# Patient Record
Sex: MALE | Race: BLACK OR AFRICAN AMERICAN | NOT HISPANIC OR LATINO
[De-identification: names, ages, dates, MRNs, and addresses within clinical notes are randomized per-mention and may not be internally consistent; named-entity substitution may affect disease eponyms.]

---

## 2017-02-14 ENCOUNTER — APPOINTMENT (OUTPATIENT)
Age: 72
End: 2017-02-14

## 2017-02-16 ENCOUNTER — APPOINTMENT (OUTPATIENT)
Dept: VASCULAR SURGERY | Facility: CLINIC | Age: 72
End: 2017-02-16

## 2017-02-16 VITALS
WEIGHT: 170 LBS | HEIGHT: 64 IN | RESPIRATION RATE: 16 BRPM | TEMPERATURE: 98.2 F | BODY MASS INDEX: 29.02 KG/M2 | HEART RATE: 76 BPM | SYSTOLIC BLOOD PRESSURE: 182 MMHG | DIASTOLIC BLOOD PRESSURE: 79 MMHG

## 2017-05-15 ENCOUNTER — APPOINTMENT (OUTPATIENT)
Dept: VASCULAR SURGERY | Facility: CLINIC | Age: 72
End: 2017-05-15

## 2017-08-07 ENCOUNTER — APPOINTMENT (OUTPATIENT)
Dept: VASCULAR SURGERY | Facility: CLINIC | Age: 72
End: 2017-08-07
Payer: MEDICARE

## 2017-08-07 PROCEDURE — 93990 DOPPLER FLOW TESTING: CPT

## 2017-08-07 PROCEDURE — 99214 OFFICE O/P EST MOD 30 MIN: CPT | Mod: 25

## 2017-08-21 ENCOUNTER — FORM ENCOUNTER (OUTPATIENT)
Age: 72
End: 2017-08-21

## 2017-08-22 ENCOUNTER — APPOINTMENT (OUTPATIENT)
Age: 72
End: 2017-08-22
Payer: MEDICARE

## 2017-08-22 PROCEDURE — 36907Z: CUSTOM | Mod: 59

## 2017-08-22 PROCEDURE — 36902Z: CUSTOM

## 2017-10-23 ENCOUNTER — APPOINTMENT (OUTPATIENT)
Dept: VASCULAR SURGERY | Facility: CLINIC | Age: 72
End: 2017-10-23

## 2017-11-13 ENCOUNTER — FORM ENCOUNTER (OUTPATIENT)
Age: 72
End: 2017-11-13

## 2017-11-14 ENCOUNTER — APPOINTMENT (OUTPATIENT)
Age: 72
End: 2017-11-14
Payer: MEDICARE

## 2017-11-14 PROCEDURE — 36902Z: CUSTOM

## 2017-11-14 PROCEDURE — 36907Z: CUSTOM | Mod: 59

## 2018-03-14 ENCOUNTER — FORM ENCOUNTER (OUTPATIENT)
Age: 73
End: 2018-03-14

## 2018-03-15 ENCOUNTER — APPOINTMENT (OUTPATIENT)
Age: 73
End: 2018-03-15
Payer: MEDICARE

## 2018-03-15 PROCEDURE — 36902Z: CUSTOM

## 2018-07-09 ENCOUNTER — FORM ENCOUNTER (OUTPATIENT)
Age: 73
End: 2018-07-09

## 2018-07-10 ENCOUNTER — APPOINTMENT (OUTPATIENT)
Dept: ENDOVASCULAR SURGERY | Facility: CLINIC | Age: 73
End: 2018-07-10
Payer: MEDICARE

## 2018-07-10 PROCEDURE — 36907Z: CUSTOM | Mod: 59

## 2018-07-10 PROCEDURE — 36902Z: CUSTOM

## 2018-10-29 ENCOUNTER — FORM ENCOUNTER (OUTPATIENT)
Age: 73
End: 2018-10-29

## 2018-10-30 ENCOUNTER — APPOINTMENT (OUTPATIENT)
Dept: ENDOVASCULAR SURGERY | Facility: CLINIC | Age: 73
End: 2018-10-30
Payer: MEDICARE

## 2018-10-30 PROCEDURE — 36902Z: CUSTOM

## 2018-10-30 PROCEDURE — 36907Z: CUSTOM | Mod: 59

## 2019-01-02 ENCOUNTER — INPATIENT (INPATIENT)
Facility: HOSPITAL | Age: 74
LOS: 2 days | Discharge: NURSING, REHAB OR LONG-TERM CARE | DRG: 280 | End: 2019-01-05
Attending: INTERNAL MEDICINE | Admitting: INTERNAL MEDICINE
Payer: MEDICARE

## 2019-01-02 VITALS
RESPIRATION RATE: 22 BRPM | HEIGHT: 65 IN | OXYGEN SATURATION: 99 % | HEART RATE: 109 BPM | SYSTOLIC BLOOD PRESSURE: 210 MMHG | DIASTOLIC BLOOD PRESSURE: 118 MMHG | WEIGHT: 169.98 LBS | TEMPERATURE: 98 F

## 2019-01-02 DIAGNOSIS — I48.2 CHRONIC ATRIAL FIBRILLATION: ICD-10-CM

## 2019-01-02 DIAGNOSIS — E87.70 FLUID OVERLOAD, UNSPECIFIED: ICD-10-CM

## 2019-01-02 DIAGNOSIS — J90 PLEURAL EFFUSION, NOT ELSEWHERE CLASSIFIED: ICD-10-CM

## 2019-01-02 DIAGNOSIS — R06.02 SHORTNESS OF BREATH: ICD-10-CM

## 2019-01-02 DIAGNOSIS — N18.6 END STAGE RENAL DISEASE: ICD-10-CM

## 2019-01-02 DIAGNOSIS — Z29.9 ENCOUNTER FOR PROPHYLACTIC MEASURES, UNSPECIFIED: ICD-10-CM

## 2019-01-02 DIAGNOSIS — I21.4 NON-ST ELEVATION (NSTEMI) MYOCARDIAL INFARCTION: ICD-10-CM

## 2019-01-02 DIAGNOSIS — I10 ESSENTIAL (PRIMARY) HYPERTENSION: ICD-10-CM

## 2019-01-02 LAB
ALBUMIN SERPL ELPH-MCNC: 3.3 G/DL — LOW (ref 3.5–5)
ALP SERPL-CCNC: 155 U/L — HIGH (ref 40–120)
ALT FLD-CCNC: 43 U/L DA — SIGNIFICANT CHANGE UP (ref 10–60)
ANION GAP SERPL CALC-SCNC: 18 MMOL/L — HIGH (ref 5–17)
APTT BLD: 31 SEC — SIGNIFICANT CHANGE UP (ref 27.5–36.3)
AST SERPL-CCNC: 21 U/L — SIGNIFICANT CHANGE UP (ref 10–40)
BASOPHILS # BLD AUTO: 0 K/UL — SIGNIFICANT CHANGE UP (ref 0–0.2)
BASOPHILS NFR BLD AUTO: 0.4 % — SIGNIFICANT CHANGE UP (ref 0–2)
BILIRUB SERPL-MCNC: 0.4 MG/DL — SIGNIFICANT CHANGE UP (ref 0.2–1.2)
BUN SERPL-MCNC: 85 MG/DL — HIGH (ref 7–18)
CALCIUM SERPL-MCNC: 7.4 MG/DL — LOW (ref 8.4–10.5)
CHLORIDE SERPL-SCNC: 104 MMOL/L — SIGNIFICANT CHANGE UP (ref 96–108)
CK MB BLD-MCNC: 1.2 % — SIGNIFICANT CHANGE UP (ref 0–3.5)
CK MB CFR SERPL CALC: 5.9 NG/ML — HIGH (ref 0–3.6)
CK SERPL-CCNC: 511 U/L — HIGH (ref 35–232)
CO2 SERPL-SCNC: 20 MMOL/L — LOW (ref 22–31)
CREAT SERPL-MCNC: 13.9 MG/DL — HIGH (ref 0.5–1.3)
EOSINOPHIL # BLD AUTO: 0.2 K/UL — SIGNIFICANT CHANGE UP (ref 0–0.5)
EOSINOPHIL NFR BLD AUTO: 2.1 % — SIGNIFICANT CHANGE UP (ref 0–6)
GLUCOSE BLDC GLUCOMTR-MCNC: 96 MG/DL — SIGNIFICANT CHANGE UP (ref 70–99)
GLUCOSE SERPL-MCNC: 108 MG/DL — HIGH (ref 70–99)
HCT VFR BLD CALC: 33.4 % — LOW (ref 39–50)
HGB BLD-MCNC: 10.8 G/DL — LOW (ref 13–17)
INR BLD: 1.02 RATIO — SIGNIFICANT CHANGE UP (ref 0.88–1.16)
LYMPHOCYTES # BLD AUTO: 2 K/UL — SIGNIFICANT CHANGE UP (ref 1–3.3)
LYMPHOCYTES # BLD AUTO: 21.5 % — SIGNIFICANT CHANGE UP (ref 13–44)
MCHC RBC-ENTMCNC: 32.4 GM/DL — SIGNIFICANT CHANGE UP (ref 32–36)
MCHC RBC-ENTMCNC: 32.6 PG — SIGNIFICANT CHANGE UP (ref 27–34)
MCV RBC AUTO: 100.5 FL — HIGH (ref 80–100)
MONOCYTES # BLD AUTO: 0.6 K/UL — SIGNIFICANT CHANGE UP (ref 0–0.9)
MONOCYTES NFR BLD AUTO: 6.5 % — SIGNIFICANT CHANGE UP (ref 2–14)
NEUTROPHILS # BLD AUTO: 6.4 K/UL — SIGNIFICANT CHANGE UP (ref 1.8–7.4)
NEUTROPHILS NFR BLD AUTO: 69.5 % — SIGNIFICANT CHANGE UP (ref 43–77)
NT-PROBNP SERPL-SCNC: 8696 PG/ML — HIGH (ref 0–125)
PLATELET # BLD AUTO: 153 K/UL — SIGNIFICANT CHANGE UP (ref 150–400)
POTASSIUM SERPL-MCNC: 5.3 MMOL/L — SIGNIFICANT CHANGE UP (ref 3.5–5.3)
POTASSIUM SERPL-SCNC: 5.3 MMOL/L — SIGNIFICANT CHANGE UP (ref 3.5–5.3)
PROT SERPL-MCNC: 8 G/DL — SIGNIFICANT CHANGE UP (ref 6–8.3)
PROTHROM AB SERPL-ACNC: 11.3 SEC — SIGNIFICANT CHANGE UP (ref 10–12.9)
RBC # BLD: 3.33 M/UL — LOW (ref 4.2–5.8)
RBC # FLD: 14.5 % — SIGNIFICANT CHANGE UP (ref 10.3–14.5)
SODIUM SERPL-SCNC: 142 MMOL/L — SIGNIFICANT CHANGE UP (ref 135–145)
TROPONIN I SERPL-MCNC: 0.11 NG/ML — HIGH (ref 0–0.04)
TROPONIN I SERPL-MCNC: 0.42 NG/ML — HIGH (ref 0–0.04)
WBC # BLD: 9.2 K/UL — SIGNIFICANT CHANGE UP (ref 3.8–10.5)
WBC # FLD AUTO: 9.2 K/UL — SIGNIFICANT CHANGE UP (ref 3.8–10.5)

## 2019-01-02 PROCEDURE — 71045 X-RAY EXAM CHEST 1 VIEW: CPT | Mod: 26

## 2019-01-02 PROCEDURE — 71045 X-RAY EXAM CHEST 1 VIEW: CPT | Mod: 26,77

## 2019-01-02 PROCEDURE — 99285 EMERGENCY DEPT VISIT HI MDM: CPT

## 2019-01-02 PROCEDURE — 93010 ELECTROCARDIOGRAM REPORT: CPT

## 2019-01-02 RX ORDER — DILTIAZEM HCL 120 MG
120 CAPSULE, EXT RELEASE 24 HR ORAL DAILY
Qty: 0 | Refills: 0 | Status: DISCONTINUED | OUTPATIENT
Start: 2019-01-02 | End: 2019-01-05

## 2019-01-02 RX ORDER — ATORVASTATIN CALCIUM 80 MG/1
40 TABLET, FILM COATED ORAL AT BEDTIME
Qty: 0 | Refills: 0 | Status: DISCONTINUED | OUTPATIENT
Start: 2019-01-02 | End: 2019-01-02

## 2019-01-02 RX ORDER — SEVELAMER CARBONATE 2400 MG/1
800 POWDER, FOR SUSPENSION ORAL THREE TIMES A DAY
Qty: 0 | Refills: 0 | Status: DISCONTINUED | OUTPATIENT
Start: 2019-01-02 | End: 2019-01-05

## 2019-01-02 RX ORDER — CALCITRIOL 0.5 UG/1
0.25 CAPSULE ORAL DAILY
Qty: 0 | Refills: 0 | Status: DISCONTINUED | OUTPATIENT
Start: 2019-01-02 | End: 2019-01-05

## 2019-01-02 RX ORDER — COLCHICINE 0.6 MG
0.6 TABLET ORAL
Qty: 0 | Refills: 0 | Status: DISCONTINUED | OUTPATIENT
Start: 2019-01-02 | End: 2019-01-03

## 2019-01-02 RX ORDER — HEPARIN SODIUM 5000 [USP'U]/ML
INJECTION INTRAVENOUS; SUBCUTANEOUS
Qty: 25000 | Refills: 0 | Status: DISCONTINUED | OUTPATIENT
Start: 2019-01-02 | End: 2019-01-03

## 2019-01-02 RX ORDER — INSULIN LISPRO 100/ML
VIAL (ML) SUBCUTANEOUS
Qty: 0 | Refills: 0 | Status: DISCONTINUED | OUTPATIENT
Start: 2019-01-02 | End: 2019-01-03

## 2019-01-02 RX ORDER — METOPROLOL TARTRATE 50 MG
25 TABLET ORAL
Qty: 0 | Refills: 0 | Status: DISCONTINUED | OUTPATIENT
Start: 2019-01-02 | End: 2019-01-05

## 2019-01-02 RX ORDER — ATORVASTATIN CALCIUM 80 MG/1
80 TABLET, FILM COATED ORAL AT BEDTIME
Qty: 0 | Refills: 0 | Status: DISCONTINUED | OUTPATIENT
Start: 2019-01-02 | End: 2019-01-05

## 2019-01-02 RX ORDER — NITROGLYCERIN 6.5 MG
0.4 CAPSULE, EXTENDED RELEASE ORAL ONCE
Qty: 0 | Refills: 0 | Status: COMPLETED | OUTPATIENT
Start: 2019-01-02 | End: 2019-01-02

## 2019-01-02 RX ORDER — DOCUSATE SODIUM 100 MG
200 CAPSULE ORAL DAILY
Qty: 0 | Refills: 0 | Status: DISCONTINUED | OUTPATIENT
Start: 2019-01-02 | End: 2019-01-05

## 2019-01-02 RX ORDER — ISOSORBIDE DINITRATE 5 MG/1
20 TABLET ORAL
Qty: 0 | Refills: 0 | Status: DISCONTINUED | OUTPATIENT
Start: 2019-01-02 | End: 2019-01-05

## 2019-01-02 RX ORDER — ASPIRIN/CALCIUM CARB/MAGNESIUM 324 MG
81 TABLET ORAL DAILY
Qty: 0 | Refills: 0 | Status: DISCONTINUED | OUTPATIENT
Start: 2019-01-02 | End: 2019-01-05

## 2019-01-02 RX ORDER — CLOPIDOGREL BISULFATE 75 MG/1
75 TABLET, FILM COATED ORAL DAILY
Qty: 0 | Refills: 0 | Status: DISCONTINUED | OUTPATIENT
Start: 2019-01-02 | End: 2019-01-05

## 2019-01-02 RX ORDER — HEPARIN SODIUM 5000 [USP'U]/ML
5000 INJECTION INTRAVENOUS; SUBCUTANEOUS EVERY 12 HOURS
Qty: 0 | Refills: 0 | Status: DISCONTINUED | OUTPATIENT
Start: 2019-01-02 | End: 2019-01-02

## 2019-01-02 RX ORDER — LACTULOSE 10 G/15ML
20 SOLUTION ORAL DAILY
Qty: 0 | Refills: 0 | Status: DISCONTINUED | OUTPATIENT
Start: 2019-01-02 | End: 2019-01-05

## 2019-01-02 RX ADMIN — HEPARIN SODIUM 950 UNIT(S)/HR: 5000 INJECTION INTRAVENOUS; SUBCUTANEOUS at 18:54

## 2019-01-02 RX ADMIN — Medication 0.1 MILLIGRAM(S): at 09:29

## 2019-01-02 RX ADMIN — Medication 0.4 MILLIGRAM(S): at 08:57

## 2019-01-02 NOTE — ED PROVIDER NOTE - MEDICAL DECISION MAKING DETAILS
Patient in mild respiratory distress. Patient hypertensive with no fever, portable X-ray, call dialysis to expedite dialysis for patient.

## 2019-01-02 NOTE — H&P ADULT - PROBLEM SELECTOR PLAN 6
IMPROVE VTE Individual Risk Assessment          RISK                                                          Points  [  ] Previous VTE                                                3  [  ] Thrombophilia                                             2  [  ] Lower limb paralysis                                   2        (unable to hold up >15 seconds)    [  ] Current Cancer                                             2         (within 6 months)  [x  ] Immobilization > 24 hrs                              1  [  ] ICU/CCU stay > 24 hours                             1  [ x ] Age > 60                                                         1    IMPROVE VTE Score: 2  heparin drip

## 2019-01-02 NOTE — ED PROVIDER NOTE - OBJECTIVE STATEMENT
72 y/o M patient with a significant PMHx of ESRD on dialysis and no significant PSHx presents to the ED with SOB since this morning. Patient denies chest pain, cough, fever and any other complaints. Patient is due for dialysis this morning. 74 y/o M patient with a significant PMHx of ESRD on dialysis and no significant PSHx presents to the ED with SOB since this morning. Patient denies chest pain, cough, fever and any other complaints. Patient is due for dialysis this morning, last HD on sunday.

## 2019-01-02 NOTE — H&P ADULT - HISTORY OF PRESENT ILLNESS
73M, from [facility], w/ PMHx ESRD, T2DM, HTN, CVA, Chronic Afib, Vascular dementia who presents to the ED c/o SOB. Patient was found to have BP: 220/148 mmHg at NH in addition to dyspnea. Rest of history limited as patient is verbally abusive and not cooperating with staff. Patient is s/p emergent HD for fluid overload. Resting comfortably and speaking in full sentences w/o distress. 73M, from [facility], w/ PMHx ESRD, HTN, CVA (Rt Hemiplegia), AOCD, Chronic Afib (Eliquis), Vascular dementia, Gout, GERD, who presents to the ED c/o SOB. Patient was found to have BP: 220/148 mmHg at NH in addition to dyspnea. Rest of history limited as patient is verbally abusive and not cooperating with staff. Patient is s/p emergent HD for fluid overload. Resting comfortably and speaking in full sentences w/o distress.

## 2019-01-02 NOTE — ED ADULT NURSE REASSESSMENT NOTE - NS ED NURSE REASSESS COMMENT FT1
Pt returned to ED S/P emergent dialysis AxOx1 attached to cardiac monitor and N/C 3lit/min Pt noted uncooperative and verbally abusive refuse to F/S MD Mccracken  made aware
Pt transferred to Dialysis via stretcher no cardiac monitor indicated as per DR Keita.

## 2019-01-02 NOTE — H&P ADULT - PROBLEM SELECTOR PLAN 1
Patient p/w SOB and hypertensive urgency w/ BP: 210/118 mmHg  s/p emergent HD w/ improvement of symptoms  CXR: effusions  Follow up CXR post HD

## 2019-01-02 NOTE — ED PROVIDER NOTE - CARE PLAN
Principal Discharge DX:	ESRD (end stage renal disease) on dialysis  Secondary Diagnosis:	SOB (shortness of breath)  Secondary Diagnosis:	NSTEMI (non-ST elevated myocardial infarction)  Secondary Diagnosis:	NSTEMI (non-ST elevated myocardial infarction)

## 2019-01-02 NOTE — ED ADULT NURSE NOTE - OBJECTIVE STATEMENT
B[facility] for [facility] and difficulty in breathing denies C/P placed on cardiac monitor attached to NRM safety maintained, Pt on hemodialysis MWF Rt arm AV shunt with +bruit noted

## 2019-01-02 NOTE — H&P ADULT - ASSESSMENT
73M, from [facility], w/ PMHx ESRD, T2DM, HTN, CVA, Chronic Afib, Vascular dementia who presents to the ED c/o SOB. Patient was found to have BP: 220/148 mmHg at NH in addition to dyspnea. Rest of history limited as patient is verbally abusive and not cooperating with staff. Patient is s/p emergent HD for fluid overload. Resting comfortably and speaking in full sentences w/o distress. 73M, from [facility], w/ PMHx ESRD, HTN, CVA (Rt Hemiplegia), AOCD, Chronic Afib (Eliquis), Vascular dementia, Gout, GERD, who presents to the ED c/o SOB. Patient was found to have BP: 220/148 mmHg at NH in addition to dyspnea. Rest of history limited as patient is verbally abusive and not cooperating with staff. Patient is s/p emergent HD for fluid overload. Resting comfortably and speaking in full sentences w/o distress. 73M, from [facility], w/ PMHx ESRD, HTN, CVA (Rt Hemiplegia), AOCD, Chronic Afib (Eliquis), Vascular dementia, Gout, GERD, who presents to the ED c/o SOB. Patient was found to have BP: 220/148 mmHg at NH in addition to dyspnea. Rest of history limited as patient is verbally abusive and not cooperating with staff. Patient is s/p emergent HD for fluid overload. Resting comfortably and speaking in full sentences w/o distress.        Patient admitted for hypertensive urgency 2/2 fluid overload, s/p emergent HD

## 2019-01-02 NOTE — ED ADULT NURSE NOTE - ED STAT RN HANDOFF DETAILS
Pt on cardiac monitor appearing not distress denies C/P oe sob at present started Heparin drip as per MD orders, endorsed to Alia ROPER, safety maintained all the times

## 2019-01-02 NOTE — ED PROVIDER NOTE - PROGRESS NOTE DETAILS
labs reveal elevated troponin. xray with pleural effusion, patient feeling better with nitro and clonidine. pending HD at noon. Dr Jacinto saw patient, admit for HD. xray with effusion and possible opacity. no secondary signs of infection, suspect fluid overload rather than pneumonia. will hold of antibiotics until after HD.

## 2019-01-02 NOTE — ED ADULT TRIAGE NOTE - CHIEF COMPLAINT QUOTE
BIBEMS from [facility] for difficulty breathing. right arm AV shunt, due for dialysis today.

## 2019-01-02 NOTE — ED ADULT NURSE NOTE - INTERVENTIONS DEFINITIONS
Provide visual clues: red socks/Instruct patient to call for assistance/Stretcher in lowest position, wheels locked, appropriate side rails in place

## 2019-01-02 NOTE — PROGRESS NOTE ADULT - SUBJECTIVE AND OBJECTIVE BOX
DORSAINVIL, JEANCLAUDE  MRN-[id number]  MED ATTENDING INITIAL ADMITTING NOTE:  Patient is a 73y old  Male who presents with a chief complaint of     HISTORY OF PRESENT ILLNESS:    MEDICATIONS  (STANDING):      MEDICATIONS  (PRN):      Allergies    No Known Allergies    Intolerances        PAST MEDICAL & SURGICAL HISTORY:  ESRD (end stage renal disease) on dialysis  No significant past surgical histor    FAMILY HISTORY:      SOCIAL HISTORY  Smoking History:     REVIEW OF SYSTEMS:    CONSTITUTIONAL:  Fevers [  ]  Yes  [  ]  No                                   chills [  ]  Yes  [  ]  No                               sweats  [  ]  Yes  [  ]  No                                fatigue [  ]  Yes  [  ]  No    HEENT:  Eyes:  Diplopia or blurred vision [  ]  Yes  [  ]  No    ENT:                        earache  [  ]  Yes  [  ]  No                             sore throat  [  ]  Yes  [  ]  No                            runny nose.  [  ]  Yes  [  ]  No    CARDIOVASCULAR:       chest pain  [  ]  Yes  [  ]  No                        squeezing, tightness,  [  ]  Yes  [  ]  No                                      palpitations.  [  ]  Yes  [  ]  No    RESPIRATORY:             Cough [  ]  Yes  [  ]  No                                  Wheezing [  ]  Yes  [  ]  No                                Hemoptysis [  ]  Yes  [  ]  No                                      Sputum [  ]  Yes  [  ]  No                   Shortness of Breathe [  ]  Yes  [  ]  No    GASTROINTESTINAL:      Abdominal pain  [  ]  Yes  [  ]  No                                                    Nausea  [  ]  Yes  [  ]  No                                                   Vomiting [  ]  Yes  [  ]  No                                              Constipation [  ]  Yes  [  ]  No                                                    Diarrhea [  ]  Yes  [  ]  No    GENITOURINARY:           Incontinence [  ]  Yes  [  ]  No                                                Dysuria [  ]  Yes  [  ]  No                                      Blood in Urine  [  ]  Yes  [  ]  No                          Frequency or urgency.  [  ]  Yes  [  ]  No    NEUROLOGIC:                     Paresthesias  [  ]  Yes  [  ]  No                                             fasciculations  [  ]  Yes  [  ]  No                                seizures or weakness.  [  ]  Yes  [  ]  No                                                   Headache [  ]  Yes  [  ]  No                                  Loss of Conciousness [  ]  Yes  [  ]  No                                                     Insomnia [  ]  Yes  [  ]  No    PSYCHIATRIC:    Disorder of thought or mood.  [  ]  Yes  [  ]  No                                                           Anxiety [  ]  Yes  [  ]  No                                                      Depression [  ]  Yes  [  ]  No                                                         Dementia [  ]  Yes  [  ]  No    Vital Signs Last 24 Hrs  T(C): 36.4 (02 Jan 2019 12:36), Max: 37.1 (02 Jan 2019 08:55)  T(F): 97.5 (02 Jan 2019 12:36), Max: 98.7 (02 Jan 2019 08:55)  HR: 93 (02 Jan 2019 12:36) (91 - 109)  BP: 181/100 (02 Jan 2019 12:36) (181/100 - 213/98)  BP(mean): --  RR: 20 (02 Jan 2019 12:36) (20 - 22)  SpO2: 100% (02 Jan 2019 12:36) (99% - 100%)  I&O's Detail      PHYSICAL EXAMINATION:    GENERAL: The patient is a well-developed, well-nourished _____in no apparent distress.     HEENT: Head is normocephalic and atraumatic. Extraocular muscles are intact. Mucous membranes are moist.     NECK: Supple. jvd [  ]  Yes  [  ]  No    LUNGS: Clear to auscultation  [  ]  Yes  [  ]  No                               wheezing, [  ]  Yes  [  ]  No                                      rales  [  ]  Yes  [  ]  No                                    rhonchi  [  ]  Yes  [  ]  No                 Respirations labored  [  ]  Yes  [  ]  No    HEART: Regular rate and rhythm  [  ]  Yes  [  ]  No                                        Murmur [  ]  Yes  [  ]  No                                              rub  [  ]  Yes  [  ]  No                                          gallop  [  ]  Yes  [  ]  No    ABDOMEN:                                 Soft, [  ]  Yes  [  ]  No                                             nontender [  ]  Yes  [  ]  No                                             distended.[  ]  Yes  [  ]  No                            hepatosplenomegaly ,[  ]  Yes  [  ]  No                                                    BS   [  ]  Yes  [  ]  No    EXTREMITIES:                cyanosis, [  ]  Yes  [  ]  No                                       clubbing,   [  ]  Yes  [  ]  No                                               rash, [  ]  Yes  [  ]  No                                           edema.  [  ]  Yes  [  ]  No    NEUROLOGIC: Alert and Oriented  [  ] Person [  ] Time  [ ] Place                                    Cranial nerves intact    [  ]  Yes  [  ]  No                                               sensory Intact  [  ]  Yes  [  ]  No                                                  motor WNL   [  ]  Yes  [  ]  No                                                Sin Paresis  [  ]  Yes  [  ]  No  DTR  babinski+ or -      LABS:                        10.8   9.2   )-----------( 153      ( 02 Jan 2019 10:51 )             33.4     01-02    142  |  104  |  85<H>  ----------------------------<  108<H>  5.3   |  20<L>  |  13.90<H>    Ca    7.4<L>      02 Jan 2019 09:20    TPro  8.0  /  Alb  3.3<L>  /  TBili  0.4  /  DBili  x   /  AST  21  /  ALT  43  /  AlkPhos  155<H>  01-02          CARDIAC MARKERS ( 02 Jan 2019 09:20 )  0.106 ng/mL / x     / x     / x     / x            Serum Pro-Brain Natriuretic Peptide: 8696 pg/mL (01-02-19 @ 09:20)          MICROBIOLOGY:    RADIOLOGY & ADDITIONAL STUDIES:    CXR:    Ct scan chest:    ekg;    echo: DORSAINVIL, JEANCLAUDE  [id number]  [facility] ATTENDING INITIAL ADMITTING NOTE:  Patient is a 73y old  Male who presents with a chief complaint of     HISTORY OF PRESENT ILLNESS:  · Chief Complaint: The patient is a 73y Male complaining of difficulty breathing.	  · HPI Objective Statement: 72 y/o M patient with a significant PMHx of ESRD on dialysis and no significant PSHx presents to the ED with SOB since this morning. Patient denies chest pain, cough, fever and any other complaints. Patient is due for dialysis this morning, last HD on sunday.	  · Presenting Symptoms: SHORTNESS OF BREATH	  · Negative Findings: no chest pain, no cough, no fever	  · Timing: gradual onset	  · Duration: today	  · Quality: aching	  · Context: unknown	  · Recent Exposure To: none known	    MEDICATIONS  (STANDING):  PENDING REVIEW    MEDICATIONS  (PRN):      Allergies    No Known Allergies    Intolerances        PAST MEDICAL & SURGICAL HISTORY:  ESRD (end stage renal disease) on dialysis  HTN  S/P AV GRFT    FAMILY HISTORY:      SOCIAL HISTORY  Smoking History:     REVIEW OF SYSTEMS:    CONSTITUTIONAL:  Fevers [  ]  Yes  [ X ]  No                                   chills [  ]  Yes  [ X ]  No                               sweats  [  ]  Yes  [X  ]  No                                fatigue [ X ]  Yes  [  ]  No    HEENT:  Eyes:  Diplopia or blurred vision [  ]  Yes  [X  ]  No    ENT:                        earache  [  ]  Yes  [ X ]  No                             sore throat  [  ]  Yes  [ X ]  No                            runny nose.  [  ]  Yes  [  X]  No    CARDIOVASCULAR:       chest pain  [X  ]  Yes  [  ]  No                        squeezing, tightness,  [  ]  Yes  [X  ]  No                                      palpitations.  [  ]  Yes  [X  ]  No    RESPIRATORY:             Cough [ X ]  Yes  [  ]  No                                  Wheezing [  ]  Yes  [ X ]  No                                Hemoptysis [  ]  Yes  [ X ]  No                                      Sputum [  ]  Yes  X[  ]  No                   Shortness of Breathe [ X ]  Yes  [  ]  No    GASTROINTESTINAL:      Abdominal pain  [  ]  Yes  [ X ]  No                                                    Nausea  [  ]  Yes  [X  ]  No                                                   Vomiting [  ]  Yes  [X  ]  No                                              Constipation X[  ]  Yes  [  ]  No                                                    Diarrhea [  ]  Yes  [  ]X  No    GENITOURINARY:           Incontinence [  ]  Yes  [ X ]  No                                                Dysuria [  ]  Yes  [X  ]  No                                      Blood in Urine  [  ]  Yes  [ X ]  No                          Frequency or urgency.  [  ]  Yes  [  X]  No    NEUROLOGIC:                     Paresthesias  [  ]  Yes  [  X]  No                                             fasciculations  [  ]  Yes  [ X ]  No                                seizures or weakness.  [  ]  Yes  [ X ]  No                                                   Headache [  ]  Yes  [X  ]  No                                  Loss of Conciousness [  ]  Yes  [X  ]  No                                                     Insomnia [  ]  Yes  [ X ]  No    PSYCHIATRIC:    Disorder of thought or mood.  [  ]  Yes  [X  ]  No                                                           Anxiety [  ]  Yes  [  ]  No                                                      Depression [  ]  Yes  [  ]  No                                                         Dementia [  ]  Yes  [X  ]  No    Vital Signs Last 24 Hrs  T(C): 36.4 (02 Jan 2019 12:36), Max: 37.1 (02 Jan 2019 08:55)  T(F): 97.5 (02 Jan 2019 12:36), Max: 98.7 (02 Jan 2019 08:55)  HR: 93 (02 Jan 2019 12:36) (91 - 109)  BP: 181/100 (02 Jan 2019 12:36) (181/100 - 213/98)  BP(mean): --  RR: 20 (02 Jan 2019 12:36) (20 - 22)  SpO2: 100% (02 Jan 2019 12:36) (99% - 100%)  I&O's Detail      PHYSICAL EXAMINATION:    GENERAL: The patient is a well-developed, well-nourished _____in MILD  distress.     HEENT: Head is normocephalic and atraumatic. Extraocular muscles are intact. Mucous membranes are moist.     NECK: Supple. jvd [  ]  Yes  [ X ]  No    LUNGS: Clear to auscultation  [  ]  Yes  [  X]  No                               wheezing, [  ]  Yes  [  X]  No                                      rales  [ X ]  Yes  [  ]  No , LT  BASE                                   rhonchi  [  ]  Yes  [X  ]  No                 Respirations labored  [  ]  Yes  [  X]  No    HEART: Regular rate and rhythm  X[  ]  Yes  [  ]  No                                        Murmur [  ]  Yes  [ X ]  No                                              rub  [  ]  Yes  [ X ]  No                                          gallop  [  X]  Yes  [  ]  No    ABDOMEN:                                 Soft, [X  ]  Yes  [  ]  No                                             nontender [X  ]  Yes  [  ]  No                                             distended.[  ]  Yes  [  X]  No                            hepatosplenomegaly ,[  ]  Yes  [ X ]  No                                                    BS   [ X ]  Yes  [  ]  No    EXTREMITIES:                cyanosis, [  ]  Yes  X[  ]  No                                       clubbing,   [  ]  Yes  [ X ]  No                                               rash, [  ]  Yes  [X  ]  No                                           edema.  [  ]  Yes  [ X ]  No  RT ARM  AV GRAFT    NEUROLOGIC: Alert and Oriented  [ X ] Person [ X ] Time  [ ]X Place                                    Cranial nerves intact    [ X ]  Yes  [  ]  No                                               sensory Intact  [ X ]  Yes  [  ]  No                                                  motor WNL   [ X ]  Yes  [  ]  No                                                Sin Paresis  [  ]  Yes  [  X]  No  DTR  babinski+ or -      LABS:                        10.8   9.2   )-----------( 153      ( 02 Jan 2019 10:51 )             33.4     01-02    142  |  104  |  85<H>  ----------------------------<  108<H>  5.3   |  20<L>  |  13.90<H>    Ca    7.4<L>      02 Jan 2019 09:20    TPro  8.0  /  Alb  3.3<L>  /  TBili  0.4  /  DBili  x   /  AST  21  /  ALT  43  /  AlkPhos  155<H>  01-02          CARDIAC MARKERS ( 02 Jan 2019 09:20 )  0.106 ng/mL / x     / x     / x     / x            Serum Pro-Brain Natriuretic Peptide: 8696 pg/mL (01-02-19 @ 09:20)          MICROBIOLOGY:    RADIOLOGY & ADDITIONAL STUDIES:    CXR:  < from: Xray Chest 1 View-PORTABLE IMMEDIATE (01.02.19 @ 10:53) >    FINDINGS/  IMPRESSION:  New small leftpleural effusion with adjacent opacity. Vascular stent   overlies right axillary and subclavian region.    Heart size cannot be accurately assessed in this projection.                  COSTA CARVAJAL M.D., ATTENDING RADIOLOGIST  This document has been electronically signed. Jan 2 2019 11:13AM                < end of copied text >    Ct scan chest:    ekg; NSR WITH OLD CHANGE    echo:

## 2019-01-02 NOTE — CONSULT NOTE ADULT - SUBJECTIVE AND OBJECTIVE BOX
Chief complain and HPI  Patient came to the ER for SOB THAT STARTED IN EARLY AM.    ESRD due to hypertension  CVA  History of PAF.    PAST MEDICAL & SURGICAL HISTORY:  ESRD (end stage renal disease) on dialysis  No significant past surgical history      Home Medications Reviewed    Hospital Medications:   MEDICATIONS  (STANDING):    MEDICATIONS  (PRN):      Allergies    No Known Allergies    Intolerances          01-02    142  |  104  |  85<H>  ----------------------------<  108<H>  5.3   |  20<L>  |  13.90<H>    Ca    7.4<L>      02 Jan 2019 09:20    TPro  8.0  /  Alb  3.3<L>  /  TBili  0.4  /  DBili  x   /  AST  21  /  ALT  43  /  AlkPhos  155<H>  01-02              RADIOLOGY & ADDITIONAL STUDIES:    SOCIAL HISTORY: Denies ETOh,Smoking,     FAMILY HISTORY:      REVIEW OF SYSTEMS:  CONSTITUTIONAL: No malaise, No fatigue, No fevers or chills, well developed, no diaphoresis  EYES/ENT: No visual changes;  No vertigo or throat pain   NECK: No pain or stiffness  RESPIRATORY: No cough, wheezing, hemoptysis; No shortness of breath  CARDIOVASCULAR: No chest pain or palpitations. No edema  GASTROINTESTINAL: No abdominal or epigastric pain. No nausea, vomiting, or hematemesis; No diarrhea or constipation. No melena or hematochezia.  GENITOURINARY: No dysuria, frequency, foamy urine, urinary urgency, incontinence or hematuria  NEUROLOGICAL: No numbness or weakness, No tremor  SKIN: No itching, burning, rashes, or lesions   VASCULAR: No claudication  Musculoskeletal: no arthralgia, no myalgia  All other review of systems is negative unless indicated above.    VITALS:  Vital Signs Last 24 Hrs  T(C): 37.1 (02 Jan 2019 08:55), Max: 37.1 (02 Jan 2019 08:55)  T(F): 98.7 (02 Jan 2019 08:55), Max: 98.7 (02 Jan 2019 08:55)  HR: 95 (02 Jan 2019 09:08) (95 - 109)      BP: 200/94 (02 Jan 2019 09:08) (200/94 - 213/98)  BP(mean): --  RR: 22 (02 Jan 2019 09:08) (22 - 22)  SpO2: 99% (02 Jan 2019 08:55) (99% - 99%)    Height (cm): 165.1 (01-02 @ 08:24)  Weight (kg): 77.1 (01-02 @ 08:24)  BMI (kg/m2): 28.3 (01-02 @ 08:24)  BSA (m2): 1.85 (01-02 @ 08:24)    PHYSICAL EXAM:  Constitutional: NAD  HEENT: anicteric sclera, oropharynx clear, MMM  Neck: No JVD  Respiratory: good air entrance B/L, no wheezes, rales or rhonchi  Cardiovascular: S1, S2, RRR, no pericardial rub, no murmur  Gastrointestinal: BS+, soft, no tenderness, no distension, no bruit  Pelvis: bladder non-distended, no CVA tenderness  Extremities: No cyanosis or clubbing. No peripheral edema  Neurological: A/O x 3, no focal deficits  Psychiatric: Normal mood, normal affect  : No CVA tenderness. No kumar.   Skin: No rashes  Vascular: all pulses present  Access: Chief complain and HPI  Patient came to the ER for SOB THAT STARTED IN EARLY AM.    ESRD due to hypertension  CVA, right hemiparesis  DM  GOUT  ASHD  History of PAF.    PAST MEDICAL & SURGICAL HISTORY:  ESRD (end stage renal disease) on dialysis  No significant past surgical history      Home Medications Reviewed    Hospital Medications:   MEDICATIONS  (STANDING):    MEDICATIONS  (PRN):      Allergies    No Known Allergies    Intolerances          01-02    142  |  104  |  85<H>  ----------------------------<  108<H>  5.3   |  20<L>  |  13.90<H>    Ca    7.4<L>      02 Jan 2019 09:20    TPro  8.0  /  Alb  3.3<L>  /  TBili  0.4  /  DBili  x   /  AST  21  /  ALT  43  /  AlkPhos  155<H>  01-02              RADIOLOGY & ADDITIONAL STUDIES:    SOCIAL HISTORY: Denies ETOh,Smoking,     FAMILY HISTORY:      REVIEW OF SYSTEMS:  CONSTITUTIONAL: No malaise, No fatigue, No fevers or chills, well developed, no diaphoresis  EYES/ENT: No visual changes;  No vertigo or throat pain   NECK: No pain or stiffness  RESPIRATORY: No cough, wheezing, hemoptysis; No shortness of breath  CARDIOVASCULAR: No chest pain or palpitations. No edema  GASTROINTESTINAL: No abdominal or epigastric pain. No nausea, vomiting, or hematemesis; No diarrhea or constipation. No melena or hematochezia.  GENITOURINARY: No dysuria, frequency, foamy urine, urinary urgency, incontinence or hematuria  NEUROLOGICAL: No numbness or weakness, No tremor  SKIN: No itching, burning, rashes, or lesions   VASCULAR: No claudication  Musculoskeletal: no arthralgia, no myalgia  All other review of systems is negative unless indicated above.    VITALS:  Vital Signs Last 24 Hrs  T(C): 37.1 (02 Jan 2019 08:55), Max: 37.1 (02 Jan 2019 08:55)  T(F): 98.7 (02 Jan 2019 08:55), Max: 98.7 (02 Jan 2019 08:55)  HR: 95 (02 Jan 2019 09:08) (95 - 109)      BP: 200/94 (02 Jan 2019 09:08) (200/94 - 213/98)  BP(mean): --  RR: 22 (02 Jan 2019 09:08) (22 - 22)  SpO2: 99% (02 Jan 2019 08:55) (99% - 99%)    Height (cm): 165.1 (01-02 @ 08:24)  Weight (kg): 77.1 (01-02 @ 08:24)  BMI (kg/m2): 28.3 (01-02 @ 08:24)  BSA (m2): 1.85 (01-02 @ 08:24)    PHYSICAL EXAM:  Constitutional: NAD  HEENT: anicteric sclera, oropharynx clear, MMM  Neck: No JVD  Respiratory: good air entrance B/L, no wheezes, rales or rhonchi  Cardiovascular: S1, S2, RRR, no pericardial rub, no murmur  Gastrointestinal: BS+, soft, no tenderness, no distension, no bruit  Pelvis: bladder non-distended, no CVA tenderness  Extremities: No cyanosis or clubbing. No peripheral edema  Neurological: A/O x 3, no focal deficits  Psychiatric: Normal mood, normal affect  : No CVA tenderness. No kumar.   Skin: No rashes  Vascular: all pulses present  Access: Chief complain and HPI  Patient came to the ER for SOB THAT STARTED IN EARLY AM.    ESRD due to hypertension  CVA, right hemiparesis  DM  GOUT  ASHD  History of PAF.    PAST MEDICAL & SURGICAL HISTORY:  ESRD (end stage renal disease) on dialysis  No significant past surgical history      Home Medications Reviewed    Hospital Medications:   MEDICATIONS  (STANDING):    MEDICATIONS  (PRN):      Allergies    No Known Allergies    Intolerances      Troponin I, Serum: 0.106: TYPE:(C=Critical, N=Notification, A=Abnormal) N      01-02    142  |  104  |  85<H>  ----------------------------<  108<H>  5.3   |  20<L>  |  13.90<H>    Ca    7.4<L>      02 Jan 2019 09:20    TPro  8.0  /  Alb  3.3<L>  /  TBili  0.4  /  DBili  x   /  AST  21  /  ALT  43  /  AlkPhos  155<H>  01-02              RADIOLOGY & ADDITIONAL STUDIES:    SOCIAL HISTORY: Denies ETOh,Smoking,     FAMILY HISTORY:      REVIEW OF SYSTEMS:  CONSTITUTIONAL: No malaise, No fatigue, No fevers or chills, well developed, no diaphoresis  EYES/ENT: No visual changes;  No vertigo or throat pain   NECK: No pain or stiffness  RESPIRATORY: No cough, wheezing, hemoptysis; No shortness of breath  CARDIOVASCULAR: No chest pain or palpitations. No edema  GASTROINTESTINAL: No abdominal or epigastric pain. No nausea, vomiting, or hematemesis; No diarrhea or constipation. No melena or hematochezia.  GENITOURINARY: No dysuria, frequency, foamy urine, urinary urgency, incontinence or hematuria  NEUROLOGICAL: No numbness or weakness, No tremor  SKIN: No itching, burning, rashes, or lesions   VASCULAR: No claudication  Musculoskeletal: no arthralgia, no myalgia  All other review of systems is negative unless indicated above.    VITALS:  Vital Signs Last 24 Hrs  T(C): 37.1 (02 Jan 2019 08:55), Max: 37.1 (02 Jan 2019 08:55)  T(F): 98.7 (02 Jan 2019 08:55), Max: 98.7 (02 Jan 2019 08:55)  HR: 95 (02 Jan 2019 09:08) (95 - 109)      BP: 200/94 (02 Jan 2019 09:08) (200/94 - 213/98)  BP(mean): --  RR: 22 (02 Jan 2019 09:08) (22 - 22)  SpO2: 99% (02 Jan 2019 08:55) (99% - 99%)    Height (cm): 165.1 (01-02 @ 08:24)  Weight (kg): 77.1 (01-02 @ 08:24)  BMI (kg/m2): 28.3 (01-02 @ 08:24)  BSA (m2): 1.85 (01-02 @ 08:24)    PHYSICAL EXAM:  Constitutional: NAD  HEENT: anicteric sclera, oropharynx clear, MMM  Neck: No JVD  Respiratory: good air entrance B/L, no wheezes, rales or rhonchi  Cardiovascular: S1, S2, RRR, no pericardial rub, no murmur  Gastrointestinal: BS+, soft, no tenderness, no distension, no bruit  Pelvis: bladder non-distended, no CVA tenderness  Extremities: No cyanosis or clubbing. No peripheral edema  Neurological: A/O x 3, no focal deficits  Psychiatric: Normal mood, normal affect  : No CVA tenderness. No kumar.   Skin: No rashes  Vascular: all pulses present  Access:

## 2019-01-02 NOTE — H&P ADULT - PROBLEM SELECTOR PLAN 2
ECG: SR VR@95bpm, T1: 0.106  Could be type 2 MI from demand in setting of hypertensive crisis, however given risk factors will start heparin drip and consult cardio for further evaluation  Follow up T2,T3  c/w ASA + Lipitor 80 mg HS + Lopressor 25 mg BID  Telemonitoring  Follow up TSH, HbA1C, Lipid profile  Follow up TTE  Cardio consult: Dr Morales ECG: SR VR@95bpm, T1: 0.106  Could be type 2 MI from demand in setting of hypertensive crisis, however given risk factors will start heparin drip and consult cardio for further evaluation  Follow up T2,T3  Refusing repeat ECG  c/w ASA + Lipitor 80 mg HS + Lopressor 25 mg BID  Telemonitoring  Follow up TSH, HbA1C, Lipid profile  Follow up TTE  Cardio consult: Dr Morales

## 2019-01-03 DIAGNOSIS — I48.91 UNSPECIFIED ATRIAL FIBRILLATION: ICD-10-CM

## 2019-01-03 DIAGNOSIS — R74.8 ABNORMAL LEVELS OF OTHER SERUM ENZYMES: ICD-10-CM

## 2019-01-03 LAB
ANION GAP SERPL CALC-SCNC: 12 MMOL/L — SIGNIFICANT CHANGE UP (ref 5–17)
BUN SERPL-MCNC: 59 MG/DL — HIGH (ref 7–18)
CALCIUM SERPL-MCNC: 7.5 MG/DL — LOW (ref 8.4–10.5)
CALCIUM SERPL-MCNC: 9 MG/DL — SIGNIFICANT CHANGE UP (ref 8.4–10.5)
CHLORIDE SERPL-SCNC: 102 MMOL/L — SIGNIFICANT CHANGE UP (ref 96–108)
CK MB BLD-MCNC: 0.7 % — SIGNIFICANT CHANGE UP (ref 0–3.5)
CK MB CFR SERPL CALC: 3.4 NG/ML — SIGNIFICANT CHANGE UP (ref 0–3.6)
CK SERPL-CCNC: 474 U/L — HIGH (ref 35–232)
CO2 SERPL-SCNC: 28 MMOL/L — SIGNIFICANT CHANGE UP (ref 22–31)
CREAT SERPL-MCNC: 10.9 MG/DL — HIGH (ref 0.5–1.3)
GLUCOSE BLDC GLUCOMTR-MCNC: 117 MG/DL — HIGH (ref 70–99)
GLUCOSE BLDC GLUCOMTR-MCNC: 94 MG/DL — SIGNIFICANT CHANGE UP (ref 70–99)
GLUCOSE SERPL-MCNC: 154 MG/DL — HIGH (ref 70–99)
HBA1C BLD-MCNC: 5.4 % — SIGNIFICANT CHANGE UP (ref 4–5.6)
HCT VFR BLD CALC: 31.5 % — LOW (ref 39–50)
HGB BLD-MCNC: 9.7 G/DL — LOW (ref 13–17)
MCHC RBC-ENTMCNC: 30.9 GM/DL — LOW (ref 32–36)
MCHC RBC-ENTMCNC: 31.6 PG — SIGNIFICANT CHANGE UP (ref 27–34)
MCV RBC AUTO: 102.3 FL — HIGH (ref 80–100)
PLATELET # BLD AUTO: 153 K/UL — SIGNIFICANT CHANGE UP (ref 150–400)
POTASSIUM SERPL-MCNC: 4.7 MMOL/L — SIGNIFICANT CHANGE UP (ref 3.5–5.3)
POTASSIUM SERPL-SCNC: 4.7 MMOL/L — SIGNIFICANT CHANGE UP (ref 3.5–5.3)
PTH-INTACT FLD-MCNC: 431 PG/ML — HIGH (ref 15–65)
RBC # BLD: 3.08 M/UL — LOW (ref 4.2–5.8)
RBC # FLD: 14.7 % — HIGH (ref 10.3–14.5)
SODIUM SERPL-SCNC: 142 MMOL/L — SIGNIFICANT CHANGE UP (ref 135–145)
TROPONIN I SERPL-MCNC: 0.32 NG/ML — HIGH (ref 0–0.04)
WBC # BLD: 4.9 K/UL — SIGNIFICANT CHANGE UP (ref 3.8–10.5)
WBC # FLD AUTO: 4.9 K/UL — SIGNIFICANT CHANGE UP (ref 3.8–10.5)

## 2019-01-03 PROCEDURE — 93306 TTE W/DOPPLER COMPLETE: CPT | Mod: 26

## 2019-01-03 RX ADMIN — Medication 0.6 MILLIGRAM(S): at 05:14

## 2019-01-03 RX ADMIN — ISOSORBIDE DINITRATE 20 MILLIGRAM(S): 5 TABLET ORAL at 06:18

## 2019-01-03 RX ADMIN — SEVELAMER CARBONATE 800 MILLIGRAM(S): 2400 POWDER, FOR SUSPENSION ORAL at 05:13

## 2019-01-03 RX ADMIN — Medication 120 MILLIGRAM(S): at 05:14

## 2019-01-03 RX ADMIN — SEVELAMER CARBONATE 800 MILLIGRAM(S): 2400 POWDER, FOR SUSPENSION ORAL at 13:40

## 2019-01-03 RX ADMIN — Medication 25 MILLIGRAM(S): at 05:14

## 2019-01-03 RX ADMIN — Medication 25 MILLIGRAM(S): at 18:14

## 2019-01-03 RX ADMIN — Medication 1 TABLET(S): at 13:40

## 2019-01-03 RX ADMIN — Medication 200 MILLIGRAM(S): at 13:41

## 2019-01-03 RX ADMIN — ISOSORBIDE DINITRATE 20 MILLIGRAM(S): 5 TABLET ORAL at 18:14

## 2019-01-03 NOTE — PROGRESS NOTE ADULT - SUBJECTIVE AND OBJECTIVE BOX
PGY 1 Note discussed with supervising resident and primary attending    Patient is a 73y old  Male who presents with a chief complaint of fluid overload (03 Jan 2019 11:58)      INTERVAL HPI/OVERNIGHT EVENTS: no acute overnight events, rales on examination but better than before, dialysis tomorrow.    MEDICATIONS  (STANDING):  aspirin  chewable 81 milliGRAM(s) Oral daily  atorvastatin 80 milliGRAM(s) Oral at bedtime  calcitriol   Capsule 0.25 MICROGram(s) Oral daily  clopidogrel Tablet 75 milliGRAM(s) Oral daily  diltiazem    milliGRAM(s) Oral daily  docusate sodium 200 milliGRAM(s) Oral daily  insulin lispro (HumaLOG) corrective regimen sliding scale   SubCutaneous Before meals and at bedtime  isosorbide   dinitrate Tablet (ISORDIL) 20 milliGRAM(s) Oral two times a day  lactulose Syrup 20 Gram(s) Oral daily  metoprolol tartrate 25 milliGRAM(s) Oral two times a day  multivitamin 1 Tablet(s) Oral daily  sevelamer hydrochloride 800 milliGRAM(s) Oral three times a day    MEDICATIONS  (PRN):      __________________________________________________  REVIEW OF SYSTEMS:    CONSTITUTIONAL: No fever,   EYES: no acute visual disturbances  NECK: No pain or stiffness  RESPIRATORY: No cough; No shortness of breath  CARDIOVASCULAR: No chest pain, no palpitations  GASTROINTESTINAL: No pain. No nausea or vomiting; No diarrhea   NEUROLOGICAL: No headache or numbness, no tremors  MUSCULOSKELETAL: No joint pain, no muscle pain  GENITOURINARY: no dysuria, no frequency, no hesitancy  PSYCHIATRY: no depression , no anxiety  ALL OTHER  ROS negative        Vital Signs Last 24 Hrs  T(C): 36.9 (03 Jan 2019 13:59), Max: 36.9 (03 Jan 2019 13:59)  T(F): 98.4 (03 Jan 2019 13:59), Max: 98.4 (03 Jan 2019 13:59)  HR: 69 (03 Jan 2019 13:59) (69 - 88)  BP: 138/62 (03 Jan 2019 13:59) (137/50 - 171/79)  BP(mean): --  RR: 18 (03 Jan 2019 13:59) (16 - 20)  SpO2: 96% (03 Jan 2019 13:59) (92% - 100%)    ________________________________________________  PHYSICAL EXAM:  GENERAL: NAD  HEENT: Normocephalic;  conjunctivae and sclerae clear; moist mucous membranes;   NECK : supple  CHEST/LUNG: rales B/L  HEART: S1 S2  regular; no murmurs, gallops or rubs  ABDOMEN: Soft, Nontender, Nondistended; Bowel sounds present  EXTREMITIES: no cyanosis; no edema; no calf tenderness  SKIN: warm and dry; no rash  NERVOUS SYSTEM:  Awake and alert; Oriented  to place, person and time ; no new deficits    _________________________________________________  LABS:                        9.7    4.9   )-----------( 153      ( 03 Jan 2019 10:09 )             31.5     01-03    142  |  102  |  59<H>  ----------------------------<  154<H>  4.7   |  28  |  10.90<H>    Ca    7.5<L>      03 Jan 2019 10:09    TPro  8.0  /  Alb  3.3<L>  /  TBili  0.4  /  DBili  x   /  AST  21  /  ALT  43  /  AlkPhos  155<H>  01-02    PT/INR - ( 02 Jan 2019 17:59 )   PT: 11.3 sec;   INR: 1.02 ratio         PTT - ( 02 Jan 2019 17:59 )  PTT:31.0 sec    CAPILLARY BLOOD GLUCOSE      POCT Blood Glucose.: 94 mg/dL (03 Jan 2019 08:59)        RADIOLOGY & ADDITIONAL TESTS:    < from: Xray Chest 1 View- PORTABLE-Routine (01.02.19 @ 19:27) >    INTERPRETATION:  AP semierect chest on January 2, 2019 at 6:42 PM.   Concern is fluid overload.    Heart is enlarged. Heart obscures the left base.    There is a mild central congestive pattern.    Long vascular stent from the right upper arm into the innominate system   again noted.    Chest is similar to January 2, earlier study.    IMPRESSION: Chest appears unchanged.    < from: Xray Chest 1 View-PORTABLE IMMEDIATE (01.02.19 @ 10:53) >    FINDINGS/  IMPRESSION:  New small leftpleural effusion with adjacent opacity. Vascular stent   overlies right axillary and subclavian region.    Heart size cannot be accurately assessed in this projection.              Imaging Personally Reviewed:  YES    Consultant(s) Notes Reviewed:   YES    Care Discussed with Consultants :  YES    Plan of care was discussed with patient and /or primary care giver; all questions and concerns were addressed and care was aligned with patient's wishes.

## 2019-01-03 NOTE — PROGRESS NOTE ADULT - ASSESSMENT
73M, from [facility], w/ PMHx ESRD, HTN, CVA (Rt Hemiplegia), AOCD, Chronic Afib (Eliquis), Vascular dementia, Gout, GERD, who presents to the ED c/o SOB. Patient was found to have BP: 220/148 mmHg at NH in addition to dyspnea. Rest of history limited as patient is verbally abusive and not cooperating with staff. Patient is s/p emergent HD for fluid overload. Resting comfortably and speaking in full sentences w/o distress.        Patient admitted for hypertensive urgency 2/2 fluid overload, s/p emergent HD.

## 2019-01-03 NOTE — CONSULT NOTE ADULT - PROBLEM SELECTOR RECOMMENDATION 2
no chest pain   NSR @ 95  elevated troponin most likely demand and due to ESRD  troponin trending down   c/w ASA, Plavix and BB   f/u echo

## 2019-01-03 NOTE — PROGRESS NOTE ADULT - ASSESSMENT
ESRD   CHF improved with dialysis, fluid status stable.  Increased troponin level, patient refused to be on Heparin  Follow with cardiology.    Dialysis tomorrow  Start Epogen

## 2019-01-03 NOTE — CONSULT NOTE ADULT - SUBJECTIVE AND OBJECTIVE BOX
HPI:  73M, from [facility], w/ PMHx ESRD, HTN, CVA (Rt Hemiplegia), AOCD, Chronic Afib (Eliquis), Vascular dementia, Gout, GERD, who presents to the ED c/o SOB. Patient was found to have BP: 220/148 mmHg at NH in addition to dyspnea. Patient is s/p emergent HD for fluid overload.  Pt was found to have elevated troponin. Pt refusing labs and treatment with aggressive behaviour at times. Limited ROS due to pt uncooperativeness      MEDICATIONS  (STANDING):  aspirin  chewable 81 milliGRAM(s) Oral daily  atorvastatin 80 milliGRAM(s) Oral at bedtime  calcitriol   Capsule 0.25 MICROGram(s) Oral daily  clopidogrel Tablet 75 milliGRAM(s) Oral daily  diltiazem    milliGRAM(s) Oral daily  docusate sodium 200 milliGRAM(s) Oral daily  insulin lispro (HumaLOG) corrective regimen sliding scale   SubCutaneous Before meals and at bedtime  isosorbide   dinitrate Tablet (ISORDIL) 20 milliGRAM(s) Oral two times a day  lactulose Syrup 20 Gram(s) Oral daily  metoprolol tartrate 25 milliGRAM(s) Oral two times a day  multivitamin 1 Tablet(s) Oral daily  sevelamer hydrochloride 800 milliGRAM(s) Oral three times a day    MEDICATIONS  (PRN):    PAST MEDICAL & SURGICAL HISTORY:  Anemia  Hypertension  Diabetes  ESRD (end stage renal disease) on dialysis  No significant past surgical history    SOCIAL HISTORY:  Smoker:  unknown   ETOH use:  unknown   Ilicit Drug use:  unknown       REVIEW OF SYSTEMS:    limited ROS    CONSTITUTIONAL: No weakness, fevers or chills  RESPIRATORY:  shortness of breath  CARDIOVASCULAR: No chest pain or palpitations  GASTROINTESTINAL: No abdominal or epigastric pain. No nausea, vomiting  NEUROLOGICAL: No numbness or weakness    All other review of systems is negative unless indicated above.    Vital Signs Last 24 Hrs  T(C): 36.8 (03 Jan 2019 10:34), Max: 36.8 (03 Jan 2019 10:34)  T(F): 98.3 (03 Jan 2019 10:34), Max: 98.3 (03 Jan 2019 10:34)  HR: 73 (03 Jan 2019 10:34) (73 - 93)  BP: 137/50 (03 Jan 2019 10:34) (137/50 - 181/100)  BP(mean): --  RR: 18 (03 Jan 2019 10:34) (16 - 20)  SpO2: 96% (03 Jan 2019 10:34) (92% - 100%)          General: A/ox 1,   Neck: Supple, NO JVD  Cardiac: S1 S2, No M/R/G  Pulmonary: bibasilar rales, no wheezing, no rhonchi   Abdomen: Soft, Non -tender, +BS x 4 quads  Extremities: No Rashes, No edema  Neuro: A/o x 1, right sided hemiplegia         Telemetry: NSR  EKG: NSR @ 94  CHADSVASC: 5                          9.7    4.9   )-----------( 153      ( 03 Jan 2019 10:09 )             31.5     01-03    142  |  102  |  59<H>  ----------------------------<  154<H>  4.7   |  28  |  10.90<H>    Ca    7.5<L>      03 Jan 2019 10:09    TPro  8.0  /  Alb  3.3<L>  /  TBili  0.4  /  DBili  x   /  AST  21  /  ALT  43  /  AlkPhos  155<H>  01-02      Assessment/Plan    1)     RADIOLOGY & ADDITIONAL TESTS:    < from: Xray Chest 1 View- PORTABLE-Routine (01.02.19 @ 19:27) >    INTERPRETATION:  AP semierect chest on January 2, 2019 at 6:42 PM.   Concern is fluid overload.    Heart is enlarged. Heart obscures the left base.    There is a mild central congestive pattern.    Long vascular stent from the right upper arm into the innominate system   again noted.    Chest is similar to January 2, earlier study.    IMPRESSION: Chest appears unchanged.    < end of copied text >

## 2019-01-03 NOTE — PROGRESS NOTE ADULT - SUBJECTIVE AND OBJECTIVE BOX
DORSAINVIL, JEANCLAUDE  MRN-[id number]    Patient is a 73y old  Male who presents with a chief complaint of sob  patient seen and examined    s decreased sob     ..MEDICATIONS  (STANDING):  aspirin  chewable 81 milliGRAM(s) Oral daily  atorvastatin 80 milliGRAM(s) Oral at bedtime  calcitriol   Capsule 0.25 MICROGram(s) Oral daily  clopidogrel Tablet 75 milliGRAM(s) Oral daily  diltiazem    milliGRAM(s) Oral daily  docusate sodium 200 milliGRAM(s) Oral daily  insulin lispro (HumaLOG) corrective regimen sliding scale   SubCutaneous Before meals and at bedtime  isosorbide   dinitrate Tablet (ISORDIL) 20 milliGRAM(s) Oral two times a day  lactulose Syrup 20 Gram(s) Oral daily  metoprolol tartrate 25 milliGRAM(s) Oral two times a day  multivitamin 1 Tablet(s) Oral daily  sevelamer hydrochloride 800 milliGRAM(s) Oral three times a day    MEDICATIONS  (PRN):  :      Allergies    No Known Allergies    Intolerances    PAST MEDICAL & SURGICAL HISTORY:  Anemia  Hypertension  Diabetes  ESRD (end stage renal disease) on dialysis  No significant past surgical history  .  FAMILY HISTORY:      SOCIAL HISTORY  Smoking History:     REVIEW OF SYSTEMS:    CONSTITUTIONAL:  Fevers [  ]  Yes  [ X ]  No                                   chills [  ]  Yes  [ X ]  No                               sweats  [  ]  Yes  [X  ]  No                                fatigue [ X ]  Yes  [  ]  No    HEENT:  Eyes:  Diplopia or blurred vision [  ]  Yes  [X  ]  No    ENT:                        earache  [  ]  Yes  [ X ]  No                             sore throat  [  ]  Yes  [ X ]  No                            runny nose.  [  ]  Yes  [  X]  No    CARDIOVASCULAR:       chest pain  [ ]  Yes  [ x ]  No                        squeezing, tightness,  [  ]  Yes  [X  ]  No                                      palpitations.  [  ]  Yes  [X  ]  No    RESPIRATORY:             Cough [  ]  Yes  [ x ]  No                                  Wheezing [  ]  Yes  [ X ]  No                                Hemoptysis [  ]  Yes  [ X ]  No                                      Sputum [  ]  Yes  X[  ]  No                   Shortness of Breathe [ X ]  Yes  [  ]  No, but mild    GASTROINTESTINAL:      Abdominal pain  [  ]  Yes  [ X ]  No                                                    Nausea  [  ]  Yes  [X  ]  No                                                   Vomiting [  ]  Yes  [X  ]  No                                              Constipation X[  ]  Yes  [  ]  No                                                    Diarrhea [  ]  Yes  [  ]X  No    GENITOURINARY:           Incontinence [  ]  Yes  [ X ]  No                                                Dysuria [  ]  Yes  [X  ]  No                                      Blood in Urine  [  ]  Yes  [ X ]  No                          Frequency or urgency.  [  ]  Yes  [  X]  No    NEUROLOGIC:                     Paresthesias  [  ]  Yes  [  X]  No                                             fasciculations  [  ]  Yes  [ X ]  No                                seizures or weakness.  [  ]  Yes  [ X ]  No                                                   Headache [  ]  Yes  [X  ]  No                                  Loss of Conciousness [  ]  Yes  [X  ]  No                                                     Insomnia [  ]  Yes  [ X ]  No    PSYCHIATRIC:    Disorder of thought or mood.  [  ]  Yes  [X  ]  No                                                           Anxiety [  ]  Yes  [  ]  No                                                      Depression [  ]  Yes  [  ]  No                                                         Dementia [  ]  Yes  [X  ]  No  .Vital Signs Last 24 Hrs  T(C): 36.7 (03 Jan 2019 04:53), Max: 36.7 (02 Jan 2019 22:14)  T(F): 98 (03 Jan 2019 04:53), Max: 98.1 (02 Jan 2019 22:14)  HR: 81 (03 Jan 2019 04:53) (78 - 93)  BP: 162/72 (03 Jan 2019 04:53) (144/59 - 202/97)  BP(mean): --  RR: 18 (03 Jan 2019 04:53) (16 - 22)  SpO2: 92% (03 Jan 2019 04:53) (92% - 100%)      PHYSICAL EXAMINATION:    GENERAL: The patient is a well-developed, well-nourished __not ___in any distress.     HEENT: Head is normocephalic and atraumatic. Extraocular muscles are intact. Mucous membranes are moist.     NECK: Supple. jvd [  ]  Yes  [ X ]  No    LUNGS: Clear to auscultation  [  ]  Yes  [  X]  No                               wheezing, [  ]  Yes  [  X]  No                                      rales  [ X ]  Yes  [  ]  No , LT  BASE                                   rhonchi  [  ]  Yes  [X  ]  No                 Respirations labored  [  ]  Yes  [  X]  No    HEART: Regular rate and rhythm  X[  ]  Yes  [  ]  No                                        Murmur [  ]  Yes  [ X ]  No                                              rub  [  ]  Yes  [ X ]  No                                          gallop  [  X]  Yes  [  ]  No    ABDOMEN:                                 Soft, [X  ]  Yes  [  ]  No                                             nontender [X  ]  Yes  [  ]  No                                             distended.[  ]  Yes  [  X]  No                            hepatosplenomegaly ,[  ]  Yes  [ X ]  No                                                    BS   [ X ]  Yes  [  ]  No    EXTREMITIES:                cyanosis, [  ]  Yes  X[  ]  No                                       clubbing,   [  ]  Yes  [ X ]  No                                               rash, [  ]  Yes  [X  ]  No                                           edema.  [  ]  Yes  [ X ]  No  RT ARM  AV GRAFT    NEUROLOGIC: Alert and Oriented  [ X ] Person [ X ] Time  [ ]X Place                                    Cranial nerves intact    [ X ]  Yes  [  ]  No                                               sensory Intact  [ X ]  Yes  [  ]  No                                                  motor WNL   [ X ]  Yes  [  ]  No                                                Sin Paresis  [  ]  Yes  [  X]  No  DTR  babinski+ or -    LABS:                        10.8   9.2   )-----------( 153      ( 02 Jan 2019 10:51 )             33.4     01-02    142  |  104  |  85<H>  ----------------------------<  108<H>  5.3   |  20<L>  |  13.90<H>    Ca    7.4<L>      02 Jan 2019 09:20    TPro  8.0  /  Alb  3.3<L>  /  TBili  0.4  /  DBili  x   /  AST  21  /  ALT  43  /  AlkPhos  155<H>  01-02    PT/INR - ( 02 Jan 2019 17:59 )   PT: 11.3 sec;   INR: 1.02 ratio         PTT - ( 02 Jan 2019 17:59 )  PTT:31.0 sec      CARDIAC MARKERS ( 02 Jan 2019 15:22 )  0.423 ng/mL / x     / 511 U/L / x     / 5.9 ng/mL  CARDIAC MARKERS ( 02 Jan 2019 09:20 )  0.106 ng/mL / x     / x     / x     / x            Serum Pro-Brain Natriuretic Peptide: 8696 pg/mL (01-02-19 @ 09:20)            LABS:                        10.8   9.2   )-----------( 153      ( 02 Jan 2019 10:51 )             33.4     01-02    142  |  104  |  85<H>  ----------------------------<  108<H>  5.3   |  20<L>  |  13.90<H>    Ca    7.4<L>      02 Jan 2019 09:20    TPro  8.0  /  Alb  3.3<L>  /  TBili  0.4  /  DBili  x   /  AST  21  /  ALT  43  /  AlkPhos  155<H>  01-02          CARDIAC MARKERS ( 02 Jan 2019 09:20 )  0.106 ng/mL / x     / x     / x     / x            Serum Pro-Brain Natriuretic Peptide: 8696 pg/mL (01-02-19 @ 09:20)          MICROBIOLOGY:    RADIOLOGY & ADDITIONAL STUDIES:    CXR:  < from: Xray Chest 1 View-PORTABLE IMMEDIATE (01.02.19 @ 10:53) >    FINDINGS/  IMPRESSION:  New small leftpleural effusion with adjacent opacity. Vascular stent   overlies right axillary and subclavian region.    Heart size cannot be accurately assessed in this projection.                  COSTA CARVAJAL M.D., ATTENDING RADIOLOGIST  This document has been electronically signed. Jan 2 2019 11:13AM                < end of copied text >    Ct scan chest:    ekg; NSR WITH OLD CHANGE    echo:

## 2019-01-03 NOTE — CHART NOTE - NSCHARTNOTEFT_GEN_A_CORE
Pt is on heparin drip and need repeated APPT checks but pt has been refusing blood draws. Pt explained the risks and benefits mutiple times and tried convincing him but pt has been constantly refusing. Pt is AAOx3 and understands all the risk factors and refused. So will stop the heparin drip at this time given no APPT monitoring. Primary team to assess the situation in the morning.

## 2019-01-03 NOTE — PROGRESS NOTE ADULT - SUBJECTIVE AND OBJECTIVE BOX
Problem List:  ESRD CHF on admission   Increased troponin.   Post 3 kg fluid removal yesterday feels better today    PAST MEDICAL & SURGICAL HISTORY:  Anemia  Hypertension  Diabetes  ESRD (end stage renal disease) on dialysis  No significant past surgical history      No Known Allergies      MEDICATIONS  (STANDING):  aspirin  chewable 81 milliGRAM(s) Oral daily  atorvastatin 80 milliGRAM(s) Oral at bedtime  calcitriol   Capsule 0.25 MICROGram(s) Oral daily  clopidogrel Tablet 75 milliGRAM(s) Oral daily  diltiazem    milliGRAM(s) Oral daily  docusate sodium 200 milliGRAM(s) Oral daily  isosorbide   dinitrate Tablet (ISORDIL) 20 milliGRAM(s) Oral two times a day  lactulose Syrup 20 Gram(s) Oral daily  metoprolol tartrate 25 milliGRAM(s) Oral two times a day  multivitamin 1 Tablet(s) Oral daily  sevelamer hydrochloride 800 milliGRAM(s) Oral three times a day    MEDICATIONS  (PRN):                            9.7    4.9   )-----------( 153      ( 03 Jan 2019 10:09 )             31.5     01-03    142  |  102  |  59<H>  ----------------------------<  154<H>  4.7   |  28  |  10.90<H>    Ca    7.5<L>      03 Jan 2019 10:09    TPro  8.0  /  Alb  3.3<L>  /  TBili  0.4  /  DBili  x   /  AST  21  /  ALT  43  /  AlkPhos  155<H>  01-02    PT/INR - ( 02 Jan 2019 17:59 )   PT: 11.3 sec;   INR: 1.02 ratio         PTT - ( 02 Jan 2019 17:59 )  PTT:31.0 sec        REVIEW OF SYSTEMS:  General: no fever no chills.  RESPIRATORY: No cough, wheezing, hemoptysis; No shortness of breath  CARDIOVASCULAR: No chest pain or palpitations. No Edema  GASTROINTESTINAL: No abdominal or epigastric pain. No nausea, vomiting. No diarrhea or constipation. No melena.  GENITOURINARY: No dysuria, frequency, foamy urine, urinary urgency, incontinence or hematuria  NEUROLOGICAL: No numbness or weakness, no tremor , no dizziness.           VITALS:  T(F): 98.4 (01-03-19 @ 13:59), Max: 98.4 (01-03-19 @ 13:59)  HR: 69 (01-03-19 @ 13:59)  BP: 138/62 (01-03-19 @ 13:59)  RR: 18 (01-03-19 @ 13:59)  SpO2: 96% (01-03-19 @ 13:59)  Wt(kg): --      PHYSICAL EXAM:  Constitutional: well developed, no diaphoresis, no distress.  Neck: No JVD, no carotid bruit, supple, no adenopathy  Respiratory: Good air entrance B/L, no wheezes, rales or rhonchi  Cardiovascular: S1, S2, RRR, no pericardial rub, no murmur  Abdomen: BS+, soft, no tenderness, no bruit  Pelvis: bladder nondistended  Extremities: No cyanosis or clubbing. No peripheral edema.     Vascular Access: right AVG with bruit and thrill

## 2019-01-04 ENCOUNTER — TRANSCRIPTION ENCOUNTER (OUTPATIENT)
Age: 74
End: 2019-01-04

## 2019-01-04 LAB
ANION GAP SERPL CALC-SCNC: 14 MMOL/L — SIGNIFICANT CHANGE UP (ref 5–17)
BASOPHILS # BLD AUTO: 0.1 K/UL — SIGNIFICANT CHANGE UP (ref 0–0.2)
BASOPHILS NFR BLD AUTO: 1.1 % — SIGNIFICANT CHANGE UP (ref 0–2)
BUN SERPL-MCNC: 73 MG/DL — HIGH (ref 7–18)
CALCIUM SERPL-MCNC: 7.6 MG/DL — LOW (ref 8.4–10.5)
CHLORIDE SERPL-SCNC: 101 MMOL/L — SIGNIFICANT CHANGE UP (ref 96–108)
CO2 SERPL-SCNC: 26 MMOL/L — SIGNIFICANT CHANGE UP (ref 22–31)
CREAT SERPL-MCNC: 11.8 MG/DL — HIGH (ref 0.5–1.3)
EOSINOPHIL # BLD AUTO: 0.1 K/UL — SIGNIFICANT CHANGE UP (ref 0–0.5)
EOSINOPHIL NFR BLD AUTO: 2.3 % — SIGNIFICANT CHANGE UP (ref 0–6)
GLUCOSE BLDC GLUCOMTR-MCNC: 104 MG/DL — HIGH (ref 70–99)
GLUCOSE BLDC GLUCOMTR-MCNC: 87 MG/DL — SIGNIFICANT CHANGE UP (ref 70–99)
GLUCOSE BLDC GLUCOMTR-MCNC: 91 MG/DL — SIGNIFICANT CHANGE UP (ref 70–99)
GLUCOSE SERPL-MCNC: 100 MG/DL — HIGH (ref 70–99)
HCT VFR BLD CALC: 34.2 % — LOW (ref 39–50)
HGB BLD-MCNC: 10.8 G/DL — LOW (ref 13–17)
LYMPHOCYTES # BLD AUTO: 1.5 K/UL — SIGNIFICANT CHANGE UP (ref 1–3.3)
LYMPHOCYTES # BLD AUTO: 27.6 % — SIGNIFICANT CHANGE UP (ref 13–44)
MCHC RBC-ENTMCNC: 31.5 GM/DL — LOW (ref 32–36)
MCHC RBC-ENTMCNC: 31.6 PG — SIGNIFICANT CHANGE UP (ref 27–34)
MCV RBC AUTO: 100.1 FL — HIGH (ref 80–100)
MONOCYTES # BLD AUTO: 0.5 K/UL — SIGNIFICANT CHANGE UP (ref 0–0.9)
MONOCYTES NFR BLD AUTO: 9.1 % — SIGNIFICANT CHANGE UP (ref 2–14)
NEUTROPHILS # BLD AUTO: 3.2 K/UL — SIGNIFICANT CHANGE UP (ref 1.8–7.4)
NEUTROPHILS NFR BLD AUTO: 59.9 % — SIGNIFICANT CHANGE UP (ref 43–77)
PLATELET # BLD AUTO: 178 K/UL — SIGNIFICANT CHANGE UP (ref 150–400)
POTASSIUM SERPL-MCNC: 4.5 MMOL/L — SIGNIFICANT CHANGE UP (ref 3.5–5.3)
POTASSIUM SERPL-SCNC: 4.5 MMOL/L — SIGNIFICANT CHANGE UP (ref 3.5–5.3)
RBC # BLD: 3.41 M/UL — LOW (ref 4.2–5.8)
RBC # FLD: 14.4 % — SIGNIFICANT CHANGE UP (ref 10.3–14.5)
SODIUM SERPL-SCNC: 141 MMOL/L — SIGNIFICANT CHANGE UP (ref 135–145)
WBC # BLD: 5.4 K/UL — SIGNIFICANT CHANGE UP (ref 3.8–10.5)
WBC # FLD AUTO: 5.4 K/UL — SIGNIFICANT CHANGE UP (ref 3.8–10.5)

## 2019-01-04 PROCEDURE — 71045 X-RAY EXAM CHEST 1 VIEW: CPT | Mod: 26

## 2019-01-04 RX ORDER — APIXABAN 2.5 MG/1
2.5 TABLET, FILM COATED ORAL EVERY 12 HOURS
Qty: 0 | Refills: 0 | Status: DISCONTINUED | OUTPATIENT
Start: 2019-01-04 | End: 2019-01-05

## 2019-01-04 RX ADMIN — ISOSORBIDE DINITRATE 20 MILLIGRAM(S): 5 TABLET ORAL at 06:38

## 2019-01-04 RX ADMIN — Medication 120 MILLIGRAM(S): at 05:07

## 2019-01-04 RX ADMIN — Medication 200 MILLIGRAM(S): at 12:49

## 2019-01-04 RX ADMIN — CALCITRIOL 0.25 MICROGRAM(S): 0.5 CAPSULE ORAL at 12:49

## 2019-01-04 RX ADMIN — Medication 25 MILLIGRAM(S): at 05:07

## 2019-01-04 RX ADMIN — SEVELAMER CARBONATE 800 MILLIGRAM(S): 2400 POWDER, FOR SUSPENSION ORAL at 05:07

## 2019-01-04 NOTE — DISCHARGE NOTE ADULT - CARE PLAN
Goal:	Resolution of symptoms  Assessment and plan of treatment:	You presented with shortness of breath and high blood pressure, which improved after dialysis. Continue your dialysis as instructed by your Nephrologist. Dialysis will be reinstated upon your discharge. Please maintain a diet adequate for your condition. Continue dialysis on monday, wednesday and friday.  Assessment and plan of treatment:	Please continue with your anticoagulation medication and rate control medication as instructed in the medication reconciliation and follow up with your primary care physician.  Assessment and plan of treatment:	Continue with blood pressure medication. Maintain a healthy diet that consist of low sugar, low fat, low sodium diet. Exercise frequently if possible.  Follow up with primary care physician in one week after discharge. Principal Discharge DX:	ESRD (end stage renal disease) on dialysis  Goal:	Resolution of symptoms  Assessment and plan of treatment:	You presented with shortness of breath and high blood pressure, which improved after dialysis. Continue your dialysis as instructed by your Nephrologist. Dialysis will be reinstated upon your discharge. Please maintain a diet adequate for your condition. Continue dialysis on monday, wednesday and friday. Your chest Xray showed effusion, consider repeating Xray in 2 weeks.  Secondary Diagnosis:	Chronic atrial fibrillation  Assessment and plan of treatment:	Please continue with your anticoagulation medication and rate control medication as instructed in the medication reconciliation and follow up with your primary care physician.  Secondary Diagnosis:	HTN (hypertension)  Assessment and plan of treatment:	Continue with blood pressure medication. Maintain a healthy diet that consist of low sugar, low fat, low sodium diet. Exercise frequently if possible.  Follow up with primary care physician in one week after discharge.

## 2019-01-04 NOTE — DISCHARGE NOTE ADULT - HOSPITAL COURSE
73M, from [facility], w/ PMHx ESRD, HTN, CVA (Rt Hemiplegia), AOCD, Chronic Afib (Eliquis), Vascular dementia, Gout, GERD, p/w to the ED c/o SOB. Patient was found to have BP: 220/148 mmHg at NH in addition to dyspnea. Rest of history limited as patient is verbally abusive and not cooperating with staff. Patient is s/p emergent HD for fluid overload. Resting comfortably and speaking in full sentences w/o distress. Patient was admitted for hypertensive urgency 2/2 fluid overload, s/p emergent HD.     Problem/Plan - 1:  ·  Problem: Fluid overload.  Plan: Patient p/w SOB and hypertensive urgency w/ BP: 210/118 mmHg  likely 2/2 ESRD  s/p emergent HD w/ improvement of symptoms at 1/2  CXR: effusions in admission, repeat does not show any change  FU CXR in AM, ECHO  BNP elevated  dialysis tomorrow.      Problem/Plan - 2:  ·  Problem: NSTEMI (non-ST elevated myocardial infarction).  Plan: ECG: SR VR@95bpm,   cardiac enzymes elevated and trended down to 0.324  likely demand ischemia  c/w ASA + Lipitor 80 mg HS + Lopressor 25 mg BID  Telemonitoring- no acute events  5.4 HbA1C  Follow up TSH, Lipid profile  Follow up TTE  Cardio consult: Dr Morales.      Problem/Plan - 3:  ·  Problem: ESRD (end stage renal disease) on dialysis.  Plan: c/w Maintenance HD  Avoid nephrotoxic drugs  last dialysis yesterday  Dr Jacinto.      Problem/Plan - 4:  ·  Problem: HTN (hypertension).  Plan: c/w NH meds  BP better controlled after HD.      Problem/Plan - 5:  ·  Problem: Chronic atrial fibrillation.  Plan: Patient on Diltiazem and Eliquis 2.5 mg BID at NH  c/w Lopressor + Diltiazem. 73M from [facility], w/ PMHx ESRD, HTN, CVA (Rt Hemiplegia), AOCD, Chronic Afib (Eliquis), Vascular dementia, Gout, GERD, p/w to the ED c/o SOB. Patient was found to have BP: 220/148 mmHg at NH in addition to dyspnea. Patient is s/p emergent HD for fluid overload. Resting comfortably and speaking in full sentences w/o distress. Patient was admitted for hypertensive urgency 2/2 fluid overload, s/p emergent HD.     Problem/Plan - 1:  ·  Problem: Fluid overload.  Plan: Patient p/w SOB and hypertensive urgency w/ BP: 210/118 mmHg  likely 2/2 ESRD  s/p emergent HD w/ improvement of symptoms at 1/2  CXR: effusions in admission, repeat does not show any change  FU CXR in AM, ECHO  BNP elevated  dialysis tomorrow.      Problem/Plan - 2:  ·  Problem: NSTEMI (non-ST elevated myocardial infarction).  Plan: ECG: SR VR@95bpm,   cardiac enzymes elevated and trended down to 0.324  likely demand ischemia  c/w ASA + Lipitor 80 mg HS + Lopressor 25 mg BID  Telemonitoring- no acute events  5.4 HbA1C  Follow up TSH, Lipid profile  Follow up TTE  Cardio consult: Dr Morales.      Problem/Plan - 3:  ·  Problem: ESRD (end stage renal disease) on dialysis.  Plan: c/w Maintenance HD  Avoid nephrotoxic drugs  last dialysis yesterday  Dr Jacinto.      Problem/Plan - 4:  ·  Problem: HTN (hypertension).  Plan: c/w NH meds  BP better controlled after HD.      Problem/Plan - 5:  ·  Problem: Chronic atrial fibrillation.  Plan: Patient on Diltiazem and Eliquis 2.5 mg BID at NH  c/w Lopressor + Diltiazem. 73M from [facility], w/ PMHx ESRD, HTN, CVA (Rt Hemiplegia), AOCD, Chronic Afib (Eliquis), Vascular dementia, Gout, GERD, p/w to the ED c/o SOB. Patient was found to have BP: 220/148 mmHg at NH in addition to dyspnea. Patient received emergent HD for fluid overload. He was Resting comfortably and speaking in full sentences w/o distress. Patient was admitted for hypertensive urgency 2/2 fluid overload, s/p emergent HD. His BP improved with dialysis. His CXR showed effusions in admission, ECHO shows mod. AS with normal EF.  HIs cardiac enzymes elevated and trended down to 0.324 likely demand ischemia. Cardio consulted Dr Morales and advised no intervention for now.    Patient is on Eliquis 2.5 mg BID, Lopressor + Diltiazem for a.fib.   Patient is stable for discharge per attending and is advised to follow up with PCP as outpatient.  Please refer to patient's complete medical chart with documents for a full hospital course, for this is only a brief summary.

## 2019-01-04 NOTE — DISCHARGE NOTE ADULT - PATIENT PORTAL LINK FT
You can access the [facility] Patient Portal, offered by [facility], by registering with the following website: [email or web address]

## 2019-01-04 NOTE — DISCHARGE NOTE ADULT - CARE PROVIDER_API CALL
Bryan TIWARI [facility] Center[facility]  Phone: [phone]  Fax: (       -

## 2019-01-04 NOTE — PROGRESS NOTE ADULT - ASSESSMENT
73M, from [facility], w/ PMHx ESRD, HTN, CVA (Rt Hemiplegia), AOCD, Chronic Afib (Eliquis), Vascular dementia, Gout, GERD, who presents to the ED c/o SOB. Patient was found to have BP: 220/148 mmHg at NH in addition to dyspnea. Rest of history limited as patient is verbally abusive and not cooperating with staff. Patient is s/p emergent HD for fluid overload. Resting comfortably and speaking in full sentences w/o distress.        Patient admitted for hypertensive urgency 2/2 fluid overload, s/p emergent HD.

## 2019-01-04 NOTE — PROGRESS NOTE ADULT - SUBJECTIVE AND OBJECTIVE BOX
Problem List:  ESRD  HTN  CHF on admission      Mitral Valve: Normal mitral valve. Trace mitral  regurgitation.  Aortic Root: Normal aortic root.  Aortic Valve: Calcified aortic valve not well visualized.  Peak transaortic valve gradient equals 24.6 mm Hg, mean  transaortic valve gradient equals 14 mm Hg, estimated  aortic valve area equals 1.1 sqcm (by continuity equation),  consistent with moderate aortic stenosis. Trace aortic  regurgitation.  Left Atrium: Left atrium not well visualized.  Left Ventricle: Endocardium not well visualized; probably  normal left ventricular systolic function. Normal left  ventricular internal dimensions and wall thicknesses. Grade  I diastolic dysfunction (Impaired relaxation).  Right Heart: Right atrium not well visualized. Right  ventricle not well visualized. Normal RV systolic function  (RV tissue Doppler 12 cm/s). Tricuspid valve not well seen.  Pulmonic valve not well seen. Trace pulmonic insufficiency  is noted.  Pericardium/PleuraNo pericardial effusion.  Hemodynamic: Insufficient tricuspid regurgitation jet to  allow calculation of RVSP.  ------------------------------------------------------------------------  CONCLUSIONS:  Technically difficult study with poor acoustic windows.    1. Normal mitral valve. Trace mitral regurgitation.  2. Calcified aortic valve not well visualized. Moderate  aortic stenosis. Trace aortic regurgitation.  3. Normal aortic root.  4. Normal left ventricular internal dimensions and wall  thicknesses.  5. Endocardium not well visualized; probably normal left  ventricular systolic function.  6. Grade I diastolic dysfunction (Impaired relaxation).  7. Right ventricle not well visualized. Normal RV systolic  function (RV tissue Doppler 12 cm/s).  8. Pulmonic valve not well seen. Trace pulmonic  insufficiency is noted.  9. No pericardial effusion.      *** Compared with echocardiogram report of 6/28/2013,  aortic stenosis is now noted.      PAST MEDICAL & SURGICAL HISTORY:  Anemia  Hypertension  Diabetes  ESRD (end stage renal disease) on dialysis  No significant past surgical history      No Known Allergies      MEDICATIONS  (STANDING):  apixaban 2.5 milliGRAM(s) Oral every 12 hours  aspirin  chewable 81 milliGRAM(s) Oral daily  atorvastatin 80 milliGRAM(s) Oral at bedtime  calcitriol   Capsule 0.25 MICROGram(s) Oral daily  clopidogrel Tablet 75 milliGRAM(s) Oral daily  diltiazem    milliGRAM(s) Oral daily  docusate sodium 200 milliGRAM(s) Oral daily  isosorbide   dinitrate Tablet (ISORDIL) 20 milliGRAM(s) Oral two times a day  lactulose Syrup 20 Gram(s) Oral daily  metoprolol tartrate 25 milliGRAM(s) Oral two times a day  multivitamin 1 Tablet(s) Oral daily  sevelamer hydrochloride 800 milliGRAM(s) Oral three times a day    MEDICATIONS  (PRN):                            9.7    4.9   )-----------( 153      ( 03 Jan 2019 10:09 )             31.5     01-03    142  |  102  |  59<H>  ----------------------------<  154<H>  4.7   |  28  |  10.90<H>    Ca    7.5<L>      03 Jan 2019 10:09      PT/INR - ( 02 Jan 2019 17:59 )   PT: 11.3 sec;   INR: 1.02 ratio         PTT - ( 02 Jan 2019 17:59 )  PTT:31.0 sec        REVIEW OF SYSTEMS:  General: no fever no chills, no weight loss.    RESPIRATORY: No cough, wheezing, hemoptysis; No shortness of breath  CARDIOVASCULAR: No chest pain or palpitations. No Edema  GASTROINTESTINAL: No abdominal or epigastric pain. No nausea, vomiting. No diarrhea or constipation. No melena.  GENITOURINARY: No dysuria, frequency, foamy urine, urinary urgency, incontinence or hematuria  NEUROLOGICAL: No numbness or weakness, no tremor , no dizziness.   Muscle skeletal : no joint pain and no swelling of joints and limbs.  SKIN: No itching, burning, rashes.        VITALS:  T(F): 98.3 (01-04-19 @ 04:59), Max: 98.8 (01-03-19 @ 18:11)  HR: 76 (01-04-19 @ 06:37)  BP: 155/70 (01-04-19 @ 06:37)  RR: 18 (01-04-19 @ 04:59)  SpO2: 95% (01-04-19 @ 04:59)  Wt(kg): --      PHYSICAL EXAM:  Constitutional: well developed, no diaphoresis, no distress.  Neck: No JVD, no carotid bruit, supple, no adenopathy  Respiratory: Good air entrance B/L, no wheezes, rales or rhonchi  Cardiovascular: S1, S2, RRR, no pericardial rub, no murmur  Abdomen: BS+, soft, no tenderness, no bruit  Pelvis: bladder nondistended  Extremities: No cyanosis or clubbing. No peripheral edema.     Vascular Access: right AVG with bruit and thrill

## 2019-01-04 NOTE — CONSULT NOTE ADULT - SUBJECTIVE AND OBJECTIVE BOX
PULMONARY CONSULT NOTE      DORSAINVIL, JEANCLAUDE  MRN-[id number]    Patient is a 73y old  Male who presents with a chief complaint of fluid overload (04 Jan 2019 08:44)  Hx chart lab and xrays reviewed ,Feels better now    HISTORY OF PRESENT ILLNESS:  73M, from [facility], w/ PMHx ESRD, HTN, CVA (Rt Hemiplegia), AOCD, Chronic Afib (Eliquis), Vascular dementia, Gout, GERD, who presents to the ED c/o SOB. Patient was found to have BP: 220/148 mmHg at NH in addition to dyspnea. Rest of history limited as patient is verbally abusive and not cooperating with staff. Patient is s/p emergent HD for fluid overload. Resting comfortably and speaking in full sentences w/o distress.        Home Medications:  calcitriol 0.25 mcg oral capsule: 1 cap(s) orally once a day (02 Jan 2019 16:52)  clopidogrel 75 mg oral tablet: 1 tab(s) orally once a day (02 Jan 2019 16:52)  Colcrys 0.6 mg oral tablet: 1 tab(s) orally 2 times a day (02 Jan 2019 16:52)  DilTIAZem Hydrochloride  mg/24 hours oral capsule, extended release: 1 cap(s) orally once a day (02 Jan 2019 16:52)  docusate sodium 100 mg oral tablet: 2 tab(s) orally once a day (at bedtime) (02 Jan 2019 16:52)  Eliquis 2.5 mg oral tablet: 1 tab(s) orally 2 times a day (02 Jan 2019 16:52)  isosorbide dinitrate 20 mg oral tablet: 1 tab(s) orally 2 times a day (02 Jan 2019 16:52)  lactulose 10 g/15 mL oral syrup: 30 milliliter(s) orally once a day (02 Jan 2019 16:52)  levalbuterol 0.31 mg/3 mL inhalation solution: 3 milliliter(s) inhaled every 6 hours, As Needed (02 Jan 2019 16:52)  Lipitor 80 mg oral tablet: 1 tab(s) orally once a day (02 Jan 2019 16:52)  pantoprazole 40 mg oral granule, delayed release: 1 each orally once a day (02 Jan 2019 16:52)  sevelamer hydrochloride 800 mg oral tablet: 2 tab(s) orally 3 times a day (02 Jan 2019 16:52)  Triphrocaps oral capsule: 1 cap(s) orally once a day (02 Jan 2019 16:52)      MEDICATIONS  (STANDING):  aspirin  chewable 81 milliGRAM(s) Oral daily  atorvastatin 80 milliGRAM(s) Oral at bedtime  calcitriol   Capsule 0.25 MICROGram(s) Oral daily  clopidogrel Tablet 75 milliGRAM(s) Oral daily  diltiazem    milliGRAM(s) Oral daily  docusate sodium 200 milliGRAM(s) Oral daily  isosorbide   dinitrate Tablet (ISORDIL) 20 milliGRAM(s) Oral two times a day  lactulose Syrup 20 Gram(s) Oral daily  metoprolol tartrate 25 milliGRAM(s) Oral two times a day  multivitamin 1 Tablet(s) Oral daily  sevelamer hydrochloride 800 milliGRAM(s) Oral three times a day      MEDICATIONS  (PRN):      Allergies    No Known Allergies    Intolerances        PAST MEDICAL & SURGICAL HISTORY:  Anemia  Hypertension  Diabetes  ESRD (end stage renal disease) on dialysis  No significant past surgical history  CVA with Rt hemiplegia  AF    FAMILY HISTORY:  No pertinent family history in first degree relatives    SOCIAL HISTORY SMOKING --   ETOH --    DRUGS--    REVIEW OF SYSTEMS: as per RN  CONSTITUTIONAL: No fever, weight loss, or fatigue   EYES: No eye pain, visual disturbances, or discharge  ENT:  No difficulty hearing, tinnitus, vertigo; No sinus or throat pain  NECK: No pain or stiffness   RESPIRATORY:  cough +  wheezing--   chills--   hemoptysis--    Shortness of Breath+  CARDIOVASCULAR: No chest pain, palpitations, passing out, dizziness, or leg swelling  GASTROINTESTINAL: No abdominal or epigastric pain. No nausea, vomiting, or hematemesis; No diarrhea or constipation. No melena or hematochezia.  GENITOURINARY: No dysuria, frequency, hematuria, or incontinence  NEUROLOGICAL: No headaches, memory loss++ loss of strength Rt side ++ numbness, or tremors  SKIN: No itching, burning, rashes, or lesions   HEME/LYMPH: No easy bruising, or bleeding gums  ALLERGY AND IMMUNOLOGIC: No hives or eczema      Vital Signs Last 24 Hrs  T(C): 36.8 (04 Jan 2019 04:59), Max: 37.1 (03 Jan 2019 18:11)  T(F): 98.3 (04 Jan 2019 04:59), Max: 98.8 (03 Jan 2019 18:11)  HR: 76 (04 Jan 2019 06:37) (69 - 82)  BP: 155/70 (04 Jan 2019 06:37) (137/50 - 175/81)  BP(mean): --  RR: 18 (04 Jan 2019 04:59) (17 - 18)  SpO2: 95% (04 Jan 2019 04:59) (93% - 98%)  I&O's Detail      PHYSICAL EXAMINATION:    GENERAL: The patient is a well-developed, well-nourished in no apparent distress.   SKIN: No rashes ecchymoses or cyanosis  HEENT: Head is normocephalic and atraumatic. Extraocular muscles are intact. Mucous membranes are moist.   Neck supple LN not felt, JVP not increased  Thyroid not enlarged  Lymphatic: No lymphadenopathy  Cardiovascular:  S1 S2  heard , NSR, JVP not increased , systolic  murmur at apex and LSB,, No  gallop or rub  Respiratory:  Symmetrical chest wall movements Breathing vesicular , Percussion note normal no dulness   with   rales lt base, no  wheeze  ABDOMEN:  Soft, Non-tender,   No  hepatosplenomegaly ,BS positive		  Extremities: Normal range of motion, No clubbing, cyanosis or edema , No calf tenderness, AVF in place  Vascular: Peripheral pulses palpable 2+ bilaterally  CNS: Alert and responsive  Rt hemiparesis  Babinski+ rt side    LABS:                        9.7    4.9   )-----------( 153      ( 03 Jan 2019 10:09 )             31.5     01-03    142  |  102  |  59<H>  ----------------------------<  154<H>  4.7   |  28  |  10.90<H>    Ca    7.5<L>      03 Jan 2019 10:09    TPro  8.0  /  Alb  3.3<L>  /  TBili  0.4  /  DBili  x   /  AST  21  /  ALT  43  /  AlkPhos  155<H>  01-02    PT/INR - ( 02 Jan 2019 17:59 )   PT: 11.3 sec;   INR: 1.02 ratio         PTT - ( 02 Jan 2019 17:59 )  PTT:31.0 sec      CARDIAC MARKERS ( 03 Jan 2019 10:09 )  0.324 ng/mL / x     / 474 U/L / x     / 3.4 ng/mL  CARDIAC MARKERS ( 02 Jan 2019 15:22 )  0.423 ng/mL / x     / 511 U/L / x     / 5.9 ng/mL  CARDIAC MARKERS ( 02 Jan 2019 09:20 )  0.106 ng/mL / x     / x     / x     / x            Serum Pro-Brain Natriuretic Peptide: 8696 pg/mL (01-02-19 @ 09:20)          RADIOLOGY & ADDITIONAL STUDIES:    CXR:< from: Xray Chest 1 View- PORTABLE-Routine (01.02.19 @ 19:27) >  Heart is enlarged. Heart obscures the left base.    There is a mild central congestive pattern.    Long vascular stent from the right upper arm into the innominate system   again noted.        ekg;< from: 12 Lead ECG (01.02.19 @ 08:34) >   Normal sinus rhythm  Possible Left atrial enlargement  Nonspecific T wave abnormality  Abnormal ECG          echo:< from: Transthoracic Echocardiogram (01.03.19 @ 15:18) >    1. Normal mitral valve. Trace mitral regurgitation.  2. Calcified aortic valve not well visualized. Moderate  aortic stenosis. Trace aortic regurgitation.  3. Normal aortic root.  4. Normal left ventricular internal dimensions and wall  thicknesses.  5. Endocardium not well visualized; probably normal left  ventricular systolic function.  6. Grade I diastolic dysfunction (Impaired relaxation).  7. Right ventricle not well visualized. Normal RV systolic  function (RV tissue Doppler 12 cm/s).  8. Pulmonic valve not well seen. Trace pulmonic  insufficiency is noted.  9. No pericardial effusion.

## 2019-01-04 NOTE — CONSULT NOTE ADULT - ASSESSMENT
73M, from [facility], w/ PMHx ESRD, HTN, CVA (Rt Hemiplegia), AOCD, Chronic Afib (Eliquis), Vascular dementia, Gout, GERD, who presents to the ED c/o SOB. Patient was found to have BP: 220/148 mmHg at NH in addition to dyspnea. Patient is s/p emergent HD for fluid overload.  Pt was found to have elevated troponin.
HCVD with Moderate AS with CHF/Fluid overload with small pl effusion Lt  ESRD with H/D with anemia  DM      PLAN; Optimise H/D time and decrease dry wt  Repeat CXR in 1-2 weeks  No need for tap  Thanks  Discussed with PMD
ESRD  CHF , to have dialysis , attempt 3 kg fluid remova  Continue to follow troponin level .    xR chest CHF continue as above.    Anemia will start Epogen .

## 2019-01-04 NOTE — PROGRESS NOTE ADULT - ASSESSMENT
ESRD   CHF improved with dialysis, fluid status stable.  Increased troponin level, patient refused to be on Heparin  Follow with cardiology.    Dialysis today , UF attempt 3 kg. Follow BP in dialysis  Moderate AS on Echocardiogram new    Dialysis tomorrow  Start Epogen due to anemia

## 2019-01-04 NOTE — DISCHARGE NOTE ADULT - PLAN OF CARE
Resolution of symptoms You presented with shortness of breath and high blood pressure, which improved after dialysis. Continue your dialysis as instructed by your Nephrologist. Dialysis will be reinstated upon your discharge. Please maintain a diet adequate for your condition. Continue dialysis on monday, wednesday and friday. Please continue with your anticoagulation medication and rate control medication as instructed in the medication reconciliation and follow up with your primary care physician. Continue with blood pressure medication. Maintain a healthy diet that consist of low sugar, low fat, low sodium diet. Exercise frequently if possible.  Follow up with primary care physician in one week after discharge. You presented with shortness of breath and high blood pressure, which improved after dialysis. Continue your dialysis as instructed by your Nephrologist. Dialysis will be reinstated upon your discharge. Please maintain a diet adequate for your condition. Continue dialysis on monday, wednesday and friday. Your chest Xray showed effusion, consider repeating Xray in 2 weeks.

## 2019-01-04 NOTE — DISCHARGE NOTE ADULT - MEDICATION SUMMARY - MEDICATIONS TO TAKE
I will START or STAY ON the medications listed below when I get home from the hospital:    isosorbide dinitrate 20 mg oral tablet  -- 1 tab(s) by mouth 2 times a day  -- Indication: For HTN (hypertension)    DilTIAZem Hydrochloride  mg/24 hours oral capsule, extended release  -- 1 cap(s) by mouth once a day  -- Indication: For Chronic atrial fibrillation    Eliquis 2.5 mg oral tablet  -- 1 tab(s) by mouth 2 times a day  -- Indication: For Chronic atrial fibrillation    Colcrys 0.6 mg oral tablet  -- 1 tab(s) by mouth 2 times a day  -- Indication: For Gout    Lipitor 80 mg oral tablet  -- 1 tab(s) by mouth once a day  -- Indication: For Hyperlipidemia    clopidogrel 75 mg oral tablet  -- 1 tab(s) by mouth once a day  -- Indication: For Need for prophylactic measure    levalbuterol 0.31 mg/3 mL inhalation solution  -- 3 milliliter(s) inhaled every 6 hours, As Needed  -- Indication: For Need for prophylactic measure    docusate sodium 100 mg oral tablet  -- 2 tab(s) by mouth once a day (at bedtime)  -- Indication: For Constipation    lactulose 10 g/15 mL oral syrup  -- 30 milliliter(s) by mouth once a day  -- Indication: For Constipation    sevelamer hydrochloride 800 mg oral tablet  -- 2 tab(s) by mouth 3 times a day  -- Indication: For ESRD (end stage renal disease) on dialysis    pantoprazole 40 mg oral granule, delayed release  -- 1 each by mouth once a day  -- Indication: For Need for prophylactic measure    Triphrocaps oral capsule  -- 1 cap(s) by mouth once a day  -- Indication: For Need for prophylactic measure    calcitriol 0.25 mcg oral capsule  -- 1 cap(s) by mouth once a day  -- Indication: For Need for prophylactic measure

## 2019-01-04 NOTE — PROGRESS NOTE ADULT - SUBJECTIVE AND OBJECTIVE BOX
Patient is a 73y old  Male who presents with a chief complaint of fluid overload (03 Jan 2019 11:58)      INTERVAL HPI/OVERNIGHT EVENTS: no acute overnight events, rales on examination but better than before,no sob , no sob  .MEDICATIONS  (STANDING):  aspirin  chewable 81 milliGRAM(s) Oral daily  atorvastatin 80 milliGRAM(s) Oral at bedtime  calcitriol   Capsule 0.25 MICROGram(s) Oral daily  clopidogrel Tablet 75 milliGRAM(s) Oral daily  diltiazem    milliGRAM(s) Oral daily  docusate sodium 200 milliGRAM(s) Oral daily  isosorbide   dinitrate Tablet (ISORDIL) 20 milliGRAM(s) Oral two times a day  lactulose Syrup 20 Gram(s) Oral daily  metoprolol tartrate 25 milliGRAM(s) Oral two times a day  multivitamin 1 Tablet(s) Oral daily  sevelamer hydrochloride 800 milliGRAM(s) Oral three times a day    MEDICATIONS  (PRN):        __________________________________________________  REVIEW OF SYSTEMS:    CONSTITUTIONAL: No fever,   EYES: no acute visual disturbances  NECK: No pain or stiffness  RESPIRATORY: No cough; No shortness of breath  CARDIOVASCULAR: No chest pain, no palpitations  GASTROINTESTINAL: No pain. No nausea or vomiting; No diarrhea   NEUROLOGICAL: No headache or numbness, no tremors  MUSCULOSKELETAL: No joint pain, no muscle pain  GENITOURINARY: no dysuria, no frequency, no hesitancy  PSYCHIATRY: no depression , no anxiety  ALL OTHER  ROS negative      .Vital Signs Last 24 Hrs  T(C): 36.8 (04 Jan 2019 04:59), Max: 37.1 (03 Jan 2019 18:11)  T(F): 98.3 (04 Jan 2019 04:59), Max: 98.8 (03 Jan 2019 18:11)  HR: 76 (04 Jan 2019 06:37) (69 - 82)  BP: 155/70 (04 Jan 2019 06:37) (137/50 - 175/81)  BP(mean): --  RR: 18 (04 Jan 2019 04:59) (17 - 18)  SpO2: 95% (04 Jan 2019 04:59) (93% - 98%)    ________________________________________________  PHYSICAL EXAM:  GENERAL: NAD  HEENT: Normocephalic;  conjunctivae and sclerae clear; moist mucous membranes;   NECK : supple  CHEST/LUNG: rales B/L  HEART: S1 S2  regular; no murmurs, gallops or rubs  ABDOMEN: Soft, Nontender, Nondistended; Bowel sounds present  EXTREMITIES: no cyanosis; no edema; no calf tenderness  SKIN: warm and dry; no rash  NERVOUS SYSTEM:  Awake and alert; Oriented  to place, person and time ; no new deficits    _________________________________________________  LABS:                        9.7    4.9   )-----------( 153      ( 03 Jan 2019 10:09 )             31.5     01-03    142  |  102  |  59<H>  ----------------------------<  154<H>  4.7   |  28  |  10.90<H>    Ca    7.5<L>      03 Jan 2019 10:09    TPro  8.0  /  Alb  3.3<L>  /  TBili  0.4  /  DBili  x   /  AST  21  /  ALT  43  /  AlkPhos  155<H>  01-02    PT/INR - ( 02 Jan 2019 17:59 )   PT: 11.3 sec;   INR: 1.02 ratio         PTT - ( 02 Jan 2019 17:59 )  PTT:31.0 sec    CAPILLARY BLOOD GLUCOSE      POCT Blood Glucose.: 94 mg/dL (03 Jan 2019 08:59)        RADIOLOGY & ADDITIONAL TESTS:    < from: Xray Chest 1 View- PORTABLE-Routine (01.02.19 @ 19:27) >    INTERPRETATION:  AP semierect chest on January 2, 2019 at 6:42 PM.   Concern is fluid overload.    Heart is enlarged. Heart obscures the left base.    There is a mild central congestive pattern.    Long vascular stent from the right upper arm into the innominate system   again noted.    Chest is similar to January 2, earlier study.    IMPRESSION: Chest appears unchanged.    < from: Xray Chest 1 View-PORTABLE IMMEDIATE (01.02.19 @ 10:53) >    FINDINGS/  IMPRESSION:  New small leftpleural effusion with adjacent opacity. Vascular stent   overlies right axillary and subclavian region.    Heart size cannot be accurately assessed in this projection.        .

## 2019-01-05 VITALS
OXYGEN SATURATION: 94 % | SYSTOLIC BLOOD PRESSURE: 115 MMHG | TEMPERATURE: 99 F | RESPIRATION RATE: 18 BRPM | HEART RATE: 70 BPM | DIASTOLIC BLOOD PRESSURE: 44 MMHG

## 2019-01-05 LAB — GLUCOSE BLDC GLUCOMTR-MCNC: 82 MG/DL — SIGNIFICANT CHANGE UP (ref 70–99)

## 2019-01-05 PROCEDURE — 82962 GLUCOSE BLOOD TEST: CPT

## 2019-01-05 PROCEDURE — 83036 HEMOGLOBIN GLYCOSYLATED A1C: CPT

## 2019-01-05 PROCEDURE — 99285 EMERGENCY DEPT VISIT HI MDM: CPT | Mod: 25

## 2019-01-05 PROCEDURE — 85027 COMPLETE CBC AUTOMATED: CPT

## 2019-01-05 PROCEDURE — 83880 ASSAY OF NATRIURETIC PEPTIDE: CPT

## 2019-01-05 PROCEDURE — 36415 COLL VENOUS BLD VENIPUNCTURE: CPT

## 2019-01-05 PROCEDURE — 80048 BASIC METABOLIC PNL TOTAL CA: CPT

## 2019-01-05 PROCEDURE — 71045 X-RAY EXAM CHEST 1 VIEW: CPT

## 2019-01-05 PROCEDURE — 93306 TTE W/DOPPLER COMPLETE: CPT

## 2019-01-05 PROCEDURE — 93005 ELECTROCARDIOGRAM TRACING: CPT

## 2019-01-05 PROCEDURE — 83970 ASSAY OF PARATHORMONE: CPT

## 2019-01-05 PROCEDURE — 85730 THROMBOPLASTIN TIME PARTIAL: CPT

## 2019-01-05 PROCEDURE — 85610 PROTHROMBIN TIME: CPT

## 2019-01-05 PROCEDURE — 80053 COMPREHEN METABOLIC PANEL: CPT

## 2019-01-05 PROCEDURE — 82550 ASSAY OF CK (CPK): CPT

## 2019-01-05 PROCEDURE — 84484 ASSAY OF TROPONIN QUANT: CPT

## 2019-01-05 PROCEDURE — 82553 CREATINE MB FRACTION: CPT

## 2019-01-05 PROCEDURE — 82310 ASSAY OF CALCIUM: CPT

## 2019-01-05 RX ADMIN — SEVELAMER CARBONATE 800 MILLIGRAM(S): 2400 POWDER, FOR SUSPENSION ORAL at 05:42

## 2019-01-05 RX ADMIN — Medication 1 TABLET(S): at 11:49

## 2019-01-05 RX ADMIN — CLOPIDOGREL BISULFATE 75 MILLIGRAM(S): 75 TABLET, FILM COATED ORAL at 11:47

## 2019-01-05 RX ADMIN — CALCITRIOL 0.25 MICROGRAM(S): 0.5 CAPSULE ORAL at 11:47

## 2019-01-05 RX ADMIN — Medication 200 MILLIGRAM(S): at 11:47

## 2019-01-05 RX ADMIN — ISOSORBIDE DINITRATE 20 MILLIGRAM(S): 5 TABLET ORAL at 05:42

## 2019-01-05 RX ADMIN — Medication 81 MILLIGRAM(S): at 11:47

## 2019-01-05 RX ADMIN — Medication 120 MILLIGRAM(S): at 05:42

## 2019-01-05 RX ADMIN — SEVELAMER CARBONATE 800 MILLIGRAM(S): 2400 POWDER, FOR SUSPENSION ORAL at 14:55

## 2019-01-05 RX ADMIN — LACTULOSE 20 GRAM(S): 10 SOLUTION ORAL at 11:48

## 2019-01-05 RX ADMIN — APIXABAN 2.5 MILLIGRAM(S): 2.5 TABLET, FILM COATED ORAL at 05:42

## 2019-01-05 RX ADMIN — Medication 25 MILLIGRAM(S): at 05:42

## 2019-01-05 NOTE — PROGRESS NOTE ADULT - PROBLEM SELECTOR PROBLEM 3
ESRD (end stage renal disease) on dialysis

## 2019-01-05 NOTE — PROGRESS NOTE ADULT - ASSESSMENT
HCVD with Moderate AS with CHF/Fluid overload with small pl effusion Lt  ESRD with H/D with anemia  DM      PLAN; Optimise H/D time and decrease dry wt  Continue with H/D and present Rx  Discussed with PMD

## 2019-01-05 NOTE — PROGRESS NOTE ADULT - REASON FOR ADMISSION
fluid overload

## 2019-01-05 NOTE — PROGRESS NOTE ADULT - SUBJECTIVE AND OBJECTIVE BOX
PULMONARY  progress note/ Final Note    DORSAINVIL, JEANCLAUDE  MRN-[id number]    Patient is a 73y old  Male who presents with a chief complaint of fluid overload (04 Jan 2019 14:49)        MEDICATIONS  (STANDING):  apixaban 2.5 milliGRAM(s) Oral every 12 hours  aspirin  chewable 81 milliGRAM(s) Oral daily  atorvastatin 80 milliGRAM(s) Oral at bedtime  calcitriol   Capsule 0.25 MICROGram(s) Oral daily  clopidogrel Tablet 75 milliGRAM(s) Oral daily  diltiazem    milliGRAM(s) Oral daily  docusate sodium 200 milliGRAM(s) Oral daily  isosorbide   dinitrate Tablet (ISORDIL) 20 milliGRAM(s) Oral two times a day  lactulose Syrup 20 Gram(s) Oral daily  metoprolol tartrate 25 milliGRAM(s) Oral two times a day  multivitamin 1 Tablet(s) Oral daily  sevelamer hydrochloride 800 milliGRAM(s) Oral three times a day            Allergies    No Known Allergies            PAST MEDICAL & SURGICAL HISTORY:  Anemia  Hypertension  Diabetes  ESRD (end stage renal disease) on dialysis  No significant past surgical history           REVIEW OF SYSTEMS:  CONSTITUTIONAL: No fever, weight loss, or fatigue   EYES: No eye pain, visual disturbances, or discharge  ENT:  No difficulty hearing, tinnitus, vertigo; No sinus or throat pain  NECK: No pain or stiffness or nodes  RESPIRATORY:  cough--   wheezing--   chills--   hemoptysis--  Shortness of Breath--  CARDIOVASCULAR: No chest pain, palpitations, passing out, dizziness, or leg swelling  GASTROINTESTINAL: No abdominal or epigastric pain. No nausea, vomiting, or hematemesis; No diarrhea or constipation. No melena or hematochezia.  GENITOURINARY: No dysuria, frequency, hematuria, or incontinence  NEUROLOGICAL: No headaches, memory loss+, loss of strength+ rt side, no numbness, or tremors  HEME/LYMPH: No easy bruising, or bleeding gums  ALLERGY AND IMMUNOLOGIC: No hives or eczema    Vital Signs Last 24 Hrs  T(C): 36.9 (05 Jan 2019 05:06), Max: 37 (04 Jan 2019 21:01)  T(F): 98.4 (05 Jan 2019 05:06), Max: 98.6 (04 Jan 2019 21:01)  HR: 84 (05 Jan 2019 05:06) (76 - 91)  BP: 140/62 (05 Jan 2019 05:06) (136/52 - 201/88)  BP(mean): --  RR: 18 (05 Jan 2019 05:06) (16 - 19)  SpO2: 97% (05 Jan 2019 05:06) (94% - 97%)  I&O's Detail      PHYSICAL EXAMINATION:    GENERAL: The patient is a well-developed, well-nourished in no apparent distress. obese B/M  SKIN no rash ecchymoses or bruises  HEENT: Head is normocephalic and atraumatic  VIVIANA , Extraocular muscles are intact. Mucous membranes  moist.   Neck supple ,No LN felt JVP not increased  Thyroid not enlarged  Cardiovascular:  S1 S2 heard, RSR, No JVD , systolic  murmur at apex, No gallop or rub  Respiratory: Chest wall symmetrical with good air entry ,Percussion note normal,    Lungs vesicular breathing with no   rales , no   wheeze	  ABDOMEN:  Soft, Non-tender, obese,  no hepatomegaly or splenomegaly BS positive	  Extremities:  No clubbing, cyanosis or edema  Vascular: Peripheral pulses palpable 2+ bilaterally  CNS:  Alert and responsive   Cranial nerves intact  sensory intact  Rt hemiparesis  LABS:                        10.8   5.4   )-----------( 178      ( 04 Jan 2019 21:34 )             34.2     01-04    141  |  101  |  73<H>  ----------------------------<  100<H>  4.5   |  26  |  11.80<H>    Ca    7.6<L>      04 Jan 2019 21:34          RADIOLOGY & ADDITIONAL STUDIES:    CXR:< from: Xray Chest 1 View- PORTABLE-Routine (01.04.19 @ 11:34) >  Right axillary stent again seen.  There is stable cardiomegaly.  Interval improvement in central congestive pattern.  No focal infiltrates seen bilaterally. No significant pleural effusion.   No pneumothorax.  No acute bony abnormalities.

## 2019-01-05 NOTE — PROGRESS NOTE ADULT - SUBJECTIVE AND OBJECTIVE BOX
Patient is a 73y old  Male who presents with a chief complaint of fluid overload (03 Jan 2019 11:58)      late entry  patient was seen and examined    s doing ok  .MEDICATIONS  (STANDING):  apixaban 2.5 milliGRAM(s) Oral every 12 hours  aspirin  chewable 81 milliGRAM(s) Oral daily  atorvastatin 80 milliGRAM(s) Oral at bedtime  calcitriol   Capsule 0.25 MICROGram(s) Oral daily  clopidogrel Tablet 75 milliGRAM(s) Oral daily  diltiazem    milliGRAM(s) Oral daily  docusate sodium 200 milliGRAM(s) Oral daily  isosorbide   dinitrate Tablet (ISORDIL) 20 milliGRAM(s) Oral two times a day  lactulose Syrup 20 Gram(s) Oral daily  metoprolol tartrate 25 milliGRAM(s) Oral two times a day  multivitamin 1 Tablet(s) Oral daily  sevelamer hydrochloride 800 milliGRAM(s) Oral three times a day    MEDICATIONS  (PRN):          __________________________________________________  REVIEW OF SYSTEMS:    CONSTITUTIONAL: No fever,   EYES: no acute visual disturbances  NECK: No pain or stiffness  RESPIRATORY: No cough; No shortness of breath  CARDIOVASCULAR: No chest pain, no palpitations  GASTROINTESTINAL: No pain. No nausea or vomiting; No diarrhea   NEUROLOGICAL: No headache or numbness, no tremors  MUSCULOSKELETAL: No joint pain, no muscle pain  GENITOURINARY: no dysuria, no frequency, no hesitancy  PSYCHIATRY: no depression , no anxiety  ALL OTHER  ROS negative      .Vital Signs Last 24 Hrs  T(C): 36.9 (05 Jan 2019 05:06), Max: 37 (04 Jan 2019 21:01)  T(F): 98.4 (05 Jan 2019 05:06), Max: 98.6 (04 Jan 2019 21:01)  HR: 84 (05 Jan 2019 05:06) (76 - 91)  BP: 140/62 (05 Jan 2019 05:06) (136/52 - 201/88)  BP(mean): --  RR: 18 (05 Jan 2019 05:06) (16 - 19)  SpO2: 97% (05 Jan 2019 05:06) (94% - 97%)  ________________________________________________  PHYSICAL EXAM:  GENERAL: NAD  HEENT: Normocephalic;  conjunctivae and sclerae clear; moist mucous membranes;   NECK : supple  CHEST/LUNG: rales B/L  HEART: S1 S2  regular; no murmurs, gallops or rubs  ABDOMEN: Soft, Nontender, Nondistended; Bowel sounds present  EXTREMITIES: no cyanosis; no edema; no calf tenderness  SKIN: warm and dry; no rash  NERVOUS SYSTEM:  Awake and alert; Oriented  to place, person and time ; no new deficits    _________________________________________________  LABS:                        10.8   5.4   )-----------( 178      ( 04 Jan 2019 21:34 )             34.2     01-04    141  |  101  |  73<H>  ----------------------------<  100<H>  4.5   |  26  |  11.80<H>    Ca    7.6<L>      04 Jan 2019 21:34                            LABS:                        9.7    4.9   )-----------( 153      ( 03 Jan 2019 10:09 )             31.5     01-03    142  |  102  |  59<H>  ----------------------------<  154<H>  4.7   |  28  |  10.90<H>    Ca    7.5<L>      03 Jan 2019 10:09    TPro  8.0  /  Alb  3.3<L>  /  TBili  0.4  /  DBili  x   /  AST  21  /  ALT  43  /  AlkPhos  155<H>  01-02    PT/INR - ( 02 Jan 2019 17:59 )   PT: 11.3 sec;   INR: 1.02 ratio         PTT - ( 02 Jan 2019 17:59 )  PTT:31.0 sec    CAPILLARY BLOOD GLUCOSE      POCT Blood Glucose.: 94 mg/dL (03 Jan 2019 08:59)        RADIOLOGY & ADDITIONAL TESTS:    < from: Xray Chest 1 View- PORTABLE-Routine (01.02.19 @ 19:27) >    INTERPRETATION:  AP semierect chest on January 2, 2019 at 6:42 PM.   Concern is fluid overload.    Heart is enlarged. Heart obscures the left base.    There is a mild central congestive pattern.    Long vascular stent from the right upper arm into the innominate system   again noted.    Chest is similar to January 2, earlier study.    IMPRESSION: Chest appears unchanged.    < from: Xray Chest 1 View-PORTABLE IMMEDIATE (01.02.19 @ 10:53) >    FINDINGS/  IMPRESSION:  New small leftpleural effusion with adjacent opacity. Vascular stent   overlies right axillary and subclavian region.    Heart size cannot be accurately assessed in this projection.        .

## 2019-01-05 NOTE — PROGRESS NOTE ADULT - ASSESSMENT
73M, from [facility], w/ PMHx ESRD, HTN, CVA (Rt Hemiplegia), AOCD, Chronic Afib (Eliquis), Vascular dementia, Gout, GERD, who presents to the ED c/o SOB. Patient was found to have BP: 220/148 mmHg at NH in addition to dyspnea. Rest of history limited as patient is verbally abusive and not cooperating with staff. Patient is s/p emergent HD for fluid overload. Resting comfortably and speaking in full sentences w/o distress.        Patient admitted for hypertensive urgency 2/2 fluid overload, s/p emergent HD.

## 2019-01-05 NOTE — PROGRESS NOTE ADULT - PROBLEM SELECTOR PLAN 5
HD  REPEAT CXR POST HD  TO GET PULMONARY CONSULT DR STREETER
HD  REPEAT CXR POST HD
Patient on Diltiazem and Eliquis 2.5 mg BID at NH  c/w Lopressor + Diltiazem

## 2019-01-05 NOTE — PROGRESS NOTE ADULT - ASSESSMENT
ESRD   CHF improved with dialysis, fluid status stable.  Increased troponin level, patient refused to be on Heparin  Follow with cardiology.    Last dialysis yesterday with 3 kg fluid removal - fluid status improved.  Moderate AS on Echocardiogram new    Dialysis tomorrow  Start Epogen due to anemia

## 2019-01-05 NOTE — PROGRESS NOTE ADULT - PROBLEM SELECTOR PROBLEM 5
Pleural effusion
Chronic atrial fibrillation
Pleural effusion

## 2019-01-05 NOTE — PROGRESS NOTE ADULT - SUBJECTIVE AND OBJECTIVE BOX
Problem List:  ESRD due to hypertension  CHF on admisson improved    PAST MEDICAL & SURGICAL HISTORY:  Anemia  Hypertension  Diabetes  ESRD (end stage renal disease) on dialysis  No significant past surgical history      No Known Allergies      MEDICATIONS  (STANDING):  apixaban 2.5 milliGRAM(s) Oral every 12 hours  aspirin  chewable 81 milliGRAM(s) Oral daily  atorvastatin 80 milliGRAM(s) Oral at bedtime  calcitriol   Capsule 0.25 MICROGram(s) Oral daily  clopidogrel Tablet 75 milliGRAM(s) Oral daily  diltiazem    milliGRAM(s) Oral daily  docusate sodium 200 milliGRAM(s) Oral daily  isosorbide   dinitrate Tablet (ISORDIL) 20 milliGRAM(s) Oral two times a day  lactulose Syrup 20 Gram(s) Oral daily  metoprolol tartrate 25 milliGRAM(s) Oral two times a day  multivitamin 1 Tablet(s) Oral daily  sevelamer hydrochloride 800 milliGRAM(s) Oral three times a day    MEDICATIONS  (PRN):                            10.8   5.4   )-----------( 178      ( 04 Jan 2019 21:34 )             34.2     01-04    141  |  101  |  73<H>  ----------------------------<  100<H>  4.5   |  26  |  11.80<H>    Ca    7.6<L>      04 Jan 2019 21:34              REVIEW OF SYSTEMS:  General: no fever no chills, no weight loss.  EYES/ENT: No visual changes;  No vertigo, no headache.  NECK: No pain or stiffness  RESPIRATORY: No cough, wheezing, hemoptysis; No shortness of breath  CARDIOVASCULAR: No chest pain or palpitations. No Edema  GASTROINTESTINAL: No abdominal or epigastric pain. No nausea, vomiting. No diarrhea or constipation. No melena.  GENITOURINARY: No dysuria, frequency, foamy urine, urinary urgency, incontinence or hematuria          VITALS:  T(F): 98.4 (01-05-19 @ 05:06), Max: 98.6 (01-04-19 @ 21:01)  HR: 84 (01-05-19 @ 05:06)  BP: 140/62 (01-05-19 @ 05:06)  RR: 18 (01-05-19 @ 05:06)  SpO2: 97% (01-05-19 @ 05:06)  Wt(kg): --      PHYSICAL EXAM:  Constitutional: well developed, no diaphoresis, no distress.  Neck: No JVD, no carotid bruit, supple, no adenopathy  Respiratory: Good air entrance B/L, no wheezes, rales or rhonchi  Cardiovascular: S1, S2, RRR, no pericardial rub, no murmur  Abdomen: BS+, soft, no tenderness, no bruit  Pelvis: bladder nondistended  Extremities: No cyanosis or clubbing. No peripheral edema.  Vascular Access: tight upper armAVG with bruit and thrill;

## 2019-01-05 NOTE — PROGRESS NOTE ADULT - PROBLEM SELECTOR PLAN 2
R/O   MI  ELEVATED TROPONIN  CARDIOLOGY DR NARVAEZ  CARDIOLOGY EVAL DR NARVAEZ
ECG: SR VR@95bpm,   cardiac enzymes elevated and trended down to 0.324  likely demand ischemia  c/w ASA + Lipitor 80 mg HS + Lopressor 25 mg BID  Telemonitoring- no acute events  5.4 HbA1C  Follow up TSH, Lipid profile  Follow up TTE  Cardio consult: Dr Morales
R/O   MI  ELEVATED TROPONIN  CARDIOLOGY EVAL DR NARVAEZ

## 2019-01-05 NOTE — PROGRESS NOTE ADULT - PROBLEM SELECTOR PLAN 1
? FLUID OVER LOAD  HD AS PER RENAL
? FLUID OVER LOAD  HD
Patient p/w SOB and hypertensive urgency w/ BP: 210/118 mmHg  likely 2/2 ESRD  s/p emergent HD w/ improvement of symptoms at 1/2  CXR: effusions in admission, repeat does not show any change  FU CXR in AM, ECHO  BNP elevated  dialysis tomorrow

## 2019-01-05 NOTE — PROGRESS NOTE ADULT - ATTENDING COMMENTS
I have examined pt personally Hx chart lab and xrays reviewed and pt discussed with residents
RESUME PATIENT'S HOME MEDS  CARDILOGY EVAL   ECHO
patient is clinically stable   d/c planning
patient is clinically stable   hd today  d/c planning if ok with cardio and pulmonary

## 2019-01-05 NOTE — PROGRESS NOTE ADULT - NSHPATTENDINGPLANDISCUSS_GEN_ALL_CORE
medical team and the patient
the patient and medical team
the patient and medical team
resident and PMD
RESIDENT , PATIENT AND HIS FAMILY
ER ATTENDING, RESIDENT , PATIENT AND HIS FAMILY
resident , patient and his family
resident , patient and his family

## 2019-01-05 NOTE — PROGRESS NOTE ADULT - PROBLEM SELECTOR PLAN 4
RESUME HOME MEDS
RESUME HOME MEDS
c/w NH meds  BP better controlled after HD

## 2019-01-05 NOTE — PROGRESS NOTE ADULT - PROBLEM SELECTOR PLAN 3
ON HAD   DR VILLA -RENAL
ON HAD   DR VILLA -RENAL
c/w Maintenance HD  Avoid nephrotoxic drugs  last dialysis yesterday  Dr Jacinto
none

## 2019-01-18 PROBLEM — D64.9 ANEMIA, UNSPECIFIED: Chronic | Status: ACTIVE | Noted: 2019-01-02

## 2019-01-18 PROBLEM — I10 ESSENTIAL (PRIMARY) HYPERTENSION: Chronic | Status: ACTIVE | Noted: 2019-01-02

## 2019-01-25 ENCOUNTER — APPOINTMENT (OUTPATIENT)
Dept: VASCULAR SURGERY | Facility: CLINIC | Age: 74
End: 2019-01-25

## 2019-02-19 ENCOUNTER — APPOINTMENT (OUTPATIENT)
Dept: VASCULAR SURGERY | Facility: CLINIC | Age: 74
End: 2019-02-19
Payer: MEDICARE

## 2019-02-19 VITALS — SYSTOLIC BLOOD PRESSURE: 191 MMHG | HEART RATE: 69 BPM | DIASTOLIC BLOOD PRESSURE: 83 MMHG

## 2019-02-19 PROCEDURE — 93990 DOPPLER FLOW TESTING: CPT

## 2019-02-19 PROCEDURE — 99213 OFFICE O/P EST LOW 20 MIN: CPT

## 2019-02-19 NOTE — DISCUSSION/SUMMARY
[FreeTextEntry1] : 72 yo male with history of esrd on hd presents for follow up of right upper extremity avg.  pt denies any difficulty with the avg during hd. \par duplex shows widely patent avf \par will continue to monitor pt to follow up in 3 months with repeat duplex

## 2019-02-19 NOTE — HISTORY OF PRESENT ILLNESS
[FreeTextEntry1] : 72 yo male with history of esrd on hd presents for follow up of right upper extremity avg.  pt denies any difficulty with the avg during hd.  he reports one episode of bleeding after hd yesterday otherwise no recurrent issues.  pt states that he also experiences occasional right foot pain that is intermittent.  pt denies any open wounds or ulcers.

## 2019-02-19 NOTE — PHYSICAL EXAM
[Thrill] : thrill [Hand well perfused] : hand well perfused [Warm Extremities] : warm extremities [2+] : right 2+ [Normal] : coordination grossly intact, no focal deficits [Pulsatile Thrill] : no pulsatile thrill [Aneurysm] : no aneurysm [Bleeding] : no bleeding [Ulcer] : no ulcer [Gangrene] : no gangrene [de-identified] : intact

## 2019-05-21 ENCOUNTER — APPOINTMENT (OUTPATIENT)
Dept: VASCULAR SURGERY | Facility: CLINIC | Age: 74
End: 2019-05-21
Payer: MEDICARE

## 2019-05-21 VITALS
SYSTOLIC BLOOD PRESSURE: 179 MMHG | BODY MASS INDEX: 30.73 KG/M2 | HEART RATE: 77 BPM | TEMPERATURE: 98.1 F | DIASTOLIC BLOOD PRESSURE: 92 MMHG | WEIGHT: 180 LBS | HEIGHT: 64 IN

## 2019-05-21 PROCEDURE — 99213 OFFICE O/P EST LOW 20 MIN: CPT

## 2019-05-21 PROCEDURE — 93990 DOPPLER FLOW TESTING: CPT

## 2019-05-21 PROCEDURE — 93923 UPR/LXTR ART STDY 3+ LVLS: CPT

## 2019-05-21 NOTE — HISTORY OF PRESENT ILLNESS
[FreeTextEntry1] : 73 male with history of esrd on hd, cva, non-ambulatory wheelchair bound, hld, htn, afib on eliquis presents for follow up of right upper extremity avg.  pt denies any difficulty with hd at this time.  pt states that there is only minimal bleeding after hd that resolves with a few min of pressure.  \par pt reports right foot pain at night that wakes him from sleep.  pt states that the pain is in the toes.  \par pt denies any open wounds or ulcers

## 2019-05-21 NOTE — PHYSICAL EXAM
[0] : right 0 [No Rash or Lesion] : No rash or lesion [Alert] : alert [Calm] : calm [JVD] : no jugular venous distention  [Ankle Swelling (On Exam)] : not present [Varicose Veins Of Lower Extremities] : not present [] : not present [Skin Ulcer] : no ulcer [de-identified] : appears well

## 2019-05-21 NOTE — ASSESSMENT
[FreeTextEntry1] : 73 male with history of esrd on hd, cva, non-ambulatory wheelchair bound, hld, htn, afib on eliquis presents for follow up of right upper extremity avg now complaining of pain in the right foot/toes at night that is waking him from sleep.  \par duplex of the right upper extremity avg shows 50% stenosis within the graft and at the subclavian vein\par osiel/pvr shows diminished waveforms bilaterally with osiel of 0.7 \par would recommend right lower extremity angiogram given the pain, scheduled for june 11th \par discussed with pt hcp daughter and sister \par will repeat avf duplex in 3 months

## 2019-06-11 ENCOUNTER — APPOINTMENT (OUTPATIENT)
Dept: ENDOVASCULAR SURGERY | Facility: CLINIC | Age: 74
End: 2019-06-11
Payer: MEDICARE

## 2019-07-08 ENCOUNTER — FORM ENCOUNTER (OUTPATIENT)
Age: 74
End: 2019-07-08

## 2019-07-09 ENCOUNTER — APPOINTMENT (OUTPATIENT)
Dept: ENDOVASCULAR SURGERY | Facility: CLINIC | Age: 74
End: 2019-07-09
Payer: MEDICARE

## 2019-07-09 VITALS
WEIGHT: 180 LBS | HEART RATE: 76 BPM | SYSTOLIC BLOOD PRESSURE: 153 MMHG | OXYGEN SATURATION: 100 % | DIASTOLIC BLOOD PRESSURE: 77 MMHG | TEMPERATURE: 97.9 F | RESPIRATION RATE: 18 BRPM | BODY MASS INDEX: 30.73 KG/M2 | HEIGHT: 64 IN

## 2019-07-09 DIAGNOSIS — I48.91 UNSPECIFIED ATRIAL FIBRILLATION: ICD-10-CM

## 2019-07-09 DIAGNOSIS — I73.9 PERIPHERAL VASCULAR DISEASE, UNSPECIFIED: ICD-10-CM

## 2019-07-09 PROCEDURE — 37228Z: CUSTOM | Mod: RT

## 2019-07-09 PROCEDURE — 37221Z: CUSTOM | Mod: RT

## 2019-07-09 PROCEDURE — 75625 CONTRAST EXAM ABDOMINL AORTA: CPT

## 2019-07-09 RX ORDER — RENAGEL 800 MG/1
800 TABLET ORAL
Refills: 0 | Status: ACTIVE | COMMUNITY

## 2019-07-12 ENCOUNTER — EMERGENCY (EMERGENCY)
Facility: HOSPITAL | Age: 74
LOS: 1 days | Discharge: HOME | End: 2019-07-12
Attending: EMERGENCY MEDICINE
Payer: MEDICARE

## 2019-07-12 VITALS
WEIGHT: 169.98 LBS | DIASTOLIC BLOOD PRESSURE: 81 MMHG | HEIGHT: 64 IN | OXYGEN SATURATION: 96 % | HEART RATE: 83 BPM | RESPIRATION RATE: 18 BRPM | SYSTOLIC BLOOD PRESSURE: 132 MMHG | TEMPERATURE: 98 F

## 2019-07-12 VITALS
HEART RATE: 82 BPM | DIASTOLIC BLOOD PRESSURE: 77 MMHG | RESPIRATION RATE: 18 BRPM | OXYGEN SATURATION: 98 % | SYSTOLIC BLOOD PRESSURE: 131 MMHG | TEMPERATURE: 99 F

## 2019-07-12 LAB
ALBUMIN SERPL ELPH-MCNC: 2.9 G/DL — LOW (ref 3.5–5)
ALP SERPL-CCNC: 115 U/L — SIGNIFICANT CHANGE UP (ref 40–120)
ALT FLD-CCNC: 40 U/L DA — SIGNIFICANT CHANGE UP (ref 10–60)
ANION GAP SERPL CALC-SCNC: 7 MMOL/L — SIGNIFICANT CHANGE UP (ref 5–17)
AST SERPL-CCNC: 19 U/L — SIGNIFICANT CHANGE UP (ref 10–40)
BILIRUB SERPL-MCNC: 0.3 MG/DL — SIGNIFICANT CHANGE UP (ref 0.2–1.2)
BUN SERPL-MCNC: 33 MG/DL — HIGH (ref 7–18)
CALCIUM SERPL-MCNC: 7.8 MG/DL — LOW (ref 8.4–10.5)
CHLORIDE SERPL-SCNC: 99 MMOL/L — SIGNIFICANT CHANGE UP (ref 96–108)
CO2 SERPL-SCNC: 30 MMOL/L — SIGNIFICANT CHANGE UP (ref 22–31)
CREAT SERPL-MCNC: 7.44 MG/DL — HIGH (ref 0.5–1.3)
GLUCOSE SERPL-MCNC: 105 MG/DL — HIGH (ref 70–99)
HCT VFR BLD CALC: 27.9 % — LOW (ref 39–50)
HGB BLD-MCNC: 9.1 G/DL — LOW (ref 13–17)
MCHC RBC-ENTMCNC: 32.6 GM/DL — SIGNIFICANT CHANGE UP (ref 32–36)
MCHC RBC-ENTMCNC: 33.5 PG — SIGNIFICANT CHANGE UP (ref 27–34)
MCV RBC AUTO: 102.6 FL — HIGH (ref 80–100)
NRBC # BLD: 0 /100 WBCS — SIGNIFICANT CHANGE UP (ref 0–0)
PLATELET # BLD AUTO: 119 K/UL — LOW (ref 150–400)
POTASSIUM SERPL-MCNC: 4.2 MMOL/L — SIGNIFICANT CHANGE UP (ref 3.5–5.3)
POTASSIUM SERPL-SCNC: 4.2 MMOL/L — SIGNIFICANT CHANGE UP (ref 3.5–5.3)
PROT SERPL-MCNC: 7.4 G/DL — SIGNIFICANT CHANGE UP (ref 6–8.3)
RBC # BLD: 2.72 M/UL — LOW (ref 4.2–5.8)
RBC # FLD: 14.4 % — SIGNIFICANT CHANGE UP (ref 10.3–14.5)
SODIUM SERPL-SCNC: 136 MMOL/L — SIGNIFICANT CHANGE UP (ref 135–145)
TROPONIN I SERPL-MCNC: 0.06 NG/ML — HIGH (ref 0–0.04)
WBC # BLD: 4.97 K/UL — SIGNIFICANT CHANGE UP (ref 3.8–10.5)
WBC # FLD AUTO: 4.97 K/UL — SIGNIFICANT CHANGE UP (ref 3.8–10.5)

## 2019-07-12 PROCEDURE — 80053 COMPREHEN METABOLIC PANEL: CPT

## 2019-07-12 PROCEDURE — 85027 COMPLETE CBC AUTOMATED: CPT

## 2019-07-12 PROCEDURE — 36415 COLL VENOUS BLD VENIPUNCTURE: CPT

## 2019-07-12 PROCEDURE — 99283 EMERGENCY DEPT VISIT LOW MDM: CPT

## 2019-07-12 PROCEDURE — 93010 ELECTROCARDIOGRAM REPORT: CPT

## 2019-07-12 PROCEDURE — 93005 ELECTROCARDIOGRAM TRACING: CPT

## 2019-07-12 PROCEDURE — 84484 ASSAY OF TROPONIN QUANT: CPT

## 2019-07-12 PROCEDURE — 99284 EMERGENCY DEPT VISIT MOD MDM: CPT

## 2019-07-12 NOTE — ED PROVIDER NOTE - CLINICAL SUMMARY MEDICAL DECISION MAKING FREE TEXT BOX
73 yo male was seen in the ED for having SVT in his dialysis center after completing dialysis.    ekg  labs  reassess

## 2019-07-12 NOTE — ED ADULT NURSE NOTE - OBJECTIVE STATEMENT
74 yr old male sent from dialysis center for Rapid afib postdialysis  hr on arrival is 80 bpm, pt is a&oX3, VITALS stable,

## 2019-07-12 NOTE — ED ADULT TRIAGE NOTE - CADM TRG TX PRIOR TO ARRIVAL
Topical Clindamycin Counseling: Patient counseled that this medication may cause skin irritation or allergic reactions.  In the event of skin irritation, the patient was advised to reduce the amount of the drug applied or use it less frequently.   The patient verbalized understanding of the proper use and possible adverse effects of clindamycin.  All of the patient's questions and concerns were addressed. Minocycline Pregnancy And Lactation Text: This medication is Pregnancy Category D and not consider safe during pregnancy. It is also excreted in breast milk. Topical Sulfur Applications Pregnancy And Lactation Text: This medication is Pregnancy Category C and has an unknown safety profile during pregnancy. It is unknown if this topical medication is excreted in breast milk. High Dose Vitamin A Pregnancy And Lactation Text: High dose vitamin A therapy is contraindicated during pregnancy and breast feeding. Erythromycin Counseling:  I discussed with the patient the risks of erythromycin including but not limited to GI upset, allergic reaction, drug rash, diarrhea, increase in liver enzymes, and yeast infections. Topical Retinoid Pregnancy And Lactation Text: This medication is Pregnancy Category C. It is unknown if this medication is excreted in breast milk. Include Pregnancy/Lactation Warning?: No Azithromycin Counseling:  I discussed with the patient the risks of azithromycin including but not limited to GI upset, allergic reaction, drug rash, diarrhea, and yeast infections. Doxycycline Counseling:  Patient counseled regarding possible photosensitivity and increased risk for sunburn.  Patient instructed to avoid sunlight, if possible.  When exposed to sunlight, patients should wear protective clothing, sunglasses, and sunscreen.  The patient was instructed to call the office immediately if the following severe adverse effects occur:  hearing changes, easy bruising/bleeding, severe headache, or vision changes.  The patient verbalized understanding of the proper use and possible adverse effects of doxycycline.  All of the patient's questions and concerns were addressed. EKG Tazorac Pregnancy And Lactation Text: This medication is not safe during pregnancy. It is unknown if this medication is excreted in breast milk. Benzoyl Peroxide Pregnancy And Lactation Text: This medication is Pregnancy Category C. It is unknown if benzoyl peroxide is excreted in breast milk. Dapsone Counseling: I discussed with the patient the risks of dapsone including but not limited to hemolytic anemia, agranulocytosis, rashes, methemoglobinemia, kidney failure, peripheral neuropathy, headaches, GI upset, and liver toxicity.  Patients who start dapsone require monitoring including baseline LFTs and weekly CBCs for the first month, then every month thereafter.  The patient verbalized understanding of the proper use and possible adverse effects of dapsone.  All of the patient's questions and concerns were addressed. Isotretinoin Pregnancy And Lactation Text: This medication is Pregnancy Category X and is considered extremely dangerous during pregnancy. It is unknown if it is excreted in breast milk. Topical Retinoid counseling:  Patient advised to apply a pea-sized amount only at bedtime and wait 30 minutes after washing their face before applying.  If too drying, patient may add a non-comedogenic moisturizer. The patient verbalized understanding of the proper use and possible adverse effects of retinoids.  All of the patient's questions and concerns were addressed. Azithromycin Pregnancy And Lactation Text: This medication is considered safe during pregnancy and is also secreted in breast milk. Tazorac Counseling:  Patient advised that medication is irritating and drying.  Patient may need to apply sparingly and wash off after an hour before eventually leaving it on overnight.  The patient verbalized understanding of the proper use and possible adverse effects of tazorac.  All of the patient's questions and concerns were addressed. Spironolactone Pregnancy And Lactation Text: This medication can cause feminization of the male fetus and should be avoided during pregnancy. The active metabolite is also found in breast milk. Tetracycline Counseling: Patient counseled regarding possible photosensitivity and increased risk for sunburn.  Patient instructed to avoid sunlight, if possible.  When exposed to sunlight, patients should wear protective clothing, sunglasses, and sunscreen.  The patient was instructed to call the office immediately if the following severe adverse effects occur:  hearing changes, easy bruising/bleeding, severe headache, or vision changes.  The patient verbalized understanding of the proper use and possible adverse effects of tetracycline.  All of the patient's questions and concerns were addressed. Patient understands to avoid pregnancy while on therapy due to potential birth defects. Isotretinoin Counseling: Patient should get monthly blood tests, not donate blood, not drive at night if vision affected, not share medication, and not undergo elective surgery for 6 months after tx completed. Side effects reviewed, pt to contact office should one occur. Topical Clindamycin Pregnancy And Lactation Text: This medication is Pregnancy Category B and is considered safe during pregnancy. It is unknown if it is excreted in breast milk. Bactrim Counseling:  I discussed with the patient the risks of sulfa antibiotics including but not limited to GI upset, allergic reaction, drug rash, diarrhea, dizziness, photosensitivity, and yeast infections.  Rarely, more serious reactions can occur including but not limited to aplastic anemia, agranulocytosis, methemoglobinemia, blood dyscrasias, liver or kidney failure, lung infiltrates or desquamative/blistering drug rashes. Bactrim Pregnancy And Lactation Text: This medication is Pregnancy Category D and is known to cause fetal risk.  It is also excreted in breast milk. Detail Level: Simple Spironolactone Counseling: Patient advised regarding risks of diarrhea, abdominal pain, hyperkalemia, birth defects (for female patients), liver toxicity and renal toxicity. The patient may need blood work to monitor liver and kidney function and potassium levels while on therapy. The patient verbalized understanding of the proper use and possible adverse effects of spironolactone.  All of the patient's questions and concerns were addressed. Birth Control Pills Pregnancy And Lactation Text: This medication should be avoided if pregnant and for the first 30 days post-partum. Topical Sulfur Applications Counseling: Topical Sulfur Counseling: Patient counseled that this medication may cause skin irritation or allergic reactions.  In the event of skin irritation, the patient was advised to reduce the amount of the drug applied or use it less frequently.   The patient verbalized understanding of the proper use and possible adverse effects of topical sulfur application.  All of the patient's questions and concerns were addressed. Minocycline Counseling: Patient advised regarding possible photosensitivity and discoloration of the teeth, skin, lips, tongue and gums.  Patient instructed to avoid sunlight, if possible.  When exposed to sunlight, patients should wear protective clothing, sunglasses, and sunscreen.  The patient was instructed to call the office immediately if the following severe adverse effects occur:  hearing changes, easy bruising/bleeding, severe headache, or vision changes.  The patient verbalized understanding of the proper use and possible adverse effects of minocycline.  All of the patient's questions and concerns were addressed. Detail Level: Zone High Dose Vitamin A Counseling: Side effects reviewed, pt to contact office should one occur. Dapsone Pregnancy And Lactation Text: This medication is Pregnancy Category C and is not considered safe during pregnancy or breast feeding. Doxycycline Pregnancy And Lactation Text: This medication is Pregnancy Category D and not consider safe during pregnancy. It is also excreted in breast milk but is considered safe for shorter treatment courses. Birth Control Pills Counseling: Birth Control Pill Counseling: I discussed with the patient the potential side effects of OCPs including but not limited to increased risk of stroke, heart attack, thrombophlebitis, deep venous thrombosis, hepatic adenomas, breast changes, GI upset, headaches, and depression.  The patient verbalized understanding of the proper use and possible adverse effects of OCPs. All of the patient's questions and concerns were addressed. Erythromycin Pregnancy And Lactation Text: This medication is Pregnancy Category B and is considered safe during pregnancy. It is also excreted in breast milk. Benzoyl Peroxide Counseling: Patient counseled that medicine may cause skin irritation and bleach clothing.  In the event of skin irritation, the patient was advised to reduce the amount of the drug applied or use it less frequently.   The patient verbalized understanding of the proper use and possible adverse effects of benzoyl peroxide.  All of the patient's questions and concerns were addressed.

## 2019-07-12 NOTE — ED PROVIDER NOTE - OBJECTIVE STATEMENT
73 yo male with pmh of ESRD (MWF), a.fib on eliquis, CVA with right sided residual weakness, dementia, HTN, lives in [facility] was sent to the ED from his dialysis center for SVT. Pt is a poor historian and history was obtained from dialysis center chart. As per chart, pt completed dialysis and went into SVT with HR of 170.   Upon arrival to the ED, pts HR is 89 and he is in NSR. Pt denies having any complaints at this time and states that he feels "fine." pt did not receive any medications for SVT from EMS or dialysis center.

## 2019-07-12 NOTE — ED ADULT NURSE NOTE - NSIMPLEMENTINTERV_GEN_ALL_ED
Implemented All Fall with Harm Risk Interventions:  [place] to call system. Call bell, personal items and telephone within reach. Instruct patient to call for assistance. Room bathroom lighting operational. Non-slip footwear when patient is off stretcher. Physically safe environment: no spills, clutter or unnecessary equipment. Stretcher in lowest position, wheels locked, appropriate side rails in place. Provide visual cue, wrist band, yellow gown, etc. Monitor gait and stability. Monitor for mental status changes and reorient to person, place, and time. Review medications for side effects contributing to fall risk. Reinforce activity limits and safety measures with patient and family. Provide visual clues: red socks.

## 2019-07-15 ENCOUNTER — APPOINTMENT (OUTPATIENT)
Dept: ENDOVASCULAR SURGERY | Facility: CLINIC | Age: 74
End: 2019-07-15
Payer: MEDICARE

## 2019-07-15 VITALS
TEMPERATURE: 97.9 F | HEART RATE: 68 BPM | SYSTOLIC BLOOD PRESSURE: 176 MMHG | OXYGEN SATURATION: 99 % | WEIGHT: 180 LBS | RESPIRATION RATE: 16 BRPM | DIASTOLIC BLOOD PRESSURE: 79 MMHG | BODY MASS INDEX: 30.73 KG/M2 | HEIGHT: 64 IN

## 2019-07-15 DIAGNOSIS — T82.868A THROMBOSIS DUE VASCULAR PROSTHETIC DEVICES, IMPLANTS AND GRAFTS, INITIAL ENCOUNTER: ICD-10-CM

## 2019-07-15 PROCEDURE — 36905Z: CUSTOM

## 2019-07-15 NOTE — PAST MEDICAL HISTORY
[Increasing age ( >40 years old)] : Increasing age ( >40 years old) [Altered mobility] : Altered mobility [No therapy indicated for cases scheduled for less than one hour] : No therapy indicated for cases scheduled for less than one hour. [FreeTextEntry1] : Malignant Hyperthermia Screening Tool and Risk of Bleeding Assessment\par \par Mr. JEANCLAUDE DORSAINVIL denies family history of unexpected death following Anesthesia or Exercise.\par Denies Family history of Malignant Hyperthermia, Muscle or Neuromuscular disorder and High Temperature following exercise.\par \par Mr. JEANCLAUDE DORSAINVIL denies history of Muscle Spasm, Dark or Chocolate - Colored urine and Unanticipated fever immediately following anesthesia or serious exercise. \par Mr. GONG also denies bleeding tendencies/ Risks of Bleeding.\par

## 2019-07-15 NOTE — PROCEDURE
[Resume diet] : resume diet [Site check for bleeding/hematoma/thrill/bruit] : Site check for bleeding/hematoma/thrill/bruit [Vital signs on admission the q 15 mins x2] : Vital signs on admission the q 15 mins x2 [FreeTextEntry1] : Aortogram, right leg angiogram/angioplasty/stent [FreeTextEntry3] : TPA2 mg in  3 ml sterile water to right arm AVG  via 10 cm speedlyser by Dr. Curry @ 12:25

## 2019-07-15 NOTE — HISTORY OF PRESENT ILLNESS
[] : in right upper arm [FreeTextEntry1] : was sent to hospital after dialysis Friday for afib, not admitted\par  refuses Eliquis and all other meds except Renagel\par accompanied by nsg home aide [employer] [phone]\par feels ok\par uses w/c, no falls\par stroke (10 years ago)\par No bleeding diathesis\par NSR today [FreeTextEntry3] : 10/8/2013 Dr. Chaudhary [FreeTextEntry4] : Friday 7/12/2019 [FreeTextEntry5] : 7/14/2019 6 pm [FreeTextEntry6] : Dr. Yadav

## 2019-07-15 NOTE — PHYSICAL EXAM
[0] : right 0 [No Rash or Lesion] : No rash or lesion [Alert] : alert [Calm] : calm [Thrill] : no thrill [JVD] : no jugular venous distention  [Ankle Swelling (On Exam)] : not present [Varicose Veins Of Lower Extremities] : not present [] : not present [Skin Ulcer] : no ulcer [de-identified] : appears well

## 2019-07-29 ENCOUNTER — APPOINTMENT (OUTPATIENT)
Dept: VASCULAR SURGERY | Facility: CLINIC | Age: 74
End: 2019-07-29
Payer: MEDICARE

## 2019-07-29 VITALS
WEIGHT: 180 LBS | HEIGHT: 64 IN | BODY MASS INDEX: 30.73 KG/M2 | HEART RATE: 79 BPM | SYSTOLIC BLOOD PRESSURE: 147 MMHG | DIASTOLIC BLOOD PRESSURE: 70 MMHG

## 2019-07-29 PROCEDURE — 99213 OFFICE O/P EST LOW 20 MIN: CPT

## 2019-07-29 PROCEDURE — 93923 UPR/LXTR ART STDY 3+ LVLS: CPT

## 2019-07-29 NOTE — HISTORY OF PRESENT ILLNESS
[FreeTextEntry1] : 74 male with history of esrd on hd, cva, non-ambulatory wheelchair bound, hld, htn, afib on eliquis presents for follow up s/p right lower extremity angiogram with right iliac stent placement and at angioplasty.  pt reports improvement in his pain and is now able to sleep without any lower extremity pain.  pt denies any difficulty with avf for hd

## 2019-07-29 NOTE — PHYSICAL EXAM
[No Rash or Lesion] : No rash or lesion [Alert] : alert [Calm] : calm [JVD] : no jugular venous distention  [Normal Heart Sounds] : normal heart sounds [Normal Breath Sounds] : Normal breath sounds [Ankle Swelling (On Exam)] : not present [Abdomen Masses] : No abdominal masses [] : not present [Varicose Veins Of Lower Extremities] : not present [Oriented to Place] : oriented to place [Skin Ulcer] : no ulcer [de-identified] : appears well

## 2019-07-29 NOTE — ASSESSMENT
[FreeTextEntry1] : 74 male with history of esrd on hd, cva, non-ambulatory wheelchair bound, hld, htn, afib on eliquis presents for follow up s/p right lower extremity angiogram with right iliac stent placement and at angioplasty with improvement in his rest pain \par osiel/pvr shows improvement in distal waveforms and toe pressures \par pt to continue with Plavix and eliquis can d/c asa \par pt to follow up in September for avf duplex and right lower extremity iliac stent duplex \par

## 2019-08-26 PROBLEM — L98.499 ULCER, SKIN, NON-HEALING: Status: ACTIVE | Noted: 2019-08-26

## 2019-08-26 NOTE — HISTORY OF PRESENT ILLNESS
[] : in right upper arm [FreeTextEntry1] : referred from dialysis\par accompanied by nsg home aide\par feels ok\par uses w/c, no falls\par  [FreeTextEntry3] : 10/8/2013 Dr. Chaudhary [FreeTextEntry5] : 8/26/2019 [FreeTextEntry4] : 8/26/2019 [FreeTextEntry6] : Dr. Yadav

## 2019-08-26 NOTE — PHYSICAL EXAM
[No Rash or Lesion] : No rash or lesion [Calm] : calm [Alert] : alert [Thrill] : no thrill [FreeTextEntry1] : ulcer noted over [JVD] : no jugular venous distention  [Ankle Swelling (On Exam)] : not present [Varicose Veins Of Lower Extremities] : not present [] : not present [Skin Ulcer] : no ulcer [de-identified] : appears well

## 2019-08-26 NOTE — PAST MEDICAL HISTORY
[Altered mobility] : Altered mobility [Increasing age ( >40 years old)] : Increasing age ( >40 years old) [No therapy indicated for cases scheduled for less than one hour] : No therapy indicated for cases scheduled for less than one hour. [FreeTextEntry1] : Malignant Hyperthermia Screening Tool and Risk of Bleeding Assessment\par \par Mr. JEANCLAUDE DORSAINVIL denies family history of unexpected death following Anesthesia or Exercise.\par Denies Family history of Malignant Hyperthermia, Muscle or Neuromuscular disorder and High Temperature following exercise.\par \par Mr. JEANCLAUDE DORSAINVIL denies history of Muscle Spasm, Dark or Chocolate - Colored urine and Unanticipated fever immediately following anesthesia or serious exercise. \par Mr. GOGN also denies bleeding tendencies/ Risks of Bleeding.\par

## 2019-08-26 NOTE — PROCEDURE
[Resume diet] : resume diet [Site check for bleeding/hematoma/thrill/bruit] : Site check for bleeding/hematoma/thrill/bruit [Vital signs on admission the q 15 mins x2] : Vital signs on admission the q 15 mins x2 [FreeTextEntry1] : right arm AVG fistulogram/angioplasty

## 2019-08-27 ENCOUNTER — APPOINTMENT (OUTPATIENT)
Dept: ENDOVASCULAR SURGERY | Facility: CLINIC | Age: 74
End: 2019-08-27

## 2019-08-27 DIAGNOSIS — N18.6 END STAGE RENAL DISEASE: ICD-10-CM

## 2019-08-27 DIAGNOSIS — L98.499 NON-PRESSURE CHRONIC ULCER OF SKIN OF OTHER SITES WITH UNSPECIFIED SEVERITY: ICD-10-CM

## 2019-09-23 ENCOUNTER — APPOINTMENT (OUTPATIENT)
Dept: VASCULAR SURGERY | Facility: CLINIC | Age: 74
End: 2019-09-23

## 2019-10-03 ENCOUNTER — APPOINTMENT (OUTPATIENT)
Dept: VASCULAR SURGERY | Facility: CLINIC | Age: 74
End: 2019-10-03

## 2019-10-20 ENCOUNTER — INPATIENT (INPATIENT)
Facility: HOSPITAL | Age: 74
LOS: 3 days | Discharge: HOME | DRG: 555 | End: 2019-10-24
Attending: INTERNAL MEDICINE | Admitting: INTERNAL MEDICINE
Payer: MEDICARE

## 2019-10-20 VITALS
HEART RATE: 80 BPM | DIASTOLIC BLOOD PRESSURE: 70 MMHG | WEIGHT: 169.98 LBS | SYSTOLIC BLOOD PRESSURE: 175 MMHG | TEMPERATURE: 98 F | RESPIRATION RATE: 20 BRPM | OXYGEN SATURATION: 97 %

## 2019-10-20 DIAGNOSIS — E11.9 TYPE 2 DIABETES MELLITUS WITHOUT COMPLICATIONS: ICD-10-CM

## 2019-10-20 DIAGNOSIS — Z29.9 ENCOUNTER FOR PROPHYLACTIC MEASURES, UNSPECIFIED: ICD-10-CM

## 2019-10-20 DIAGNOSIS — L03.90 CELLULITIS, UNSPECIFIED: ICD-10-CM

## 2019-10-20 DIAGNOSIS — R09.89 OTHER SPECIFIED SYMPTOMS AND SIGNS INVOLVING THE CIRCULATORY AND RESPIRATORY SYSTEMS: ICD-10-CM

## 2019-10-20 DIAGNOSIS — D64.9 ANEMIA, UNSPECIFIED: ICD-10-CM

## 2019-10-20 DIAGNOSIS — I10 ESSENTIAL (PRIMARY) HYPERTENSION: ICD-10-CM

## 2019-10-20 DIAGNOSIS — I63.9 CEREBRAL INFARCTION, UNSPECIFIED: ICD-10-CM

## 2019-10-20 DIAGNOSIS — N18.6 END STAGE RENAL DISEASE: ICD-10-CM

## 2019-10-20 LAB
ALBUMIN SERPL ELPH-MCNC: 2.8 G/DL — LOW (ref 3.5–5)
ALP SERPL-CCNC: 91 U/L — SIGNIFICANT CHANGE UP (ref 40–120)
ALT FLD-CCNC: 25 U/L DA — SIGNIFICANT CHANGE UP (ref 10–60)
ANION GAP SERPL CALC-SCNC: 11 MMOL/L — SIGNIFICANT CHANGE UP (ref 5–17)
APTT BLD: 31.9 SEC — SIGNIFICANT CHANGE UP (ref 27.5–36.3)
AST SERPL-CCNC: 17 U/L — SIGNIFICANT CHANGE UP (ref 10–40)
BASOPHILS # BLD AUTO: 0 K/UL — SIGNIFICANT CHANGE UP (ref 0–0.2)
BASOPHILS NFR BLD AUTO: 0 % — SIGNIFICANT CHANGE UP (ref 0–2)
BILIRUB SERPL-MCNC: 0.3 MG/DL — SIGNIFICANT CHANGE UP (ref 0.2–1.2)
BUN SERPL-MCNC: 67 MG/DL — HIGH (ref 7–18)
CALCIUM SERPL-MCNC: 8.3 MG/DL — LOW (ref 8.4–10.5)
CHLORIDE SERPL-SCNC: 99 MMOL/L — SIGNIFICANT CHANGE UP (ref 96–108)
CO2 SERPL-SCNC: 27 MMOL/L — SIGNIFICANT CHANGE UP (ref 22–31)
CREAT SERPL-MCNC: 11.5 MG/DL — HIGH (ref 0.5–1.3)
EOSINOPHIL # BLD AUTO: 0.23 K/UL — SIGNIFICANT CHANGE UP (ref 0–0.5)
EOSINOPHIL NFR BLD AUTO: 4 % — SIGNIFICANT CHANGE UP (ref 0–6)
ERYTHROCYTE [SEDIMENTATION RATE] IN BLOOD: 87 MM/HR — HIGH (ref 0–20)
GLUCOSE SERPL-MCNC: 88 MG/DL — SIGNIFICANT CHANGE UP (ref 70–99)
HCT VFR BLD CALC: 27.8 % — LOW (ref 39–50)
HGB BLD-MCNC: 8.7 G/DL — LOW (ref 13–17)
INR BLD: 1.01 RATIO — SIGNIFICANT CHANGE UP (ref 0.88–1.16)
LACTATE SERPL-SCNC: 2.5 MMOL/L — HIGH (ref 0.7–2)
LYMPHOCYTES # BLD AUTO: 1.48 K/UL — SIGNIFICANT CHANGE UP (ref 1–3.3)
LYMPHOCYTES # BLD AUTO: 26 % — SIGNIFICANT CHANGE UP (ref 13–44)
MCHC RBC-ENTMCNC: 31.3 GM/DL — LOW (ref 32–36)
MCHC RBC-ENTMCNC: 32.8 PG — SIGNIFICANT CHANGE UP (ref 27–34)
MCV RBC AUTO: 104.9 FL — HIGH (ref 80–100)
MONOCYTES # BLD AUTO: 0.97 K/UL — HIGH (ref 0–0.9)
MONOCYTES NFR BLD AUTO: 17 % — HIGH (ref 2–14)
NEUTROPHILS # BLD AUTO: 3.02 K/UL — SIGNIFICANT CHANGE UP (ref 1.8–7.4)
NEUTROPHILS NFR BLD AUTO: 53 % — SIGNIFICANT CHANGE UP (ref 43–77)
PLATELET # BLD AUTO: 156 K/UL — SIGNIFICANT CHANGE UP (ref 150–400)
POTASSIUM SERPL-MCNC: 5.9 MMOL/L — HIGH (ref 3.5–5.3)
POTASSIUM SERPL-SCNC: 5.9 MMOL/L — HIGH (ref 3.5–5.3)
PROT SERPL-MCNC: 7 G/DL — SIGNIFICANT CHANGE UP (ref 6–8.3)
PROTHROM AB SERPL-ACNC: 11.2 SEC — SIGNIFICANT CHANGE UP (ref 10–12.9)
RBC # BLD: 2.65 M/UL — LOW (ref 4.2–5.8)
RBC # FLD: 15.4 % — HIGH (ref 10.3–14.5)
SODIUM SERPL-SCNC: 137 MMOL/L — SIGNIFICANT CHANGE UP (ref 135–145)
WBC # BLD: 5.69 K/UL — SIGNIFICANT CHANGE UP (ref 3.8–10.5)
WBC # FLD AUTO: 5.69 K/UL — SIGNIFICANT CHANGE UP (ref 3.8–10.5)

## 2019-10-20 PROCEDURE — 73590 X-RAY EXAM OF LOWER LEG: CPT | Mod: 26,RT

## 2019-10-20 PROCEDURE — 93971 EXTREMITY STUDY: CPT | Mod: 26,RT

## 2019-10-20 PROCEDURE — 99285 EMERGENCY DEPT VISIT HI MDM: CPT

## 2019-10-20 PROCEDURE — 73630 X-RAY EXAM OF FOOT: CPT | Mod: 26,RT

## 2019-10-20 RX ORDER — CLOPIDOGREL BISULFATE 75 MG/1
75 TABLET, FILM COATED ORAL DAILY
Refills: 0 | Status: DISCONTINUED | OUTPATIENT
Start: 2019-10-20 | End: 2019-10-24

## 2019-10-20 RX ORDER — CALCITRIOL 0.5 UG/1
0.25 CAPSULE ORAL DAILY
Refills: 0 | Status: DISCONTINUED | OUTPATIENT
Start: 2019-10-20 | End: 2019-10-22

## 2019-10-20 RX ORDER — HYDRALAZINE HCL 50 MG
25 TABLET ORAL THREE TIMES A DAY
Refills: 0 | Status: DISCONTINUED | OUTPATIENT
Start: 2019-10-20 | End: 2019-10-24

## 2019-10-20 RX ORDER — LEVALBUTEROL 1.25 MG/.5ML
3 SOLUTION, CONCENTRATE RESPIRATORY (INHALATION)
Qty: 0 | Refills: 0 | DISCHARGE

## 2019-10-20 RX ORDER — COLCHICINE 0.6 MG
0.6 TABLET ORAL
Refills: 0 | Status: DISCONTINUED | OUTPATIENT
Start: 2019-10-20 | End: 2019-10-23

## 2019-10-20 RX ORDER — SODIUM POLYSTYRENE SULFONATE 4.1 MEQ/G
15 POWDER, FOR SUSPENSION ORAL ONCE
Refills: 0 | Status: COMPLETED | OUTPATIENT
Start: 2019-10-20 | End: 2019-10-20

## 2019-10-20 RX ORDER — PANTOPRAZOLE SODIUM 20 MG/1
40 TABLET, DELAYED RELEASE ORAL
Refills: 0 | Status: DISCONTINUED | OUTPATIENT
Start: 2019-10-20 | End: 2019-10-24

## 2019-10-20 RX ORDER — ATORVASTATIN CALCIUM 80 MG/1
80 TABLET, FILM COATED ORAL AT BEDTIME
Refills: 0 | Status: DISCONTINUED | OUTPATIENT
Start: 2019-10-20 | End: 2019-10-24

## 2019-10-20 RX ORDER — SEVELAMER CARBONATE 2400 MG/1
1600 POWDER, FOR SUSPENSION ORAL THREE TIMES A DAY
Refills: 0 | Status: DISCONTINUED | OUTPATIENT
Start: 2019-10-20 | End: 2019-10-24

## 2019-10-20 RX ORDER — PIPERACILLIN AND TAZOBACTAM 4; .5 G/20ML; G/20ML
3.38 INJECTION, POWDER, LYOPHILIZED, FOR SOLUTION INTRAVENOUS ONCE
Refills: 0 | Status: COMPLETED | OUTPATIENT
Start: 2019-10-20 | End: 2019-10-20

## 2019-10-20 RX ORDER — VANCOMYCIN HCL 1 G
1000 VIAL (EA) INTRAVENOUS ONCE
Refills: 0 | Status: COMPLETED | OUTPATIENT
Start: 2019-10-20 | End: 2019-10-20

## 2019-10-20 RX ORDER — ACETAMINOPHEN 500 MG
650 TABLET ORAL ONCE
Refills: 0 | Status: COMPLETED | OUTPATIENT
Start: 2019-10-20 | End: 2019-10-20

## 2019-10-20 RX ORDER — LACTULOSE 10 G/15ML
20 SOLUTION ORAL DAILY
Refills: 0 | Status: DISCONTINUED | OUTPATIENT
Start: 2019-10-20 | End: 2019-10-24

## 2019-10-20 RX ORDER — VANCOMYCIN HCL 1 G
1000 VIAL (EA) INTRAVENOUS
Refills: 0 | Status: DISCONTINUED | OUTPATIENT
Start: 2019-10-20 | End: 2019-10-22

## 2019-10-20 RX ORDER — APIXABAN 2.5 MG/1
2.5 TABLET, FILM COATED ORAL
Refills: 0 | Status: DISCONTINUED | OUTPATIENT
Start: 2019-10-20 | End: 2019-10-24

## 2019-10-20 RX ORDER — ISOSORBIDE DINITRATE 5 MG/1
1 TABLET ORAL
Qty: 0 | Refills: 0 | DISCHARGE

## 2019-10-20 RX ORDER — DOCUSATE SODIUM 100 MG
100 CAPSULE ORAL
Refills: 0 | Status: DISCONTINUED | OUTPATIENT
Start: 2019-10-20 | End: 2019-10-24

## 2019-10-20 RX ORDER — DILTIAZEM HCL 120 MG
120 CAPSULE, EXT RELEASE 24 HR ORAL DAILY
Refills: 0 | Status: DISCONTINUED | OUTPATIENT
Start: 2019-10-20 | End: 2019-10-24

## 2019-10-20 RX ORDER — NIFEDIPINE 30 MG
60 TABLET, EXTENDED RELEASE 24 HR ORAL DAILY
Refills: 0 | Status: DISCONTINUED | OUTPATIENT
Start: 2019-10-20 | End: 2019-10-21

## 2019-10-20 RX ADMIN — Medication 650 MILLIGRAM(S): at 21:15

## 2019-10-20 RX ADMIN — Medication 250 MILLIGRAM(S): at 20:46

## 2019-10-20 RX ADMIN — PIPERACILLIN AND TAZOBACTAM 3.38 GRAM(S): 4; .5 INJECTION, POWDER, LYOPHILIZED, FOR SOLUTION INTRAVENOUS at 21:17

## 2019-10-20 RX ADMIN — PIPERACILLIN AND TAZOBACTAM 200 GRAM(S): 4; .5 INJECTION, POWDER, LYOPHILIZED, FOR SOLUTION INTRAVENOUS at 20:46

## 2019-10-20 RX ADMIN — Medication 650 MILLIGRAM(S): at 20:45

## 2019-10-20 RX ADMIN — Medication 1000 MILLIGRAM(S): at 23:42

## 2019-10-20 NOTE — ED PROVIDER NOTE - OBJECTIVE STATEMENT
73 y/o M with past hx of CVA, ESRD on dialysis, presents to the ED c/o RLE pain for the past few days. Pt denies fevers or chills. No further complaints at this time.

## 2019-10-20 NOTE — ED ADULT NURSE REASSESSMENT NOTE - NS ED NURSE REASSESS COMMENT FT1
patient refused 2nd set of blood cultures to be taken. provider notified, states "pt to be given antibiotic"

## 2019-10-20 NOTE — ED ADULT NURSE NOTE - NSIMPLEMENTINTERV_GEN_ALL_ED
Implemented All Universal Safety Interventions:  Boss to call system. Call bell, personal items and telephone within reach. Instruct patient to call for assistance. Room bathroom lighting operational. Non-slip footwear when patient is off stretcher. Physically safe environment: no spills, clutter or unnecessary equipment. Stretcher in lowest position, wheels locked, appropriate side rails in place.

## 2019-10-20 NOTE — H&P ADULT - ASSESSMENT
74 M, wheel chair bound, from [facility] with PMH significant for HTN, HLD, CVA with residual right sided weakness, ESRD on HD (M,W,F) Anemia, constipation, gout presents to Ed with c/o right sided leg pain and swelling since Thursday. There was initial suspicion of DVT, Duplex came negative. Given elevated ESR and physical exam findings, patient is admitted for lower extremity cellulitis requiring IV antibiotics.

## 2019-10-20 NOTE — H&P ADULT - PROBLEM SELECTOR PLAN 6
-Patient has hx of anemia   -Noted to have macrocytic anemia   -Follow anemia panel, vitamin B12 and folate   -May need Epogen

## 2019-10-20 NOTE — H&P ADULT - NSHPPHYSICALEXAM_GEN_ALL_CORE
CONSTITUTIONAL: Well appearing, well nourished, awake, alert and in no apparent distress  ENMT: Airway patent, Nasal mucosa clear. Mouth with normal mucosa. Throat has no vesicles, no oropharyngeal exudates and uvula is midline.  EYES: Clear bilaterally, pupils equal, round and reactive to light. EOMI.  CARDIAC: Normal rate, regular rhythm.  Heart sounds S1, S2.  No murmurs, rubs or gallops   RESPIRATORY: Breath sounds clear and equal bilaterally. No wheezes, rhales or rhonchi  MUSCULOSKELETAL: Spine appears normal, range of motion is not limited, no muscle or joint tenderness  EXTREMITIES: Right lower extremity mild erythema, warm to touch with dry scaly skin, swollen, non pitting edema.    NEUROLOGICAL: Alert and oriented, strength 5/5 in left upper and lower extremity, 4/5 in right Upper and lower extremity , no sensory deficits.  SKIN: No rash, skin turgor

## 2019-10-20 NOTE — ED PROVIDER NOTE - CARDIAC, MLM
Normal rate, regular rhythm.  Heart sounds S1, S2.  No murmurs, rubs or gallops. Unable to palpate DP pulse, DP pulse seen via doppler

## 2019-10-20 NOTE — H&P ADULT - HISTORY OF PRESENT ILLNESS
74 M, wheel chair bound, from [facility] with PMH significant for HTN, HLD, CVA with residual right sided weakness, ESRD on HD (M,W,F) Anemia, constipation, gout presents to Ed with c/o right sided leg pain and swelling since Thursday. Patient reports that he has been having right leg pain, 8/10 in intensity, non radiating for last few days. He denies having fever, chills, any discharge from leg. There was concern for DVT given his prolong immobility and clinical presentation however US duplex was negative for DVT.   He otherwise feels well and denies any chest pain, shortness of breath or abdominal pain.

## 2019-10-20 NOTE — H&P ADULT - PROBLEM SELECTOR PLAN 7
IMPROVE VTE score:  [ ] Previous VTE                                                3  [ ] Thrombophilia                                             2  [ x] Lower limb paralysis                                  2        (unable to hold up >15 seconds)    [ ] Current Cancer (within 6 months)            2   [x] Immobilization > 24 hrs                              1  [ ] ICU/CCU stay > 24 hours                            1  [x] Age > 60                                                         1  Improve score 4   Eliquis

## 2019-10-20 NOTE — H&P ADULT - NSICDXPASTMEDICALHX_GEN_ALL_CORE_FT
PAST MEDICAL HISTORY:  Anemia     Diabetes     ESRD (end stage renal disease) on dialysis     Hypertension

## 2019-10-20 NOTE — H&P ADULT - PROBLEM SELECTOR PLAN 1
-Patient p/w right leg pain and swelling, no fever, no wbc , ESR elevated   -PE: warm, mild erythematous leg   -US Duplex negative for DVT   -Follow X-ray and VITALY **  -S/p IV zosyn and vancomycin in ED   -C/w Vancomycin   -ID  -Patient p/w right leg pain and swelling, no fever, no wbc , ESR elevated   -PE: warm, mild erythematous leg   -US Duplex negative for DVT   -Follow X-ray and VITALY **  -S/p IV zosyn and vancomycin in ED   -C/w Vancomycin   -ID Dr. Morales

## 2019-10-21 DIAGNOSIS — Z71.89 OTHER SPECIFIED COUNSELING: ICD-10-CM

## 2019-10-21 DIAGNOSIS — Z02.9 ENCOUNTER FOR ADMINISTRATIVE EXAMINATIONS, UNSPECIFIED: ICD-10-CM

## 2019-10-21 LAB
GLUCOSE BLDC GLUCOMTR-MCNC: 87 MG/DL — SIGNIFICANT CHANGE UP (ref 70–99)
GLUCOSE BLDC GLUCOMTR-MCNC: 90 MG/DL — SIGNIFICANT CHANGE UP (ref 70–99)

## 2019-10-21 PROCEDURE — 99232 SBSQ HOSP IP/OBS MODERATE 35: CPT

## 2019-10-21 RX ORDER — ERYTHROPOIETIN 10000 [IU]/ML
10000 INJECTION, SOLUTION INTRAVENOUS; SUBCUTANEOUS
Refills: 0 | Status: DISCONTINUED | OUTPATIENT
Start: 2019-10-21 | End: 2019-10-24

## 2019-10-21 RX ADMIN — ATORVASTATIN CALCIUM 80 MILLIGRAM(S): 80 TABLET, FILM COATED ORAL at 23:36

## 2019-10-21 RX ADMIN — Medication 250 MILLIGRAM(S): at 11:11

## 2019-10-21 RX ADMIN — Medication 25 MILLIGRAM(S): at 14:01

## 2019-10-21 RX ADMIN — Medication 25 MILLIGRAM(S): at 05:55

## 2019-10-21 RX ADMIN — APIXABAN 2.5 MILLIGRAM(S): 2.5 TABLET, FILM COATED ORAL at 05:55

## 2019-10-21 RX ADMIN — SEVELAMER CARBONATE 1600 MILLIGRAM(S): 2400 POWDER, FOR SUSPENSION ORAL at 14:01

## 2019-10-21 RX ADMIN — Medication 1 TABLET(S): at 11:12

## 2019-10-21 RX ADMIN — Medication 25 MILLIGRAM(S): at 23:36

## 2019-10-21 RX ADMIN — PANTOPRAZOLE SODIUM 40 MILLIGRAM(S): 20 TABLET, DELAYED RELEASE ORAL at 06:04

## 2019-10-21 RX ADMIN — Medication 0.6 MILLIGRAM(S): at 05:55

## 2019-10-21 RX ADMIN — SEVELAMER CARBONATE 1600 MILLIGRAM(S): 2400 POWDER, FOR SUSPENSION ORAL at 23:36

## 2019-10-21 RX ADMIN — Medication 100 MILLIGRAM(S): at 05:55

## 2019-10-21 RX ADMIN — ERYTHROPOIETIN 10000 UNIT(S): 10000 INJECTION, SOLUTION INTRAVENOUS; SUBCUTANEOUS at 19:29

## 2019-10-21 RX ADMIN — SEVELAMER CARBONATE 1600 MILLIGRAM(S): 2400 POWDER, FOR SUSPENSION ORAL at 06:04

## 2019-10-21 RX ADMIN — CLOPIDOGREL BISULFATE 75 MILLIGRAM(S): 75 TABLET, FILM COATED ORAL at 11:12

## 2019-10-21 RX ADMIN — Medication 120 MILLIGRAM(S): at 05:55

## 2019-10-21 RX ADMIN — LACTULOSE 20 GRAM(S): 10 SOLUTION ORAL at 11:11

## 2019-10-21 NOTE — CONSULT NOTE ADULT - ASSESSMENT
# Right foot cellulitis    would recommend:    1. Follow up Blood cultures  2. Continue Vancomycin and keep level between 15 to 20    will follow the patient with you and make further recommendation based on the clinical course and Lab results  Thank you for the opportunity to participate in Mr. GONG's care Patient is a 74y old  Male who is wheel chair bound,  and with gout sent in to the ER from [facility] for evaluation of  right sided leg pain and swelling.  He has no fever, chills, or any discharge from leg.  On Admission, he has no fever and no leukocytosis. The Xray and Doppler  of the of Right leg is negative. He has started on Vancomycin and the ID consult requested to assist with further evaluation and antibiotic management.       # Right foot cellulitis - Findings is not consistent with cellulitis    would recommend:    1. Follow up Blood cultures  2. Discontinue Vancomycin since RLE findings is not consistent with cellulitis  3. MRI of RLE to rule out deep infection  4. Need LE ? wound Care    d/w patient and Dialysis Nursing staff    will follow the patient with you and make further recommendation based on the clinical course and Lab results  Thank you for the opportunity to participate in Mr. GONG's care

## 2019-10-21 NOTE — CONSULT NOTE ADULT - ASSESSMENT
74 M, wheel chair bound, from [facility] with PMH significant for HTN, HLD, CVA with residual right sided weakness, ESRD on HD (M,W,F) Anemia, constipation, gout presents to Ed with c/o right sided leg pain and swelling. We got involved to arrange for HD today    A/P:  ESRD:  HD MWF  From [facility] HD with Dr. 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

## 2019-10-21 NOTE — CONSULT NOTE ADULT - SUBJECTIVE AND OBJECTIVE BOX
Patient is a 74y old  Male who presents with a chief complaint of right foot pain and swelling (21 Oct 2019 15:07)      REVIEW OF SYSTEMS: Total of twelve systems have been reviewed with patient and found to be negative unless mentioned in HPI      PAST MEDICAL & SURGICAL HISTORY:  Anemia  Hypertension  Diabetes  ESRD (end stage renal disease) on dialysis  No significant past surgical history          SOCIAL HISTORY  Alcohol: Does not drink  Tobacco: Does not smoke  Illicit substance use: None      FAMILY HISTORY: Non contributory to the present illness        ALLERGIES: No Known Allergies        Vital Signs Last 24 Hrs  T(C): 36.4 (21 Oct 2019 17:34), Max: 37.1 (21 Oct 2019 00:50)  T(F): 97.6 (21 Oct 2019 17:34), Max: 98.8 (21 Oct 2019 00:50)  HR: 83 (21 Oct 2019 17:34) (79 - 86)  BP: 174/70 (21 Oct 2019 17:34) (134/68 - 177/76)  BP(mean): --  RR: 19 (21 Oct 2019 17:34) (18 - 21)  SpO2: 100% (21 Oct 2019 17:34) (96% - 100%)        PHYSICAL EXAM:  GENERAL: Not in distress   CHEST/LUNG:  Aire ntry bilaterally  HEART: s1 and s2 present  ABDOMEN:  Nontender and  Nondistended  EXTREMITIES: No pedal  edema  CNS: Awake and Alert        LABS:                        8.7    5.69  )-----------( 156      ( 20 Oct 2019 17:37 )             27.8         10-20    137  |  99  |  67<H>  ----------------------------<  88  5.9<H>   |  27  |  11.50<H>    Ca    8.3<L>      20 Oct 2019 17:37    TPro  7.0  /  Alb  2.8<L>  /  TBili  0.3  /  DBili  x   /  AST  17  /  ALT  25  /  AlkPhos  91  10-20    PT/INR - ( 20 Oct 2019 17:37 )   PT: 11.2 sec;   INR: 1.01 ratio      PTT - ( 20 Oct 2019 17:37 )  PTT:31.9 sec        MEDICATIONS  (STANDING):  apixaban 2.5 milliGRAM(s) Oral two times a day  atorvastatin 80 milliGRAM(s) Oral at bedtime  calcitriol   Capsule 0.25 MICROGram(s) Oral daily  clopidogrel Tablet 75 milliGRAM(s) Oral daily  colchicine 0.6 milliGRAM(s) Oral two times a day  diltiazem    milliGRAM(s) Oral daily  docusate sodium 100 milliGRAM(s) Oral two times a day  epoetin ximena Injectable 28556 Unit(s) IV Push <User Schedule>  hydrALAZINE 25 milliGRAM(s) Oral three times a day  lactulose Syrup 20 Gram(s) Oral daily  multivitamin 1 Tablet(s) Oral daily  pantoprazole    Tablet 40 milliGRAM(s) Oral before breakfast  sevelamer carbonate 1600 milliGRAM(s) Oral three times a day  vancomycin  IVPB 1000 milliGRAM(s) IV Intermittent <User Schedule>    MEDICATIONS  (PRN):          RADIOLOGY & ADDITIONAL TESTS:    < from: Xray Foot AP + Lateral + Oblique, Right (10.20.19 @ 19:33) >    IMPRESSION: Osteopenia. Soft tissue swelling. No evidence for acute   fracture or dislocation.    < end of copied text > Patient is a 74y old  Male who is wheel chair bound,  and with gout sent in to the ER from [facility] for evaluation of  right sided leg pain and swelling.  He has no fever, chills, or any discharge from leg.  On Admission, he has no fever and no leukocytosis. The Xray and Doppler  of the of Right leg is negative. He has started on Vancomycin and the ID consult requested to assist with further evaluation and antibiotic management.         REVIEW OF SYSTEMS: Total of twelve systems have been reviewed with patient and found to be negative unless mentioned in HPI        PAST MEDICAL & SURGICAL HISTORY:  Anemia  Hypertension  Diabetes  ESRD (end stage renal disease) on dialysis  No significant past surgical history        SOCIAL HISTORY  Alcohol: Does not drink  Tobacco: Does not smoke  Illicit substance use: None      FAMILY HISTORY: Non contributory to the present illness        ALLERGIES: No Known Allergies        Vital Signs Last 24 Hrs  T(C): 36.4 (21 Oct 2019 17:34), Max: 37.1 (21 Oct 2019 00:50)  T(F): 97.6 (21 Oct 2019 17:34), Max: 98.8 (21 Oct 2019 00:50)  HR: 83 (21 Oct 2019 17:34) (79 - 86)  BP: 174/70 (21 Oct 2019 17:34) (134/68 - 177/76)  BP(mean): --  RR: 19 (21 Oct 2019 17:34) (18 - 21)  SpO2: 100% (21 Oct 2019 17:34) (96% - 100%)        PHYSICAL EXAM:  GENERAL: Not in distress   CHEST/LUNG:  Air entry bilaterally  HEART: s1 and s2 present  ABDOMEN:  Nontender and  Nondistended  EXTREMITIES: Right LE has hyperkeratosis, no open wound and not swollen or tender  CNS: Awake and Alert        LABS:                        8.7    5.69  )-----------( 156      ( 20 Oct 2019 17:37 )             27.8         10-20    137  |  99  |  67<H>  ----------------------------<  88  5.9<H>   |  27  |  11.50<H>    Ca    8.3<L>      20 Oct 2019 17:37    TPro  7.0  /  Alb  2.8<L>  /  TBili  0.3  /  DBili  x   /  AST  17  /  ALT  25  /  AlkPhos  91  10-20    PT/INR - ( 20 Oct 2019 17:37 )   PT: 11.2 sec;   INR: 1.01 ratio      PTT - ( 20 Oct 2019 17:37 )  PTT:31.9 sec        MEDICATIONS  (STANDING):  apixaban 2.5 milliGRAM(s) Oral two times a day  atorvastatin 80 milliGRAM(s) Oral at bedtime  calcitriol   Capsule 0.25 MICROGram(s) Oral daily  clopidogrel Tablet 75 milliGRAM(s) Oral daily  colchicine 0.6 milliGRAM(s) Oral two times a day  diltiazem    milliGRAM(s) Oral daily  docusate sodium 100 milliGRAM(s) Oral two times a day  epoetin ximena Injectable 58828 Unit(s) IV Push <User Schedule>  hydrALAZINE 25 milliGRAM(s) Oral three times a day  lactulose Syrup 20 Gram(s) Oral daily  multivitamin 1 Tablet(s) Oral daily  pantoprazole    Tablet 40 milliGRAM(s) Oral before breakfast  sevelamer carbonate 1600 milliGRAM(s) Oral three times a day  vancomycin  IVPB 1000 milliGRAM(s) IV Intermittent <User Schedule>    MEDICATIONS  (PRN):        RADIOLOGY & ADDITIONAL TESTS:    10/20/19 : Xray Foot AP + Lateral + Oblique, Right (10.20.19 @ 19:33):  Osteopenia. Soft tissue swelling. No evidence for acute fracture or dislocation.      10/20/19 : US Duplex Venous Lower Ext Ltd, Right (10.20.19 @ 17:24) No evidence of right lower extremity deep venous thrombosis.

## 2019-10-21 NOTE — CONSULT NOTE ADULT - SUBJECTIVE AND OBJECTIVE BOX
Dr. Stanton  Office [phone]  Cell [phone]  Charlee PARKS  Cell [phone]    HPI:  74 M, wheel chair bound, from [facility] with PMH significant for HTN, HLD, CVA with residual right sided weakness, ESRD on HD (M,W,F) Anemia, constipation, gout presents to Ed with c/o right sided leg pain and swelling since Thursday. Patient reports that he has been having right leg pain, 8/10 in intensity, non radiating for last few days. He denies having fever, chills, any discharge from leg. There was concern for DVT given his prolong immobility and clinical presentation however US duplex was negative for DVT. He otherwise feels well and denies any chest pain, shortness of breath or abdominal pain.        Allergies:  No Known Allergies      PAST MEDICAL & SURGICAL HISTORY:  Anemia  Hypertension  Diabetes  ESRD (end stage renal disease) on dialysis  No significant past surgical history      Home Medications Reviewed    Hospital Medications:   MEDICATIONS  (STANDING):  apixaban 2.5 milliGRAM(s) Oral two times a day  atorvastatin 80 milliGRAM(s) Oral at bedtime  calcitriol   Capsule 0.25 MICROGram(s) Oral daily  clopidogrel Tablet 75 milliGRAM(s) Oral daily  colchicine 0.6 milliGRAM(s) Oral two times a day  diltiazem    milliGRAM(s) Oral daily  docusate sodium 100 milliGRAM(s) Oral two times a day  epoetin ximena Injectable 60248 Unit(s) IV Push <User Schedule>  hydrALAZINE 25 milliGRAM(s) Oral three times a day  lactulose Syrup 20 Gram(s) Oral daily  multivitamin 1 Tablet(s) Oral daily  pantoprazole    Tablet 40 milliGRAM(s) Oral before breakfast  sevelamer carbonate 1600 milliGRAM(s) Oral three times a day  vancomycin  IVPB 1000 milliGRAM(s) IV Intermittent <User Schedule>      SOCIAL HISTORY:  Denies ETOh, Smoking,     FAMILY HISTORY:  No pertinent family history in first degree relatives      REVIEW OF SYSTEMS:  CONSTITUTIONAL: has weakness, no fevers or chills  EYES/ENT: No visual changes;  No vertigo or throat pain   NECK: No pain or stiffness  RESPIRATORY: No cough, wheezing, hemoptysis; No shortness of breath  CARDIOVASCULAR: No chest pain or palpitations.  GASTROINTESTINAL: No abdominal or epigastric pain. No nausea, vomiting, or hematemesis; No diarrhea or constipation. No melena or hematochezia.  GENITOURINARY: No dysuria, frequency, foamy urine, urinary urgency, incontinence or hematuria  NEUROLOGICAL: No numbness, has weakness  SKIN: No itching, burning, rashes, or lesions   VASCULAR: as per HPI  All other review of systems is negative unless indicated above.    VITALS:  T(F): 97.6 (10-21-19 @ 11:15), Max: 98.8 (10-21-19 @ 00:50)  HR: 80 (10-21-19 @ 11:15)  BP: 146/66 (10-21-19 @ 11:15)  RR: 21 (10-21-19 @ 11:15)  SpO2: 100% (10-21-19 @ 11:15)  Wt(kg): --      Weight (kg): [id number] (10-20 @ 23:40)    PHYSICAL EXAM:  Constitutional: NAD  HEENT: anicteric sclera, oropharynx clear, MMM  Neck: No JVD  Respiratory: CTAB, no wheezes, rales or rhonchi  Cardiovascular: S1, S2, RRR  Gastrointestinal: BS+, soft, NT/ND  Extremities: No cyanosis or clubbing.  peripheral edema 2+ in right LE  Neurological: A/O x 3  Psychiatric: Normal mood, normal affect  : No CVA tenderness. No kumar.   Skin: No rashes  Vascular Access: AVF    LABS:  10-20    137  |  99  |  67<H>  ----------------------------<  88  5.9<H>   |  27  |  11.50<H>    Ca    8.3<L>      20 Oct 2019 17:37    TPro  7.0  /  Alb  2.8<L>  /  TBili  0.3  /  DBili      /  AST  17  /  ALT  25  /  AlkPhos  91  10-20    Creatinine Trend: 11.50 <--                        8.7    5.69  )-----------( 156      ( 20 Oct 2019 17:37 )             27.8     Urine Studies:        RADIOLOGY & ADDITIONAL STUDIES:

## 2019-10-21 NOTE — PROGRESS NOTE ADULT - SUBJECTIVE AND OBJECTIVE BOX
HPI:  74 M, wheel chair bound, from [facility] with PMH significant for HTN, HLD, CVA with residual right sided weakness, ESRD on HD (M,W,F) Anemia, constipation, gout presents to Ed with c/o right sided leg pain and swelling since Thursday.  Patient admitted with cellulitis.    OVERNIGHT EVENTS:  No overnight events    REVIEW OF SYSTEMS:    CONSTITUTIONAL: No fever,   EYES: no acute visual disturbances  NECK: No pain or stiffness  RESPIRATORY: No cough; No shortness of breath  CARDIOVASCULAR: No chest pain, no palpitations  GASTROINTESTINAL: No pain. No nausea, vomiting or diarrhea   NEUROLOGICAL: No headache or numbness, no tremors  MUSCULOSKELETAL: No joint pain, no muscle pain  GENITOURINARY: no dysuria, no frequency, no hesitancy  PSYCHIATRY: no depression , no anxiety  ALL OTHER  ROS negative        Vital Signs Last 24 Hrs  T(C): 36.4 (21 Oct 2019 11:15), Max: 37.1 (21 Oct 2019 00:50)  T(F): 97.6 (21 Oct 2019 11:15), Max: 98.8 (21 Oct 2019 00:50)  HR: 80 (21 Oct 2019 11:15) (79 - 86)  BP: 146/66 (21 Oct 2019 11:15) (134/68 - 177/76)  BP(mean): --  RR: 21 (21 Oct 2019 11:15) (18 - 21)  SpO2: 100% (21 Oct 2019 11:15) (96% - 100%)    ________________________________________________  PHYSICAL EXAM:    GENERAL: NAD  HEENT: Normocephalic; conjunctivae and sclerae clear; moist mucous membranes;   NECK : supple, no JVD  CHEST/LUNG: Clear to auscultation bilaterally   HEART: S1 S2  regular  ABDOMEN: Soft, Nontender, Nondistended; Bowel sounds present  EXTREMITIES: no cyanosis; no LE edema; no calf tenderness  SKIN: warm and dry; no rash  NERVOUS SYSTEM:  Alert & Oriented x3; no new deficits    _________________________________________________  CURRENT MEDICATIONS:    MEDICATIONS  (STANDING):  apixaban 2.5 milliGRAM(s) Oral two times a day  atorvastatin 80 milliGRAM(s) Oral at bedtime  calcitriol   Capsule 0.25 MICROGram(s) Oral daily  clopidogrel Tablet 75 milliGRAM(s) Oral daily  colchicine 0.6 milliGRAM(s) Oral two times a day  diltiazem    milliGRAM(s) Oral daily  docusate sodium 100 milliGRAM(s) Oral two times a day  hydrALAZINE 25 milliGRAM(s) Oral three times a day  lactulose Syrup 20 Gram(s) Oral daily  multivitamin 1 Tablet(s) Oral daily  pantoprazole    Tablet 40 milliGRAM(s) Oral before breakfast  sevelamer carbonate 1600 milliGRAM(s) Oral three times a day  vancomycin  IVPB 1000 milliGRAM(s) IV Intermittent <User Schedule>    MEDICATIONS  (PRN):      __________________________________________________  LABS:                          8.7    5.69  )-----------( 156      ( 20 Oct 2019 17:37 )             27.8     10-20    137  |  99  |  67<H>  ----------------------------<  88  5.9<H>   |  27  |  11.50<H>    Ca    8.3<L>      20 Oct 2019 17:37    TPro  7.0  /  Alb  2.8<L>  /  TBili  0.3  /  DBili  x   /  AST  17  /  ALT  25  /  AlkPhos  91  10-20    PT/INR - ( 20 Oct 2019 17:37 )   PT: 11.2 sec;   INR: 1.01 ratio         PTT - ( 20 Oct 2019 17:37 )  PTT:31.9 sec    CAPILLARY BLOOD GLUCOSE          __________________________________________________  RADIOLOGY & ADDITIONAL TESTS:    Imaging Personally Reviewed:  YES    < from: US Duplex Venous Lower Ext Ltd, Right (10.20.19 @ 17:24) >  FINDINGS:    There is normal compressibility of the right common femoral, femoral and   popliteal veins.     Doppler examination shows normal spontaneous and phasic flow.    No calf vein thrombosis is detected.    There is right lower extremity soft tissue edema.    IMPRESSION:     No evidence of right lower extremity deep venous thrombosis.    < end of copied text >    < from: Xray Tibia + Fibula 2 Views, Right (10.20.19 @ 19:33) >  FINDINGS:  There is no evidence for acute or chronic fracture deformity   of the right tibia or fibula. Knee and ankle articulations appear intact.   No suprapatellar joint effusion is identified. Vascular calcifications   are demonstrated. Soft tissue swelling is identified. No retained   radiodense foreign bodies evident. Regions of osteopenia are identified   within the distal aspect of the right tibia, fibula as well as osseous   structures of the right foot.    IMPRESSION:  Regions of osteopenia distal aspect of the right   tibia/fibula as well as right foot. No evidence for acute or chronic   fracture deformity. Soft tissue swelling.    < end of copied text >  < from: Xray Foot AP + Lateral + Oblique, Right (10.20.19 @ 19:33) >  FINDINGS: Osteopenia identified without evidence for acute fracture or   dislocation. Soft tissue swelling of the right foot identified with   prominent soft tissue swelling noted along the dorsum of the right foot.   No retained metallic foreign bodies identified. Extensive vascular   calcifications noted.    IMPRESSION: Osteopenia. Soft tissue swelling. No evidence for acute   fracture or dislocation.    < end of copied text >    Consultant(s) Notes Reviewed:   YES     Plan of care was discussed with patient; all questions and concerns were addressed and care was aligned with patient's wishes.

## 2019-10-22 RX ORDER — FERROUS SULFATE 325(65) MG
325 TABLET ORAL DAILY
Refills: 0 | Status: DISCONTINUED | OUTPATIENT
Start: 2019-10-22 | End: 2019-10-24

## 2019-10-22 RX ORDER — CHLORHEXIDINE GLUCONATE 213 G/1000ML
1 SOLUTION TOPICAL
Refills: 0 | Status: DISCONTINUED | OUTPATIENT
Start: 2019-10-22 | End: 2019-10-24

## 2019-10-22 RX ORDER — FOLIC ACID 0.8 MG
1 TABLET ORAL DAILY
Refills: 0 | Status: DISCONTINUED | OUTPATIENT
Start: 2019-10-22 | End: 2019-10-24

## 2019-10-22 RX ADMIN — Medication 1 APPLICATION(S): at 22:07

## 2019-10-22 RX ADMIN — Medication 25 MILLIGRAM(S): at 22:07

## 2019-10-22 RX ADMIN — Medication 100 MILLIGRAM(S): at 06:36

## 2019-10-22 RX ADMIN — Medication 325 MILLIGRAM(S): at 12:18

## 2019-10-22 RX ADMIN — Medication 100 MILLIGRAM(S): at 17:36

## 2019-10-22 RX ADMIN — SEVELAMER CARBONATE 1600 MILLIGRAM(S): 2400 POWDER, FOR SUSPENSION ORAL at 06:36

## 2019-10-22 RX ADMIN — Medication 1 MILLIGRAM(S): at 12:18

## 2019-10-22 RX ADMIN — SEVELAMER CARBONATE 1600 MILLIGRAM(S): 2400 POWDER, FOR SUSPENSION ORAL at 13:49

## 2019-10-22 RX ADMIN — CLOPIDOGREL BISULFATE 75 MILLIGRAM(S): 75 TABLET, FILM COATED ORAL at 13:49

## 2019-10-22 RX ADMIN — Medication 120 MILLIGRAM(S): at 06:36

## 2019-10-22 RX ADMIN — Medication 25 MILLIGRAM(S): at 06:36

## 2019-10-22 RX ADMIN — Medication 0.6 MILLIGRAM(S): at 06:36

## 2019-10-22 RX ADMIN — APIXABAN 2.5 MILLIGRAM(S): 2.5 TABLET, FILM COATED ORAL at 17:36

## 2019-10-22 RX ADMIN — LACTULOSE 20 GRAM(S): 10 SOLUTION ORAL at 13:49

## 2019-10-22 RX ADMIN — APIXABAN 2.5 MILLIGRAM(S): 2.5 TABLET, FILM COATED ORAL at 06:36

## 2019-10-22 RX ADMIN — SEVELAMER CARBONATE 1600 MILLIGRAM(S): 2400 POWDER, FOR SUSPENSION ORAL at 22:07

## 2019-10-22 RX ADMIN — Medication 1 APPLICATION(S): at 14:50

## 2019-10-22 RX ADMIN — Medication 25 MILLIGRAM(S): at 13:48

## 2019-10-22 RX ADMIN — Medication 1 TABLET(S): at 12:18

## 2019-10-22 RX ADMIN — ATORVASTATIN CALCIUM 80 MILLIGRAM(S): 80 TABLET, FILM COATED ORAL at 22:07

## 2019-10-22 RX ADMIN — PANTOPRAZOLE SODIUM 40 MILLIGRAM(S): 20 TABLET, DELAYED RELEASE ORAL at 06:36

## 2019-10-22 RX ADMIN — Medication 0.6 MILLIGRAM(S): at 17:36

## 2019-10-22 NOTE — PROGRESS NOTE ADULT - SUBJECTIVE AND OBJECTIVE BOX
Patient is seen and examined at the bed side, is afebrile.      REVIEW OF SYSTEMS: All other review systems are negative        ALLERGIES: No Known Allergies      Vital Signs Last 24 Hrs  T(C): 37 (22 Oct 2019 14:48), Max: 37 (22 Oct 2019 14:48)  T(F): 98.6 (22 Oct 2019 14:48), Max: 98.6 (22 Oct 2019 14:48)  HR: 97 (22 Oct 2019 14:48) (80 - 97)  BP: 136/69 (22 Oct 2019 14:48) (104/59 - 163/77)  BP(mean): --  RR: 15 (22 Oct 2019 14:48) (15 - 18)  SpO2: 95% (22 Oct 2019 14:48) (95% - 100%)        PHYSICAL EXAM:  GENERAL: Not in distress   CHEST/LUNG:  Air entry bilaterally  HEART: s1 and s2 present  ABDOMEN:  Nontender and  Nondistended  EXTREMITIES: Right LE has hyperkeratosis, no open wound and not swollen or tender  CNS: Awake and Alert        LABS: N new Labs                          8.7    5.69  )-----------( 156      ( 20 Oct 2019 17:37 )             27.8         10-20      137  |  99  |  67<H>  ----------------------------<  88  5.9<H>   |  27  |  11.50<H>    Ca    8.3<L>      20 Oct 2019 17:37    TPro  7.0  /  Alb  2.8<L>  /  TBili  0.3  /  DBili  x   /  AST  17  /  ALT  25  /  AlkPhos  91  10-20    PT/INR - ( 20 Oct 2019 17:37 )   PT: 11.2 sec;   INR: 1.01 ratio      PTT - ( 20 Oct 2019 17:37 )  PTT:31.9 sec        MEDICATIONS  (STANDING):  apixaban 2.5 milliGRAM(s) Oral two times a day  atorvastatin 80 milliGRAM(s) Oral at bedtime  betamethasone valerate 0.1% Cream 1 Application(s) Topical three times a day  chlorhexidine 4% Liquid 1 Application(s) Topical <User Schedule>  clopidogrel Tablet 75 milliGRAM(s) Oral daily  colchicine 0.6 milliGRAM(s) Oral two times a day  diltiazem    milliGRAM(s) Oral daily  docusate sodium 100 milliGRAM(s) Oral two times a day  epoetin ximena Injectable 50687 Unit(s) IV Push <User Schedule>  ferrous    sulfate 325 milliGRAM(s) Oral daily  folic acid 1 milliGRAM(s) Oral daily  hydrALAZINE 25 milliGRAM(s) Oral three times a day  lactulose Syrup 20 Gram(s) Oral daily  multivitamin 1 Tablet(s) Oral daily  pantoprazole    Tablet 40 milliGRAM(s) Oral before breakfast  sevelamer carbonate 1600 milliGRAM(s) Oral three times a day  vancomycin  IVPB 1000 milliGRAM(s) IV Intermittent <User Schedule>    MEDICATIONS  (PRN):      RADIOLOGY & ADDITIONAL TESTS:    10/20/19 : Xray Foot AP + Lateral + Oblique, Right (10.20.19 @ 19:33):  Osteopenia. Soft tissue swelling. No evidence for acute fracture or dislocation.      10/20/19 : US Duplex Venous Lower Ext Ltd, Right (10.20.19 @ 17:24) No evidence of right lower extremity deep venous thrombosis.        MICROBIOLOGY DATA:    Culture - Blood (10.21.19 @ 10:58)    Specimen Source: .Blood    Culture Results:   No growth to date. Patient is seen and examined at the bed side, is afebrile. He is doing better and no complaints. The Blood culture has no growth  to date.         REVIEW OF SYSTEMS: All other review systems are negative        ALLERGIES: No Known Allergies      Vital Signs Last 24 Hrs  T(C): 37 (22 Oct 2019 14:48), Max: 37 (22 Oct 2019 14:48)  T(F): 98.6 (22 Oct 2019 14:48), Max: 98.6 (22 Oct 2019 14:48)  HR: 97 (22 Oct 2019 14:48) (80 - 97)  BP: 136/69 (22 Oct 2019 14:48) (104/59 - 163/77)  BP(mean): --  RR: 15 (22 Oct 2019 14:48) (15 - 18)  SpO2: 95% (22 Oct 2019 14:48) (95% - 100%)        PHYSICAL EXAM:  GENERAL: Not in distress   CHEST/LUNG:  Air entry bilaterally  HEART: s1 and s2 present  ABDOMEN:  Nontender and  Nondistended  EXTREMITIES: Right LE has hyperkeratosis, no open wound and not swollen or tender  CNS: Awake and Alert        LABS: N new Labs                          8.7    5.69  )-----------( 156      ( 20 Oct 2019 17:37 )             27.8         10-20      137  |  99  |  67<H>  ----------------------------<  88  5.9<H>   |  27  |  11.50<H>    Ca    8.3<L>      20 Oct 2019 17:37    TPro  7.0  /  Alb  2.8<L>  /  TBili  0.3  /  DBili  x   /  AST  17  /  ALT  25  /  AlkPhos  91  10-20    PT/INR - ( 20 Oct 2019 17:37 )   PT: 11.2 sec;   INR: 1.01 ratio      PTT - ( 20 Oct 2019 17:37 )  PTT:31.9 sec        MEDICATIONS  (STANDING):  apixaban 2.5 milliGRAM(s) Oral two times a day  atorvastatin 80 milliGRAM(s) Oral at bedtime  betamethasone valerate 0.1% Cream 1 Application(s) Topical three times a day  chlorhexidine 4% Liquid 1 Application(s) Topical <User Schedule>  clopidogrel Tablet 75 milliGRAM(s) Oral daily  colchicine 0.6 milliGRAM(s) Oral two times a day  diltiazem    milliGRAM(s) Oral daily  docusate sodium 100 milliGRAM(s) Oral two times a day  epoetin ximena Injectable 04884 Unit(s) IV Push <User Schedule>  ferrous    sulfate 325 milliGRAM(s) Oral daily  folic acid 1 milliGRAM(s) Oral daily  hydrALAZINE 25 milliGRAM(s) Oral three times a day  lactulose Syrup 20 Gram(s) Oral daily  multivitamin 1 Tablet(s) Oral daily  pantoprazole    Tablet 40 milliGRAM(s) Oral before breakfast  sevelamer carbonate 1600 milliGRAM(s) Oral three times a day  vancomycin  IVPB 1000 milliGRAM(s) IV Intermittent <User Schedule>    MEDICATIONS  (PRN):      RADIOLOGY & ADDITIONAL TESTS:    10/20/19 : Xray Foot AP + Lateral + Oblique, Right (10.20.19 @ 19:33):  Osteopenia. Soft tissue swelling. No evidence for acute fracture or dislocation.      10/20/19 : US Duplex Venous Lower Ext Ltd, Right (10.20.19 @ 17:24) No evidence of right lower extremity deep venous thrombosis.        MICROBIOLOGY DATA:    Culture - Blood (10.21.19 @ 10:58)    Specimen Source: .Blood    Culture Results:   No growth to date.

## 2019-10-22 NOTE — PROGRESS NOTE ADULT - PROBLEM SELECTOR PLAN 2
ESRD on HD MWF via RU AVF  Pt got one dialysis on 10/21  Creatinine is 11, pt refused to get morning labs   Continue with HD  Dr Pritchard (Nephrology) following

## 2019-10-22 NOTE — CONSULT NOTE ADULT - SUBJECTIVE AND OBJECTIVE BOX
Chief Complaint : Patient is a 74y old  Male who presents with a chief complaint of right foot pain and swelling (22 Oct 2019 11:12)    HPI : HPI:  74 M, wheel chair bound, from [facility] with PMH significant for HTN, HLD, CVA with residual right sided weakness, ESRD on HD (M,W,F) Anemia, constipation, gout presents to Ed with c/o right sided leg pain and swelling since Thursday. Patient reports that he has been having right leg pain, 8/10 in intensity, non radiating for last few days. He denies having fever, chills, any discharge from leg. There was concern for DVT given his prolong immobility and clinical presentation however US duplex was negative for DVT.   He otherwise feels well and denies any chest pain, shortness of breath or abdominal pain. (20 Oct 2019 22:42)      Patient admits to  (-) Fevers, (-) Chills, (-) Nausea, (-) Vomiting, (-) Shortness of Breath    S : 74y year old Male seen by podiatry team at bedside for RLE extremity pain and swelling at the request of Dr. Ledesma. Patient relates his right leg pain has improved since his hospitalization. He denies any trauma to the area. Patient is wheelchair bound nonambulatory. Patient is poor historian.  He denies f/c/n/v or SOB.     PMH: Anemia  Hypertension  Diabetes  ESRD (end stage renal disease) on dialysis    PSH:No significant past surgical history      Allergies:No Known Allergies      Labs:                          8.7    5.69  )-----------( 156      ( 20 Oct 2019 17:37 )             27.8     WBC Trend  5.69 Date (10-20 @ 17:37)      Chem  10-20    137  |  99  |  67<H>  ----------------------------<  88  5.9<H>   |  27  |  11.50<H>    Ca    8.3<L>      20 Oct 2019 17:37    TPro  7.0  /  Alb  2.8<L>  /  TBili  0.3  /  DBili  x   /  AST  17  /  ALT  25  /  AlkPhos  91  10-20          T(F): 98.3 (10-22-19 @ 05:30), Max: 98.5 (10-21-19 @ 16:16)  HR: 85 (10-22-19 @ 05:30) (80 - 85)  BP: 163/77 (10-22-19 @ 05:30) (104/59 - 174/70)  RR: 18 (10-22-19 @ 05:30) (16 - 20)  SpO2: 96% (10-22-19 @ 05:30) (96% - 100%)  Wt(kg): --    O:   General: Pleasant  male NAD & AOX3.    Derm:  Skin warm, dry and bilateral with peeling dried skin. No edema or erythema noted. No focal increase in warmth b/l lower extremity. No open lesions noted. Diffuse pain to palpation to the right lower extreimty.   Vascular: Dorsalis Pedis and Posterior Tibial pulses non-palpable b/l.  CFT delayed digits 1-5 bilateral.    Neuro: Protective sensation intact tested with SWMF.   MSK: unable to assess, pt. unable to perform passive ROM. ankle joint rigid.     < from: US Duplex Venous Lower Ext Ltd, Right (10.20.19 @ 17:24) >  EXAM:  US DPLX LWR EXT VEINS LTD RT                            PROCEDURE DATE:  10/20/2019          INTERPRETATION:  CLINICAL INFORMATION: Right lower extremity pain and   swelling    COMPARISON: None available.    TECHNIQUE: Duplex sonography of the RIGHT LOWER extremity veins with   color and spectral Doppler, with and without compression.      FINDINGS:    There is normal compressibility of the right common femoral, femoral and   popliteal veins.     Doppler examination shows normal spontaneous and phasic flow.    No calf vein thrombosis is detected.    There is right lower extremity soft tissue edema.    IMPRESSION:     No evidence of right lower extremity deep venous thrombosis.        < end of copied text >    < from: Xray Foot AP + Lateral + Oblique, Right (10.20.19 @ 19:33) >  EXAM:  FOOT RIGHT (MINIMUM 3 VIEWS)                            PROCEDURE DATE:  10/20/2019          INTERPRETATION:  AP, lateral, and oblique views of the right foot    CLINICAL INDICATION: Pain and swelling    COMPARISON: None     FINDINGS: Osteopenia identified without evidence for acute fracture or   dislocation. Soft tissue swelling of the right foot identified with   prominent soft tissue swelling noted along the dorsum of the right foot.   No retained metallic foreign bodies identified. Extensive vascular   calcifications noted.    IMPRESSION: Osteopenia. Soft tissue swelling. No evidence for acute   fracture or dislocation.          < end of copied text >      < from: Xray Tibia + Fibula 2 Views, Right (10.20.19 @ 19:33) >  EXAM:  LEG AP&LAT - RIGHT                            PROCEDURE DATE:  10/20/2019          INTERPRETATION:  CLINICAL INDICATION:  Right lower extremity pain and   swelling    COMPARISON:  None    TECHNIQUE:  AP and lateral radiographs of the right lower leg performed.    FINDINGS:  There is no evidence for acute or chronic fracture deformity   of the right tibia or fibula. Knee and ankle articulations appear intact.   No suprapatellar joint effusion is identified. Vascular calcifications   are demonstrated. Soft tissue swelling is identified. No retained   radiodense foreign bodies evident. Regions of osteopenia are identified   within the distal aspect of the right tibia, fibula as well as osseous   structures of the right foot.    IMPRESSION:  Regions of osteopenia distal aspect of the right   tibia/fibula as well as right foot. No evidence for acute or chronic   fracture deformity. Soft tissue swelling.          < end of copied text >      A: Right lower extremity pain    P:   Chart reviewed and Patient evaluated  Discussed diagnosis and treatment with patient  X-rays reviewed negative findings  U/S reviewed DVT r/o as above  VITALY/PVR pending  Recommend lidex cream to b/l lower extremity for scaling skin.   Offloading to bilateral Heels while in bed  Discussed with Attending Dr. Ledesma Chief Complaint : Patient is a 74y old  Male who presents with a chief complaint of right foot pain and swelling (22 Oct 2019 11:12)    HPI : HPI:  74 M, wheel chair bound, from [facility] with PMH significant for HTN, HLD, CVA with residual right sided weakness, ESRD on HD (M,W,F) Anemia, constipation, gout presents to Ed with c/o right sided leg pain and swelling since Thursday. Patient reports that he has been having right leg pain, 8/10 in intensity, non radiating for last few days. He denies having fever, chills, any discharge from leg. There was concern for DVT given his prolong immobility and clinical presentation however US duplex was negative for DVT.   He otherwise feels well and denies any chest pain, shortness of breath or abdominal pain. (20 Oct 2019 22:42)      Patient admits to  (-) Fevers, (-) Chills, (-) Nausea, (-) Vomiting, (-) Shortness of Breath    S : 74y year old Male seen by podiatry team at bedside for RLE extremity pain and swelling at the request of Dr. Ledesma. Patient relates his right leg pain has improved since his hospitalization. He denies any trauma to the area. Patient is wheelchair bound nonambulatory. Patient is poor historian.  He denies f/c/n/v or SOB.     PMH: Anemia  Hypertension  Diabetes  ESRD (end stage renal disease) on dialysis    PSH:No significant past surgical history      Allergies:No Known Allergies      Labs:                          8.7    5.69  )-----------( 156      ( 20 Oct 2019 17:37 )             27.8     WBC Trend  5.69 Date (10-20 @ 17:37)      Chem  10-20    137  |  99  |  67<H>  ----------------------------<  88  5.9<H>   |  27  |  11.50<H>    Ca    8.3<L>      20 Oct 2019 17:37    TPro  7.0  /  Alb  2.8<L>  /  TBili  0.3  /  DBili  x   /  AST  17  /  ALT  25  /  AlkPhos  91  10-20          T(F): 98.3 (10-22-19 @ 05:30), Max: 98.5 (10-21-19 @ 16:16)  HR: 85 (10-22-19 @ 05:30) (80 - 85)  BP: 163/77 (10-22-19 @ 05:30) (104/59 - 174/70)  RR: 18 (10-22-19 @ 05:30) (16 - 20)  SpO2: 96% (10-22-19 @ 05:30) (96% - 100%)  Wt(kg): --    O:   General: Pleasant  male NAD & AOX3.    Derm:  Skin warm, dry and bilateral with peeling dried skin. No edema or erythema noted. No focal increase in warmth b/l lower extremity. No open lesions noted. Diffuse pain to palpation to the right lower extreimty.   Vascular: Dorsalis Pedis and Posterior Tibial pulses non-palpable b/l.  CFT delayed digits 1-5 bilateral.    Neuro: Protective sensation intact tested with SWMF.   MSK: unable to assess, pt. unable to perform passive ROM. ankle joint rigid.     < from: US Duplex Venous Lower Ext Ltd, Right (10.20.19 @ 17:24) >  EXAM:  US DPLX LWR EXT VEINS LTD RT                            PROCEDURE DATE:  10/20/2019          INTERPRETATION:  CLINICAL INFORMATION: Right lower extremity pain and   swelling    COMPARISON: None available.    TECHNIQUE: Duplex sonography of the RIGHT LOWER extremity veins with   color and spectral Doppler, with and without compression.      FINDINGS:    There is normal compressibility of the right common femoral, femoral and   popliteal veins.     Doppler examination shows normal spontaneous and phasic flow.    No calf vein thrombosis is detected.    There is right lower extremity soft tissue edema.    IMPRESSION:     No evidence of right lower extremity deep venous thrombosis.        < end of copied text >    < from: Xray Foot AP + Lateral + Oblique, Right (10.20.19 @ 19:33) >  EXAM:  FOOT RIGHT (MINIMUM 3 VIEWS)                            PROCEDURE DATE:  10/20/2019          INTERPRETATION:  AP, lateral, and oblique views of the right foot    CLINICAL INDICATION: Pain and swelling    COMPARISON: None     FINDINGS: Osteopenia identified without evidence for acute fracture or   dislocation. Soft tissue swelling of the right foot identified with   prominent soft tissue swelling noted along the dorsum of the right foot.   No retained metallic foreign bodies identified. Extensive vascular   calcifications noted.    IMPRESSION: Osteopenia. Soft tissue swelling. No evidence for acute   fracture or dislocation.          < end of copied text >      < from: Xray Tibia + Fibula 2 Views, Right (10.20.19 @ 19:33) >  EXAM:  LEG AP&LAT - RIGHT                            PROCEDURE DATE:  10/20/2019          INTERPRETATION:  CLINICAL INDICATION:  Right lower extremity pain and   swelling    COMPARISON:  None    TECHNIQUE:  AP and lateral radiographs of the right lower leg performed.    FINDINGS:  There is no evidence for acute or chronic fracture deformity   of the right tibia or fibula. Knee and ankle articulations appear intact.   No suprapatellar joint effusion is identified. Vascular calcifications   are demonstrated. Soft tissue swelling is identified. No retained   radiodense foreign bodies evident. Regions of osteopenia are identified   within the distal aspect of the right tibia, fibula as well as osseous   structures of the right foot.    IMPRESSION:  Regions of osteopenia distal aspect of the right   tibia/fibula as well as right foot. No evidence for acute or chronic   fracture deformity. Soft tissue swelling.          < end of copied text >      A: Right lower extremity pain    P:   Chart reviewed and Patient evaluated  Discussed diagnosis and treatment with patient  X-rays reviewed negative findings  U/S reviewed DVT r/o as above  VITALY/PVR pending  Please consult Vascular Dr. Varma or Dr. amin  Recommend lidex cream to b/l lower extremity for scaling skin.   Offloading to bilateral Heels while in bed  Discussed with Attending Dr. Ledesma Chief Complaint : Patient is a 74y old  Male who presents with a chief complaint of right foot pain and swelling (22 Oct 2019 11:12)    HPI : HPI:  74 M, wheel chair bound, from [facility] with PMH significant for HTN, HLD, CVA with residual right sided weakness, ESRD on HD (M,W,F) Anemia, constipation, gout presents to Ed with c/o right sided leg pain and swelling since Thursday. Patient reports that he has been having right leg pain, 8/10 in intensity, non radiating for last few days. He denies having fever, chills, any discharge from leg. There was concern for DVT given his prolong immobility and clinical presentation however US duplex was negative for DVT.   He otherwise feels well and denies any chest pain, shortness of breath or abdominal pain. (20 Oct 2019 22:42)      Patient admits to  (-) Fevers, (-) Chills, (-) Nausea, (-) Vomiting, (-) Shortness of Breath    S : 74y year old Male seen by podiatry team at bedside for RLE extremity pain and swelling. Patient relates his right leg pain has improved since his hospitalization. He denies any trauma to the area. Patient is wheelchair bound nonambulatory. Patient is poor historian.  He denies f/c/n/v or SOB.     PMH: Anemia  Hypertension  Diabetes  ESRD (end stage renal disease) on dialysis    PSH:No significant past surgical history      Allergies:No Known Allergies      Labs:                          8.7    5.69  )-----------( 156      ( 20 Oct 2019 17:37 )             27.8     WBC Trend  5.69 Date (10-20 @ 17:37)      Chem  10-20    137  |  99  |  67<H>  ----------------------------<  88  5.9<H>   |  27  |  11.50<H>    Ca    8.3<L>      20 Oct 2019 17:37    TPro  7.0  /  Alb  2.8<L>  /  TBili  0.3  /  DBili  x   /  AST  17  /  ALT  25  /  AlkPhos  91  10-20          T(F): 98.3 (10-22-19 @ 05:30), Max: 98.5 (10-21-19 @ 16:16)  HR: 85 (10-22-19 @ 05:30) (80 - 85)  BP: 163/77 (10-22-19 @ 05:30) (104/59 - 174/70)  RR: 18 (10-22-19 @ 05:30) (16 - 20)  SpO2: 96% (10-22-19 @ 05:30) (96% - 100%)  Wt(kg): --    O:   General: Pleasant  male NAD & AOX3.    Derm:  Skin warm, dry and bilateral with peeling dried skin. No edema or erythema noted. No focal increase in warmth b/l lower extremity. No open lesions noted. Diffuse pain to palpation to the right lower extreimty.   Vascular: Dorsalis Pedis and Posterior Tibial pulses non-palpable b/l.  CFT delayed digits 1-5 bilateral.    Neuro: Protective sensation intact tested with SWMF.   MSK: unable to assess, pt. unable to perform passive ROM. ankle joint rigid.     < from: US Duplex Venous Lower Ext Ltd, Right (10.20.19 @ 17:24) >  EXAM:  US DPLX LWR EXT VEINS LTD RT                            PROCEDURE DATE:  10/20/2019          INTERPRETATION:  CLINICAL INFORMATION: Right lower extremity pain and   swelling    COMPARISON: None available.    TECHNIQUE: Duplex sonography of the RIGHT LOWER extremity veins with   color and spectral Doppler, with and without compression.      FINDINGS:    There is normal compressibility of the right common femoral, femoral and   popliteal veins.     Doppler examination shows normal spontaneous and phasic flow.    No calf vein thrombosis is detected.    There is right lower extremity soft tissue edema.    IMPRESSION:     No evidence of right lower extremity deep venous thrombosis.        < end of copied text >    < from: Xray Foot AP + Lateral + Oblique, Right (10.20.19 @ 19:33) >  EXAM:  FOOT RIGHT (MINIMUM 3 VIEWS)                            PROCEDURE DATE:  10/20/2019          INTERPRETATION:  AP, lateral, and oblique views of the right foot    CLINICAL INDICATION: Pain and swelling    COMPARISON: None     FINDINGS: Osteopenia identified without evidence for acute fracture or   dislocation. Soft tissue swelling of the right foot identified with   prominent soft tissue swelling noted along the dorsum of the right foot.   No retained metallic foreign bodies identified. Extensive vascular   calcifications noted.    IMPRESSION: Osteopenia. Soft tissue swelling. No evidence for acute   fracture or dislocation.          < end of copied text >      < from: Xray Tibia + Fibula 2 Views, Right (10.20.19 @ 19:33) >  EXAM:  LEG AP&LAT - RIGHT                            PROCEDURE DATE:  10/20/2019          INTERPRETATION:  CLINICAL INDICATION:  Right lower extremity pain and   swelling    COMPARISON:  None    TECHNIQUE:  AP and lateral radiographs of the right lower leg performed.    FINDINGS:  There is no evidence for acute or chronic fracture deformity   of the right tibia or fibula. Knee and ankle articulations appear intact.   No suprapatellar joint effusion is identified. Vascular calcifications   are demonstrated. Soft tissue swelling is identified. No retained   radiodense foreign bodies evident. Regions of osteopenia are identified   within the distal aspect of the right tibia, fibula as well as osseous   structures of the right foot.    IMPRESSION:  Regions of osteopenia distal aspect of the right   tibia/fibula as well as right foot. No evidence for acute or chronic   fracture deformity. Soft tissue swelling.          < end of copied text >      A: Right lower extremity pain    P:   Chart reviewed and Patient evaluated  Discussed diagnosis and treatment with patient  X-rays reviewed negative findings  U/S reviewed DVT r/o as above  VITALY/PVR pending  Please consult Vascular Dr. Varma or Dr. amin  Recommend lidex cream to b/l lower extremity for scaling skin.   Offloading to bilateral Heels while in bed  Discussed with Attending Dr. Ledesma

## 2019-10-22 NOTE — PROGRESS NOTE ADULT - SUBJECTIVE AND OBJECTIVE BOX
HPI:  74 M, wheel chair bound, from [facility] with PMH significant for HTN, HLD, CVA with residual right sided weakness, ESRD on HD (M,W,F) Anemia, constipation, gout presents to Ed with c/o right sided leg pain and swelling since Thursday.  Patient admitted with cellulitis.    patient was seen and examined   s doing better but still has pain in rt leg and foot   MEDICATIONS  (STANDING):  apixaban 2.5 milliGRAM(s) Oral two times a day  atorvastatin 80 milliGRAM(s) Oral at bedtime  clopidogrel Tablet 75 milliGRAM(s) Oral daily  colchicine 0.6 milliGRAM(s) Oral two times a day  diltiazem    milliGRAM(s) Oral daily  docusate sodium 100 milliGRAM(s) Oral two times a day  epoetin ximena Injectable 42766 Unit(s) IV Push <User Schedule>  ferrous    sulfate 325 milliGRAM(s) Oral daily  folic acid 1 milliGRAM(s) Oral daily  hydrALAZINE 25 milliGRAM(s) Oral three times a day  lactulose Syrup 20 Gram(s) Oral daily  multivitamin 1 Tablet(s) Oral daily  pantoprazole    Tablet 40 milliGRAM(s) Oral before breakfast  sevelamer carbonate 1600 milliGRAM(s) Oral three times a day  vancomycin  IVPB 1000 milliGRAM(s) IV Intermittent <User Schedule>    MEDICATIONS  (PRN):      PAST MEDICAL & SURGICAL HISTORY:  Anemia  Hypertension  Diabetes  ESRD (end stage renal disease) on dialysis  No significant past surgical history    REVIEW OF SYSTEMS:    CONSTITUTIONAL: No fever,   EYES: no acute visual disturbances  NECK: No pain or stiffness  RESPIRATORY: No cough; No shortness of breath  CARDIOVASCULAR: No chest pain, no palpitations  GASTROINTESTINAL: No pain. No nausea, vomiting or diarrhea   NEUROLOGICAL: No headache or numbness, no tremors  MUSCULOSKELETAL: No joint pain, pain in right foot and leg  GENITOURINARY: no dysuria, no frequency, no hesitancy  PSYCHIATRY: no depression , no anxiety  ALL OTHER  ROS negative      Vital Signs Last 24 Hrs  T(C): 36.8 (22 Oct 2019 05:30), Max: 36.9 (21 Oct 2019 16:16)  T(F): 98.3 (22 Oct 2019 05:30), Max: 98.5 (21 Oct 2019 16:16)  HR: 85 (22 Oct 2019 05:30) (80 - 85)  BP: 163/77 (22 Oct 2019 05:30) (104/59 - 174/70)  BP(mean): --  RR: 18 (22 Oct 2019 05:30) (16 - 21)  SpO2: 96% (22 Oct 2019 05:30) (96% - 100%)    ________________________________________________  PHYSICAL EXAM:    GENERAL: NAD  HEENT: Normocephalic; conjunctivae and sclerae clear; moist mucous membranes;   NECK : supple, no JVD  CHEST/LUNG: Clear to auscultation bilaterally   HEART: S1 S2  regular  ABDOMEN: Soft, Nontender, Nondistended; Bowel sounds present  EXTREMITIES: no cyanosis;  rt leg mild swelling with warmth , skin desquamation of rt leg and foot- partial   SKIN: warm and dry; no rash  NERVOUS SYSTEM:  Alert & Oriented x3; no new deficits    _________________________________________________  C  __________________________________________________                      LABS:                          8.7    5.69  )-----------( 156      ( 20 Oct 2019 17:37 )             27.8     10-20    137  |  99  |  67<H>  ----------------------------<  88  5.9<H>   |  27  |  11.50<H>    Ca    8.3<L>      20 Oct 2019 17:37    TPro  7.0  /  Alb  2.8<L>  /  TBili  0.3  /  DBili  x   /  AST  17  /  ALT  25  /  AlkPhos  91  10-20    PT/INR - ( 20 Oct 2019 17:37 )   PT: 11.2 sec;   INR: 1.01 ratio         PTT - ( 20 Oct 2019 17:37 )  PTT:31.9 sec    CAPILLARY BLOOD GLUCOSE          __________________________________________________  RADIOLOGY & ADDITIONAL TESTS:    Imaging Personally Reviewed:  YES    < from: US Duplex Venous Lower Ext Ltd, Right (10.20.19 @ 17:24) >  FINDINGS:    There is normal compressibility of the right common femoral, femoral and   popliteal veins.     Doppler examination shows normal spontaneous and phasic flow.    No calf vein thrombosis is detected.    There is right lower extremity soft tissue edema.    IMPRESSION:     No evidence of right lower extremity deep venous thrombosis.    < end of copied text >    < from: Xray Tibia + Fibula 2 Views, Right (10.20.19 @ 19:33) >  FINDINGS:  There is no evidence for acute or chronic fracture deformity   of the right tibia or fibula. Knee and ankle articulations appear intact.   No suprapatellar joint effusion is identified. Vascular calcifications   are demonstrated. Soft tissue swelling is identified. No retained   radiodense foreign bodies evident. Regions of osteopenia are identified   within the distal aspect of the right tibia, fibula as well as osseous   structures of the right foot.    IMPRESSION:  Regions of osteopenia distal aspect of the right   tibia/fibula as well as right foot. No evidence for acute or chronic   fracture deformity. Soft tissue swelling.    < end of copied text >  < from: Xray Foot AP + Lateral + Oblique, Right (10.20.19 @ 19:33) >  FINDINGS: Osteopenia identified without evidence for acute fracture or   dislocation. Soft tissue swelling of the right foot identified with   prominent soft tissue swelling noted along the dorsum of the right foot.   No retained metallic foreign bodies identified. Extensive vascular   calcifications noted.    IMPRESSION: Osteopenia. Soft tissue swelling. No evidence for acute   fracture or dislocation.    < end of copied text >    Consultant(s) Notes Reviewed:   YES     Plan of care was discussed with patient; all questions and concerns were addressed and care was aligned with patient's wishes.

## 2019-10-22 NOTE — PROGRESS NOTE ADULT - SUBJECTIVE AND OBJECTIVE BOX
[facility]   MD Phyllis Rod D.O. Fatima Sheikh, D.O. Ruoro Wong, PA    From 7 AM - 5 PM:  OFFICE: [phone]  Dr. Stanton cell: [phone]  Dr. Motley cell: [phone]  Dr. Moura cell: [phone]  KASEY Mccrary cell: [phone]    From 5 PM - 7 AM: Answering Service: [phone]        RENAL FOLLOW UP NOTE  --------------------------------------------------------------------------------  HPI: Pt seen and examined this AM. Denies any complaints today. Tolerated HD well yesterday.         PAST HISTORY  --------------------------------------------------------------------------------  No significant changes to PMH, PSH, FHx, SHx, unless otherwise noted    ALLERGIES & MEDICATIONS  --------------------------------------------------------------------------------  Allergies    No Known Allergies    Intolerances      Standing Inpatient Medications  apixaban 2.5 milliGRAM(s) Oral two times a day  atorvastatin 80 milliGRAM(s) Oral at bedtime  betamethasone valerate 0.1% Cream 1 Application(s) Topical three times a day  chlorhexidine 4% Liquid 1 Application(s) Topical <User Schedule>  clopidogrel Tablet 75 milliGRAM(s) Oral daily  colchicine 0.6 milliGRAM(s) Oral two times a day  diltiazem    milliGRAM(s) Oral daily  docusate sodium 100 milliGRAM(s) Oral two times a day  epoetin ximena Injectable 63751 Unit(s) IV Push <User Schedule>  ferrous    sulfate 325 milliGRAM(s) Oral daily  folic acid 1 milliGRAM(s) Oral daily  hydrALAZINE 25 milliGRAM(s) Oral three times a day  lactulose Syrup 20 Gram(s) Oral daily  multivitamin 1 Tablet(s) Oral daily  pantoprazole    Tablet 40 milliGRAM(s) Oral before breakfast  sevelamer carbonate 1600 milliGRAM(s) Oral three times a day    PRN Inpatient Medications      REVIEW OF SYSTEMS  --------------------------------------------------------------------------------  General: no fever  CVS: no chest pain  RESP: no sob, no cough  ABD: no abdominal pain  : no dysuria,  MSK: no edema     VITALS/PHYSICAL EXAM  --------------------------------------------------------------------------------  T(C): 37 (10-22-19 @ 14:48), Max: 37 (10-22-19 @ 14:48)  HR: 97 (10-22-19 @ 14:48) (80 - 97)  BP: 136/69 (10-22-19 @ 14:48) (104/59 - 163/77)  RR: 15 (10-22-19 @ 14:48) (15 - 18)  SpO2: 95% (10-22-19 @ 14:48) (95% - 100%)  Wt(kg): --    Weight (kg): 81.5 (10-21-19 @ 17:34)      Physical Exam:  	Gen: NAD  	HEENT: MMM  	Pulm: CTA B/L  	CV: S1S2, + murmur  	Abd: Soft, +BS  	Ext: No LE edema B/L              Neuro: Awake   	Skin: Warm and Dry   	Vascular access: AVF w/ good thrill and bruit    LABS/STUDIES  --------------------------------------------------------------------------------                Creatinine Trend:  SCr 11.50 [10-20 @ 17:37]        PTH -- (Ca 9.0)      [01-02-19 @ 23:43]   431  HbA1c 5.4      [01-03-19 @ 15:05]

## 2019-10-22 NOTE — PROGRESS NOTE ADULT - SUBJECTIVE AND OBJECTIVE BOX
PGY 1 Note discussed with primary attending    Patient is a 74y old  Male who presents with a chief complaint of right foot pain and swelling (22 Oct 2019 11:57)      INTERVAL HPI/OVERNIGHT EVENTS: offers no new complaints; current symptoms resolving    MEDICATIONS  (STANDING):  apixaban 2.5 milliGRAM(s) Oral two times a day  atorvastatin 80 milliGRAM(s) Oral at bedtime  betamethasone valerate 0.1% Cream 1 Application(s) Topical three times a day  clopidogrel Tablet 75 milliGRAM(s) Oral daily  colchicine 0.6 milliGRAM(s) Oral two times a day  diltiazem    milliGRAM(s) Oral daily  docusate sodium 100 milliGRAM(s) Oral two times a day  epoetin ximena Injectable 77747 Unit(s) IV Push <User Schedule>  ferrous    sulfate 325 milliGRAM(s) Oral daily  folic acid 1 milliGRAM(s) Oral daily  hydrALAZINE 25 milliGRAM(s) Oral three times a day  lactulose Syrup 20 Gram(s) Oral daily  multivitamin 1 Tablet(s) Oral daily  pantoprazole    Tablet 40 milliGRAM(s) Oral before breakfast  sevelamer carbonate 1600 milliGRAM(s) Oral three times a day  vancomycin  IVPB 1000 milliGRAM(s) IV Intermittent <User Schedule>    MEDICATIONS  (PRN):      __________________________________________________  REVIEW OF SYSTEMS:    CONSTITUTIONAL: No fever,   EYES: no acute visual disturbances  NECK: No pain or stiffness  RESPIRATORY: No cough; No shortness of breath  CARDIOVASCULAR: No chest pain, no palpitations  GASTROINTESTINAL: No pain. No nausea or vomiting; No diarrhea   NEUROLOGICAL: No headache or numbness, no tremors  MUSCULOSKELETAL: No joint pain, no muscle pain  GENITOURINARY: no dysuria, no frequency, no hesitancy  PSYCHIATRY: no depression , no anxiety  ALL OTHER  ROS negative        Vital Signs Last 24 Hrs  T(C): 36.8 (22 Oct 2019 05:30), Max: 36.9 (21 Oct 2019 16:16)  T(F): 98.3 (22 Oct 2019 05:30), Max: 98.5 (21 Oct 2019 16:16)  HR: 85 (22 Oct 2019 05:30) (80 - 85)  BP: 163/77 (22 Oct 2019 05:30) (104/59 - 174/70)  BP(mean): --  RR: 18 (22 Oct 2019 05:30) (16 - 20)  SpO2: 96% (22 Oct 2019 05:30) (96% - 100%)    ________________________________________________  PHYSICAL EXAM:  GENERAL: NAD  HEENT: Normocephalic;  conjunctivae and sclerae clear; moist mucous membranes;   NECK : supple  CHEST/LUNG: Clear to auscultation bilaterally with good air entry   HEART: S1 S2  regular; no murmurs, gallops or rubs  ABDOMEN: Soft, Nontender, Nondistended; Bowel sounds present  EXTREMITIES: no cyanosis; no edema; no calf tenderness  SKIN: Severely dry skin on RLE, Scaly, Edematous and tender to touch.  NERVOUS SYSTEM:  Awake and alert; Oriented  to place, person and time ; no new deficits    _________________________________________________  LABS:                        8.7    5.69  )-----------( 156      ( 20 Oct 2019 17:37 )             27.8     10-20    137  |  99  |  67<H>  ----------------------------<  88  5.9<H>   |  27  |  11.50<H>    Ca    8.3<L>      20 Oct 2019 17:37    TPro  7.0  /  Alb  2.8<L>  /  TBili  0.3  /  DBili  x   /  AST  17  /  ALT  25  /  AlkPhos  91  10-20    PT/INR - ( 20 Oct 2019 17:37 )   PT: 11.2 sec;   INR: 1.01 ratio         PTT - ( 20 Oct 2019 17:37 )  PTT:31.9 sec    CAPILLARY BLOOD GLUCOSE      POCT Blood Glucose.: 90 mg/dL (21 Oct 2019 20:37)  POCT Blood Glucose.: 87 mg/dL (21 Oct 2019 20:09)        RADIOLOGY & ADDITIONAL TESTS:    Imaging Personally Reviewed:  YES/NO    Consultant(s) Notes Reviewed:   YES/ No    Care Discussed with Consultants :     Plan of care was discussed with patient and /or primary care giver; all questions and concerns were addressed and care was aligned with patient's wishes.

## 2019-10-23 LAB
ANION GAP SERPL CALC-SCNC: 8 MMOL/L — SIGNIFICANT CHANGE UP (ref 5–17)
BASOPHILS # BLD AUTO: 0.01 K/UL — SIGNIFICANT CHANGE UP (ref 0–0.2)
BASOPHILS NFR BLD AUTO: 0.3 % — SIGNIFICANT CHANGE UP (ref 0–2)
BUN SERPL-MCNC: 49 MG/DL — HIGH (ref 7–18)
CALCIUM SERPL-MCNC: 7.4 MG/DL — LOW (ref 8.4–10.5)
CHLORIDE SERPL-SCNC: 97 MMOL/L — SIGNIFICANT CHANGE UP (ref 96–108)
CO2 SERPL-SCNC: 30 MMOL/L — SIGNIFICANT CHANGE UP (ref 22–31)
CREAT SERPL-MCNC: 9.8 MG/DL — HIGH (ref 0.5–1.3)
EOSINOPHIL # BLD AUTO: 0.12 K/UL — SIGNIFICANT CHANGE UP (ref 0–0.5)
EOSINOPHIL NFR BLD AUTO: 3.1 % — SIGNIFICANT CHANGE UP (ref 0–6)
GLUCOSE SERPL-MCNC: 86 MG/DL — SIGNIFICANT CHANGE UP (ref 70–99)
HCT VFR BLD CALC: 27.7 % — LOW (ref 39–50)
HGB BLD-MCNC: 8.8 G/DL — LOW (ref 13–17)
IMM GRANULOCYTES NFR BLD AUTO: 0.3 % — SIGNIFICANT CHANGE UP (ref 0–1.5)
LYMPHOCYTES # BLD AUTO: 1.09 K/UL — SIGNIFICANT CHANGE UP (ref 1–3.3)
LYMPHOCYTES # BLD AUTO: 28 % — SIGNIFICANT CHANGE UP (ref 13–44)
MCHC RBC-ENTMCNC: 31.8 GM/DL — LOW (ref 32–36)
MCHC RBC-ENTMCNC: 32.7 PG — SIGNIFICANT CHANGE UP (ref 27–34)
MCV RBC AUTO: 103 FL — HIGH (ref 80–100)
MONOCYTES # BLD AUTO: 0.59 K/UL — SIGNIFICANT CHANGE UP (ref 0–0.9)
MONOCYTES NFR BLD AUTO: 15.2 % — HIGH (ref 2–14)
NEUTROPHILS # BLD AUTO: 2.07 K/UL — SIGNIFICANT CHANGE UP (ref 1.8–7.4)
NEUTROPHILS NFR BLD AUTO: 53.1 % — SIGNIFICANT CHANGE UP (ref 43–77)
NRBC # BLD: 0 /100 WBCS — SIGNIFICANT CHANGE UP (ref 0–0)
PLATELET # BLD AUTO: 142 K/UL — LOW (ref 150–400)
POTASSIUM SERPL-MCNC: 4.4 MMOL/L — SIGNIFICANT CHANGE UP (ref 3.5–5.3)
POTASSIUM SERPL-SCNC: 4.4 MMOL/L — SIGNIFICANT CHANGE UP (ref 3.5–5.3)
RBC # BLD: 2.69 M/UL — LOW (ref 4.2–5.8)
RBC # FLD: 15.6 % — HIGH (ref 10.3–14.5)
SODIUM SERPL-SCNC: 135 MMOL/L — SIGNIFICANT CHANGE UP (ref 135–145)
WBC # BLD: 3.89 K/UL — SIGNIFICANT CHANGE UP (ref 3.8–10.5)
WBC # FLD AUTO: 3.89 K/UL — SIGNIFICANT CHANGE UP (ref 3.8–10.5)

## 2019-10-23 RX ORDER — COLCHICINE 0.6 MG
0.6 TABLET ORAL DAILY
Refills: 0 | Status: DISCONTINUED | OUTPATIENT
Start: 2019-10-24 | End: 2019-10-24

## 2019-10-23 RX ADMIN — PANTOPRAZOLE SODIUM 40 MILLIGRAM(S): 20 TABLET, DELAYED RELEASE ORAL at 06:05

## 2019-10-23 RX ADMIN — Medication 0.6 MILLIGRAM(S): at 06:05

## 2019-10-23 RX ADMIN — ERYTHROPOIETIN 10000 UNIT(S): 10000 INJECTION, SOLUTION INTRAVENOUS; SUBCUTANEOUS at 12:41

## 2019-10-23 RX ADMIN — Medication 120 MILLIGRAM(S): at 06:05

## 2019-10-23 RX ADMIN — ATORVASTATIN CALCIUM 80 MILLIGRAM(S): 80 TABLET, FILM COATED ORAL at 22:56

## 2019-10-23 RX ADMIN — SEVELAMER CARBONATE 1600 MILLIGRAM(S): 2400 POWDER, FOR SUSPENSION ORAL at 06:04

## 2019-10-23 RX ADMIN — Medication 1 APPLICATION(S): at 22:56

## 2019-10-23 RX ADMIN — Medication 25 MILLIGRAM(S): at 06:05

## 2019-10-23 RX ADMIN — Medication 25 MILLIGRAM(S): at 22:56

## 2019-10-23 RX ADMIN — Medication 1 TABLET(S): at 11:27

## 2019-10-23 RX ADMIN — Medication 325 MILLIGRAM(S): at 11:27

## 2019-10-23 RX ADMIN — SEVELAMER CARBONATE 1600 MILLIGRAM(S): 2400 POWDER, FOR SUSPENSION ORAL at 22:56

## 2019-10-23 RX ADMIN — CLOPIDOGREL BISULFATE 75 MILLIGRAM(S): 75 TABLET, FILM COATED ORAL at 11:26

## 2019-10-23 RX ADMIN — Medication 1 APPLICATION(S): at 06:05

## 2019-10-23 RX ADMIN — Medication 1 MILLIGRAM(S): at 11:27

## 2019-10-23 RX ADMIN — Medication 100 MILLIGRAM(S): at 06:04

## 2019-10-23 RX ADMIN — APIXABAN 2.5 MILLIGRAM(S): 2.5 TABLET, FILM COATED ORAL at 06:05

## 2019-10-23 RX ADMIN — Medication 100 MILLIGRAM(S): at 17:46

## 2019-10-23 NOTE — PROGRESS NOTE ADULT - SUBJECTIVE AND OBJECTIVE BOX
Problem List:  ESRD due to hypertension  Admitted for  right foot pain and swelling , no signs of infection  No CHF    Possible gout    PAST MEDICAL & SURGICAL HISTORY:  Anemia  Hypertension  Diabetes  ESRD (end stage renal disease) on dialysis      No Known Allergies      MEDICATIONS  (STANDING):  apixaban 2.5 milliGRAM(s) Oral two times a day  atorvastatin 80 milliGRAM(s) Oral at bedtime  betamethasone valerate 0.1% Cream 1 Application(s) Topical three times a day  chlorhexidine 4% Liquid 1 Application(s) Topical <User Schedule>  clopidogrel Tablet 75 milliGRAM(s) Oral daily  colchicine 0.6 milliGRAM(s) Oral two times a day  diltiazem    milliGRAM(s) Oral daily  docusate sodium 100 milliGRAM(s) Oral two times a day  epoetin ximena Injectable 06918 Unit(s) IV Push <User Schedule>  ferrous    sulfate 325 milliGRAM(s) Oral daily  folic acid 1 milliGRAM(s) Oral daily  hydrALAZINE 25 milliGRAM(s) Oral three times a day  lactulose Syrup 20 Gram(s) Oral daily  multivitamin 1 Tablet(s) Oral daily  pantoprazole    Tablet 40 milliGRAM(s) Oral before breakfast  sevelamer carbonate 1600 milliGRAM(s) Oral three times a day    MEDICATIONS  (PRN):                            8.8    3.89  )-----------( 142      ( 23 Oct 2019 12:26 )             27.7     10-23    135  |  97  |  49<H>  ----------------------------<  86  4.4   |  30  |  9.80<H>    Ca    7.4<L>      23 Oct 2019 12:26              REVIEW OF SYSTEMS:  General: no fever no chills, no weight loss.  EYES/ENT: No visual changes;  No vertigo, no headache.  NECK: No pain or stiffness  RESPIRATORY: No cough, wheezing, hemoptysis; No shortness of breath  CARDIOVASCULAR: No chest pain or palpitations. No Edema  GASTROINTESTINAL: No abdominal or epigastric pain. No nausea, vomiting. No diarrhea or constipation. No melena.  GENITOURINARY: No dysuria, frequency, foamy urine, urinary urgency, incontinence or hematuria  NEUROLOGICAL: No numbness or weakness, no tremor , no dizziness.   Muscle skeletal : right foot pain improved  SKIN: No itching, burning, rashes.        VITALS:  T(F): 98.4 (10-23-19 @ 15:40), Max: 99 (10-22-19 @ 21:00)  HR: 76 (10-23-19 @ 15:40)  BP: 149/70 (10-23-19 @ 15:40)  RR: 18 (10-23-19 @ 15:40)  SpO2: 98% (10-23-19 @ 04:48)  Wt(kg): --      PHYSICAL EXAM:  Constitutional: well developed, no diaphoresis, no distress.  Neck: No JVD, no carotid bruit, supple, no adenopathy  Respiratory: Good air entrance B/L, no wheezes, rales or rhonchi  Cardiovascular: S1, S2, systolic m 2/6 LSB, RRR, no pericardial rub, no murmur  Abdomen: BS+, soft, no tenderness, no bruit  Pelvis: bladder nondistended  Extremities: No cyanosis or clubbing. No peripheral edema.   Vascular Access: right AVG with bruit and thrill

## 2019-10-23 NOTE — PROGRESS NOTE ADULT - PROBLEM SELECTOR PLAN 2
ESRD on HD MWF via RU AVF  Pt got one dialysis on 10/23  Creatinine is 19.80   Continue with HD  Dr Pritchard (Nephrology) following

## 2019-10-23 NOTE — PROGRESS NOTE ADULT - SUBJECTIVE AND OBJECTIVE BOX
Patient is a 74y old  Male who presents with a chief complaint of right foot pain and swelling (22 Oct 2019 11:57)      INTERVAL HPI/OVERNIGHT EVENTS: some pain rt ankle     MEDICATIONS  (STANDING):  apixaban 2.5 milliGRAM(s) Oral two times a day  atorvastatin 80 milliGRAM(s) Oral at bedtime  betamethasone valerate 0.1% Cream 1 Application(s) Topical three times a day  chlorhexidine 4% Liquid 1 Application(s) Topical <User Schedule>  clopidogrel Tablet 75 milliGRAM(s) Oral daily  colchicine 0.6 milliGRAM(s) Oral two times a day  diltiazem    milliGRAM(s) Oral daily  docusate sodium 100 milliGRAM(s) Oral two times a day  epoetin ximena Injectable 66887 Unit(s) IV Push <User Schedule>  ferrous    sulfate 325 milliGRAM(s) Oral daily  folic acid 1 milliGRAM(s) Oral daily  hydrALAZINE 25 milliGRAM(s) Oral three times a day  lactulose Syrup 20 Gram(s) Oral daily  multivitamin 1 Tablet(s) Oral daily  pantoprazole    Tablet 40 milliGRAM(s) Oral before breakfast  sevelamer carbonate 1600 milliGRAM(s) Oral three times a day    MEDICATIONS  (PRN):  PAST MEDICAL & SURGICAL HISTORY:  Anemia  Hypertension  Diabetes  ESRD (end stage renal disease) on dialysis  No significant past surgical history    __________________________________________________  REVIEW OF SYSTEMS:    CONSTITUTIONAL: No fever,   EYES: no acute visual disturbances  NECK: No pain or stiffness  RESPIRATORY: No cough; No shortness of breath  CARDIOVASCULAR: No chest pain, no palpitations  GASTROINTESTINAL: No pain. No nausea or vomiting; No diarrhea   NEUROLOGICAL: No headache or numbness, no tremors  MUSCULOSKELETAL:mild rt ankle joint pain, no muscle pain  GENITOURINARY: no dysuria, no frequency, no hesitancy  PSYCHIATRY: no depression , no anxiety  ALL OTHER  ROS negative      Vital Signs Last 24 Hrs  T(C): 37 (23 Oct 2019 12:10), Max: 37.2 (22 Oct 2019 21:00)  T(F): 98.6 (23 Oct 2019 12:10), Max: 99 (22 Oct 2019 21:00)  HR: 85 (23 Oct 2019 12:10) (82 - 97)  BP: 181/82 (23 Oct 2019 12:10) (136/69 - 181/82)  BP(mean): --  RR: 18 (23 Oct 2019 12:10) (15 - 18)  SpO2: 98% (23 Oct 2019 04:48) (95% - 98%)    ________________________________________________  PHYSICAL EXAM:  GENERAL: NAD  HEENT: Normocephalic;  conjunctivae and sclerae clear; moist mucous membranes;   NECK : supple  CHEST/LUNG: Clear to auscultation bilaterally with good air entry   HEART: S1 S2  regular; no murmurs, gallops or rubs  ABDOMEN: Soft, Nontender, Nondistended; Bowel sounds present  EXTREMITIES: no cyanosis; no edema; no calf tenderness  SKIN: Severely dry skin on RLE, Scaly, Edematous and tender to touch.  NERVOUS SYSTEM:  Awake and alert; Oriented  to place, person and time ; no new deficits    _________________________________________________    LABS:                        8.8    3.89  )-----------( 142      ( 23 Oct 2019 12:26 )             27.7     10-23    135  |  97  |  49<H>  ----------------------------<  86  4.4   |  30  |  9.80<H>    Ca    7.4<L>      23 Oct 2019 12:26    < from: Xray Foot AP + Lateral + Oblique, Right (10.20.19 @ 19:33) >  IMPRESSION: Osteopenia. Soft tissue swelling. No evidence for acute   fracture or dislocation.      < end of copied text >                          LABS:                        8.7    5.69  )-----------( 156      ( 20 Oct 2019 17:37 )             27.8     10-20    137  |  99  |  67<H>  ----------------------------<  88  5.9<H>   |  27  |  11.50<H>    Ca    8.3<L>      20 Oct 2019 17:37    TPro  7.0  /  Alb  2.8<L>  /  TBili  0.3  /  DBili  x   /  AST  17  /  ALT  25  /  AlkPhos  91  10-20    PT/INR - ( 20 Oct 2019 17:37 )   PT: 11.2 sec;   INR: 1.01 ratio         PTT - ( 20 Oct 2019 17:37 )  PTT:31.9 sec    CAPILLARY BLOOD GLUCOSE      POCT Blood Glucose.: 90 mg/dL (21 Oct 2019 20:37)  POCT Blood Glucose.: 87 mg/dL (21 Oct 2019 20:09)        RADIOLOGY & ADDITIONAL TESTS:    Imaging Personally Reviewed:  YES/NO    Consultant(s) Notes Reviewed:   YES/ No    Care Discussed with Consultants :     Plan of care was discussed with patient and /or primary care giver; all questions and concerns were addressed and care was aligned with patient's wishes.

## 2019-10-23 NOTE — PROGRESS NOTE ADULT - SUBJECTIVE AND OBJECTIVE BOX
PGY 1 Note discussed with primary attending    Patient is a 74y old  Male who presents with a chief complaint of right foot pain and swelling (23 Oct 2019 16:28)      INTERVAL HPI/OVERNIGHT EVENTS: offers no new complaints; current symptoms resolving    MEDICATIONS  (STANDING):  apixaban 2.5 milliGRAM(s) Oral two times a day  atorvastatin 80 milliGRAM(s) Oral at bedtime  betamethasone valerate 0.1% Cream 1 Application(s) Topical three times a day  chlorhexidine 4% Liquid 1 Application(s) Topical <User Schedule>  clopidogrel Tablet 75 milliGRAM(s) Oral daily  diltiazem    milliGRAM(s) Oral daily  docusate sodium 100 milliGRAM(s) Oral two times a day  epoetin ximena Injectable 01254 Unit(s) IV Push <User Schedule>  ferrous    sulfate 325 milliGRAM(s) Oral daily  folic acid 1 milliGRAM(s) Oral daily  hydrALAZINE 25 milliGRAM(s) Oral three times a day  lactulose Syrup 20 Gram(s) Oral daily  multivitamin 1 Tablet(s) Oral daily  pantoprazole    Tablet 40 milliGRAM(s) Oral before breakfast  sevelamer carbonate 1600 milliGRAM(s) Oral three times a day    MEDICATIONS  (PRN):      __________________________________________________  REVIEW OF SYSTEMS:    CONSTITUTIONAL: No fever,   EYES: no acute visual disturbances  NECK: No pain or stiffness  RESPIRATORY: No cough; No shortness of breath  CARDIOVASCULAR: No chest pain, no palpitations  GASTROINTESTINAL: No pain. No nausea or vomiting; No diarrhea   NEUROLOGICAL: No headache or numbness, no tremors  MUSCULOSKELETAL: No joint pain, no muscle pain  GENITOURINARY: no dysuria, no frequency, no hesitancy  PSYCHIATRY: no depression , no anxiety  ALL OTHER  ROS negative        Vital Signs Last 24 Hrs  T(C): 36.9 (23 Oct 2019 16:30), Max: 37.2 (22 Oct 2019 21:00)  T(F): 98.4 (23 Oct 2019 16:30), Max: 99 (22 Oct 2019 21:00)  HR: 81 (23 Oct 2019 16:30) (76 - 85)  BP: 147/70 (23 Oct 2019 16:30) (145/76 - 181/82)  BP(mean): --  RR: 18 (23 Oct 2019 16:30) (16 - 18)  SpO2: 99% (23 Oct 2019 16:30) (97% - 99%)    ________________________________________________  PHYSICAL EXAM:  GENERAL: NAD  HEENT: Normocephalic;  conjunctivae and sclerae clear; moist mucous membranes;   NECK : supple  CHEST/LUNG: Clear to auscultation bilaterally with good air entry   HEART: S1 S2  regular; no murmurs, gallops or rubs  ABDOMEN: Soft, Nontender, Nondistended; Bowel sounds present  EXTREMITIES: no cyanosis; no edema; no calf tenderness  SKIN: Dry lower extremities,   NERVOUS SYSTEM:  Awake and alert; Oriented  to place, person and time ; no new deficits    _________________________________________________  LABS:                        8.8    3.89  )-----------( 142      ( 23 Oct 2019 12:26 )             27.7     10-23    135  |  97  |  49<H>  ----------------------------<  86  4.4   |  30  |  9.80<H>    Ca    7.4<L>      23 Oct 2019 12:26          CAPILLARY BLOOD GLUCOSE            RADIOLOGY & ADDITIONAL TESTS:    Imaging Personally Reviewed:  YES    Consultant(s) Notes Reviewed:   YES    Care Discussed with Consultants :     Plan of care was discussed with patient and /or primary care giver; all questions and concerns were addressed and care was aligned with patient's wishes.

## 2019-10-23 NOTE — PROGRESS NOTE ADULT - SUBJECTIVE AND OBJECTIVE BOX
there is area of tenderness around lateral mal which   may represent pus collection . or old wound pt is not a goof historian   recommended continue antibiotics as per id   recommended   MRI   note to follow 74 M, wheel chair bound, from [facility] with PMH significant for HTN, HLD, CVA with residual right sided weakness, ESRD on HD (M,W,F) Anemia, constipation, gout presents to Ed with c/o right sided leg pain and swelling since Thursday. Patient reports that he has been having right leg pain, 8/10 in intensity, non radiating for last few days. He denies having fever, chills, any discharge from leg. There was concern for DVT given his prolong immobility and clinical presentation however US duplex was negative for DVT.   He otherwise feels well and denies any chest pain, shortness of breath or abdominal pain.    Patient admits to  (-) Fevers, (-) Chills, (-) Nausea, (-) Vomiting, (-) Shortness of Breath    S : 74y year old Male seen at bedside for RLE extremity pain and swelling. Patient relates his right leg pain has improved since his hospitalization. He denies any trauma to the area. Patient is wheelchair bound nonambulatory. Patient is poor historian.    He denies f/c/n/v or SOB.     PMH: Anemia  Hypertension  Diabetes  ESRD (end stage renal disease) on dialysis    PSH:  No significant past surgical history      Allergies:No Known Allergies    Vital Signs Last 24 Hrs  T(C): 36.9 (23 Oct 2019 16:30), Max: 37 (23 Oct 2019 12:10)  T(F): 98.4 (23 Oct 2019 16:30), Max: 98.6 (23 Oct 2019 12:10)  HR: 81 (23 Oct 2019 16:30) (76 - 85)  BP: 147/70 (23 Oct 2019 16:30) (146/76 - 181/82)  BP(mean): --  RR: 18 (23 Oct 2019 16:30) (17 - 18)  SpO2: 99% (23 Oct 2019 16:30) (98% - 99%)                        8.8    3.89  )-----------( 142      ( 23 Oct 2019 12:26 )             27.7   10-23    135  |  97  |  49<H>  ----------------------------<  86  4.4   |  30  |  9.80<H>    Ca    7.4<L>      23 Oct 2019 12:26  < from: US Duplex Venous Lower Ext Ltd, Right (10.20.19 @ 17:24) >    EXAM:  US DPLX LWR EXT VEINS LTD RT                            PROCEDURE DATE:  10/20/2019          INTERPRETATION:  CLINICAL INFORMATION: Right lower extremity pain and   swelling    COMPARISON: None available.    TECHNIQUE: Duplex sonography of the RIGHT LOWER extremity veins with   color and spectral Doppler, with and without compression.      FINDINGS:    There is normal compressibility of the right common femoral, femoral and   popliteal veins.     Doppler examination shows normal spontaneous and phasic flow.    No calf vein thrombosis is detected.    There is right lower extremity soft tissue edema.    IMPRESSION:     No evidence of right lower extremity deep venous thrombosis.            < end of copied text >  < from: Xray Foot AP + Lateral + Oblique, Right (10.20.19 @ 19:33) >  EXAM:  FOOT RIGHT (MINIMUM 3 VIEWS)                            PROCEDURE DATE:  10/20/2019          INTERPRETATION:  AP, lateral, and oblique views of the right foot    CLINICAL INDICATION: Pain and swelling    COMPARISON: None     FINDINGS: Osteopenia identified without evidence for acute fracture or   dislocation. Soft tissue swelling of the right foot identified with   prominent soft tissue swelling noted along the dorsum of the right foot.   No retained metallic foreign bodies identified. Extensive vascular   calcifications noted.    IMPRESSION: Osteopenia. Soft tissue swelling. No evidence for acute   fracture or dislocation.      < from: Xray Tibia + Fibula 2 Views, Right (10.20.19 @ 19:33) >  EXAM:  LEG AP&LAT - RIGHT                            PROCEDURE DATE:  10/20/2019          INTERPRETATION:  CLINICAL INDICATION:  Right lower extremity pain and   swelling    COMPARISON:  None    TECHNIQUE:  AP and lateral radiographs of the right lower leg performed.    FINDINGS:  There is no evidence for acute or chronic fracture deformity   of the right tibia or fibula. Knee and ankle articulations appear intact.   No suprapatellar joint effusion is identified. Vascular calcifications   are demonstrated. Soft tissue swelling is identified. No retained   radiodense foreign bodies evident. Regions of osteopenia are identified   within the distal aspect of the right tibia, fibula as well as osseous   structures of the right foot.    IMPRESSION:  Regions of osteopenia distal aspect of the right   tibia/fibula as well as right foot. No evidence for acute or chronic   fracture deformity. Soft tissue swelling.                    ARNOLD EUBANKS   This document has been electronically signed. Oct 21 2019  8:47AM          < end of copied text >            ARNOLD EUBANKS   This document has been electronically signed. Oct 21 2019  8:49AM                < end of copied text >        MEDICATIONS  (STANDING):  apixaban 2.5 milliGRAM(s) Oral two times a day  atorvastatin 80 milliGRAM(s) Oral at bedtime  betamethasone valerate 0.1% Cream 1 Application(s) Topical three times a day  chlorhexidine 4% Liquid 1 Application(s) Topical <User Schedule>  clopidogrel Tablet 75 milliGRAM(s) Oral daily  colchicine 0.6 milliGRAM(s) Oral daily  diltiazem    milliGRAM(s) Oral daily  docusate sodium 100 milliGRAM(s) Oral two times a day  epoetin ximena Injectable 09791 Unit(s) IV Push <User Schedule>  ferrous    sulfate 325 milliGRAM(s) Oral daily  folic acid 1 milliGRAM(s) Oral daily  hydrALAZINE 25 milliGRAM(s) Oral three times a day  lactulose Syrup 20 Gram(s) Oral daily  multivitamin 1 Tablet(s) Oral daily  pantoprazole    Tablet 40 milliGRAM(s) Oral before breakfast  sevelamer carbonate 1600 milliGRAM(s) Oral three times a day    MEDICATIONS  (PRN):      exam  focused lower extremities :   vasc: DP /PT -fainted left and right foot  TG -WNL   neuro : pt responds to touch to b/l feet  derm: right foot skin discoloration -darker than left , there is area of discoloration to right lat mal , the area is elevated and tender to touch , pt can not recall any history about ulcer/trauma to the area   no active drainage , no odor , skin scaling is improving   right hallux nail is very long and deformed discolored

## 2019-10-23 NOTE — PROGRESS NOTE ADULT - SUBJECTIVE AND OBJECTIVE BOX
Patient is seen and examined at the bed side, is afebrile. He is doing better and no complaints. The Blood culture has no growth  to date.         REVIEW OF SYSTEMS: All other review systems are negative        ALLERGIES: No Known Allergies      Vital Signs Last 24 Hrs  T(C): 36.9 (23 Oct 2019 16:30), Max: 37.2 (22 Oct 2019 21:00)  T(F): 98.4 (23 Oct 2019 16:30), Max: 99 (22 Oct 2019 21:00)  HR: 81 (23 Oct 2019 16:30) (76 - 85)  BP: 147/70 (23 Oct 2019 16:30) (145/76 - 181/82)  BP(mean): --  RR: 18 (23 Oct 2019 16:30) (16 - 18)  SpO2: 99% (23 Oct 2019 16:30) (97% - 99%)      PHYSICAL EXAM:  GENERAL: Not in distress   CHEST/LUNG:  Air entry bilaterally  HEART: s1 and s2 present  ABDOMEN:  Nontender and  Nondistended  EXTREMITIES: Right LE has hyperkeratosis, no open wound and not swollen or tender  CNS: Awake and Alert        LABS:                         8.8    3.89  )-----------( 142      ( 23 Oct 2019 12:26 )             27.7                           8.7    5.69  )-----------( 156      ( 20 Oct 2019 17:37 )             27.8           10-23    135  |  97  |  49<H>  ----------------------------<  86  4.4   |  30  |  9.80<H>    Ca    7.4<L>      23 Oct 2019 12:26      10-20      137  |  99  |  67<H>  ----------------------------<  88  5.9<H>   |  27  |  11.50<H>    Ca    8.3<L>      20 Oct 2019 17:37    TPro  7.0  /  Alb  2.8<L>  /  TBili  0.3  /  DBili  x   /  AST  17  /  ALT  25  /  AlkPhos  91  10-20    PT/INR - ( 20 Oct 2019 17:37 )   PT: 11.2 sec;   INR: 1.01 ratio      PTT - ( 20 Oct 2019 17:37 )  PTT:31.9 sec        MEDICATIONS  (STANDING):  apixaban 2.5 milliGRAM(s) Oral two times a day  atorvastatin 80 milliGRAM(s) Oral at bedtime  betamethasone valerate 0.1% Cream 1 Application(s) Topical three times a day  chlorhexidine 4% Liquid 1 Application(s) Topical <User Schedule>  clopidogrel Tablet 75 milliGRAM(s) Oral daily  diltiazem    milliGRAM(s) Oral daily  docusate sodium 100 milliGRAM(s) Oral two times a day  epoetin ximena Injectable 55084 Unit(s) IV Push <User Schedule>  ferrous    sulfate 325 milliGRAM(s) Oral daily  folic acid 1 milliGRAM(s) Oral daily  hydrALAZINE 25 milliGRAM(s) Oral three times a day  lactulose Syrup 20 Gram(s) Oral daily  multivitamin 1 Tablet(s) Oral daily  pantoprazole    Tablet 40 milliGRAM(s) Oral before breakfast  sevelamer carbonate 1600 milliGRAM(s) Oral three times a day    MEDICATIONS  (PRN):      RADIOLOGY & ADDITIONAL TESTS:      10/20/19 : Xray Foot AP + Lateral + Oblique, Right (10.20.19 @ 19:33):  Osteopenia. Soft tissue swelling. No evidence for acute fracture or dislocation.      10/20/19 : US Duplex Venous Lower Ext Ltd, Right (10.20.19 @ 17:24) No evidence of right lower extremity deep venous thrombosis.        MICROBIOLOGY DATA:    Culture - Blood (10.21.19 @ 23:13)    Specimen Source: .Blood    Culture Results:   No growth to date.        Culture - Blood (10.21.19 @ 10:58)    Specimen Source: .Blood    Culture Results:   No growth to date. Patient is seen and examined at the bed side, is afebrile. He is doing better and no complaints. The Blood cultures remains negative.          REVIEW OF SYSTEMS: All other review systems are negative        ALLERGIES: No Known Allergies      Vital Signs Last 24 Hrs  T(C): 36.9 (23 Oct 2019 16:30), Max: 37.2 (22 Oct 2019 21:00)  T(F): 98.4 (23 Oct 2019 16:30), Max: 99 (22 Oct 2019 21:00)  HR: 81 (23 Oct 2019 16:30) (76 - 85)  BP: 147/70 (23 Oct 2019 16:30) (145/76 - 181/82)  BP(mean): --  RR: 18 (23 Oct 2019 16:30) (16 - 18)  SpO2: 99% (23 Oct 2019 16:30) (97% - 99%)      PHYSICAL EXAM:  GENERAL: Not in distress   CHEST/LUNG:  Air entry bilaterally  HEART: s1 and s2 present  ABDOMEN:  Nontender and  Nondistended  EXTREMITIES: Right LE has hyperkeratosis, no open wound and not swollen or tender  CNS: Awake and Alert        LABS:                         8.8    3.89  )-----------( 142      ( 23 Oct 2019 12:26 )             27.7                           8.7    5.69  )-----------( 156      ( 20 Oct 2019 17:37 )             27.8           10-23    135  |  97  |  49<H>  ----------------------------<  86  4.4   |  30  |  9.80<H>    Ca    7.4<L>      23 Oct 2019 12:26      10-20      137  |  99  |  67<H>  ----------------------------<  88  5.9<H>   |  27  |  11.50<H>    Ca    8.3<L>      20 Oct 2019 17:37    TPro  7.0  /  Alb  2.8<L>  /  TBili  0.3  /  DBili  x   /  AST  17  /  ALT  25  /  AlkPhos  91  10-20    PT/INR - ( 20 Oct 2019 17:37 )   PT: 11.2 sec;   INR: 1.01 ratio      PTT - ( 20 Oct 2019 17:37 )  PTT:31.9 sec        MEDICATIONS  (STANDING):  apixaban 2.5 milliGRAM(s) Oral two times a day  atorvastatin 80 milliGRAM(s) Oral at bedtime  betamethasone valerate 0.1% Cream 1 Application(s) Topical three times a day  chlorhexidine 4% Liquid 1 Application(s) Topical <User Schedule>  clopidogrel Tablet 75 milliGRAM(s) Oral daily  diltiazem    milliGRAM(s) Oral daily  docusate sodium 100 milliGRAM(s) Oral two times a day  epoetin ximena Injectable 21439 Unit(s) IV Push <User Schedule>  ferrous    sulfate 325 milliGRAM(s) Oral daily  folic acid 1 milliGRAM(s) Oral daily  hydrALAZINE 25 milliGRAM(s) Oral three times a day  lactulose Syrup 20 Gram(s) Oral daily  multivitamin 1 Tablet(s) Oral daily  pantoprazole    Tablet 40 milliGRAM(s) Oral before breakfast  sevelamer carbonate 1600 milliGRAM(s) Oral three times a day    MEDICATIONS  (PRN):      RADIOLOGY & ADDITIONAL TESTS:      10/20/19 : Xray Foot AP + Lateral + Oblique, Right (10.20.19 @ 19:33):  Osteopenia. Soft tissue swelling. No evidence for acute fracture or dislocation.      10/20/19 : US Duplex Venous Lower Ext Ltd, Right (10.20.19 @ 17:24) No evidence of right lower extremity deep venous thrombosis.        MICROBIOLOGY DATA:    Culture - Blood (10.21.19 @ 23:13)    Specimen Source: .Blood    Culture Results:   No growth to date.        Culture - Blood (10.21.19 @ 10:58)    Specimen Source: .Blood    Culture Results:   No growth to date.

## 2019-10-24 ENCOUNTER — TRANSCRIPTION ENCOUNTER (OUTPATIENT)
Age: 74
End: 2019-10-24

## 2019-10-24 VITALS
RESPIRATION RATE: 16 BRPM | DIASTOLIC BLOOD PRESSURE: 52 MMHG | HEART RATE: 90 BPM | SYSTOLIC BLOOD PRESSURE: 137 MMHG | OXYGEN SATURATION: 97 % | TEMPERATURE: 98 F

## 2019-10-24 PROCEDURE — 82962 GLUCOSE BLOOD TEST: CPT

## 2019-10-24 PROCEDURE — 93923 UPR/LXTR ART STDY 3+ LVLS: CPT

## 2019-10-24 PROCEDURE — 93971 EXTREMITY STUDY: CPT

## 2019-10-24 PROCEDURE — 73590 X-RAY EXAM OF LOWER LEG: CPT

## 2019-10-24 PROCEDURE — 85730 THROMBOPLASTIN TIME PARTIAL: CPT

## 2019-10-24 PROCEDURE — 36415 COLL VENOUS BLD VENIPUNCTURE: CPT

## 2019-10-24 PROCEDURE — 99285 EMERGENCY DEPT VISIT HI MDM: CPT | Mod: 25

## 2019-10-24 PROCEDURE — 85027 COMPLETE CBC AUTOMATED: CPT

## 2019-10-24 PROCEDURE — 83605 ASSAY OF LACTIC ACID: CPT

## 2019-10-24 PROCEDURE — 85652 RBC SED RATE AUTOMATED: CPT

## 2019-10-24 PROCEDURE — 87040 BLOOD CULTURE FOR BACTERIA: CPT

## 2019-10-24 PROCEDURE — 99261: CPT

## 2019-10-24 PROCEDURE — 85610 PROTHROMBIN TIME: CPT

## 2019-10-24 PROCEDURE — 73721 MRI JNT OF LWR EXTRE W/O DYE: CPT

## 2019-10-24 PROCEDURE — 73630 X-RAY EXAM OF FOOT: CPT

## 2019-10-24 PROCEDURE — 80048 BASIC METABOLIC PNL TOTAL CA: CPT

## 2019-10-24 PROCEDURE — 73721 MRI JNT OF LWR EXTRE W/O DYE: CPT | Mod: 26,RT

## 2019-10-24 PROCEDURE — 80053 COMPREHEN METABOLIC PANEL: CPT

## 2019-10-24 PROCEDURE — 93923 UPR/LXTR ART STDY 3+ LVLS: CPT | Mod: 26

## 2019-10-24 RX ORDER — FOLIC ACID 0.8 MG
1 TABLET ORAL
Qty: 0 | Refills: 0 | DISCHARGE
Start: 2019-10-24

## 2019-10-24 RX ORDER — COLCHICINE 0.6 MG
1 TABLET ORAL
Qty: 0 | Refills: 0 | DISCHARGE

## 2019-10-24 RX ORDER — COLCHICINE 0.6 MG
1 TABLET ORAL
Qty: 14 | Refills: 0
Start: 2019-10-24 | End: 2019-11-06

## 2019-10-24 RX ADMIN — SEVELAMER CARBONATE 1600 MILLIGRAM(S): 2400 POWDER, FOR SUSPENSION ORAL at 13:35

## 2019-10-24 RX ADMIN — Medication 0.6 MILLIGRAM(S): at 12:24

## 2019-10-24 RX ADMIN — CHLORHEXIDINE GLUCONATE 1 APPLICATION(S): 213 SOLUTION TOPICAL at 07:21

## 2019-10-24 RX ADMIN — Medication 1 APPLICATION(S): at 13:35

## 2019-10-24 RX ADMIN — Medication 1 APPLICATION(S): at 07:19

## 2019-10-24 RX ADMIN — Medication 100 MILLIGRAM(S): at 07:19

## 2019-10-24 RX ADMIN — Medication 120 MILLIGRAM(S): at 07:19

## 2019-10-24 RX ADMIN — Medication 25 MILLIGRAM(S): at 07:18

## 2019-10-24 RX ADMIN — Medication 1 MILLIGRAM(S): at 12:34

## 2019-10-24 RX ADMIN — APIXABAN 2.5 MILLIGRAM(S): 2.5 TABLET, FILM COATED ORAL at 07:19

## 2019-10-24 RX ADMIN — APIXABAN 2.5 MILLIGRAM(S): 2.5 TABLET, FILM COATED ORAL at 18:41

## 2019-10-24 RX ADMIN — CLOPIDOGREL BISULFATE 75 MILLIGRAM(S): 75 TABLET, FILM COATED ORAL at 12:24

## 2019-10-24 RX ADMIN — Medication 1 TABLET(S): at 12:24

## 2019-10-24 RX ADMIN — Medication 325 MILLIGRAM(S): at 12:24

## 2019-10-24 RX ADMIN — Medication 25 MILLIGRAM(S): at 13:35

## 2019-10-24 RX ADMIN — LACTULOSE 20 GRAM(S): 10 SOLUTION ORAL at 12:24

## 2019-10-24 RX ADMIN — SEVELAMER CARBONATE 1600 MILLIGRAM(S): 2400 POWDER, FOR SUSPENSION ORAL at 07:18

## 2019-10-24 RX ADMIN — PANTOPRAZOLE SODIUM 40 MILLIGRAM(S): 20 TABLET, DELAYED RELEASE ORAL at 07:18

## 2019-10-24 NOTE — PROGRESS NOTE ADULT - PROBLEM SELECTOR PLAN 2
ESRD on HD MWF via RU AVF  Next dialysis scheduled to be on 10/25  Creatinine is 19.80   Continue with HD  Dr Pritchard (Nephrology) following ESRD on HD MWF via RU AVF  Next dialysis scheduled to be on 10/25  Creatinine is 19.80   Continue with HD  colchicine dose decreased   Dr Pritchard (Nephrology) following

## 2019-10-24 NOTE — PROGRESS NOTE ADULT - PROBLEM SELECTOR PLAN 4
H/o of CVA  Continue with ASA  Continue with Plavix  Continue with Eliquis

## 2019-10-24 NOTE — DISCHARGE NOTE PROVIDER - HOSPITAL COURSE
74 M, wheel chair bound, from [facility] with PMH significant for HTN, HLD, CVA with residual right sided weakness, ESRD on HD (M,W,F) Anemia, constipation, gout presented to Ed with c/o right sided leg pain and swelling since Thursday. Patient reports that he has been having right leg pain, 8/10 in intensity, non radiating for last few days. He denied having fever, chills, any discharge from leg. There was concern for DVT given his prolong immobility and clinical presentation however US duplex was negative for DVT and arterial disease.     There was concern of cellulitis which was ruled out. He was given vancomycin.     MRI of ankle is also normal     Pt is stable to be discharged back to rehab.

## 2019-10-24 NOTE — PROGRESS NOTE ADULT - NSHPATTENDINGPLANDISCUSS_GEN_ALL_CORE
Dr. Siu, patient , residents
resident and patient
resident and patient.
resident , patient and his family.

## 2019-10-24 NOTE — PROGRESS NOTE ADULT - ATTENDING COMMENTS
pt seen and evaluated
podiatry eval Dr Sorensenenic  arterial doppler
spoke to podiatry attending , patient needs MRI of ankle   continue abx   if mri wnl then d/c planning back to [facility] in am
patient was seen and examined along with resident   above discussed , reviewed and agreed  check mri and arterial doppler   if reports wnl then d/c back to [facility]

## 2019-10-24 NOTE — PROGRESS NOTE ADULT - SUBJECTIVE AND OBJECTIVE BOX
Problem List:  ESRD  Hypertension and diabetes      PAST MEDICAL & SURGICAL HISTORY:  Anemia  Hypertension  Diabetes  ESRD (end stage renal disease) on dialysis  No significant past surgical history      No Known Allergies      MEDICATIONS  (STANDING):  apixaban 2.5 milliGRAM(s) Oral two times a day  atorvastatin 80 milliGRAM(s) Oral at bedtime  betamethasone valerate 0.1% Cream 1 Application(s) Topical three times a day  chlorhexidine 4% Liquid 1 Application(s) Topical <User Schedule>  clopidogrel Tablet 75 milliGRAM(s) Oral daily  colchicine 0.6 milliGRAM(s) Oral daily  diltiazem    milliGRAM(s) Oral daily  docusate sodium 100 milliGRAM(s) Oral two times a day  epoetin ximena Injectable 58415 Unit(s) IV Push <User Schedule>  ferrous    sulfate 325 milliGRAM(s) Oral daily  folic acid 1 milliGRAM(s) Oral daily  hydrALAZINE 25 milliGRAM(s) Oral three times a day  lactulose Syrup 20 Gram(s) Oral daily  multivitamin 1 Tablet(s) Oral daily  pantoprazole    Tablet 40 milliGRAM(s) Oral before breakfast  sevelamer carbonate 1600 milliGRAM(s) Oral three times a day    MEDICATIONS  (PRN):    No osseous edema or marrow signal abnormality to suggest osteomyelitis.   No joint effusion. Medial and lateral flexor tendons are intact. Anterior   extensor tendons are intact. There is enthesopathy at the insertion of   the Achilles tendon. There is diffuse muscle atrophy. Within the   subcutaneous tissues along the lateral aspect of the distal fibula, there   is a 3.6 x 2.4 x 0.8 cm lobulated T1 heterogeneous with peripheral   increased T1 signal intensity/predominantly T2 hyperintense lesion.   Lesion appears in proximity to adjacent vasculature. Differential   includes a hematoma versus vascular malformation versus less likely   infection in the appropriate clinical setting. There is nosignificant   edema adjacent to the focus.    IMPRESSION:   Lobulated lesion along the lateral aspect of the distal fibula within the   subcutaneous tissues in proximity to the adjacent vasculature of which   primary differential includes a hematoma versus vascular malformation   versus infection the appropriate clinical setting. Consider targeted   point of care ultrasound or contrast-enhanced MRI if able for further   evaluation.                          8.8    3.89  )-----------( 142      ( 23 Oct 2019 12:26 )             27.7     10-23    135  |  97  |  49<H>  ----------------------------<  86  4.4   |  30  |  9.80<H>    Ca    7.4<L>      23 Oct 2019 12:26              REVIEW OF SYSTEMS:  General: no fever no chills, no weight loss.  EYES/ENT: No visual changes;  No vertigo, no headache.  NECK: No pain or stiffness  RESPIRATORY: No cough, wheezing, hemoptysis; No shortness of breath  CARDIOVASCULAR: No chest pain or palpitations. No Edema  GASTROINTESTINAL: No abdominal or epigastric pain. No nausea, vomiting. No diarrhea or constipation. No melena.  GENITOURINARY: No dysuria, frequency, foamy urine, urinary urgency, incontinence or hematuria  NEUROLOGICAL: No numbness or weakness, no tremor , no dizziness.   Muscle skeletal : no joint pain and no swelling of joints and limbs.  SKIN: No itching, burning, rashes.        VITALS:  T(F): 98 (10-24-19 @ 13:09), Max: 98.6 (10-24-19 @ 04:57)  HR: 90 (10-24-19 @ 13:09)  BP: 137/52 (10-24-19 @ 13:09)  RR: 16 (10-24-19 @ 13:09)  SpO2: 97% (10-24-19 @ 13:09)  Wt(kg): --      PHYSICAL EXAM:  Constitutional: well developed, no diaphoresis, no distress.  Neck: No JVD, no carotid bruit, supple, no adenopathy  Respiratory: Good air entrance B/L, no wheezes, rales or rhonchi  Cardiovascular: S1, S2, RRR, no pericardial rub, no murmur  Abdomen: BS+, soft, no tenderness, no bruit  Pelvis: bladder nondistended  Extremities: No cyanosis or clubbing. No peripheral edema.     Vascular Access: right AVG with bruit and thrill

## 2019-10-24 NOTE — PROGRESS NOTE ADULT - PROBLEM SELECTOR PLAN 8
Patient to remain FULL CODE

## 2019-10-24 NOTE — PROGRESS NOTE ADULT - PROBLEM SELECTOR PLAN 6
IMRPOVE Score 4  Continue with Eliquis

## 2019-10-24 NOTE — DISCHARGE NOTE NURSING/CASE MANAGEMENT/SOCIAL WORK - NSDCPEPTCAREGIVEDUMATLIST _GEN_ALL_CORE
History  Chief Complaint   Patient presents with    Nausea     states that she has been nauseous for about 2 weeks since her period started     Patient is a G13 45-year-old female presents today with chief complaint of nausea for the past 2 weeks associated this suprapubic abdominal pain left worse than right over the past 2 days  Patient reports she has not had any fevers or chills associated with her symptoms  Patient endorses vaginal bleeding/spotting for 2 episodes  Patient reports she has been having unprotected sex and has not taken a pregnancy test   Patient denies any dysuria, hematuria or flank pain  Patient reports she has had few episodes of vomiting however has been able to tolerate both solid and liquid oral intake  Patient reports he has not been taking any medications to alleviate her symptoms  Patient notes she does have an OBGYN and family doctor both of whom she has not contacted in regards to these symptoms  Patient denies any blood noted in the vomit  History provided by:  Patient  Nausea   The primary symptoms include abdominal pain ( suprapubic), nausea and vomiting  Primary symptoms do not include fever, fatigue, dysuria or rash  The onset was gradual  The problem has not changed since onset  The illness does not include chills  None       Past Medical History:   Diagnosis Date    Asthma     Depression        Past Surgical History:   Procedure Laterality Date    MULTIPLE TOOTH EXTRACTIONS Bilateral 2019       Family History   Problem Relation Age of Onset    Diabetes Mother     Hypertension Mother     Depression Mother     Hypertension Father     Heart disease Father      I have reviewed and agree with the history as documented  Social History     Tobacco Use    Smoking status: Former Smoker     Types: Cigarettes     Last attempt to quit: 2019     Years since quittin 1    Smokeless tobacco: Never Used   Substance Use Topics    Alcohol use:  Yes Frequency: 2-4 times a month     Drinks per session: 5 or 6     Binge frequency: Monthly     Comment: weekends    Drug use: Yes     Frequency: 3 0 times per week     Types: Marijuana     Comment: daily        Review of Systems   Constitutional: Negative for chills, fatigue and fever  HENT: Negative for congestion, ear pain, rhinorrhea and sore throat  Eyes: Negative for redness  Respiratory: Negative for chest tightness and shortness of breath  Cardiovascular: Negative for chest pain and palpitations  Gastrointestinal: Positive for abdominal pain ( suprapubic), nausea and vomiting  Genitourinary: Negative for dysuria and hematuria  Musculoskeletal: Negative  Skin: Negative for rash  Neurological: Negative for dizziness, syncope, light-headedness and numbness  Physical Exam  Physical Exam   Constitutional: She is oriented to person, place, and time  She appears well-developed and well-nourished  HENT:   Head: Normocephalic  Eyes: No scleral icterus  Cardiovascular: Normal rate and regular rhythm  Pulmonary/Chest: Effort normal and breath sounds normal  No stridor  Abdominal: Soft  Bowel sounds are normal  She exhibits no distension  There is tenderness  There is no rebound and no guarding  Suprapubic abdominal tenderness without rebound or guarding  Patient reports left-sided suprapubic tenderness is worse than right-sided  Negative CVA tenderness  Musculoskeletal: Normal range of motion  Neurological: She is alert and oriented to person, place, and time  Skin: Skin is warm and dry  Capillary refill takes less than 2 seconds  Psychiatric: She has a normal mood and affect  Nursing note and vitals reviewed        Vital Signs  ED Triage Vitals [09/03/19 1540]   Temperature Pulse Respirations Blood Pressure SpO2   98 2 °F (36 8 °C) 78 18 152/80 99 %      Temp Source Heart Rate Source Patient Position - Orthostatic VS BP Location FiO2 (%)   Tympanic Monitor Sitting Diabetes/Stroke Left arm --      Pain Score       --           Vitals:    09/03/19 1540   BP: 152/80   Pulse: 78   Patient Position - Orthostatic VS: Sitting         Visual Acuity      ED Medications  Medications   sodium chloride 0 9 % bolus 1,000 mL (1,000 mL Intravenous New Bag 9/3/19 1753)   ketorolac (TORADOL) injection 15 mg (15 mg Intravenous Given 9/3/19 1754)   ondansetron (ZOFRAN) injection 4 mg (4 mg Intravenous Given 9/3/19 1755)       Diagnostic Studies  Results Reviewed     Procedure Component Value Units Date/Time    hCG, quantitative [[id number]]  (Normal) Collected:  09/03/19 1610    Lab Status:  Final result Specimen:  Blood from Arm, Left Updated:  09/03/19 1659     HCG, Quant <3 mIU/mL     Narrative:       Pregnant Gestational Age           HCG Range (mIU/mL)  4 weeks                              44 - 6952  5 weeks                              348 - 91113        6 - 7 weeks                          975 - 116813  8 - 10 weeks                         90758 - 459596  11 - 12 weeks                        25998 - 540305  13 - 27 weeks (2nd Trimester)        7148 - 549030  28 - 40 weeks (3rd Trimester)        1531 - 560902                 Lipase [[id number]]  (Normal) Collected:  09/03/19 1610    Lab Status:  Final result Specimen:  Blood from Arm, Left Updated:  09/03/19 1640     Lipase 94 u/L     Comprehensive metabolic panel [[id number]]  (Abnormal) Collected:  09/03/19 1610    Lab Status:  Final result Specimen:  Blood from Arm, Left Updated:  09/03/19 1640     Sodium 138 mmol/L      Potassium 4 2 mmol/L      Chloride 101 mmol/L      CO2 29 mmol/L      ANION GAP 8 mmol/L      BUN 9 mg/dL      Creatinine 0 54 mg/dL      Glucose 97 mg/dL      Calcium 9 5 mg/dL      AST 28 U/L      ALT 23 U/L      Alkaline Phosphatase 66 U/L      Total Protein 7 7 g/dL      Albumin 4 4 g/dL      Total Bilirubin 0 40 mg/dL      eGFR 130 ml/min/1 73sq m     Narrative:       [place] guidelines for Chronic Kidney Disease (CKD):     Stage 1 with normal or high GFR (GFR > 90 mL/min/1 73 square meters)    Stage 2 Mild CKD (GFR = 60-89 mL/min/1 73 square meters)    Stage 3A Moderate CKD (GFR = 45-59 mL/min/1 73 square meters)    Stage 3B Moderate CKD (GFR = 30-44 mL/min/1 73 square meters)    Stage 4 Severe CKD (GFR = 15-29 mL/min/1 73 square meters)    Stage 5 End Stage CKD (GFR <15 mL/min/1 73 square meters)  Note: GFR calculation is accurate only with a steady state creatinine    CBC and differential [[id number]]  (Abnormal) Collected:  09/03/19 1610    Lab Status:  Final result Specimen:  Blood from Arm, Left Updated:  09/03/19 1638     WBC 8 50 Thousand/uL      RBC 4 22 Million/uL      Hemoglobin 12 9 g/dL      Hematocrit 37 9 %      MCV 90 fL      MCH 30 6 pg      MCHC 34 1 g/dL      RDW 12 5 %      MPV 8 9 fL      Platelets 713 Thousands/uL      Neutrophils Relative 68 %      Lymphocytes Relative 24 %      Monocytes Relative 7 %      Eosinophils Relative 1 %      Basophils Relative 0 %      Neutrophils Absolute 5 80 Thousands/µL      Lymphocytes Absolute 2 10 Thousands/µL      Monocytes Absolute 0 60 Thousand/µL      Eosinophils Absolute 0 10 Thousand/µL      Basophils Absolute 0 00 Thousands/µL     POCT pregnancy, urine [[id number]]  (Normal) Resulted:  09/03/19 1610    Lab Status:  Final result Updated:  09/03/19 1610     EXT PREG TEST UR (Ref: Negative) Negative     Control Valid    UA w Reflex to Microscopic w Reflex to Culture [[id number]]     Lab Status:  No result Specimen:  Urine                  US pelvis complete w transvaginal   Final Result by Mateo Mills MD (09/03 1657)          1  No sonographic evidence for torsion  2  6 8 x 4 2 cm right ovarian hemorrhagic cyst  Short-term follow-up with pelvic ultrasound is recommended in 6-12 weeks                              Workstation performed: [id number]                    Procedures  Procedures       ED Course MDM    Disposition  Final diagnoses:   Hemorrhagic ovarian cyst     Time reflects when diagnosis was documented in both MDM as applicable and the Disposition within this note     Time User Action Codes Description Comment    9/3/2019  5:00 PM Dorothea Machuca Kaitlyn [Q15 [id number]] Hemorrhagic ovarian cyst       ED Disposition     ED Disposition Condition Date/Time Comment    Discharge Good Tue Sep 3, 2019  5:00 PM Donnell Hernandez discharge to home/self care  Follow-up Information     Follow up With Specialties Details Why Sarina Walter [facility] Gyne[facility] Schedule an appointment as soon as possible for a visit in 2 days  [address] [address] [address]            Patient's Medications   Discharge Prescriptions    NAPROXEN (EC NAPROSYN) 500 MG EC TABLET    Take 1 tablet (500 mg total) by mouth 2 (two) times a day with meals       Start Date: 9/3/2019  End Date: 9/2/2020       Order Dose: 500 mg       Quantity: 20 tablet    Refills: 0    ONDANSETRON (ZOFRAN-ODT) 4 MG DISINTEGRATING TABLET    Take 1 tablet (4 mg total) by mouth every 8 (eight) hours as needed for nausea or vomiting for up to 10 days       Start Date: 9/3/2019  End Date: 9/13/2019       Order Dose: 4 mg       Quantity: 20 tablet    Refills: 0     No discharge procedures on file      ED Provider  Electronically Signed by           Sue Phelps PA-C  09/03/19 5929

## 2019-10-24 NOTE — PROGRESS NOTE ADULT - PROBLEM SELECTOR PLAN 1
WBC   Afebrile  US noted above   Xray RLE noted above  F/u VITALY  Continue with vancomycin in dialysis   Dr. Morales (ID) consulted
WBC 5.69  Afebrile  US noted above   Xray RLE noted above  F/u VITALY  Continue with vancomycin in dialysis   Dr. Morales (ID) consulted
WBC 5.69  Afebrile  US noted above   Xray RLE noted above  F/u VITALY  Continue with vancomycin in dialysis   RLE is tender and skin is dry and scaly.   Followed up with podiatry, Topical steroids   Dr. Morales (ID) consulted  D/C planning
celulitis ruled out   Vanco D/Garrett   WBC coming down  Afebrile  Xray RLE noted above  F/u VITALY, MRI of RLE   skin is dry and scaly.   Followed up with podiatry, Topical steroids   Dr. Morales (ID) consulted  D/C plan after medically stabilizing
celulitis ruled out   Vanco D/Garrett   WBC coming down  Afebrile  Xray RLE noted above  F/u VITALY, MRI of RLE   skin is dry and scaly.   Followed up with podiatry, Topical steroids   Dr. Morales (ID) consulted
WBC 5.69  Afebrile  US noted above   Xray RLE noted above  F/u VITALY  Continue with vancomycin in dialysis   RLE is tender and skin is dry and scaly.   Followed up with podiatry, Topical steroids   Dr. Morales (ID) consulted  D/C planning

## 2019-10-24 NOTE — PROGRESS NOTE ADULT - PROBLEM SELECTOR PLAN 7
Patient admitted from home and is expected to return home upon discharge

## 2019-10-24 NOTE — PROGRESS NOTE ADULT - SUBJECTIVE AND OBJECTIVE BOX
Patient is seen and examined at the bed side, is afebrile. He is doing better and no complaints. The Blood cultures remains negative.          REVIEW OF SYSTEMS: All other review systems are negative        ALLERGIES: No Known Allergies      Vital Signs Last 24 Hrs  T(C): 36.7 (24 Oct 2019 13:09), Max: 37 (24 Oct 2019 04:57)  T(F): 98 (24 Oct 2019 13:09), Max: 98.6 (24 Oct 2019 04:57)  HR: 90 (24 Oct 2019 13:09) (79 - 90)  BP: 137/52 (24 Oct 2019 13:09) (137/52 - 154/70)  BP(mean): --  RR: 16 (24 Oct 2019 13:09) (16 - 18)  SpO2: 97% (24 Oct 2019 13:09) (97% - 99%)      PHYSICAL EXAM:  GENERAL: Not in distress   CHEST/LUNG:  Air entry bilaterally  HEART: s1 and s2 present  ABDOMEN:  Nontender and  Nondistended  EXTREMITIES: Right LE has hyperkeratosis, no open wound and not swollen or tender  CNS: Awake and Alert        LABS: No new Labs                        8.8    3.89  )-----------( 142      ( 23 Oct 2019 12:26 )             27.7                           8.7    5.69  )-----------( 156      ( 20 Oct 2019 17:37 )             27.8         10-23    135  |  97  |  49<H>  ----------------------------<  86  4.4   |  30  |  9.80<H>    Ca    7.4<L>      23 Oct 2019 12:26      10-20      137  |  99  |  67<H>  ----------------------------<  88  5.9<H>   |  27  |  11.50<H>    Ca    8.3<L>      20 Oct 2019 17:37    TPro  7.0  /  Alb  2.8<L>  /  TBili  0.3  /  DBili  x   /  AST  17  /  ALT  25  /  AlkPhos  91  10-20    PT/INR - ( 20 Oct 2019 17:37 )   PT: 11.2 sec;   INR: 1.01 ratio      PTT - ( 20 Oct 2019 17:37 )  PTT:31.9 sec        MEDICATIONS  (STANDING):  apixaban 2.5 milliGRAM(s) Oral two times a day  atorvastatin 80 milliGRAM(s) Oral at bedtime  betamethasone valerate 0.1% Cream 1 Application(s) Topical three times a day  chlorhexidine 4% Liquid 1 Application(s) Topical <User Schedule>  clopidogrel Tablet 75 milliGRAM(s) Oral daily  colchicine 0.6 milliGRAM(s) Oral daily  diltiazem    milliGRAM(s) Oral daily  docusate sodium 100 milliGRAM(s) Oral two times a day  epoetin ximena Injectable 91718 Unit(s) IV Push <User Schedule>  ferrous    sulfate 325 milliGRAM(s) Oral daily  folic acid 1 milliGRAM(s) Oral daily  hydrALAZINE 25 milliGRAM(s) Oral three times a day  lactulose Syrup 20 Gram(s) Oral daily  multivitamin 1 Tablet(s) Oral daily  pantoprazole    Tablet 40 milliGRAM(s) Oral before breakfast  sevelamer carbonate 1600 milliGRAM(s) Oral three times a day    MEDICATIONS  (PRN):      RADIOLOGY & ADDITIONAL TESTS:      < from: MR Ankle No Cont, Right (10.24.19 @ 10:44) >  Lobulated lesion along the lateral aspect of the distal fibula within the   subcutaneous tissues in proximity to the adjacent vasculature of which   primary differential includes a hematoma versus vascular malformation   versus infection the appropriate clinical setting. Consider targeted   point of care ultrasound or contrast-enhanced MRI if able for further   evaluation.    10/20/19 : Xray Foot AP + Lateral + Oblique, Right (10.20.19 @ 19:33):  Osteopenia. Soft tissue swelling. No evidence for acute fracture or dislocation.      10/20/19 : US Duplex Venous Lower Ext Ltd, Right (10.20.19 @ 17:24) No evidence of right lower extremity deep venous thrombosis.        MICROBIOLOGY DATA:    Culture - Blood (10.21.19 @ 23:13)    Specimen Source: .Blood    Culture Results:   No growth to date.        Culture - Blood (10.21.19 @ 10:58)    Specimen Source: .Blood    Culture Results:   No growth to date.

## 2019-10-24 NOTE — PROGRESS NOTE ADULT - REASON FOR ADMISSION
right foot pain and swelling

## 2019-10-24 NOTE — PROGRESS NOTE ADULT - ASSESSMENT
74 M, wheel chair bound, from [facility] with PMH significant for HTN, HLD, CVA with residual right sided weakness, ESRD on HD (M,W,F) Anemia, constipation, gout presents to Ed with c/o right sided leg pain and swelling. We got involved to arrange for HD today      ESRD:  HD MWF  From [facility] with Dr. 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
ESRD   Hypertension stable  Gout, improved , continue Colchicine 0.6 mg daily for total  14 days.  Follow uric acid level  Follow PO4 in am.    Dialysis today.
ESRD continue with dialysis  Inflammatory process of right foot follow with PMD  BP stable
Patient is a 74y old  Male who presents with a chief complaint of right foot pain and swelling
Patient is a 74y old  Male who is wheel chair bound,  and with gout sent in to the ER from [facility] for evaluation of  right sided leg pain and swelling.  He has no fever, chills, or any discharge from leg.  On Admission, he has no fever and no leukocytosis. The Xray and Doppler  of the of Right leg is negative. He has started on Vancomycin and the ID consult requested to assist with further evaluation and antibiotic management.       # Right foot cellulitis - Findings is not consistent with cellulitis    would recommend:    1. Monitor OFF antibiotics  2. MRI of RLE to rule out deep infection  3. Need LE ? wound Care    d/w patient     will follow the patient with you
Patient is a 74y old  Male who is wheel chair bound,  and with gout sent in to the ER from [facility] for evaluation of  right sided leg pain and swelling.  He has no fever, chills, or any discharge from leg.  On Admission, he has no fever and no leukocytosis. The Xray and Doppler  of the of Right leg is negative. He has started on Vancomycin and the ID consult requested to assist with further evaluation and antibiotic management.       # Right foot cellulitis - Findings is not consistent with cellulitis    would recommend:    1. Monitor OFF antibiotics  2. MRI of RLE to rule out deep infection  3. Need LE ? wound Care    d/w patient     will follow the patient with you
Patient is a 74y old  Male who is wheel chair bound,  and with gout sent in to the ER from [facility] for evaluation of  right sided leg pain and swelling.  He has no fever, chills, or any discharge from leg.  On Admission, he has no fever and no leukocytosis. The Xray and Doppler  of the of Right leg is negative. He has started on Vancomycin and the ID consult requested to assist with further evaluation and antibiotic management.       # Right foot cellulitis - Findings is not consistent with cellulitis    would recommend:    1. Discontinue Vancomycin since RLE findings is not consistent with cellulitis  2. MRI of RLE to rule out deep infection  3. Need LE ? wound Care    d/w patient     will follow the patient with you
Patient is a 74y old  Male who presents with a chief complaint of right foot pain and swelling
A: right lower extremity pain   skin lesion -right ankle -fluid collection/abscess can not be ruled out   cellulitis ?  P  recommended MRI   continue antibiotics as per ID   Uric acid ?   will follow up   please contact with any patient related questions @[phone]
Patient is a 74y old  Male who presents with a chief complaint of right foot pain and swelling
Patient is a 74y old  Male who presents with a chief complaint of right foot pain and swelling

## 2019-10-24 NOTE — PROGRESS NOTE ADULT - PROBLEM SELECTOR PLAN 3
BP controlled  Continue with Diltiazem 120 mg QD and Hydralazine 25 mg TIB  Continue to monitor BP

## 2019-10-24 NOTE — PROGRESS NOTE ADULT - PROBLEM SELECTOR PLAN 5
Hgb 8.7  HD pt  Nephrology following

## 2019-10-24 NOTE — PROGRESS NOTE ADULT - PROBLEM SELECTOR PROBLEM 2
ESRD (end stage renal disease) on dialysis

## 2019-10-24 NOTE — DISCHARGE NOTE NURSING/CASE MANAGEMENT/SOCIAL WORK - PATIENT PORTAL LINK FT
You can access the FollowMyHealth Patient Portal offered by [facility] by registering at the following website: [email or web address]. By joining [facility]’s FollowMyHealth portal, you will also be able to view your health information using other applications (apps) compatible with our system.

## 2019-10-24 NOTE — PROGRESS NOTE ADULT - PROVIDER SPECIALTY LIST ADULT
Infectious Disease
Internal Medicine
Podiatry
Nephrology
Internal Medicine

## 2019-10-24 NOTE — DISCHARGE NOTE PROVIDER - NSDCCPCAREPLAN_GEN_ALL_CORE_FT
PRINCIPAL DISCHARGE DIAGNOSIS  Diagnosis: Cellulitis  Assessment and Plan of Treatment: You were admitted to hospital with RLE swelling and pain. We had a suspicion of deep tisse infection called cellulitis. we started you on IV antibiotics and consulted Infectious doctor. but blood cultures were negative so we discontinued the antibiotics.      SECONDARY DISCHARGE DIAGNOSES  Diagnosis: Gout  Assessment and Plan of Treatment: You have a history of gout. Please continue taking your medications regulalry. Avoid taking NSAIDs as they can affect your kidneys    Diagnosis: PAD (peripheral artery disease)  Assessment and Plan of Treatment: Your skin on lower extremities is very dry and scaly. We were suuspecting that you might have decreased blood supply to your feet. We did doppler which did not show any abnormality. Please keep your extremities moisturized. Apply topical medication    Diagnosis: DVT, lower extremity  Assessment and Plan of Treatment: There were no clots found in your veins    Diagnosis: ESRD (end stage renal disease) on dialysis  Assessment and Plan of Treatment: You are getting scheduled dialysis. Please follow up with your primary care PRINCIPAL DISCHARGE DIAGNOSIS  Diagnosis: Cellulitis  Assessment and Plan of Treatment: You were admitted to hospital with RLE swelling and pain. We had a suspicion of deep tisuse infection called cellulitis. we started you on IV antibiotics and consulted Infectious doctor. but blood cultures were negative so we discontinued the antibiotics.      SECONDARY DISCHARGE DIAGNOSES  Diagnosis: Gout  Assessment and Plan of Treatment: You have a history of gout. Please continue taking your medications regulalry. Avoid taking NSAIDs as they can affect your kidneys    Diagnosis: PAD (peripheral artery disease)  Assessment and Plan of Treatment: Your skin on lower extremities is very dry and scaly. We were suuspecting that you might have decreased blood supply to your feet. We did doppler which did not show any abnormality. Please keep your extremities moisturized. Apply topical medication    Diagnosis: DVT, lower extremity  Assessment and Plan of Treatment: There were no clots found in your veins    Diagnosis: ESRD (end stage renal disease) on dialysis  Assessment and Plan of Treatment: You are getting scheduled dialysis. Please follow up with your primary care PRINCIPAL DISCHARGE DIAGNOSIS  Diagnosis: Cellulitis  Assessment and Plan of Treatment: You were admitted to hospital with RLE swelling and pain. We had a suspicion of deep tisuse infection called cellulitis. we started you on IV antibiotics and consulted Infectious doctor. but blood cultures were negative. MRI of lower extremity showed Lobulated lesion along the lateral aspect of the distal fibula within the   subcutaneous tissues in proximity to the adjacent vasculature of which   primary differential includes a hematoma versus vascular malformation   versus infection the appropriate clinical setting. Consider targeted   point of care ultrasound or contrast-enhanced MRI in 2 weeks .   We are prescribing you with oral antibiotics. Please take them regularly and follow up with your primary care        SECONDARY DISCHARGE DIAGNOSES  Diagnosis: Gout  Assessment and Plan of Treatment: You have a history of gout. Please continue taking your medications regulalry. Avoid taking NSAIDs as they can affect your kidneys    Diagnosis: PAD (peripheral artery disease)  Assessment and Plan of Treatment: Your skin on lower extremities is very dry and scaly. We were suuspecting that you might have decreased blood supply to your feet. We did doppler which did not show any abnormality. Please keep your extremities moisturized. Apply topical medication    Diagnosis: DVT, lower extremity  Assessment and Plan of Treatment: There were no clots found in your veins    Diagnosis: ESRD (end stage renal disease) on dialysis  Assessment and Plan of Treatment: You are getting scheduled dialysis. Please follow up with your primary care

## 2019-10-24 NOTE — PROGRESS NOTE ADULT - SUBJECTIVE AND OBJECTIVE BOX
PGY 1 Note discussed with primary attending    Patient is a 74y old  Male who presents with a chief complaint of right foot pain and swelling (23 Oct 2019 18:21)      INTERVAL HPI/OVERNIGHT EVENTS: offers no new complaints; current symptoms resolving    MEDICATIONS  (STANDING):  apixaban 2.5 milliGRAM(s) Oral two times a day  atorvastatin 80 milliGRAM(s) Oral at bedtime  betamethasone valerate 0.1% Cream 1 Application(s) Topical three times a day  chlorhexidine 4% Liquid 1 Application(s) Topical <User Schedule>  clopidogrel Tablet 75 milliGRAM(s) Oral daily  colchicine 0.6 milliGRAM(s) Oral daily  diltiazem    milliGRAM(s) Oral daily  docusate sodium 100 milliGRAM(s) Oral two times a day  epoetin ximena Injectable 57915 Unit(s) IV Push <User Schedule>  ferrous    sulfate 325 milliGRAM(s) Oral daily  folic acid 1 milliGRAM(s) Oral daily  hydrALAZINE 25 milliGRAM(s) Oral three times a day  lactulose Syrup 20 Gram(s) Oral daily  multivitamin 1 Tablet(s) Oral daily  pantoprazole    Tablet 40 milliGRAM(s) Oral before breakfast  sevelamer carbonate 1600 milliGRAM(s) Oral three times a day    MEDICATIONS  (PRN):      __________________________________________________  REVIEW OF SYSTEMS:    CONSTITUTIONAL: No fever,   EYES: no acute visual disturbances  NECK: No pain or stiffness  RESPIRATORY: No cough; No shortness of breath  CARDIOVASCULAR: No chest pain, no palpitations  GASTROINTESTINAL: No pain. No nausea or vomiting; No diarrhea   NEUROLOGICAL: No headache or numbness, no tremors  MUSCULOSKELETAL: No joint pain, no muscle pain  GENITOURINARY: no dysuria, no frequency, no hesitancy  PSYCHIATRY: no depression , no anxiety  ALL OTHER  ROS negative        Vital Signs Last 24 Hrs  T(C): 37 (24 Oct 2019 04:57), Max: 37 (23 Oct 2019 12:10)  T(F): 98.6 (24 Oct 2019 04:57), Max: 98.6 (23 Oct 2019 12:10)  HR: 79 (24 Oct 2019 04:57) (76 - 85)  BP: 154/70 (24 Oct 2019 04:57) (147/70 - 181/82)  BP(mean): --  RR: 18 (24 Oct 2019 04:57) (18 - 18)  SpO2: 97% (24 Oct 2019 04:57) (97% - 99%)    ________________________________________________  PHYSICAL EXAM:  GENERAL: NAD  HEENT: Normocephalic;  conjunctivae and sclerae clear; moist mucous membranes;   NECK : supple  CHEST/LUNG: Clear to auscultation bilaterally with good air entry   HEART: S1 S2  regular; no murmurs, gallops or rubs  ABDOMEN: Soft, Nontender, Nondistended; Bowel sounds present  EXTREMITIES: no cyanosis; no edema; no calf tenderness  SKIN: Dry scaly skin on B/L LE, tenderness on R ankle  NERVOUS SYSTEM:  Awake and alert; Oriented  to place, person and time ; no new deficits    _________________________________________________  LABS:                        8.8    3.89  )-----------( 142      ( 23 Oct 2019 12:26 )             27.7     10-23    135  |  97  |  49<H>  ----------------------------<  86  4.4   |  30  |  9.80<H>    Ca    7.4<L>      23 Oct 2019 12:26          CAPILLARY BLOOD GLUCOSE            RADIOLOGY & ADDITIONAL TESTS:    Imaging Personally Reviewed:  YES    Consultant(s) Notes Reviewed:   YES    Care Discussed with Consultants :     Plan of care was discussed with patient and /or primary care giver; all questions and concerns were addressed and care was aligned with patient's wishes.

## 2019-10-26 LAB
CULTURE RESULTS: SIGNIFICANT CHANGE UP
SPECIMEN SOURCE: SIGNIFICANT CHANGE UP

## 2019-10-27 LAB
CULTURE RESULTS: SIGNIFICANT CHANGE UP
SPECIMEN SOURCE: SIGNIFICANT CHANGE UP

## 2019-11-18 ENCOUNTER — FORM ENCOUNTER (OUTPATIENT)
Age: 74
End: 2019-11-18

## 2019-11-19 ENCOUNTER — APPOINTMENT (OUTPATIENT)
Dept: ENDOVASCULAR SURGERY | Facility: CLINIC | Age: 74
End: 2019-11-19
Payer: MEDICARE

## 2019-11-19 PROCEDURE — 36907Z: CUSTOM

## 2019-11-19 PROCEDURE — 36901Z: CUSTOM | Mod: 59

## 2019-11-19 RX ORDER — DILTIAZEM HYDROCHLORIDE 120 MG/1
120 CAPSULE, COATED, EXTENDED RELEASE ORAL
Refills: 0 | Status: ACTIVE | COMMUNITY

## 2019-11-19 RX ORDER — CHLORHEXIDINE GLUCONATE 4 %
325 (65 FE) LIQUID (ML) TOPICAL
Refills: 0 | Status: ACTIVE | COMMUNITY

## 2019-11-19 RX ORDER — ISOSORBIDE DINITRATE 10 MG/1
10 TABLET ORAL
Refills: 0 | Status: ACTIVE | COMMUNITY

## 2019-11-19 RX ORDER — CALCITRIOL 0.25 UG/1
0.25 CAPSULE, LIQUID FILLED ORAL
Refills: 0 | Status: ACTIVE | COMMUNITY

## 2019-11-19 RX ORDER — ATORVASTATIN CALCIUM 40 MG/1
40 TABLET, FILM COATED ORAL
Refills: 0 | Status: ACTIVE | COMMUNITY

## 2019-11-19 RX ORDER — APIXABAN 2.5 MG/1
2.5 TABLET, FILM COATED ORAL
Refills: 0 | Status: ACTIVE | COMMUNITY

## 2019-11-19 RX ORDER — CLOPIDOGREL BISULFATE 75 MG/1
75 TABLET, FILM COATED ORAL
Refills: 0 | Status: ACTIVE | COMMUNITY

## 2019-11-19 RX ORDER — NIFEDIPINE 60 MG/1
60 TABLET, FILM COATED, EXTENDED RELEASE ORAL
Refills: 0 | Status: ACTIVE | COMMUNITY

## 2019-11-20 NOTE — PHYSICAL EXAM
[No Rash or Lesion] : No rash or lesion [Alert] : alert [Calm] : calm [Thrill] : no thrill [Pulsatile Thrill] : no pulsatile thrill [FreeTextEntry1] : ulcer noted over [JVD] : no jugular venous distention  [Ankle Swelling (On Exam)] : not present [Varicose Veins Of Lower Extremities] : not present [] : not present [Skin Ulcer] : no ulcer [de-identified] : appears well

## 2019-11-20 NOTE — PAST MEDICAL HISTORY
[Altered mobility] : Altered mobility [No therapy indicated for cases scheduled for less than one hour] : No therapy indicated for cases scheduled for less than one hour. [Increasing age ( >40 years old)] : Increasing age ( >40 years old) [FreeTextEntry1] : Malignant Hyperthermia Screening Tool and Risk of Bleeding Assessment\par \par Mr. JEANCLAUDE DORSAINVIL denies family history of unexpected death following Anesthesia or Exercise.\par Denies Family history of Malignant Hyperthermia, Muscle or Neuromuscular disorder and High Temperature following exercise.\par \par Mr. JEANCLAUDE DORSAINVIL denies history of Muscle Spasm, Dark or Chocolate - Colored urine and Unanticipated fever immediately following anesthesia or serious exercise. \par Mr. GONG also denies bleeding tendencies/ Risks of Bleeding.\par

## 2019-11-20 NOTE — HISTORY OF PRESENT ILLNESS
[] : in right upper arm [FreeTextEntry1] : referred from dialysis\par accompanied by nsg home aide\par feels ok\par  no meds this morning\par uses w/c, no falls\par  [FreeTextEntry3] : 10/8/2013 Dr. Chaudhary [FreeTextEntry4] : yesterday [FreeTextEntry5] : yesterday at 9 pm [FreeTextEntry6] : Dr. Yadav

## 2020-01-14 ENCOUNTER — APPOINTMENT (OUTPATIENT)
Dept: VASCULAR SURGERY | Facility: CLINIC | Age: 75
End: 2020-01-14

## 2020-02-01 NOTE — PHYSICAL EXAM
[JVD] : no jugular venous distention  [Ankle Swelling (On Exam)] : not present [Varicose Veins Of Lower Extremities] : not present [Skin Ulcer] : no ulcer [] : not present [de-identified] : appears well

## 2020-02-01 NOTE — HISTORY OF PRESENT ILLNESS
[FreeTextEntry1] : c/o right big toe pain, otherwise feels ok\par uses w/c\par accompanied by nsg home aide Heather\par recent hospitalozation for afib, refuses Eliquis and all other meds except Renagel [FreeTextEntry3] : 10/8/2013 Dr. Chaudhary [FreeTextEntry4] : Yesterday [FreeTextEntry5] : 6pm 7/8/2019 [FreeTextEntry6] : Dr. Yadav

## 2020-02-01 NOTE — PAST MEDICAL HISTORY
[FreeTextEntry1] : Malignant Hyperthermia Screening Tool and Risk of Bleeding Assessment\par \par Mr. JEANCLAUDE DORSAINVIL denies family history of unexpected death following Anesthesia or Exercise.\par Denies Family history of Malignant Hyperthermia, Muscle or Neuromuscular disorder and High Temperature following exercise.\par \par Mr. JEANCLAUDE DORSAINVIL denies history of Muscle Spasm, Dark or Chocolate - Colored urine and Unanticipated fever immediately following anesthesia or serious exercise. \par Mr. GONG also denies bleeding tendencies/ Risks of Bleeding.\par

## 2020-02-03 ENCOUNTER — INPATIENT (INPATIENT)
Facility: HOSPITAL | Age: 75
LOS: 1 days | Discharge: NURSING, REHAB OR LONG-TERM CARE | DRG: 313 | End: 2020-02-05
Attending: INTERNAL MEDICINE | Admitting: INTERNAL MEDICINE
Payer: MEDICARE

## 2020-02-03 VITALS
TEMPERATURE: 98 F | SYSTOLIC BLOOD PRESSURE: 180 MMHG | WEIGHT: 182.98 LBS | OXYGEN SATURATION: 100 % | HEIGHT: 65 IN | RESPIRATION RATE: 18 BRPM | DIASTOLIC BLOOD PRESSURE: 90 MMHG | HEART RATE: 80 BPM

## 2020-02-03 DIAGNOSIS — Z29.9 ENCOUNTER FOR PROPHYLACTIC MEASURES, UNSPECIFIED: ICD-10-CM

## 2020-02-03 DIAGNOSIS — I77.0 ARTERIOVENOUS FISTULA, ACQUIRED: Chronic | ICD-10-CM

## 2020-02-03 DIAGNOSIS — I63.9 CEREBRAL INFARCTION, UNSPECIFIED: ICD-10-CM

## 2020-02-03 DIAGNOSIS — I10 ESSENTIAL (PRIMARY) HYPERTENSION: ICD-10-CM

## 2020-02-03 DIAGNOSIS — I21.4 NON-ST ELEVATION (NSTEMI) MYOCARDIAL INFARCTION: ICD-10-CM

## 2020-02-03 DIAGNOSIS — D64.9 ANEMIA, UNSPECIFIED: ICD-10-CM

## 2020-02-03 DIAGNOSIS — R07.9 CHEST PAIN, UNSPECIFIED: ICD-10-CM

## 2020-02-03 DIAGNOSIS — I48.20 CHRONIC ATRIAL FIBRILLATION, UNSPECIFIED: ICD-10-CM

## 2020-02-03 DIAGNOSIS — N18.6 END STAGE RENAL DISEASE: ICD-10-CM

## 2020-02-03 LAB
ALBUMIN SERPL ELPH-MCNC: 3.1 G/DL — LOW (ref 3.5–5)
ALP SERPL-CCNC: 99 U/L — SIGNIFICANT CHANGE UP (ref 40–120)
ALT FLD-CCNC: 43 U/L DA — SIGNIFICANT CHANGE UP (ref 10–60)
ANION GAP SERPL CALC-SCNC: 9 MMOL/L — SIGNIFICANT CHANGE UP (ref 5–17)
AST SERPL-CCNC: 21 U/L — SIGNIFICANT CHANGE UP (ref 10–40)
BASOPHILS # BLD AUTO: 0.02 K/UL — SIGNIFICANT CHANGE UP (ref 0–0.2)
BASOPHILS NFR BLD AUTO: 0.3 % — SIGNIFICANT CHANGE UP (ref 0–2)
BILIRUB SERPL-MCNC: 0.3 MG/DL — SIGNIFICANT CHANGE UP (ref 0.2–1.2)
BUN SERPL-MCNC: 66 MG/DL — HIGH (ref 7–18)
CALCIUM SERPL-MCNC: 8 MG/DL — LOW (ref 8.4–10.5)
CHLORIDE SERPL-SCNC: 101 MMOL/L — SIGNIFICANT CHANGE UP (ref 96–108)
CK SERPL-CCNC: 259 U/L — HIGH (ref 35–232)
CO2 SERPL-SCNC: 26 MMOL/L — SIGNIFICANT CHANGE UP (ref 22–31)
CREAT SERPL-MCNC: 11.4 MG/DL — HIGH (ref 0.5–1.3)
EOSINOPHIL # BLD AUTO: 0.27 K/UL — SIGNIFICANT CHANGE UP (ref 0–0.5)
EOSINOPHIL NFR BLD AUTO: 4.6 % — SIGNIFICANT CHANGE UP (ref 0–6)
GLUCOSE SERPL-MCNC: 89 MG/DL — SIGNIFICANT CHANGE UP (ref 70–99)
HCT VFR BLD CALC: 29.5 % — LOW (ref 39–50)
HGB BLD-MCNC: 9.6 G/DL — LOW (ref 13–17)
IMM GRANULOCYTES NFR BLD AUTO: 0.2 % — SIGNIFICANT CHANGE UP (ref 0–1.5)
LYMPHOCYTES # BLD AUTO: 2.28 K/UL — SIGNIFICANT CHANGE UP (ref 1–3.3)
LYMPHOCYTES # BLD AUTO: 38.7 % — SIGNIFICANT CHANGE UP (ref 13–44)
MCHC RBC-ENTMCNC: 32.5 GM/DL — SIGNIFICANT CHANGE UP (ref 32–36)
MCHC RBC-ENTMCNC: 34 PG — SIGNIFICANT CHANGE UP (ref 27–34)
MCV RBC AUTO: 104.6 FL — HIGH (ref 80–100)
MONOCYTES # BLD AUTO: 0.47 K/UL — SIGNIFICANT CHANGE UP (ref 0–0.9)
MONOCYTES NFR BLD AUTO: 8 % — SIGNIFICANT CHANGE UP (ref 2–14)
NEUTROPHILS # BLD AUTO: 2.84 K/UL — SIGNIFICANT CHANGE UP (ref 1.8–7.4)
NEUTROPHILS NFR BLD AUTO: 48.2 % — SIGNIFICANT CHANGE UP (ref 43–77)
NRBC # BLD: 0 /100 WBCS — SIGNIFICANT CHANGE UP (ref 0–0)
NT-PROBNP SERPL-SCNC: 6775 PG/ML — HIGH (ref 0–125)
PLATELET # BLD AUTO: 74 K/UL — LOW (ref 150–400)
POTASSIUM SERPL-MCNC: 4.9 MMOL/L — SIGNIFICANT CHANGE UP (ref 3.5–5.3)
POTASSIUM SERPL-SCNC: 4.9 MMOL/L — SIGNIFICANT CHANGE UP (ref 3.5–5.3)
PROT SERPL-MCNC: 7 G/DL — SIGNIFICANT CHANGE UP (ref 6–8.3)
RBC # BLD: 2.82 M/UL — LOW (ref 4.2–5.8)
RBC # FLD: 13.7 % — SIGNIFICANT CHANGE UP (ref 10.3–14.5)
SODIUM SERPL-SCNC: 136 MMOL/L — SIGNIFICANT CHANGE UP (ref 135–145)
TROPONIN I SERPL-MCNC: 0.11 NG/ML — HIGH (ref 0–0.04)
TROPONIN I SERPL-MCNC: 0.13 NG/ML — HIGH (ref 0–0.04)
WBC # BLD: 5.89 K/UL — SIGNIFICANT CHANGE UP (ref 3.8–10.5)
WBC # FLD AUTO: 5.89 K/UL — SIGNIFICANT CHANGE UP (ref 3.8–10.5)

## 2020-02-03 PROCEDURE — 71045 X-RAY EXAM CHEST 1 VIEW: CPT | Mod: 26

## 2020-02-03 PROCEDURE — 99285 EMERGENCY DEPT VISIT HI MDM: CPT

## 2020-02-03 RX ORDER — PANTOPRAZOLE SODIUM 20 MG/1
1 TABLET, DELAYED RELEASE ORAL
Qty: 0 | Refills: 0 | DISCHARGE

## 2020-02-03 RX ORDER — APIXABAN 2.5 MG/1
2.5 TABLET, FILM COATED ORAL
Refills: 0 | Status: DISCONTINUED | OUTPATIENT
Start: 2020-02-03 | End: 2020-02-04

## 2020-02-03 RX ORDER — COLCHICINE 0.6 MG
0.6 TABLET ORAL
Refills: 0 | Status: DISCONTINUED | OUTPATIENT
Start: 2020-02-03 | End: 2020-02-03

## 2020-02-03 RX ORDER — ASPIRIN/CALCIUM CARB/MAGNESIUM 324 MG
81 TABLET ORAL DAILY
Refills: 0 | Status: DISCONTINUED | OUTPATIENT
Start: 2020-02-03 | End: 2020-02-05

## 2020-02-03 RX ORDER — CALCITRIOL 0.5 UG/1
0.25 CAPSULE ORAL DAILY
Refills: 0 | Status: DISCONTINUED | OUTPATIENT
Start: 2020-02-03 | End: 2020-02-05

## 2020-02-03 RX ORDER — FERROUS SULFATE 325(65) MG
325 TABLET ORAL DAILY
Refills: 0 | Status: DISCONTINUED | OUTPATIENT
Start: 2020-02-03 | End: 2020-02-05

## 2020-02-03 RX ORDER — CLOPIDOGREL BISULFATE 75 MG/1
75 TABLET, FILM COATED ORAL DAILY
Refills: 0 | Status: DISCONTINUED | OUTPATIENT
Start: 2020-02-03 | End: 2020-02-05

## 2020-02-03 RX ORDER — PANTOPRAZOLE SODIUM 20 MG/1
40 TABLET, DELAYED RELEASE ORAL
Refills: 0 | Status: DISCONTINUED | OUTPATIENT
Start: 2020-02-03 | End: 2020-02-05

## 2020-02-03 RX ORDER — COLCHICINE 0.6 MG
0.6 TABLET ORAL DAILY
Refills: 0 | Status: DISCONTINUED | OUTPATIENT
Start: 2020-02-03 | End: 2020-02-05

## 2020-02-03 RX ORDER — ISOSORBIDE DINITRATE 5 MG/1
10 TABLET ORAL
Refills: 0 | Status: DISCONTINUED | OUTPATIENT
Start: 2020-02-03 | End: 2020-02-05

## 2020-02-03 RX ORDER — NIFEDIPINE 30 MG
60 TABLET, EXTENDED RELEASE 24 HR ORAL DAILY
Refills: 0 | Status: DISCONTINUED | OUTPATIENT
Start: 2020-02-03 | End: 2020-02-05

## 2020-02-03 RX ORDER — SEVELAMER CARBONATE 2400 MG/1
1600 POWDER, FOR SUSPENSION ORAL
Refills: 0 | Status: DISCONTINUED | OUTPATIENT
Start: 2020-02-03 | End: 2020-02-05

## 2020-02-03 RX ORDER — DILTIAZEM HCL 120 MG
120 CAPSULE, EXT RELEASE 24 HR ORAL DAILY
Refills: 0 | Status: DISCONTINUED | OUTPATIENT
Start: 2020-02-03 | End: 2020-02-05

## 2020-02-03 RX ORDER — LACTULOSE 10 G/15ML
10 SOLUTION ORAL DAILY
Refills: 0 | Status: DISCONTINUED | OUTPATIENT
Start: 2020-02-03 | End: 2020-02-05

## 2020-02-03 RX ORDER — FOLIC ACID 0.8 MG
1 TABLET ORAL DAILY
Refills: 0 | Status: DISCONTINUED | OUTPATIENT
Start: 2020-02-03 | End: 2020-02-05

## 2020-02-03 RX ORDER — HYDRALAZINE HCL 50 MG
25 TABLET ORAL THREE TIMES A DAY
Refills: 0 | Status: DISCONTINUED | OUTPATIENT
Start: 2020-02-03 | End: 2020-02-05

## 2020-02-03 RX ORDER — ATORVASTATIN CALCIUM 80 MG/1
80 TABLET, FILM COATED ORAL AT BEDTIME
Refills: 0 | Status: DISCONTINUED | OUTPATIENT
Start: 2020-02-03 | End: 2020-02-05

## 2020-02-03 RX ADMIN — ATORVASTATIN CALCIUM 80 MILLIGRAM(S): 80 TABLET, FILM COATED ORAL at 22:26

## 2020-02-03 RX ADMIN — Medication 25 MILLIGRAM(S): at 22:26

## 2020-02-03 RX ADMIN — APIXABAN 2.5 MILLIGRAM(S): 2.5 TABLET, FILM COATED ORAL at 18:04

## 2020-02-03 RX ADMIN — ISOSORBIDE DINITRATE 10 MILLIGRAM(S): 5 TABLET ORAL at 18:05

## 2020-02-03 RX ADMIN — SEVELAMER CARBONATE 1600 MILLIGRAM(S): 2400 POWDER, FOR SUSPENSION ORAL at 18:04

## 2020-02-03 NOTE — ED ADULT NURSE NOTE - NS ED NURSE PATIENT LEFT UNIT TIME
Dr. Reid, [facility]   [address] [place] [address]  Phone: [phone]  Fax: (   )    -  Follow Up Time: 1 month    Milton Melgar)  Cardiology; Internal Medicine  [address], [address]  [place] [address]  Phone: [phone]  Fax: [phone]  Follow Up Time: 1 week
06:31

## 2020-02-03 NOTE — ED ADULT TRIAGE NOTE - CHIEF COMPLAINT QUOTE
PT REPORTS CHEST PAIN DURING HEMODIALYSIS. S/P 1/2 DIALYSIS TODAY. EMS REPORTS PT WAS GIVEN NITRO .04MG SL AND BASA 81MG

## 2020-02-03 NOTE — H&P ADULT - PROBLEM SELECTOR PLAN 7
IMPROVE VTE Individual Risk Assessment  RISK                                                                Points  [  ] Previous VTE                                                  3  [  ] Thrombophilia                                               2  [  ] Lower limb paralysis                                      2  [  ] Current Cancer                                              2         (within 6 months)  [  ] Immobilization > 24 hrs                                1  [  ] ICU/CCU stay > 24 hours                              1  [  ] Age > 60                                                      1  IMPROVE VTE Score ___2______  DVT ppx : Eliquis  GI ppx : PPI

## 2020-02-03 NOTE — ED PROVIDER NOTE - CARE PLAN
Principal Discharge DX:	NSTEMI (non-ST elevated myocardial infarction)  Secondary Diagnosis:	SVT (supraventricular tachycardia)

## 2020-02-03 NOTE — ED ADULT NURSE NOTE - OBJECTIVE STATEMENT
had episode of chest pain while getting dialysis today, chest pain subsided after taking aspirin and nitro. Denies any pain now.

## 2020-02-03 NOTE — H&P ADULT - HISTORY OF PRESENT ILLNESS
74 y M, wheel chair bound, from [facility] with PMH significant for HTN, HLD, CVA with residual right sided weakness, ESRD on HD (M,W,F at [facility]) Anemia, constipation, gout presents to Ed with L sided chest pain.  Today during his dialysis session, the pt experienced sudden, sharp, nonpleuritic, nonradiating 8/10 chest pain in his L chest lasting for approx 15 mins, associated with palpitations. Denied nausea, vomiting, dizziness, dyspnea, vision changes. EMS administered nitro 0.4mg SL and ASA 81mg after which pt reports the chest pain resolved completely. Has experienced similar pain once before during HD session, which resolved spontaneously without medication. Denies prior history of exertional chest pain, recent illness, lower extremity swelling/pain/erythema, SOB, sweating, N/V/D/C or any other complaints   Pt only finished 30 mins of dialysis    ED course 74 y M, wheel chair bound, from [facility] with PMH significant for HTN, HLD, CVA with residual right sided weakness, ESRD on HD (M,W,F at [facility]) Anemia, constipation, gout presents to Ed with L sided chest pain.  Today during his dialysis session, the pt experienced sudden, sharp, nonpleuritic, nonradiating 8/10 chest pain in his L chest lasting for approx 15 mins, associated with palpitations. Denied nausea, vomiting, dizziness, dyspnea, vision changes. EMS administered nitro 0.4mg SL and ASA 81mg after which pt reports the chest pain resolved completely. Has experienced similar pain once before during HD session, which resolved spontaneously without medication. Denies prior history of exertional chest pain, recent illness, lower extremity swelling/pain/erythema, SOB, sweating, N/V/D/C or any other complaints   Pt only finished 30 mins of dialysis    ED course   Vitals : afebrile HR 80 /90 improved to 137/70 without any intervention   Labs : wbc 5k , hb 9.6 K 4.9 . T1 0.106 bnp 6700  CXR : clear , no congestion   ECG : sinus rhythm with no NYA     SH : Denies Smoking, alcohol or drug use

## 2020-02-03 NOTE — H&P ADULT - PROBLEM SELECTOR PLAN 3
Pt takes eliquis for AC and cardizem for rate control  - currently rate controlled   - c/w home meds

## 2020-02-03 NOTE — ED ADULT NURSE NOTE - NSIMPLEMENTINTERV_GEN_ALL_ED
Implemented All Fall Risk Interventions:  [place] to call system. Call bell, personal items and telephone within reach. Instruct patient to call for assistance. Room bathroom lighting operational. Non-slip footwear when patient is off stretcher. Physically safe environment: no spills, clutter or unnecessary equipment. Stretcher in lowest position, wheels locked, appropriate side rails in place. Provide visual cue, wrist band, yellow gown, etc. Monitor gait and stability. Monitor for mental status changes and reorient to person, place, and time. Review medications for side effects contributing to fall risk. Reinforce activity limits and safety measures with patient and family.

## 2020-02-03 NOTE — ED PROVIDER NOTE - NS ED ROS FT
Gen: No weight changes, fatigue, fevers/chills, weakness  Head/Eyes/Ears/Mouth: No headache; Normal hearing; Normal vision    Respiratory: No dyspnea, cough, wheezing, hemoptysis  CV: +chest pain, palpitations. No PND, orthopnea  GI: No abdominal pain, diarrhea, constipation, nausea, vomiting, melena, hematochezia  : No increased frequency, dysuria, hematuria, nocturia  MSK: No joint pain/swelling; no back pain; no edema  Endo: no heat or cold intolerance, no hair changes  Skin: no rashes or lesions  Heme: No easy bruising or bleeding  All other systems were reviewed and are negative, except as noted.

## 2020-02-03 NOTE — H&P ADULT - PROBLEM SELECTOR PLAN 1
-Pt p/w  chest pain , resolved with sub nitro   - Risk factors : HTN,HLD , CVA   - given risk factors and no prior Cardiac w/u ,will rule out ACS  - ECG : SR with no NYA , T1 0.106  - will trend trops, f/u T2 and T3  - c/w aspirin, Cardizem and statin   - f/u Echo  - likely will need Stress test   - Cardio Dr Morales

## 2020-02-03 NOTE — H&P ADULT - NSICDXPASTMEDICALHX_GEN_ALL_CORE_FT
PAST MEDICAL HISTORY:  Anemia     Cerebrovascular accident (CVA), unspecified mechanism     Diabetes     ESRD (end stage renal disease) on dialysis     Hypertension

## 2020-02-03 NOTE — H&P ADULT - ASSESSMENT
74 y M, wheel chair bound, from [facility] with PMH significant for HTN, HLD, CVA with residual right sided weakness, ESRD on HD (M,W,F at [facility]) Anemia, constipation, gout presents to Ed with L sided chest pain.    Pt will be admitted to tele for Chest pain

## 2020-02-03 NOTE — H&P ADULT - PROBLEM SELECTOR PLAN 2
Pt on HD MWF . Only had HD for 30 mins today   - CXR : not congested , pt not c/o SOB   - c/w renal meds( Renvela, calcitriol)   - will need HD tmrw  - Avoid nephrotoxic agents   - Nephro consult Dr Pritchard

## 2020-02-03 NOTE — ED PROVIDER NOTE - CLINICAL SUMMARY MEDICAL DECISION MAKING FREE TEXT BOX
Bossman: Patient with chest pain 30 minutes into his dialysis session. then received aspirin and nitro with improvement. EKG from dialysis center reveals patient was in SVT with rate 153. Patient is currently asymtptomatic. on exam, well appearing NAD, clear lungs, heart regular, right av fistula. no peripheral edema. labs reveal elevated troponin. plan admit to telemetry.

## 2020-02-03 NOTE — ED PROVIDER NOTE - PMH
Anemia    Cerebrovascular accident (CVA), unspecified mechanism    Diabetes    ESRD (end stage renal disease) on dialysis    Hypertension

## 2020-02-03 NOTE — ED PROVIDER NOTE - OBJECTIVE STATEMENT
75yo man with pmh of ESRD on HD (M/W/F), afib on eliquis, CVA w/ residual R-sided weakness, HTN, anemia, and gout who p/w an acute episode of chest pain. Today during his dialysis session, the pt experienced sudden, sharp, nonpleuritic, nonradiating 8/10 chest pain in his L chest. Associated with palpitations. Denied nausea, vomiting, dizziness, dyspnea, vision changes. EMS administered nitro 0.4mg SL and ASA 81mg after which pt reports the chest pain resolved completely. Has experienced similar pain once before during HD session, which resolved spontaneously without medication. Denies prior history of exertional chest pain, recent illness, lower extremity swelling/pain/erythema. Pt is wheelchair-bound and resides at [facility].

## 2020-02-03 NOTE — H&P ADULT - NSHPPHYSICALEXAM_GEN_ALL_CORE
GENERAL: NAD  EYES: EOMI, PERRLA,   NECK: Supple, No JVD  CHEST/LUNG: b/l basilar crackles   HEART: Regular rate and rhythm; No murmurs, +ve S1 S2   ABDOMEN: Soft, Nontender, Nondistended; Bowel sounds present  NERVOUS SYSTEM:  Alert & awake, Motor 5/5 on L side , 4/5 on R side    EXTREMITIES:   No clubbing, cyanosis, or edema  LYMPH NODES : non palpable   PSYCH: normal mood and affect

## 2020-02-04 LAB
24R-OH-CALCIDIOL SERPL-MCNC: 37.9 NG/ML — SIGNIFICANT CHANGE UP (ref 30–80)
ANION GAP SERPL CALC-SCNC: 13 MMOL/L — SIGNIFICANT CHANGE UP (ref 5–17)
BASOPHILS # BLD AUTO: 0.02 K/UL — SIGNIFICANT CHANGE UP (ref 0–0.2)
BASOPHILS NFR BLD AUTO: 0.3 % — SIGNIFICANT CHANGE UP (ref 0–2)
BUN SERPL-MCNC: 83 MG/DL — HIGH (ref 7–18)
CALCIUM SERPL-MCNC: 8.4 MG/DL — SIGNIFICANT CHANGE UP (ref 8.4–10.5)
CHLORIDE SERPL-SCNC: 100 MMOL/L — SIGNIFICANT CHANGE UP (ref 96–108)
CHOLEST SERPL-MCNC: 138 MG/DL — SIGNIFICANT CHANGE UP (ref 10–199)
CO2 SERPL-SCNC: 25 MMOL/L — SIGNIFICANT CHANGE UP (ref 22–31)
CREAT SERPL-MCNC: 12.4 MG/DL — HIGH (ref 0.5–1.3)
EOSINOPHIL # BLD AUTO: 0.23 K/UL — SIGNIFICANT CHANGE UP (ref 0–0.5)
EOSINOPHIL NFR BLD AUTO: 3.9 % — SIGNIFICANT CHANGE UP (ref 0–6)
FOLATE SERPL-MCNC: 13.9 NG/ML — SIGNIFICANT CHANGE UP
GLUCOSE SERPL-MCNC: 89 MG/DL — SIGNIFICANT CHANGE UP (ref 70–99)
HBA1C BLD-MCNC: 5.7 % — HIGH (ref 4–5.6)
HCT VFR BLD CALC: 26.3 % — LOW (ref 39–50)
HCV AB S/CO SERPL IA: 0.17 S/CO — SIGNIFICANT CHANGE UP (ref 0–0.99)
HCV AB SERPL-IMP: SIGNIFICANT CHANGE UP
HDLC SERPL-MCNC: 48 MG/DL — SIGNIFICANT CHANGE UP
HGB BLD-MCNC: 8.8 G/DL — LOW (ref 13–17)
IMM GRANULOCYTES NFR BLD AUTO: 0.3 % — SIGNIFICANT CHANGE UP (ref 0–1.5)
INR BLD: 1.09 RATIO — SIGNIFICANT CHANGE UP (ref 0.88–1.16)
LIPID PNL WITH DIRECT LDL SERPL: 66 MG/DL — SIGNIFICANT CHANGE UP
LYMPHOCYTES # BLD AUTO: 1.92 K/UL — SIGNIFICANT CHANGE UP (ref 1–3.3)
LYMPHOCYTES # BLD AUTO: 32.9 % — SIGNIFICANT CHANGE UP (ref 13–44)
MAGNESIUM SERPL-MCNC: 2.1 MG/DL — SIGNIFICANT CHANGE UP (ref 1.6–2.6)
MCHC RBC-ENTMCNC: 33.5 GM/DL — SIGNIFICANT CHANGE UP (ref 32–36)
MCHC RBC-ENTMCNC: 33.7 PG — SIGNIFICANT CHANGE UP (ref 27–34)
MCV RBC AUTO: 100.8 FL — HIGH (ref 80–100)
MONOCYTES # BLD AUTO: 0.52 K/UL — SIGNIFICANT CHANGE UP (ref 0–0.9)
MONOCYTES NFR BLD AUTO: 8.9 % — SIGNIFICANT CHANGE UP (ref 2–14)
NEUTROPHILS # BLD AUTO: 3.13 K/UL — SIGNIFICANT CHANGE UP (ref 1.8–7.4)
NEUTROPHILS NFR BLD AUTO: 53.7 % — SIGNIFICANT CHANGE UP (ref 43–77)
NRBC # BLD: 0 /100 WBCS — SIGNIFICANT CHANGE UP (ref 0–0)
PHOSPHATE SERPL-MCNC: 4 MG/DL — SIGNIFICANT CHANGE UP (ref 2.5–4.5)
PLATELET # BLD AUTO: 170 K/UL — SIGNIFICANT CHANGE UP (ref 150–400)
POTASSIUM SERPL-MCNC: 5.2 MMOL/L — SIGNIFICANT CHANGE UP (ref 3.5–5.3)
POTASSIUM SERPL-SCNC: 5.2 MMOL/L — SIGNIFICANT CHANGE UP (ref 3.5–5.3)
PROTHROM AB SERPL-ACNC: 12.1 SEC — SIGNIFICANT CHANGE UP (ref 10–12.9)
RBC # BLD: 2.61 M/UL — LOW (ref 4.2–5.8)
RBC # FLD: 13.8 % — SIGNIFICANT CHANGE UP (ref 10.3–14.5)
SODIUM SERPL-SCNC: 138 MMOL/L — SIGNIFICANT CHANGE UP (ref 135–145)
TOTAL CHOLESTEROL/HDL RATIO MEASUREMENT: 2.9 RATIO — LOW (ref 3.4–9.6)
TRIGL SERPL-MCNC: 119 MG/DL — SIGNIFICANT CHANGE UP (ref 10–149)
TROPONIN I SERPL-MCNC: 0.1 NG/ML — HIGH (ref 0–0.04)
TSH SERPL-MCNC: 2.56 UU/ML — SIGNIFICANT CHANGE UP (ref 0.34–4.82)
VIT B12 SERPL-MCNC: 801 PG/ML — SIGNIFICANT CHANGE UP (ref 232–1245)
WBC # BLD: 5.84 K/UL — SIGNIFICANT CHANGE UP (ref 3.8–10.5)
WBC # FLD AUTO: 5.84 K/UL — SIGNIFICANT CHANGE UP (ref 3.8–10.5)

## 2020-02-04 RX ORDER — ERYTHROPOIETIN 10000 [IU]/ML
4000 INJECTION, SOLUTION INTRAVENOUS; SUBCUTANEOUS
Refills: 0 | Status: DISCONTINUED | OUTPATIENT
Start: 2020-02-04 | End: 2020-02-05

## 2020-02-04 RX ORDER — APIXABAN 2.5 MG/1
5 TABLET, FILM COATED ORAL
Refills: 0 | Status: DISCONTINUED | OUTPATIENT
Start: 2020-02-04 | End: 2020-02-05

## 2020-02-04 RX ADMIN — Medication 81 MILLIGRAM(S): at 14:15

## 2020-02-04 RX ADMIN — APIXABAN 5 MILLIGRAM(S): 2.5 TABLET, FILM COATED ORAL at 17:23

## 2020-02-04 RX ADMIN — Medication 25 MILLIGRAM(S): at 21:34

## 2020-02-04 RX ADMIN — Medication 120 MILLIGRAM(S): at 06:00

## 2020-02-04 RX ADMIN — PANTOPRAZOLE SODIUM 40 MILLIGRAM(S): 20 TABLET, DELAYED RELEASE ORAL at 06:45

## 2020-02-04 RX ADMIN — Medication 1 MILLIGRAM(S): at 14:15

## 2020-02-04 RX ADMIN — LACTULOSE 10 GRAM(S): 10 SOLUTION ORAL at 14:18

## 2020-02-04 RX ADMIN — Medication 325 MILLIGRAM(S): at 14:15

## 2020-02-04 RX ADMIN — SEVELAMER CARBONATE 1600 MILLIGRAM(S): 2400 POWDER, FOR SUSPENSION ORAL at 14:16

## 2020-02-04 RX ADMIN — Medication 0.6 MILLIGRAM(S): at 14:15

## 2020-02-04 RX ADMIN — SEVELAMER CARBONATE 1600 MILLIGRAM(S): 2400 POWDER, FOR SUSPENSION ORAL at 17:23

## 2020-02-04 RX ADMIN — APIXABAN 2.5 MILLIGRAM(S): 2.5 TABLET, FILM COATED ORAL at 06:00

## 2020-02-04 RX ADMIN — ISOSORBIDE DINITRATE 10 MILLIGRAM(S): 5 TABLET ORAL at 06:00

## 2020-02-04 RX ADMIN — Medication 25 MILLIGRAM(S): at 06:00

## 2020-02-04 RX ADMIN — Medication 25 MILLIGRAM(S): at 14:20

## 2020-02-04 RX ADMIN — CALCITRIOL 0.25 MICROGRAM(S): 0.5 CAPSULE ORAL at 14:16

## 2020-02-04 RX ADMIN — Medication 60 MILLIGRAM(S): at 06:00

## 2020-02-04 RX ADMIN — ISOSORBIDE DINITRATE 10 MILLIGRAM(S): 5 TABLET ORAL at 17:23

## 2020-02-04 RX ADMIN — ATORVASTATIN CALCIUM 80 MILLIGRAM(S): 80 TABLET, FILM COATED ORAL at 21:34

## 2020-02-04 RX ADMIN — CLOPIDOGREL BISULFATE 75 MILLIGRAM(S): 75 TABLET, FILM COATED ORAL at 16:58

## 2020-02-04 NOTE — PROGRESS NOTE ADULT - PROBLEM SELECTOR PLAN 4
Pt on cardizem, isodril, nifedipine and hydralazine at [facility]   - c/w home meds with parameters   - monitor BP and titrate meds  - DASH diet

## 2020-02-04 NOTE — PROGRESS NOTE ADULT - SUBJECTIVE AND OBJECTIVE BOX
PGY 1 Note discussed with supervising resident and primary attending    Patient is a 74y old  Male who presents with a chief complaint of Chest pain (03 Feb 2020 15:46)      INTERVAL HPI/OVERNIGHT EVENTS: Patient seen and examined at the bedside. Patient denies any pain, his chest pain has resolved. BP this morning was still elevated to 174/80. Patient denies any difficulty with eating, urinary or stool problems.     MEDICATIONS  (STANDING):  apixaban 2.5 milliGRAM(s) Oral two times a day  aspirin  chewable 81 milliGRAM(s) Oral daily  atorvastatin 80 milliGRAM(s) Oral at bedtime  calcitriol   Capsule 0.25 MICROGram(s) Oral daily  clopidogrel Tablet 75 milliGRAM(s) Oral daily  colchicine 0.6 milliGRAM(s) Oral daily  diltiazem    milliGRAM(s) Oral daily  ferrous    sulfate 325 milliGRAM(s) Oral daily  folic acid 1 milliGRAM(s) Oral daily  hydrALAZINE 25 milliGRAM(s) Oral three times a day  isosorbide   dinitrate Tablet (ISORDIL) 10 milliGRAM(s) Oral two times a day  lactulose Syrup 10 Gram(s) Oral daily  NIFEdipine XL 60 milliGRAM(s) Oral daily  pantoprazole    Tablet 40 milliGRAM(s) Oral before breakfast  sevelamer carbonate 1600 milliGRAM(s) Oral three times a day with meals    MEDICATIONS  (PRN):      __________________________________________________  REVIEW OF SYSTEMS:    CONSTITUTIONAL: No fever,   EYES: no acute visual disturbances  NECK: No pain or stiffness  RESPIRATORY: No cough; No shortness of breath  CARDIOVASCULAR: No chest pain, no palpitations  GASTROINTESTINAL: No pain. No nausea or vomiting; No diarrhea   NEUROLOGICAL: No headache or numbness, no tremors  MUSCULOSKELETAL: No joint pain, no muscle pain  GENITOURINARY: no dysuria, no frequency, no hesitancy  PSYCHIATRY: no depression , no anxiety  ALL OTHER  ROS negative        Vital Signs Last 24 Hrs  T(C): 36.4 (04 Feb 2020 06:50), Max: 36.4 (03 Feb 2020 12:27)  T(F): 97.6 (04 Feb 2020 06:50), Max: 97.6 (04 Feb 2020 06:50)  HR: 80 (04 Feb 2020 06:50) (71 - 82)  BP: 174/80 (04 Feb 2020 06:50) (127/62 - 182/72)  BP(mean): --  RR: 18 (04 Feb 2020 06:50) (17 - 20)  SpO2: 97% (04 Feb 2020 06:50) (97% - 100%)    ________________________________________________  PHYSICAL EXAM:  GENERAL: NAD, face swollen  HEENT: Normocephalic;  conjunctivae and sclerae clear; moist mucous membranes;   NECK : supple  CHEST/LUNG: crackles bilaterally  HEART: S1 S2  regular; no murmurs, gallops or rubs  ABDOMEN: Soft, Nontender, Nondistended; Bowel sounds present  EXTREMITIES: no cyanosis; no edema; no calf tenderness  SKIN: warm and dry; no rash  NERVOUS SYSTEM:  Awake and alert; Oriented  to place, person and time ;  Motor 5/5 on L side , 4/5 on R side    _________________________________________________  LABS:                        8.8    5.84  )-----------( 170      ( 04 Feb 2020 05:27 )             26.3     02-04    138  |  100  |  83<H>  ----------------------------<  89  5.2   |  25  |  12.40<H>    Ca    8.4      04 Feb 2020 05:27  Phos  4.0     02-04  Mg     2.1     02-04    TPro  7.0  /  Alb  3.1<L>  /  TBili  0.3  /  DBili  x   /  AST  21  /  ALT  43  /  AlkPhos  99  02-03    PT/INR - ( 04 Feb 2020 05:27 )   PT: 12.1 sec;   INR: 1.09 ratio             CAPILLARY BLOOD GLUCOSE            RADIOLOGY & ADDITIONAL TESTS:    < from: Xray Chest 1 View- PORTABLE-Urgent (02.03.20 @ 15:19) >  EXAM:  XR CHEST PORTABLE URGENT 1V                            PROCEDURE DATE:  02/03/2020          INTERPRETATION:  AP chest on February 3, 2020 at 3:12 PM. Patient has chest pain.    Heart enlargement again noted.    Slight left base pleural reaction again seen.    Vascular stent in the upper left arm is noted.    Chest is similar to January 4, 2019.    IMPRESSION: Heart enlargement. Slight left base pleural reaction, and vascular stent in the left upper arm again noted.        < end of copied text >      Imaging Personally Reviewed:  YES/NO    Consultant(s) Notes Reviewed:   YES/ No    Care Discussed with Consultants :     Plan of care was discussed with patient and /or primary care giver; all questions and concerns were addressed and care was aligned with patient's wishes.

## 2020-02-04 NOTE — PROGRESS NOTE ADULT - ASSESSMENT
74 y M, wheel chair bound, from [facility] with PMH significant for HTN, HLD, CVA with residual right sided weakness, ESRD on HD (M,W,F at [facility]) Anemia, constipation, gout presents to Ed with L sided chest pain.    Pt will be admitted to tele for Chest pain

## 2020-02-04 NOTE — PROGRESS NOTE ADULT - PROBLEM SELECTOR PLAN 1
-Pt p/w  chest pain , resolved with sub nitro   - ECG : NSR with no NYA   - Troponins mildly elevated with Tmax of 0.12  - Pro-BNP 6715  - c/w aspirin, cardizem and statin   - f/u Echo  - likely will need Stress test   - Cardio Dr Morales

## 2020-02-04 NOTE — PROGRESS NOTE ADULT - PROBLEM SELECTOR PLAN 2
Pt on HD MWF . Only had HD for 30 mins yesterday  - c/w renal meds( Renvela, calcitriol)   - HD today  - Avoid nephrotoxic agents   - Nephro consult Dr Pritchard

## 2020-02-04 NOTE — CONSULT NOTE ADULT - ASSESSMENT
ESRD due to hypertension   Chest pain with increased Troponin level    ESRD    Dialysis today , start Epogen  Remove 2.5 Kg fluid in dialysis.  Cardiology follow up.

## 2020-02-04 NOTE — CONSULT NOTE ADULT - SUBJECTIVE AND OBJECTIVE BOX
74 y M, wheel chair bound, from [facility] with PMH significant for HTN, HLD, CVA with residual right sided weakness, ESRD on HD (M,W,F at [facility]) Anemia, constipation, gout presents to Ed with L sided chest pain. Yesterday afterv 30 minutes of dialysis he experienced severe chest pain , no radiation to the back or shoulder  No SOB  911 was called and he received ASA and O2  Pain lasted for 20 minutes  No more chest pain from that time.       ED course   Vitals : afebrile HR 80 /90 improved to 137/70 without any intervention  Troponin I, Serum (02.03.20 @ 14:08)    Troponin I, Serum: 0.106: TYPE:(C=Critical, N=Notification, A=Abnormal) Y- 0.126.126      SH : Denies Smoking, alcohol or drug use     PAST MEDICAL & SURGICAL HISTORY:  Cerebrovascular accident (CVA), unspecified mechanism  Anemia  Hypertension  Diabetes  ESRD (end stage renal disease) on dialysis  A-V fistula      Home Medications Reviewed    Hospital Medications:   MEDICATIONS  (STANDING):  apixaban 5 milliGRAM(s) Oral two times a day  aspirin  chewable 81 milliGRAM(s) Oral daily  atorvastatin 80 milliGRAM(s) Oral at bedtime  calcitriol   Capsule 0.25 MICROGram(s) Oral daily  clopidogrel Tablet 75 milliGRAM(s) Oral daily  colchicine 0.6 milliGRAM(s) Oral daily  diltiazem    milliGRAM(s) Oral daily  ferrous    sulfate 325 milliGRAM(s) Oral daily  folic acid 1 milliGRAM(s) Oral daily  hydrALAZINE 25 milliGRAM(s) Oral three times a day  isosorbide   dinitrate Tablet (ISORDIL) 10 milliGRAM(s) Oral two times a day  lactulose Syrup 10 Gram(s) Oral daily  NIFEdipine XL 60 milliGRAM(s) Oral daily  pantoprazole    Tablet 40 milliGRAM(s) Oral before breakfast  sevelamer carbonate 1600 milliGRAM(s) Oral three times a day with meals    MEDICATIONS  (PRN):      Allergies    No Known Allergies    Intolerances                              8.8    5.84  )-----------( 170      ( 04 Feb 2020 05:27 )             26.3     02-04    138  |  100  |  83<H>  ----------------------------<  89  5.2   |  25  |  12.40<H>    Ca    8.4      04 Feb 2020 05:27  Phos  4.0     02-04  Mg     2.1     02-04    TPro  7.0  /  Alb  3.1<L>  /  TBili  0.3  /  DBili  x   /  AST  21  /  ALT  43  /  AlkPhos  99  02-03    PT/INR - ( 04 Feb 2020 05:27 )   PT: 12.1 sec;   INR: 1.09 ratio                   RADIOLOGY & ADDITIONAL STUDIES:    SOCIAL HISTORY: Denies ETOh,Smoking,     FAMILY HISTORY:  Family history of diabetes mellitus      REVIEW OF SYSTEMS:  CONSTITUTIONAL: No malaise, No fatigue, No fevers or chills, well developed, no diaphoresis  EYES/ENT: No visual changes;  No vertigo or throat pain   NECK: No pain or stiffness  RESPIRATORY: No cough, wheezing, hemoptysis; No shortness of breath  CARDIOVASCULAR: No chest pain or palpitations. No edema at present  GASTROINTESTINAL: No abdominal or epigastric pain. No nausea, vomiting, or hematemesis; No diarrhea or constipation. No melena or hematochezia.  GENITOURINARY: No dysuria, still able to urinate  NEUROLOGICAL: wheelchair bound, can stand up    VITALS:  Vital Signs Last 24 Hrs  T(C): 36.8 (04 Feb 2020 10:37), Max: 36.8 (04 Feb 2020 10:37)  T(F): 98.2 (04 Feb 2020 10:37), Max: 98.2 (04 Feb 2020 10:37)  HR: 78 (04 Feb 2020 10:37) (71 - 82)  BP: 153/66 (04 Feb 2020 10:37) (127/62 - 182/72)  BP(mean): --  RR: 17 (04 Feb 2020 10:37) (17 - 20)  SpO2: 100% (04 Feb 2020 10:37) (97% - 100%)        PHYSICAL EXAM:  Constitutional: NAD  Orbital edema  Neck: No JVD  Respiratory: air entrance B/L, no wheezes, rales or rhonchi  Cardiovascular: S1, S2, RRR, no pericardial rub, no murmur  Gastrointestinal: BS+, soft, no tenderness, no distension, no bruit  Pelvis: bladder non-distended, no CVA tenderness  Extremities: No cyanosis or clubbing. No peripheral edema  Neurological: A/O x 3, no focal deficits  Psychiatric: Normal mood, normal affect    Access: t\Right upper arm AVG with bruit and thrill

## 2020-02-05 ENCOUNTER — TRANSCRIPTION ENCOUNTER (OUTPATIENT)
Age: 75
End: 2020-02-05

## 2020-02-05 VITALS
OXYGEN SATURATION: 98 % | SYSTOLIC BLOOD PRESSURE: 121 MMHG | HEART RATE: 70 BPM | RESPIRATION RATE: 18 BRPM | DIASTOLIC BLOOD PRESSURE: 51 MMHG

## 2020-02-05 LAB
ALBUMIN SERPL ELPH-MCNC: 2.9 G/DL — LOW (ref 3.5–5)
ALP SERPL-CCNC: 89 U/L — SIGNIFICANT CHANGE UP (ref 40–120)
ALT FLD-CCNC: 35 U/L DA — SIGNIFICANT CHANGE UP (ref 10–60)
ANION GAP SERPL CALC-SCNC: 9 MMOL/L — SIGNIFICANT CHANGE UP (ref 5–17)
AST SERPL-CCNC: 18 U/L — SIGNIFICANT CHANGE UP (ref 10–40)
BILIRUB SERPL-MCNC: 0.5 MG/DL — SIGNIFICANT CHANGE UP (ref 0.2–1.2)
BUN SERPL-MCNC: 51 MG/DL — HIGH (ref 7–18)
CALCIUM SERPL-MCNC: 8.2 MG/DL — LOW (ref 8.4–10.5)
CHLORIDE SERPL-SCNC: 100 MMOL/L — SIGNIFICANT CHANGE UP (ref 96–108)
CO2 SERPL-SCNC: 28 MMOL/L — SIGNIFICANT CHANGE UP (ref 22–31)
CREAT SERPL-MCNC: 9.4 MG/DL — HIGH (ref 0.5–1.3)
GLUCOSE SERPL-MCNC: 92 MG/DL — SIGNIFICANT CHANGE UP (ref 70–99)
HCT VFR BLD CALC: 25.7 % — LOW (ref 39–50)
HGB BLD-MCNC: 8.6 G/DL — LOW (ref 13–17)
MAGNESIUM SERPL-MCNC: 2 MG/DL — SIGNIFICANT CHANGE UP (ref 1.6–2.6)
MCHC RBC-ENTMCNC: 33.5 GM/DL — SIGNIFICANT CHANGE UP (ref 32–36)
MCHC RBC-ENTMCNC: 34.1 PG — HIGH (ref 27–34)
MCV RBC AUTO: 102 FL — HIGH (ref 80–100)
NRBC # BLD: 0 /100 WBCS — SIGNIFICANT CHANGE UP (ref 0–0)
PHOSPHATE SERPL-MCNC: 3.7 MG/DL — SIGNIFICANT CHANGE UP (ref 2.5–4.5)
PLATELET # BLD AUTO: 191 K/UL — SIGNIFICANT CHANGE UP (ref 150–400)
POTASSIUM SERPL-MCNC: 4.6 MMOL/L — SIGNIFICANT CHANGE UP (ref 3.5–5.3)
POTASSIUM SERPL-SCNC: 4.6 MMOL/L — SIGNIFICANT CHANGE UP (ref 3.5–5.3)
PROT SERPL-MCNC: 6.5 G/DL — SIGNIFICANT CHANGE UP (ref 6–8.3)
RBC # BLD: 2.52 M/UL — LOW (ref 4.2–5.8)
RBC # FLD: 13.7 % — SIGNIFICANT CHANGE UP (ref 10.3–14.5)
SODIUM SERPL-SCNC: 137 MMOL/L — SIGNIFICANT CHANGE UP (ref 135–145)
WBC # BLD: 5.34 K/UL — SIGNIFICANT CHANGE UP (ref 3.8–10.5)
WBC # FLD AUTO: 5.34 K/UL — SIGNIFICANT CHANGE UP (ref 3.8–10.5)

## 2020-02-05 PROCEDURE — 36415 COLL VENOUS BLD VENIPUNCTURE: CPT

## 2020-02-05 PROCEDURE — 71045 X-RAY EXAM CHEST 1 VIEW: CPT

## 2020-02-05 PROCEDURE — 84100 ASSAY OF PHOSPHORUS: CPT

## 2020-02-05 PROCEDURE — 85027 COMPLETE CBC AUTOMATED: CPT

## 2020-02-05 PROCEDURE — 99285 EMERGENCY DEPT VISIT HI MDM: CPT

## 2020-02-05 PROCEDURE — 82550 ASSAY OF CK (CPK): CPT

## 2020-02-05 PROCEDURE — 80053 COMPREHEN METABOLIC PANEL: CPT

## 2020-02-05 PROCEDURE — 82607 VITAMIN B-12: CPT

## 2020-02-05 PROCEDURE — 83880 ASSAY OF NATRIURETIC PEPTIDE: CPT

## 2020-02-05 PROCEDURE — 93306 TTE W/DOPPLER COMPLETE: CPT

## 2020-02-05 PROCEDURE — 82746 ASSAY OF FOLIC ACID SERUM: CPT

## 2020-02-05 PROCEDURE — 85610 PROTHROMBIN TIME: CPT

## 2020-02-05 PROCEDURE — 80048 BASIC METABOLIC PNL TOTAL CA: CPT

## 2020-02-05 PROCEDURE — 86803 HEPATITIS C AB TEST: CPT

## 2020-02-05 PROCEDURE — 84443 ASSAY THYROID STIM HORMONE: CPT

## 2020-02-05 PROCEDURE — 83735 ASSAY OF MAGNESIUM: CPT

## 2020-02-05 PROCEDURE — 82306 VITAMIN D 25 HYDROXY: CPT

## 2020-02-05 PROCEDURE — 99261: CPT

## 2020-02-05 PROCEDURE — 93005 ELECTROCARDIOGRAM TRACING: CPT

## 2020-02-05 PROCEDURE — 80061 LIPID PANEL: CPT

## 2020-02-05 PROCEDURE — 84484 ASSAY OF TROPONIN QUANT: CPT

## 2020-02-05 PROCEDURE — 83036 HEMOGLOBIN GLYCOSYLATED A1C: CPT

## 2020-02-05 RX ORDER — DOCUSATE SODIUM 100 MG
2 CAPSULE ORAL
Qty: 0 | Refills: 0 | DISCHARGE

## 2020-02-05 RX ORDER — ERYTHROPOIETIN 10000 [IU]/ML
0 INJECTION, SOLUTION INTRAVENOUS; SUBCUTANEOUS
Qty: 0 | Refills: 0 | DISCHARGE
Start: 2020-02-05

## 2020-02-05 RX ORDER — LACTULOSE 10 G/15ML
30 SOLUTION ORAL
Qty: 0 | Refills: 0 | DISCHARGE

## 2020-02-05 RX ADMIN — Medication 120 MILLIGRAM(S): at 05:39

## 2020-02-05 RX ADMIN — SEVELAMER CARBONATE 1600 MILLIGRAM(S): 2400 POWDER, FOR SUSPENSION ORAL at 17:34

## 2020-02-05 RX ADMIN — Medication 25 MILLIGRAM(S): at 05:39

## 2020-02-05 RX ADMIN — APIXABAN 5 MILLIGRAM(S): 2.5 TABLET, FILM COATED ORAL at 17:34

## 2020-02-05 RX ADMIN — Medication 1 MILLIGRAM(S): at 12:22

## 2020-02-05 RX ADMIN — Medication 325 MILLIGRAM(S): at 12:22

## 2020-02-05 RX ADMIN — CALCITRIOL 0.25 MICROGRAM(S): 0.5 CAPSULE ORAL at 12:22

## 2020-02-05 RX ADMIN — LACTULOSE 10 GRAM(S): 10 SOLUTION ORAL at 12:22

## 2020-02-05 RX ADMIN — PANTOPRAZOLE SODIUM 40 MILLIGRAM(S): 20 TABLET, DELAYED RELEASE ORAL at 05:39

## 2020-02-05 RX ADMIN — SEVELAMER CARBONATE 1600 MILLIGRAM(S): 2400 POWDER, FOR SUSPENSION ORAL at 12:22

## 2020-02-05 RX ADMIN — APIXABAN 5 MILLIGRAM(S): 2.5 TABLET, FILM COATED ORAL at 05:39

## 2020-02-05 RX ADMIN — Medication 25 MILLIGRAM(S): at 13:53

## 2020-02-05 RX ADMIN — CLOPIDOGREL BISULFATE 75 MILLIGRAM(S): 75 TABLET, FILM COATED ORAL at 12:22

## 2020-02-05 RX ADMIN — ISOSORBIDE DINITRATE 10 MILLIGRAM(S): 5 TABLET ORAL at 05:39

## 2020-02-05 RX ADMIN — Medication 81 MILLIGRAM(S): at 12:22

## 2020-02-05 RX ADMIN — Medication 60 MILLIGRAM(S): at 05:39

## 2020-02-05 RX ADMIN — Medication 0.6 MILLIGRAM(S): at 12:22

## 2020-02-05 RX ADMIN — ISOSORBIDE DINITRATE 10 MILLIGRAM(S): 5 TABLET ORAL at 17:34

## 2020-02-05 NOTE — PROGRESS NOTE ADULT - ATTENDING COMMENTS
d/c planning back to [facility]   further cardiac work up as out patient.
patient was seen and examined along  with resident   above discussed , reviewed and agreed   cardiology eval

## 2020-02-05 NOTE — DISCHARGE NOTE PROVIDER - NSDCMRMEDTOKEN_GEN_ALL_CORE_FT
aspirin 81 mg oral tablet, chewable: 1 tab(s) orally once a day  calcitriol 0.25 mcg oral capsule: 1 cap(s) orally once a day  clopidogrel 75 mg oral tablet: 1 tab(s) orally once a day  Colcrys 0.6 mg oral tablet: 1 tab(s) orally 2 times a day  DilTIAZem Hydrochloride  mg/24 hours oral capsule, extended release: 1 cap(s) orally once a day  docusate sodium 100 mg oral tablet: 2 tab(s) orally once a day (at bedtime)  Eliquis 2.5 mg oral tablet: 1 tab(s) orally 2 times a day  famotidine 20 mg oral tablet: 1 tab(s) orally once a day  ferrous sulfate 325 mg (65 mg elemental iron) oral tablet: 1 tab(s) orally once a day  folic acid 1 mg oral tablet: 1 tab(s) orally once a day  hydrALAZINE 25 mg oral tablet: 1 tab(s) orally 3 times a day  isosorbide dinitrate 20 mg oral tablet: 0.5 tab(s) orally 2 times a day  lactulose 10 g/15 mL oral syrup: 30 milliliter(s) orally once a day  Lipitor 80 mg oral tablet: 1 tab(s) orally once a day  NIFEdipine 60 mg oral tablet, extended release: 1 tab(s) orally once a day  sevelamer hydrochloride 800 mg oral tablet: 2 tab(s) orally 3 times a day aspirin 81 mg oral tablet, chewable: 1 tab(s) orally once a day  calcitriol 0.25 mcg oral capsule: 1 cap(s) orally once a day  clopidogrel 75 mg oral tablet: 1 tab(s) orally once a day  Colcrys 0.6 mg oral tablet: 1 tab(s) orally 2 times a day  DilTIAZem Hydrochloride  mg/24 hours oral capsule, extended release: 1 cap(s) orally once a day  Eliquis 2.5 mg oral tablet: 1 tab(s) orally 2 times a day  epoetin ximena:   famotidine 20 mg oral tablet: 1 tab(s) orally once a day  ferrous sulfate 325 mg (65 mg elemental iron) oral tablet: 1 tab(s) orally once a day  folic acid 1 mg oral tablet: 1 tab(s) orally once a day  hydrALAZINE 25 mg oral tablet: 1 tab(s) orally 3 times a day  isosorbide dinitrate 20 mg oral tablet: 0.5 tab(s) orally 2 times a day  Lipitor 80 mg oral tablet: 1 tab(s) orally once a day  NIFEdipine 60 mg oral tablet, extended release: 1 tab(s) orally once a day  sevelamer hydrochloride 800 mg oral tablet: 2 tab(s) orally 3 times a day

## 2020-02-05 NOTE — PROGRESS NOTE ADULT - ASSESSMENT
74 y M, wheel chair bound, from [facility] with PMH significant for HTN, HLD, CVA with residual right sided weakness, ESRD on HD (M,W,F at [place]) Anemia, constipation, gout presents to Ed with L sided chest pain.    Pt will be admitted to tele for Chest pain

## 2020-02-05 NOTE — DISCHARGE NOTE PROVIDER - NSDCCPCAREPLAN_GEN_ALL_CORE_FT
PRINCIPAL DISCHARGE DIAGNOSIS  Diagnosis: Chest pain  Assessment and Plan of Treatment: - You presented to the hospital with a complaint of chest pain.   - EKG showed normal sinus rhythm.   - you got Admitted to telemetry unit.   - Your serial cardiac enzymes were negative for Heart attack.   - No arrhythmia seen on tele monitor.   - Your Echocardiogram was done and showed Ejection fraction 55% and moderate aortic stenosis.  - You were seen by cardiologist Dr. Morales who recommended stress test   - You refused stress test.  - You are medically stable to be discharged from the hospital.  - You need to follow up with your Primary Care Physician in 1 week. Also please follow with a cardiologist.      SECONDARY DISCHARGE DIAGNOSES  Diagnosis: HTN (hypertension)  Assessment and Plan of Treatment: Continue with blood pressure medication. Maintain a healthy diet that consist of low sugar, low fat, low sodium diet. Exercise frequently if possible.  Follow up with primary care physician in one week after discharge.    Diagnosis: ESRD (end stage renal disease) on dialysis  Assessment and Plan of Treatment: Continue your dialysis as intructed by your Nephrologist. Dialysis will be reinstated upon your discharge.  Please mantain a diet adequate for you condition  Continue dialysis on : Mon, wed and friday.    Diagnosis: Chronic atrial fibrillation  Assessment and Plan of Treatment: Please continue with your anticoagulation medication eliquis as instructed in the medication reconciliation and your primary care physician.  Please continue with you rate control medication: cardizem  as instructed in the medication reconciliation and your primary care physician.

## 2020-02-05 NOTE — PROGRESS NOTE ADULT - SUBJECTIVE AND OBJECTIVE BOX
Patient is a 74y old  Male who presents with a chief complaint of Chest pain (03 Feb 2020 15:46)  s doing ok , no cp , does not want stress test   MEDICATIONS  (STANDING):  apixaban 5 milliGRAM(s) Oral two times a day  aspirin  chewable 81 milliGRAM(s) Oral daily  atorvastatin 80 milliGRAM(s) Oral at bedtime  calcitriol   Capsule 0.25 MICROGram(s) Oral daily  clopidogrel Tablet 75 milliGRAM(s) Oral daily  colchicine 0.6 milliGRAM(s) Oral daily  diltiazem    milliGRAM(s) Oral daily  epoetin ximena Injectable 4000 Unit(s) IV Push <User Schedule>  ferrous    sulfate 325 milliGRAM(s) Oral daily  folic acid 1 milliGRAM(s) Oral daily  hydrALAZINE 25 milliGRAM(s) Oral three times a day  isosorbide   dinitrate Tablet (ISORDIL) 10 milliGRAM(s) Oral two times a day  lactulose Syrup 10 Gram(s) Oral daily  NIFEdipine XL 60 milliGRAM(s) Oral daily  pantoprazole    Tablet 40 milliGRAM(s) Oral before breakfast  sevelamer carbonate 1600 milliGRAM(s) Oral three times a day with meals    MEDICATIONS  (PRN):    __________________________________________________  REVIEW OF SYSTEMS:    CONSTITUTIONAL: No fever,   EYES: no acute visual disturbances  NECK: No pain or stiffness  RESPIRATORY: No cough; No shortness of breath  CARDIOVASCULAR: No chest pain, no palpitations  GASTROINTESTINAL: No pain. No nausea or vomiting; No diarrhea   NEUROLOGICAL: No headache or numbness, no tremors  MUSCULOSKELETAL: No joint pain, no muscle pain  GENITOURINARY: no dysuria, no frequency, no hesitancy  PSYCHIATRY: no depression , no anxiety  ALL OTHER  ROS negative    Vital Signs Last 24 Hrs  T(C): 36.8 (05 Feb 2020 11:11), Max: 37 (05 Feb 2020 07:52)  T(F): 98.3 (05 Feb 2020 11:11), Max: 98.6 (05 Feb 2020 07:52)  HR: 70 (05 Feb 2020 11:11) (70 - 88)  BP: 115/66 (05 Feb 2020 11:11) (115/66 - 152/64)  BP(mean): --  RR: 18 (05 Feb 2020 11:11) (17 - 18)  SpO2: 99% (05 Feb 2020 11:11) (96% - 100%)  ________________________________________________  PHYSICAL EXAM:  GENERAL: NAD, face swollen  HEENT: Normocephalic;  conjunctivae and sclerae clear; moist mucous membranes;   NECK : supple  CHEST/LUNG: crackles bilaterally  HEART: S1 S2  regular; no murmurs, gallops or rubs  ABDOMEN: Soft, Nontender, Nondistended; Bowel sounds present  EXTREMITIES: no cyanosis; no edema; no calf tenderness  SKIN: warm and dry; no rash  NERVOUS SYSTEM:  Awake and alert; Oriented  to place, person and time ;  Motor 5/5 on L side , 4/5 on R side    _________________________________________________    LABS:                        8.6    5.34  )-----------( 191      ( 05 Feb 2020 07:12 )             25.7     02-05    137  |  100  |  51<H>  ----------------------------<  92  4.6   |  28  |  9.40<H>    Ca    8.2<L>      05 Feb 2020 07:12  Phos  3.7     02-05  Mg     2.0     02-05    TPro  6.5  /  Alb  2.9<L>  /  TBili  0.5  /  DBili  x   /  AST  18  /  ALT  35  /  AlkPhos  89  02-05    PT/INR - ( 04 Feb 2020 05:27 )   PT: 12.1 sec;   INR: 1.09 ratio               CARDIAC MARKERS ( 04 Feb 2020 05:27 )  0.105 ng/mL / x     / x     / x     / x      CARDIAC MARKERS ( 03 Feb 2020 22:28 )  0.126 ng/mL / x     / x     / x     / x            Serum Pro-Brain Natriuretic Peptide: 6775 pg/mL (02-03-20 @ 14:08)            LABS:                        8.8    5.84  )-----------( 170      ( 04 Feb 2020 05:27 )             26.3     02-04    138  |  100  |  83<H>  ----------------------------<  89  5.2   |  25  |  12.40<H>    Ca    8.4      04 Feb 2020 05:27  Phos  4.0     02-04  Mg     2.1     02-04    TPro  7.0  /  Alb  3.1<L>  /  TBili  0.3  /  DBili  x   /  AST  21  /  ALT  43  /  AlkPhos  99  02-03    PT/INR - ( 04 Feb 2020 05:27 )   PT: 12.1 sec;   INR: 1.09 ratio             CAPILLARY BLOOD GLUCOSE            RADIOLOGY & ADDITIONAL TESTS:    < from: Xray Chest 1 View- PORTABLE-Urgent (02.03.20 @ 15:19) >  EXAM:  XR CHEST PORTABLE URGENT 1V                            PROCEDURE DATE:  02/03/2020          INTERPRETATION:  AP chest on February 3, 2020 at 3:12 PM. Patient has chest pain.    Heart enlargement again noted.    Slight left base pleural reaction again seen.    Vascular stent in the upper left arm is noted.    Chest is similar to January 4, 2019.    IMPRESSION: Heart enlargement. Slight left base pleural reaction, and vascular stent in the left upper arm again noted.        < end of copied text >      Imaging Personally Reviewed:  YES/NO    Consultant(s) Notes Reviewed:   YES/ No    Care Discussed with Consultants :     Plan of care was discussed with patient and /or primary care giver; all questions and concerns were addressed and care was aligned with patient's wishes.

## 2020-02-05 NOTE — DISCHARGE NOTE PROVIDER - NSDCFUSCHEDAPPT_GEN_ALL_CORE_FT
DORSAINVIL, JEANCLAUDE ; 02/27/2020 ; [facility] [address] DORSAINVIL, JEANCLAUDE ; 02/27/2020 ; [facility] [address] DORSAINVIL, JEANCLAUDE ; 02/27/2020 ; [facility] [address] DORSAINVIL, JEANCLAUDE ; 02/27/2020 ; [facility] [address] DORSAINVIL, JEANCLAUDE ; 02/27/2020 ; [facility] [address] DORSAINVIL, JEANCLAUDE ; 02/27/2020 ; [facility] [address] DORSAINVIL, JEANCLAUDE ; 02/27/2020 ; [facility] [address] DORSAINVIL, JEANCLAUDE ; 02/27/2020 ; [facility] [address]

## 2020-02-05 NOTE — DISCHARGE NOTE PROVIDER - NSDCQMSTROKE_NEU_ALL_CORE
----- Message from Keith Berkowitz MD sent at 9/6/2019 10:14 AM CDT -----  B12 folate normal kidney liver function normal   No

## 2020-02-05 NOTE — DISCHARGE NOTE NURSING/CASE MANAGEMENT/SOCIAL WORK - NSDCPEPTSTRK_GEN_ALL_CORE
Call 911 for stroke/Stroke education booklet/Stroke support groups for patients, families, and friends/Risk factors for stroke/Stroke warning signs and symptoms/Signs and symptoms of stroke/Need for follow up after discharge/Prescribed medications

## 2020-02-05 NOTE — DISCHARGE NOTE NURSING/CASE MANAGEMENT/SOCIAL WORK - PATIENT PORTAL LINK FT
You can access the FollowMyHealth Patient Portal offered by [facility] by registering at the following website: [email or web address]. By joining [employer]’s FollowMyHealth portal, you will also be able to view your health information using other applications (apps) compatible with our system.

## 2020-02-05 NOTE — PROGRESS NOTE ADULT - ASSESSMENT
ESRD due to hypertension   Chest pain with increased Troponin level, refused workup.  possible discharge today    ESRD    Agree for dialysis today , his dialysis days are MWF and on Monday he received only 30 minutes of dialysis

## 2020-02-05 NOTE — DISCHARGE NOTE PROVIDER - HOSPITAL COURSE
74 y M, wheel chair bound, from [facility] with PMH significant for HTN, HLD, CVA with residual right sided weakness, ESRD on HD (M,W,F at [place]) Anemia, constipation, gout presents to Ed with L sided chest pain.  Today during his dialysis session, the pt experienced sudden, sharp, nonpleuritic, nonradiating 8/10 chest pain in his L chest lasting for approx 15 mins, associated with palpitations. Denied nausea, vomiting, dizziness, dyspnea, vision changes. EMS administered nitro 0.4mg SL and ASA 81mg after which pt reports the chest pain resolved completely. Has experienced similar pain once before during HD session, which resolved spontaneously without medication. Denies prior history of exertional chest pain, recent illness, lower extremity swelling/pain/erythema, SOB, sweating, N/V/D/C or any other complaints     Pt only finished 30 mins of dialysis        ED course     Vitals : afebrile HR 80 /90 improved to 137/70 without any intervention     Labs : wbc 5k , hb 9.6 K 4.9 . T1 0.106 bnp 6700    CXR : clear , no congestion     ECG : sinus rhythm with no NYA         SH : Denies Smoking, alcohol or drug use         P 74 y M, wheel chair bound, from [facility] with PMH significant for HTN, HLD, CVA with residual right sided weakness, ESRD on HD (M,W,F at [place]) Anemia, constipation, gout presented to Ed with L sided chest pain during his dialysis session, the pt experienced sudden, sharp, nonpleuritic, nonradiating 8/10 chest pain in his L chest lasting for approx 15 mins, associated with palpitations. Denied nausea, vomiting, dizziness, dyspnea, vision changes. EMS administered nitro 0.4mg SL and ASA 81mg after which pt reports the chest pain resolved completely. Has experienced similar pain once before during HD session, which resolved spontaneously without medication.         ED course :    Vitals : afebrile HR 80 /90 improved to 137/70 without any intervention     Labs : wbc 5k , hb 9.6 K 4.9 . T1 0.106 bnp 6700    CXR : clear , no congestion         Patient was admitted to [facility] for ACS rule out. ECG was sinus rhythm with no NYA. Troponins mildly elevated with Tmax of 0.12.  Pro-BNP was 6715. Pt was c/w aspirin, cardizem and statin . Echo showed EF 55% and moderate AS. Patient refused stress test, cardiologist was Dr Morales. Patient got dialysis on 2/4, nephro was Dr Pritchard. For chronic afib, patient was continued on eliquis and on cardizem, isodril, nifedipine and hydralazine for HTN.    Patient is stable for discharge per attending and is advised to follow up with PCP as outpatient    Please refer to patient's complete medical chart with documents for a full hospital course, for this is only a brief summary.

## 2020-02-05 NOTE — PROGRESS NOTE ADULT - SUBJECTIVE AND OBJECTIVE BOX
Problem List:  74 y M, wheel chair bound, from [facility] with PMH significant for HTN, HLD, CVA with residual right sided weakness, ESRD on HD (M,W,F at [place]) Anemia, constipation, gout presents to Ed with L sided chest pain. Yesterday afterv 30 minutes of dialysis he experienced severe chest pain , no radiation to the back or shoulder  No SOB  911 was called and he received ASA and O2  Pain lasted for 20 minutes  No more chest pain from that time.    Patient refused stress test and work up for CAD.  Echo    1. Normal mitral valve. Trace mitral regurgitation.  2. Calcified aortic valve not well visualized. Moderate  aortic stenosis. Trace aortic regurgitation.  3. Normal aortic root.  4. Normal left ventricular internal dimensions and wall  thicknesses.  5. Endocardium not well visualized; probably normal left  ventricular systolic function.  6. Grade I diastolic dysfunction (Impaired relaxation).  7. Right ventricle not well visualized. Normal RV systolic  function (RV tissue Doppler 12 cm/s).  8. Pulmonic valve not well seen. Trace pulmonic  insufficiency is noted.  9. No pericardial effusion.       ED course   Vitals : afebrile HR 80 /90 improved to 137/70 without any intervention  Troponin I, Serum (02.03.20 @ 14:08)    Troponin I, Serum: 0.106: TYPE:(C=Critical, N=Notification, A=Abnormal) Y- 0.126.126      PAST MEDICAL & SURGICAL HISTORY:  Cerebrovascular accident (CVA), unspecified mechanism  Anemia  Hypertension  Diabetes  ESRD (end stage renal disease) on dialysis  A-V fistula      No Known Allergies      MEDICATIONS  (STANDING):  apixaban 5 milliGRAM(s) Oral two times a day  aspirin  chewable 81 milliGRAM(s) Oral daily  atorvastatin 80 milliGRAM(s) Oral at bedtime  calcitriol   Capsule 0.25 MICROGram(s) Oral daily  clopidogrel Tablet 75 milliGRAM(s) Oral daily  colchicine 0.6 milliGRAM(s) Oral daily  diltiazem    milliGRAM(s) Oral daily  epoetin ximena Injectable 4000 Unit(s) IV Push <User Schedule>  ferrous    sulfate 325 milliGRAM(s) Oral daily  folic acid 1 milliGRAM(s) Oral daily  hydrALAZINE 25 milliGRAM(s) Oral three times a day  isosorbide   dinitrate Tablet (ISORDIL) 10 milliGRAM(s) Oral two times a day  lactulose Syrup 10 Gram(s) Oral daily  NIFEdipine XL 60 milliGRAM(s) Oral daily  pantoprazole    Tablet 40 milliGRAM(s) Oral before breakfast  sevelamer carbonate 1600 milliGRAM(s) Oral three times a day with meals    MEDICATIONS  (PRN):                            8.6    5.34  )-----------( 191      ( 05 Feb 2020 07:12 )             25.7     02-05    137  |  100  |  51<H>  ----------------------------<  92  4.6   |  28  |  9.40<H>    Ca    8.2<L>      05 Feb 2020 07:12  Phos  3.7     02-05  Mg     2.0     02-05    TPro  6.5  /  Alb  2.9<L>  /  TBili  0.5  /  DBili  x   /  AST  18  /  ALT  35  /  AlkPhos  89  02-05    PT/INR - ( 04 Feb 2020 05:27 )   PT: 12.1 sec;   INR: 1.09 ratio                 REVIEW OF SYSTEMS:  General: no fever no chills, no weight loss.    RESPIRATORY: No cough, wheezing, hemoptysis; No shortness of breath  CARDIOVASCULAR: No chest pain or palpitations. No Edema  GASTROINTESTINAL: No abdominal or epigastric pain. No nausea, vomiting. No diarrhea or constipation. No melena.  GENITOURINARY: No dysuria, frequency, foamy urine, urinary urgency, incontinence or hematuria  NEUROLOGICAL: No numbness or weakness, no tremor , no dizziness.   Muscle skeletal : no joint pain and no swelling of joints and limbs.  SKIN: No itching, burning, rashes.        VITALS:  T(F): 98.3 (02-05-20 @ 11:11), Max: 98.6 (02-05-20 @ 07:52)  HR: 70 (02-05-20 @ 11:11)  BP: 115/66 (02-05-20 @ 11:11)  RR: 18 (02-05-20 @ 11:11)  SpO2: 99% (02-05-20 @ 11:11)  Wt(kg): --      PHYSICAL EXAM:  Constitutional: well developed, no diaphoresis, no distress.  Neck: No JVD, no carotid bruit, supple, no adenopathy  Respiratory: Good air entrance B/L, no wheezes, rales or rhonchi  Cardiovascular: S1, S2, RRR, no pericardial rub, no murmur  Abdomen: BS+, soft, no tenderness, no bruit  Pelvis: bladder nondistended  Extremities:  No peripheral edema.       Vascular Access: right AVG upper arm

## 2020-02-18 ENCOUNTER — APPOINTMENT (OUTPATIENT)
Dept: VASCULAR SURGERY | Facility: CLINIC | Age: 75
End: 2020-02-18
Payer: MEDICARE

## 2020-02-18 PROCEDURE — 93923 UPR/LXTR ART STDY 3+ LVLS: CPT

## 2020-02-18 PROCEDURE — 93990 DOPPLER FLOW TESTING: CPT | Mod: 59

## 2020-02-18 PROCEDURE — 93979 VASCULAR STUDY: CPT

## 2020-03-02 ENCOUNTER — FORM ENCOUNTER (OUTPATIENT)
Age: 75
End: 2020-03-02

## 2020-03-02 PROBLEM — I63.9 CEREBRAL INFARCTION, UNSPECIFIED: Chronic | Status: ACTIVE | Noted: 2020-02-03

## 2020-03-03 ENCOUNTER — APPOINTMENT (OUTPATIENT)
Dept: ENDOVASCULAR SURGERY | Facility: CLINIC | Age: 75
End: 2020-03-03
Payer: MEDICARE

## 2020-03-03 VITALS
HEART RATE: 74 BPM | BODY MASS INDEX: 30.04 KG/M2 | WEIGHT: 175 LBS | TEMPERATURE: 98 F | SYSTOLIC BLOOD PRESSURE: 176 MMHG | OXYGEN SATURATION: 100 % | DIASTOLIC BLOOD PRESSURE: 75 MMHG | RESPIRATION RATE: 18 BRPM

## 2020-03-03 PROCEDURE — 99213 OFFICE O/P EST LOW 20 MIN: CPT | Mod: 25,57

## 2020-03-03 PROCEDURE — 36907Z: CUSTOM | Mod: 59

## 2020-03-03 PROCEDURE — 36902Z: CUSTOM

## 2020-03-16 NOTE — PAST MEDICAL HISTORY
[Altered mobility] : Altered mobility [Increasing age ( >40 years old)] : Increasing age ( >40 years old) [No therapy indicated for cases scheduled for less than one hour] : No therapy indicated for cases scheduled for less than one hour. [FreeTextEntry1] : Malignant Hyperthermia Screening Tool and Risk of Bleeding Assessment\par \par Mr. JEANCLAUDE DORSAINVIL denies family history of unexpected death following Anesthesia or Exercise.\par Denies Family history of Malignant Hyperthermia, Muscle or Neuromuscular disorder and High Temperature following exercise.\par \par Mr. JEANCLAUDE DORSAINVIL denies history of Muscle Spasm, Dark or Chocolate - Colored urine and Unanticipated fever immediately following anesthesia or serious exercise. \par Mr. GONG also denies bleeding tendencies/ Risks of Bleeding.\par

## 2020-03-16 NOTE — PHYSICAL EXAM
[No Rash or Lesion] : No rash or lesion [Alert] : alert [Calm] : calm [Thrill] : no thrill [Pulsatile Thrill] : no pulsatile thrill [FreeTextEntry1] : ulcer noted over [JVD] : no jugular venous distention  [Ankle Swelling (On Exam)] : not present [Varicose Veins Of Lower Extremities] : not present [] : not present [Skin Ulcer] : no ulcer [de-identified] : appears well

## 2020-03-16 NOTE — REASON FOR VISIT
[Other ___] : a [unfilled] visit for [Formal Caregiver] : formal caregiver [Prolonged Bleeding] : prolonged bleeding [FreeTextEntry2] : referral for prolonged bleeding

## 2020-05-19 NOTE — DISCHARGE NOTE PROVIDER - CARE PROVIDERS DIRECT ADDRESSES
aerobic capacity/endurance/integumentary integrity
,DirectAddress_Unknown
gait, locomotion, and balance/aerobic capacity/endurance

## 2020-06-09 ENCOUNTER — APPOINTMENT (OUTPATIENT)
Dept: VASCULAR SURGERY | Facility: CLINIC | Age: 75
End: 2020-06-09
Payer: MEDICARE

## 2020-06-09 VITALS — TEMPERATURE: 97.5 F | WEIGHT: 175 LBS | HEIGHT: 64 IN | BODY MASS INDEX: 29.88 KG/M2

## 2020-06-09 PROCEDURE — 93990 DOPPLER FLOW TESTING: CPT

## 2020-07-13 ENCOUNTER — EMERGENCY (EMERGENCY)
Facility: HOSPITAL | Age: 75
LOS: 1 days | Discharge: HOME | End: 2020-07-13
Attending: EMERGENCY MEDICINE
Payer: MEDICARE

## 2020-07-13 VITALS
RESPIRATION RATE: 18 BRPM | HEART RATE: 84 BPM | SYSTOLIC BLOOD PRESSURE: 154 MMHG | OXYGEN SATURATION: 98 % | DIASTOLIC BLOOD PRESSURE: 68 MMHG | TEMPERATURE: 98 F

## 2020-07-13 VITALS
OXYGEN SATURATION: 96 % | HEART RATE: 88 BPM | DIASTOLIC BLOOD PRESSURE: 74 MMHG | TEMPERATURE: 98 F | RESPIRATION RATE: 18 BRPM | SYSTOLIC BLOOD PRESSURE: 179 MMHG

## 2020-07-13 DIAGNOSIS — I77.0 ARTERIOVENOUS FISTULA, ACQUIRED: Chronic | ICD-10-CM

## 2020-07-13 PROCEDURE — 99282 EMERGENCY DEPT VISIT SF MDM: CPT

## 2020-07-13 NOTE — ED ADULT TRIAGE NOTE - CHIEF COMPLAINT QUOTE
ineza sent from dialysis center for eval bleeding to Rt upper arm AV graft s/p completion of dialysis today . no active bleeding noted

## 2020-07-13 NOTE — ED PROVIDER NOTE - PATIENT PORTAL LINK FT
You can access the FollowMyHealth Patient Portal offered by [facility] by registering at the following website: [email or web address]. By joining [employer]’s FollowMyHealth portal, you will also be able to view your health information using other applications (apps) compatible with our system.

## 2020-07-13 NOTE — ED ADULT NURSE NOTE - OBJECTIVE STATEMENT
Pt sent from dialysis for bleeding AV graft today. no bleeding in er, dsg intacted. Dialysis Mon/wed/ fri

## 2020-07-13 NOTE — ED PROVIDER NOTE - OBJECTIVE STATEMENT
75 y.o male with a PMHX of DM, HTN, ESRD on dialysis, anemia and no PSHx presents to the ED c/o bleeding from AV graft. Patient endorses he had full dialysis for x3-4 hours. Patient endorses the dressing was placed and sent to ED for further evaluation. Patient denies any other acute complaints. NKDA

## 2020-07-13 NOTE — ED ADULT TRIAGE NOTE - DOMESTIC TRAVEL HIGH RISK QUESTION
Nurses notes reviewed and accepted.     Subjective:  This 22-year-old male patient presents to the clinic with his mother who he wishes to have in the room during treatment. He presents with complaint of ingrowing toenail on the other side of the left great toe. He had a permanent removal to the fibular edge of the left great toenail and is not having problems with that edge any longer, however, he is noticing pains and pressure and pulse drainage from the outside edge of the left great toenail. Once the pus drainage was expressed the pain level decreased. He has been performing Epson salt soaks twice daily followed by antibiotic ointment and a gauze bandage. It is improved in its pain level currently. He does not recall any trauma to this edge of the toe.    Objective:  Patient presents to the clinic alert, pleasant, cooperative in no acute distress. Pedal pulses palpable to both dorsalis pedis and posterior tibial of the left lower extremity. Skin temperature warm to warm, proximal ankle to distal digits, left. Hair growth is seen dorsally on the left hallux and the dorsum of the left foot.    Evaluation the left hallux is state reveals acute erythema and minor edema to the tibial border of the nail plate and dried hyperkeratotic tissue to the entire tibial border of the left hallux. Tenderness with palpation to the distal tibial nail fold. The nail plate at the distal tibial edge appears to be cracked medially into the medial nail fold. There is granulation tissue along the distal tibial nail fold left hallux nail. No purulent drainage expressed. No ascending cellulitis noted.    Assessment:  Onychia left hallux tibial border    Plan:  I was able to trim back the nail plate that was chipped medially in the distal tibial nail fold. I applied antibiotic ointment and a gauze bandage. I've instructed him to perform Epson salt soaks once daily followed by antibiotic ointment and a gauze bandage until completely  healed. If not healed within 2 weeks he may need to consider P&A matrixectomy to the tibial border the left hallux nail plate, however, I feel that this will likely heal well for him and he will not likely need permanent removal to the tibial border. He and his mother verbalized their understanding of this.   No

## 2020-07-14 ENCOUNTER — APPOINTMENT (OUTPATIENT)
Dept: ENDOVASCULAR SURGERY | Facility: CLINIC | Age: 75
End: 2020-07-14
Payer: MEDICARE

## 2020-07-14 ENCOUNTER — FORM ENCOUNTER (OUTPATIENT)
Age: 75
End: 2020-07-14

## 2020-07-14 NOTE — PROCEDURE
[FreeTextEntry1] : right arm AVG fistulogram/angioplasty [Resume diet] : resume diet [Site check for bleeding/hematoma/thrill/bruit] : Site check for bleeding/hematoma/thrill/bruit [Vital signs on admission the q 15 mins x2] : Vital signs on admission the q 15 mins x2

## 2020-07-14 NOTE — HISTORY OF PRESENT ILLNESS
[FreeTextEntry1] : referred from dialysis\par accompanied by nsg home aide\par feels ok\par  no meds this morning\par uses w/c, no falls\par  [] : in right upper arm [FreeTextEntry3] : 10/8/2013 Dr. Chaudhary [FreeTextEntry4] : yesterday [FreeTextEntry5] : yesterday at  [FreeTextEntry6] : Dr. Yadav

## 2020-07-14 NOTE — REASON FOR VISIT
[Other ___] : a [unfilled] visit for [Prolonged Bleeding] : prolonged bleeding [Formal Caregiver] : formal caregiver

## 2020-07-15 ENCOUNTER — APPOINTMENT (OUTPATIENT)
Dept: ENDOVASCULAR SURGERY | Facility: CLINIC | Age: 75
End: 2020-07-15
Payer: MEDICARE

## 2020-07-15 VITALS
SYSTOLIC BLOOD PRESSURE: 212 MMHG | TEMPERATURE: 98.1 F | RESPIRATION RATE: 18 BRPM | WEIGHT: 175 LBS | HEIGHT: 64 IN | BODY MASS INDEX: 29.88 KG/M2 | HEART RATE: 76 BPM | OXYGEN SATURATION: 100 % | DIASTOLIC BLOOD PRESSURE: 75 MMHG

## 2020-07-15 PROCEDURE — 99213 OFFICE O/P EST LOW 20 MIN: CPT | Mod: 25

## 2020-07-15 PROCEDURE — 36907Z: CUSTOM | Mod: 59

## 2020-07-15 PROCEDURE — 36902Z: CUSTOM

## 2020-07-15 RX ORDER — FOLIC ACID 1 MG/1
1 TABLET ORAL
Refills: 0 | Status: ACTIVE | COMMUNITY

## 2020-07-16 NOTE — HISTORY OF PRESENT ILLNESS
[] : in right upper arm [FreeTextEntry1] : referred from dialysis\par accompanied by mya MA\par feels ok\par  no meds this morning\par uses w/c, no reported falls\par refuses to take Eliquis, only medication he takes is Renvela\par  [FreeTextEntry3] : 10/8/2013 Dr. Chaudhary [FreeTextEntry4] : yesterday [FreeTextEntry5] : yesterday at 6 pm [FreeTextEntry6] : Dr. Yadav

## 2020-07-16 NOTE — PHYSICAL EXAM
[No Rash or Lesion] : No rash or lesion [Alert] : alert [Calm] : calm [Pulsatile Thrill] : pulsatile thrill [FreeTextEntry1] : ulcer noted over [JVD] : no jugular venous distention  [Ankle Swelling (On Exam)] : not present [Varicose Veins Of Lower Extremities] : not present [] : not present [Skin Ulcer] : no ulcer [de-identified] : appears well

## 2020-07-23 ENCOUNTER — EMERGENCY (EMERGENCY)
Facility: HOSPITAL | Age: 75
LOS: 1 days | Discharge: HOME | End: 2020-07-23
Attending: EMERGENCY MEDICINE
Payer: MEDICARE

## 2020-07-23 ENCOUNTER — FORM ENCOUNTER (OUTPATIENT)
Age: 75
End: 2020-07-23

## 2020-07-23 VITALS
OXYGEN SATURATION: 100 % | DIASTOLIC BLOOD PRESSURE: 70 MMHG | HEART RATE: 71 BPM | RESPIRATION RATE: 16 BRPM | WEIGHT: 179.9 LBS | SYSTOLIC BLOOD PRESSURE: 189 MMHG | TEMPERATURE: 98 F

## 2020-07-23 VITALS
OXYGEN SATURATION: 97 % | HEART RATE: 71 BPM | SYSTOLIC BLOOD PRESSURE: 177 MMHG | RESPIRATION RATE: 16 BRPM | DIASTOLIC BLOOD PRESSURE: 72 MMHG

## 2020-07-23 DIAGNOSIS — I77.0 ARTERIOVENOUS FISTULA, ACQUIRED: Chronic | ICD-10-CM

## 2020-07-23 LAB
ALBUMIN SERPL ELPH-MCNC: 3.2 G/DL — LOW (ref 3.5–5)
ALP SERPL-CCNC: 112 U/L — SIGNIFICANT CHANGE UP (ref 40–120)
ALT FLD-CCNC: 22 U/L DA — SIGNIFICANT CHANGE UP (ref 10–60)
ANION GAP SERPL CALC-SCNC: 8 MMOL/L — SIGNIFICANT CHANGE UP (ref 5–17)
APTT BLD: 35.6 SEC — HIGH (ref 27.5–35.5)
AST SERPL-CCNC: 16 U/L — SIGNIFICANT CHANGE UP (ref 10–40)
BASOPHILS # BLD AUTO: 0.03 K/UL — SIGNIFICANT CHANGE UP (ref 0–0.2)
BASOPHILS NFR BLD AUTO: 0.6 % — SIGNIFICANT CHANGE UP (ref 0–2)
BILIRUB SERPL-MCNC: 0.5 MG/DL — SIGNIFICANT CHANGE UP (ref 0.2–1.2)
BUN SERPL-MCNC: 52 MG/DL — HIGH (ref 7–18)
CALCIUM SERPL-MCNC: 8.5 MG/DL — SIGNIFICANT CHANGE UP (ref 8.4–10.5)
CHLORIDE SERPL-SCNC: 96 MMOL/L — SIGNIFICANT CHANGE UP (ref 96–108)
CO2 SERPL-SCNC: 31 MMOL/L — SIGNIFICANT CHANGE UP (ref 22–31)
CREAT SERPL-MCNC: 9.23 MG/DL — HIGH (ref 0.5–1.3)
EOSINOPHIL # BLD AUTO: 0.15 K/UL — SIGNIFICANT CHANGE UP (ref 0–0.5)
EOSINOPHIL NFR BLD AUTO: 3.2 % — SIGNIFICANT CHANGE UP (ref 0–6)
GLUCOSE SERPL-MCNC: 85 MG/DL — SIGNIFICANT CHANGE UP (ref 70–99)
HCT VFR BLD CALC: 30.5 % — LOW (ref 39–50)
HGB BLD-MCNC: 10.1 G/DL — LOW (ref 13–17)
IMM GRANULOCYTES NFR BLD AUTO: 0.2 % — SIGNIFICANT CHANGE UP (ref 0–1.5)
LYMPHOCYTES # BLD AUTO: 1.59 K/UL — SIGNIFICANT CHANGE UP (ref 1–3.3)
LYMPHOCYTES # BLD AUTO: 34 % — SIGNIFICANT CHANGE UP (ref 13–44)
MCHC RBC-ENTMCNC: 32.6 PG — SIGNIFICANT CHANGE UP (ref 27–34)
MCHC RBC-ENTMCNC: 33.1 GM/DL — SIGNIFICANT CHANGE UP (ref 32–36)
MCV RBC AUTO: 98.4 FL — SIGNIFICANT CHANGE UP (ref 80–100)
MONOCYTES # BLD AUTO: 0.64 K/UL — SIGNIFICANT CHANGE UP (ref 0–0.9)
MONOCYTES NFR BLD AUTO: 13.7 % — SIGNIFICANT CHANGE UP (ref 2–14)
NEUTROPHILS # BLD AUTO: 2.25 K/UL — SIGNIFICANT CHANGE UP (ref 1.8–7.4)
NEUTROPHILS NFR BLD AUTO: 48.3 % — SIGNIFICANT CHANGE UP (ref 43–77)
NRBC # BLD: 0 /100 WBCS — SIGNIFICANT CHANGE UP (ref 0–0)
PLATELET # BLD AUTO: 95 K/UL — LOW (ref 150–400)
POTASSIUM SERPL-MCNC: 5.1 MMOL/L — SIGNIFICANT CHANGE UP (ref 3.5–5.3)
POTASSIUM SERPL-SCNC: 5.1 MMOL/L — SIGNIFICANT CHANGE UP (ref 3.5–5.3)
PROT SERPL-MCNC: 7.2 G/DL — SIGNIFICANT CHANGE UP (ref 6–8.3)
RBC # BLD: 3.1 M/UL — LOW (ref 4.2–5.8)
RBC # FLD: 14.4 % — SIGNIFICANT CHANGE UP (ref 10.3–14.5)
SODIUM SERPL-SCNC: 135 MMOL/L — SIGNIFICANT CHANGE UP (ref 135–145)
WBC # BLD: 4.67 K/UL — SIGNIFICANT CHANGE UP (ref 3.8–10.5)
WBC # FLD AUTO: 4.67 K/UL — SIGNIFICANT CHANGE UP (ref 3.8–10.5)

## 2020-07-23 PROCEDURE — 85027 COMPLETE CBC AUTOMATED: CPT

## 2020-07-23 PROCEDURE — 99283 EMERGENCY DEPT VISIT LOW MDM: CPT

## 2020-07-23 PROCEDURE — 86900 BLOOD TYPING SEROLOGIC ABO: CPT

## 2020-07-23 PROCEDURE — 85730 THROMBOPLASTIN TIME PARTIAL: CPT

## 2020-07-23 PROCEDURE — 86850 RBC ANTIBODY SCREEN: CPT

## 2020-07-23 PROCEDURE — 36415 COLL VENOUS BLD VENIPUNCTURE: CPT

## 2020-07-23 PROCEDURE — 80053 COMPREHEN METABOLIC PANEL: CPT

## 2020-07-23 PROCEDURE — 86901 BLOOD TYPING SEROLOGIC RH(D): CPT

## 2020-07-23 NOTE — ED PROVIDER NOTE - PATIENT PORTAL LINK FT
You can access the FollowMyHealth Patient Portal offered by [facility] by registering at the following website: [email or web address]. By joining [facility]’s FollowMyHealth portal, you will also be able to view your health information using other applications (apps) compatible with our system.

## 2020-07-23 NOTE — ED PROVIDER NOTE - NSFOLLOWUPINSTRUCTIONS_ED_ALL_ED_FT
Arteriovenous Fistula Creation for Hemodialysis    AMBULATORY CARE:    What you need to know about an arteriovenous fistula (AVF) creation: An AVF creation is surgery to connect an artery to a vein. This surgery is done so you can receive hemodialysis. The AVF is usually placed in your forearm or upper arm.     How to prepare for an AVF creation:     Your surgeon will talk to you about how to prepare for surgery. You may need an ultrasound of your arm before surgery. An ultrasound will help your surgeon decide which artery and vein he will use to create your AVF. You may need to stop taking blood thinners 1 week before surgery.       Your surgeon may tell you not to eat or drink anything after midnight on the day of your surgery. He will tell you what medicines to take or not take on the day of your surgery. You may be given an antibiotic through your IV to help prevent a bacterial infection. Tell a healthcare provider if you have ever had an allergic reaction to an antibiotic. Ask someone to drive you home and stay with you after surgery.     What will happen during an AVF creation: You may be given general anesthesia to keep you asleep and free from pain during surgery. You may instead be given local or regional anesthesia to numb the surgery area. With local or regional anesthesia, you may still feel pressure or pushing during surgery, but you should not feel any pain. Your surgeon will make an incision in your arm. He will connect your artery and vein with stitches. Your incision will be closed with stitches and covered with a bandage.     What will happen after an AVF creation:     Healthcare providers will monitor you until you are awake. They will feel the area over your AVF for a thrill, and listen for a bruit. A thrill is a vibration, and a bruit is a humming noise. The presence of a bruit and a thrill mean that blood is moving through your AVF properly. A healthcare provider will show you how to feel for a thrill.       Your arm may feel sore for several days after your surgery. You may have mild bruising or swelling near your wound. Your wound may drain a few drops of blood or pink fluid for 24 hours. Your AVF will take 2 to 3 months to heal. After this time, it can be used for hemodialysis.     Risks of an AVF creation: You may bleed more than expected or get an infection. Your AVF may become blocked. This may stop blood flow through your AVF or to your arm or hand. You may need surgery to fix this or create another AVF. You may get a blood clot in your arm or leg. This may become life-threatening.     Call 911 for any of the following:     You feel lightheaded, short of breath, and have chest pain.       You cough up blood.       You have trouble breathing.      You cannot stop the bleeding from your wound even after you hold firm pressure for 10 minutes.     Seek care immediately if:     Blood soaks through your bandage.      Your arm, hand, or fingers are cold, numb, blue, or pale.       Your bruise suddenly gets bigger.       You have trouble moving your arm, hand, or fingers.       Your arm or leg feels warm, tender, and painful. It may look swollen and red.    Contact your healthcare provider if:     You have a fever or chills.      Your wound is red, swollen, or draining pus.      You have nausea or are vomiting.      Your skin is itchy, swollen, or you have a rash.      You cannot feel a thrill over your AVF.       You have questions or concerns about your condition or care.    Medicines: You may need any of the following:     Prescription pain medicine may be given. Ask your healthcare provider how to take this medicine safely. Some prescription pain medicines contain acetaminophen. Do not take other medicines that contain acetaminophen without talking to your healthcare provider. Too much acetaminophen may cause liver damage. Prescription pain medicine may cause constipation. Ask your healthcare provider how to prevent or treat constipation.       Acetaminophen decreases pain and fever. It is available without a doctor's order. Ask how much to take and how often to take it. Follow directions. Read the labels of all other medicines you are using to see if they also contain acetaminophen, or ask your doctor or pharmacist. Acetaminophen can cause liver damage if not taken correctly. Do not use more than 4 grams (4,000 milligrams) total of acetaminophen in one day.       Take your medicine as directed. Contact your healthcare provider if you think your medicine is not helping or if you have side effects. Tell him or her if you are allergic to any medicine. Keep a list of the medicines, vitamins, and herbs you take. Include the amounts, and when and why you take them. Bring the list or the pill bottles to follow-up visits. Carry your medicine list with you in case of an emergency.    Care for your wound as directed: Remove your bandage in 48 hours or as directed. Carefully wash around the wound with soap and water. Dry the area and put on new, clean bandages as directed. Change your bandages when they get wet or dirty.    If bleeding from your wound occurs: Apply firm, steady pressure to stop the bleeding. Apply pressure with a clean gauze or towel for 5 to 10 minutes. Call 911 if bleeding becomes heavy or does not stop. How to Stop Wound Bleeding         Activity guidelines for your arm with the AVF:     Do not lift anything heavier than 5 pounds for 48 hours or as directed.       Do not push or pull with your arm.       Do not sleep with your arm tucked under you.       Do not carry a purse or bags with your arm.       Do not wear tight-fitting clothing or jewelry over your arm or hand.       After 48 hours, do gentle arm exercises as directed. These exercises will help your AVF heal.     Feel for a thrill over your AVF: Your healthcare provider will tell you how often to feel for a thrill. Place your index finger and second finger over your wound. You should feel a vibration.     Apply ice: Apply ice on your wound for 15 to 20 minutes every hour or as directed. Use an ice pack, or put crushed ice in a plastic bag. Cover it with a towel before you apply it to your skin. Ice helps prevent tissue damage and decreases swelling and pain.    Elevate your arm: Elevate your arm with the AVF above the level of your heart as often as you can. This will help decrease swelling and pain. Prop your arm on pillows or blankets to keep it elevated comfortably.     Tell healthcare providers that you have an AVF. Tell them not to do IVs, blood draws, and blood pressure readings in your arm with the AVF. Do not get vaccinations, such as a flu shot, in your arm with the AVF. These actions can help prevent infection, bleeding, or damage to your AVF.     Follow up with your healthcare provider as directed: Write down your questions so you remember to ask them during your visits.        © Copyright [employer] 2020       back to top                      © Copyright [employer] 2020

## 2020-07-23 NOTE — ED ADULT NURSE NOTE - NSIMPLEMENTINTERV_GEN_ALL_ED
Implemented All Fall with Harm Risk Interventions:  [place] to call system. Call bell, personal items and telephone within reach. Instruct patient to call for assistance. Room bathroom lighting operational. Non-slip footwear when patient is off stretcher. Physically safe environment: no spills, clutter or unnecessary equipment. Stretcher in lowest position, wheels locked, appropriate side rails in place. Provide visual cue, wrist band, yellow gown, etc. Monitor gait and stability. Monitor for mental status changes and reorient to person, place, and time. Review medications for side effects contributing to fall risk. Reinforce activity limits and safety measures with patient and family. Provide visual clues: red socks.

## 2020-07-23 NOTE — ED PROVIDER NOTE - CLINICAL SUMMARY MEDICAL DECISION MAKING FREE TEXT BOX
pt had bleeding from av fistula site  not bleeding now  will check cbc and if normal will dc back to [facility]  spoke to nurse on 5th floor of [facility] reagarding plan

## 2020-07-23 NOTE — ED PROVIDER NOTE - OBJECTIVE STATEMENT
[place] [facility] [place] was bleeding   pressure bandage placed by nursing and no active bleeding on arrival  last HD was yesterday

## 2020-07-24 ENCOUNTER — APPOINTMENT (OUTPATIENT)
Dept: ENDOVASCULAR SURGERY | Facility: CLINIC | Age: 75
End: 2020-07-24
Payer: MEDICARE

## 2020-07-24 VITALS
RESPIRATION RATE: 15 BRPM | HEIGHT: 64 IN | OXYGEN SATURATION: 98 % | WEIGHT: 175 LBS | SYSTOLIC BLOOD PRESSURE: 198 MMHG | TEMPERATURE: 98 F | BODY MASS INDEX: 29.88 KG/M2 | DIASTOLIC BLOOD PRESSURE: 76 MMHG | HEART RATE: 74 BPM

## 2020-07-24 PROCEDURE — 36902Z: CUSTOM

## 2020-07-24 PROCEDURE — 36907Z: CUSTOM | Mod: 59

## 2020-07-29 NOTE — PROGRESS NOTE ADULT - PROBLEM SELECTOR PLAN 6
[facility]        NAME: Drema Osgood is a 47 y o  female  : 1968    MRN: [id number]  DATE: 2020  TIME: 8:35 AM    Assessment and Plan   Frequent urination [R35 0]  1  Frequent urination  POCT urine dip    Urine culture    sulfamethoxazole-trimethoprim (BACTRIM DS) 800-160 mg per tablet    phenazopyridine (PYRIDIUM) 100 mg tablet         Patient Instructions       Follow up with PCP in 3-5 days  Proceed to  ER if symptoms worsen  Chief Complaint     Chief Complaint   Patient presents with    Possible UTI     frequnecy and pressure that started today          History of Present Illness       Urinary Tract Infection    This is a new problem  The current episode started today  The problem occurs every urination  The problem has been unchanged  The quality of the pain is described as burning  The pain is at a severity of 4/10  The pain is moderate  There has been no fever  She is not sexually active  There is no history of pyelonephritis  Associated symptoms include frequency  She has tried nothing for the symptoms  The treatment provided no relief  Review of Systems   Review of Systems   Constitutional: Negative  Respiratory: Negative  Cardiovascular: Negative  Genitourinary: Positive for frequency  All other systems reviewed and are negative          Current Medications       Current Outpatient Medications:     albuterol (Ventolin HFA) 90 mcg/act inhaler, Inhale 2 puffs every 6 (six) hours as needed for wheezing or shortness of breath, Disp: 1 Inhaler, Rfl: 0    atoMOXetine (STRATTERA) 25 mg capsule, Take 1 capsule (25 mg total) by mouth daily, Disp: 90 capsule, Rfl: 0    Biotin 89701 MCG TABS, Take by mouth, Disp: , Rfl:     Calcium Carbonate-Vit D-Min (CALCIUM 1200 PO), Take 2,400 mg by mouth daily, Disp: , Rfl:     Cyanocobalamin ([employer] VITAMIN B12 PO), Take by mouth, Disp: , Rfl:     drospirenone-ethinyl estradiol (LIEN) 3-0 02 MG per tablet, Take 1 tablet by mouth daily (Patient taking differently: Take 1 tablet by mouth daily at bedtime ), Disp: 84 tablet, Rfl: 4    famotidine (PEPCID) 20 mg tablet, Take 1 tablet (20 mg total) by mouth daily, Disp: 60 tablet, Rfl: 0    hydrOXYzine HCL (ATARAX) 25 mg tablet, Take 1 tablet (25 mg total) by mouth every 6 (six) hours as needed for itching, Disp: 90 tablet, Rfl: 0    ipratropium (ATROVENT) 0 03 % nasal spray, 2 sprays into each nostril every 12 (twelve) hours, Disp: 30 mL, Rfl: 0    lamoTRIgine (LaMICtal) 100 mg tablet, Take 1 tablet (100 mg total) by mouth daily, Disp: 90 tablet, Rfl: 1    lamoTRIgine (LaMICtal) 150 MG tablet, TAKE ONE TABLET BY MOUTH DAILY, Disp: 90 tablet, Rfl: 0    meloxicam (MOBIC) 7 5 mg tablet, Take 1 tablet (7 5 mg total) by mouth daily, Disp: 15 tablet, Rfl: 0    metoprolol succinate (TOPROL-XL) 25 mg 24 hr tablet, Take 1 tablet (25 mg total) by mouth 2 (two) times a day, Disp: 180 tablet, Rfl: 3    montelukast (SINGULAIR) 10 mg tablet, Take 1 tablet (10 mg total) by mouth daily at bedtime, Disp: 90 tablet, Rfl: 1    Multiple Vitamins-Minerals (MULTI COMPLETE PO), Take by mouth, Disp: , Rfl:     Polypodium Leucotomos (Heliocare) 240 MG CAPS, Take two tablets once a day, Disp: 60 capsule, Rfl: 3    Probiotic Product (PRO-BIOTIC BLEND PO), Take by mouth as needed , Disp: , Rfl:     QUEtiapine (SEROquel) 50 mg tablet, Take 1 tablet (50 mg total) by mouth daily at bedtime, Disp: 90 tablet, Rfl: 1    venlafaxine (EFFEXOR-XR) 150 mg 24 hr capsule, Take 1 capsule (150 mg total) by mouth daily, Disp: 90 capsule, Rfl: 1    venlafaxine (EFFEXOR-XR) 75 mg 24 hr capsule, Take 1 capsule (75 mg total) by mouth daily, Disp: 90 capsule, Rfl: 1    pantoprazole (PROTONIX) 40 mg tablet, TAKE ONE TABLET BY MOUTH EVERY DAY, Disp: 90 tablet, Rfl: 0    phenazopyridine (PYRIDIUM) 100 mg tablet, Take 1 tablet (100 mg total) by mouth 3 (three) times a day as needed for bladder spasms, Disp: 10 tablet, Rfl: 0   sulfamethoxazole-trimethoprim (BACTRIM DS) 800-160 mg per tablet, Take 1 tablet by mouth every 12 (twelve) hours for 3 days, Disp: 6 tablet, Rfl: 0    Current Allergies     Allergies as of 2020 - Reviewed 2020   Allergen Reaction Noted    Ibuprofen Other (See Comments) 2019            The following portions of the patient's history were reviewed and updated as appropriate: allergies, current medications, past family history, past medical history, past social history, past surgical history and problem list      Past Medical History:   Diagnosis Date    ADHD (attention deficit hyperactivity disorder)     Bulging lumbar disc     Carpal tunnel syndrome     RIGHT  LAST ASSESSED: 16    Chronic back pain     low    Chronic pain disorder     Colon polyps     Diabetes mellitus ([facility] [address] )     Resolved post weight loss    GERD (gastroesophageal reflux disease)     Gestational diabetes     Hearing loss     left ear    Hyperlipidemia     Resolved with weight loss    IBS (irritable bowel syndrome)     Ileus ([facility] [address] )     LAST ASSESSED: 8/3/17    Labial cyst     LAST ASSESSED: 16    Myofascial pain     LAST ASSESSED: 16    Obesity     Ovarian cyst     LEFT   LAST ASSESSED: 16    Panic attack     Pap smear for cervical cancer screening     2018--pap wnl, HRHPV neg    Pneumonia     Seasonal allergies     Thoracic outlet syndrome     2010    Trochanteric bursitis of both hips     LAST ASSESSED: 3/18/16    Ulnar neuropathy at elbow     Varicella     Wears glasses        Past Surgical History:   Procedure Laterality Date    BILE DUCT EXPLORATION      ENDOSCOPIC REMOVAL OF STONES FROM BILIARY TRACT     SECTION      x3    CHOLECYSTECTOMY      COLONOSCOPY      -polyp, repeat     DILATION AND CURETTAGE OF UTERUS      ENDOMETRIAL ABLATION      ERCP W/ SPHICTEROTOMY      FIRST RIB REMOVAL      THORAX EXCISION OF FIRST RIB    HYSTEROSCOPY      MT COLONOSCOPY FLX DX W/COLLJ SPEC WHEN PFRMD N/A 5/30/2017    Procedure: COLONOSCOPY;  Surgeon: Karon Gray MD;  [facility]: [facility]; Service: Gastroenterology    KY ESOPHAGOGASTRODUODENOSCOPY TRANSORAL DIAGNOSTIC N/A 9/14/2017    Procedure: ESOPHAGOGASTRODUODENOSCOPY (EGD); Surgeon: Greyson Chicas MD;  [facility]: [facility]; Service: Gastroenterology    [place] HYSTEROSCOPY,W/ENDO BX N/A 10/20/2017    Procedure: DILATATION AND CURETTAGE (D&C) WITH HYSTEROSCOPY  REMOVAL VULVAR RT  LESION;  Surgeon: Swapnil Rodriguez MD;  Location: [facility];  Service: Gynecology    KY LAP, ÁLVARO RESTRICT PROC, LONGITUDINAL GASTRECTOMY N/A 2/6/2018    Procedure: GASTRECTOMY SLEEVE LAPAROSCOPIC; INTRAOPERATIVE EGD ;  Surgeon: Galen Ramesh MD;  [facility];  Service: Ling Real SURG IMPLNT [address] Left 1/29/2020    Procedure: Insertion of thoracic spinal cord stimulator electrode via laminotomy and placement of left buttock implantable pulse generator (NEUROMONITORING);   Surgeon: Wallace Gilman MD;  [facility];  Service: Neurosurgery    SPINAL CORD STIMULATOR TRIAL W/ LAMINOTOMY      TONSILLECTOMY AND ADENOIDECTOMY      TUBAL LIGATION      VEIN LIGATION AND STRIPPING Right     VULVA SURGERY  10/20/2017    BIOPSY    WISDOM TOOTH EXTRACTION         Family History   Problem Relation Age of Onset    Diabetes Mother     Other Mother         HYPERCHOLESTEROLEMIA    Breast cancer Mother         >50    BRCA1 Negative Mother     BRCA2 Negative Mother     Diabetes Father     Other Father         traumatic brain injury    Prostate cancer Father     Alcohol abuse Father         in remission    Heart disease Father     Neuropathy Father     Hyperlipidemia Father     Hypertension Brother     Diabetes Brother     Other Brother         HYPERCHOLESTEROLEMIA    Alcohol abuse Brother     Depression Brother         attempted suicide    Colon cancer Maternal Grandfather     Heart attack Maternal Grandmother     No Known Problems Paternal Grandmother     No Known Problems Paternal Grandfather     Diabetes Brother     Alcohol abuse Brother     Asthma Son     No Known Problems Daughter     Ovarian cancer Neg Hx     Uterine cancer Neg Hx          Medications have been verified  Objective   /87   Pulse 68   Temp 98 °F (36 7 °C)   Resp 18   Ht 5' 8" (1 727 m)   Wt 77 1 kg (170 lb)   LMP 01/07/2019 (Approximate)   SpO2 98%   BMI 25 85 kg/m²        Physical Exam     Physical Exam   Constitutional: She appears well-developed and well-nourished  Cardiovascular: Normal rate and regular rhythm  Pulmonary/Chest: Effort normal and breath sounds normal    Abdominal: Soft  Bowel sounds are normal    Nursing note and vitals reviewed  Hb 8.8 today  - macrocytic anemia   - check Vit B12 , folate   - c/w ferrous sulphate  - f/u cbc

## 2020-08-10 ENCOUNTER — INPATIENT (INPATIENT)
Facility: HOSPITAL | Age: 75
LOS: 1 days | Discharge: NURSING, REHAB OR LONG-TERM CARE | DRG: 640 | End: 2020-08-12
Attending: INTERNAL MEDICINE | Admitting: INTERNAL MEDICINE
Payer: MEDICARE

## 2020-08-10 VITALS
OXYGEN SATURATION: 97 % | DIASTOLIC BLOOD PRESSURE: 93 MMHG | TEMPERATURE: 98 F | WEIGHT: 184.97 LBS | RESPIRATION RATE: 20 BRPM | HEART RATE: 92 BPM | SYSTOLIC BLOOD PRESSURE: 208 MMHG

## 2020-08-10 DIAGNOSIS — I50.9 HEART FAILURE, UNSPECIFIED: ICD-10-CM

## 2020-08-10 DIAGNOSIS — E87.5 HYPERKALEMIA: ICD-10-CM

## 2020-08-10 DIAGNOSIS — I48.91 UNSPECIFIED ATRIAL FIBRILLATION: ICD-10-CM

## 2020-08-10 DIAGNOSIS — I25.10 ATHEROSCLEROTIC HEART DISEASE OF NATIVE CORONARY ARTERY WITHOUT ANGINA PECTORIS: ICD-10-CM

## 2020-08-10 DIAGNOSIS — I77.0 ARTERIOVENOUS FISTULA, ACQUIRED: Chronic | ICD-10-CM

## 2020-08-10 DIAGNOSIS — D64.9 ANEMIA, UNSPECIFIED: ICD-10-CM

## 2020-08-10 DIAGNOSIS — I16.1 HYPERTENSIVE EMERGENCY: ICD-10-CM

## 2020-08-10 DIAGNOSIS — N18.6 END STAGE RENAL DISEASE: ICD-10-CM

## 2020-08-10 DIAGNOSIS — E11.9 TYPE 2 DIABETES MELLITUS WITHOUT COMPLICATIONS: ICD-10-CM

## 2020-08-10 DIAGNOSIS — R79.89 OTHER SPECIFIED ABNORMAL FINDINGS OF BLOOD CHEMISTRY: ICD-10-CM

## 2020-08-10 DIAGNOSIS — Z29.9 ENCOUNTER FOR PROPHYLACTIC MEASURES, UNSPECIFIED: ICD-10-CM

## 2020-08-10 LAB
A1C WITH ESTIMATED AVERAGE GLUCOSE RESULT: 5.1 % — SIGNIFICANT CHANGE UP (ref 4–5.6)
ALBUMIN SERPL ELPH-MCNC: 3.2 G/DL — LOW (ref 3.5–5)
ALP SERPL-CCNC: 119 U/L — SIGNIFICANT CHANGE UP (ref 40–120)
ALT FLD-CCNC: 39 U/L DA — SIGNIFICANT CHANGE UP (ref 10–60)
ANION GAP SERPL CALC-SCNC: 10 MMOL/L — SIGNIFICANT CHANGE UP (ref 5–17)
ANION GAP SERPL CALC-SCNC: 15 MMOL/L — SIGNIFICANT CHANGE UP (ref 5–17)
APTT BLD: 30.3 SEC — SIGNIFICANT CHANGE UP (ref 27.5–35.5)
AST SERPL-CCNC: 22 U/L — SIGNIFICANT CHANGE UP (ref 10–40)
BASOPHILS # BLD AUTO: 0.02 K/UL — SIGNIFICANT CHANGE UP (ref 0–0.2)
BASOPHILS NFR BLD AUTO: 0.3 % — SIGNIFICANT CHANGE UP (ref 0–2)
BILIRUB SERPL-MCNC: 0.3 MG/DL — SIGNIFICANT CHANGE UP (ref 0.2–1.2)
BUN SERPL-MCNC: 41 MG/DL — HIGH (ref 7–18)
BUN SERPL-MCNC: 86 MG/DL — HIGH (ref 7–18)
CALCIUM SERPL-MCNC: 8.6 MG/DL — SIGNIFICANT CHANGE UP (ref 8.4–10.5)
CALCIUM SERPL-MCNC: 8.7 MG/DL — SIGNIFICANT CHANGE UP (ref 8.4–10.5)
CHLORIDE SERPL-SCNC: 100 MMOL/L — SIGNIFICANT CHANGE UP (ref 96–108)
CHLORIDE SERPL-SCNC: 95 MMOL/L — LOW (ref 96–108)
CO2 SERPL-SCNC: 25 MMOL/L — SIGNIFICANT CHANGE UP (ref 22–31)
CO2 SERPL-SCNC: 27 MMOL/L — SIGNIFICANT CHANGE UP (ref 22–31)
CREAT SERPL-MCNC: 11.8 MG/DL — HIGH (ref 0.5–1.3)
CREAT SERPL-MCNC: 6.71 MG/DL — HIGH (ref 0.5–1.3)
EOSINOPHIL # BLD AUTO: 0.11 K/UL — SIGNIFICANT CHANGE UP (ref 0–0.5)
EOSINOPHIL NFR BLD AUTO: 1.7 % — SIGNIFICANT CHANGE UP (ref 0–6)
ESTIMATED AVERAGE GLUCOSE: 100 MG/DL — SIGNIFICANT CHANGE UP (ref 68–114)
GLUCOSE BLDC GLUCOMTR-MCNC: 100 MG/DL — HIGH (ref 70–99)
GLUCOSE BLDC GLUCOMTR-MCNC: 85 MG/DL — SIGNIFICANT CHANGE UP (ref 70–99)
GLUCOSE BLDC GLUCOMTR-MCNC: 92 MG/DL — SIGNIFICANT CHANGE UP (ref 70–99)
GLUCOSE BLDC GLUCOMTR-MCNC: 96 MG/DL — SIGNIFICANT CHANGE UP (ref 70–99)
GLUCOSE SERPL-MCNC: 128 MG/DL — HIGH (ref 70–99)
GLUCOSE SERPL-MCNC: 95 MG/DL — SIGNIFICANT CHANGE UP (ref 70–99)
HCT VFR BLD CALC: 27.7 % — LOW (ref 39–50)
HGB BLD-MCNC: 9.3 G/DL — LOW (ref 13–17)
IMM GRANULOCYTES NFR BLD AUTO: 0.2 % — SIGNIFICANT CHANGE UP (ref 0–1.5)
INR BLD: 0.96 RATIO — SIGNIFICANT CHANGE UP (ref 0.88–1.16)
LYMPHOCYTES # BLD AUTO: 1.1 K/UL — SIGNIFICANT CHANGE UP (ref 1–3.3)
LYMPHOCYTES # BLD AUTO: 17.3 % — SIGNIFICANT CHANGE UP (ref 13–44)
MCHC RBC-ENTMCNC: 32.6 PG — SIGNIFICANT CHANGE UP (ref 27–34)
MCHC RBC-ENTMCNC: 33.6 GM/DL — SIGNIFICANT CHANGE UP (ref 32–36)
MCV RBC AUTO: 97.2 FL — SIGNIFICANT CHANGE UP (ref 80–100)
MONOCYTES # BLD AUTO: 0.51 K/UL — SIGNIFICANT CHANGE UP (ref 0–0.9)
MONOCYTES NFR BLD AUTO: 8 % — SIGNIFICANT CHANGE UP (ref 2–14)
NEUTROPHILS # BLD AUTO: 4.62 K/UL — SIGNIFICANT CHANGE UP (ref 1.8–7.4)
NEUTROPHILS NFR BLD AUTO: 72.5 % — SIGNIFICANT CHANGE UP (ref 43–77)
NRBC # BLD: 0 /100 WBCS — SIGNIFICANT CHANGE UP (ref 0–0)
NT-PROBNP SERPL-SCNC: HIGH PG/ML (ref 0–450)
PLATELET # BLD AUTO: 131 K/UL — LOW (ref 150–400)
POTASSIUM SERPL-MCNC: 4.3 MMOL/L — SIGNIFICANT CHANGE UP (ref 3.5–5.3)
POTASSIUM SERPL-MCNC: 5.9 MMOL/L — HIGH (ref 3.5–5.3)
POTASSIUM SERPL-SCNC: 4.3 MMOL/L — SIGNIFICANT CHANGE UP (ref 3.5–5.3)
POTASSIUM SERPL-SCNC: 5.9 MMOL/L — HIGH (ref 3.5–5.3)
PROT SERPL-MCNC: 7.1 G/DL — SIGNIFICANT CHANGE UP (ref 6–8.3)
PROTHROM AB SERPL-ACNC: 11.3 SEC — SIGNIFICANT CHANGE UP (ref 10.6–13.6)
RBC # BLD: 2.85 M/UL — LOW (ref 4.2–5.8)
RBC # FLD: 14.7 % — HIGH (ref 10.3–14.5)
SARS-COV-2 IGG SERPL QL IA: POSITIVE
SARS-COV-2 IGM SERPL IA-ACNC: 18.3 AU/ML — HIGH
SARS-COV-2 RNA SPEC QL NAA+PROBE: SIGNIFICANT CHANGE UP
SODIUM SERPL-SCNC: 135 MMOL/L — SIGNIFICANT CHANGE UP (ref 135–145)
SODIUM SERPL-SCNC: 137 MMOL/L — SIGNIFICANT CHANGE UP (ref 135–145)
TROPONIN I SERPL-MCNC: 0.27 NG/ML — HIGH (ref 0–0.04)
TROPONIN I SERPL-MCNC: 0.97 NG/ML — HIGH (ref 0–0.04)
TSH SERPL-MCNC: 3.65 UU/ML — SIGNIFICANT CHANGE UP (ref 0.34–4.82)
WBC # BLD: 6.37 K/UL — SIGNIFICANT CHANGE UP (ref 3.8–10.5)
WBC # FLD AUTO: 6.37 K/UL — SIGNIFICANT CHANGE UP (ref 3.8–10.5)

## 2020-08-10 PROCEDURE — 99285 EMERGENCY DEPT VISIT HI MDM: CPT

## 2020-08-10 PROCEDURE — 71045 X-RAY EXAM CHEST 1 VIEW: CPT | Mod: 26

## 2020-08-10 RX ORDER — SEVELAMER CARBONATE 2400 MG/1
1600 POWDER, FOR SUSPENSION ORAL
Refills: 0 | Status: DISCONTINUED | OUTPATIENT
Start: 2020-08-10 | End: 2020-08-12

## 2020-08-10 RX ORDER — CLOPIDOGREL BISULFATE 75 MG/1
75 TABLET, FILM COATED ORAL DAILY
Refills: 0 | Status: DISCONTINUED | OUTPATIENT
Start: 2020-08-10 | End: 2020-08-10

## 2020-08-10 RX ORDER — ISOSORBIDE DINITRATE 5 MG/1
10 TABLET ORAL ONCE
Refills: 0 | Status: COMPLETED | OUTPATIENT
Start: 2020-08-10 | End: 2020-08-10

## 2020-08-10 RX ORDER — DEXTROSE 50 % IN WATER 50 %
25 SYRINGE (ML) INTRAVENOUS ONCE
Refills: 0 | Status: DISCONTINUED | OUTPATIENT
Start: 2020-08-10 | End: 2020-08-12

## 2020-08-10 RX ORDER — DEXTROSE 50 % IN WATER 50 %
50 SYRINGE (ML) INTRAVENOUS ONCE
Refills: 0 | Status: DISCONTINUED | OUTPATIENT
Start: 2020-08-10 | End: 2020-08-10

## 2020-08-10 RX ORDER — LABETALOL HCL 100 MG
10 TABLET ORAL ONCE
Refills: 0 | Status: DISCONTINUED | OUTPATIENT
Start: 2020-08-10 | End: 2020-08-10

## 2020-08-10 RX ORDER — ATORVASTATIN CALCIUM 80 MG/1
80 TABLET, FILM COATED ORAL AT BEDTIME
Refills: 0 | Status: DISCONTINUED | OUTPATIENT
Start: 2020-08-10 | End: 2020-08-12

## 2020-08-10 RX ORDER — ASPIRIN/CALCIUM CARB/MAGNESIUM 324 MG
81 TABLET ORAL DAILY
Refills: 0 | Status: DISCONTINUED | OUTPATIENT
Start: 2020-08-10 | End: 2020-08-12

## 2020-08-10 RX ORDER — INSULIN HUMAN 100 [IU]/ML
5 INJECTION, SOLUTION SUBCUTANEOUS ONCE
Refills: 0 | Status: DISCONTINUED | OUTPATIENT
Start: 2020-08-10 | End: 2020-08-10

## 2020-08-10 RX ORDER — CALCITRIOL 0.5 UG/1
0.25 CAPSULE ORAL DAILY
Refills: 0 | Status: DISCONTINUED | OUTPATIENT
Start: 2020-08-10 | End: 2020-08-12

## 2020-08-10 RX ORDER — NIFEDIPINE 30 MG
60 TABLET, EXTENDED RELEASE 24 HR ORAL DAILY
Refills: 0 | Status: DISCONTINUED | OUTPATIENT
Start: 2020-08-10 | End: 2020-08-12

## 2020-08-10 RX ORDER — COLCHICINE 0.6 MG
0.6 TABLET ORAL
Refills: 0 | Status: DISCONTINUED | OUTPATIENT
Start: 2020-08-10 | End: 2020-08-12

## 2020-08-10 RX ORDER — INSULIN LISPRO 100/ML
VIAL (ML) SUBCUTANEOUS
Refills: 0 | Status: DISCONTINUED | OUTPATIENT
Start: 2020-08-10 | End: 2020-08-12

## 2020-08-10 RX ORDER — CHLORHEXIDINE GLUCONATE 213 G/1000ML
1 SOLUTION TOPICAL DAILY
Refills: 0 | Status: DISCONTINUED | OUTPATIENT
Start: 2020-08-10 | End: 2020-08-12

## 2020-08-10 RX ORDER — FERROUS SULFATE 325(65) MG
325 TABLET ORAL DAILY
Refills: 0 | Status: DISCONTINUED | OUTPATIENT
Start: 2020-08-10 | End: 2020-08-12

## 2020-08-10 RX ORDER — APIXABAN 2.5 MG/1
2.5 TABLET, FILM COATED ORAL
Refills: 0 | Status: DISCONTINUED | OUTPATIENT
Start: 2020-08-10 | End: 2020-08-12

## 2020-08-10 RX ORDER — HYDRALAZINE HCL 50 MG
25 TABLET ORAL THREE TIMES A DAY
Refills: 0 | Status: DISCONTINUED | OUTPATIENT
Start: 2020-08-10 | End: 2020-08-12

## 2020-08-10 RX ORDER — DEXTROSE 50 % IN WATER 50 %
50 SYRINGE (ML) INTRAVENOUS ONCE
Refills: 0 | Status: COMPLETED | OUTPATIENT
Start: 2020-08-10 | End: 2020-08-10

## 2020-08-10 RX ORDER — CALCIUM GLUCONATE 100 MG/ML
1 VIAL (ML) INTRAVENOUS ONCE
Refills: 0 | Status: DISCONTINUED | OUTPATIENT
Start: 2020-08-10 | End: 2020-08-10

## 2020-08-10 RX ORDER — SODIUM ZIRCONIUM CYCLOSILICATE 10 G/10G
10 POWDER, FOR SUSPENSION ORAL ONCE
Refills: 0 | Status: COMPLETED | OUTPATIENT
Start: 2020-08-10 | End: 2020-08-10

## 2020-08-10 RX ORDER — FOLIC ACID 0.8 MG
1 TABLET ORAL DAILY
Refills: 0 | Status: DISCONTINUED | OUTPATIENT
Start: 2020-08-10 | End: 2020-08-12

## 2020-08-10 RX ORDER — INSULIN HUMAN 100 [IU]/ML
10 INJECTION, SOLUTION SUBCUTANEOUS ONCE
Refills: 0 | Status: COMPLETED | OUTPATIENT
Start: 2020-08-10 | End: 2020-08-10

## 2020-08-10 RX ORDER — METOPROLOL TARTRATE 50 MG
25 TABLET ORAL EVERY 12 HOURS
Refills: 0 | Status: DISCONTINUED | OUTPATIENT
Start: 2020-08-10 | End: 2020-08-12

## 2020-08-10 RX ORDER — CALCIUM GLUCONATE 100 MG/ML
1 VIAL (ML) INTRAVENOUS ONCE
Refills: 0 | Status: COMPLETED | OUTPATIENT
Start: 2020-08-10 | End: 2020-08-10

## 2020-08-10 RX ORDER — DILTIAZEM HCL 120 MG
120 CAPSULE, EXT RELEASE 24 HR ORAL DAILY
Refills: 0 | Status: DISCONTINUED | OUTPATIENT
Start: 2020-08-10 | End: 2020-08-10

## 2020-08-10 RX ORDER — ISOSORBIDE DINITRATE 5 MG/1
10 TABLET ORAL
Refills: 0 | Status: DISCONTINUED | OUTPATIENT
Start: 2020-08-10 | End: 2020-08-12

## 2020-08-10 RX ORDER — PANTOPRAZOLE SODIUM 20 MG/1
40 TABLET, DELAYED RELEASE ORAL
Refills: 0 | Status: DISCONTINUED | OUTPATIENT
Start: 2020-08-10 | End: 2020-08-12

## 2020-08-10 RX ADMIN — Medication 50 MILLILITER(S): at 06:19

## 2020-08-10 RX ADMIN — INSULIN HUMAN 10 UNIT(S): 100 INJECTION, SOLUTION SUBCUTANEOUS at 06:27

## 2020-08-10 RX ADMIN — SODIUM ZIRCONIUM CYCLOSILICATE 10 GRAM(S): 10 POWDER, FOR SUSPENSION ORAL at 08:35

## 2020-08-10 RX ADMIN — PANTOPRAZOLE SODIUM 40 MILLIGRAM(S): 20 TABLET, DELAYED RELEASE ORAL at 20:33

## 2020-08-10 RX ADMIN — Medication 0.6 MILLIGRAM(S): at 18:13

## 2020-08-10 RX ADMIN — Medication 325 MILLIGRAM(S): at 18:13

## 2020-08-10 RX ADMIN — Medication 60 MILLIGRAM(S): at 20:33

## 2020-08-10 RX ADMIN — Medication 25 MILLIGRAM(S): at 18:15

## 2020-08-10 RX ADMIN — CALCITRIOL 0.25 MICROGRAM(S): 0.5 CAPSULE ORAL at 20:33

## 2020-08-10 RX ADMIN — ISOSORBIDE DINITRATE 10 MILLIGRAM(S): 5 TABLET ORAL at 18:13

## 2020-08-10 RX ADMIN — Medication 25 MILLIGRAM(S): at 15:02

## 2020-08-10 RX ADMIN — Medication 1 MILLIGRAM(S): at 18:13

## 2020-08-10 RX ADMIN — ISOSORBIDE DINITRATE 10 MILLIGRAM(S): 5 TABLET ORAL at 06:54

## 2020-08-10 RX ADMIN — Medication 100 GRAM(S): at 06:17

## 2020-08-10 RX ADMIN — Medication 25 MILLIGRAM(S): at 21:47

## 2020-08-10 RX ADMIN — ATORVASTATIN CALCIUM 80 MILLIGRAM(S): 80 TABLET, FILM COATED ORAL at 21:47

## 2020-08-10 NOTE — CONSULT NOTE ADULT - PROBLEM SELECTOR RECOMMENDATION 6
Echo in feb 2020 showed normal EF and Grade 1 Diastolic dysfunction.  Elevated pro-bnp because of fluid overload.  No need to repeat Echo unless patient complains of chest pain with elevated troponin.  Monitor Input and output.

## 2020-08-10 NOTE — ED PROVIDER NOTE - CARE PLAN
Principal Discharge DX:	CHF (congestive heart failure)  Secondary Diagnosis:	Hyperkalemia  Secondary Diagnosis:	Hypertension  Secondary Diagnosis:	ESRD (end stage renal disease) on dialysis

## 2020-08-10 NOTE — H&P ADULT - PROBLEM SELECTOR PLAN 3
K 5.9 on admission  EKG NSR , no changes  s/p cocktail and HD  will repeat BMP  Monitor on tele floor  Cardio Dr Morales

## 2020-08-10 NOTE — H&P ADULT - PROBLEM SELECTOR PLAN 9
IMPROVE VTE Individual Risk Assessment  RISK                                                                Points  [  ] Previous VTE                                                  3  [  ] Thrombophilia                                               2[  ] Lower limb paralysis                                      2        (unable to hold up >15 seconds)    [  ] Current Cancer                                              2         (within 6 months)  [ x ] Immobilization > 24 hrs                                1  [  ] ICU/CCU stay > 24 hours                              1  [  x] Age > 60                                                      1  IMPROVE VTE Score 2  Patient takes Eliquis History of diabetes and CKD secondary to diabetes and blood pressure  Blood sugar elevated as he received D50   will do finger stick ACHD and sliding scale for now  No outpoatient diabetic medications  f/i A1C  goal range 140-180

## 2020-08-10 NOTE — CONSULT NOTE ADULT - PROBLEM SELECTOR RECOMMENDATION 2
Mild troponin elevation in setting of no chest pain and no EKG ischemic change along with ESRD.   Trend troponin.   Echo in feb 2020 showed normal EF and Grade 1 Diastolic dysfunction.  Elevated pro-bnp because of fluid overload. Mild troponin elevation in setting of no chest pain and no EKG ischemic change along with ESRD.   Trend troponin.   Echo in feb 2020 showed normal EF and Grade 1 Diastolic dysfunction.  Elevated pro-bnp because of fluid overload.  Resume aspirin, statin, beta blocker.

## 2020-08-10 NOTE — H&P ADULT - NSHPREVIEWOFSYSTEMS_GEN_ALL_CORE
.  CONSTITUTIONAL: No weakness, fevers or chills  EYES/ENT: No visual changes;  No vertigo or throat pain   NECK: No pain or stiffness  RESPIRATORY: No cough, wheezing, hemoptysis; No shortness of breath  CARDIOVASCULAR: No chest pain or palpitations  GASTROINTESTINAL: No abdominal or epigastric pain. No nausea, vomiting, or hematemesis; No diarrhea or constipation. No melena or hematochezia.  GENITOURINARY: No dysuria, frequency or hematuria  NEUROLOGICAL: No numbness or weakness  SKIN: No itching, burning, rashes, or lesions   All other review of systems is negative unless indicated above.

## 2020-08-10 NOTE — H&P ADULT - HISTORY OF PRESENT ILLNESS
75 y.o male with a PMHx of ESRD from [facility] on hemodialysis M/W/F, HTN and DM reported to the ED with shortness of breath. According to the patient, it started around 3 in the morning, sudden in onset, non progressive and got relieved on its own.  He denies chest pain, orthopnea, headaches, palpitations, vomiting, nausea, diarrhea or constipation. As per the ED provider and Dr Siu, patient is non compliant and he often refused to take multiple medications.     ED course: Blood pressure was elevated 205/91, Creatinine 11 and potassium 5.9, Dr Jacinto was consulted and patient was taken for hemodilaysis

## 2020-08-10 NOTE — H&P ADULT - PROBLEM SELECTOR PLAN 4
H/O GIDD from Echo ni feb with normal EF.  Currently CXR shows pulmonary congestion  BNP 24293, false positive as patient is ESRD  mild pedal edema  Patient makes mild urine only  Daily weights  salt restriction 1st set of [id number] is elevated to 0.266  EKG does not show any abnormality  NSR  Will trend cardiac enzymes  started on statin, b blockers  echo asp er cardio recommendations

## 2020-08-10 NOTE — CONSULT NOTE ADULT - ASSESSMENT
Cardiology is consulted because of shortness of breath, hypertensive emergency and elevated troponin.  Patient denies any chest pain. Shortness of breath improved. Urgent HD planned. EKG showed NSR no ischemic change. CXR shows increased interstitial infiltrates and fluid overload. Mild troponin elevation in setting of no chest pain and no EKG ischemic change along with ESRD. Trend troponins.   Hypertensive emergency secondary to non-compliance Resume NH medication. Taking out Fluids with HD will help as well. Goal BP around 150-160 mm Hg systolic for first 24 hours. Monitor blood pressure. We can go up on hydralazine or metoprolol as needed.
ESRD, hyperkalemia  Acute onset CHF with dyspnea improved after admission.  Hypertension  Increased Troponin level.  Atrial fibrillation .    Dialysis today.  Follow his medications intake with his NH attending.  Cardiology follow up and  troponin as per PMD, r/o cardiac ischemia versus cardiac arrythmia and ischemia on demand  Restart metoprolol and will follow with cardiology  Hold all other meds till cardiology see the patient

## 2020-08-10 NOTE — H&P ADULT - PROBLEM SELECTOR PLAN 10
IMPROVE VTE Individual Risk Assessment  RISK                                                                Points  [  ] Previous VTE                                                  3  [  ] Thrombophilia                                               2[  ] Lower limb paralysis                                      2        (unable to hold up >15 seconds)    [  ] Current Cancer                                              2         (within 6 months)  [ x ] Immobilization > 24 hrs                                1  [  ] ICU/CCU stay > 24 hours                              1  [  x] Age > 60                                                      1  IMPROVE VTE Score 2  Patient takes Eliquis

## 2020-08-10 NOTE — H&P ADULT - PROBLEM SELECTOR PLAN 6
Rate controlled  Continue home dose of renally dose Eliquis 2.5mg BID H/O CAD and takes Aspirin, plavix and high dose statins  Will hold plavix for now  Continue lipitor and aspirin only

## 2020-08-10 NOTE — CONSULT NOTE ADULT - PROBLEM SELECTOR RECOMMENDATION 5
Came with normal blood glucose level but recent one is above 500 mg/dl.  likely because of hyperkalemia cocktail (D50).  Insulin HSS  consider adding basal insulin coverage.  f/u hba1c

## 2020-08-10 NOTE — H&P ADULT - NSHPPHYSICALEXAM_GEN_ALL_CORE
Vital Signs Last 24 Hrs  T(C): 36.6 (10 Aug 2020 09:31), Max: 37.1 (10 Aug 2020 07:00)  T(F): 97.8 (10 Aug 2020 09:31), Max: 98.8 (10 Aug 2020 07:00)  HR: 91 (10 Aug 2020 09:31) (91 - 95)  BP: 202/98 (10 Aug 2020 09:31) (202/98 - 213/89)  BP(mean): --  RR: 18 (10 Aug 2020 09:31) (18 - 20)  SpO2: 99% (10 Aug 2020 07:00) (96% - 99%)    PHYSICAL EXAM:  GENERAL: NAD, speaks in full sentences, no signs of respiratory distress  HEAD:  Atraumatic, Normocephalic  EYES: EOMI, PERRLA, conjunctiva and sclera clear  NECK: Supple, No JVD  CHEST/LUNG: Clear to auscultation bilaterally; No wheeze; mild crackles; No accessory muscles used  HEART: Regular rate and rhythm; No murmurs;   ABDOMEN: Soft, Nontender, Nondistended; Bowel sounds present; No guarding  EXTREMITIES:  2+ Peripheral Pulses, No cyanosis or edema  PSYCH: AAOx3  NEUROLOGY: non-focal  SKIN: No rashes or lesions

## 2020-08-10 NOTE — CONSULT NOTE ADULT - PROBLEM SELECTOR RECOMMENDATION 4
PAF currently rate controlled.  on eliquis for AC, metoprolol for rate control.  recommend resume NH medications

## 2020-08-10 NOTE — H&P ADULT - PROBLEM SELECTOR PLAN 2
Patient is ESRD on hemodialysis at [facility]   Went for urgent dialysis as Creatinine in 11 and Potassium 5.9  Received ca gluconate, D50 push and Insluin regular 10U in ED  Dr thomas on board  Rpeat daily BMP  Renal restriction diet   consult for outpatient dilaysis

## 2020-08-10 NOTE — H&P ADULT - PROBLEM SELECTOR PLAN 5
H/O CAD and takes Aspirin, plavix and high dose statins  Will hold plavix for now  Continue lipitor and aspirin only H/O GIDD from Echo ni feb with normal EF.  Currently CXR shows pulmonary congestion  BNP 77951, false positive as patient is ESRD  mild pedal edema  Patient makes mild urine only  Daily weights  salt restriction

## 2020-08-10 NOTE — ED PROVIDER NOTE - PHYSICAL EXAMINATION
R arm fistula   positive sign thrills, bruits   no respiratory distress  rales bibasilar R arm fistula + thrills, bruits   no respiratory distress  Lungs +rales bibasilar  No LE edema

## 2020-08-10 NOTE — ED ADULT NURSE NOTE - NSIMPLEMENTINTERV_GEN_ALL_ED
Implemented All Fall with Harm Risk Interventions:  Hall to call system. Call bell, personal items and telephone within reach. Instruct patient to call for assistance. Room bathroom lighting operational. Non-slip footwear when patient is off stretcher. Physically safe environment: no spills, clutter or unnecessary equipment. Stretcher in lowest position, wheels locked, appropriate side rails in place. Provide visual cue, wrist band, yellow gown, etc. Monitor gait and stability. Monitor for mental status changes and reorient to person, place, and time. Review medications for side effects contributing to fall risk. Reinforce activity limits and safety measures with patient and family. Provide visual clues: red socks.

## 2020-08-10 NOTE — CONSULT NOTE ADULT - SUBJECTIVE AND OBJECTIVE BOX
HPI:  75 y.o male with a PMHx of ESRD from [facility] on hemodialysis M/W/F, HTN and DM reported to the ED with shortness of breath. According to the patient, it started around 3 in the morning, sudden in onset, non progressive and got relieved on its own.  He denies chest pain, orthopnea, headaches, palpitations, vomiting, nausea, diarrhea or constipation. As per the ED provider and Dr Siu, patient is non compliant and he often refused to take multiple medications.     ED course: Blood pressure was elevated 205/91, Creatinine 11 and potassium 5.9, Dr Jacinto was consulted and patient was taken for hemodilaysis (10 Aug 2020 08:47)    Interval History: Patient denies any chest pain. Shortness of breath improved. Urgent HD planned. EKG showed NSR no ischemic change. CXR shows increased interstitial infiltrates and fluid overload. Mild troponin elevation in setting of no chest pain and no EKG ischemic change along with ESRD. Trend troponins.   Hypertensive emergency secondary to non-complaince. Resume NH medication. Taking out Fluids with HD will help as well. Goal BP around 150-160 mm Hg systolic for first 24 hours. Monitor blood pressure. can go up on hydralazine or metoprolol as needed.  MEDICATIONS  (STANDING):  apixaban 2.5 milliGRAM(s) Oral two times a day  aspirin  chewable 81 milliGRAM(s) Oral daily  atorvastatin 80 milliGRAM(s) Oral at bedtime  calcitriol   Capsule 0.25 MICROGram(s) Oral daily  chlorhexidine 2% Cloths 1 Application(s) Topical daily  colchicine 0.6 milliGRAM(s) Oral two times a day  dextrose 50% Injectable 25 Gram(s) IV Push once  ferrous    sulfate 325 milliGRAM(s) Oral daily  folic acid 1 milliGRAM(s) Oral daily  hydrALAZINE 25 milliGRAM(s) Oral three times a day  insulin lispro (HumaLOG) corrective regimen sliding scale   SubCutaneous three times a day before meals  isosorbide   dinitrate Tablet (ISORDIL) 10 milliGRAM(s) Oral two times a day  metoprolol tartrate 25 milliGRAM(s) Oral every 12 hours  NIFEdipine XL 60 milliGRAM(s) Oral daily  pantoprazole    Tablet 40 milliGRAM(s) Oral before breakfast  sevelamer carbonate 1600 milliGRAM(s) Oral three times a day with meals    MEDICATIONS  (PRN):    PAST MEDICAL & SURGICAL HISTORY:  Cerebrovascular accident (CVA), unspecified mechanism  Anemia  Hypertension  Diabetes  ESRD (end stage renal disease) on dialysis  A-V fistula    SOCIAL HISTORY:  Smoker:  YES / NO        PACK YEARS:                         WHEN QUIT?  ETOH use:  YES / NO               FREQUENCY / QUANTITY:  Ilicit Drug use:  YES / NO      REVIEW OF SYSTEMS:    CONSTITUTIONAL: No weakness, fevers or chills  RESPIRATORY: No cough, wheezing, hemoptysis; No shortness of breath  CARDIOVASCULAR: No chest pain or palpitations  GASTROINTESTINAL: No abdominal or epigastric pain. No nausea, vomiting, or hematemesis; No diarrhea or constipation. No melena or hematochezia.  NEUROLOGICAL: No numbness or weakness  All other review of systems is negative unless indicated above.    Vital Signs Last 24 Hrs  T(C): 36.6 (10 Aug 2020 09:31), Max: 37.1 (10 Aug 2020 07:00)  T(F): 97.8 (10 Aug 2020 09:31), Max: 98.8 (10 Aug 2020 07:00)  HR: 91 (10 Aug 2020 09:31) (91 - 95)  BP: 202/98 (10 Aug 2020 09:31) (202/98 - 213/89)  BP(mean): --  RR: 18 (10 Aug 2020 09:31) (18 - 20)  SpO2: 99% (10 Aug 2020 07:00) (96% - 99%)    General: A/ox 3, No acute Distress  Neck: Supple, NO JVD  Cardiac: S1 S2, No M/R/G  Pulmonary: CTAB, Breathing unlabored, No Rhonchi/Rales/Wheezing  Abdomen: Soft, Non -tender, +BS x 4 quads  Extremities: No Rashes, No edema, Right AVF  Neuro: A/o x 3, No focal deficits        Telemetry:   EKG: no hyperkalemic or ischemic EKG change  CHADSVASC: 6                          9.3    6.37  )-----------( 131      ( 10 Aug 2020 05:03 )             27.7     08-10    135  |  95<L>  |  86<H>  ----------------------------<  95  5.9<H>   |  25  |  11.80<H>    Ca    8.6      10 Aug 2020 05:03    TPro  7.1  /  Alb  3.2<L>  /  TBili  0.3  /  DBili  x   /  AST  22  /  ALT  39  /  AlkPhos  119  08-10
Chief complain/HPI  ESRD   due to HTN and diabetes.  He was sent to the ER from NH due to acute onset episode of SOB in the night.  XR chest MILD CHF  His SOB improved in the ER.  He is not on O2 at this time with out SOB, feels well now.  He denies C/P or palpitation  No cough  No fever or chills  Denies increased intake of fluid in the night   /91 in the last 3 hours.  As per dialysis records a times he refuses BP and medications in the NH. Only medication in NH is renvela.        PAST MEDICAL & SURGICAL HISTORY:  Cerebrovascular accident (CVA), unspecified mechanism  Anemia  Hypertension  Diabetes  ESRD (end stage renal disease) on dialysis  A-V fistula      Home Medications Reviewed  fOLIC ACID  Calcitriol 0.25 daily  Lipitor 80 mg daily  Nifedipine XL 60 mg daily ?   Diltiazem  MG DAILY  Isosorbide 10 mg TID  Eliquis 2.5 q 12 hours  Plavix 75mg daily  ASA 81 mg daily    renvela 1600 mg TID    Hospital Medications:   MEDICATIONS  (STANDING):  sodium zirconium cyclosilicate 10 Gram(s) Oral Once    MEDICATIONS  (PRN):      Allergies    No Known Allergies    Intolerances                              9.3    6.37  )-----------( 131      ( 10 Aug 2020 05:03 )             27.7     08-10    135  |  95<L>  |  86<H>  ----------------------------<  95  5.9<H>   |  25  |  11.80<H>    Ca    8.6      10 Aug 2020 05:03    TPro  7.1  /  Alb  3.2<L>  /  TBili  0.3  /  DBili  x   /  AST  22  /  ALT  39  /  AlkPhos  119  08-10    PT/INR - ( 10 Aug 2020 05:03 )   PT: 11.3 sec;   INR: 0.96 ratio         PTT - ( 10 Aug 2020 05:03 )  PTT:30.3 sec          RADIOLOGY & ADDITIONAL STUDIES:    SOCIAL HISTORY: Denies ETOh,Smoking,     FAMILY HISTORY:  Family history of diabetes mellitus      REVIEW OF SYSTEMS:  CONSTITUTIONAL: No malaise, No fatigue, No fevers or chills, well developed, no diaphoresis  EYES/ENT: No visual changes;  No vertigo or throat pain   NECK: No pain or stiffness  RESPIRATORY: No cough, wheezing, hemoptysis; No shortness of breath  CARDIOVASCULAR: No chest pain or palpitations. No edema  GASTROINTESTINAL: No abdominal or epigastric pain. No nausea, vomiting, or hematemesis; No diarrhea or constipation. No melena or hematochezia.  GENITOURINARY: No dysuria, frequency, foamy urine, urinary urgency, incontinence or hematuria  NEUROLOGICAL: No numbness or weakness, No tremor  SKIN: No itching, burning, rashes, or lesions   VASCULAR: No claudication      VITALS:  Vital Signs Last 24 Hrs  T(C): 37.1 (10 Aug 2020 07:00), Max: 37.1 (10 Aug 2020 07:00)  T(F): 98.8 (10 Aug 2020 07:00), Max: 98.8 (10 Aug 2020 07:00)  HR: 93 (10 Aug 2020 07:00) (92 - 95)  BP: 205/91 (10 Aug 2020 07:00) (205/91 - 213/89)  BP(mean): --  RR: 19 (10 Aug 2020 07:00) (19 - 20)  SpO2: 99% (10 Aug 2020 07:00) (96% - 99%)      Weight (kg): 83.9 (08-10 @ 04:31)    PHYSICAL EXAM:  Constitutional: NAD  HEENT: anicteric sclera, oropharynx clear, MMM  Neck: No JVD  Respiratory:  air entrance OK with rales at the base  Cardiovascular: S1, S2, RRR, systolic m in pulmonary area 2/6no pericardial rub, no murmur  Gastrointestinal: BS+, soft, no tenderness, no distension, no bruit  Pelvis: bladder non-distended, no CVA tenderness  Extremities: No cyanosis or clubbing. No peripheral edema  Neurological: A/O x 3, no focal deficits    Vascular: right AVG with bruit and thrill

## 2020-08-10 NOTE — CONSULT NOTE ADULT - PROBLEM SELECTOR RECOMMENDATION 9
Hypertensive emergency secondary to non-compliance.  Resume NH medication.  Taking out Fluids with urgent HD will help as well.   Goal BP around 150-160 mm Hg systolic for first 24 hours.   Monitor blood pressure.   We can go up on hydralazine or metoprolol as needed.  Telemetry monitoring for 24 hours.

## 2020-08-10 NOTE — H&P ADULT - PROBLEM SELECTOR PLAN 1
Patient presented with Acute onset of shortness of breath and difficulty in breathing  No orthoponea, chest pain Grade 1 pedal edema   shows pulmonary vacular congestion which is likely flash pulmonary edema as cardiac function and previous CXR in feb is negative for such finidngs , although cardiomegaly is consistent  S/p Labetalol in ED  Patient is non compliant and refuses to take medications in NH.  Started on home medications with parameters  Patient is taking both Cardizem and procardia outpatient, EF from previous admission in Feb is normal  Will start lopressor 25mg BID and hold cardizem  Continue rest of the home medications  DASh diet,   Will defer echo to cardio as he has his last one from february this year  Dr Morales consulted

## 2020-08-10 NOTE — PATIENT PROFILE ADULT - NSPROEDALEARNPREF_GEN_A_NUR
skill demonstration/verbal instruction/written material skill demonstration/verbal instruction/pictorial

## 2020-08-10 NOTE — H&P ADULT - ATTENDING COMMENTS
patient was seen and examined along with resident earlier   above discussed  , reviewed and agreed   cardiology and renal follow up

## 2020-08-10 NOTE — H&P ADULT - ASSESSMENT
75 y.o male with a PMHx of ESRD from [facility] on hemodialysis M/W/F, HTN and DM reported to the ED with shortness of breath. According to the patient, it started around 3 in the morning, sudden in onset, non progressive and got relieved on its own.  Admitting for  Hypertensive emergency  ESRD on dialysis  Hyperkalemia  Elevated troponin  Elevated BNP 75 y.o male with a PMHx of ESRD from [facility] on hemodialysis M/W/F, HTN and DM reported to the ED with shortness of breath. According to the patient, it started around 3 in the morning, sudden in onset, non progressive and got relieved on its own.  Admitting for  Hypertensive emergency  ESRD on dialysis  Hyperkalemia  Elevated troponin  Elevated BNP    Patient is FULL CODE

## 2020-08-10 NOTE — H&P ADULT - PROBLEM SELECTOR PLAN 7
Likely anemia of chronic disease  hb stable  takes iron and epogen oupatient  will continue Rate controlled  Continue home dose of renally dose Eliquis 2.5mg BID

## 2020-08-10 NOTE — ED PROVIDER NOTE - OBJECTIVE STATEMENT
75 y.o male with a PMHX of ESRD on hemodialysis mon wed and fri, HTN and DM reports to the ED transferred by nursing home for shortness of breath . Patient endorses it started at 3 am today. Patient states on evaluation that he feels better. No reported chest pain or fever. Patient denies any other acute complaints. NKDA

## 2020-08-10 NOTE — H&P ADULT - PROBLEM SELECTOR PLAN 8
History of diabetes and CKD secondary to diabetes and blood pressure  Blood sugar elevated as he received D50   will do finger stick ACHD and sliding scale for now  No outpoatient diabetic medications  f/i A1C  goal range 140-180 Likely anemia of chronic disease  hb stable  takes iron and epogen oupatient  will continue

## 2020-08-11 LAB
ANION GAP SERPL CALC-SCNC: 7 MMOL/L — SIGNIFICANT CHANGE UP (ref 5–17)
BUN SERPL-MCNC: 50 MG/DL — HIGH (ref 7–18)
CALCIUM SERPL-MCNC: 9 MG/DL — SIGNIFICANT CHANGE UP (ref 8.4–10.5)
CHLORIDE SERPL-SCNC: 100 MMOL/L — SIGNIFICANT CHANGE UP (ref 96–108)
CO2 SERPL-SCNC: 29 MMOL/L — SIGNIFICANT CHANGE UP (ref 22–31)
CREAT SERPL-MCNC: 9.01 MG/DL — HIGH (ref 0.5–1.3)
FOLATE SERPL-MCNC: 9.3 NG/ML — SIGNIFICANT CHANGE UP
GLUCOSE BLDC GLUCOMTR-MCNC: 103 MG/DL — HIGH (ref 70–99)
GLUCOSE BLDC GLUCOMTR-MCNC: 92 MG/DL — SIGNIFICANT CHANGE UP (ref 70–99)
GLUCOSE SERPL-MCNC: 85 MG/DL — SIGNIFICANT CHANGE UP (ref 70–99)
HCT VFR BLD CALC: 27.6 % — LOW (ref 39–50)
HGB BLD-MCNC: 9 G/DL — LOW (ref 13–17)
MCHC RBC-ENTMCNC: 32.6 GM/DL — SIGNIFICANT CHANGE UP (ref 32–36)
MCHC RBC-ENTMCNC: 32.7 PG — SIGNIFICANT CHANGE UP (ref 27–34)
MCV RBC AUTO: 100.4 FL — HIGH (ref 80–100)
NRBC # BLD: 0 /100 WBCS — SIGNIFICANT CHANGE UP (ref 0–0)
PHOSPHATE SERPL-MCNC: 4.9 MG/DL — HIGH (ref 2.5–4.5)
PLATELET # BLD AUTO: 137 K/UL — LOW (ref 150–400)
POTASSIUM SERPL-MCNC: 4.9 MMOL/L — SIGNIFICANT CHANGE UP (ref 3.5–5.3)
POTASSIUM SERPL-SCNC: 4.9 MMOL/L — SIGNIFICANT CHANGE UP (ref 3.5–5.3)
RBC # BLD: 2.75 M/UL — LOW (ref 4.2–5.8)
RBC # FLD: 15 % — HIGH (ref 10.3–14.5)
SODIUM SERPL-SCNC: 136 MMOL/L — SIGNIFICANT CHANGE UP (ref 135–145)
TROPONIN I SERPL-MCNC: 0.83 NG/ML — HIGH (ref 0–0.04)
VIT B12 SERPL-MCNC: 770 PG/ML — SIGNIFICANT CHANGE UP (ref 232–1245)
WBC # BLD: 4.16 K/UL — SIGNIFICANT CHANGE UP (ref 3.8–10.5)
WBC # FLD AUTO: 4.16 K/UL — SIGNIFICANT CHANGE UP (ref 3.8–10.5)

## 2020-08-11 PROCEDURE — 71045 X-RAY EXAM CHEST 1 VIEW: CPT | Mod: 26

## 2020-08-11 RX ADMIN — Medication 25 MILLIGRAM(S): at 05:21

## 2020-08-11 RX ADMIN — Medication 0.6 MILLIGRAM(S): at 05:21

## 2020-08-11 RX ADMIN — CALCITRIOL 0.25 MICROGRAM(S): 0.5 CAPSULE ORAL at 11:36

## 2020-08-11 RX ADMIN — CHLORHEXIDINE GLUCONATE 1 APPLICATION(S): 213 SOLUTION TOPICAL at 11:35

## 2020-08-11 RX ADMIN — ATORVASTATIN CALCIUM 80 MILLIGRAM(S): 80 TABLET, FILM COATED ORAL at 21:13

## 2020-08-11 RX ADMIN — SEVELAMER CARBONATE 1600 MILLIGRAM(S): 2400 POWDER, FOR SUSPENSION ORAL at 07:53

## 2020-08-11 RX ADMIN — ISOSORBIDE DINITRATE 10 MILLIGRAM(S): 5 TABLET ORAL at 17:04

## 2020-08-11 RX ADMIN — ISOSORBIDE DINITRATE 10 MILLIGRAM(S): 5 TABLET ORAL at 05:21

## 2020-08-11 RX ADMIN — Medication 25 MILLIGRAM(S): at 17:04

## 2020-08-11 RX ADMIN — PANTOPRAZOLE SODIUM 40 MILLIGRAM(S): 20 TABLET, DELAYED RELEASE ORAL at 05:21

## 2020-08-11 RX ADMIN — SEVELAMER CARBONATE 1600 MILLIGRAM(S): 2400 POWDER, FOR SUSPENSION ORAL at 11:35

## 2020-08-11 RX ADMIN — Medication 1 MILLIGRAM(S): at 11:35

## 2020-08-11 RX ADMIN — Medication 25 MILLIGRAM(S): at 13:31

## 2020-08-11 RX ADMIN — Medication 81 MILLIGRAM(S): at 11:35

## 2020-08-11 RX ADMIN — Medication 60 MILLIGRAM(S): at 05:20

## 2020-08-11 RX ADMIN — Medication 325 MILLIGRAM(S): at 11:35

## 2020-08-11 RX ADMIN — Medication 25 MILLIGRAM(S): at 21:13

## 2020-08-11 RX ADMIN — SEVELAMER CARBONATE 1600 MILLIGRAM(S): 2400 POWDER, FOR SUSPENSION ORAL at 17:04

## 2020-08-11 RX ADMIN — Medication 0.6 MILLIGRAM(S): at 17:04

## 2020-08-11 NOTE — PHYSICAL THERAPY INITIAL EVALUATION ADULT - CRITERIA FOR SKILLED THERAPEUTIC INTERVENTIONS
risk reduction/prevention/anticipated discharge recommendation/impairments found/functional limitations in following categories/therapy frequency/rehab potential/predicted duration of therapy intervention

## 2020-08-11 NOTE — PROGRESS NOTE ADULT - PROBLEM SELECTOR PLAN 6
- H/O CAD and takes Aspirin, plavix and high dose statins  - Will hold plavix for now  - Continue lipitor and aspirin only

## 2020-08-11 NOTE — PROGRESS NOTE ADULT - PROBLEM SELECTOR PLAN 9
- History of diabetes and CKD secondary to diabetes and blood pressure  - Blood sugar elevated as he received D50   - will do finger stick ACHD and sliding scale for now  - No outpatient diabetic medications  - A1C: 5.1  - goal range 140-180

## 2020-08-11 NOTE — PROGRESS NOTE ADULT - PROBLEM SELECTOR PLAN 5
- H/O Grade I diastolic dysfunction from Echo ni feb with normal EF.  - Currently CXR shows pulmonary congestion  - Repeat CXR shows blunting at left costophrenic angle which could reflect small pleural effusion and/or pleural thickening.  - BNP 09085, false positive as patient is ESRD  - Patient makes mild urine only  - Daily weights  - salt restriction

## 2020-08-11 NOTE — PROGRESS NOTE ADULT - PROBLEM SELECTOR PLAN 4
- Troponin 0.266 --> 0.969 --> 0.830  - EKG does not show any abnormality, NSR  - started on statin, b blockers  - f/u echo

## 2020-08-11 NOTE — PHYSICAL THERAPY INITIAL EVALUATION ADULT - IMPAIRED TRANSFERS: BED/CHAIR, REHAB EVAL
decreased flexibility impaired postural control/decreased flexibility/decreased strength/impaired balance

## 2020-08-12 ENCOUNTER — TRANSCRIPTION ENCOUNTER (OUTPATIENT)
Age: 75
End: 2020-08-12

## 2020-08-12 VITALS
OXYGEN SATURATION: 100 % | SYSTOLIC BLOOD PRESSURE: 135 MMHG | RESPIRATION RATE: 18 BRPM | TEMPERATURE: 98 F | HEART RATE: 68 BPM | DIASTOLIC BLOOD PRESSURE: 49 MMHG

## 2020-08-12 LAB
ANION GAP SERPL CALC-SCNC: 10 MMOL/L — SIGNIFICANT CHANGE UP (ref 5–17)
BUN SERPL-MCNC: 74 MG/DL — HIGH (ref 7–18)
CALCIUM SERPL-MCNC: 8.4 MG/DL — SIGNIFICANT CHANGE UP (ref 8.4–10.5)
CHLORIDE SERPL-SCNC: 97 MMOL/L — SIGNIFICANT CHANGE UP (ref 96–108)
CO2 SERPL-SCNC: 29 MMOL/L — SIGNIFICANT CHANGE UP (ref 22–31)
CREAT SERPL-MCNC: 11.8 MG/DL — HIGH (ref 0.5–1.3)
GLUCOSE BLDC GLUCOMTR-MCNC: 93 MG/DL — SIGNIFICANT CHANGE UP (ref 70–99)
GLUCOSE SERPL-MCNC: 107 MG/DL — HIGH (ref 70–99)
HCT VFR BLD CALC: 26 % — LOW (ref 39–50)
HGB BLD-MCNC: 8.5 G/DL — LOW (ref 13–17)
MAGNESIUM SERPL-MCNC: 2.3 MG/DL — SIGNIFICANT CHANGE UP (ref 1.6–2.6)
MCHC RBC-ENTMCNC: 32.4 PG — SIGNIFICANT CHANGE UP (ref 27–34)
MCHC RBC-ENTMCNC: 32.7 GM/DL — SIGNIFICANT CHANGE UP (ref 32–36)
MCV RBC AUTO: 99.2 FL — SIGNIFICANT CHANGE UP (ref 80–100)
NRBC # BLD: 0 /100 WBCS — SIGNIFICANT CHANGE UP (ref 0–0)
PHOSPHATE SERPL-MCNC: 5.4 MG/DL — HIGH (ref 2.5–4.5)
PLATELET # BLD AUTO: 117 K/UL — LOW (ref 150–400)
POTASSIUM SERPL-MCNC: 5.2 MMOL/L — SIGNIFICANT CHANGE UP (ref 3.5–5.3)
POTASSIUM SERPL-SCNC: 5.2 MMOL/L — SIGNIFICANT CHANGE UP (ref 3.5–5.3)
RBC # BLD: 2.62 M/UL — LOW (ref 4.2–5.8)
RBC # FLD: 15.1 % — HIGH (ref 10.3–14.5)
SARS-COV-2 RNA SPEC QL NAA+PROBE: SIGNIFICANT CHANGE UP
SODIUM SERPL-SCNC: 136 MMOL/L — SIGNIFICANT CHANGE UP (ref 135–145)
WBC # BLD: 3.93 K/UL — SIGNIFICANT CHANGE UP (ref 3.8–10.5)
WBC # FLD AUTO: 3.93 K/UL — SIGNIFICANT CHANGE UP (ref 3.8–10.5)

## 2020-08-12 PROCEDURE — 82607 VITAMIN B-12: CPT

## 2020-08-12 PROCEDURE — 93005 ELECTROCARDIOGRAM TRACING: CPT

## 2020-08-12 PROCEDURE — 99261: CPT

## 2020-08-12 PROCEDURE — 85027 COMPLETE CBC AUTOMATED: CPT

## 2020-08-12 PROCEDURE — 83880 ASSAY OF NATRIURETIC PEPTIDE: CPT

## 2020-08-12 PROCEDURE — 82746 ASSAY OF FOLIC ACID SERUM: CPT

## 2020-08-12 PROCEDURE — 83036 HEMOGLOBIN GLYCOSYLATED A1C: CPT

## 2020-08-12 PROCEDURE — 85730 THROMBOPLASTIN TIME PARTIAL: CPT

## 2020-08-12 PROCEDURE — 82962 GLUCOSE BLOOD TEST: CPT

## 2020-08-12 PROCEDURE — 86769 SARS-COV-2 COVID-19 ANTIBODY: CPT

## 2020-08-12 PROCEDURE — 71045 X-RAY EXAM CHEST 1 VIEW: CPT

## 2020-08-12 PROCEDURE — 99053 MED SERV 10PM-8AM 24 HR FAC: CPT

## 2020-08-12 PROCEDURE — 99285 EMERGENCY DEPT VISIT HI MDM: CPT | Mod: 25

## 2020-08-12 PROCEDURE — 84100 ASSAY OF PHOSPHORUS: CPT

## 2020-08-12 PROCEDURE — 80053 COMPREHEN METABOLIC PANEL: CPT

## 2020-08-12 PROCEDURE — 83735 ASSAY OF MAGNESIUM: CPT

## 2020-08-12 PROCEDURE — 85610 PROTHROMBIN TIME: CPT

## 2020-08-12 PROCEDURE — 84484 ASSAY OF TROPONIN QUANT: CPT

## 2020-08-12 PROCEDURE — 80048 BASIC METABOLIC PNL TOTAL CA: CPT

## 2020-08-12 PROCEDURE — 97162 PT EVAL MOD COMPLEX 30 MIN: CPT

## 2020-08-12 PROCEDURE — U0003: CPT

## 2020-08-12 PROCEDURE — 84443 ASSAY THYROID STIM HORMONE: CPT

## 2020-08-12 PROCEDURE — 36415 COLL VENOUS BLD VENIPUNCTURE: CPT

## 2020-08-12 RX ORDER — CLOPIDOGREL BISULFATE 75 MG/1
1 TABLET, FILM COATED ORAL
Qty: 0 | Refills: 0 | DISCHARGE

## 2020-08-12 RX ORDER — METOPROLOL TARTRATE 50 MG
1 TABLET ORAL
Qty: 60 | Refills: 0
Start: 2020-08-12 | End: 2020-09-10

## 2020-08-12 RX ORDER — DILTIAZEM HCL 120 MG
1 CAPSULE, EXT RELEASE 24 HR ORAL
Qty: 0 | Refills: 0 | DISCHARGE

## 2020-08-12 RX ADMIN — PANTOPRAZOLE SODIUM 40 MILLIGRAM(S): 20 TABLET, DELAYED RELEASE ORAL at 05:13

## 2020-08-12 RX ADMIN — Medication 25 MILLIGRAM(S): at 13:34

## 2020-08-12 RX ADMIN — Medication 1 MILLIGRAM(S): at 13:33

## 2020-08-12 RX ADMIN — Medication 25 MILLIGRAM(S): at 05:13

## 2020-08-12 RX ADMIN — CHLORHEXIDINE GLUCONATE 1 APPLICATION(S): 213 SOLUTION TOPICAL at 13:34

## 2020-08-12 RX ADMIN — SEVELAMER CARBONATE 1600 MILLIGRAM(S): 2400 POWDER, FOR SUSPENSION ORAL at 08:26

## 2020-08-12 RX ADMIN — Medication 325 MILLIGRAM(S): at 13:33

## 2020-08-12 RX ADMIN — Medication 0.6 MILLIGRAM(S): at 05:13

## 2020-08-12 RX ADMIN — CALCITRIOL 0.25 MICROGRAM(S): 0.5 CAPSULE ORAL at 13:33

## 2020-08-12 RX ADMIN — Medication 60 MILLIGRAM(S): at 05:13

## 2020-08-12 RX ADMIN — ISOSORBIDE DINITRATE 10 MILLIGRAM(S): 5 TABLET ORAL at 05:13

## 2020-08-12 RX ADMIN — SEVELAMER CARBONATE 1600 MILLIGRAM(S): 2400 POWDER, FOR SUSPENSION ORAL at 13:34

## 2020-08-12 RX ADMIN — Medication 81 MILLIGRAM(S): at 13:33

## 2020-08-12 NOTE — PROGRESS NOTE ADULT - PROBLEM SELECTOR PLAN 2
Pt takes eliquis for AC and cardizem for rate control  - currently rate controlled   - c/w home meds
- Patient is ESRD on hemodialysis at [facility]   - Went for urgent dialysis as Creatinine in 11 and Potassium 5.9  - Received ca gluconate, D50 push and Insluin regular 10U in ED  - Dr thomas on board  - Repeat daily BMP  - Renal restriction diet  -  consult for outpatient dialysis
Pt takes eliquis for AC and cardizem for rate control  - currently rate controlled   - c/w home meds

## 2020-08-12 NOTE — DISCHARGE NOTE PROVIDER - NSDCMRMEDTOKEN_GEN_ALL_CORE_FT
aspirin 81 mg oral tablet, chewable: 1 tab(s) orally once a day  calcitriol 0.25 mcg oral capsule: 1 cap(s) orally once a day  Colcrys 0.6 mg oral tablet: 1 tab(s) orally 2 times a day  Eliquis 2.5 mg oral tablet: 1 tab(s) orally 2 times a day  epoetin ximena:   famotidine 20 mg oral tablet: 1 tab(s) orally once a day  ferrous sulfate 325 mg (65 mg elemental iron) oral tablet: 1 tab(s) orally once a day  folic acid 1 mg oral tablet: 1 tab(s) orally once a day  hydrALAZINE 25 mg oral tablet: 1 tab(s) orally 3 times a day  isosorbide dinitrate 20 mg oral tablet: 0.5 tab(s) orally 2 times a day  Lipitor 80 mg oral tablet: 1 tab(s) orally once a day  metoprolol tartrate 25 mg oral tablet: 1 tab(s) orally every 12 hours  NIFEdipine 60 mg oral tablet, extended release: 1 tab(s) orally once a day  sevelamer hydrochloride 800 mg oral tablet: 2 tab(s) orally 3 times a day

## 2020-08-12 NOTE — DIETITIAN INITIAL EVALUATION ADULT. - NS FNS WEIGHT USED FOR CALC
Ht=5' 4" ( per pt)    LFM=157 lb+10%=143 lb    Admission bw=890 lb    BMI=31.5   Current vs=755.6 lb 8/12/2020,  wt data from [facility] EMR: 177 lb 2/5/2020; 180.7 lb 2/4/2020; appears no drastic changes x 6m, a bit fluctuated, may due to fluid shift from HD and/or scale variance

## 2020-08-12 NOTE — DISCHARGE NOTE PROVIDER - NSDCCPCAREPLAN_GEN_ALL_CORE_FT
PRINCIPAL DISCHARGE DIAGNOSIS  Diagnosis: Fluid overload  Assessment and Plan of Treatment: You came in with shortness of breath, you were noted to be in fluid overload and underwent urgent dialysis. Your symptoms have significantly improved and you are advised to continue with fluid restriction and dialysis schedule.      SECONDARY DISCHARGE DIAGNOSES  Diagnosis: Atrial fibrillation  Assessment and Plan of Treatment: Please continue with your metoprolol and eliquis home dose for your atrial fibrillation.    Diagnosis: Diabetes  Assessment and Plan of Treatment: Please continue with your home medications    Diagnosis: ESRD (end stage renal disease) on dialysis  Assessment and Plan of Treatment: Please continue with your dialysis as scheduled and follow up with your nephrologist regularly    Diagnosis: Hypertension  Assessment and Plan of Treatment: Your blood pressure was very elevated when you came in, you underwent urgent dialysis and were restarted on your medications, you are advised to continue your home medications, take low salt diet and monitor your blood pressure closely. Please follow up with your doctor for readjsutment of your home medications

## 2020-08-12 NOTE — DISCHARGE NOTE PROVIDER - HOSPITAL COURSE
Pt is a 75 y.o male with a PMHx of ESRD from [facility] on hemodialysis M/W/F, HTN and DM reported to the ED with shortness of breath. According to the patient, it started around 3 in the morning, sudden in onset, non progressive and got relieved on its own.    He denies chest pain, orthopnea, headaches, palpitations, vomiting, nausea, diarrhea or constipation. As per the ED provider and Dr Siu, patient is non compliant and he often refused to take multiple medications. On ED patient K was elevated as well as his BP, he underwent emergent Dialysis     Pt was started on dialysis and shortness of breath significantly improved after fluid removal. Pt was seen by cardio, Pt had a recent echo done that was noted with normal EF and grade 1 diastolic dysfunction.     Pt p/w hypertensive urgency, BP improved post dialysis and restarting home medications. Pt advised about medication compliance. Pt was also noted with hyperkalemia savage improved post dialysis. Pt is stable for discharge as discussed with attending on case

## 2020-08-12 NOTE — PROGRESS NOTE ADULT - PROBLEM SELECTOR PROBLEM 4
Cerebrovascular accident (CVA), unspecified mechanism
Cerebrovascular accident (CVA), unspecified mechanism
Elevated troponin

## 2020-08-12 NOTE — PROGRESS NOTE ADULT - PROBLEM SELECTOR PLAN 3
adjust meds   patient is refusing most of time   advised patient to be complaint   - c/w home meds with parameters   - monitor BP and titrate meds  - DASH diet
- K 5.9 on admission  - EKG NSR , no changes  - s/p cocktail and HD  - Monitor on tele floor  - daily BMP  - Today K 4.9  Cardio Dr Morales
adjust meds   patient is refusing most of time   advised patient to be complaint   - c/w home meds with parameters   - monitor BP and titrate meds  - DASH diet

## 2020-08-12 NOTE — PROGRESS NOTE ADULT - PROBLEM SELECTOR PLAN 1
- Patient presented with Acute onset of shortness of breath and difficulty in breathing  - No orthopnoea, chest pain Grade 1 pedal edema  - Chest X ray shows pulmonary vacular congestion which is likely flash pulmonary edema  - S/p Labetalol in ED  - Patient is non compliant and refuses to take medications in NH.  - Started on home medications with parameters  - Patient is taking both Cardizem and procardia outpatient, EF from previous admission in Feb is normal  - Will start lopressor 25mg BID and hold cardizem  - Continue rest of the home medications  - DASh diet,   - F/u Echo  - Dr Morales consulted
Pt on HD MWF . Only had HD for 30 mins yesterday  - c/w renal meds( Renvela, calcitriol)   - HD today  - Avoid nephrotoxic agents   - Nephro consult Dr Pritchard
Pt on HD MWF . Only had HD for 30 mins yesterday  - c/w renal meds( Renvela, calcitriol)   - HD today  - Avoid nephrotoxic agents   - Nephro consult Dr Pritchard

## 2020-08-12 NOTE — PROGRESS NOTE ADULT - PROBLEM SELECTOR PROBLEM 1
ESRD (end stage renal disease) on dialysis
Hypertensive emergency
ESRD (end stage renal disease) on dialysis

## 2020-08-12 NOTE — DIETITIAN INITIAL EVALUATION ADULT. - DIET TYPE
diet Rx at skilled nursing facility: 2 to 3g Na, 2g K, No concentrated sweets, Low Chol, Regular food, thin liquid, 1200 ml fluid May consider Nepro 1can daily or bid as medically feasible ( 1 can providing 425 kcal, 19 g protein) if decreased intake persistent

## 2020-08-12 NOTE — DIETITIAN INITIAL EVALUATION ADULT. - FACTORS AFF FOOD INTAKE
Quaker/ethnic/cultural/personal food preferences/acute on chronic comorbidities, including ESRD on HD

## 2020-08-12 NOTE — DIETITIAN INITIAL EVALUATION ADULT. - PERTINENT LABORATORY DATA
08-11 Na136 mmol/L Glu 85 mg/dL K+ 4.9 mmol/L Cr  9.01 mg/dL<H> BUN 50 mg/dL<H>   08-11 Phos 4.9 mg/dL<H>   08-10 Alb 3.2 g/dL<L>        08-10-20 @ 18:56 HgbA1C 5.1 [4.0 - 5.6]

## 2020-08-12 NOTE — PROGRESS NOTE ADULT - SUBJECTIVE AND OBJECTIVE BOX
PGY-1 Progress Note discussed with attending    PAGER #: [phone]] TILL 5:00 PM  PLEASE CONTACT ON CALL TEAM:  - On Call Team (Please refer to Mary) FROM 5:00 PM - 8:30PM  - Nightfloat Team FROM 8:30 -7:30 AM    CHIEF COMPLAINT & BRIEF HOSPITAL COURSE:  75 y.o male with a PMHx of ESRD from [facility] on hemodialysis M/W/F, HTN and DM reported to the ED with shortness of breath. According to the patient, it started around 3 in the morning, sudden in onset, non progressive and got relieved on its own.  He denies chest pain, orthopnea, headaches, palpitations, vomiting, nausea, diarrhea or constipation. As per the ED provider and Dr Siu, patient is non compliant and he often refused to take multiple medications.     On ED patient K was elevated as well as his BP, he underwent emergent Dialysis       INTERVAL HPI/OVERNIGHT EVENTS:   Patient is seen and examined at bedside, has no complains, is laying comfortably in bed listening to music. I talked to him in detail about the importance of being compliant with his medications. Fistula is patent and has good thrill.     REVIEW OF SYSTEMS:   CONSTITUTIONAL: No fever, weight loss, or fatigue  RESPIRATORY: No cough, wheezing, chills or hemoptysis; No shortness of breath  CARDIOVASCULAR: No chest pain, palpitations, dizziness, or leg swelling  GASTROINTESTINAL: No abdominal pain. No nausea, vomiting, or hematemesis; No diarrhea or constipation. No melena or hematochezia.  GENITOURINARY: No dysuria or hematuria, urinary frequency  MUSCULOSKELETAL: No pain, no Limited ROM  NEUROLOGICAL: No headaches, memory loss, loss of strength, numbness, or tremors  SKIN: No itching, burning, rashes, or lesions     MEDICATIONS  (STANDING):  apixaban 2.5 milliGRAM(s) Oral two times a day  aspirin  chewable 81 milliGRAM(s) Oral daily  atorvastatin 80 milliGRAM(s) Oral at bedtime  calcitriol   Capsule 0.25 MICROGram(s) Oral daily  chlorhexidine 2% Cloths 1 Application(s) Topical daily  colchicine 0.6 milliGRAM(s) Oral two times a day  dextrose 50% Injectable 25 Gram(s) IV Push once  ferrous    sulfate 325 milliGRAM(s) Oral daily  folic acid 1 milliGRAM(s) Oral daily  hydrALAZINE 25 milliGRAM(s) Oral three times a day  insulin lispro (HumaLOG) corrective regimen sliding scale   SubCutaneous three times a day before meals  isosorbide   dinitrate Tablet (ISORDIL) 10 milliGRAM(s) Oral two times a day  metoprolol tartrate 25 milliGRAM(s) Oral every 12 hours  NIFEdipine XL 60 milliGRAM(s) Oral daily  pantoprazole    Tablet 40 milliGRAM(s) Oral before breakfast  sevelamer carbonate 1600 milliGRAM(s) Oral three times a day with meals    MEDICATIONS  (PRN):      Vital Signs Last 24 Hrs  T(C): 36.8 (11 Aug 2020 15:56), Max: 37.1 (10 Aug 2020 23:30)  T(F): 98.3 (11 Aug 2020 15:56), Max: 98.8 (10 Aug 2020 23:30)  HR: 66 (11 Aug 2020 15:56) (65 - 78)  BP: 122/64 (11 Aug 2020 15:56) (119/48 - 166/72)  BP(mean): 73 (11 Aug 2020 12:23) (73 - 75)  RR: 18 (11 Aug 2020 15:56) (18 - 20)  SpO2: 96% (11 Aug 2020 15:56) (94% - 99%)    PHYSICAL EXAMINATION:  GENERAL: NAD, well built  HEAD:  Atraumatic, Normocephalic  EYES:  conjunctiva and sclera clear  NECK: Supple, No JVD, Normal thyroid  CHEST/LUNG: Clear to auscultation. Clear to percussion bilaterally; No rales, rhonchi, wheezing, or rubs  HEART: Regular rate and rhythm; No murmurs, rubs, or gallops  ABDOMEN: Soft, Nontender, Nondistended; Bowel sounds present  NERVOUS SYSTEM:  Alert & Oriented X3,    EXTREMITIES:  Right arm fistula, patent with good thrill. 2+ Peripheral Pulses, No clubbing, cyanosis, or edema  SKIN: warm dry                          9.0    4.16  )-----------( 137      ( 11 Aug 2020 07:15 )             27.6     08-11    136  |  100  |  50<H>  ----------------------------<  85  4.9   |  29  |  9.01<H>    Ca    9.0      11 Aug 2020 07:15  Phos  4.9     08-11    TPro  7.1  /  Alb  3.2<L>  /  TBili  0.3  /  DBili  x   /  AST  22  /  ALT  39  /  AlkPhos  119  08-10    LIVER FUNCTIONS - ( 10 Aug 2020 05:03 )  Alb: 3.2 g/dL / Pro: 7.1 g/dL / ALK PHOS: 119 U/L / ALT: 39 U/L DA / AST: 22 U/L / GGT: x           CARDIAC MARKERS ( 11 Aug 2020 02:00 )  0.830 ng/mL / x     / x     / x     / x      CARDIAC MARKERS ( 10 Aug 2020 19:28 )  0.969 ng/mL / x     / x     / x     / x      CARDIAC MARKERS ( 10 Aug 2020 05:03 )  0.266 ng/mL / x     / x     / x     / x          PT/INR - ( 10 Aug 2020 05:03 )   PT: 11.3 sec;   INR: 0.96 ratio         PTT - ( 10 Aug 2020 05:03 )  PTT:30.3 sec  I&O's Summary    10 Aug 2020 07:01  -  11 Aug 2020 07:00  --------------------------------------------------------  IN: 700 mL / OUT: 2400 mL / NET: -1700 mL          RADIOLOGY & ADDITIONAL TESTS:    < from: Xray Chest 1 View- PORTABLE-Routine (08.11.20 @ 09:14) >    INTERPRETATION:  Chest portable.    Clinical History: Shortness of breath.    Comparison: 8/10/2020.    Single AP view submitted.    The right costophrenic angle is entirely included on this exam.    The evaluation of the cardiomediastinal silhouette is limited on portable technique.    There is prominence of the bronchovascular markings at the lower lung zone.  Again noted is blunting at left costophrenic angle which could reflect small pleural effusion and/or pleural thickening.  No new lobar lung consolidation is noted.    Right subclavian vascular stent is again noted.    Impression:    Findings as discussed above.    < end of copied text >
Patient is a 74y old  Male who presents with a chief complaint of Chest pain (03 Feb 2020 15:46)      INTERVAL HPI/OVERNIGHT EVENTS: Patient seen and examined at the bedside. Patient denies any sob today   MEDICATIONS  (STANDING):  apixaban 2.5 milliGRAM(s) Oral two times a day  aspirin  chewable 81 milliGRAM(s) Oral daily  atorvastatin 80 milliGRAM(s) Oral at bedtime  calcitriol   Capsule 0.25 MICROGram(s) Oral daily  chlorhexidine 2% Cloths 1 Application(s) Topical daily  colchicine 0.6 milliGRAM(s) Oral two times a day  dextrose 50% Injectable 25 Gram(s) IV Push once  ferrous    sulfate 325 milliGRAM(s) Oral daily  folic acid 1 milliGRAM(s) Oral daily  hydrALAZINE 25 milliGRAM(s) Oral three times a day  insulin lispro (HumaLOG) corrective regimen sliding scale   SubCutaneous three times a day before meals  isosorbide   dinitrate Tablet (ISORDIL) 10 milliGRAM(s) Oral two times a day  metoprolol tartrate 25 milliGRAM(s) Oral every 12 hours  NIFEdipine XL 60 milliGRAM(s) Oral daily  pantoprazole    Tablet 40 milliGRAM(s) Oral before breakfast  sevelamer carbonate 1600 milliGRAM(s) Oral three times a day with meals    MEDICATIONS  (PRN):  PAST MEDICAL & SURGICAL HISTORY:  Cerebrovascular accident (CVA), unspecified mechanism  Anemia  Hypertension  Diabetes  ESRD (end stage renal disease) on dialysis  A-V fistula        __________________________________________________  REVIEW OF SYSTEMS:    CONSTITUTIONAL: No fever,   EYES: no acute visual disturbances  NECK: No pain or stiffness  RESPIRATORY: No cough; No shortness of breath  CARDIOVASCULAR: No chest pain, no palpitations  GASTROINTESTINAL: No pain. No nausea or vomiting; No diarrhea   NEUROLOGICAL: No headache or numbness, no tremors  MUSCULOSKELETAL: No joint pain, no muscle pain  GENITOURINARY: no dysuria, no frequency, no hesitancy  PSYCHIATRY: no depression , no anxiety  ALL OTHER  ROS negative      Vital Signs Last 24 Hrs  T(C): 37.1 (11 Aug 2020 11:22), Max: 37.1 (10 Aug 2020 23:30)  T(F): 98.8 (11 Aug 2020 11:22), Max: 98.8 (10 Aug 2020 23:30)  HR: 70 (11 Aug 2020 11:22) (69 - 89)  BP: 119/48 (11 Aug 2020 11:22) (119/48 - 176/82)  BP(mean): --  RR: 18 (11 Aug 2020 11:22) (18 - 20)  SpO2: 96% (11 Aug 2020 11:22) (94% - 99%)    ________________________________________________  PHYSICAL EXAM:  GENERAL: NAD, face swollen  HEENT: Normocephalic;  conjunctivae and sclerae clear; moist mucous membranes;   NECK : supple  CHEST/LUNG: crackles bilaterally decreased  HEART: S1 S2  regular; no murmurs, gallops or rubs  ABDOMEN: Soft, Nontender, Nondistended; Bowel sounds present  EXTREMITIES: no cyanosis; no edema; no calf tenderness  SKIN: warm and dry; no rash  NERVOUS SYSTEM:  Awake and alert; Oriented  to place, person and time ;  Motor 5/5 on L side , 4/5 on R side    _________________________________________________  LABS:                        9.0    4.16  )-----------( 137      ( 11 Aug 2020 07:15 )             27.6     08-11    136  |  100  |  50<H>  ----------------------------<  85  4.9   |  29  |  9.01<H>    Ca    9.0      11 Aug 2020 07:15  Phos  4.9     08-11    TPro  7.1  /  Alb  3.2<L>  /  TBili  0.3  /  DBili  x   /  AST  22  /  ALT  39  /  AlkPhos  119  08-10    PT/INR - ( 10 Aug 2020 05:03 )   PT: 11.3 sec;   INR: 0.96 ratio         PTT - ( 10 Aug 2020 05:03 )  PTT:30.3 sec      CARDIAC MARKERS ( 11 Aug 2020 02:00 )  0.830 ng/mL / x     / x     / x     / x      CARDIAC MARKERS ( 10 Aug 2020 19:28 )  0.969 ng/mL / x     / x     / x     / x      CARDIAC MARKERS ( 10 Aug 2020 05:03 )  0.266 ng/mL / x     / x     / x     / x      < from: Xray Chest 1 View AP/PA (08.10.20 @ 05:26) >  Lungs: There is vascular congestion with increased interstitial markings.  Heart: The heart is normal in size.  Mediastinum: The mediastinum is within normal limits.    IMPRESSION:    Pulmonary vascular congestion.        < end of copied text >        Serum Pro-Brain Natriuretic Peptide: 34521 pg/mL (08-10-20 @ 05:03)
SUBJ:  no overnight event. blood pressure is controlled    MEDICATIONS  (STANDING):  apixaban 2.5 milliGRAM(s) Oral two times a day  aspirin  chewable 81 milliGRAM(s) Oral daily  atorvastatin 80 milliGRAM(s) Oral at bedtime  calcitriol   Capsule 0.25 MICROGram(s) Oral daily  chlorhexidine 2% Cloths 1 Application(s) Topical daily  colchicine 0.6 milliGRAM(s) Oral two times a day  dextrose 50% Injectable 25 Gram(s) IV Push once  ferrous    sulfate 325 milliGRAM(s) Oral daily  folic acid 1 milliGRAM(s) Oral daily  hydrALAZINE 25 milliGRAM(s) Oral three times a day  insulin lispro (HumaLOG) corrective regimen sliding scale   SubCutaneous three times a day before meals  isosorbide   dinitrate Tablet (ISORDIL) 10 milliGRAM(s) Oral two times a day  metoprolol tartrate 25 milliGRAM(s) Oral every 12 hours  NIFEdipine XL 60 milliGRAM(s) Oral daily  pantoprazole    Tablet 40 milliGRAM(s) Oral before breakfast  sevelamer carbonate 1600 milliGRAM(s) Oral three times a day with meals    MEDICATIONS  (PRN):            Vital Signs Last 24 Hrs  T(C): 36.9 (12 Aug 2020 09:56), Max: 37.2 (12 Aug 2020 07:42)  T(F): 98.4 (12 Aug 2020 09:56), Max: 98.9 (12 Aug 2020 07:42)  HR: 71 (12 Aug 2020 09:56) (65 - 73)  BP: 161/65 (12 Aug 2020 09:56) (119/55 - 161/65)  BP(mean): 73 (11 Aug 2020 12:23) (73 - 75)  RR: 17 (12 Aug 2020 09:56) (17 - 20)  SpO2: 100% (12 Aug 2020 09:56) (94% - 100%)    REVIEW OF SYSTEMS:  CONSTITUTIONAL: No fever, weight loss, or fatigue  EYES: No eye pain, visual disturbances, or discharge  ENMT:  No difficulty hearing, tinnitus, vertigo; No sinus or throat pain  NECK: No pain or stiffness  RESPIRATORY: No cough, wheezing, chills or hemoptysis; No shortness of breath  CARDIOVASCULAR: No chest pain, palpitations, dizziness, or leg swelling  GASTROINTESTINAL: No abdominal or epigastric pain. No nausea, vomiting, or hematemesis; No diarrhea or constipation. No melena or hematochezia.  GENITOURINARY: No dysuria, frequency, hematuria, or incontinence  NEUROLOGICAL: No headaches, memory loss, loss of strength, numbness, or tremors  SKIN: No itching, burning, rashes, or lesions   LYMPH NODES: No enlarged glands  ENDOCRINE: No heat or cold intolerance; No hair loss  MUSCULOSKELETAL: No joint pain or swelling; No muscle, back, or extremity pain  PSYCHIATRIC: No depression, anxiety, mood swings, or difficulty sleeping  HEME/LYMPH: No easy bruising, or bleeding gums  ALLERY AND IMMUNOLOGIC: No hives or eczema    PHYSICAL EXAM:  · CONSTITUTIONAL:	Well-developed, well nourished    BMI-  · EYES:	EOMI; PERRL; no drainage or redness  · ENMT	No oral lesions; no gross abnormalities  · NECK:	No bruits; no thyromegaly or nodules  ·BACK:	No deformity or limitation of movement  ·RESPIRATORY:   airway patent; breath sounds equal; good air movement; respirations non-labored; clear to auscultation bilaterally; no chest wall tenderness; no intercostal retractions; no rales,rhonchi or wheeze  · CARDIOVASCULAR	regular rate and rhythm  no rub  no murmur  normal PMI  . GASTROINTESTINAL:  no distention; no masses palpable; bowel sounds normal; no rebound tenderness; no guarding; no rigidity; no organomegaly  · EXTREMITIES: No cyanosis, clubbing or edema  · VASCULAR: 	Equal and normal pulses (carotid, femoral, dorsalis pedis)  ·NEUROLOGICAL:   alert and oriented x 3; sensation intact; deep reflexes intact; cranial nerves intact; no spontaneous movement; superficial reflexes intact; normal strength  · SKIN:	No lesions; no rash  . LYMPH NODES:	No lymphadedenopathy  · MUSCULOSKELETAL:   No calf tenderness  no joint swelling	  TELEMETRY:    ECG:    TTE:    LABS:                        8.5    3.93  )-----------( 117      ( 12 Aug 2020 10:12 )             26.0     08-12    136  |  97  |  74<H>  ----------------------------<  107<H>  5.2   |  29  |  11.80<H>    Ca    8.4      12 Aug 2020 10:12  Phos  5.4     08-12  Mg     2.3     08-12      CARDIAC MARKERS ( 11 Aug 2020 02:00 )  0.830 ng/mL / x     / x     / x     / x      CARDIAC MARKERS ( 10 Aug 2020 19:28 )  0.969 ng/mL / x     / x     / x     / x            Creatinine Trend: 11.80<--, 9.01<--, 6.71<--, 11.80<--, 9.23<--  I&O's Summary    11 Aug 2020 07:01  -  12 Aug 2020 07:00  --------------------------------------------------------  IN: 275 mL / OUT: 400 mL / NET: -125 mL      BNP  RADIOLOGY & ADDITIONAL STUDIES:    IMPRESSION AND PLAN:
Patient is a 74y old  Male who presents with a chief complaint of Chest pain (03 Feb 2020 15:46)      INTERVAL HPI/OVERNIGHT EVENTS: Patient seen and examined at the bedside. Patient denies any sob today , doing ok , taking meds   MEDICATIONS  (STANDING):  apixaban 2.5 milliGRAM(s) Oral two times a day  aspirin  chewable 81 milliGRAM(s) Oral daily  atorvastatin 80 milliGRAM(s) Oral at bedtime  calcitriol   Capsule 0.25 MICROGram(s) Oral daily  chlorhexidine 2% Cloths 1 Application(s) Topical daily  colchicine 0.6 milliGRAM(s) Oral two times a day  dextrose 50% Injectable 25 Gram(s) IV Push once  ferrous    sulfate 325 milliGRAM(s) Oral daily  folic acid 1 milliGRAM(s) Oral daily  hydrALAZINE 25 milliGRAM(s) Oral three times a day  insulin lispro (HumaLOG) corrective regimen sliding scale   SubCutaneous three times a day before meals  isosorbide   dinitrate Tablet (ISORDIL) 10 milliGRAM(s) Oral two times a day  metoprolol tartrate 25 milliGRAM(s) Oral every 12 hours  NIFEdipine XL 60 milliGRAM(s) Oral daily  pantoprazole    Tablet 40 milliGRAM(s) Oral before breakfast  sevelamer carbonate 1600 milliGRAM(s) Oral three times a day with meals    MEDICATIONS  (PRN):    MEDICATIONS  (PRN):  PAST MEDICAL & SURGICAL HISTORY:  Cerebrovascular accident (CVA), unspecified mechanism  Anemia  Hypertension  Diabetes  ESRD (end stage renal disease) on dialysis  A-V fistula        __________________________________________________  REVIEW OF SYSTEMS:    CONSTITUTIONAL: No fever,   EYES: no acute visual disturbances  NECK: No pain or stiffness  RESPIRATORY: No cough; No shortness of breath  CARDIOVASCULAR: No chest pain, no palpitations  GASTROINTESTINAL: No pain. No nausea or vomiting; No diarrhea   NEUROLOGICAL: No headache or numbness, no tremors  MUSCULOSKELETAL: No joint pain, no muscle pain  GENITOURINARY: no dysuria, no frequency, no hesitancy  PSYCHIATRY: no depression , no anxiety  ALL OTHER  ROS negative    Vital Signs Last 24 Hrs  T(C): 36.9 (12 Aug 2020 09:56), Max: 37.2 (12 Aug 2020 07:42)  T(F): 98.4 (12 Aug 2020 09:56), Max: 98.9 (12 Aug 2020 07:42)  HR: 71 (12 Aug 2020 09:56) (65 - 73)  BP: 161/65 (12 Aug 2020 09:56) (119/55 - 161/65)  BP(mean): 73 (11 Aug 2020 12:23) (73 - 75)  RR: 17 (12 Aug 2020 09:56) (17 - 20)  SpO2: 100% (12 Aug 2020 09:56) (94% - 100%)    ________________________________________________  PHYSICAL EXAM:  GENERAL: NAD, face swollen  HEENT: Normocephalic;  conjunctivae and sclerae clear; moist mucous membranes;   NECK : supple  CHEST/LUNG: crackles bilaterally decreased  HEART: S1 S2  regular; no murmurs, gallops or rubs  ABDOMEN: Soft, Nontender, Nondistended; Bowel sounds present  EXTREMITIES: no cyanosis; no edema; no calf tenderness  SKIN: warm and dry; no rash  NERVOUS SYSTEM:  Awake and alert; Oriented  to place, person and time ;  Motor 5/5 on L side , 4/5 on R side    _________________________________________________  LABS:                        8.5    3.93  )-----------( 117      ( 12 Aug 2020 10:12 )             26.0     08-12    136  |  97  |  74<H>  ----------------------------<  107<H>  5.2   |  29  |  11.80<H>    Ca    8.4      12 Aug 2020 10:12  Phos  5.4     08-12  Mg     2.3     08-12            CARDIAC MARKERS ( 11 Aug 2020 02:00 )  0.830 ng/mL / x     / x     / x     / x      CARDIAC MARKERS ( 10 Aug 2020 19:28 )  0.969 ng/mL / x     / x     / x     / x            Serum Pro-Brain Natriuretic Peptide: 17325 pg/mL (08-10-20 @ 05:03)            LABS:                        9.0    4.16  )-----------( 137      ( 11 Aug 2020 07:15 )             27.6     08-11    136  |  100  |  50<H>  ----------------------------<  85  4.9   |  29  |  9.01<H>    Ca    9.0      11 Aug 2020 07:15  Phos  4.9     08-11    TPro  7.1  /  Alb  3.2<L>  /  TBili  0.3  /  DBili  x   /  AST  22  /  ALT  39  /  AlkPhos  119  08-10    PT/INR - ( 10 Aug 2020 05:03 )   PT: 11.3 sec;   INR: 0.96 ratio         PTT - ( 10 Aug 2020 05:03 )  PTT:30.3 sec      CARDIAC MARKERS ( 11 Aug 2020 02:00 )  0.830 ng/mL / x     / x     / x     / x      CARDIAC MARKERS ( 10 Aug 2020 19:28 )  0.969 ng/mL / x     / x     / x     / x      CARDIAC MARKERS ( 10 Aug 2020 05:03 )  0.266 ng/mL / x     / x     / x     / x      < from: Xray Chest 1 View AP/PA (08.10.20 @ 05:26) >  Lungs: There is vascular congestion with increased interstitial markings.  Heart: The heart is normal in size.  Mediastinum: The mediastinum is within normal limits.    IMPRESSION:    Pulmonary vascular congestion.        < end of copied text >        Serum Pro-Brain Natriuretic Peptide: 89103 pg/mL (08-10-20 @ 05:03)
Problem List:  ESRD  HTN  Poor compliance with meds in the NH    PAST MEDICAL & SURGICAL HISTORY:  Cerebrovascular accident (CVA), unspecified mechanism  Anemia  Hypertension  Diabetes  ESRD (end stage renal disease) on dialysis  A-V fistula      No Known Allergies      MEDICATIONS  (STANDING):  apixaban 2.5 milliGRAM(s) Oral two times a day  aspirin  chewable 81 milliGRAM(s) Oral daily  atorvastatin 80 milliGRAM(s) Oral at bedtime  calcitriol   Capsule 0.25 MICROGram(s) Oral daily  chlorhexidine 2% Cloths 1 Application(s) Topical daily  colchicine 0.6 milliGRAM(s) Oral two times a day  dextrose 50% Injectable 25 Gram(s) IV Push once  ferrous    sulfate 325 milliGRAM(s) Oral daily  folic acid 1 milliGRAM(s) Oral daily  hydrALAZINE 25 milliGRAM(s) Oral three times a day  insulin lispro (HumaLOG) corrective regimen sliding scale   SubCutaneous three times a day before meals  isosorbide   dinitrate Tablet (ISORDIL) 10 milliGRAM(s) Oral two times a day  metoprolol tartrate 25 milliGRAM(s) Oral every 12 hours  NIFEdipine XL 60 milliGRAM(s) Oral daily  pantoprazole    Tablet 40 milliGRAM(s) Oral before breakfast  sevelamer carbonate 1600 milliGRAM(s) Oral three times a day with meals    MEDICATIONS  (PRN):                            9.0    4.16  )-----------( 137      ( 11 Aug 2020 07:15 )             27.6     08-11    136  |  100  |  50<H>  ----------------------------<  85  4.9   |  29  |  9.01<H>    Ca    9.0      11 Aug 2020 07:15  Phos  4.9     08-11              REVIEW OF SYSTEMS:  General: no fever no chills, no weight loss.  EYES/ENT: No visual changes;  No vertigo, no headache.  NECK: No pain or stiffness  RESPIRATORY: No cough, wheezing, hemoptysis; No shortness of breath  CARDIOVASCULAR: No chest pain or palpitations. No Edema  GASTROINTESTINAL: No abdominal or epigastric pain. No nausea, vomiting. No diarrhea or constipation. No melena.  GENITOURINARY: No dysuria, frequency, foamy urine, urinary urgency, incontinence or hematuria          VITALS:  T(F): 98.9 (08-12-20 @ 07:42), Max: 98.9 (08-12-20 @ 07:42)  HR: 68 (08-12-20 @ 07:42)  BP: 122/69 (08-12-20 @ 07:42)  RR: 18 (08-12-20 @ 07:42)  SpO2: 94% (08-12-20 @ 07:42)  Wt(kg): --    08-11 @ 07:01  -  08-12 @ 07:00  --------------------------------------------------------  IN: 275 mL / OUT: 400 mL / NET: -125 mL        PHYSICAL EXAM:  Constitutional: well developed, no diaphoresis, no distress.  Neck: No JVD, no carotid bruit, supple, no adenopathy  Respiratory:  air entrance B/L, no wheezes, rales or rhonchi  Cardiovascular: S1, S2, RRR, no pericardial rub, no murmur  Abdomen: BS+, soft, no tenderness, no bruit  Pelvis: bladder nondistended  Extremities: No cyanosis or clubbing. No peripheral edema.     Vascular Access: RIGHT AVG WITH BRUIT AND THRILL
Problem List:  ESRD  SOB and CHF on admission, Increased Troponon level  Hypertenion  Poor compliance with meds    PAST MEDICAL & SURGICAL HISTORY:  Cerebrovascular accident (CVA), unspecified mechanism  Anemia  Hypertension  Diabetes  ESRD (end stage renal disease) on dialysis  A-V fistula      No Known Allergies      MEDICATIONS  (STANDING):  apixaban 2.5 milliGRAM(s) Oral two times a day  aspirin  chewable 81 milliGRAM(s) Oral daily  atorvastatin 80 milliGRAM(s) Oral at bedtime  calcitriol   Capsule 0.25 MICROGram(s) Oral daily  chlorhexidine 2% Cloths 1 Application(s) Topical daily  colchicine 0.6 milliGRAM(s) Oral two times a day  dextrose 50% Injectable 25 Gram(s) IV Push once  ferrous    sulfate 325 milliGRAM(s) Oral daily  folic acid 1 milliGRAM(s) Oral daily  hydrALAZINE 25 milliGRAM(s) Oral three times a day  insulin lispro (HumaLOG) corrective regimen sliding scale   SubCutaneous three times a day before meals  isosorbide   dinitrate Tablet (ISORDIL) 10 milliGRAM(s) Oral two times a day  metoprolol tartrate 25 milliGRAM(s) Oral every 12 hours  NIFEdipine XL 60 milliGRAM(s) Oral daily  pantoprazole    Tablet 40 milliGRAM(s) Oral before breakfast  sevelamer carbonate 1600 milliGRAM(s) Oral three times a day with meals    MEDICATIONS  (PRN):                            9.0    4.16  )-----------( 137      ( 11 Aug 2020 07:15 )             27.6     08-11    136  |  100  |  50<H>  ----------------------------<  85  4.9   |  29  |  9.01<H>    Ca    9.0      11 Aug 2020 07:15  Phos  4.9     08-11    TPro  7.1  /  Alb  3.2<L>  /  TBili  0.3  /  DBili  x   /  AST  22  /  ALT  39  /  AlkPhos  119  08-10    PT/INR - ( 10 Aug 2020 05:03 )   PT: 11.3 sec;   INR: 0.96 ratio         PTT - ( 10 Aug 2020 05:03 )  PTT:30.3 sec        REVIEW OF SYSTEMS:  General: no fever no chills, no weight loss.  EYES/ENT: No visual changes;  No vertigo, no headache.  NECK: No pain or stiffness  RESPIRATORY: No cough, wheezing, hemoptysis; No shortness of breath  CARDIOVASCULAR: No chest pain or palpitations. No Edema  GASTROINTESTINAL: No abdominal or epigastric pain. No nausea, vomiting. No diarrhea or constipation. No melena.  GENITOURINARY: No dysuria, frequency, foamy urine, urinary urgency, incontinence or hematuria  NEUROLOGICAL: No numbness or weakness, no tremor , no dizziness.   Muscle skeletal : no joint pain and no swelling of joints and limbs.  SKIN: No itching, burning, rashes.        VITALS:  T(F): 98.4 (08-11-20 @ 07:44), Max: 98.8 (08-10-20 @ 23:30)  HR: 69 (08-11-20 @ 07:44)  BP: 150/64 (08-11-20 @ 07:44)  RR: 18 (08-11-20 @ 07:44)  SpO2: 95% (08-11-20 @ 07:44)  Wt(kg): --    08-10 @ 07:01  -  08-11 @ 07:00  --------------------------------------------------------  IN: 700 mL / OUT: 2400 mL / NET: -1700 mL        PHYSICAL EXAM:  Constitutional: well developed, no diaphoresis, no distress.  Neck: No JVD, no carotid bruit, supple, no adenopathy  Respiratory: Good air entrance B/L, no wheezes, rales or rhonchi  Cardiovascular: S1, S2, RRR, no pericardial rub, no murmur  Abdomen: BS+, soft, no tenderness, no bruit  Pelvis: bladder nondistended  Extremities: No cyanosis or clubbing. No peripheral edema.   Pulses: All present  Neurological: A/O x 3, no focal deficits  Psychiatric: Normal mood, normal affect  Skin: No rashes  Vascular Access:
Summary:  75y Male  with hypertensive emergency.    Subjective: blood pressure is better controlled. no chest pain      Objective: no need for inpatient cardiology workup.    MEDICATIONS  (STANDING):  apixaban 2.5 milliGRAM(s) Oral two times a day  aspirin  chewable 81 milliGRAM(s) Oral daily  atorvastatin 80 milliGRAM(s) Oral at bedtime  calcitriol   Capsule 0.25 MICROGram(s) Oral daily  chlorhexidine 2% Cloths 1 Application(s) Topical daily  colchicine 0.6 milliGRAM(s) Oral two times a day  dextrose 50% Injectable 25 Gram(s) IV Push once  ferrous    sulfate 325 milliGRAM(s) Oral daily  folic acid 1 milliGRAM(s) Oral daily  hydrALAZINE 25 milliGRAM(s) Oral three times a day  insulin lispro (HumaLOG) corrective regimen sliding scale   SubCutaneous three times a day before meals  isosorbide   dinitrate Tablet (ISORDIL) 10 milliGRAM(s) Oral two times a day  metoprolol tartrate 25 milliGRAM(s) Oral every 12 hours  NIFEdipine XL 60 milliGRAM(s) Oral daily  pantoprazole    Tablet 40 milliGRAM(s) Oral before breakfast  sevelamer carbonate 1600 milliGRAM(s) Oral three times a day with meals    MEDICATIONS  (PRN):              Vital Signs Last 24 Hrs  T(C): 37.1 (11 Aug 2020 11:22), Max: 37.1 (10 Aug 2020 23:30)  T(F): 98.8 (11 Aug 2020 11:22), Max: 98.8 (10 Aug 2020 23:30)  HR: 65 (11 Aug 2020 13:30) (65 - 89)  BP: 119/55 (11 Aug 2020 13:30) (119/48 - 166/72)  BP(mean): 73 (11 Aug 2020 12:23) (73 - 75)  RR: 18 (11 Aug 2020 11:22) (18 - 20)  SpO2: 98% (11 Aug 2020 12:23) (94% - 99%)      General:  Well developed, well nourished, alert and active, no pallor, NAD.  HEENT:    Normal appearance of conjunctiva, ears, nose, lips, oropharynx, and oral mucosa, anicteric.  Neck:  No masses, no asymmetry.  Lymph Nodes:  No lymphadenopathy.   Cardiovascular:  RRR normal S1/S2, no murmur.  Respiratory:  CTA B/L, normal respiratory effort.   Abdominal:   soft, no masses or tenderness, normoactive BS, NT/ND, no HSM.  Extremities:   No clubbing or cyanosis, normal capillary refill, no edema.   Skin:   No rash, jaundice, lesions, eczema.   Musculoskeletal:  No joint swelling, erythema or tenderness.   Neuro: No focal deficits.   Other:       LABS:                        9.0    4.16  )-----------( 137      ( 11 Aug 2020 07:15 )             27.6     08-11    136  |  100  |  50<H>  ----------------------------<  85  4.9   |  29  |  9.01<H>    Ca    9.0      11 Aug 2020 07:15  Phos  4.9     08-11    TPro  7.1  /  Alb  3.2<L>  /  TBili  0.3  /  DBili  x   /  AST  22  /  ALT  39  /  AlkPhos  119  08-10    PT/INR - ( 10 Aug 2020 05:03 )   PT: 11.3 sec;   INR: 0.96 ratio         PTT - ( 10 Aug 2020 05:03 )  PTT:30.3 sec      RADIOLOGY & ADDITIONAL TESTS:

## 2020-08-12 NOTE — DIETITIAN INITIAL EVALUATION ADULT. - OTHER INFO
Pt alert, oriented, asleep when visited, from skilled nursing facility; poor medication compliance per MD Pt alert, oriented, well-communicated, from skilled nursing facility; poor medication compliance per MD; appetite good, varied intake depending on food served, 50% intake at breakfast observed, denied recent wt changes, denied GI distress, chewing or swallowing problem at present, food choices obtained and forwarded to Dietary Pt alert, oriented, well-communicated, from skilled nursing facility; poor medication compliance per MD; appetite good, varied intake depending on food served, 50% intake at breakfast observed, denied recent wt changes, denied GI distress, chewing or swallowing problem at present, food choices obtained and forwarded to Dietary; followed by dietitian at skilled nursing facility and out-pt HD center; h/o DM, on finger sticks, [id number]=5.1 noted.

## 2020-08-12 NOTE — DIETITIAN INITIAL EVALUATION ADULT. - PERTINENT MEDS FT
MEDICATIONS  (STANDING):  apixaban 2.5 milliGRAM(s) Oral two times a day  aspirin  chewable 81 milliGRAM(s) Oral daily  atorvastatin 80 milliGRAM(s) Oral at bedtime  calcitriol   Capsule 0.25 MICROGram(s) Oral daily  chlorhexidine 2% Cloths 1 Application(s) Topical daily  colchicine 0.6 milliGRAM(s) Oral two times a day  dextrose 50% Injectable 25 Gram(s) IV Push once  ferrous    sulfate 325 milliGRAM(s) Oral daily  folic acid 1 milliGRAM(s) Oral daily  hydrALAZINE 25 milliGRAM(s) Oral three times a day  insulin lispro (HumaLOG) corrective regimen sliding scale   SubCutaneous three times a day before meals  isosorbide   dinitrate Tablet (ISORDIL) 10 milliGRAM(s) Oral two times a day  metoprolol tartrate 25 milliGRAM(s) Oral every 12 hours  NIFEdipine XL 60 milliGRAM(s) Oral daily  pantoprazole    Tablet 40 milliGRAM(s) Oral before breakfast  sevelamer carbonate 1600 milliGRAM(s) Oral three times a day with meals

## 2020-08-12 NOTE — DISCHARGE NOTE PROVIDER - NSDCFUSCHEDAPPT_GEN_ALL_CORE_FT
DORSAINVIL, JEANCLAUDE ; 10/13/2020 ; [facility] [address] DORSAINVIL, JEANCLAUDE ; 10/13/2020 ; [facility] [address]

## 2020-08-12 NOTE — PHARMACOTHERAPY INTERVENTION NOTE - COMMENTS
[facility] Heart Failure Patient was counseled on his medications.  Son was by his side. Discussed use of newly added Eliquis- indications and possible side effects. Also went over blood pressure medications and other meds such as atorvastatin.  Explained to the patient that his clopidogrel was discontinued.  Patient showed understanding and had no additional questions.  At the time of the counseling patient was being prepared for discharge.

## 2020-08-12 NOTE — PROGRESS NOTE ADULT - ASSESSMENT
74 y M, wheel chair bound, from [facility] with PMH significant for HTN, HLD, CVA with residual right sided weakness, ESRD on HD (M,W,F at [place]) Anemia, constipation, gout presents to Ed with L sided chest pain.    Pt will be admitted to tele for Chest pain
75 y.o male with a PMHx of ESRD from [facility] on hemodialysis M/W/F, HTN and DM reported to the ED with shortness of breath. According to the patient, it started around 3 in the morning, sudden in onset, non progressive and got relieved on its own.  Admitting for Hypertensive emergency, ESRD on dialysis, Hyperkalemia, Elevated troponin, Elevated BNP    Patient is FULL CODE
Cardiology is consulted because of shortness of breath, hypertensive emergency and elevated troponin.  Patient denies any chest pain. Shortness of breath improved. Urgent HD planned. EKG showed NSR no ischemic change. CXR shows increased interstitial infiltrates and fluid overload. Mild troponin elevation in setting of no chest pain and no EKG ischemic change along with ESRD. Trend troponins.   Hypertensive emergency secondary to non-compliance Resume NH medication. Taking out Fluids with HD will help as well. Goal BP around 150-160 mm Hg systolic for first 24 hours. Monitor blood pressure. We can go up on hydralazine or metoprolol as needed.    Problem/Recommendation - 1:  Problem: Hypertensive emergency. Recommendation: Hypertensive emergency secondary to non-compliance.  Resume NH medication.  Taking out Fluids with urgent HD will help as well.   Goal BP around 150-160 mm Hg systolic for first 24 hours.   Monitor blood pressure.   We can go up on hydralazine or metoprolol as needed.  can dc telemetry     Problem/Recommendation - 2:  ·  Problem: Elevated troponin.  Recommendation: Mild troponin elevation in setting of no chest pain and no EKG ischemic change along with ESRD.   Echo in feb 2020 showed normal EF and Grade 1 Diastolic dysfunction.  Elevated pro-bnp because of fluid overload.  Resume aspirin, statin, beta blocker.     Problem/Recommendation - 3:  ·  Problem: ESRD (end stage renal disease) on dialysis.  Recommendation: Hypertensive and Diabetic ESRD.  Urgent HD planned.   EKG showed NSR no ischemic change.        Problem/Recommendation - 4:  ·  Problem: Atrial fibrillation.  Recommendation: PAF currently rate controlled.  on eliquis for AC, metoprolol for rate control.  recommend resume NH medications.      Problem/Recommendation - 5:  ·  Problem: Diabetes.  Recommendation: Came with normal blood glucose level but recent one is above 500 mg/dl.  likely because of hyperkalemia cocktail (D50).  Insulin HSS     Problem/Recommendation - 6:  Problem: CHF (congestive heart failure). Recommendation: Echo in feb 2020 showed normal EF and Grade 1 Diastolic dysfunction.  Elevated pro-bnp because of fluid overload.  No need to repeat Echo unless patient complains of chest pain with elevated troponin.  Monitor Input and output.     Problem/Recommendation - 7:  Problem: Prophylactic measure. Recommendation: on eliquis.
74 y M, wheel chair bound, from [facility] with PMH significant for HTN, HLD, CVA with residual right sided weakness, ESRD on HD (M,W,F at [place]) Anemia, constipation, gout presents to Ed with L sided chest pain.    Pt will be admitted to tele for Chest pain
Cardiology is consulted because of shortness of breath, hypertensive emergency and elevated troponin.  Patient denies any chest pain. Shortness of breath improved. Urgent HD planned. EKG showed NSR no ischemic change. CXR shows increased interstitial infiltrates and fluid overload. Mild troponin elevation in setting of no chest pain and no EKG ischemic change along with ESRD. Trend troponins.   Hypertensive emergency secondary to non-compliance Resume NH medication. Taking out Fluids with HD will help as well. Goal BP around 150-160 mm Hg systolic for first 24 hours. Monitor blood pressure. We can go up on hydralazine or metoprolol as needed.    Problem/Recommendation - 1:  Problem: Hypertensive emergency. Recommendation: Hypertensive emergency secondary to non-compliance.  Resume NH medication.  Taking out Fluids with urgent HD will help as well.   Goal BP around 150-160 mm Hg systolic for first 24 hours.   Monitor blood pressure.   We can go up on hydralazine or metoprolol as needed.  can dc telemetry     Problem/Recommendation - 2:  ·  Problem: Elevated troponin.  Recommendation: Mild troponin elevation in setting of no chest pain and no EKG ischemic change along with ESRD.   Trend troponin.   Echo in feb 2020 showed normal EF and Grade 1 Diastolic dysfunction.  Elevated pro-bnp because of fluid overload.  Resume aspirin, statin, beta blocker.     Problem/Recommendation - 3:  ·  Problem: ESRD (end stage renal disease) on dialysis.  Recommendation: Hypertensive and Diabetic ESRD.  Urgent HD planned.   EKG showed NSR no ischemic change.   CXR shows increased interstitial infiltrates and fluid overload.  Repeat serum potassium after HD.      Problem/Recommendation - 4:  ·  Problem: Atrial fibrillation.  Recommendation: PAF currently rate controlled.  on eliquis for AC, metoprolol for rate control.  recommend resume NH medications.      Problem/Recommendation - 5:  ·  Problem: Diabetes.  Recommendation: Came with normal blood glucose level but recent one is above 500 mg/dl.  likely because of hyperkalemia cocktail (D50).  Insulin HSS  consider adding basal insulin coverage.  f/u hba1c.      Problem/Recommendation - 6:  Problem: CHF (congestive heart failure). Recommendation: Echo in feb 2020 showed normal EF and Grade 1 Diastolic dysfunction.  Elevated pro-bnp because of fluid overload.  No need to repeat Echo unless patient complains of chest pain with elevated troponin.  Monitor Input and output.     Problem/Recommendation - 7:  Problem: Prophylactic measure. Recommendation: on eliquis.
ESRD  Dyspnea on admission resolved  Hypertension improved with medications  He refused taking meds in the NH.  Dialysis todat  Cardiology follow up and Echocardiogram.

## 2020-09-18 ENCOUNTER — INPATIENT (INPATIENT)
Facility: HOSPITAL | Age: 75
LOS: 2 days | Discharge: NURSING, REHAB OR LONG-TERM CARE | DRG: 291 | End: 2020-09-21
Attending: INTERNAL MEDICINE | Admitting: INTERNAL MEDICINE
Payer: MEDICARE

## 2020-09-18 VITALS
HEART RATE: 166 BPM | TEMPERATURE: 98 F | HEIGHT: 65 IN | DIASTOLIC BLOOD PRESSURE: 68 MMHG | RESPIRATION RATE: 16 BRPM | SYSTOLIC BLOOD PRESSURE: 166 MMHG | OXYGEN SATURATION: 98 %

## 2020-09-18 DIAGNOSIS — I77.0 ARTERIOVENOUS FISTULA, ACQUIRED: Chronic | ICD-10-CM

## 2020-09-18 DIAGNOSIS — N18.6 END STAGE RENAL DISEASE: ICD-10-CM

## 2020-09-18 DIAGNOSIS — D64.9 ANEMIA, UNSPECIFIED: ICD-10-CM

## 2020-09-18 DIAGNOSIS — Z29.9 ENCOUNTER FOR PROPHYLACTIC MEASURES, UNSPECIFIED: ICD-10-CM

## 2020-09-18 DIAGNOSIS — E87.70 FLUID OVERLOAD, UNSPECIFIED: ICD-10-CM

## 2020-09-18 DIAGNOSIS — Z71.89 OTHER SPECIFIED COUNSELING: ICD-10-CM

## 2020-09-18 DIAGNOSIS — I10 ESSENTIAL (PRIMARY) HYPERTENSION: ICD-10-CM

## 2020-09-18 DIAGNOSIS — R07.9 CHEST PAIN, UNSPECIFIED: ICD-10-CM

## 2020-09-18 DIAGNOSIS — E11.9 TYPE 2 DIABETES MELLITUS WITHOUT COMPLICATIONS: ICD-10-CM

## 2020-09-18 LAB
ALBUMIN SERPL ELPH-MCNC: 2.8 G/DL — LOW (ref 3.5–5)
ALP SERPL-CCNC: 124 U/L — HIGH (ref 40–120)
ALT FLD-CCNC: 30 U/L DA — SIGNIFICANT CHANGE UP (ref 10–60)
ANION GAP SERPL CALC-SCNC: 9 MMOL/L — SIGNIFICANT CHANGE UP (ref 5–17)
APTT BLD: 40.3 SEC — HIGH (ref 27.5–35.5)
AST SERPL-CCNC: 20 U/L — SIGNIFICANT CHANGE UP (ref 10–40)
BASOPHILS # BLD AUTO: 0.03 K/UL — SIGNIFICANT CHANGE UP (ref 0–0.2)
BASOPHILS NFR BLD AUTO: 0.8 % — SIGNIFICANT CHANGE UP (ref 0–2)
BILIRUB SERPL-MCNC: 0.3 MG/DL — SIGNIFICANT CHANGE UP (ref 0.2–1.2)
BUN SERPL-MCNC: 58 MG/DL — HIGH (ref 7–18)
CALCIUM SERPL-MCNC: 8.3 MG/DL — LOW (ref 8.4–10.5)
CHLORIDE SERPL-SCNC: 96 MMOL/L — SIGNIFICANT CHANGE UP (ref 96–108)
CHOLEST SERPL-MCNC: 139 MG/DL — SIGNIFICANT CHANGE UP (ref 10–199)
CO2 SERPL-SCNC: 30 MMOL/L — SIGNIFICANT CHANGE UP (ref 22–31)
CREAT SERPL-MCNC: 9.85 MG/DL — HIGH (ref 0.5–1.3)
EOSINOPHIL # BLD AUTO: 0.17 K/UL — SIGNIFICANT CHANGE UP (ref 0–0.5)
EOSINOPHIL NFR BLD AUTO: 4.4 % — SIGNIFICANT CHANGE UP (ref 0–6)
GLUCOSE BLDC GLUCOMTR-MCNC: 113 MG/DL — HIGH (ref 70–99)
GLUCOSE SERPL-MCNC: 120 MG/DL — HIGH (ref 70–99)
HCT VFR BLD CALC: 25.1 % — LOW (ref 39–50)
HDLC SERPL-MCNC: 50 MG/DL — SIGNIFICANT CHANGE UP
HGB BLD-MCNC: 8.6 G/DL — LOW (ref 13–17)
IMM GRANULOCYTES NFR BLD AUTO: 0.3 % — SIGNIFICANT CHANGE UP (ref 0–1.5)
INR BLD: 1.01 RATIO — SIGNIFICANT CHANGE UP (ref 0.88–1.16)
INR BLD: 1.03 RATIO — SIGNIFICANT CHANGE UP (ref 0.88–1.16)
LIDOCAIN IGE QN: 481 U/L — HIGH (ref 73–393)
LIPID PNL WITH DIRECT LDL SERPL: 70 MG/DL — SIGNIFICANT CHANGE UP
LYMPHOCYTES # BLD AUTO: 0.77 K/UL — LOW (ref 1–3.3)
LYMPHOCYTES # BLD AUTO: 19.7 % — SIGNIFICANT CHANGE UP (ref 13–44)
MAGNESIUM SERPL-MCNC: 2.2 MG/DL — SIGNIFICANT CHANGE UP (ref 1.6–2.6)
MAGNESIUM SERPL-MCNC: 2.3 MG/DL — SIGNIFICANT CHANGE UP (ref 1.6–2.6)
MCHC RBC-ENTMCNC: 33.7 PG — SIGNIFICANT CHANGE UP (ref 27–34)
MCHC RBC-ENTMCNC: 34.3 GM/DL — SIGNIFICANT CHANGE UP (ref 32–36)
MCV RBC AUTO: 98.4 FL — SIGNIFICANT CHANGE UP (ref 80–100)
MONOCYTES # BLD AUTO: 0.45 K/UL — SIGNIFICANT CHANGE UP (ref 0–0.9)
MONOCYTES NFR BLD AUTO: 11.5 % — SIGNIFICANT CHANGE UP (ref 2–14)
NEUTROPHILS # BLD AUTO: 2.47 K/UL — SIGNIFICANT CHANGE UP (ref 1.8–7.4)
NEUTROPHILS NFR BLD AUTO: 63.3 % — SIGNIFICANT CHANGE UP (ref 43–77)
NRBC # BLD: 0 /100 WBCS — SIGNIFICANT CHANGE UP (ref 0–0)
NT-PROBNP SERPL-SCNC: HIGH PG/ML (ref 0–450)
PHOSPHATE SERPL-MCNC: 4.7 MG/DL — HIGH (ref 2.5–4.5)
PLATELET # BLD AUTO: 134 K/UL — LOW (ref 150–400)
POTASSIUM SERPL-MCNC: 4.4 MMOL/L — SIGNIFICANT CHANGE UP (ref 3.5–5.3)
POTASSIUM SERPL-SCNC: 4.4 MMOL/L — SIGNIFICANT CHANGE UP (ref 3.5–5.3)
PROT SERPL-MCNC: 6.8 G/DL — SIGNIFICANT CHANGE UP (ref 6–8.3)
PROTHROM AB SERPL-ACNC: 11.8 SEC — SIGNIFICANT CHANGE UP (ref 10.6–13.6)
PROTHROM AB SERPL-ACNC: 12 SEC — SIGNIFICANT CHANGE UP (ref 10.6–13.6)
RBC # BLD: 2.55 M/UL — LOW (ref 4.2–5.8)
RBC # FLD: 14.8 % — HIGH (ref 10.3–14.5)
SARS-COV-2 RNA SPEC QL NAA+PROBE: SIGNIFICANT CHANGE UP
SODIUM SERPL-SCNC: 135 MMOL/L — SIGNIFICANT CHANGE UP (ref 135–145)
TOTAL CHOLESTEROL/HDL RATIO MEASUREMENT: 2.8 RATIO — LOW (ref 3.4–9.6)
TRIGL SERPL-MCNC: 97 MG/DL — SIGNIFICANT CHANGE UP (ref 10–149)
TROPONIN I SERPL-MCNC: 0.12 NG/ML — HIGH (ref 0–0.04)
TSH SERPL-MCNC: 2.2 UU/ML — SIGNIFICANT CHANGE UP (ref 0.34–4.82)
WBC # BLD: 3.9 K/UL — SIGNIFICANT CHANGE UP (ref 3.8–10.5)
WBC # FLD AUTO: 3.9 K/UL — SIGNIFICANT CHANGE UP (ref 3.8–10.5)

## 2020-09-18 PROCEDURE — 99285 EMERGENCY DEPT VISIT HI MDM: CPT | Mod: 25

## 2020-09-18 PROCEDURE — 71045 X-RAY EXAM CHEST 1 VIEW: CPT | Mod: 26

## 2020-09-18 RX ORDER — ATORVASTATIN CALCIUM 80 MG/1
80 TABLET, FILM COATED ORAL AT BEDTIME
Refills: 0 | Status: DISCONTINUED | OUTPATIENT
Start: 2020-09-18 | End: 2020-09-21

## 2020-09-18 RX ORDER — INSULIN LISPRO 100/ML
VIAL (ML) SUBCUTANEOUS
Refills: 0 | Status: DISCONTINUED | OUTPATIENT
Start: 2020-09-18 | End: 2020-09-21

## 2020-09-18 RX ORDER — INFLUENZA VIRUS VACCINE 15; 15; 15; 15 UG/.5ML; UG/.5ML; UG/.5ML; UG/.5ML
0.5 SUSPENSION INTRAMUSCULAR ONCE
Refills: 0 | Status: COMPLETED | OUTPATIENT
Start: 2020-09-18 | End: 2020-09-18

## 2020-09-18 RX ORDER — METOPROLOL TARTRATE 50 MG
12.5 TABLET ORAL
Refills: 0 | Status: DISCONTINUED | OUTPATIENT
Start: 2020-09-18 | End: 2020-09-19

## 2020-09-18 RX ORDER — HYDRALAZINE HCL 50 MG
25 TABLET ORAL THREE TIMES A DAY
Refills: 0 | Status: DISCONTINUED | OUTPATIENT
Start: 2020-09-18 | End: 2020-09-21

## 2020-09-18 RX ORDER — FOLIC ACID 0.8 MG
1 TABLET ORAL DAILY
Refills: 0 | Status: DISCONTINUED | OUTPATIENT
Start: 2020-09-18 | End: 2020-09-21

## 2020-09-18 RX ORDER — FUROSEMIDE 40 MG
40 TABLET ORAL DAILY
Refills: 0 | Status: DISCONTINUED | OUTPATIENT
Start: 2020-09-18 | End: 2020-09-21

## 2020-09-18 RX ORDER — ASPIRIN/CALCIUM CARB/MAGNESIUM 324 MG
81 TABLET ORAL DAILY
Refills: 0 | Status: DISCONTINUED | OUTPATIENT
Start: 2020-09-18 | End: 2020-09-21

## 2020-09-18 RX ORDER — APIXABAN 2.5 MG/1
2.5 TABLET, FILM COATED ORAL
Refills: 0 | Status: DISCONTINUED | OUTPATIENT
Start: 2020-09-18 | End: 2020-09-21

## 2020-09-18 RX ORDER — HYDRALAZINE HCL 50 MG
5 TABLET ORAL ONCE
Refills: 0 | Status: COMPLETED | OUTPATIENT
Start: 2020-09-18 | End: 2020-09-18

## 2020-09-18 RX ORDER — COLCHICINE 0.6 MG
0.6 TABLET ORAL
Refills: 0 | Status: DISCONTINUED | OUTPATIENT
Start: 2020-09-18 | End: 2020-09-19

## 2020-09-18 RX ORDER — HEPARIN SODIUM 5000 [USP'U]/ML
5000 INJECTION INTRAVENOUS; SUBCUTANEOUS EVERY 8 HOURS
Refills: 0 | Status: DISCONTINUED | OUTPATIENT
Start: 2020-09-18 | End: 2020-09-18

## 2020-09-18 RX ORDER — ISOSORBIDE DINITRATE 5 MG/1
20 TABLET ORAL THREE TIMES A DAY
Refills: 0 | Status: DISCONTINUED | OUTPATIENT
Start: 2020-09-18 | End: 2020-09-21

## 2020-09-18 RX ORDER — FERROUS SULFATE 325(65) MG
325 TABLET ORAL DAILY
Refills: 0 | Status: DISCONTINUED | OUTPATIENT
Start: 2020-09-18 | End: 2020-09-21

## 2020-09-18 RX ORDER — HYDRALAZINE HCL 50 MG
25 TABLET ORAL ONCE
Refills: 0 | Status: COMPLETED | OUTPATIENT
Start: 2020-09-18 | End: 2020-09-18

## 2020-09-18 RX ORDER — NIFEDIPINE 30 MG
60 TABLET, EXTENDED RELEASE 24 HR ORAL DAILY
Refills: 0 | Status: DISCONTINUED | OUTPATIENT
Start: 2020-09-18 | End: 2020-09-19

## 2020-09-18 RX ORDER — CALCITRIOL 0.5 UG/1
0.25 CAPSULE ORAL DAILY
Refills: 0 | Status: DISCONTINUED | OUTPATIENT
Start: 2020-09-18 | End: 2020-09-19

## 2020-09-18 RX ADMIN — Medication 5 MILLIGRAM(S): at 18:06

## 2020-09-18 RX ADMIN — Medication 25 MILLIGRAM(S): at 22:49

## 2020-09-18 RX ADMIN — Medication 81 MILLIGRAM(S): at 22:49

## 2020-09-18 RX ADMIN — Medication 12.5 MILLIGRAM(S): at 18:10

## 2020-09-18 RX ADMIN — Medication 60 MILLIGRAM(S): at 18:08

## 2020-09-18 RX ADMIN — Medication 25 MILLIGRAM(S): at 15:58

## 2020-09-18 RX ADMIN — ISOSORBIDE DINITRATE 20 MILLIGRAM(S): 5 TABLET ORAL at 23:13

## 2020-09-18 RX ADMIN — Medication 40 MILLIGRAM(S): at 21:02

## 2020-09-18 RX ADMIN — Medication 1 MILLIGRAM(S): at 22:49

## 2020-09-18 RX ADMIN — Medication 0.6 MILLIGRAM(S): at 22:50

## 2020-09-18 RX ADMIN — ATORVASTATIN CALCIUM 80 MILLIGRAM(S): 80 TABLET, FILM COATED ORAL at 22:49

## 2020-09-18 NOTE — H&P ADULT - HISTORY OF PRESENT ILLNESS
74 yo male from [facility]  with medical history significant for ESRD on hemodialysis M/W/F, HTN and DM was sent from HD center due to chest pain. He states that while he was having HD, he started having chest pain which was on left side of chest, non radiating, 7/10, feels like tightness, not relieved by aspirin. Has associated palpitations. Denies dizziness, nausea, vomiting, sweating, dyspnea, syncope. He denies fever, Ur symptoms, abdominal pain, diarrhea. No recent illness, no travel, no sick contacts. 76 yo male from [facility]  with medical history significant for ESRD on hemodialysis M/W/F, HTN and DM, Afib on eliquis was sent from HD center due to chest pain. He states that while he was having HD, he started having chest pain which was on left side of chest, non radiating, 7/10, feels like tightness, not relieved by aspirin. Has associated palpitations. Dialysis was not completed, had to stop in between due to chest pain. Denies dizziness, nausea, vomiting, sweating, dyspnea, syncope. He denies fever, Ur symptoms, abdominal pain, diarrhea. No recent illness, no travel, no sick contacts.

## 2020-09-18 NOTE — H&P ADULT - NSHPPHYSICALEXAM_GEN_ALL_CORE
General: well appearing male, no acute distress   HEENT: normocephalic, atraumatic   Respiratory: HAs crackles at bases bilaterally   Cardiac: irregular rate, no murmurs  Abdomen: soft, non-tender, no guarding or rebound   MSK: no swelling or tenderness of lower extremities, moving all extremities spontaneously, has edema on UE, has RUE access  Skin: warm, dry   Neuro: A&Ox3, no focal neurological deficits  Psych: appropriate affect

## 2020-09-18 NOTE — ED PROVIDER NOTE - CLINICAL SUMMARY MEDICAL DECISION MAKING FREE TEXT BOX
75M presenting with chest pain during dialysis. no current symptoms. concern for ACS. labs, ekg, cxr. likely admission.

## 2020-09-18 NOTE — H&P ADULT - ASSESSMENT
76 yo male from [facility]  with medical history significant for ESRD on hemodialysis M/W/F, HTN and DM, Afib on eliquis was sent from HD center due to chest pain      ED:  EKG- NSR, Trop elevated   Pro-BNP- elevated  CXR- Pulmonary congestion      Patient is being admitted to tele for ACS r/o NSTEMI vs unstable angina.

## 2020-09-18 NOTE — ED PROVIDER NOTE - OBJECTIVE STATEMENT
75M, pmh of HTN, DM, ESRD (MWF), presenting with palpitations. patient reports he was at dialysis center when he suddenly had chest pain and palpitations. only had 10 minutes of dialysis before symptoms started. completed dialysis on Wednesday. prior to dialysis he was in normal state of health without fever, chest pain, shortness of breathing abdominal pain or swelling in lower extremities.

## 2020-09-18 NOTE — ED ADULT NURSE NOTE - CHPI ED NUR SYMPTOMS NEG
no back pain/no dizziness/no shortness of breath/no chills/no syncope/no diaphoresis/no fever/no nausea/no vomiting/no congestion

## 2020-09-18 NOTE — H&P ADULT - PROBLEM SELECTOR PLAN 2
Found to have pulmonary congestion on CXR, pro-BNP was elevated  Did not have full HD today  Patient still makes urine, will give lasix 40 iv   Nephro consult for HD   Monitor I/O, fluid restriction

## 2020-09-18 NOTE — ED ADULT NURSE NOTE - OBJECTIVE STATEMENT
Pt aox3, BIBA from dialysis center c/o chest pain and palpitaitons, p/w right upper arm AV fistula, for dialysis (M,W,F). Pt was unable to complete dialysis today. EKG done, pt placed on cardiac monitor, no signs of distress noted.

## 2020-09-18 NOTE — H&P ADULT - PROBLEM SELECTOR PLAN 1
Presented with chest pain while having HD  EKG showed NSR  Trop 1 was elevated  Could be demand ischemia secondary to fluid overload  Will r/o ACS, NSTEMI vs unstable angina  LAst echo was done in feb showed GIDD  On ASA, statin, BB  admit to tele  Trend cardiac enzymes  Cardio consult Dr Salinas

## 2020-09-18 NOTE — H&P ADULT - PROBLEM SELECTOR PLAN 6
HAs h/o anemia due to chronic disease  Monitor cbc HAs h/o anemia due to chronic disease  Monitor cbc  Transfuse as needed

## 2020-09-18 NOTE — H&P ADULT - ATTENDING COMMENTS
74 yo male from [facility]  with medical history significant for ESRD on hemodialysis M/W/F, HTN and DM, Afib on eliquis was sent from HD center due to chest pain. He states that while he was having HD, he started having chest pain which was on left side of chest, non radiating, 7/10, feels like tightness, not relieved by aspirin. Has associated palpitations. Dialysis was not completed, had to stop in between due to chest pain. Denies dizziness, nausea, vomiting, sweating, dyspnea, syncope. He denies fever, Ur symptoms, abdominal pain, diarrhea. No recent illness, no travel, no sick contacts.    ED:  EKG- NSR, Trop elevated   Pro-BNP- elevated  CXR- Pulmonary congestion    assessment   --- chest pain,  r/o acs, r/o chf, pulm edema, h/o ESRD on hemodialysis M/W/F, HTN and DM, Afib on eliquis     plan  --  adm to tele, acs protocol, lopressor, aspirin, statin, lasix, cont preadmit home meds, gi and dvt profilaxis  cbc, bmp, mg, phos, lipid, tsh, ce q8 x3, hgba1c    hd as per renal    echo    cardio cons   renal cons  pulm cons

## 2020-09-18 NOTE — ED ADULT NURSE NOTE - NSIMPLEMENTINTERV_GEN_ALL_ED
Implemented All Fall with Harm Risk Interventions:  [place] to call system. Call bell, personal items and telephone within reach. Instruct patient to call for assistance. Room bathroom lighting operational. Non-slip footwear when patient is off stretcher. Physically safe environment: no spills, clutter or unnecessary equipment. Stretcher in lowest position, wheels locked, appropriate side rails in place. Provide visual cue, wrist band, yellow gown, etc. Monitor gait and stability. Monitor for mental status changes and reorient to person, place, and time. Review medications for side effects contributing to fall risk. Reinforce activity limits and safety measures with patient and family. Provide visual clues: red socks.

## 2020-09-18 NOTE — ED CLERICAL - CLERICAL COMMENTS
Pt assigned [id number] @17:54 Pt assigned [id number] @17:54/ 1832 PULLED BACK PATIENT BLOOD PRESSURE TOO HIGH Patient/Caregiver provided printed discharge information.

## 2020-09-18 NOTE — ED ADULT NURSE REASSESSMENT NOTE - NS ED NURSE REASSESS COMMENT FT1
Pt aox3, in stable condition, on cardiac monitor, BP elevated, MD Mai made aware, pt medicated for BP. Pt in stable condition to be transferred to [facility] as per MD Mai, Report given to JACQUELIN Rogers.

## 2020-09-18 NOTE — ED ADULT NURSE NOTE - ED COMFORT CARE
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 The patient reports no changes in current medications. He does report intentional weight loss due to exercise and diet control. He also follows a fluid restriction of 50 ounces to which he remains stringent. The patient reports dry weight of 222 lbs. He reports increased swelling of his abdomen than usual. Urine output remains consistent with no reports of urine frequency, urgency, dysuria, or hematuria.     Past Medical History:   Diagnosis Date   • Cirrhosis of liver (CMS/HCC)        Past Surgical History:   Procedure Laterality Date   • BACK SURGERY     • FRACTURE SURGERY     • IR PARACENTESIS  2017   • SHOULDER SURGERY         Social History     Social History   • Marital status: Single     Spouse name: N/A   • Number of children: N/A   • Years of education: N/A     Occupational History   • Not on file.     Social History Main Topics   • Smoking status: Never Smoker   • Smokeless tobacco: Never Used   • Alcohol use No      Comment: quit  last drink 17   • Drug use: No   • Sexual activity: Yes     Partners: Female     Other Topics Concern   • Not on file     Social History Narrative   • No narrative on file       Family History   Problem Relation Age of Onset   • Heart disease Mother    • Cancer Mother      lung   • Diabetes Father    • Substance abuse Father    • Cancer Maternal Aunt    • Cancer Maternal Grandmother        ALLERGIES:  No Known Allergies      Review of Systems:  CONSTITUTIONAL: Denies fatigue,fever, headaches.    EYES:  Denies visual blurring, double vision.   ENT: Denies chronic sinus or nasal problems, dysphagia.  CV:  Denies chest discomfort with exertion, GARCIA, orthopnea, palpitations.   RESPIRATORY: Denies cough and SOB.   GI:  Reports hematochezia, BRBPR   MSK: Denies joint pain,muscle aches.   SKIN:  Denies rashes or nodules.  NEURO:  Denies numbness, tingling, weakness. Denies speech or gait disturbances.   PSYCH: Denies anxiety and denies depression.   ENDOCRINE:  Denies cold intolerance, heat intolerance, polyphagia, polydipsia, polyuria.  HEME/LYMPH:  Denies abnormal bruising, lumps, or bumps.      Vital Last Value 24 Hour Range   Temperature 98.3 °F (36.8 °C) Temp  Min: 98.1 °F (36.7 °C)  Max: 98.7 °F (37.1 °C)   Pulse 88 Pulse  Min: 77  Max: 93   Respiratory 18 Resp  Min: 14  Max: 20   Blood Pressure 120/74 BP  Min: 112/62  Max: 127/64   Art BP   No Data Recorded   Pulse Oximetry 99 % SpO2  Min: 98 %  Max: 100 %     Vital Today Admitted   Weight 104.7 kg Weight: 103.5 kg   Height N/A Height: 5' 5\" (165.1 cm)   BMI N/A BMI (Calculated): 38.05     Weight over the past 48 Hours:  Patient Vitals for the past 48 hrs:   Weight   07/25/17 1220 103.5 kg   07/26/17 0637 104.7 kg        Hemodynamics:      Last Value 24 Hour Range   CVP   No Data Recorded   PAS/PAD   No Data Recorded   PCWP   No Data Recorded   CO   No Data Recorded   CI   No Data Recorded   SVR   No Data Recorded   SV02   No Data Recorded     Intake/Output:    Last Stool Occurrence: 1 (loose; bloody, notified nurse) (07/26/17 1200)    I/O this shift:  In: 500 [P.O.:120; Blood:380]  Out: 60 [Urine:60]    I/O last 3 completed shifts:  In: 510 [P.O.:510]  Out: 1002 [Urine:1000; Stool:2]      Intake/Output Summary (Last 24 hours) at 07/26/17 1322  Last data filed at 07/26/17 1300   Gross per 24 hour   Intake             1010 ml   Output             1062 ml   Net              -52 ml       Medications/Infusions:  Prescriptions Prior to Admission   Medication  Sig Dispense Refill   • folic acid (FOLATE) 1 MG tablet Take 1 tablet by mouth daily. 30 tablet 2   • hydroCORTisone (ANUSOL-HC) 25 MG suppository Place 1 suppository rectally 2 times daily. 12 each 0   • traMADol (ULTRAM) 50 MG tablet Take 1 tablet by mouth every 8 hours as needed for Pain. 12 tablet 0   • cholecalciferol (VITAMIN D3) 1000 UNITS tablet Take 2 tablets by mouth daily. 30 tablet 0   • thiamine (VITAMIN B1) 100 MG tablet Take 1 tablet by mouth daily. 30 tablet 0   • vitamin - therapeutic multivitamins w/minerals (CENTRUM SILVER,THERA-M) Tab Take 1 tablet by mouth daily. 125 tablet 0   • pantoprazole (PROTONIX) 40 MG tablet Take 1 tablet by mouth daily. 30 tablet 0   • spironolactone (ALDACTONE) 100 MG tablet Take 1 tablet by mouth daily. 30 tablet 0     • sodium chloride       • sodium chloride (PF)  2 mL Injection 2 times per day   • albumin human (SPA)  25 g Intravenous TID   • folic acid  1 mg Oral Daily   • cholecalciferol  2,000 Units Oral Daily   • hydroCORTisone  25 mg Rectal BID   • thiamine  100 mg Oral Daily   • pantoprazole  40 mg Oral Daily   • vitamin - therapeutic multivitamins w/minerals  1 tablet Oral Daily     • sodium chloride 0.9% infusion         Physical Exam:    General: Alert, no acute distress, pleasant gentleman   HEENT: Head atraumatic, normocephalic. Moist oral mucus membranes. Scleral icterus   Neck: Supple, no JVD.  Trachea midline.  Chest/Lungs: Normal effort.  Symmetrical expansion.  Clear to auscultation.  No wheezes, rales or rhonchi.  CVS: Regular rate and rhythm. S1,S2 well heard. Systolic murmur, S3 heard. Has no pericardial rub heard.  Abdomen: normoactive BS, distended, mildly tense, 3+ pitting edema abdominal wall   Neuro: No focal neurological deficit.  A&O x 3, no asterixis  Skin: jaundiced, lower extremity rashes bilaterally  Extremities: Pulses intact and symmetric. No clubbing or cyanosis. 2+ pitting edema in bilateral lower extremity edema.     Laboratory  Results:    Recent Labs  Lab 07/26/17  0510 07/25/17  1605 07/24/17  1246   SODIUM 119* 120* 120*   POTASSIUM 5.2* 5.1 5.7*   CHLORIDE 86* 88* 88*   CO2 24 22 25   ANIONGAP 14 15 13   BUN 26* 27* 27*   CREATININE 1.42* 1.46* 1.54*   GFRNA 58 56 53   GFRA 67 65 61   GLUCOSE 77 87 96   CALCIUM 7.6* 8.1* 8.1*   ALBUMIN 2.3* 2.2* 2.5*   AST 91* 111* 108*   GPT 28 35 32   ALKPT 106 137* 130*   BILIRUBIN 15.5* 16.4* 16.6*         Recent Labs  Lab 07/26/17  0510 07/25/17  1605 07/24/17  1246   WBC 6.9 11.6* 9.6   HGB 6.7* 7.8* 7.5*   HCT 18.2* 21.3* 20.9*   PLT 43* 58* 59*       No results found    No results found    Invalid input(s): ANASCREENWIT    Urine Panel  No results found    Impression, Plan & Recommendations:    Hypervolemic hyponatremia 2/2 EtOH induced cirrhosis   Patient admitted directly after finding sodium of 120 and K on 5.7. Sodium has been chronically low, slightly lower this admission (119). Primarily explained by cirrhosis in addition to aldactone use.   - Fluid restriction 1500 mL   - Stop aldactone   - Urine sodium, creatinine, and urine osmol     Hyperkalemia:   Likely due to Aldactone effects,   On hold     Volume overload:  Will add Lasix     Anemia:  Transfuse as needed       Decompensated cirrhosis 2/2 alcohol use    Patient with portal HTN and ascites, protuberant abdomen with juandice and scleral icterus.   - hepatology primary team   - SPA 25 g TID  - Low sodium diet     Hx of esophageal varices and internal hemmorhoids   Patient noted to have hemorrhoids in 6/2017, and today Hgb of 6.7.   - being transfused 1 unit today   - propanolol 20 mg BID     I would like to thank Dr. Us for allowing us to participate in the care of this patient. My colleagues and I from [facility] will follow closely along with you. Please call if you have any questions.    I have performed the HPI and have discussed the above case including treatment plan with Dr. Bruno.      I saw and  examined the patient personally today. I agree with above history, physical examination, assessment and plan outlined above    Tab Bruno MD  Transplant Nephrology                 Patient informed

## 2020-09-19 LAB
A1C WITH ESTIMATED AVERAGE GLUCOSE RESULT: 5.2 % — SIGNIFICANT CHANGE UP (ref 4–5.6)
ALBUMIN SERPL ELPH-MCNC: 2.9 G/DL — LOW (ref 3.5–5)
ALP SERPL-CCNC: 119 U/L — SIGNIFICANT CHANGE UP (ref 40–120)
ALT FLD-CCNC: 28 U/L DA — SIGNIFICANT CHANGE UP (ref 10–60)
ANION GAP SERPL CALC-SCNC: 9 MMOL/L — SIGNIFICANT CHANGE UP (ref 5–17)
AST SERPL-CCNC: 16 U/L — SIGNIFICANT CHANGE UP (ref 10–40)
BASOPHILS # BLD AUTO: 0.02 K/UL — SIGNIFICANT CHANGE UP (ref 0–0.2)
BASOPHILS NFR BLD AUTO: 0.4 % — SIGNIFICANT CHANGE UP (ref 0–2)
BILIRUB SERPL-MCNC: 0.5 MG/DL — SIGNIFICANT CHANGE UP (ref 0.2–1.2)
BLD GP AB SCN SERPL QL: SIGNIFICANT CHANGE UP
BUN SERPL-MCNC: 77 MG/DL — HIGH (ref 7–18)
CALCIUM SERPL-MCNC: 8.4 MG/DL — SIGNIFICANT CHANGE UP (ref 8.4–10.5)
CHLORIDE SERPL-SCNC: 100 MMOL/L — SIGNIFICANT CHANGE UP (ref 96–108)
CO2 SERPL-SCNC: 27 MMOL/L — SIGNIFICANT CHANGE UP (ref 22–31)
CREAT SERPL-MCNC: 12.6 MG/DL — HIGH (ref 0.5–1.3)
EOSINOPHIL # BLD AUTO: 0.1 K/UL — SIGNIFICANT CHANGE UP (ref 0–0.5)
EOSINOPHIL NFR BLD AUTO: 1.9 % — SIGNIFICANT CHANGE UP (ref 0–6)
ESTIMATED AVERAGE GLUCOSE: 103 MG/DL — SIGNIFICANT CHANGE UP (ref 68–114)
FOLATE SERPL-MCNC: 11.5 NG/ML — SIGNIFICANT CHANGE UP
FOLATE SERPL-MCNC: 15.9 NG/ML — SIGNIFICANT CHANGE UP
GLUCOSE BLDC GLUCOMTR-MCNC: 108 MG/DL — HIGH (ref 70–99)
GLUCOSE BLDC GLUCOMTR-MCNC: 113 MG/DL — HIGH (ref 70–99)
GLUCOSE BLDC GLUCOMTR-MCNC: 93 MG/DL — SIGNIFICANT CHANGE UP (ref 70–99)
GLUCOSE BLDC GLUCOMTR-MCNC: 94 MG/DL — SIGNIFICANT CHANGE UP (ref 70–99)
GLUCOSE SERPL-MCNC: 102 MG/DL — HIGH (ref 70–99)
HCT VFR BLD CALC: 25.1 % — LOW (ref 39–50)
HGB BLD-MCNC: 8.5 G/DL — LOW (ref 13–17)
IMM GRANULOCYTES NFR BLD AUTO: 0.2 % — SIGNIFICANT CHANGE UP (ref 0–1.5)
LYMPHOCYTES # BLD AUTO: 1.24 K/UL — SIGNIFICANT CHANGE UP (ref 1–3.3)
LYMPHOCYTES # BLD AUTO: 23.5 % — SIGNIFICANT CHANGE UP (ref 13–44)
MAGNESIUM SERPL-MCNC: 2.3 MG/DL — SIGNIFICANT CHANGE UP (ref 1.6–2.6)
MCHC RBC-ENTMCNC: 33.5 PG — SIGNIFICANT CHANGE UP (ref 27–34)
MCHC RBC-ENTMCNC: 33.9 GM/DL — SIGNIFICANT CHANGE UP (ref 32–36)
MCV RBC AUTO: 98.8 FL — SIGNIFICANT CHANGE UP (ref 80–100)
MONOCYTES # BLD AUTO: 0.8 K/UL — SIGNIFICANT CHANGE UP (ref 0–0.9)
MONOCYTES NFR BLD AUTO: 15.2 % — HIGH (ref 2–14)
NEUTROPHILS # BLD AUTO: 3.1 K/UL — SIGNIFICANT CHANGE UP (ref 1.8–7.4)
NEUTROPHILS NFR BLD AUTO: 58.8 % — SIGNIFICANT CHANGE UP (ref 43–77)
NRBC # BLD: 0 /100 WBCS — SIGNIFICANT CHANGE UP (ref 0–0)
PHOSPHATE SERPL-MCNC: 5.9 MG/DL — HIGH (ref 2.5–4.5)
PLATELET # BLD AUTO: 151 K/UL — SIGNIFICANT CHANGE UP (ref 150–400)
POTASSIUM SERPL-MCNC: 5.6 MMOL/L — HIGH (ref 3.5–5.3)
POTASSIUM SERPL-SCNC: 5.6 MMOL/L — HIGH (ref 3.5–5.3)
PROT SERPL-MCNC: 6.8 G/DL — SIGNIFICANT CHANGE UP (ref 6–8.3)
RBC # BLD: 2.54 M/UL — LOW (ref 4.2–5.8)
RBC # FLD: 15 % — HIGH (ref 10.3–14.5)
SODIUM SERPL-SCNC: 136 MMOL/L — SIGNIFICANT CHANGE UP (ref 135–145)
VIT B12 SERPL-MCNC: 665 PG/ML — SIGNIFICANT CHANGE UP (ref 232–1245)
WBC # BLD: 5.27 K/UL — SIGNIFICANT CHANGE UP (ref 3.8–10.5)
WBC # FLD AUTO: 5.27 K/UL — SIGNIFICANT CHANGE UP (ref 3.8–10.5)

## 2020-09-19 RX ORDER — DRONEDARONE 400 MG/1
400 TABLET, FILM COATED ORAL
Refills: 0 | Status: DISCONTINUED | OUTPATIENT
Start: 2020-09-19 | End: 2020-09-21

## 2020-09-19 RX ORDER — ERYTHROPOIETIN 10000 [IU]/ML
4000 INJECTION, SOLUTION INTRAVENOUS; SUBCUTANEOUS
Refills: 0 | Status: DISCONTINUED | OUTPATIENT
Start: 2020-09-19 | End: 2020-09-21

## 2020-09-19 RX ORDER — IRON SUCROSE 20 MG/ML
100 INJECTION, SOLUTION INTRAVENOUS
Refills: 0 | Status: DISCONTINUED | OUTPATIENT
Start: 2020-09-19 | End: 2020-09-21

## 2020-09-19 RX ORDER — DOXERCALCIFEROL 2.5 UG/1
2 CAPSULE ORAL
Refills: 0 | Status: DISCONTINUED | OUTPATIENT
Start: 2020-09-19 | End: 2020-09-21

## 2020-09-19 RX ORDER — IRON SUCROSE 20 MG/ML
100 INJECTION, SOLUTION INTRAVENOUS ONCE
Refills: 0 | Status: COMPLETED | OUTPATIENT
Start: 2020-09-19 | End: 2020-09-19

## 2020-09-19 RX ORDER — DOXERCALCIFEROL 2.5 UG/1
2 CAPSULE ORAL ONCE
Refills: 0 | Status: COMPLETED | OUTPATIENT
Start: 2020-09-19 | End: 2020-09-19

## 2020-09-19 RX ORDER — SEVELAMER CARBONATE 2400 MG/1
800 POWDER, FOR SUSPENSION ORAL
Refills: 0 | Status: DISCONTINUED | OUTPATIENT
Start: 2020-09-19 | End: 2020-09-21

## 2020-09-19 RX ORDER — CINACALCET 30 MG/1
30 TABLET, FILM COATED ORAL DAILY
Refills: 0 | Status: DISCONTINUED | OUTPATIENT
Start: 2020-09-19 | End: 2020-09-21

## 2020-09-19 RX ORDER — METOPROLOL TARTRATE 50 MG
25 TABLET ORAL
Refills: 0 | Status: DISCONTINUED | OUTPATIENT
Start: 2020-09-19 | End: 2020-09-21

## 2020-09-19 RX ADMIN — ATORVASTATIN CALCIUM 80 MILLIGRAM(S): 80 TABLET, FILM COATED ORAL at 21:31

## 2020-09-19 RX ADMIN — Medication 25 MILLIGRAM(S): at 14:46

## 2020-09-19 RX ADMIN — Medication 25 MILLIGRAM(S): at 21:31

## 2020-09-19 RX ADMIN — ISOSORBIDE DINITRATE 20 MILLIGRAM(S): 5 TABLET ORAL at 05:38

## 2020-09-19 RX ADMIN — Medication 0.6 MILLIGRAM(S): at 05:38

## 2020-09-19 RX ADMIN — SEVELAMER CARBONATE 800 MILLIGRAM(S): 2400 POWDER, FOR SUSPENSION ORAL at 11:57

## 2020-09-19 RX ADMIN — CINACALCET 30 MILLIGRAM(S): 30 TABLET, FILM COATED ORAL at 11:56

## 2020-09-19 RX ADMIN — IRON SUCROSE 210 MILLIGRAM(S): 20 INJECTION, SOLUTION INTRAVENOUS at 11:57

## 2020-09-19 RX ADMIN — Medication 60 MILLIGRAM(S): at 05:39

## 2020-09-19 RX ADMIN — Medication 25 MILLIGRAM(S): at 05:38

## 2020-09-19 RX ADMIN — ISOSORBIDE DINITRATE 20 MILLIGRAM(S): 5 TABLET ORAL at 14:46

## 2020-09-19 RX ADMIN — DOXERCALCIFEROL 2 MICROGRAM(S): 2.5 CAPSULE ORAL at 11:57

## 2020-09-19 RX ADMIN — Medication 40 MILLIGRAM(S): at 05:39

## 2020-09-19 RX ADMIN — ISOSORBIDE DINITRATE 20 MILLIGRAM(S): 5 TABLET ORAL at 21:31

## 2020-09-19 RX ADMIN — APIXABAN 2.5 MILLIGRAM(S): 2.5 TABLET, FILM COATED ORAL at 19:15

## 2020-09-19 RX ADMIN — DRONEDARONE 400 MILLIGRAM(S): 400 TABLET, FILM COATED ORAL at 19:15

## 2020-09-19 RX ADMIN — Medication 12.5 MILLIGRAM(S): at 05:38

## 2020-09-19 NOTE — CONSULT NOTE ADULT - SUBJECTIVE AND OBJECTIVE BOX
Chief complain/HPI  74 yo male from [facility]  with medical history significant for ESRD on hemodialysis M/W/F, HTN and DM, Afib on eliquis was sent from HD center due tachycardia , increased HR to 159 after 10 minutes of dialysis that was associated with chest pain 7/10,  / 98, 170/80 at that time. Dialysis was stopped and he was sent to the hospital. As per history a times he refused his BP meds and po4 binders in the NH.  Admission EKG was HR 90 , sinus rhythm.      Allergies and Intolerances:        Allergies:  	No Known Allergies:     Home Medications:   * Patient Currently Takes Medications as of 18-Sep-2020 13:42 documented in Structured Notes  · 	metoprolol tartrate 25 mg oral tablet: 1 tab(s) orally every 12 hours  · 	epoetin ximena:   · 	folic acid 1 mg oral tablet: 1 tab(s) orally once a day  · 	aspirin 81 mg oral tablet, chewable: 1 tab(s) orally once a day  · 	Colcrys 0.6 mg oral tablet: 1 tab(s) orally 2 times a day  · 	famotidine 20 mg oral tablet: 1 tab(s) orally once a day  · 	Lipitor 80 mg oral tablet: 1 tab(s) orally once a day  · 	calcitriol 0.25 mcg oral capsule: 1 cap(s) orally once a day  · 	Eliquis 2.5 mg oral tablet: 1 tab(s) orally 2 times a day  · 	sevelamer hydrochloride 800 mg oral tablet: 2 tab(s) orally 3 times a day  · 	isosorbide dinitrate 20 mg oral tablet: 0.5 tab(s) orally 2 times a day  · 	NIFEdipine 60 mg oral tablet, extended release: 1 tab(s) orally once a day  · 	hydrALAZINE 25 mg oral tablet: 1 tab(s) orally 3 times a day  · 	ferrous sulfate 325 mg (65 mg elemental iron) oral tablet: 1 tab(s) orally once a day    .      PAST MEDICAL & SURGICAL HISTORY:  Cerebrovascular accident (CVA), unspecified mechanism  Anemia  Hypertension  Diabetes  ESRD (end stage renal disease) on dialysis  A-V fistula  Atrial Fibrillation  SHPTH  Gout      Home Medications Reviewed    Hospital Medications:   MEDICATIONS  (STANDING):  apixaban 2.5 milliGRAM(s) Oral two times a day  aspirin  chewable 81 milliGRAM(s) Oral daily  atorvastatin 80 milliGRAM(s) Oral at bedtime  calcitriol   Capsule 0.25 MICROGram(s) Oral daily  colchicine 0.6 milliGRAM(s) Oral two times a day  ferrous    sulfate 325 milliGRAM(s) Oral daily  folic acid 1 milliGRAM(s) Oral daily  furosemide   Injectable 40 milliGRAM(s) IV Push daily  hydrALAZINE 25 milliGRAM(s) Oral three times a day  influenza   Vaccine 0.5 milliLiter(s) IntraMuscular once  insulin lispro (HumaLOG) corrective regimen sliding scale   SubCutaneous three times a day before meals  isosorbide   dinitrate Tablet (ISORDIL) 20 milliGRAM(s) Oral three times a day  metoprolol tartrate 12.5 milliGRAM(s) Oral two times a day  NIFEdipine XL 60 milliGRAM(s) Oral daily    MEDICATIONS  (PRN):      Allergies    No Known Allergies    Intolerances                              8.6    3.90  )-----------( 134      ( 18 Sep 2020 12:52 )             25.1     09-18    135  |  96  |  58<H>  ----------------------------<  120<H>  4.4   |  30  |  9.85<H>    Ca    8.3<L>      18 Sep 2020 12:52  Phos  4.7     09-18  Mg     2.2     09-18    TPro  6.8  /  Alb  2.8<L>  /  TBili  0.3  /  DBili  x   /  AST  20  /  ALT  30  /  AlkPhos  124<H>  09-18    PT/INR - ( 18 Sep 2020 18:20 )   PT: 11.8 sec;   INR: 1.01 ratio         PTT - ( 18 Sep 2020 12:52 )  PTT:40.3 sec          RADIOLOGY & ADDITIONAL STUDIES:    SOCIAL HISTORY: Denies ETOh,Smoking,     FAMILY HISTORY:  Family history of diabetes mellitus        REVIEW OF SYSTEMS:  CONSTITUTIONAL: No malaise, No fatigue, No fevers or chills, well developed, no diaphoresis  EYES/ENT: No visual changes;  No vertigo or throat pain   NECK: No pain or stiffness  RESPIRATORY: No cough, wheezing, hemoptysis; No shortness of breath  CARDIOVASCULAR: No chest pain or palpitations. No edema  GASTROINTESTINAL: No abdominal or epigastric pain. No nausea, vomiting, or hematemesis; No diarrhea or constipation. No melena or hematochezia.  GENITOURINARY: No dysuria, frequency, foamy urine, urinary urgency, incontinence or hematuria  NEUROLOGICAL: No numbness or weakness, No tremor  SKIN: No itching, burning, rashes, or lesions   VASCULAR: No claudication  Musculoskeletal: no arthralgia, no myalgia  All other review of systems is negative unless indicated above.    VITALS:  Vital Signs Last 24 Hrs  T(C): 36.3 (19 Sep 2020 07:48), Max: 37 (18 Sep 2020 21:40)  T(F): 97.3 (19 Sep 2020 07:48), Max: 98.6 (18 Sep 2020 21:40)  HR: 72 (19 Sep 2020 07:48) (72 - 166)  BP: 134/57 (19 Sep 2020 07:48) (134/57 - 200/79)  BP(mean): --  RR: 18 (19 Sep 2020 07:48) (16 - 20)  SpO2: 100% (19 Sep 2020 07:48) (96% - 100%)    Height (cm): 165.1 (09-18 @ 11:07)    PHYSICAL EXAM:  Constitutional: NAD  HEENT: anicteric sclera, oropharynx clear, MMM  Neck: No JVD  Respiratory: good air entrance B/L, no wheezes, rales or rhonchi  Cardiovascular: S1, S2, RRR, no pericardial rub, no murmur  Gastrointestinal: BS+, soft, no tenderness, no distension, no bruit  Pelvis: bladder non-distended, no CVA tenderness  Extremities: No cyanosis or clubbing. No peripheral edema  Neurological: A/O x 3, no focal deficits  Psychiatric: Normal mood, normal affect  : No CVA tenderness. No kumar.   Skin: No rashes  Vascular: all pulses present  Access:                     Chief complain/HPI  74 yo male from [facility]  with medical history significant for ESRD on hemodialysis M/W/F, HTN and DM, Afib on eliquis was sent from HD center due tachycardia , increased HR to 159 after 10 minutes of dialysis that was associated with SOB and chest pain 7/10,  / 98, 170/80 at that time. Dialysis was stopped and he was sent to the hospital. As per history a times he refused his BP meds and po4 binders in the NH.  Admission EKG was HR 90 , sinus rhythm.  He feels well now no more CP and Palpitation and no c/o sob  No history of stents placemnt in the past  On last admission he refused cardiac cat    ECHO on 023/20 with moderate AS and diastolic dysfunction grade 1        Allergies and Intolerances:        Allergies:  	No Known Allergies:     Home Medications:   * Patient Currently Takes Medications as of 18-Sep-2020 13:42 documented in Structured Notes  · 	metoprolol tartrate 25 mg oral tablet: 1 tab(s) orally every 12 hours  · 	epoetin ximena:   · 	folic acid 1 mg oral tablet: 1 tab(s) orally once a day  · 	aspirin 81 mg oral tablet, chewable: 1 tab(s) orally once a day  · 	Colcrys 0.6 mg oral tablet: 1 tab(s) orally 2 times a day  · 	famotidine 20 mg oral tablet: 1 tab(s) orally once a day  · 	Lipitor 80 mg oral tablet: 1 tab(s) orally once a day  · 	calcitriol 0.25 mcg oral capsule: 1 cap(s) orally once a day  · 	Eliquis 2.5 mg oral tablet: 1 tab(s) orally 2 times a day  · 	sevelamer hydrochloride 800 mg oral tablet: 2 tab(s) orally 3 times a day  · 	isosorbide dinitrate 20 mg oral tablet: 0.5 tab(s) orally 2 times a day  · 	NIFEdipine 60 mg oral tablet, extended release: 1 tab(s) orally once a day  · 	hydrALAZINE 25 mg oral tablet: 1 tab(s) orally 3 times a day  · 	ferrous sulfate 325 mg (65 mg elemental iron) oral tablet: 1 tab(s) orally once a day    .      PAST MEDICAL & SURGICAL HISTORY:  Cerebrovascular accident (CVA), unspecified mechanism  Anemia  Hypertension  Diabetes  ESRD (end stage renal disease) on dialysis  A-V fistula  Atrial Fibrillation  SHPTH  Gout      Home Medications Reviewed    Hospital Medications:   MEDICATIONS  (STANDING):  apixaban 2.5 milliGRAM(s) Oral two times a day  aspirin  chewable 81 milliGRAM(s) Oral daily  atorvastatin 80 milliGRAM(s) Oral at bedtime  calcitriol   Capsule 0.25 MICROGram(s) Oral daily  colchicine 0.6 milliGRAM(s) Oral two times a day  ferrous    sulfate 325 milliGRAM(s) Oral daily  folic acid 1 milliGRAM(s) Oral daily  furosemide   Injectable 40 milliGRAM(s) IV Push daily  hydrALAZINE 25 milliGRAM(s) Oral three times a day  influenza   Vaccine 0.5 milliLiter(s) IntraMuscular once  insulin lispro (HumaLOG) corrective regimen sliding scale   SubCutaneous three times a day before meals  isosorbide   dinitrate Tablet (ISORDIL) 20 milliGRAM(s) Oral three times a day  metoprolol tartrate 12.5 milliGRAM(s) Oral two times a day  NIFEdipine XL 60 milliGRAM(s) Oral daily    MEDICATIONS  (PRN):      Allergies    No Known Allergies    Intolerances                              8.6    3.90  )-----------( 134      ( 18 Sep 2020 12:52 )             25.1     09-18    135  |  96  |  58<H>  ----------------------------<  120<H>  4.4   |  30  |  9.85<H>    Ca    8.3<L>      18 Sep 2020 12:52  Phos  4.7     09-18  Mg     2.2     09-18    TPro  6.8  /  Alb  2.8<L>  /  TBili  0.3  /  DBili  x   /  AST  20  /  ALT  30  /  AlkPhos  124<H>  09-18    PT/INR - ( 18 Sep 2020 18:20 )   PT: 11.8 sec;   INR: 1.01 ratio         PTT - ( 18 Sep 2020 12:52 )  PTT:40.3 sec          RADIOLOGY & ADDITIONAL STUDIES:    SOCIAL HISTORY: Denies ETOh,Smoking,     FAMILY HISTORY:  Family history of diabetes mellitus        REVIEW OF SYSTEMS:  CONSTITUTIONAL: No malaise, No fatigue, No fevers or chills, well developed, no diaphoresis    RESPIRATORY: No cough, wheezing, hemoptysis; No shortness of breath  CARDIOVASCULAR: No chest pain or palpitations. No edema  GASTROINTESTINAL: No abdominal or epigastric pain. No nausea, vomiting, or hematemesis; No diarrhea or constipation. No melena or hematochezia.  GENITOURINARY: No dysuria, frequency, foamy urine, urinary urgency, incontinence or hematuria  NEUROLOGICAL: weakness of lower extremities , tremor chronic in movment.  SKIN: No itching, burning, rashes, or lesions     VITALS:  Vital Signs Last 24 Hrs  T(C): 36.3 (19 Sep 2020 07:48), Max: 37 (18 Sep 2020 21:40)  T(F): 97.3 (19 Sep 2020 07:48), Max: 98.6 (18 Sep 2020 21:40)  HR: 72 (19 Sep 2020 07:48) (72 - 166)  BP: 134/57 (19 Sep 2020 07:48) (134/57 - 200/79)  BP(mean): --  RR: 18 (19 Sep 2020 07:48) (16 - 20)  SpO2: 100% (19 Sep 2020 07:48) (96% - 100%)    Height (cm): 165.1 (09-18 @ 11:07)    PHYSICAL EXAM:  Constitutional: NAD  Neck: No JVD  Respiratory:  air entrance B/L, crepitation at the base  Cardiovascular: S1, S2, RRR,systolic m 2/6 in aortic and pulmonary area  Gastrointestinal: BS+, soft, no tenderness, no distension, no bruit  Pelvis: bladder non-distended, no CVA tenderness  Extremities: No cyanosis or clubbing. No peripheral edema, facial edema plus 2    Psychiatric: Normal mood, normal affect  : No CVA tenderness. No kumar.     Vascular: all pulses present  Access: right AVG with bruit and thrill

## 2020-09-19 NOTE — PHARMACOTHERAPY INTERVENTION NOTE - PROVIDER CONTACTED
spoke to Josefina Manuel about colchicine dose, as per md campos to d/c for now. She will speak to pt tomorrow and will find out the dose he was taking

## 2020-09-19 NOTE — PHARMACOTHERAPY INTERVENTION NOTE - COMMENTS
pt received one dose of colchicine 0.6mg today , ok to d/c order for now, Dr Jacinto , will see patient tomorrow and will find out what dose he was taking at home

## 2020-09-19 NOTE — CONSULT NOTE ADULT - SUBJECTIVE AND OBJECTIVE BOX
CHIEF COMPLAINT:Patient is a 75y old  Male who presents with a chief complaint of Chest pain.      HPI:  76 yo male from [facility]  with medical history significant for ESRD on hemodialysis M/W/F, HTN ,AS DM, Parox Afib on eliquis was sent from HD center due to chest pain. He states that while he was having HD, he started having chest pain which was on left side of chest, non radiating, 7/10, feels like tightness, not relieved by aspirin and tachycardia into 150's. Has associated palpitations. Dialysis was not completed, had to stop in between due to chest pain. Denies dizziness, nausea, vomiting, sweating, dyspnea, syncope. He denies fever, Ur symptoms, abdominal pain, diarrhea. No recent illness, no travel, no sick contacts. (18 Sep 2020 19:42)      PAST MEDICAL & SURGICAL HISTORY:  Cerebrovascular accident (CVA), unspecified mechanism  PAF  ESRD  DM  HTN  Lipid d/o  PAF  Rt AVF  Anemia          MEDICATIONS  (STANDING):  apixaban 2.5 milliGRAM(s) Oral two times a day  aspirin  chewable 81 milliGRAM(s) Oral daily  atorvastatin 80 milliGRAM(s) Oral at bedtime  cinacalcet 30 milliGRAM(s) Oral daily  colchicine 0.6 milliGRAM(s) Oral two times a day  doxercalciferol Injectable 2 MICROGram(s) IV Push <User Schedule>  doxercalciferol Injectable 2 MICROGram(s) IV Push once  epoetin ximena-epbx (RETACRIT) Injectable 4000 Unit(s) IV Push <User Schedule>  ferrous    sulfate 325 milliGRAM(s) Oral daily  folic acid 1 milliGRAM(s) Oral daily  furosemide   Injectable 40 milliGRAM(s) IV Push daily  hydrALAZINE 25 milliGRAM(s) Oral three times a day  influenza   Vaccine 0.5 milliLiter(s) IntraMuscular once  insulin lispro (HumaLOG) corrective regimen sliding scale   SubCutaneous three times a day before meals  iron sucrose IVPB 100 milliGRAM(s) IV Intermittent <User Schedule>  iron sucrose IVPB 100 milliGRAM(s) IV Intermittent once  isosorbide   dinitrate Tablet (ISORDIL) 20 milliGRAM(s) Oral three times a day  metoprolol tartrate 12.5 milliGRAM(s) Oral two times a day  sevelamer carbonate 800 milliGRAM(s) Oral three times a day with meals    MEDICATIONS  (PRN):      FAMILY HISTORY:  Family history of diabetes mellitus        SOCIAL HISTORY:    [x ] Non-smoker    [ x] Alcohol-denies    Allergies    No Known Allergies    Intolerances    	    REVIEW OF SYSTEMS:  CONSTITUTIONAL: No fever, weight loss, or fatigue  EYES: No eye pain, visual disturbances, or discharge  ENT:  No difficulty hearing, tinnitus, vertigo; No sinus or throat pain  NECK: No pain or stiffness  RESPIRATORY: No cough, wheezing, chills or hemoptysis; No Shortness of Breath  CARDIOVASCULAR: + chest pain, No palpitations, passing out, dizziness, or leg swelling  GASTROINTESTINAL: No abdominal or epigastric pain. No nausea, vomiting, or hematemesis; No diarrhea or constipation. No melena or hematochezia.  GENITOURINARY: No dysuria, frequency, hematuria, or incontinence  NEUROLOGICAL: No headaches, memory loss, loss of strength, numbness, or tremors  SKIN: No itching, burning, rashes, or lesions   LYMPH Nodes: No enlarged glands  ENDOCRINE: No heat or cold intolerance; No hair loss  MUSCULOSKELETAL: No joint pain or swelling; No muscle, back, or extremity pain  PSYCHIATRIC: No depression, anxiety, mood swings, or difficulty sleeping  HEME/LYMPH: No easy bruising, or bleeding gums  ALLERGY AND IMMUNOLOGIC: No hives or eczema	        PHYSICAL EXAM:  T(C): 36.3 (09-19-20 @ 07:48), Max: 37 (09-18-20 @ 21:40)  HR: 72 (09-19-20 @ 07:48) (72 - 166)  BP: 134/57 (09-19-20 @ 07:48) (134/57 - 200/79)  RR: 18 (09-19-20 @ 07:48) (16 - 20)  SpO2: 100% (09-19-20 @ 07:48) (96% - 100%)  Wt(kg): --  I&O's Summary      Appearance: Normal	  HEENT:   Normal oral mucosa, PERRL, EOMI	  Lymphatic: No lymphadenopathy  Cardiovascular: Normal S1 S2, No JVD, No murmurs, No edema  Respiratory: Lungs clear to auscultation	  Psychiatry: A & O x 3, Mood & affect appropriate  Gastrointestinal:  Soft, Non-tender, + BS	  Skin: No rashes, No ecchymoses, No cyanosis	  Neurologic: Non-focal  Extremities: Normal range of motion, No clubbing, cyanosis or edema  Vascular: Peripheral pulses palpable 2+ bilaterally        ECG:  nsr with lead reversal v1 and v2	  	  	  LABS:	 	    CARDIAC MARKERS ( 18 Sep 2020 12:52 )  0.123 ng/mL / x     / x     / x     / x                            8.6    3.90  )-----------( 134      ( 18 Sep 2020 12:52 )             25.1     09-18    135  |  96  |  58<H>  ----------------------------<  120<H>  4.4   |  30  |  9.85<H>    Ca    8.3<L>      18 Sep 2020 12:52  Phos  4.7     09-18  Mg     2.2     09-18    TPro  6.8  /  Alb  2.8<L>  /  TBili  0.3  /  DBili  x   /  AST  20  /  ALT  30  /  AlkPhos  124<H>  09-18    proBNP: Serum Pro-Brain Natriuretic Peptide: 94431 pg/mL (09-18 @ 12:52)    Lipid Profile: Cholesterol 139  LDL 70  HDL 50  TG 97      TSH: Thyroid Stimulating Hormone, Serum: 2.20 uU/mL (09-18 @ 18:20)      < from: Transthoracic Echocardiogram (02.04.20 @ 07:24) >  OBSERVATIONS:  Mitral Valve: Normal mitral valve.  Aortic Root: Aortic Root: 3.2 cm.    Aortic Valve: Aortic valve not well seen. Peak transaortic  valve gradient equals 29.2 mm Hg, mean transaortic valve  gradient equals 16 mm Hg, estimated aortic valve area  equals 1.5 sqcm (by continuity equation), consistent with  moderate aortic stenosis.The dimensionless index is .38.  Mild aortic regurgitation.  Peak left ventricular outflow  tract gradient equals 4 mm Hg.  Left Atrium: Normal left atrium.  LA volume index = 25  cc/m2.  Left Ventricle: Endocardium not well visualized; grossly  normal left ventricular systolic function. Normal left  ventricular internal dimensions and wall thicknesses. Grade  I diastolic dysfunction (Impaired relaxation).  Right Heart: Normal right atrium. Normal right ventricular  size and systolic function (TAPSE 2.2 cm). Normal tricuspid  valve. Normal pulmonic valve.  Pericardium/PleuraNormal pericardium with no pericardial  effusion.  Hemodynamic: RA Pressure is 8 mm Hg. RV systolic pressure  is mildly increased at  37 mm Hg.    < end of copied text >  < from: Xray Chest 1 View AP/PA (09.18.20 @ 12:39) >  EXAM:  XR CHEST AP OR PA 1V                            PROCEDURE DATE:  09/18/2020          INTERPRETATION:  INDICATION: Chest Pain    PRIORS: 8/11/2020.    VIEWS: Portable AP radiography of the chest performed.    FINDINGS: Heart size appears within normal limits. No superior mediastinal widening is identified. Vascular stent graft material is identified overlying the right brachial, right axillary and right subclavian regions. Bilateral lung parenchymal airspace opacities are identified suggesting element of CHF. Clinical correlation and follow-up suggested. No definite pleural effusion or pneumothorax is demonstrated. No mediastinal shift is noted. No acute osseous fractures.    IMPRESSION: Bilateral lung parenchymal airspace opacities suggesting an element of CHF. Clinical correlation and follow-up suggested.      < end of copied text >

## 2020-09-19 NOTE — CONSULT NOTE ADULT - ASSESSMENT
76 [place] [facility]  with medical history significant for ESRD on hemodialysis M/W/F, HTN ,AS DM, Parox Afib on eliquis was sent from HD center due to chest pain,acute diastolic HF and PAF.  1.Tele monitoring.  2.PAF-add multaq, inc lopressor 25mmg  bid,cont eliquis.  3.HTN-b blocker,Imdur,hydralazine,d/c procardia xl.  4.DM-Insulin.  5.CVA-eliquis,statin.  6.ESRD-HD as per renal.  7.Anemia-Epogen,Iron.  8.PPI.
ESRD dialysis days are MWF had only 10 minutes dialysis yesterday  Atrial fibrillation with RVR in the dialysis  Unstable angina, NSTEMI associated with rapid Atrial fibrillation  CHF. Follow with dialysis today - remove 3 kg  Cardiology follow up , continue metoprolol and Eliquis.    Anemia   received Aranesp on Monday, will start ALICIA on Monday.  Continue Venofer .    Renal bone disease and SHPTH - start sensipar, continue Hecterol or Zemplar  in dialysis , DC calcitriol.

## 2020-09-19 NOTE — CONSULT NOTE ADULT - SUBJECTIVE AND OBJECTIVE BOX
PULMONARY CONSULT NOTE      DORSAINVIL, JEANCLAUDE  MRN-[id number]    Patient is a 75y old  Male who presents with a chief complaint of Chest pain (19 Sep 2020 08:28)    History of Present Illness:  Reason for Admission: Chest pain  History of Present Illness:   76 yo male from [facility]  with medical history significant for ESRD on hemodialysis M/W/F, HTN and DM, Afib on eliquis was sent from HD center due to chest pain. He states that while he was having HD, he started having chest pain which was on left side of chest, non radiating, 7/10, feels like tightness, not relieved by aspirin. Has associated palpitations. Dialysis was not completed, had to stop in between due to chest pain. Denies dizziness, nausea, vomiting, sweating, dyspnea, syncope. He denies fever, Ur symptoms, abdominal pain, diarrhea. No recent illness, no travel, no sick contacts.      HISTORY OF PRESENT ILLNESS: As above. Awake, alert, comfortable in bed in NAD    MEDICATIONS  (STANDING):  apixaban 2.5 milliGRAM(s) Oral two times a day  aspirin  chewable 81 milliGRAM(s) Oral daily  atorvastatin 80 milliGRAM(s) Oral at bedtime  cinacalcet 30 milliGRAM(s) Oral daily  colchicine 0.6 milliGRAM(s) Oral two times a day  doxercalciferol Injectable 2 MICROGram(s) IV Push <User Schedule>  doxercalciferol Injectable 2 MICROGram(s) IV Push once  epoetin ximena-epbx (RETACRIT) Injectable 4000 Unit(s) IV Push <User Schedule>  ferrous    sulfate 325 milliGRAM(s) Oral daily  folic acid 1 milliGRAM(s) Oral daily  furosemide   Injectable 40 milliGRAM(s) IV Push daily  hydrALAZINE 25 milliGRAM(s) Oral three times a day  influenza   Vaccine 0.5 milliLiter(s) IntraMuscular once  insulin lispro (HumaLOG) corrective regimen sliding scale   SubCutaneous three times a day before meals  iron sucrose IVPB 100 milliGRAM(s) IV Intermittent <User Schedule>  iron sucrose IVPB 100 milliGRAM(s) IV Intermittent once  isosorbide   dinitrate Tablet (ISORDIL) 20 milliGRAM(s) Oral three times a day  metoprolol tartrate 12.5 milliGRAM(s) Oral two times a day  sevelamer carbonate 800 milliGRAM(s) Oral three times a day with meals      MEDICATIONS  (PRN):      Allergies    No Known Allergies    Intolerances        PAST MEDICAL & SURGICAL HISTORY:  Cerebrovascular accident (CVA), unspecified mechanism    Anemia    Hypertension    Diabetes    ESRD (end stage renal disease) on dialysis    A-V fistula        FAMILY HISTORY:  Family history of diabetes mellitus        SOCIAL HISTORY  Smoking History:     REVIEW OF SYSTEMS:    CONSTITUTIONAL:  No fevers, chills, sweats    HEENT:  Eyes:  No diplopia or blurred vision. ENT:  No earache, sore throat or runny nose.    CARDIOVASCULAR:  No pressure, squeezing, tightness, or heaviness about the chest; no palpitations.    RESPIRATORY:  Per HPI    GASTROINTESTINAL:  No abdominal pain, nausea, vomiting or diarrhea.    GENITOURINARY:  No dysuria, frequency or urgency.    NEUROLOGIC:  No paresthesias, fasciculations, seizures or weakness.    PSYCHIATRIC:  No disorder of thought or mood.    Vital Signs Last 24 Hrs  T(C): 36.3 (19 Sep 2020 07:48), Max: 37 (18 Sep 2020 21:40)  T(F): 97.3 (19 Sep 2020 07:48), Max: 98.6 (18 Sep 2020 21:40)  HR: 72 (19 Sep 2020 07:48) (72 - 166)  BP: 134/57 (19 Sep 2020 07:48) (134/57 - 200/79)  BP(mean): --  RR: 18 (19 Sep 2020 07:48) (16 - 20)  SpO2: 100% (19 Sep 2020 07:48) (96% - 100%)  I&O's Detail      PHYSICAL EXAMINATION:    GENERAL: The patient is a well-developed, well-nourished _____in no apparent distress.     HEENT: Head is normocephalic and atraumatic. Extraocular muscles are intact. Mucous membranes are moist.     NECK: Supple.     LUNGS: Clear to auscultation without wheezing, rales, or rhonchi. Respirations unlabored    HEART: Regular rate and rhythm without murmur.    ABDOMEN: Soft, nontender, and nondistended.  No hepatosplenomegaly is noted.    EXTREMITIES: Without any cyanosis, clubbing, rash, lesions or edema.    NEUROLOGIC: Grossly intact.      LABS:                        8.6    3.90  )-----------( 134      ( 18 Sep 2020 12:52 )             25.1     09-18    135  |  96  |  58<H>  ----------------------------<  120<H>  4.4   |  30  |  9.85<H>    Ca    8.3<L>      18 Sep 2020 12:52  Phos  4.7     09-18  Mg     2.2     09-18    TPro  6.8  /  Alb  2.8<L>  /  TBili  0.3  /  DBili  x   /  AST  20  /  ALT  30  /  AlkPhos  124<H>  09-18    PT/INR - ( 18 Sep 2020 18:20 )   PT: 11.8 sec;   INR: 1.01 ratio         PTT - ( 18 Sep 2020 12:52 )  PTT:40.3 sec      CARDIAC MARKERS ( 18 Sep 2020 12:52 )  0.123 ng/mL / x     / x     / x     / x            Serum Pro-Brain Natriuretic Peptide: 08029 pg/mL (09-18-20 @ 12:52)          MICROBIOLOGY:x< from: Xray Chest 1 View AP/PA (09.18.20 @ 12:39) >  IMPRESSION: Bilateral lung parenchymal airspace opacities suggesting an element of CHF. Clinical correlation and follow-up suggested.      < end of copied text >      RADIOLOGY & ADDITIONAL STUDIES:    CXR:    Ct scan chest:    ekg;    echo:

## 2020-09-19 NOTE — PROGRESS NOTE ADULT - SUBJECTIVE AND OBJECTIVE BOX
Patient is a 75y old  Male who presents with a chief complaint of Chest pain (18 Sep 2020 19:42)    pt seen in icu [  ], reg med floor [   ], bed [  ], chair at bedside [   ], a+o x3 [  ], lethargic [  ],  nad [  ]    kumar [  ], ngt [  ], peg [  ], et tube [  ], cent line [  ], picc line [  ]        Allergies    No Known Allergies        Vitals    T(F): 98 (09-19-20 @ 05:04), Max: 98.6 (09-18-20 @ 21:40)  HR: 82 (09-19-20 @ 05:04) (79 - 166)  BP: 148/62 (09-19-20 @ 05:04) (148/59 - 200/79)  RR: 18 (09-19-20 @ 05:04) (16 - 20)  SpO2: 100% (09-19-20 @ 05:04) (96% - 100%)  Wt(kg): --  CAPILLARY BLOOD GLUCOSE      POCT Blood Glucose.: 113 mg/dL (18 Sep 2020 22:08)      Labs                          8.6    3.90  )-----------( 134      ( 18 Sep 2020 12:52 )             25.1       09-18    135  |  96  |  58<H>  ----------------------------<  120<H>  4.4   |  30  |  9.85<H>    Ca    8.3<L>      18 Sep 2020 12:52  Phos  4.7     09-18  Mg     2.2     09-18    TPro  6.8  /  Alb  2.8<L>  /  TBili  0.3  /  DBili  x   /  AST  20  /  ALT  30  /  AlkPhos  124<H>  09-18      CARDIAC MARKERS ( 18 Sep 2020 12:52 )  0.123 ng/mL / x     / x     / x     / x                Radiology Results      Meds    MEDICATIONS  (STANDING):  apixaban 2.5 milliGRAM(s) Oral two times a day  aspirin  chewable 81 milliGRAM(s) Oral daily  atorvastatin 80 milliGRAM(s) Oral at bedtime  calcitriol   Capsule 0.25 MICROGram(s) Oral daily  colchicine 0.6 milliGRAM(s) Oral two times a day  ferrous    sulfate 325 milliGRAM(s) Oral daily  folic acid 1 milliGRAM(s) Oral daily  furosemide   Injectable 40 milliGRAM(s) IV Push daily  hydrALAZINE 25 milliGRAM(s) Oral three times a day  influenza   Vaccine 0.5 milliLiter(s) IntraMuscular once  insulin lispro (HumaLOG) corrective regimen sliding scale   SubCutaneous three times a day before meals  isosorbide   dinitrate Tablet (ISORDIL) 20 milliGRAM(s) Oral three times a day  metoprolol tartrate 12.5 milliGRAM(s) Oral two times a day  NIFEdipine XL 60 milliGRAM(s) Oral daily      MEDICATIONS  (PRN):      Physical Exam    Neuro :  no focal deficits  Respiratory: CTA B/L  CV: RRR, S1S2, no murmurs,   Abdominal: Soft, NT, ND +BS,  Extremities: No edema, + peripheral pulses    ASSESSMENT    Chest pain    Cerebrovascular accident (CVA), unspecified mechanism    Anemia    Hypertension    Diabetes    ESRD (end stage renal disease) on dialysis    A-V fistula    No significant past surgical history        PLAN     Patient is a 75y old  Male who presents with a chief complaint of Chest pain (18 Sep 2020 19:42)    pt seen in tele [ x ], reg med floor [   ], bed [ x ], chair at bedside [   ], a+o x3 [ x ], lethargic [  ],  nad [ x ]      Allergies    No Known Allergies        Vitals    T(F): 98 (09-19-20 @ 05:04), Max: 98.6 (09-18-20 @ 21:40)  HR: 82 (09-19-20 @ 05:04) (79 - 166)  BP: 148/62 (09-19-20 @ 05:04) (148/59 - 200/79)  RR: 18 (09-19-20 @ 05:04) (16 - 20)  SpO2: 100% (09-19-20 @ 05:04) (96% - 100%)  Wt(kg): --  CAPILLARY BLOOD GLUCOSE      POCT Blood Glucose.: 113 mg/dL (18 Sep 2020 22:08)      Labs                          8.6    3.90  )-----------( 134      ( 18 Sep 2020 12:52 )             25.1       09-18    135  |  96  |  58<H>  ----------------------------<  120<H>  4.4   |  30  |  9.85<H>    Ca    8.3<L>      18 Sep 2020 12:52  Phos  4.7     09-18  Mg     2.2     09-18    TPro  6.8  /  Alb  2.8<L>  /  TBili  0.3  /  DBili  x   /  AST  20  /  ALT  30  /  AlkPhos  124<H>  09-18      CARDIAC MARKERS ( 18 Sep 2020 12:52 )  0.123 ng/mL / x     / x     / x     / x                Radiology Results      Meds    MEDICATIONS  (STANDING):  apixaban 2.5 milliGRAM(s) Oral two times a day  aspirin  chewable 81 milliGRAM(s) Oral daily  atorvastatin 80 milliGRAM(s) Oral at bedtime  calcitriol   Capsule 0.25 MICROGram(s) Oral daily  colchicine 0.6 milliGRAM(s) Oral two times a day  ferrous    sulfate 325 milliGRAM(s) Oral daily  folic acid 1 milliGRAM(s) Oral daily  furosemide   Injectable 40 milliGRAM(s) IV Push daily  hydrALAZINE 25 milliGRAM(s) Oral three times a day  influenza   Vaccine 0.5 milliLiter(s) IntraMuscular once  insulin lispro (HumaLOG) corrective regimen sliding scale   SubCutaneous three times a day before meals  isosorbide   dinitrate Tablet (ISORDIL) 20 milliGRAM(s) Oral three times a day  metoprolol tartrate 12.5 milliGRAM(s) Oral two times a day  NIFEdipine XL 60 milliGRAM(s) Oral daily      MEDICATIONS  (PRN):      Physical Exam    Neuro :  no focal deficits  Respiratory: B/L rales  CV: RRR, S1S2, no murmurs,   Abdominal: Soft, NT, ND +BS,  Extremities: No edema, + peripheral pulses    ASSESSMENT    Chest pain   r/o acs,   r/o chf,   pulm edema,   h/o ESRD on hemodialysis M/W/F,   Afib on eliquis   Cerebrovascular accident (CVA), unspecified mechanism  Anemia  Hypertension  Diabetes        PLAN      cont tele,   acs protocol,   cont lopressor, aspirin, statin,   cont lasix,   cardio cons  ce q8 x3,   echo   pulm cons  [id number]  renal cons  hd as per renal  cont current meds

## 2020-09-19 NOTE — CHART NOTE - NSCHARTNOTEFT_GEN_A_CORE
Patient refused to have blood drawn for troponin check. Explained to him risk associated and the need. He agreed for morning but did not want to be stick again at night  f/u CE in am

## 2020-09-19 NOTE — CHART NOTE - NSCHARTNOTEFT_GEN_A_CORE
Call team endorsed to follow morning cardiac enzymes, however patient continues to refuse labs at this time.

## 2020-09-20 DIAGNOSIS — E66.01 MORBID (SEVERE) OBESITY DUE TO EXCESS CALORIES: ICD-10-CM

## 2020-09-20 DIAGNOSIS — I48.91 UNSPECIFIED ATRIAL FIBRILLATION: ICD-10-CM

## 2020-09-20 RX ORDER — PANTOPRAZOLE SODIUM 20 MG/1
40 TABLET, DELAYED RELEASE ORAL
Refills: 0 | Status: DISCONTINUED | OUTPATIENT
Start: 2020-09-20 | End: 2020-09-21

## 2020-09-20 RX ADMIN — Medication 25 MILLIGRAM(S): at 14:13

## 2020-09-20 RX ADMIN — Medication 81 MILLIGRAM(S): at 12:42

## 2020-09-20 RX ADMIN — Medication 325 MILLIGRAM(S): at 12:42

## 2020-09-20 RX ADMIN — ATORVASTATIN CALCIUM 80 MILLIGRAM(S): 80 TABLET, FILM COATED ORAL at 21:34

## 2020-09-20 RX ADMIN — ISOSORBIDE DINITRATE 20 MILLIGRAM(S): 5 TABLET ORAL at 05:50

## 2020-09-20 RX ADMIN — SEVELAMER CARBONATE 800 MILLIGRAM(S): 2400 POWDER, FOR SUSPENSION ORAL at 17:16

## 2020-09-20 RX ADMIN — SEVELAMER CARBONATE 800 MILLIGRAM(S): 2400 POWDER, FOR SUSPENSION ORAL at 09:00

## 2020-09-20 RX ADMIN — Medication 25 MILLIGRAM(S): at 05:50

## 2020-09-20 RX ADMIN — APIXABAN 2.5 MILLIGRAM(S): 2.5 TABLET, FILM COATED ORAL at 17:15

## 2020-09-20 RX ADMIN — Medication 25 MILLIGRAM(S): at 17:16

## 2020-09-20 RX ADMIN — Medication 1 MILLIGRAM(S): at 12:43

## 2020-09-20 RX ADMIN — SEVELAMER CARBONATE 800 MILLIGRAM(S): 2400 POWDER, FOR SUSPENSION ORAL at 12:43

## 2020-09-20 RX ADMIN — DRONEDARONE 400 MILLIGRAM(S): 400 TABLET, FILM COATED ORAL at 05:51

## 2020-09-20 RX ADMIN — DRONEDARONE 400 MILLIGRAM(S): 400 TABLET, FILM COATED ORAL at 17:16

## 2020-09-20 RX ADMIN — Medication 40 MILLIGRAM(S): at 05:50

## 2020-09-20 RX ADMIN — CINACALCET 30 MILLIGRAM(S): 30 TABLET, FILM COATED ORAL at 12:42

## 2020-09-20 RX ADMIN — ISOSORBIDE DINITRATE 20 MILLIGRAM(S): 5 TABLET ORAL at 21:34

## 2020-09-20 RX ADMIN — APIXABAN 2.5 MILLIGRAM(S): 2.5 TABLET, FILM COATED ORAL at 05:50

## 2020-09-20 RX ADMIN — Medication 25 MILLIGRAM(S): at 21:34

## 2020-09-20 NOTE — PROGRESS NOTE ADULT - SUBJECTIVE AND OBJECTIVE BOX
PGY 1 Note discussed with supervising resident and primary attending  PGY-1: Lolly Frias MD  PAGER #: [phone] TILL 5:00 PM  Please contact On Call team 5PM - 8:30PM  Please contact Nightfloat team 8:30PM - 7:30AM  Patient is a 75y old  Male who presents with a chief complaint of Chest pain (20 Sep 2020 09:50)    Brief Hospital Course: 76 yo male from [facility]  with medical history significant for ESRD on hemodialysis M/W/F, HTN and DM, Afib on eliquis was sent from HD center due to chest pain. He states that while he was having HD, he started having chest pain which was on left side of chest, non radiating, 7/10, feels like tightness, not relieved by aspirin. Has associated palpitations. Dialysis was not completed, had to stop in between due to chest pain. Denies dizziness, nausea, vomiting, sweating, dyspnea, syncope. He denies fever, Ur symptoms, abdominal pain, diarrhea. No recent illness, no travel, no sick contacts.    INTERVAL HPI/OVERNIGHT EVENTS: No acute events noted overnight. S/p HD yesterday. No events on telemetry - rate controlled. Patient refusing lab draws yesterday and this morning. Also refused stress test. Reports resolution of chest pain.     MEDICATIONS  (STANDING):  apixaban 2.5 milliGRAM(s) Oral two times a day  aspirin  chewable 81 milliGRAM(s) Oral daily  atorvastatin 80 milliGRAM(s) Oral at bedtime  cinacalcet 30 milliGRAM(s) Oral daily  doxercalciferol Injectable 2 MICROGram(s) IV Push <User Schedule>  dronedarone 400 milliGRAM(s) Oral two times a day  epoetin ximena-epbx (RETACRIT) Injectable 4000 Unit(s) IV Push <User Schedule>  ferrous    sulfate 325 milliGRAM(s) Oral daily  folic acid 1 milliGRAM(s) Oral daily  furosemide   Injectable 40 milliGRAM(s) IV Push daily  hydrALAZINE 25 milliGRAM(s) Oral three times a day  influenza   Vaccine 0.5 milliLiter(s) IntraMuscular once  insulin lispro (HumaLOG) corrective regimen sliding scale   SubCutaneous three times a day before meals  iron sucrose IVPB 100 milliGRAM(s) IV Intermittent <User Schedule>  isosorbide   dinitrate Tablet (ISORDIL) 20 milliGRAM(s) Oral three times a day  metoprolol tartrate 25 milliGRAM(s) Oral two times a day  pantoprazole    Tablet 40 milliGRAM(s) Oral before breakfast  sevelamer carbonate 800 milliGRAM(s) Oral three times a day with meals    MEDICATIONS  (PRN):      __________________________________________________  REVIEW OF SYSTEMS:  CONSTITUTIONAL: No fever, weight loss, or fatigue  RESPIRATORY: No cough, wheezing, chills or hemoptysis; No shortness of breath  CARDIOVASCULAR: No chest pain, palpitations, dizziness, or leg swelling  GASTROINTESTINAL: No abdominal pain. No nausea, vomiting, or hematemesis; No diarrhea or constipation. No melena or hematochezia.  GENITOURINARY: No dysuria or hematuria, no burning micturition, no polyuria, no urinary hesitancy, no nocturia, normal urinary frequency  NEUROLOGICAL: No headaches, memory loss, loss of strength, numbness, or tremors  SKIN: No itching, burning, rashes, or lesions   All other ROS negative except noted above    Vital Signs Last 24 Hrs  T(C): 36.9 (20 Sep 2020 08:20), Max: 37.2 (19 Sep 2020 15:37)  T(F): 98.4 (20 Sep 2020 08:20), Max: 98.9 (19 Sep 2020 15:37)  HR: 76 (20 Sep 2020 08:20) (74 - 85)  BP: 108/52 (20 Sep 2020 08:20) (108/52 - 135/60)  BP(mean): --  RR: 19 (20 Sep 2020 08:20) (18 - 19)  SpO2: 100% (20 Sep 2020 08:20) (99% - 100%)    ________________________________________________  PHYSICAL EXAMINATION:  GENERAL: NAD, well-developed, obese  HEAD:  Atraumatic, Normocephalic  EYES:  conjunctiva and sclera clear  NECK: Supple, No JVD, Normal thyroid  CHEST/LUNG: Clear to auscultation bilaterally; No rales, rhonchi, wheezing, or rubs  HEART: Regular rate and rhythm; No murmurs, rubs, or gallops  ABDOMEN: Soft, Nontender, Nondistended; Bowel sounds present  NERVOUS SYSTEM:  Alert & Oriented X3,  Moving all 4 extremities  EXTREMITIES: R AVF.  Peripheral pulses palpable. No clubbing, cyanosis, or edema  SKIN: Warm, Dry, no rashes or lesions    _________________________________________________  LABS:                        8.5    5.27  )-----------( 151      ( 19 Sep 2020 15:41 )             25.1     09-19    136  |  100  |  77<H>  ----------------------------<  102<H>  5.6<H>   |  27  |  12.60<H>    Ca    8.4      19 Sep 2020 15:41  Phos  5.9     09-19  Mg     2.3     09-19    TPro  6.8  /  Alb  2.9<L>  /  TBili  0.5  /  DBili  x   /  AST  16  /  ALT  28  /  AlkPhos  119  09-19    PT/INR - ( 18 Sep 2020 18:20 )   PT: 11.8 sec;   INR: 1.01 ratio         PTT - ( 18 Sep 2020 12:52 )  PTT:40.3 sec    CAPILLARY BLOOD GLUCOSE      POCT Blood Glucose.: 93 mg/dL (19 Sep 2020 20:53)  POCT Blood Glucose.: 94 mg/dL (19 Sep 2020 18:23)  POCT Blood Glucose.: 108 mg/dL (19 Sep 2020 11:49)        RADIOLOGY & ADDITIONAL TESTS:    < from: Xray Chest 1 View AP/PA (09.18.20 @ 12:39) >  FINDINGS: Heart size appears within normal limits. No superior mediastinal widening is identified. Vascular stent graft material is identified overlying the right brachial, right axillary and right subclavian regions. Bilateral lung parenchymal airspace opacities are identified suggesting element of CHF. Clinical correlation and follow-up suggested. No definite pleural effusion or pneumothorax is demonstrated. No mediastinal shift is noted. No acute osseous fractures.    IMPRESSION: Bilateral lung parenchymal airspace opacities suggesting an element of CHF. Clinical correlation and follow-up suggested.      < end of copied text >  Imaging Personally Reviewed:  YES    Consultant(s) Notes Reviewed:   YES    Care Discussed with Consultants : YES     Plan of care was discussed with patient and /or primary care giver; all questions and concerns were addressed and care was aligned with patient's wishes.     PGY 1 Note discussed with supervising resident and primary attending  PGY-1: Lolly Frias MD  PAGER #: [phone] TILL 5:00 PM  Please contact On Call team 5PM - 8:30PM  Please contact Nightfloat team 8:30PM - 7:30AM  Patient is a 75y old  Male who presents with a chief complaint of Chest pain (20 Sep 2020 09:50)    Brief Hospital Course: 76 yo male from [facility]  with medical history significant for ESRD on hemodialysis M/W/F, HTN and DM, Afib on eliquis was sent from HD center due to chest pain. He states that while he was having HD, he started having chest pain which was on left side of chest, non radiating, 7/10, feels like tightness, not relieved by aspirin. Has associated palpitations. Dialysis was not completed, had to stop in between due to chest pain. Denies dizziness, nausea, vomiting, sweating, dyspnea, syncope. He denies fever, Ur symptoms, abdominal pain, diarrhea. No recent illness, no travel, no sick contacts.    INTERVAL HPI/OVERNIGHT EVENTS: No acute events noted overnight. S/p HD yesterday. No events on telemetry - rate controlled. Patient refusing lab draws yesterday and this morning - not able to trend CE. Also refused stress test. Reports resolution of chest pain. Off nasal cannula without SOB.     MEDICATIONS  (STANDING):  apixaban 2.5 milliGRAM(s) Oral two times a day  aspirin  chewable 81 milliGRAM(s) Oral daily  atorvastatin 80 milliGRAM(s) Oral at bedtime  cinacalcet 30 milliGRAM(s) Oral daily  doxercalciferol Injectable 2 MICROGram(s) IV Push <User Schedule>  dronedarone 400 milliGRAM(s) Oral two times a day  epoetin ximena-epbx (RETACRIT) Injectable 4000 Unit(s) IV Push <User Schedule>  ferrous    sulfate 325 milliGRAM(s) Oral daily  folic acid 1 milliGRAM(s) Oral daily  furosemide   Injectable 40 milliGRAM(s) IV Push daily  hydrALAZINE 25 milliGRAM(s) Oral three times a day  influenza   Vaccine 0.5 milliLiter(s) IntraMuscular once  insulin lispro (HumaLOG) corrective regimen sliding scale   SubCutaneous three times a day before meals  iron sucrose IVPB 100 milliGRAM(s) IV Intermittent <User Schedule>  isosorbide   dinitrate Tablet (ISORDIL) 20 milliGRAM(s) Oral three times a day  metoprolol tartrate 25 milliGRAM(s) Oral two times a day  pantoprazole    Tablet 40 milliGRAM(s) Oral before breakfast  sevelamer carbonate 800 milliGRAM(s) Oral three times a day with meals    MEDICATIONS  (PRN):      __________________________________________________  REVIEW OF SYSTEMS:  CONSTITUTIONAL: No fever, weight loss, or fatigue  RESPIRATORY: No cough, wheezing, chills or hemoptysis; No shortness of breath  CARDIOVASCULAR: No chest pain, palpitations, dizziness, or leg swelling  GASTROINTESTINAL: No abdominal pain. No nausea, vomiting, or hematemesis; No diarrhea or constipation. No melena or hematochezia.  GENITOURINARY: No dysuria or hematuria, no burning micturition, no polyuria, no urinary hesitancy, no nocturia, normal urinary frequency  NEUROLOGICAL: No headaches, memory loss, loss of strength, numbness, or tremors  SKIN: No itching, burning, rashes, or lesions   All other ROS negative except noted above    Vital Signs Last 24 Hrs  T(C): 36.9 (20 Sep 2020 08:20), Max: 37.2 (19 Sep 2020 15:37)  T(F): 98.4 (20 Sep 2020 08:20), Max: 98.9 (19 Sep 2020 15:37)  HR: 76 (20 Sep 2020 08:20) (74 - 85)  BP: 108/52 (20 Sep 2020 08:20) (108/52 - 135/60)  BP(mean): --  RR: 19 (20 Sep 2020 08:20) (18 - 19)  SpO2: 100% (20 Sep 2020 08:20) (99% - 100%)    ________________________________________________  PHYSICAL EXAMINATION:  GENERAL: NAD, well-developed, obese  HEAD:  Atraumatic, Normocephalic  EYES:  conjunctiva and sclera clear  NECK: Supple, No JVD, Normal thyroid  CHEST/LUNG: Clear to auscultation bilaterally; No rales, rhonchi, wheezing, or rubs  HEART: Regular rate and rhythm; No murmurs, rubs, or gallops  ABDOMEN: Soft, Nontender, Nondistended; Bowel sounds present  NERVOUS SYSTEM:  Alert & Oriented X3,  Moving all 4 extremities  EXTREMITIES: R AVF w/thrill.  Peripheral pulses palpable. No clubbing, cyanosis, or edema  SKIN: Warm, Dry, no rashes or lesions    _________________________________________________  LABS:                        8.5    5.27  )-----------( 151      ( 19 Sep 2020 15:41 )             25.1     09-19    136  |  100  |  77<H>  ----------------------------<  102<H>  5.6<H>   |  27  |  12.60<H>    Ca    8.4      19 Sep 2020 15:41  Phos  5.9     09-19  Mg     2.3     09-19    TPro  6.8  /  Alb  2.9<L>  /  TBili  0.5  /  DBili  x   /  AST  16  /  ALT  28  /  AlkPhos  119  09-19    PT/INR - ( 18 Sep 2020 18:20 )   PT: 11.8 sec;   INR: 1.01 ratio         PTT - ( 18 Sep 2020 12:52 )  PTT:40.3 sec    CAPILLARY BLOOD GLUCOSE      POCT Blood Glucose.: 93 mg/dL (19 Sep 2020 20:53)  POCT Blood Glucose.: 94 mg/dL (19 Sep 2020 18:23)  POCT Blood Glucose.: 108 mg/dL (19 Sep 2020 11:49)        RADIOLOGY & ADDITIONAL TESTS:    < from: Xray Chest 1 View AP/PA (09.18.20 @ 12:39) >  FINDINGS: Heart size appears within normal limits. No superior mediastinal widening is identified. Vascular stent graft material is identified overlying the right brachial, right axillary and right subclavian regions. Bilateral lung parenchymal airspace opacities are identified suggesting element of CHF. Clinical correlation and follow-up suggested. No definite pleural effusion or pneumothorax is demonstrated. No mediastinal shift is noted. No acute osseous fractures.    IMPRESSION: Bilateral lung parenchymal airspace opacities suggesting an element of CHF. Clinical correlation and follow-up suggested.      < end of copied text >  Imaging Personally Reviewed:  YES    Consultant(s) Notes Reviewed:   YES    Care Discussed with Consultants : YES     Plan of care was discussed with patient and /or primary care giver; all questions and concerns were addressed and care was aligned with patient's wishes.

## 2020-09-20 NOTE — PROGRESS NOTE ADULT - SUBJECTIVE AND OBJECTIVE BOX
Patient is a 75y old  Male who presents with a chief complaint of Chest pain (20 Sep 2020 09:17)  Awake, alert, laying in bed in NAD    INTERVAL HPI/OVERNIGHT EVENTS:      VITAL SIGNS:  T(F): 98.4 (09-20-20 @ 08:20)  HR: 76 (09-20-20 @ 08:20)  BP: 108/52 (09-20-20 @ 08:20)  RR: 19 (09-20-20 @ 08:20)  SpO2: 100% (09-20-20 @ 08:20)  Wt(kg): --  I&O's Detail    19 Sep 2020 07:01  -  20 Sep 2020 07:00  --------------------------------------------------------  IN:    Other (mL): 600 mL  Total IN: 600 mL    OUT:    Oral Fluid: 0 mL    Other (mL): 2600 mL  Total OUT: 2600 mL    Total NET: -2000 mL              REVIEW OF SYSTEMS:    CONSTITUTIONAL:  No fevers, chills, sweats    HEENT:  Eyes:  No diplopia or blurred vision. ENT:  No earache, sore throat or runny nose.    CARDIOVASCULAR:  No pressure, squeezing, tightness, or heaviness about the chest; no palpitations.    RESPIRATORY:  Per HPI    GASTROINTESTINAL:  No abdominal pain, nausea, vomiting or diarrhea.    GENITOURINARY:  No dysuria, frequency or urgency.    NEUROLOGIC:  No paresthesias, fasciculations, seizures or weakness.    PSYCHIATRIC:  No disorder of thought or mood.      PHYSICAL EXAM:    Constitutional: Well developed and nourished  Eyes:Perrla  ENMT: normal  Neck:supple  Respiratory: good air entry  Cardiovascular: S1 S2 regular  Gastrointestinal: Soft, Non tender  Extremities: No edema  Vascular:normal  Neurological:Awake, alert,Ox3  Musculoskeletal:Normal      MEDICATIONS  (STANDING):  apixaban 2.5 milliGRAM(s) Oral two times a day  aspirin  chewable 81 milliGRAM(s) Oral daily  atorvastatin 80 milliGRAM(s) Oral at bedtime  cinacalcet 30 milliGRAM(s) Oral daily  doxercalciferol Injectable 2 MICROGram(s) IV Push <User Schedule>  dronedarone 400 milliGRAM(s) Oral two times a day  epoetin ximena-epbx (RETACRIT) Injectable 4000 Unit(s) IV Push <User Schedule>  ferrous    sulfate 325 milliGRAM(s) Oral daily  folic acid 1 milliGRAM(s) Oral daily  furosemide   Injectable 40 milliGRAM(s) IV Push daily  hydrALAZINE 25 milliGRAM(s) Oral three times a day  influenza   Vaccine 0.5 milliLiter(s) IntraMuscular once  insulin lispro (HumaLOG) corrective regimen sliding scale   SubCutaneous three times a day before meals  iron sucrose IVPB 100 milliGRAM(s) IV Intermittent <User Schedule>  isosorbide   dinitrate Tablet (ISORDIL) 20 milliGRAM(s) Oral three times a day  metoprolol tartrate 25 milliGRAM(s) Oral two times a day  pantoprazole    Tablet 40 milliGRAM(s) Oral before breakfast  sevelamer carbonate 800 milliGRAM(s) Oral three times a day with meals    MEDICATIONS  (PRN):      Allergies    No Known Allergies    Intolerances        LABS:                        8.5    5.27  )-----------( 151      ( 19 Sep 2020 15:41 )             25.1     09-19    136  |  100  |  77<H>  ----------------------------<  102<H>  5.6<H>   |  27  |  12.60<H>    Ca    8.4      19 Sep 2020 15:41  Phos  5.9     09-19  Mg     2.3     09-19    TPro  6.8  /  Alb  2.9<L>  /  TBili  0.5  /  DBili  x   /  AST  16  /  ALT  28  /  AlkPhos  119  09-19    PT/INR - ( 18 Sep 2020 18:20 )   PT: 11.8 sec;   INR: 1.01 ratio         PTT - ( 18 Sep 2020 12:52 )  PTT:40.3 sec      CARDIAC MARKERS ( 18 Sep 2020 12:52 )  0.123 ng/mL / x     / x     / x     / x          CAPILLARY BLOOD GLUCOSE      POCT Blood Glucose.: 93 mg/dL (19 Sep 2020 20:53)  POCT Blood Glucose.: 94 mg/dL (19 Sep 2020 18:23)  POCT Blood Glucose.: 108 mg/dL (19 Sep 2020 11:49)    pro-bnp [id number] 09-18 @ 12:52     d-dimer --  09-18 @ 12:52      RADIOLOGY & ADDITIONAL TESTS:    CXR:    Ct scan chest:    ekg;    echo:

## 2020-09-20 NOTE — PROGRESS NOTE ADULT - PROBLEM SELECTOR PLAN 7
HAs h/o anemia due to chronic disease  Monitor cbc  Transfuse as needed  C/w Venofer and ALICIA with HD  Vitamin D supplements Has h/o anemia due to chronic disease  Monitor cbc  Transfuse as needed  C/w Venofer and ALICIA with HD  Vitamin D supplements

## 2020-09-20 NOTE — PROGRESS NOTE ADULT - SUBJECTIVE AND OBJECTIVE BOX
Problem List:  76 yo male from [facility]  with medical history significant for ESRD on hemodialysis M/W/F, HTN and DM, Afib on eliquis was sent from HD center due tachycardia , increased HR to 159 after 10 minutes of dialysis that was associated with SOB and chest pain 7/10,  / 98, 170/80 at that time. Dialysis was stopped and he was sent to the hospital. As per history a times he refused his BP meds and po4 binders in the NH.  Admission EKG was HR 90 , sinus rhythm.  He feels well now no more CP and Palpitation and no c/o sob  No history of stents placemnt in the past  On last admission he refused cardiac cat    ECHO on 023/20 with moderate AS and diastolic dysfunction grade 1    PAST MEDICAL & SURGICAL HISTORY:  Cerebrovascular accident (CVA), unspecified mechanism    Anemia    Hypertension    Diabetes    ESRD (end stage renal disease) on dialysis    A-V fistula        No Known Allergies      MEDICATIONS  (STANDING):  apixaban 2.5 milliGRAM(s) Oral two times a day  aspirin  chewable 81 milliGRAM(s) Oral daily  atorvastatin 80 milliGRAM(s) Oral at bedtime  cinacalcet 30 milliGRAM(s) Oral daily  doxercalciferol Injectable 2 MICROGram(s) IV Push <User Schedule>  dronedarone 400 milliGRAM(s) Oral two times a day  epoetin ximena-epbx (RETACRIT) Injectable 4000 Unit(s) IV Push <User Schedule>  ferrous    sulfate 325 milliGRAM(s) Oral daily  folic acid 1 milliGRAM(s) Oral daily  furosemide   Injectable 40 milliGRAM(s) IV Push daily  hydrALAZINE 25 milliGRAM(s) Oral three times a day  influenza   Vaccine 0.5 milliLiter(s) IntraMuscular once  insulin lispro (HumaLOG) corrective regimen sliding scale   SubCutaneous three times a day before meals  iron sucrose IVPB 100 milliGRAM(s) IV Intermittent <User Schedule>  isosorbide   dinitrate Tablet (ISORDIL) 20 milliGRAM(s) Oral three times a day  metoprolol tartrate 25 milliGRAM(s) Oral two times a day  pantoprazole    Tablet 40 milliGRAM(s) Oral before breakfast  sevelamer carbonate 800 milliGRAM(s) Oral three times a day with meals    MEDICATIONS  (PRN):                            8.5    5.27  )-----------( 151      ( 19 Sep 2020 15:41 )             25.1     09-19    136  |  100  |  77<H>  ----------------------------<  102<H>  5.6<H>   |  27  |  12.60<H>    Ca    8.4      19 Sep 2020 15:41  Phos  5.9     09-19  Mg     2.3     09-19    TPro  6.8  /  Alb  2.9<L>  /  TBili  0.5  /  DBili  x   /  AST  16  /  ALT  28  /  AlkPhos  119  09-19    PT/INR - ( 18 Sep 2020 18:20 )   PT: 11.8 sec;   INR: 1.01 ratio         PTT - ( 18 Sep 2020 12:52 )  PTT:40.3 sec        REVIEW OF SYSTEMS:  General: no fever no chills, no weight loss.  EYES/ENT: No visual changes;  No vertigo, no headache.  NECK: No pain or stiffness  RESPIRATORY: No cough, wheezing, hemoptysis; No shortness of breath  CARDIOVASCULAR: No chest pain or palpitations. No Edema  GASTROINTESTINAL: No abdominal or epigastric pain. No nausea, vomiting. No diarrhea or constipation. No melena.  GENITOURINARY: No dysuria, frequency, foamy urine, urinary urgency, incontinence or hematuria          VITALS:  T(F): 99.8 (09-20-20 @ 11:28), Max: 99.8 (09-20-20 @ 11:28)  HR: 78 (09-20-20 @ 11:28)  BP: 128/49 (09-20-20 @ 11:28)  RR: 19 (09-20-20 @ 11:28)  SpO2: 99% (09-20-20 @ 11:28)  Wt(kg): --    09-19 @ 07:01  -  09-20 @ 07:00  --------------------------------------------------------  IN: 600 mL / OUT: 2600 mL / NET: -2000 mL        PHYSICAL EXAM:  Constitutional: well developed, no diaphoresis, no distress.  Neck: No JVD, no carotid bruit, supple, no adenopathy  Respiratory:  air entrance B/L, no wheezes, rales or rhonchi  Cardiovascular: S1, S2, RRR, systolic m in aortic area 2/6,  no pericardial rub, no murmur  Abdomen: BS+, soft, no tenderness, no bruit  Pelvis: bladder nondistended  Extremities: No cyanosis or clubbing. No peripheral edema.   Pulses: All present  Neurological: A/O x 3, no focal deficits  Psychiatric: Normal mood, normal affect  Skin: No rashes  Vascular Access: right avg with bruti and thrill

## 2020-09-20 NOTE — PROGRESS NOTE ADULT - PROBLEM SELECTOR PLAN 5
Has h/o HTn  C/w Lasix, hydralazine, metoprolol, Imdur  Monitor Bp Has h/o HTN  C/w Lasix, hydralazine, metoprolol, Imdur  Monitor Bp

## 2020-09-20 NOTE — PROGRESS NOTE ADULT - PROBLEM SELECTOR PLAN 1
Presented with chest pain while having HD  EKG showed NSR  Trop 1 was elevated, patient refusing f/u troponins and labs  Could be demand ischemia secondary to fluid overload, ESRD, or cardiac  Will r/o ACS, NSTEMI vs unstable angina  Last echo was done in feb showed GIDD  On ASA, statin, BB  Telemetry monitoring  F/u ECHO, cardiac enzymes if pt agrees   Cardio consult Dr Salinas Presented with chest pain while having HD  EKG showed NSR  Trop 1 was elevated, patient refusing f/u troponins and labs  Could be demand ischemia secondary to fluid overload, ESRD, or cardiac  Will r/o ACS, NSTEMI vs unstable angina  Last echo was done in feb showed GIDD  On ASA, statin, BB  Telemetry monitoring  F/u ECHO, cardiac enzymes if pt agrees   Pt refused stress test  Cardio consult Dr Salinas

## 2020-09-20 NOTE — PROGRESS NOTE ADULT - PROBLEM SELECTOR PLAN 2
Found to have pulmonary congestion on CXR, pro-BNP was elevated  Did not have full HD today  Patient still makes urine, c/w IV Lasix 40mg  HD per Nephro  Monitor I/O, fluid restriction Found to have pulmonary congestion on CXR, pro-BNP was elevated  Patient still makes urine, c/w IV Lasix 40mg  S/p HD 9/19  HD per Nephro  Monitor I/O, fluid restriction

## 2020-09-20 NOTE — PROGRESS NOTE ADULT - SUBJECTIVE AND OBJECTIVE BOX
CHIEF COMPLAINT:Patient is a 75y old  Male who presents with a chief complaint of Chest pain.Pt appears comfortable.    	  REVIEW OF SYSTEMS:  CONSTITUTIONAL: No fever, weight loss, or fatigue  EYES: No eye pain, visual disturbances, or discharge  ENT:  No difficulty hearing, tinnitus, vertigo; No sinus or throat pain  NECK: No pain or stiffness  RESPIRATORY: No cough, wheezing, chills or hemoptysis; No Shortness of Breath  CARDIOVASCULAR: No chest pain, palpitations, passing out, dizziness, or leg swelling  GASTROINTESTINAL: No abdominal or epigastric pain. No nausea, vomiting, or hematemesis; No diarrhea or constipation. No melena or hematochezia.  GENITOURINARY: No dysuria, frequency, hematuria, or incontinence  NEUROLOGICAL: No headaches, memory loss, loss of strength, numbness, or tremors  SKIN: No itching, burning, rashes, or lesions   LYMPH Nodes: No enlarged glands  ENDOCRINE: No heat or cold intolerance; No hair loss  MUSCULOSKELETAL: No joint pain or swelling; No muscle, back, or extremity pain  PSYCHIATRIC: No depression, anxiety, mood swings, or difficulty sleeping  HEME/LYMPH: No easy bruising, or bleeding gums  ALLERGY AND IMMUNOLOGIC: No hives or eczema	        PHYSICAL EXAM:  T(C): 36.9 (09-20-20 @ 08:20), Max: 37.2 (09-19-20 @ 15:37)  HR: 76 (09-20-20 @ 08:20) (74 - 85)  BP: 108/52 (09-20-20 @ 08:20) (108/52 - 135/60)  RR: 19 (09-20-20 @ 08:20) (18 - 19)  SpO2: 100% (09-20-20 @ 08:20) (99% - 100%)  Wt(kg): --  I&O's Summary    19 Sep 2020 07:01  -  20 Sep 2020 07:00  --------------------------------------------------------  IN: 600 mL / OUT: 2600 mL / NET: -2000 mL        Appearance: Normal	  HEENT:   Normal oral mucosa, PERRL, EOMI	  Lymphatic: No lymphadenopathy  Cardiovascular: Normal S1 S2,2/6sm  Respiratory: Lungs clear to auscultation	  Psychiatry: A & O x 3, Mood & affect appropriate  Gastrointestinal:  Soft, Non-tender, + BS	  Skin: No rashes, No ecchymoses, No cyanosis	  Neurologic: Non-focal  Extremities: Normal range of motion, No clubbing, cyanosis or edema  Vascular: Peripheral pulses palpable 2+ bilaterally    MEDICATIONS  (STANDING):  apixaban 2.5 milliGRAM(s) Oral two times a day  aspirin  chewable 81 milliGRAM(s) Oral daily  atorvastatin 80 milliGRAM(s) Oral at bedtime  cinacalcet 30 milliGRAM(s) Oral daily  doxercalciferol Injectable 2 MICROGram(s) IV Push <User Schedule>  dronedarone 400 milliGRAM(s) Oral two times a day  epoetin ximena-epbx (RETACRIT) Injectable 4000 Unit(s) IV Push <User Schedule>  ferrous    sulfate 325 milliGRAM(s) Oral daily  folic acid 1 milliGRAM(s) Oral daily  furosemide   Injectable 40 milliGRAM(s) IV Push daily  hydrALAZINE 25 milliGRAM(s) Oral three times a day  influenza   Vaccine 0.5 milliLiter(s) IntraMuscular once  insulin lispro (HumaLOG) corrective regimen sliding scale   SubCutaneous three times a day before meals  iron sucrose IVPB 100 milliGRAM(s) IV Intermittent <User Schedule>  isosorbide   dinitrate Tablet (ISORDIL) 20 milliGRAM(s) Oral three times a day  metoprolol tartrate 25 milliGRAM(s) Oral two times a day  pantoprazole    Tablet 40 milliGRAM(s) Oral before breakfast  sevelamer carbonate 800 milliGRAM(s) Oral three times a day with meals      	  	  LABS:	 	      CARDIAC MARKERS ( 18 Sep 2020 12:52 )  0.123 ng/mL / x     / x     / x     / x                             8.5    5.27  )-----------( 151      ( 19 Sep 2020 15:41 )             25.1     09-19    136  |  100  |  77<H>  ----------------------------<  102<H>  5.6<H>   |  27  |  12.60<H>    Ca    8.4      19 Sep 2020 15:41  Phos  5.9     09-19  Mg     2.3     09-19    TPro  6.8  /  Alb  2.9<L>  /  TBili  0.5  /  DBili  x   /  AST  16  /  ALT  28  /  AlkPhos  119  09-19    proBNP: Serum Pro-Brain Natriuretic Peptide: 29022 pg/mL (09-18 @ 12:52)    Lipid Profile: Cholesterol 139  LDL 70  HDL 50  TG 97      TSH: Thyroid Stimulating Hormone, Serum: 2.20 uU/mL (09-18 @ 18:20)

## 2020-09-21 ENCOUNTER — TRANSCRIPTION ENCOUNTER (OUTPATIENT)
Age: 75
End: 2020-09-21

## 2020-09-21 VITALS
DIASTOLIC BLOOD PRESSURE: 57 MMHG | HEART RATE: 71 BPM | TEMPERATURE: 99 F | OXYGEN SATURATION: 100 % | SYSTOLIC BLOOD PRESSURE: 153 MMHG | RESPIRATION RATE: 18 BRPM

## 2020-09-21 LAB
ALBUMIN SERPL ELPH-MCNC: 2.8 G/DL — LOW (ref 3.5–5)
ALP SERPL-CCNC: 115 U/L — SIGNIFICANT CHANGE UP (ref 40–120)
ALT FLD-CCNC: 25 U/L DA — SIGNIFICANT CHANGE UP (ref 10–60)
ANION GAP SERPL CALC-SCNC: 11 MMOL/L — SIGNIFICANT CHANGE UP (ref 5–17)
AST SERPL-CCNC: 17 U/L — SIGNIFICANT CHANGE UP (ref 10–40)
BILIRUB SERPL-MCNC: 0.4 MG/DL — SIGNIFICANT CHANGE UP (ref 0.2–1.2)
BUN SERPL-MCNC: 63 MG/DL — HIGH (ref 7–18)
CALCIUM SERPL-MCNC: 7.8 MG/DL — LOW (ref 8.4–10.5)
CHLORIDE SERPL-SCNC: 96 MMOL/L — SIGNIFICANT CHANGE UP (ref 96–108)
CO2 SERPL-SCNC: 28 MMOL/L — SIGNIFICANT CHANGE UP (ref 22–31)
CREAT SERPL-MCNC: 11.5 MG/DL — HIGH (ref 0.5–1.3)
GLUCOSE BLDC GLUCOMTR-MCNC: 128 MG/DL — HIGH (ref 70–99)
GLUCOSE BLDC GLUCOMTR-MCNC: 149 MG/DL — HIGH (ref 70–99)
GLUCOSE BLDC GLUCOMTR-MCNC: 69 MG/DL — LOW (ref 70–99)
GLUCOSE BLDC GLUCOMTR-MCNC: 93 MG/DL — SIGNIFICANT CHANGE UP (ref 70–99)
GLUCOSE SERPL-MCNC: 143 MG/DL — HIGH (ref 70–99)
HCT VFR BLD CALC: 25.5 % — LOW (ref 39–50)
HGB BLD-MCNC: 8.7 G/DL — LOW (ref 13–17)
MAGNESIUM SERPL-MCNC: 2.3 MG/DL — SIGNIFICANT CHANGE UP (ref 1.6–2.6)
MCHC RBC-ENTMCNC: 33.7 PG — SIGNIFICANT CHANGE UP (ref 27–34)
MCHC RBC-ENTMCNC: 34.1 GM/DL — SIGNIFICANT CHANGE UP (ref 32–36)
MCV RBC AUTO: 98.8 FL — SIGNIFICANT CHANGE UP (ref 80–100)
NRBC # BLD: 0 /100 WBCS — SIGNIFICANT CHANGE UP (ref 0–0)
PHOSPHATE SERPL-MCNC: 5.1 MG/DL — HIGH (ref 2.5–4.5)
PLATELET # BLD AUTO: 162 K/UL — SIGNIFICANT CHANGE UP (ref 150–400)
POTASSIUM SERPL-MCNC: 4.4 MMOL/L — SIGNIFICANT CHANGE UP (ref 3.5–5.3)
POTASSIUM SERPL-SCNC: 4.4 MMOL/L — SIGNIFICANT CHANGE UP (ref 3.5–5.3)
PROT SERPL-MCNC: 6.8 G/DL — SIGNIFICANT CHANGE UP (ref 6–8.3)
RBC # BLD: 2.58 M/UL — LOW (ref 4.2–5.8)
RBC # FLD: 14.9 % — HIGH (ref 10.3–14.5)
SODIUM SERPL-SCNC: 135 MMOL/L — SIGNIFICANT CHANGE UP (ref 135–145)
WBC # BLD: 4.94 K/UL — SIGNIFICANT CHANGE UP (ref 3.8–10.5)
WBC # FLD AUTO: 4.94 K/UL — SIGNIFICANT CHANGE UP (ref 3.8–10.5)

## 2020-09-21 PROCEDURE — 83880 ASSAY OF NATRIURETIC PEPTIDE: CPT

## 2020-09-21 PROCEDURE — 82962 GLUCOSE BLOOD TEST: CPT

## 2020-09-21 PROCEDURE — 83735 ASSAY OF MAGNESIUM: CPT

## 2020-09-21 PROCEDURE — 93005 ELECTROCARDIOGRAM TRACING: CPT

## 2020-09-21 PROCEDURE — U0003: CPT

## 2020-09-21 PROCEDURE — 85025 COMPLETE CBC W/AUTO DIFF WBC: CPT

## 2020-09-21 PROCEDURE — 83036 HEMOGLOBIN GLYCOSYLATED A1C: CPT

## 2020-09-21 PROCEDURE — 87635 SARS-COV-2 COVID-19 AMP PRB: CPT

## 2020-09-21 PROCEDURE — 86900 BLOOD TYPING SEROLOGIC ABO: CPT

## 2020-09-21 PROCEDURE — 85730 THROMBOPLASTIN TIME PARTIAL: CPT

## 2020-09-21 PROCEDURE — 85027 COMPLETE CBC AUTOMATED: CPT

## 2020-09-21 PROCEDURE — 85610 PROTHROMBIN TIME: CPT

## 2020-09-21 PROCEDURE — 82746 ASSAY OF FOLIC ACID SERUM: CPT

## 2020-09-21 PROCEDURE — 80053 COMPREHEN METABOLIC PANEL: CPT

## 2020-09-21 PROCEDURE — 99285 EMERGENCY DEPT VISIT HI MDM: CPT

## 2020-09-21 PROCEDURE — 80061 LIPID PANEL: CPT

## 2020-09-21 PROCEDURE — 71045 X-RAY EXAM CHEST 1 VIEW: CPT

## 2020-09-21 PROCEDURE — 84443 ASSAY THYROID STIM HORMONE: CPT

## 2020-09-21 PROCEDURE — 93306 TTE W/DOPPLER COMPLETE: CPT

## 2020-09-21 PROCEDURE — 82607 VITAMIN B-12: CPT

## 2020-09-21 PROCEDURE — 83690 ASSAY OF LIPASE: CPT

## 2020-09-21 PROCEDURE — 86901 BLOOD TYPING SEROLOGIC RH(D): CPT

## 2020-09-21 PROCEDURE — 84484 ASSAY OF TROPONIN QUANT: CPT

## 2020-09-21 PROCEDURE — 99261: CPT

## 2020-09-21 PROCEDURE — 70450 CT HEAD/BRAIN W/O DYE: CPT | Mod: 26

## 2020-09-21 PROCEDURE — 36415 COLL VENOUS BLD VENIPUNCTURE: CPT

## 2020-09-21 PROCEDURE — 86850 RBC ANTIBODY SCREEN: CPT

## 2020-09-21 PROCEDURE — 84100 ASSAY OF PHOSPHORUS: CPT

## 2020-09-21 PROCEDURE — 70450 CT HEAD/BRAIN W/O DYE: CPT

## 2020-09-21 RX ORDER — PANTOPRAZOLE SODIUM 20 MG/1
1 TABLET, DELAYED RELEASE ORAL
Qty: 0 | Refills: 0 | DISCHARGE
Start: 2020-09-21

## 2020-09-21 RX ORDER — DRONEDARONE 400 MG/1
1 TABLET, FILM COATED ORAL
Qty: 60 | Refills: 0
Start: 2020-09-21 | End: 2020-10-20

## 2020-09-21 RX ORDER — CINACALCET 30 MG/1
1 TABLET, FILM COATED ORAL
Qty: 0 | Refills: 0 | DISCHARGE
Start: 2020-09-21

## 2020-09-21 RX ORDER — NIFEDIPINE 30 MG
1 TABLET, EXTENDED RELEASE 24 HR ORAL
Qty: 0 | Refills: 0 | DISCHARGE

## 2020-09-21 RX ORDER — CINACALCET 30 MG/1
1 TABLET, FILM COATED ORAL
Qty: 30 | Refills: 0
Start: 2020-09-21 | End: 2020-10-20

## 2020-09-21 RX ORDER — DRONEDARONE 400 MG/1
1 TABLET, FILM COATED ORAL
Qty: 0 | Refills: 0 | DISCHARGE
Start: 2020-09-21

## 2020-09-21 RX ORDER — DOXERCALCIFEROL 2.5 UG/1
1 CAPSULE ORAL
Qty: 0 | Refills: 0 | DISCHARGE
Start: 2020-09-21

## 2020-09-21 RX ADMIN — APIXABAN 2.5 MILLIGRAM(S): 2.5 TABLET, FILM COATED ORAL at 05:39

## 2020-09-21 RX ADMIN — ISOSORBIDE DINITRATE 20 MILLIGRAM(S): 5 TABLET ORAL at 05:39

## 2020-09-21 RX ADMIN — PANTOPRAZOLE SODIUM 40 MILLIGRAM(S): 20 TABLET, DELAYED RELEASE ORAL at 05:40

## 2020-09-21 RX ADMIN — ERYTHROPOIETIN 4000 UNIT(S): 10000 INJECTION, SOLUTION INTRAVENOUS; SUBCUTANEOUS at 11:05

## 2020-09-21 RX ADMIN — Medication 40 MILLIGRAM(S): at 05:40

## 2020-09-21 RX ADMIN — Medication 25 MILLIGRAM(S): at 05:39

## 2020-09-21 RX ADMIN — DRONEDARONE 400 MILLIGRAM(S): 400 TABLET, FILM COATED ORAL at 05:39

## 2020-09-21 RX ADMIN — DOXERCALCIFEROL 2 MICROGRAM(S): 2.5 CAPSULE ORAL at 11:38

## 2020-09-21 RX ADMIN — IRON SUCROSE 210 MILLIGRAM(S): 20 INJECTION, SOLUTION INTRAVENOUS at 01:38

## 2020-09-21 RX ADMIN — Medication 25 MILLIGRAM(S): at 05:40

## 2020-09-21 NOTE — DISCHARGE NOTE NURSING/CASE MANAGEMENT/SOCIAL WORK - PATIENT PORTAL LINK FT
You can access the FollowMyHealth Patient Portal offered by [facility] by registering at the following website: [email or web address]. By joining [employer]’s FollowMyHealth portal, you will also be able to view your health information using other applications (apps) compatible with our system.

## 2020-09-21 NOTE — PROGRESS NOTE ADULT - SUBJECTIVE AND OBJECTIVE BOX
CHIEF COMPLAINT:Patient is a 75y old  Male who presents with a chief complaint of Chest pain.Pt appears comfortable,reported visul changes this AM.    	  REVIEW OF SYSTEMS:  CONSTITUTIONAL: No fever, weight loss, or fatigue  EYES: No eye pain, visual disturbances, or discharge  ENT:  No difficulty hearing, tinnitus, vertigo; No sinus or throat pain  NECK: No pain or stiffness  RESPIRATORY: No cough, wheezing, chills or hemoptysis; No Shortness of Breath  CARDIOVASCULAR: No chest pain, palpitations, passing out, dizziness, or leg swelling  GASTROINTESTINAL: No abdominal or epigastric pain. No nausea, vomiting, or hematemesis; No diarrhea or constipation. No melena or hematochezia.  GENITOURINARY: No dysuria, frequency, hematuria, or incontinence  NEUROLOGICAL: No headaches, memory loss, loss of strength, numbness, or tremors  SKIN: No itching, burning, rashes, or lesions   LYMPH Nodes: No enlarged glands  ENDOCRINE: No heat or cold intolerance; No hair loss  MUSCULOSKELETAL: No joint pain or swelling; No muscle, back, or extremity pain  PSYCHIATRIC: No depression, anxiety, mood swings, or difficulty sleeping  HEME/LYMPH: No easy bruising, or bleeding gums  ALLERGY AND IMMUNOLOGIC: No hives or eczema	      PHYSICAL EXAM:  T(C): 36.8 (09-21-20 @ 08:30), Max: 37.7 (09-20-20 @ 11:28)  HR: 66 (09-21-20 @ 08:30) (66 - 78)  BP: 142/48 (09-21-20 @ 08:30) (122/51 - 147/58)  RR: 17 (09-21-20 @ 08:30) (17 - 19)  SpO2: 100% (09-21-20 @ 08:30) (97% - 100%)  Wt(kg): --  I&O's Summary    20 Sep 2020 07:01  -  21 Sep 2020 07:00  --------------------------------------------------------  IN: 0 mL / OUT: 0 mL / NET: 0 mL        Appearance: Normal	  HEENT:   Normal oral mucosa, PERRL, EOMI	  Lymphatic: No lymphadenopathy  Cardiovascular: Normal S1 S2, No JVD, No murmurs, No edema  Respiratory: Lungs clear to auscultation	  Psychiatry: A & O x 3, Mood & affect appropriate  Gastrointestinal:  Soft, Non-tender, + BS	  Skin: No rashes, No ecchymoses, No cyanosis	  Neurologic: Non-focal  Extremities: Normal range of motion, No clubbing, cyanosis or edema  Vascular: Peripheral pulses palpable 2+ bilaterally    MEDICATIONS  (STANDING):  apixaban 2.5 milliGRAM(s) Oral two times a day  aspirin  chewable 81 milliGRAM(s) Oral daily  atorvastatin 80 milliGRAM(s) Oral at bedtime  cinacalcet 30 milliGRAM(s) Oral daily  doxercalciferol Injectable 2 MICROGram(s) IV Push <User Schedule>  dronedarone 400 milliGRAM(s) Oral two times a day  epoetin ximena-epbx (RETACRIT) Injectable 4000 Unit(s) IV Push <User Schedule>  ferrous    sulfate 325 milliGRAM(s) Oral daily  folic acid 1 milliGRAM(s) Oral daily  furosemide   Injectable 40 milliGRAM(s) IV Push daily  hydrALAZINE 25 milliGRAM(s) Oral three times a day  influenza   Vaccine 0.5 milliLiter(s) IntraMuscular once  insulin lispro (HumaLOG) corrective regimen sliding scale   SubCutaneous three times a day before meals  iron sucrose IVPB 100 milliGRAM(s) IV Intermittent <User Schedule>  isosorbide   dinitrate Tablet (ISORDIL) 20 milliGRAM(s) Oral three times a day  metoprolol tartrate 25 milliGRAM(s) Oral two times a day  pantoprazole    Tablet 40 milliGRAM(s) Oral before breakfast  sevelamer carbonate 800 milliGRAM(s) Oral three times a day with meals      TELEMETRY: 	nsr    	  	  LABS:	 	                        8.5    5.27  )-----------( 151      ( 19 Sep 2020 15:41 )             25.1     09-19    136  |  100  |  77<H>  ----------------------------<  102<H>  5.6<H>   |  27  |  12.60<H>    Ca    8.4      19 Sep 2020 15:41  Phos  5.9     09-19  Mg     2.3     09-19    TPro  6.8  /  Alb  2.9<L>  /  TBili  0.5  /  DBili  x   /  AST  16  /  ALT  28  /  AlkPhos  119  09-19    proBNP: Serum Pro-Brain Natriuretic Peptide: 02472 pg/mL (09-18 @ 12:52)    Lipid Profile: Cholesterol 139  LDL 70  HDL 50  TG 97      TSH: Thyroid Stimulating Hormone, Serum: 2.20 uU/mL (09-18 @ 18:20)

## 2020-09-21 NOTE — DISCHARGE NOTE PROVIDER - NSDCCPCAREPLAN_GEN_ALL_CORE_FT
PRINCIPAL DISCHARGE DIAGNOSIS  Diagnosis: Chest pain  Assessment and Plan of Treatment: You came to the hospital with chest pain while having your hemodialysis. Your chest xray showed some congestion in the lungs, which was treated with furosemide. The electrocardiogram showed normal sinus rhythm. The troponin I levels which were elevated at first, but we couldn't trend the further labs because of refusal of labs. The echocrdiogram scan of the heart showed normal systolic functioning with mild mitral and aortic valve regurgitations. We couldn't do the stress test of the heart because of your refusal to the scan. The cardiologist who followed up was Dr. Salinas.  You will be discharged back to [facility] living [facility]      SECONDARY DISCHARGE DIAGNOSES  Diagnosis: Atrial fibrillation  Assessment and Plan of Treatment: For your Atrial fibrillation, we monitored your heart rate continuously in the hospital and found episodes of Atrial fibrillation simialr to your history. we gave the medications of dronedarone, metoprolol and eliquis.    Diagnosis: Acute renal failure on dialysis  Assessment and Plan of Treatment: For your end stage kidney disease, we coninued your hemodialysis regimen in the hospital (monday/wednesday/friday).    Diagnosis: Visual disturbance  Assessment and Plan of Treatment: Because of your complaint of some visual distrubances, we did a CT scan of the head which showed xxx     PRINCIPAL DISCHARGE DIAGNOSIS  Diagnosis: Chest pain  Assessment and Plan of Treatment: You came to the hospital with chest pain while having your hemodialysis. Your chest xray showed some congestion in the lungs, which was treated with furosemide. The electrocardiogram showed normal sinus rhythm. The troponin I levels which were elevated at first, but we couldn't trend the further labs because of refusal of labs. The echocrdiogram scan of the heart showed normal systolic functioning with mild mitral and aortic valve regurgitations. We couldn't do the stress test of the heart because of your refusal to the scan. The cardiologist who followed up was Dr. Salinas.  You will be discharged back to [facility] living [facility]      SECONDARY DISCHARGE DIAGNOSES  Diagnosis: Atrial fibrillation  Assessment and Plan of Treatment: For your Atrial fibrillation, we monitored your heart rate continuously in the hospital and found episodes of Atrial fibrillation simialr to your history. we gave the medications of dronedarone, metoprolol and eliquis.    Diagnosis: Visual disturbance  Assessment and Plan of Treatment: Because of your complaint of some visual distrubances, we did a CT scan of the head which showed chronic ischemic changes, no acute changes.    Diagnosis: Acute renal failure on dialysis  Assessment and Plan of Treatment: For your end stage kidney disease, we coninued your hemodialysis regimen in the hospital (monday/wednesday/friday).

## 2020-09-21 NOTE — PROGRESS NOTE ADULT - SUBJECTIVE AND OBJECTIVE BOX
Problem List:  Patient says " I dont feel well, Im upside down"  When Ia asked what is it he says that he sees the TV and the clock on the floor.  No headache and no dizziness.  No Palpitation.    PAST MEDICAL & SURGICAL HISTORY:  Cerebrovascular accident (CVA), unspecified mechanism    Anemia    Hypertension    Diabetes    ESRD (end stage renal disease) on dialysis    A-V fistula        No Known Allergies      MEDICATIONS  (STANDING):  apixaban 2.5 milliGRAM(s) Oral two times a day  aspirin  chewable 81 milliGRAM(s) Oral daily  atorvastatin 80 milliGRAM(s) Oral at bedtime  cinacalcet 30 milliGRAM(s) Oral daily  doxercalciferol Injectable 2 MICROGram(s) IV Push <User Schedule>  dronedarone 400 milliGRAM(s) Oral two times a day  epoetin ximena-epbx (RETACRIT) Injectable 4000 Unit(s) IV Push <User Schedule>  ferrous    sulfate 325 milliGRAM(s) Oral daily  folic acid 1 milliGRAM(s) Oral daily  furosemide   Injectable 40 milliGRAM(s) IV Push daily  hydrALAZINE 25 milliGRAM(s) Oral three times a day  influenza   Vaccine 0.5 milliLiter(s) IntraMuscular once  insulin lispro (HumaLOG) corrective regimen sliding scale   SubCutaneous three times a day before meals  iron sucrose IVPB 100 milliGRAM(s) IV Intermittent <User Schedule>  isosorbide   dinitrate Tablet (ISORDIL) 20 milliGRAM(s) Oral three times a day  metoprolol tartrate 25 milliGRAM(s) Oral two times a day  pantoprazole    Tablet 40 milliGRAM(s) Oral before breakfast  sevelamer carbonate 800 milliGRAM(s) Oral three times a day with meals    MEDICATIONS  (PRN):                            8.5    5.27  )-----------( 151      ( 19 Sep 2020 15:41 )             25.1     09-19    136  |  100  |  77<H>  ----------------------------<  102<H>  5.6<H>   |  27  |  12.60<H>    Ca    8.4      19 Sep 2020 15:41  Phos  5.9     09-19  Mg     2.3     09-19    TPro  6.8  /  Alb  2.9<L>  /  TBili  0.5  /  DBili  x   /  AST  16  /  ALT  28  /  AlkPhos  119  09-19            REVIEW OF SYSTEMS:  as above      VITALS:  T(F): 97.6 (09-21-20 @ 04:41), Max: 99.8 (09-20-20 @ 11:28)  HR: 72 (09-21-20 @ 04:41)  BP: 133/50 (09-21-20 @ 04:41)  RR: 19 (09-21-20 @ 04:41)  SpO2: 98% (09-21-20 @ 04:41)  Wt(kg): --    09-20 @ 07:01  -  09-21 @ 07:00  --------------------------------------------------------  IN: 0 mL / OUT: 0 mL / NET: 0 mL        PHYSICAL EXAM:  Constitutional:  no diaphoresis, no distress.  Neck: No JVD, no carotid bruit, supple, no adenopathy  Respiratory: Good air entrance B/L, no wheezes, rales or rhonchi  Cardiovascular: S1, S2, RRR, no pericardial rub, no murmur  Abdomen: BS+, soft, no tenderness, no bruit  Pelvis: bladder nondistended  Extremities: No cyanosis or clubbing. No peripheral edema.   Vascular Access: tight AVG

## 2020-09-21 NOTE — PROGRESS NOTE ADULT - PROBLEM SELECTOR PLAN 1
Presented with chest pain while having HD  EKG showed NSR  Trop 1 was elevated, patient refusing f/u troponins and labs  Could be demand ischemia secondary to fluid overload, ESRD, or cardiac  Will r/o ACS, NSTEMI vs unstable angina  Last echo was done in feb showed GIDD  On ASA, statin, BB  Telemetry monitoring  F/u ECHO, cardiac enzymes if pt agrees   Pt refused stress test  Cardio consult Dr Salinas

## 2020-09-21 NOTE — PROGRESS NOTE ADULT - SUBJECTIVE AND OBJECTIVE BOX
Pt is awake, alert, lying in bed in NAD. HD today. Saturating well.     INTERVAL HPI/OVERNIGHT EVENTS:      VITAL SIGNS:  T(F): 98.5 (09-21-20 @ 10:10)  HR: 71 (09-21-20 @ 10:10)  BP: 115/71 (09-21-20 @ 10:10)  RR: 18 (09-21-20 @ 10:10)  SpO2: 98% (09-21-20 @ 10:10)  Wt(kg): --  I&O's Detail    20 Sep 2020 07:01  -  21 Sep 2020 07:00  --------------------------------------------------------  IN:  Total IN: 0 mL    OUT:    Oral Fluid: 0 mL  Total OUT: 0 mL    Total NET: 0 mL              REVIEW OF SYSTEMS:    CONSTITUTIONAL:  No fevers, chills, sweats    HEENT:  Eyes:  No diplopia or blurred vision. ENT:  No earache, sore throat or runny nose.    CARDIOVASCULAR:  No pressure, squeezing, tightness, or heaviness about the chest; no palpitations.    RESPIRATORY:  Per HPI    GASTROINTESTINAL:  No abdominal pain, nausea, vomiting or diarrhea.    GENITOURINARY:  No dysuria, frequency or urgency.    NEUROLOGIC:  No paresthesias, fasciculations, seizures or weakness.    PSYCHIATRIC:  No disorder of thought or mood.      PHYSICAL EXAM:    Constitutional: Well developed and nourished  Eyes:Perrla  ENMT: normal  Neck:supple  Respiratory: good air entry  Cardiovascular: S1 S2 regular  Gastrointestinal: Soft, Non tender  Extremities: No edema  Vascular:normal  Neurological:Awake, alert,Ox3  Musculoskeletal:Normal      MEDICATIONS  (STANDING):  apixaban 2.5 milliGRAM(s) Oral two times a day  aspirin  chewable 81 milliGRAM(s) Oral daily  atorvastatin 80 milliGRAM(s) Oral at bedtime  cinacalcet 30 milliGRAM(s) Oral daily  doxercalciferol Injectable 2 MICROGram(s) IV Push <User Schedule>  dronedarone 400 milliGRAM(s) Oral two times a day  epoetin ximena-epbx (RETACRIT) Injectable 4000 Unit(s) IV Push <User Schedule>  ferrous    sulfate 325 milliGRAM(s) Oral daily  folic acid 1 milliGRAM(s) Oral daily  furosemide   Injectable 40 milliGRAM(s) IV Push daily  hydrALAZINE 25 milliGRAM(s) Oral three times a day  influenza   Vaccine 0.5 milliLiter(s) IntraMuscular once  insulin lispro (HumaLOG) corrective regimen sliding scale   SubCutaneous three times a day before meals  iron sucrose IVPB 100 milliGRAM(s) IV Intermittent <User Schedule>  isosorbide   dinitrate Tablet (ISORDIL) 20 milliGRAM(s) Oral three times a day  metoprolol tartrate 25 milliGRAM(s) Oral two times a day  pantoprazole    Tablet 40 milliGRAM(s) Oral before breakfast  sevelamer carbonate 800 milliGRAM(s) Oral three times a day with meals    MEDICATIONS  (PRN):      Allergies    No Known Allergies    Intolerances        LABS:                        8.7    4.94  )-----------( 162      ( 21 Sep 2020 10:27 )             25.5     09-21    135  |  96  |  63<H>  ----------------------------<  143<H>  4.4   |  28  |  11.50<H>    Ca    7.8<L>      21 Sep 2020 10:27  Phos  5.1     09-21  Mg     2.3     09-21    TPro  6.8  /  Alb  2.8<L>  /  TBili  0.4  /  DBili  x   /  AST  17  /  ALT  25  /  AlkPhos  115  09-21          st    CAPILLARY BLOOD GLUCOSE      POCT Blood Glucose.: 128 mg/dL (21 Sep 2020 09:32)  POCT Blood Glucose.: 69 mg/dL (21 Sep 2020 07:59)    pro-bnp [id number] 09-18 @ 12:52     d-dimer --  09-18 @ 12:52      RADIOLOGY & ADDITIONAL TESTS:    CXR:  < from: Xray Chest 1 View AP/PA (09.18.20 @ 12:39) >    IMPRESSION: Bilateral lung parenchymal airspace opacities suggesting an element of CHF. Clinical correlation and follow-up suggested.    < end of copied text >    Ct scan chest:    ekg;    echo:  eco

## 2020-09-21 NOTE — PROGRESS NOTE ADULT - PROBLEM SELECTOR PLAN 2
Found to have pulmonary congestion on CXR, pro-BNP was elevated  Patient still makes urine, c/w IV Lasix 40mg  S/p HD 9/19  HD per Nephro  Monitor I/O, fluid restriction

## 2020-09-21 NOTE — PROGRESS NOTE ADULT - ATTENDING COMMENTS
patient is clinically stable at present   patient refused to do stress test   risk benefit discussed  d/c back to [facility]
patient was seen and examined along with resident earlier   above discussed , reviewed and agreed   patient is refusing stress test   follow up echo   if stable   d/c in am back to [facility] after HD

## 2020-09-21 NOTE — PROGRESS NOTE ADULT - PROBLEM SELECTOR PROBLEM 1
ESRD (end stage renal disease) on dialysis
ESRD (end stage renal disease) on dialysis
Chest pain
Chest pain

## 2020-09-21 NOTE — PROGRESS NOTE ADULT - SUBJECTIVE AND OBJECTIVE BOX
Patient is a 75y old  Male who presents with a chief complaint of Chest pain (20 Sep 2020 09:50)    Brief Hospital Course: 76 yo male from [facility]  with medical history significant for ESRD on hemodialysis M/W/F, HTN and DM, Afib on eliquis was sent from HD center due to chest pain. He states that while he was having HD, he started having chest pain which was on left side of chest, non radiating, 7/10, feels like tightness, not relieved by aspirin. Has associated palpitations. Dialysis was not completed, had to stop in between due to chest pain. Denies dizziness, nausea, vomiting, sweating, dyspnea, syncope. He denies fever, Ur symptoms, abdominal pain, diarrhea. No recent illness, no travel, no sick contacts.    INTERVAL HPI/OVERNIGHT EVENTS: No acute events noted overnight. S/p HD yesterday. No events on telemetry - rate controlled. Patient refusing lab draws yesterday and this morning - not able to trend CE. Also refused stress test. Reports resolution of chest pain. Off nasal cannula without SOB. no cp , fully alert , decline stress test    MEDICATIONS  (STANDING):  apixaban 2.5 milliGRAM(s) Oral two times a day  aspirin  chewable 81 milliGRAM(s) Oral daily  atorvastatin 80 milliGRAM(s) Oral at bedtime  cinacalcet 30 milliGRAM(s) Oral daily  doxercalciferol Injectable 2 MICROGram(s) IV Push <User Schedule>  dronedarone 400 milliGRAM(s) Oral two times a day  epoetin ximena-epbx (RETACRIT) Injectable 4000 Unit(s) IV Push <User Schedule>  ferrous    sulfate 325 milliGRAM(s) Oral daily  folic acid 1 milliGRAM(s) Oral daily  furosemide   Injectable 40 milliGRAM(s) IV Push daily  hydrALAZINE 25 milliGRAM(s) Oral three times a day  influenza   Vaccine 0.5 milliLiter(s) IntraMuscular once  insulin lispro (HumaLOG) corrective regimen sliding scale   SubCutaneous three times a day before meals  iron sucrose IVPB 100 milliGRAM(s) IV Intermittent <User Schedule>  isosorbide   dinitrate Tablet (ISORDIL) 20 milliGRAM(s) Oral three times a day  metoprolol tartrate 25 milliGRAM(s) Oral two times a day  pantoprazole    Tablet 40 milliGRAM(s) Oral before breakfast  sevelamer carbonate 800 milliGRAM(s) Oral three times a day with meals    MEDICATIONS  (PRN):      __________________________________________________  REVIEW OF SYSTEMS:  CONSTITUTIONAL: No fever, weight loss, or fatigue  RESPIRATORY: No cough, wheezing, chills or hemoptysis; No shortness of breath  CARDIOVASCULAR: No chest pain, palpitations, dizziness, or leg swelling  GASTROINTESTINAL: No abdominal pain. No nausea, vomiting, or hematemesis; No diarrhea or constipation. No melena or hematochezia.  GENITOURINARY: No dysuria or hematuria, no burning micturition, no polyuria, no urinary hesitancy, no nocturia, normal urinary frequency  NEUROLOGICAL: No headaches, memory loss, loss of strength, numbness, or tremors  SKIN: No itching, burning, rashes, or lesions   All other ROS negative except noted above  Vital Signs Last 24 Hrs  T(C): 36.9 (21 Sep 2020 13:20), Max: 37.2 (20 Sep 2020 19:34)  T(F): 98.4 (21 Sep 2020 13:20), Max: 99 (20 Sep 2020 19:34)  HR: 65 (21 Sep 2020 13:20) (65 - 72)  BP: 142/44 (21 Sep 2020 13:20) (115/71 - 147/58)  BP(mean): 133 (21 Sep 2020 04:41) (133 - 133)  RR: 17 (21 Sep 2020 13:20) (17 - 24)  SpO2: 100% (21 Sep 2020 13:20) (97% - 100%)    ________________________________________________  PHYSICAL EXAMINATION:  GENERAL: NAD, well-developed, obese  HEAD:  Atraumatic, Normocephalic  EYES:  conjunctiva and sclera clear  NECK: Supple, No JVD, Normal thyroid  CHEST/LUNG: Clear to auscultation bilaterally; No rales, rhonchi, wheezing, or rubs  HEART: Regular rate and rhythm; No murmurs, rubs, or gallops  ABDOMEN: Soft, Nontender, Nondistended; Bowel sounds present  NERVOUS SYSTEM:  Alert & Oriented X3,  Moving all 4 extremities  EXTREMITIES: R AVF w/thrill.  Peripheral pulses palpable. No clubbing, cyanosis, or edema  SKIN: Warm, Dry, no rashes or lesions    _________________________________________________    LABS:                        8.7    4.94  )-----------( 162      ( 21 Sep 2020 10:27 )             25.5     09-21    135  |  96  |  63<H>  ----------------------------<  143<H>  4.4   |  28  |  11.50<H>    Ca    7.8<L>      21 Sep 2020 10:27  Phos  5.1     09-21  Mg     2.3     09-21    TPro  6.8  /  Alb  2.8<L>  /  TBili  0.4  /  DBili  x   /  AST  17  /  ALT  25  /  AlkPhos  115  09-21    < from: CT Head No Cont (09.21.20 @ 13:56) >  IMPRESSION:  1)  chronic ischemic changes again noted with volume loss, as outlined above. No new or acute abnormality suggested.  2)  follow-up MR imaging may be considered for further assessment.        < end of copied text >                        LABS:                        8.5    5.27  )-----------( 151      ( 19 Sep 2020 15:41 )             25.1     09-19    136  |  100  |  77<H>  ----------------------------<  102<H>  5.6<H>   |  27  |  12.60<H>    Ca    8.4      19 Sep 2020 15:41  Phos  5.9     09-19  Mg     2.3     09-19    TPro  6.8  /  Alb  2.9<L>  /  TBili  0.5  /  DBili  x   /  AST  16  /  ALT  28  /  AlkPhos  119  09-19    PT/INR - ( 18 Sep 2020 18:20 )   PT: 11.8 sec;   INR: 1.01 ratio         PTT - ( 18 Sep 2020 12:52 )  PTT:40.3 sec    CAPILLARY BLOOD GLUCOSE      POCT Blood Glucose.: 93 mg/dL (19 Sep 2020 20:53)  POCT Blood Glucose.: 94 mg/dL (19 Sep 2020 18:23)  POCT Blood Glucose.: 108 mg/dL (19 Sep 2020 11:49)        RADIOLOGY & ADDITIONAL TESTS:    < from: Xray Chest 1 View AP/PA (09.18.20 @ 12:39) >  FINDINGS: Heart size appears within normal limits. No superior mediastinal widening is identified. Vascular stent graft material is identified overlying the right brachial, right axillary and right subclavian regions. Bilateral lung parenchymal airspace opacities are identified suggesting element of CHF. Clinical correlation and follow-up suggested. No definite pleural effusion or pneumothorax is demonstrated. No mediastinal shift is noted. No acute osseous fractures.    IMPRESSION: Bilateral lung parenchymal airspace opacities suggesting an element of CHF. Clinical correlation and follow-up suggested.      < end of copied text >  Imaging Personally Reviewed:  YES    Consultant(s) Notes Reviewed:   YES    Care Discussed with Consultants : YES     Plan of care was discussed with patient and /or primary care giver; all questions and concerns were addressed and care was aligned with patient's wishes.

## 2020-09-21 NOTE — PROGRESS NOTE ADULT - ASSESSMENT
74 yo male from [facility]  with medical history significant for ESRD on hemodialysis M/W/F, HTN ,AS DM, Parox Afib on eliquis was sent from HD center due to chest pain,acute diastolic HF and PAF.  1.D/C Tele monitoring.  2.PAF- multaq,lopressor 25mmg  bid,eliquis.  3.HTN-b blocker,Imdur,hydralazine.  4.DM-Insulin.  5.CVA-eliquis,statin.  6.ESRD-HD as per renal.  7.Anemia-Epogen,Iron.  8.PPI.  9.Pt declines stress test.  10.CT head ordered for visual disturbance.
76 yo male from [facility]  with medical history significant for ESRD on hemodialysis M/W/F, HTN and DM, Afib on eliquis was sent from HD center due to chest pain      ED:  EKG- NSR, Trop elevated   Pro-BNP- elevated  CXR- Pulmonary congestion      Patient is being admitted to tele for ACS r/o NSTEMI vs unstable angina, refused stress test.
76 yo male from [facility]  with medical history significant for ESRD on hemodialysis M/W/F, HTN ,AS DM, Parox Afib on eliquis was sent from HD center due to chest pain,acute diastolic HF and PAF.  1.Tele monitoring.  2.PAF- multaq,lopressor 25mmg  bid,eliquis.  3.HTN-b blocker,Imdur,hydralazine.  4.DM-Insulin.  5.CVA-eliquis,statin.  6.ESRD-HD as per renal.  7.Anemia-Epogen,Iron.  8.PPI.  9.Pt declines stress test.
ESRD , last dialysis yesterday, next dialysis tomorrow.  Atrial fibrillation , stable at present, placed on Multaq and follow with cardiology  Hypertension Stable on B Blocker and hydralazine  SHPTH on Sensipar and active Vitamin D  Anemia on ALICIA and Venofer.  CAD refuses stress test.    `  
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  Follow up if continue with CT head and neurology.      
76 yo male from [facility]  with medical history significant for ESRD on hemodialysis M/W/F, HTN and DM, Afib on eliquis was sent from HD center due to chest pain      ED:  EKG- NSR, Trop elevated   Pro-BNP- elevated  CXR- Pulmonary congestion      Patient is being admitted to tele for ACS r/o NSTEMI vs unstable angina, refused stress test.

## 2020-09-21 NOTE — PROGRESS NOTE ADULT - PROBLEM SELECTOR PLAN 8
IMPROVE VTE Individual Risk Assessment          RISK                                                          Points  [  ] Previous VTE                                                3  [  ] Thrombophilia                                             2  [ x ] Lower limb paralysis                                   2        (unable to hold up >15 seconds)    [  ] Current Cancer                                            2         (within 6 months)  [ x ] Immobilization > 24 hrs                             1  [  ] ICU/CCU stay > 24 hours                           1  [x  ] Age > 60                                                       1  IMPROVE VTE Score ____4_____  DVT ppx: Subq Heparin  GI ppx: Not indicated/Protonix  Diet:Renal, Diabetic  Dispo: [facility]  FULL CODE
IMPROVE VTE Individual Risk Assessment          RISK                                                          Points  [  ] Previous VTE                                                3  [  ] Thrombophilia                                             2  [ x ] Lower limb paralysis                                   2        (unable to hold up >15 seconds)    [  ] Current Cancer                                            2         (within 6 months)  [ x ] Immobilization > 24 hrs                             1  [  ] ICU/CCU stay > 24 hours                           1  [x  ] Age > 60                                                       1  IMPROVE VTE Score ____4_____  DVT ppx: Subq Heparin  GI ppx: Not indicated/Protonix  Diet:Renal, Diabetic  Dispo: [facility]  FULL CODE

## 2020-09-21 NOTE — PROGRESS NOTE ADULT - PROBLEM SELECTOR PLAN 7
Has h/o anemia due to chronic disease  Monitor cbc  Transfuse as needed  C/w Venofer and ALICIA with HD  Vitamin D supplements

## 2020-09-21 NOTE — PROGRESS NOTE ADULT - PROBLEM SELECTOR PLAN 6
R/o ACS protocol  Tele monitoring [email or web address]   Cardio follow up  Refused Stress Test
R/o ACS protocol  Tele monitoring  Cardio follow up
Has h/o DM  started on HSS   monitor ACHS
Has h/o DM  started on HSS   monitor ACHS

## 2020-09-21 NOTE — PROGRESS NOTE ADULT - PROBLEM SELECTOR PLAN 3
likely secondary to renal disease  Anemia panel  Stool for occult blood  Monitor H/H
likely secondary to renal disease  Anemia panel  Stool for occult blood
ESRD on HD MWF via R AVF  Renal diet  C/w Sevelamer  As above
ESRD on HD MWF via R AVF  Renal diet  C/w Sevelamer  As above

## 2020-09-21 NOTE — DISCHARGE NOTE PROVIDER - HOSPITAL COURSE
76 yo male from [facility]  with medical history significant for ESRD on hemodialysis M/W/F, HTN and DM, Afib on eliquis was sent from HD center due to chest pain. He states that while he was having HD, he started having chest pain which was on left side of chest, non radiating, 7/10, feels like tightness, not relieved by aspirin. Has associated palpitations. Dialysis was not completed, had to stop in between due to chest pain. Denies dizziness, nausea, vomiting, sweating, dyspnea, syncope. He denies fever, Ur symptoms, abdominal pain, diarrhea. No recent illness, no travel, no sick contacts. For chest pain workup, No events on telemetry rate controlled. Patient refusing lab draws for two days hence not able to trend CE. Also refused stress test. ECHO showed EF>55%, mild AR and MRRomi Reports resolution of chest pain. Off nasal cannula without SOB. Patient had HD today before discharge. Had some confusion in the morning, CT Head showed xxx. Pt will be discharged back to assisted living at [facility]. 76 yo male from [facility]  with medical history significant for ESRD on hemodialysis M/W/F, HTN and DM, Afib on eliquis was sent from HD center due to chest pain. He states that while he was having HD, he started having chest pain which was on left side of chest, non radiating, 7/10, feels like tightness, not relieved by aspirin. Has associated palpitations. Dialysis was not completed, had to stop in between due to chest pain. Denies dizziness, nausea, vomiting, sweating, dyspnea, syncope. He denies fever, Ur symptoms, abdominal pain, diarrhea. No recent illness, no travel, no sick contacts. For chest pain workup, No events on telemetry rate controlled. Patient refusing lab draws for two days hence not able to trend CE. Also refused stress test. ECHO showed EF>55%, mild AR and MRRomi Reports resolution of chest pain. Off nasal cannula without SOB. Patient had HD today before discharge. Had some confusion in the morning, CT Head showed chronic ischemic changes, no acute changes. Pt will be discharged back to assisted living at [facility].

## 2020-09-21 NOTE — DISCHARGE NOTE PROVIDER - NSDCMRMEDTOKEN_GEN_ALL_CORE_FT
aspirin 81 mg oral tablet, chewable: 1 tab(s) orally once a day  calcitriol 0.25 mcg oral capsule: 1 cap(s) orally once a day  cinacalcet 30 mg oral tablet: 1 tab(s) orally once a day  Colcrys 0.6 mg oral tablet: 1 tab(s) orally 2 times a day  doxercalciferol 2 mcg/mL intravenous solution: 1 milliliter(s) intravenous   dronedarone 400 mg oral tablet: 1 tab(s) orally 2 times a day  Eliquis 2.5 mg oral tablet: 1 tab(s) orally 2 times a day  epoetin ximena:   famotidine 20 mg oral tablet: 1 tab(s) orally once a day  ferrous sulfate 325 mg (65 mg elemental iron) oral tablet: 1 tab(s) orally once a day  folic acid 1 mg oral tablet: 1 tab(s) orally once a day  hydrALAZINE 25 mg oral tablet: 1 tab(s) orally 3 times a day  isosorbide dinitrate 20 mg oral tablet: 0.5 tab(s) orally 2 times a day  Lipitor 80 mg oral tablet: 1 tab(s) orally once a day  metoprolol tartrate 25 mg oral tablet: 1 tab(s) orally every 12 hours  pantoprazole 40 mg oral delayed release tablet: 1 tab(s) orally once a day (before a meal)  sevelamer hydrochloride 800 mg oral tablet: 2 tab(s) orally 3 times a day   aspirin 81 mg oral tablet, chewable: 1 tab(s) orally once a day  calcitriol 0.25 mcg oral capsule: 1 cap(s) orally once a day  cinacalcet 30 mg oral tablet: 1 tab(s) orally once a day  Colcrys 0.6 mg oral tablet: 1 tab(s) orally 2 times a day  dronedarone 400 mg oral tablet: 1 tab(s) orally 2 times a day  Eliquis 2.5 mg oral tablet: 1 tab(s) orally 2 times a day  epoetin ximena:   famotidine 20 mg oral tablet: 1 tab(s) orally once a day  ferrous sulfate 325 mg (65 mg elemental iron) oral tablet: 1 tab(s) orally once a day  folic acid 1 mg oral tablet: 1 tab(s) orally once a day  hydrALAZINE 25 mg oral tablet: 1 tab(s) orally 3 times a day  isosorbide dinitrate 20 mg oral tablet: 0.5 tab(s) orally 2 times a day  Lipitor 80 mg oral tablet: 1 tab(s) orally once a day  metoprolol tartrate 25 mg oral tablet: 1 tab(s) orally every 12 hours  pantoprazole 40 mg oral delayed release tablet: 1 tab(s) orally once a day (before a meal)  sevelamer hydrochloride 800 mg oral tablet: 2 tab(s) orally 3 times a day

## 2020-09-21 NOTE — PROGRESS NOTE ADULT - PROBLEM SELECTOR PLAN 4
monitor BP  cont meds
monitor BP  cont meds
Rate controlled on telemetry   C/w dronedarone, metoprolol, eliquis
Rate controlled on telemetry   C/w dronedarone, metoprolol, eliquis

## 2020-09-21 NOTE — PROGRESS NOTE ADULT - PROVIDER SPECIALTY LIST ADULT
Cardiology
Cardiology
Internal Medicine
Nephrology
Nephrology
Pulmonology
Internal Medicine
Internal Medicine
Pulmonology

## 2020-09-22 LAB — SARS-COV-2 RNA SPEC QL NAA+PROBE: SIGNIFICANT CHANGE UP

## 2020-10-13 ENCOUNTER — APPOINTMENT (OUTPATIENT)
Dept: VASCULAR SURGERY | Facility: CLINIC | Age: 75
End: 2020-10-13
Payer: MEDICARE

## 2020-10-13 VITALS — TEMPERATURE: 97.1 F

## 2020-10-13 PROCEDURE — 93990 DOPPLER FLOW TESTING: CPT

## 2020-10-13 PROCEDURE — 99213 OFFICE O/P EST LOW 20 MIN: CPT

## 2020-10-13 NOTE — PHYSICAL EXAM
[Thrill] : thrill [Pulsatile Thrill] : no pulsatile thrill [Aneurysm] : no aneurysm [Bleeding] : no bleeding [Hand well perfused] : hand well perfused [Warm Extremities] : warm extremities [Ulcer] : no ulcer [Gangrene] : no gangrene [2+] : right 2+ [Normal] : coordination grossly intact, no focal deficits [de-identified] : intact

## 2020-10-13 NOTE — HISTORY OF PRESENT ILLNESS
[FreeTextEntry1] : 76yo male with history of esrd on hd presents for follow up of right upper extremity avg.  pt denies any difficulty with the avg during hd.  he reports one episode of bleeding after hd yesterday otherwise no recurrent issues.

## 2020-10-13 NOTE — DISCUSSION/SUMMARY
[FreeTextEntry1] : 74 yo male with history of esrd on hd presents for follow up of right upper extremity avg.  pt denies any difficulty with the avg during hd. \par duplex shows widely patent avf \par will continue to monitor pt to follow up in 3 months with repeat duplex

## 2021-01-19 ENCOUNTER — APPOINTMENT (OUTPATIENT)
Dept: VASCULAR SURGERY | Facility: CLINIC | Age: 76
End: 2021-01-19
Payer: MEDICARE

## 2021-01-19 VITALS
HEIGHT: 64 IN | WEIGHT: 178 LBS | HEART RATE: 77 BPM | BODY MASS INDEX: 30.39 KG/M2 | DIASTOLIC BLOOD PRESSURE: 82 MMHG | TEMPERATURE: 97.9 F | SYSTOLIC BLOOD PRESSURE: 178 MMHG

## 2021-01-19 PROCEDURE — 93990 DOPPLER FLOW TESTING: CPT

## 2021-01-19 PROCEDURE — 99213 OFFICE O/P EST LOW 20 MIN: CPT

## 2021-01-19 NOTE — HISTORY OF PRESENT ILLNESS
[FreeTextEntry1] : 74yo male with history of esrd on hd presents for follow up of right upper extremity avg.  pt denies any difficulty with the avg during hd.   no recurrent issues.   [] : in right upper arm

## 2021-01-19 NOTE — PHYSICAL EXAM
[Thrill] : thrill [Pulsatile Thrill] : no pulsatile thrill [Aneurysm] : no aneurysm [Bleeding] : no bleeding [Hand well perfused] : hand well perfused [Warm Extremities] : warm extremities [Ulcer] : no ulcer [Gangrene] : no gangrene [2+] : right 2+ [Normal] : coordination grossly intact, no focal deficits [de-identified] : intact

## 2021-01-27 ENCOUNTER — TRANSCRIPTION ENCOUNTER (OUTPATIENT)
Age: 76
End: 2021-01-27

## 2021-01-27 ENCOUNTER — INPATIENT (INPATIENT)
Facility: HOSPITAL | Age: 76
LOS: 1 days | Discharge: HOME | DRG: 308 | End: 2021-01-29
Attending: HOSPITALIST | Admitting: HOSPITALIST
Payer: MEDICARE

## 2021-01-27 VITALS
DIASTOLIC BLOOD PRESSURE: 93 MMHG | SYSTOLIC BLOOD PRESSURE: 153 MMHG | OXYGEN SATURATION: 100 % | HEIGHT: 65 IN | RESPIRATION RATE: 16 BRPM | HEART RATE: 151 BPM | TEMPERATURE: 97 F

## 2021-01-27 DIAGNOSIS — Z02.9 ENCOUNTER FOR ADMINISTRATIVE EXAMINATIONS, UNSPECIFIED: ICD-10-CM

## 2021-01-27 DIAGNOSIS — I77.0 ARTERIOVENOUS FISTULA, ACQUIRED: Chronic | ICD-10-CM

## 2021-01-27 DIAGNOSIS — I48.0 PAROXYSMAL ATRIAL FIBRILLATION: ICD-10-CM

## 2021-01-27 DIAGNOSIS — I47.1 SUPRAVENTRICULAR TACHYCARDIA: ICD-10-CM

## 2021-01-27 DIAGNOSIS — N18.6 END STAGE RENAL DISEASE: ICD-10-CM

## 2021-01-27 DIAGNOSIS — I21.4 NON-ST ELEVATION (NSTEMI) MYOCARDIAL INFARCTION: ICD-10-CM

## 2021-01-27 DIAGNOSIS — Z29.9 ENCOUNTER FOR PROPHYLACTIC MEASURES, UNSPECIFIED: ICD-10-CM

## 2021-01-27 LAB
A1C WITH ESTIMATED AVERAGE GLUCOSE RESULT: 5.3 % — SIGNIFICANT CHANGE UP (ref 4–5.6)
ALBUMIN SERPL ELPH-MCNC: 3.3 G/DL — LOW (ref 3.5–5)
ALP SERPL-CCNC: 127 U/L — HIGH (ref 40–120)
ALT FLD-CCNC: 46 U/L DA — SIGNIFICANT CHANGE UP (ref 10–60)
ANION GAP SERPL CALC-SCNC: 11 MMOL/L — SIGNIFICANT CHANGE UP (ref 5–17)
ANION GAP SERPL CALC-SCNC: 13 MMOL/L — SIGNIFICANT CHANGE UP (ref 5–17)
APTT BLD: 36.4 SEC — HIGH (ref 27.5–35.5)
AST SERPL-CCNC: 20 U/L — SIGNIFICANT CHANGE UP (ref 10–40)
BASOPHILS # BLD AUTO: 0.01 K/UL — SIGNIFICANT CHANGE UP (ref 0–0.2)
BASOPHILS NFR BLD AUTO: 0.2 % — SIGNIFICANT CHANGE UP (ref 0–2)
BILIRUB SERPL-MCNC: 0.4 MG/DL — SIGNIFICANT CHANGE UP (ref 0.2–1.2)
BUN SERPL-MCNC: 68 MG/DL — HIGH (ref 7–18)
BUN SERPL-MCNC: 72 MG/DL — HIGH (ref 7–18)
CALCIUM SERPL-MCNC: 8.4 MG/DL — SIGNIFICANT CHANGE UP (ref 8.4–10.5)
CALCIUM SERPL-MCNC: 8.6 MG/DL — SIGNIFICANT CHANGE UP (ref 8.4–10.5)
CHLORIDE SERPL-SCNC: 95 MMOL/L — LOW (ref 96–108)
CHLORIDE SERPL-SCNC: 95 MMOL/L — LOW (ref 96–108)
CHOLEST SERPL-MCNC: 120 MG/DL — SIGNIFICANT CHANGE UP
CK MB BLD-MCNC: 2.6 % — SIGNIFICANT CHANGE UP (ref 0–3.5)
CK MB CFR SERPL CALC: 5 NG/ML — HIGH (ref 0–3.6)
CK SERPL-CCNC: 195 U/L — SIGNIFICANT CHANGE UP (ref 35–232)
CO2 SERPL-SCNC: 28 MMOL/L — SIGNIFICANT CHANGE UP (ref 22–31)
CO2 SERPL-SCNC: 29 MMOL/L — SIGNIFICANT CHANGE UP (ref 22–31)
CREAT SERPL-MCNC: 10.4 MG/DL — HIGH (ref 0.5–1.3)
CREAT SERPL-MCNC: 10.6 MG/DL — HIGH (ref 0.5–1.3)
EOSINOPHIL # BLD AUTO: 0.16 K/UL — SIGNIFICANT CHANGE UP (ref 0–0.5)
EOSINOPHIL NFR BLD AUTO: 3.6 % — SIGNIFICANT CHANGE UP (ref 0–6)
ESTIMATED AVERAGE GLUCOSE: 105 MG/DL — SIGNIFICANT CHANGE UP (ref 68–114)
FERRITIN SERPL-MCNC: 239 NG/ML — SIGNIFICANT CHANGE UP (ref 30–400)
FOLATE SERPL-MCNC: 8.5 NG/ML — SIGNIFICANT CHANGE UP
GLUCOSE SERPL-MCNC: 114 MG/DL — HIGH (ref 70–99)
GLUCOSE SERPL-MCNC: 79 MG/DL — SIGNIFICANT CHANGE UP (ref 70–99)
HCT VFR BLD CALC: 32.9 % — LOW (ref 39–50)
HCT VFR BLD CALC: 35 % — LOW (ref 39–50)
HDLC SERPL-MCNC: 58 MG/DL — SIGNIFICANT CHANGE UP
HGB BLD-MCNC: 10.7 G/DL — LOW (ref 13–17)
HGB BLD-MCNC: 11.4 G/DL — LOW (ref 13–17)
IMM GRANULOCYTES NFR BLD AUTO: 0.2 % — SIGNIFICANT CHANGE UP (ref 0–1.5)
INR BLD: 0.96 RATIO — SIGNIFICANT CHANGE UP (ref 0.88–1.16)
IRON SATN MFR SERPL: 29 % — SIGNIFICANT CHANGE UP (ref 20–55)
IRON SATN MFR SERPL: 58 UG/DL — LOW (ref 65–170)
LIPID PNL WITH DIRECT LDL SERPL: 50 MG/DL — SIGNIFICANT CHANGE UP
LYMPHOCYTES # BLD AUTO: 1.57 K/UL — SIGNIFICANT CHANGE UP (ref 1–3.3)
LYMPHOCYTES # BLD AUTO: 35.4 % — SIGNIFICANT CHANGE UP (ref 13–44)
MAGNESIUM SERPL-MCNC: 2.1 MG/DL — SIGNIFICANT CHANGE UP (ref 1.6–2.6)
MAGNESIUM SERPL-MCNC: 2.2 MG/DL — SIGNIFICANT CHANGE UP (ref 1.6–2.6)
MCHC RBC-ENTMCNC: 32.5 GM/DL — SIGNIFICANT CHANGE UP (ref 32–36)
MCHC RBC-ENTMCNC: 32.6 GM/DL — SIGNIFICANT CHANGE UP (ref 32–36)
MCHC RBC-ENTMCNC: 32.6 PG — SIGNIFICANT CHANGE UP (ref 27–34)
MCHC RBC-ENTMCNC: 32.7 PG — SIGNIFICANT CHANGE UP (ref 27–34)
MCV RBC AUTO: 100.3 FL — HIGH (ref 80–100)
MCV RBC AUTO: 100.3 FL — HIGH (ref 80–100)
MONOCYTES # BLD AUTO: 0.67 K/UL — SIGNIFICANT CHANGE UP (ref 0–0.9)
MONOCYTES NFR BLD AUTO: 15.1 % — HIGH (ref 2–14)
NEUTROPHILS # BLD AUTO: 2.01 K/UL — SIGNIFICANT CHANGE UP (ref 1.8–7.4)
NEUTROPHILS NFR BLD AUTO: 45.5 % — SIGNIFICANT CHANGE UP (ref 43–77)
NON HDL CHOLESTEROL: 62 MG/DL — SIGNIFICANT CHANGE UP
NRBC # BLD: 0 /100 WBCS — SIGNIFICANT CHANGE UP (ref 0–0)
NRBC # BLD: 0 /100 WBCS — SIGNIFICANT CHANGE UP (ref 0–0)
NT-PROBNP SERPL-SCNC: HIGH PG/ML (ref 0–450)
PHOSPHATE SERPL-MCNC: 6.9 MG/DL — HIGH (ref 2.5–4.5)
PLATELET # BLD AUTO: 103 K/UL — LOW (ref 150–400)
PLATELET # BLD AUTO: 105 K/UL — LOW (ref 150–400)
POTASSIUM SERPL-MCNC: 5 MMOL/L — SIGNIFICANT CHANGE UP (ref 3.5–5.3)
POTASSIUM SERPL-MCNC: 5.1 MMOL/L — SIGNIFICANT CHANGE UP (ref 3.5–5.3)
POTASSIUM SERPL-SCNC: 5 MMOL/L — SIGNIFICANT CHANGE UP (ref 3.5–5.3)
POTASSIUM SERPL-SCNC: 5.1 MMOL/L — SIGNIFICANT CHANGE UP (ref 3.5–5.3)
PROT SERPL-MCNC: 7.3 G/DL — SIGNIFICANT CHANGE UP (ref 6–8.3)
PROTHROM AB SERPL-ACNC: 11.4 SEC — SIGNIFICANT CHANGE UP (ref 10.6–13.6)
RBC # BLD: 3.28 M/UL — LOW (ref 4.2–5.8)
RBC # BLD: 3.49 M/UL — LOW (ref 4.2–5.8)
RBC # FLD: 14.2 % — SIGNIFICANT CHANGE UP (ref 10.3–14.5)
RBC # FLD: 14.4 % — SIGNIFICANT CHANGE UP (ref 10.3–14.5)
SARS-COV-2 IGG SERPL QL IA: POSITIVE
SARS-COV-2 IGM SERPL IA-ACNC: 3.45 INDEX — HIGH
SARS-COV-2 RNA SPEC QL NAA+PROBE: SIGNIFICANT CHANGE UP
SODIUM SERPL-SCNC: 135 MMOL/L — SIGNIFICANT CHANGE UP (ref 135–145)
SODIUM SERPL-SCNC: 136 MMOL/L — SIGNIFICANT CHANGE UP (ref 135–145)
TIBC SERPL-MCNC: 200 UG/DL — LOW (ref 250–450)
TRIGL SERPL-MCNC: 58 MG/DL — SIGNIFICANT CHANGE UP
TROPONIN I SERPL-MCNC: 0.18 NG/ML — HIGH (ref 0–0.04)
TROPONIN I SERPL-MCNC: 0.44 NG/ML — HIGH (ref 0–0.04)
TROPONIN I SERPL-MCNC: 0.89 NG/ML — HIGH (ref 0–0.04)
TSH SERPL-MCNC: 2.42 UU/ML — SIGNIFICANT CHANGE UP (ref 0.34–4.82)
UIBC SERPL-MCNC: 142 UG/DL — SIGNIFICANT CHANGE UP (ref 110–370)
VIT B12 SERPL-MCNC: 555 PG/ML — SIGNIFICANT CHANGE UP (ref 232–1245)
WBC # BLD: 4.43 K/UL — SIGNIFICANT CHANGE UP (ref 3.8–10.5)
WBC # BLD: 4.73 K/UL — SIGNIFICANT CHANGE UP (ref 3.8–10.5)
WBC # FLD AUTO: 4.43 K/UL — SIGNIFICANT CHANGE UP (ref 3.8–10.5)
WBC # FLD AUTO: 4.73 K/UL — SIGNIFICANT CHANGE UP (ref 3.8–10.5)

## 2021-01-27 PROCEDURE — 99291 CRITICAL CARE FIRST HOUR: CPT | Mod: CS

## 2021-01-27 PROCEDURE — 71045 X-RAY EXAM CHEST 1 VIEW: CPT | Mod: 26

## 2021-01-27 PROCEDURE — 99223 1ST HOSP IP/OBS HIGH 75: CPT

## 2021-01-27 RX ORDER — ASPIRIN/CALCIUM CARB/MAGNESIUM 324 MG
325 TABLET ORAL ONCE
Refills: 0 | Status: COMPLETED | OUTPATIENT
Start: 2021-01-27 | End: 2021-01-27

## 2021-01-27 RX ORDER — METOPROLOL TARTRATE 50 MG
25 TABLET ORAL EVERY 8 HOURS
Refills: 0 | Status: DISCONTINUED | OUTPATIENT
Start: 2021-01-27 | End: 2021-01-29

## 2021-01-27 RX ORDER — ASPIRIN/CALCIUM CARB/MAGNESIUM 324 MG
81 TABLET ORAL DAILY
Refills: 0 | Status: DISCONTINUED | OUTPATIENT
Start: 2021-01-27 | End: 2021-01-29

## 2021-01-27 RX ORDER — SEVELAMER CARBONATE 2400 MG/1
1600 POWDER, FOR SUSPENSION ORAL EVERY 8 HOURS
Refills: 0 | Status: DISCONTINUED | OUTPATIENT
Start: 2021-01-27 | End: 2021-01-29

## 2021-01-27 RX ORDER — ATORVASTATIN CALCIUM 80 MG/1
80 TABLET, FILM COATED ORAL AT BEDTIME
Refills: 0 | Status: DISCONTINUED | OUTPATIENT
Start: 2021-01-27 | End: 2021-01-29

## 2021-01-27 RX ORDER — FERROUS SULFATE 325(65) MG
325 TABLET ORAL DAILY
Refills: 0 | Status: DISCONTINUED | OUTPATIENT
Start: 2021-01-27 | End: 2021-01-29

## 2021-01-27 RX ORDER — HEPARIN SODIUM 5000 [USP'U]/ML
5000 INJECTION INTRAVENOUS; SUBCUTANEOUS EVERY 8 HOURS
Refills: 0 | Status: DISCONTINUED | OUTPATIENT
Start: 2021-01-27 | End: 2021-01-28

## 2021-01-27 RX ORDER — APIXABAN 2.5 MG/1
2.5 TABLET, FILM COATED ORAL
Refills: 0 | Status: DISCONTINUED | OUTPATIENT
Start: 2021-01-27 | End: 2021-01-27

## 2021-01-27 RX ORDER — HYDRALAZINE HCL 50 MG
25 TABLET ORAL THREE TIMES A DAY
Refills: 0 | Status: DISCONTINUED | OUTPATIENT
Start: 2021-01-27 | End: 2021-01-29

## 2021-01-27 RX ORDER — DRONEDARONE 400 MG/1
400 TABLET, FILM COATED ORAL
Refills: 0 | Status: DISCONTINUED | OUTPATIENT
Start: 2021-01-27 | End: 2021-01-29

## 2021-01-27 RX ORDER — COLCHICINE 0.6 MG
0.6 TABLET ORAL
Refills: 0 | Status: DISCONTINUED | OUTPATIENT
Start: 2021-01-27 | End: 2021-01-28

## 2021-01-27 RX ORDER — FAMOTIDINE 10 MG/ML
20 INJECTION INTRAVENOUS DAILY
Refills: 0 | Status: DISCONTINUED | OUTPATIENT
Start: 2021-01-27 | End: 2021-01-29

## 2021-01-27 RX ORDER — FOLIC ACID 0.8 MG
1 TABLET ORAL DAILY
Refills: 0 | Status: DISCONTINUED | OUTPATIENT
Start: 2021-01-27 | End: 2021-01-29

## 2021-01-27 RX ORDER — ADENOSINE 3 MG/ML
6 INJECTION INTRAVENOUS ONCE
Refills: 0 | Status: COMPLETED | OUTPATIENT
Start: 2021-01-27 | End: 2021-01-27

## 2021-01-27 RX ORDER — CALCITRIOL 0.5 UG/1
0.25 CAPSULE ORAL DAILY
Refills: 0 | Status: DISCONTINUED | OUTPATIENT
Start: 2021-01-27 | End: 2021-01-28

## 2021-01-27 RX ORDER — ISOSORBIDE DINITRATE 5 MG/1
10 TABLET ORAL
Refills: 0 | Status: DISCONTINUED | OUTPATIENT
Start: 2021-01-27 | End: 2021-01-27

## 2021-01-27 RX ORDER — METOPROLOL TARTRATE 50 MG
25 TABLET ORAL EVERY 12 HOURS
Refills: 0 | Status: DISCONTINUED | OUTPATIENT
Start: 2021-01-27 | End: 2021-01-27

## 2021-01-27 RX ADMIN — DRONEDARONE 400 MILLIGRAM(S): 400 TABLET, FILM COATED ORAL at 06:05

## 2021-01-27 RX ADMIN — SEVELAMER CARBONATE 1600 MILLIGRAM(S): 2400 POWDER, FOR SUSPENSION ORAL at 13:05

## 2021-01-27 RX ADMIN — Medication 25 MILLIGRAM(S): at 21:31

## 2021-01-27 RX ADMIN — ADENOSINE 6 MILLIGRAM(S): 3 INJECTION INTRAVENOUS at 01:53

## 2021-01-27 RX ADMIN — Medication 0.6 MILLIGRAM(S): at 18:18

## 2021-01-27 RX ADMIN — ATORVASTATIN CALCIUM 80 MILLIGRAM(S): 80 TABLET, FILM COATED ORAL at 21:43

## 2021-01-27 RX ADMIN — Medication 25 MILLIGRAM(S): at 13:05

## 2021-01-27 RX ADMIN — Medication 0.6 MILLIGRAM(S): at 06:05

## 2021-01-27 RX ADMIN — Medication 325 MILLIGRAM(S): at 04:00

## 2021-01-27 RX ADMIN — SEVELAMER CARBONATE 1600 MILLIGRAM(S): 2400 POWDER, FOR SUSPENSION ORAL at 21:43

## 2021-01-27 RX ADMIN — DRONEDARONE 400 MILLIGRAM(S): 400 TABLET, FILM COATED ORAL at 21:30

## 2021-01-27 RX ADMIN — FAMOTIDINE 20 MILLIGRAM(S): 10 INJECTION INTRAVENOUS at 11:15

## 2021-01-27 RX ADMIN — Medication 325 MILLIGRAM(S): at 11:14

## 2021-01-27 RX ADMIN — Medication 81 MILLIGRAM(S): at 11:14

## 2021-01-27 RX ADMIN — Medication 25 MILLIGRAM(S): at 06:05

## 2021-01-27 RX ADMIN — CALCITRIOL 0.25 MICROGRAM(S): 0.5 CAPSULE ORAL at 11:15

## 2021-01-27 RX ADMIN — HEPARIN SODIUM 5000 UNIT(S): 5000 INJECTION INTRAVENOUS; SUBCUTANEOUS at 21:31

## 2021-01-27 RX ADMIN — Medication 1 MILLIGRAM(S): at 11:14

## 2021-01-27 RX ADMIN — Medication 25 MILLIGRAM(S): at 21:30

## 2021-01-27 RX ADMIN — ISOSORBIDE DINITRATE 10 MILLIGRAM(S): 5 TABLET ORAL at 06:05

## 2021-01-27 RX ADMIN — SEVELAMER CARBONATE 1600 MILLIGRAM(S): 2400 POWDER, FOR SUSPENSION ORAL at 06:04

## 2021-01-27 NOTE — H&P ADULT - HISTORY OF PRESENT ILLNESS
74 yo male from [facility] with hx of HTN, Afib, cva ( 10 years ago) with right sided residual deficit,  ESRD on HD, (M, W ,F), presented with acute onset chest pain which started 11pm tonight. Pt states he was watching tv when the pain started and lasted approx 15 mins. Pt describes the pain as left sided heaviness with no radiation. pain was associated with palpitations. Pt denied sob, n/v with this episode of chest pain. Pt states he has had similar episodes in the past. Pt ambulates on wheelchair and denies exertional chest pain or dyspnea on exertion      ED course pt was noted to be SVT with HR in 140-150's pt was given adenosine and was successfully cardioverted       GOC : full code

## 2021-01-27 NOTE — H&P ADULT - NSHPPHYSICALEXAM_GEN_ALL_CORE
Vital Signs Last 24 Hrs  T(C): 36.2 (27 Jan 2021 04:26), Max: 36.3 (27 Jan 2021 01:31)  T(F): 97.1 (27 Jan 2021 04:26), Max: 97.3 (27 Jan 2021 01:31)  HR: 76 (27 Jan 2021 04:26) (74 - 153)  BP: 170/70 (27 Jan 2021 04:26) (153/93 - 178/98)  BP(mean): --  RR: 18 (27 Jan 2021 04:26) (16 - 18)  SpO2: 100% (27 Jan 2021 04:26) (100% - 100%)      GENERAL: NAD, lying in bed comfortably  HEAD:  Atraumatic, Normocephalic  EYES: EOMI, PERRLA, conjunctiva and sclera clear  ENT: Moist mucous membranes  NECK: Supple, No JVD  CHEST/LUNG: Clear to auscultation bilaterally; No rales, rhonchi, wheezing, or rubs.  HEART: Regular rate and rhythm; S1+ S2+  ABDOMEN: Bowel sounds present; Soft, Nontender, Nondistended.  EXTREMITIES:  2+ Peripheral Pulses, brisk capillary refill. No clubbing, cyanosis, or edema  NERVOUS SYSTEM:  Alert & Oriented X2, speech clear. No deficits   MSK: FROM all 4 extremities, full and equal strength  SKIN: No rashes or lesions

## 2021-01-27 NOTE — ED PROVIDER NOTE - CARE PLAN
Principal Discharge DX:	SVT (supraventricular tachycardia)   Principal Discharge DX:	NSTEMI (non-ST elevated myocardial infarction)  Secondary Diagnosis:	SVT (supraventricular tachycardia)

## 2021-01-27 NOTE — DISCHARGE NOTE PROVIDER - NSDCHOSPICE_GEN_A_CORE
Congratulations on your surgery anniversary! We hope you are doing well. This is a friendly reminder to schedule your yearly follow-up visits with our providers. Your continued success and good health is very important to us.     Please call the Bariatric
No

## 2021-01-27 NOTE — ED PROVIDER NOTE - CLINICAL SUMMARY MEDICAL DECISION MAKING FREE TEXT BOX
76yo M with hx ESRD on HD (M, W, F), HTN gastritis, vascular dementia, , DM, Afib, CVA with right hemiplegia presents with chest pain. in ED patient found to be in SVT, given adenosine with cardioversion to sinus rhythm. Will obtain ekg, labs, CXR. 76yo M with hx ESRD on HD (M, W, F), HTN gastritis, vascular dementia, , DM, Afib, CVA with right hemiplegia presents with chest pain. in ED patient found to be in SVT, given adenosine with cardioversion to sinus rhythm. Will obtain ekg, labs, CXR.    Ekg after adenosine shows NSR, no ischemia, trop 0.181, CXR shows evidence of mild pulm edema  given ASA  Patient stable for admission.

## 2021-01-27 NOTE — ED ADULT NURSE NOTE - NSIMPLEMENTINTERV_GEN_ALL_ED
Implemented All Fall Risk Interventions:  Davin to call system. Call bell, personal items and telephone within reach. Instruct patient to call for assistance. Room bathroom lighting operational. Non-slip footwear when patient is off stretcher. Physically safe environment: no spills, clutter or unnecessary equipment. Stretcher in lowest position, wheels locked, appropriate side rails in place. Provide visual cue, wrist band, yellow gown, etc. Monitor gait and stability. Monitor for mental status changes and reorient to person, place, and time. Review medications for side effects contributing to fall risk. Reinforce activity limits and safety measures with patient and family.

## 2021-01-27 NOTE — PROGRESS NOTE ADULT - PROBLEM SELECTOR PLAN 1
presented with chest pain associated with palpitation  EKG showed SVT s/p adenosine in ED  Past Hx of similar episode few months ago and refused stress test on that admission   Echo in 202o showed EF > 55% with moderate AS   C/w ASA, Lipitor, BB  Trend Troponin: T1: .  - probnp [id number]  - Telemetry monitoring  - repeat TTE ordered  - Cardiology consult Dr. Salinas consulted presented with chest pain associated with palpitation  EKG showed SVT s/p adenosine in ED  Past Hx of similar episode few months ago and refused stress test on that admission   Echo in 202o showed EF > 55% with moderate AS   C/w ASA, Lipitor, BB  Trend Troponin: T1 and T2+ likely demand ischemia, pending T3 and 1200  C/w Telemetry   Pending ECHO  Card Dr Salinas

## 2021-01-27 NOTE — PROGRESS NOTE ADULT - PROBLEM SELECTOR PLAN 5
IMPROVE VTE Individual Risk Assessment  RISK                                                         Points  [  ] Previous VTE                                      3  [  ] Thrombophilia                                   2  [  ] Lower limb paralysis                         2 (unable to hold up >15 seconds)    [  ] Current Cancer                                  2       (within 6 months)  [ x ] Immobilization > 24 hrs                    1  [  ] ICU/CCU stay > 24 hrs                         1  [ x ] Age > 60                                              1  cw heparin for dvt ppx DVT PPX: Hep SQ

## 2021-01-27 NOTE — PROGRESS NOTE ADULT - PROBLEM SELECTOR PLAN 2
Pt presented with symptomatic svt s/p adenosine in ED   will cw metoprolol 25 bid   as per pt he not on AC; Nursing records show pt is on eliquis 2.5 bid  may need to resume OAC-- Will defer to cardiologist  fu cardio for further recs presented with symptomatic SVT s/p adenosine in ED, converted to SR, now HR in 60's SR  C/w metoprolol and dronedarone  as per pt he not on AC; Nursing records show pt is on eliquis 2.5 bid  Card Dr Salinas

## 2021-01-27 NOTE — PROGRESS NOTE ADULT - PROBLEM SELECTOR PLAN 3
continue with home medications  multaq and lopressor currently pt is SR in 60's  C/w multaq and lopressor

## 2021-01-27 NOTE — DISCHARGE NOTE PROVIDER - NSDCCPCAREPLAN_GEN_ALL_CORE_FT
PRINCIPAL DISCHARGE DIAGNOSIS  Diagnosis: NSTEMI (non-ST elevated myocardial infarction)  Assessment and Plan of Treatment: You presented with chest pain with palpitation. Your heart rate was high out of normal when you arrived here.You were given medications to treat your high heart rate. You were followed by cardiologist while you were here.  Seek medical attetion:  If You have any of the following signs of a heart attack: ?Squeezing, pressure, or pain in your chest  ?Discomfort or pain in your back, neck, jaw, stomach, or arm  ?Shortness of breath  ?Nausea or vomiting  ?Lightheadedness or a sudden cold sweat  •You are dizzy, lightheaded, or feel faint.  •You have sudden numbness or weakness in your arms or legs.  Call your doctor or cardiologist if:   •You have new or worsening symptoms.  •You have swelling in your ankles or feet.  •You have questions or concerns about your condition or care.  Medicines:   Continue to take your medications as prescribed by your doctor.  You may be on more than one medicine to treat your symptoms.        SECONDARY DISCHARGE DIAGNOSES  Diagnosis: Paroxysmal A-fib  Assessment and Plan of Treatment: Atrial fibrillation is the most common heart rhythm problem & has the risk of stroke & heart attack  It helps if you control your blood pressure, not drink more than 1-2 alcohol drinks per day, cut down on caffeine, getting treatment for over active thyroid gland, & getting exercise  Call your doctor if you feel your heart racing or beating unusually, chest tightness or pain, lightheaded, faint, shortness of breath especially with exercise  It is important to take your heart medication as prescribed  You may be on anticoagulation which is very important to take as directed - you may need blood work to monitor drug levels      Diagnosis: ESRD (end stage renal disease) on dialysis  Assessment and Plan of Treatment: Continue with your dialysis treatments. Follow with your nephrologist (kidney physician). Continue your medications as prescribed.  Excess fluids and waste products will be removed from your blood; your electrolytes will be balanced; your blood pressure will be controlled.       PRINCIPAL DISCHARGE DIAGNOSIS  Diagnosis: Demand ischemia  Assessment and Plan of Treatment: You came with chest pressure . your heart enzymes were mildy increased. EKG didnot show any changes of heart attack. Ultrasound of your heart showed      SECONDARY DISCHARGE DIAGNOSES  Diagnosis: Paroxysmal A-fib  Assessment and Plan of Treatment: Atrial fibrillation is the most common heart rhythm problem & has the risk of stroke & heart attack  It helps if you control your blood pressure, not drink more than 1-2 alcohol drinks per day, cut down on caffeine, getting treatment for over active thyroid gland, & getting exercise  Call your doctor if you feel your heart racing or beating unusually, chest tightness or pain, lightheaded, faint, shortness of breath especially with exercise  It is important to take your heart medication as prescribed  You may be on anticoagulation which is very important to take as directed - you may need blood work to monitor drug levels      Diagnosis: ESRD (end stage renal disease) on dialysis  Assessment and Plan of Treatment: Continue with your dialysis treatments. Follow with your nephrologist (kidney physician). Continue your medications as prescribed.  Excess fluids and waste products will be removed from your blood; your electrolytes will be balanced; your blood pressure will be controlled.       PRINCIPAL DISCHARGE DIAGNOSIS  Diagnosis: Demand ischemia  Assessment and Plan of Treatment: You came with chest pressure . your heart enzymes were mildy increased. EKG didnot show any changes of heart attack. Ultrasound of your heart showed normal ejection fraction and mild problem in the relaxation of your left ventricle. You refused stress test to further evaluate regarding your chest pain. Risk and benefits were explained. Your chest pain and pressure have been resolved now likley it was all demand ischemia. As your are doing alright , decision was made to discharge you      SECONDARY DISCHARGE DIAGNOSES  Diagnosis: Paroxysmal A-fib  Assessment and Plan of Treatment: Atrial fibrillation is the most common heart rhythm problem & has the risk of stroke & heart attack  It helps if you control your blood pressure, not drink more than 1-2 alcohol drinks per day, cut down on caffeine, getting treatment for over active thyroid gland, & getting exercise  Call your doctor if you feel your heart racing or beating unusually, chest tightness or pain, lightheaded, faint, shortness of breath especially with exercise  It is important to take your heart medication as prescribed  You may be on anticoagulation which is very important to take as directed - you may need blood work to monitor drug levels      Diagnosis: ESRD (end stage renal disease) on dialysis  Assessment and Plan of Treatment: Continue with your dialysis treatments. Follow with your nephrologist (kidney physician). Continue your medications as prescribed.  Excess fluids and waste products will be removed from your blood; your electrolytes will be balanced; your blood pressure will be controlled.       PRINCIPAL DISCHARGE DIAGNOSIS  Diagnosis: Demand ischemia  Assessment and Plan of Treatment: You came with chest pressure . your heart enzymes were mildy increased. EKG didnot show any changes of heart attack. Ultrasound of your heart showed normal ejection fraction and mild problem in the relaxation of your left ventricle. You refused stress test to further evaluate regarding your chest pain. Risk and benefits were explained. Your chest pain and pressure have been resolved now likley it was all demand ischemia. As your are doing alright , decision was made to discharge you. follow up with your PCP within a week after discharge.Some of your medications dose and frequency have been changed. Please take it as prescribed. Heart doctor was consulted      SECONDARY DISCHARGE DIAGNOSES  Diagnosis: Paroxysmal A-fib  Assessment and Plan of Treatment: Atrial fibrillation is the most common heart rhythm problem & has the risk of stroke & heart attack. It helps if you control your blood pressure, not drink more than 1-2 alcohol drinks per day, cut down on caffeine, getting treatment for over active thyroid gland, & getting exercise  Call your doctor if you feel your heart racing or beating unusually, chest tightness or pain, lightheaded, faint, shortness of breath especially with exercise  It is important to take your heart medication as prescribed.       Diagnosis: ESRD (end stage renal disease) on dialysis  Assessment and Plan of Treatment: Continue with your dialysis treatments. Follow with your nephrologist (kidney physician). Continue your medications as prescribed.  Excess fluids and waste products will be removed from your blood; your electrolytes will be balanced; your blood pressure will be controlled.  As you are in the hospital. you got your dialysis today. Kidney doctor was consulted in the hospital    Diagnosis: Supraventricular tachycardia  Assessment and Plan of Treatment: Your heart rate was above 150 when you came to the hospital. you were given 1 dose of adenosine for your heart rate. you are on heart rate controlled medications

## 2021-01-27 NOTE — CONSULT NOTE ADULT - SUBJECTIVE AND OBJECTIVE BOX
CHIEF COMPLAINT:Patient is a 75y old  Male who presents with a chief complaint of chest pain (27 Jan 2021 11:04)      HPI:  76 yo male from [facility] with hx of HTN, Parox Afib, AS, cva ( 10 years ago) with right sided residual deficit,  ESRD on HD, (M, W ,F), presented with acute onset chest pain which started 11pm tonight. Pt states he was watching tv when the pain started and lasted approx 15 mins. Pt describes the pain as left sided heaviness with no radiation. pain was associated with palpitations. Pt denied sob, n/v with this episode of chest pain. Pt states he has had similar episodes in the past. Pt ambulates on wheelchair and denies exertional chest pain or dyspnea on exertion      ED course pt was noted to be SVT with HR in 140-150's pt was given adenosine and was successfully cardioverted       GOC : full code  (27 Jan 2021 03:48)      PAST MEDICAL & SURGICAL HISTORY:  Cerebrovascular accident (CVA), unspecified mechanism    Anemia    Hypertension    ESRD (end stage renal disease) on dialysis    A-V fistula    AS      MEDICATIONS  (STANDING):  aspirin  chewable 81 milliGRAM(s) Oral daily  atorvastatin 80 milliGRAM(s) Oral at bedtime  calcitriol   Capsule 0.25 MICROGram(s) Oral daily  colchicine 0.6 milliGRAM(s) Oral two times a day  dronedarone 400 milliGRAM(s) Oral two times a day  famotidine    Tablet 20 milliGRAM(s) Oral daily  ferrous    sulfate 325 milliGRAM(s) Oral daily  folic acid 1 milliGRAM(s) Oral daily  heparin   Injectable 5000 Unit(s) SubCutaneous every 8 hours  hydrALAZINE 25 milliGRAM(s) Oral three times a day  isosorbide   dinitrate Tablet (ISORDIL) 10 milliGRAM(s) Oral two times a day  metoprolol tartrate 25 milliGRAM(s) Oral every 12 hours  sevelamer carbonate 1600 milliGRAM(s) Oral every 8 hours    MEDICATIONS  (PRN):      FAMILY HISTORY:  Family history of diabetes mellitus        SOCIAL HISTORY:    [x ] Non-smoker    [x ] Alcohol-denies    Allergies    No Known Allergies    Intolerances    	    REVIEW OF SYSTEMS:  CONSTITUTIONAL: No fever, weight loss, or fatigue  EYES: No eye pain, visual disturbances, or discharge  ENT:  No difficulty hearing, tinnitus, vertigo; No sinus or throat pain  NECK: No pain or stiffness  RESPIRATORY: No cough, wheezing, chills or hemoptysis; No Shortness of Breath  CARDIOVASCULAR: + chest pain,No  palpitations, passing out, dizziness, or leg swelling  GASTROINTESTINAL: No abdominal or epigastric pain. No nausea, vomiting, or hematemesis; No diarrhea or constipation. No melena or hematochezia.  GENITOURINARY: No dysuria, frequency, hematuria, or incontinence  NEUROLOGICAL: No headaches, memory loss, loss of strength, numbness, or tremors  SKIN: No itching, burning, rashes, or lesions   LYMPH Nodes: No enlarged glands  ENDOCRINE: No heat or cold intolerance; No hair loss  MUSCULOSKELETAL: No joint pain or swelling; No muscle, back, or extremity pain  PSYCHIATRIC: No depression, anxiety, mood swings, or difficulty sleeping  HEME/LYMPH: No easy bruising, or bleeding gums  ALLERGY AND IMMUNOLOGIC: No hives or eczema	        PHYSICAL EXAM:  T(C): 36.6 (01-27-21 @ 07:49), Max: 36.6 (01-27-21 @ 07:49)  HR: 65 (01-27-21 @ 07:49) (65 - 153)  BP: 137/68 (01-27-21 @ 07:49) (137/68 - 178/98)  RR: 18 (01-27-21 @ 07:49) (16 - 19)  SpO2: 100% (01-27-21 @ 07:49) (96% - 100%)        Appearance: Normal	  HEENT:   Normal oral mucosa, PERRL, EOMI	  Lymphatic: No lymphadenopathy  Cardiovascular: Normal S1 S2, No JVD, No murmurs, No edema  Respiratory: Lungs clear to auscultation	  Psychiatry: A & O x 3, Mood & affect appropriate  Gastrointestinal:  Soft, Non-tender, + BS	  Skin: No rashes, No ecchymoses, No cyanosis	  Neurologic: Non-focal  Extremities: Normal range of motion, No clubbing, cyanosis or edema  Vascular: Peripheral pulses palpable 2+ bilaterally    	    ECG: SVT at 151 bpm(flutter with block),Q anterior leads    LABS:	 	      CARDIAC MARKERS ( 27 Jan 2021 06:09 )  0.437 ng/mL / x     / 195 U/L / x     / 5.0 ng/mL  CARDIAC MARKERS ( 27 Jan 2021 01:59 )  0.181 ng/mL / x     / x     / x     / x                                  10.7   4.73  )-----------( 103      ( 27 Jan 2021 06:09 )             32.9     01-27    135  |  95<L>  |  72<H>  ----------------------------<  79  5.1   |  29  |  10.60<H>    Ca    8.6      27 Jan 2021 06:09  Phos  6.9     01-27  Mg     2.1     01-27    TPro  7.3  /  Alb  3.3<L>  /  TBili  0.4  /  DBili  x   /  AST  20  /  ALT  46  /  AlkPhos  127<H>  01-27    proBNP: Serum Pro-Brain Natriuretic Peptide: 55028 pg/mL (01-27 @ 01:59)    Lipid Profile: Cholesterol 120  LDL --  HDL 58  TG 58      TSH: Thyroid Stimulating Hormone, Serum: 2.42 uU/mL (01-27 @ 06:09)      PREVIOUS DIAGNOSTIC TESTING:    [x ]   Transthoracic Echocardiogram (09.19.20 @ 16:35) >  CONCLUSIONS:  1. Mitral annular calcification. Mild mitral regurgitation.  2. Calcified aortic valve with decreased opening. Peak  transaortic valve gradient equals 36 mm Hg, mean  transaortic valve gradient equals 24 mm Hg, estimated  aortic valve area equals 1.1 sqcm (by continuity equation),  consistent with moderate aortic stenosis. Mild aortic  regurgitation.  3. Moderate concentric left ventricular hypertrophy.  4. Endocardium not well visualized; grossly normal left  ventricular systolic function.   Segmental wall motion  could not be assessed.  5. Normal right ventricular size and function.  6. Small pericardial effusion.  7. Left pleural effusion.    ------------------------------------------------------------------------  Confirmed on  9/21/2020 - 11:36:20 by Yaya Baez MD

## 2021-01-27 NOTE — H&P ADULT - PROBLEM SELECTOR PLAN 5
IMPROVE VTE Individual Risk Assessment  RISK                                                         Points  [  ] Previous VTE                                      3  [  ] Thrombophilia                                   2  [  ] Lower limb paralysis                         2 (unable to hold up >15 seconds)    [  ] Current Cancer                                  2       (within 6 months)  [ x ] Immobilization > 24 hrs                    1  [  ] ICU/CCU stay > 24 hrs                         1  [ x ] Age > 60                                              1  cw heparin for dvt ppx

## 2021-01-27 NOTE — CONSULT NOTE ADULT - SUBJECTIVE AND OBJECTIVE BOX
Chief complain/HPI  74 yo male from [facility] with hx of HTN, Afib, cva ( 10 years ago) with right sided residual deficit,  ESRD on HD, (M, W ,F), presented with acute onset chest pain which started 11pm tonight. Pt states he was watching tv when the pain started and lasted approx 15 mins. Pt describes the pain as left sided heaviness with no radiation. pain was associated with palpitations. Pt denied sob, n/v with this episode of chest pain. Pt states he has had similar episodes in the past. Pt ambulates on wheelchair and denies exertional chest pain or dyspnea on exertion.  He denoies pain at present  Compliance with medications is poor, refused NH meds  Refused cardiac angiogram in the past    PAST MEDICAL & SURGICAL HISTORY:  Cerebrovascular accident (CVA), unspecified mechanism    Anemia    Hypertension    ESRD (end stage renal disease) on dialysis    A-V fistula        Home Medications Reviewed    Hospital Medications:   MEDICATIONS  (STANDING):  aspirin  chewable 81 milliGRAM(s) Oral daily  atorvastatin 80 milliGRAM(s) Oral at bedtime  calcitriol   Capsule 0.25 MICROGram(s) Oral daily  colchicine 0.6 milliGRAM(s) Oral two times a day  dronedarone 400 milliGRAM(s) Oral two times a day  famotidine    Tablet 20 milliGRAM(s) Oral daily  ferrous    sulfate 325 milliGRAM(s) Oral daily  folic acid 1 milliGRAM(s) Oral daily  heparin   Injectable 5000 Unit(s) SubCutaneous every 8 hours  hydrALAZINE 25 milliGRAM(s) Oral three times a day  metoprolol tartrate 25 milliGRAM(s) Oral every 8 hours  sevelamer carbonate 1600 milliGRAM(s) Oral every 8 hours    MEDICATIONS  (PRN):      Allergies    No Known Allergies    Intolerances    < from: Xray Chest 1 View-PORTABLE IMMEDIATE (01.27.21 @ 02:09) >  The evaluation of the cardiomediastinal silhouette is limited on portable technique.  External pacer lead overlies the left hemithorax limiting evaluation.      There is prominence of the central pulmonary vasculature, finding which may reflect pulmonary venous hypertension.  There is mild prominence of the bilateral bronchovascular markings.  No lobar lung consolidation or significant pleural effusion is noted.    Right subclavian and axillary vascular stent is again noted.    Impression:    Findings as discussed above.    < end of copied text >                            10.7   4.73  )-----------( 103      ( 27 Jan 2021 06:09 )             32.9     01-27    135  |  95<L>  |  72<H>  ----------------------------<  79  5.1   |  29  |  10.60<H>    Ca    8.6      27 Jan 2021 06:09  Phos  6.9     01-27  Mg     2.1     01-27    TPro  7.3  /  Alb  3.3<L>  /  TBili  0.4  /  DBili  x   /  AST  20  /  ALT  46  /  AlkPhos  127<H>  01-27    PT/INR - ( 27 Jan 2021 01:59 )   PT: 11.4 sec;   INR: 0.96 ratio         PTT - ( 27 Jan 2021 01:59 )  PTT:36.4 sec          RADIOLOGY & ADDITIONAL STUDIES:    SOCIAL HISTORY: Denies ETOh,Smoking,     FAMILY HISTORY:  Family history of diabetes mellitus        REVIEW OF SYSTEMS:  CONSTITUTIONAL: No malaise, No fatigue, No fevers or chills, well developed, no diaphoresis  EYES/ENT: No visual changes;  No vertigo or throat pain   NECK: No pain or stiffness  RESPIRATORY: No cough, wheezing, hemoptysis; No shortness of breath  CARDIOVASCULAR: No chest pain or palpitations. No edema  GASTROINTESTINAL: No abdominal or epigastric pain. No nausea, vomiting, or hematemesis; No diarrhea or constipation. No melena or hematochezia.  GENITOURINARY: No dysuria, frequency, foamy urine, urinary urgency, incontinence or hematuria  NEUROLOGICAL: No numbness or weakness, No tremor  SKIN: No itching, burning, rashes, or lesions   VASCULAR: No claudication  Musculoskeletal: no arthralgia, no myalgia  All other review of systems is negative unless indicated above.    VITALS:  Vital Signs Last 24 Hrs  T(C): 36.6 (27 Jan 2021 11:12), Max: 36.6 (27 Jan 2021 07:49)  T(F): 97.9 (27 Jan 2021 11:12), Max: 97.9 (27 Jan 2021 07:49)  HR: 64 (27 Jan 2021 11:12) (64 - 153)  BP: 136/59 (27 Jan 2021 11:12) (136/59 - 178/98)  BP(mean): --  RR: 18 (27 Jan 2021 11:12) (16 - 19)  SpO2: 100% (27 Jan 2021 11:12) (96% - 100%)    Height (cm): 165.1 (01-27 @ 01:31)    PHYSICAL EXAM:  Constitutional: NAD  HEENT: anicteric sclera, oropharynx clear, MMM  Neck: No JVD  Respiratory: good air entrance B/L, no wheezes, rales or rhonchi  Cardiovascular: S1, S2, RRR, no pericardial rub, no murmur  Gastrointestinal: BS+, soft, no tenderness, no distension, no bruit  Pelvis: bladder non-distended, no CVA tenderness  Extremities: No cyanosis or clubbing. No peripheral edema  Neurological: A/O x 3, no focal deficits  Psychiatric: Normal mood, normal affect  : No CVA tenderness. No kumar.   Skin: No rashes  Vascular: all pulses present  Access:                     Chief complain/HPI  76 yo male from [facility] with hx of HTN, Afib, cva ( 10 years ago) with right sided residual deficit,  ESRD on HD, (M, W ,F), presented with acute onset chest pain which started 11pm tonight. Pt states he was watching tv when the pain started and lasted approx 15 mins. Pt describes the pain as left sided heaviness with no radiation. pain was associated with palpitations associated with PAF .  Compliance with medications is poor, refused NH meds  Refused cardiac angiogram in the past    PAST MEDICAL & SURGICAL HISTORY:  Cerebrovascular accident (CVA), unspecified mechanism    Anemia    Hypertension    ESRD (end stage renal disease) on dialysis    A-V fistula        Home Medications Reviewed    Hospital Medications:   MEDICATIONS  (STANDING):  aspirin  chewable 81 milliGRAM(s) Oral daily  atorvastatin 80 milliGRAM(s) Oral at bedtime  calcitriol   Capsule 0.25 MICROGram(s) Oral daily  colchicine 0.6 milliGRAM(s) Oral two times a day  dronedarone 400 milliGRAM(s) Oral two times a day  famotidine    Tablet 20 milliGRAM(s) Oral daily  ferrous    sulfate 325 milliGRAM(s) Oral daily  folic acid 1 milliGRAM(s) Oral daily  heparin   Injectable 5000 Unit(s) SubCutaneous every 8 hours  hydrALAZINE 25 milliGRAM(s) Oral three times a day  metoprolol tartrate 25 milliGRAM(s) Oral every 8 hours  sevelamer carbonate 1600 milliGRAM(s) Oral every 8 hours    MEDICATIONS  (PRN):      Allergies    No Known Allergies    Intolerances    < from: Xray Chest 1 View-PORTABLE IMMEDIATE (01.27.21 @ 02:09) >  The evaluation of the cardiomediastinal silhouette is limited on portable technique.  External pacer lead overlies the left hemithorax limiting evaluation.      There is prominence of the central pulmonary vasculature, finding which may reflect pulmonary venous hypertension.  There is mild prominence of the bilateral bronchovascular markings.  No lobar lung consolidation or significant pleural effusion is noted.    Right subclavian and axillary vascular stent is again noted.    Impression:    Findings as discussed above.    < end of copied text >                            10.7   4.73  )-----------( 103      ( 27 Jan 2021 06:09 )             32.9     01-27    135  |  95<L>  |  72<H>  ----------------------------<  79  5.1   |  29  |  10.60<H>    Ca    8.6      27 Jan 2021 06:09  Phos  6.9     01-27  Mg     2.1     01-27    TPro  7.3  /  Alb  3.3<L>  /  TBili  0.4  /  DBili  x   /  AST  20  /  ALT  46  /  AlkPhos  127<H>  01-27    PT/INR - ( 27 Jan 2021 01:59 )   PT: 11.4 sec;   INR: 0.96 ratio         PTT - ( 27 Jan 2021 01:59 )  PTT:36.4 sec          RADIOLOGY & ADDITIONAL STUDIES:    SOCIAL HISTORY: Denies ETOh,Smoking,     FAMILY HISTORY:  Family history of diabetes mellitus        REVIEW OF SYSTEMS:  CONSTITUTIONAL: No malaise, No fatigue, No fevers or chills, well developed, no diaphoresis  EYES/ENT: No visual changes;  No vertigo or throat pain   NECK: No pain or stiffness  RESPIRATORY: No cough, wheezing, hemoptysis; No shortness of breath  CARDIOVASCULAR: No chest pain or palpitations. No edema  GASTROINTESTINAL: No abdominal or epigastric pain. No nausea, vomiting, or hematemesis; No diarrhea or constipation. No melena or hematochezia.  GENITOURINARY: No dysuria, frequency, foamy urine, urinary urgency, incontinence or hematuria  NEUROLOGICAL: No numbness or weakness, No tremor  SKIN: No itching, burning, rashes, or lesions   VASCULAR: No claudication  Musculoskeletal: no arthralgia, no myalgia  All other review of systems is negative unless indicated above.    VITALS:  Vital Signs Last 24 Hrs  T(C): 36.6 (27 Jan 2021 11:12), Max: 36.6 (27 Jan 2021 07:49)  T(F): 97.9 (27 Jan 2021 11:12), Max: 97.9 (27 Jan 2021 07:49)  HR: 64 (27 Jan 2021 11:12) (64 - 153)  BP: 136/59 (27 Jan 2021 11:12) (136/59 - 178/98)  BP(mean): --  RR: 18 (27 Jan 2021 11:12) (16 - 19)  SpO2: 100% (27 Jan 2021 11:12) (96% - 100%)    Height (cm): 165.1 (01-27 @ 01:31)    PHYSICAL EXAM:  Constitutional: NAD  HEENT: anicteric sclera, oropharynx clear, MMM  Neck: No JVD  Respiratory: good air entrance B/L, no wheezes, rales or rhonchi  Cardiovascular: S1, S2, RRR, no pericardial rub, no murmur  Gastrointestinal: BS+, soft, no tenderness, no distension, no bruit  Pelvis: bladder non-distended, no CVA tenderness  Extremities: No cyanosis or clubbing. No peripheral edema    Right upper arm AVG

## 2021-01-27 NOTE — H&P ADULT - ASSESSMENT
74 yo male from [facility] with hx of HTN, Afib, cva ( 10 years ago) with right sided residual deficit,  ESRD on HD, (M, W ,F), presented with acute onset chest pain which started 11pm tonight. Pt is admitted for further evaluation of NSTEMI

## 2021-01-27 NOTE — DISCHARGE NOTE PROVIDER - HOSPITAL COURSE
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 Pt states he was watching tv when the pain started and lasted approx 15 mins. Pt describes the pain as left sided heaviness with no radiation. pain was associated with palpitations. Pt denied sob, n/v with this episode of chest pain. Pt stated he has had similar episodes in the past. Pt ambulates on wheelchair and denies exertional chest pain or dyspnea on exertion. Pt is found to be in 's-150's, in SVT, pt was given dose of adenosine and was converted to SR. Pt was also found to have elevated trop. Trend Trop, cardiology and nephology following.    echo...................  stress test......................  incomplete..................................      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 Pt states he was watching tv when the pain started and lasted approx 15 mins. Pt describes the pain as left sided heaviness with no radiation. pain was associated with palpitations. Pt denied sob, n/v with this episode of chest pain. Pt stated he has had similar episodes in the past. Pt ambulates on wheelchair and denies exertional chest pain or dyspnea on exertion. Pt is found to be in 's-150's, in SVT, pt was given dose of adenosine and was converted to SR. Pt was also found to have elevated trop. EKG did not show ST elevation or depression. Refused Stress test, risks and benefits were explained. . ECHO showed normal Ejection fraction and GIDD. Cardiology and Nephrology consulted. Pt got dialysis on 1/29 in the hospital. Chest pain has been resolved. Medications have been adjusted accordingly. Pt is medically stable and ready to be discharge to nursing home.       Please note that this a brief summary of hospital course please refer to daily progress notes and consult notes for full course and events

## 2021-01-27 NOTE — H&P ADULT - ATTENDING COMMENTS
Pt seen and examined.  Case discussed with Admitting Resident.  75 year old man with vascular dementia, remote hx of CVA with right sided residual deficit,  ESRD on HD, (M, W ,F), HTN, DM2, A-fib with acute onset chest pain late last night. No associated resp symptoms. has since resolved.  In the ED , noted to be in SVTs    Vital Signs Last 24 Hrs  T(C): 36.3 (27 Jan 2021 01:31), Max: 36.3 (27 Jan 2021 01:31)  T(F): 97.3 (27 Jan 2021 01:31), Max: 97.3 (27 Jan 2021 01:31)  HR: 78 (27 Jan 2021 03:04) (74 - 153)  BP: 177/72 (27 Jan 2021 03:04) (153/93 - 178/98)  RR: 18 (27 Jan 2021 03:04) (16 - 18)  SpO2: 100% (27 Jan 2021 03:04) (100% - 100%)      Labs                        11.4   4.43  )-----------( 105      ( 27 Jan 2021 01:59 )             35.0     01-27    136  |  95<L>  |  68<H>  ----------------------------<  114<H>  5.0   |  28  |  10.40<H>    Ca    8.4      27 Jan 2021 01:59  Mg     2.2     01-27    TPro  7.3  /  Alb  3.3<L>  /  TBili  0.4  /  DBili  x   /  AST  20  /  ALT  46  /  AlkPhos  127<H>  01-27    CARDIAC MARKERS ( 27 Jan 2021 01:59 )  0.181 ng/mL / x     / x     / x     / x        serum BNP - [id number]    CXR - mild interstitial infiltrate - unchanged from baseline    EKG         Impression  - Acute chest pain ? cause  1) Acute onset symptomatic SVT  2) NSTEMI vs demand ischemia from above  3)  Acute hypertensive urgency/emergency    -  hx of atrial fibrillation not on OAC  - ESRD on HD  - DM2  - HTN  - vascular dementia    Plan  - Admit to telemetry  - Serial trop and EKG ( as needed)  - ASA 325mg PO X 1 and then continue home dose - 81mg PO daily  - Continue metoprolol 25mg PO BID for rate control an NSTEMI mgt  - Continue high dose statin - @ 80mg Po qhs  - Resume BP meds  - may need to resume OAC- was on elliquis in the past- Will defer to cardiologist  - Check A1c, fasting lipid panel,  - ECHO   - Cardiology consult - Dr Masters and Carmen saw before  - Nephrology consult - Dr Jacinto  -  resume DM control    **** Of note, pt had been admitted only to Dr Sandy Siu for the past 8 years of in patient admission; however the ED physician could not find any other corroborating evidence in the Nursing Home chart that links the patient to Dr Siu.  Will admit now and clarifications can be made in the morning. Pt seen and examined.  Case discussed with Admitting Resident.  75 year old man with vascular dementia, remote hx of CVA with right sided residual deficit,  ESRD on HD, (M, W ,F), HTN, hx of paroxysmal A-fib with acute onset chest pain late last night. No associated respiratory symptoms. It has since resolved. Similar to prior history from last admission.  In the ED , noted to be in SVTs. He denies hx of DM.    Vital Signs Last 24 Hrs  T(C): 36.3 (27 Jan 2021 01:31), Max: 36.3 (27 Jan 2021 01:31)  T(F): 97.3 (27 Jan 2021 01:31), Max: 97.3 (27 Jan 2021 01:31)  HR: 78 (27 Jan 2021 03:04) (74 - 153)  BP: 177/72 (27 Jan 2021 03:04) (153/93 - 178/98)  RR: 18 (27 Jan 2021 03:04) (16 - 18)  SpO2: 100% (27 Jan 2021 03:04) (100% - 100%)    Elderly man, pleasant, able to give history, NAD AAO X 3  RRR, S1S2 only  CTA B/L   Full soft NT ND BS +  No pedal edema. no calf swelling  Power 4/5 -RUE; 3/5 in RLE            5/5 on LUE 5/5 in LLE    Labs                        11.4   4.43  )-----------( 105      ( 27 Jan 2021 01:59 )             35.0     01-27    136  |  95<L>  |  68<H>  ----------------------------<  114<H>  5.0   |  28  |  10.40<H>    Ca    8.4      27 Jan 2021 01:59  Mg     2.2     01-27    TPro  7.3  /  Alb  3.3<L>  /  TBili  0.4  /  DBili  x   /  AST  20  /  ALT  46  /  AlkPhos  127<H>  01-27    CARDIAC MARKERS ( 27 Jan 2021 01:59 )  0.181 ng/mL / x     / x     / x     / x        serum BNP - 90472    CXR - mild interstitial infiltrate - unchanged from baseline    EKG SVT; old EKG sinus rhythm      Impression  - Acute chest pain ? cause  1) Acute onset symptomatic SVT  2) NSTEMI vs. demand ischemia from above ( serum trop similar to prior levels especially with ESRD hx)  3) Acute hypertensive urgency/emergency    -  hx of atrial fibrillation not on OAC  - ESRD on HD  - DM2  - HTN  - vascular dementia    Plan  - Admit to telemetry  - Serial trop and EKG ( with SVT resolved)  - ASA 325mg PO X 1 and then continue home dose - 81mg PO daily  - Continue metoprolol 25mg PO BID for rate control an NSTEMI management  - Continue high dose statin - @ 80mg Po qhs  - Resume BP meds  - may need to resume OAC- was on Eliquis in the past-stopped more than a year ago by one of his doctors;  Will defer further utility to cardiologist  - Check A1c, fasting lipid panel,  - ECHO 2D in AM  - Cardiology consult - Dr Masters and Carmen saw before  - Nephrology consult - Dr Jacinto    **** Of note, pt had been admitted only to Dr Sandy Siu for the past 8 years of in patient admission; however the ED physician could not find any other corroborating evidence in the Nursing Home chart that links the patient to Dr Siu.  Will admit now and clarifications can be made in the morning.

## 2021-01-27 NOTE — H&P ADULT - PROBLEM SELECTOR PLAN 2
Pt presented with symptomatic svt s/p adenosine in ED   will cw metoprolol 25 bid   as per pt he not on AC; Nursing records show pt is on eliquis 2.5 bid  may need to resume OAC-- Will defer to cardiologist  fu cardio for further recs

## 2021-01-27 NOTE — H&P ADULT - PROBLEM SELECTOR PLAN 1
-EKG showed  Pt presented with chest pain associated with palpitations  - EKG showed SVT s/p adenosine in ED--> cp now resolved   - pt pw similar episode few months ago and refused stress test on that admission   - echo in 202o showed EF > 55%, moderate AS   - cw ASA, Lipitor + BB  - Check Troponins: T1: .18 will trend and check T2 and T3  - probnp [id number]  - Telemetry monitoring  - repeat TTE ordered  - Cardiology consult Dr. aSlinas consulted

## 2021-01-27 NOTE — ED PROVIDER NOTE - OBJECTIVE STATEMENT
74yo M with hx ESRD on HD (M, W, F), HTN gastritis, vascular dementia, , DM, Afib, CVA with right hemiplegia presents with chest pain. Patient reports onset at 11pm but reports currently its improved. Denies headache, difficulty breathing, N/V/D, abd pain. Reports he no longer takes Eliquis though noted on his med list from NH.

## 2021-01-27 NOTE — ED PROVIDER NOTE - MDM PATIENT STATEMENT FOR ADDL TREATMENT
Pt states that she has had incontinence in the past with her back pain. She has voided like normal later today after her episode of incontinence.   Patient with one or more new problems requiring additional work-up/treatment.

## 2021-01-27 NOTE — H&P ADULT - NSHPREVIEWOFSYSTEMS_GEN_ALL_CORE
Denies nausea, vomiting, diarrhea, abdominal pain, SOB, dizziness, headache, cough, wheezing, joint pain or swelling, fever, chills.

## 2021-01-27 NOTE — PROGRESS NOTE ADULT - PROBLEM SELECTOR PLAN 4
Pt on HD m/w/f  continue with home medications  Will consult Dr. Jacinto Pt on HD M/W/F, due to dialysis today  Dr. Jacinto following

## 2021-01-27 NOTE — H&P ADULT - NSICDXPASTMEDICALHX_GEN_ALL_CORE_FT
PAST MEDICAL HISTORY:  Anemia     Cerebrovascular accident (CVA), unspecified mechanism     ESRD (end stage renal disease) on dialysis     Hypertension

## 2021-01-27 NOTE — DISCHARGE NOTE PROVIDER - NSDCMRMEDTOKEN_GEN_ALL_CORE_FT
aspirin 81 mg oral tablet, chewable: 1 tab(s) orally once a day  calcitriol 0.25 mcg oral capsule: 1 cap(s) orally once a day  Colcrys 0.6 mg oral tablet: 1 tab(s) orally 2 times a day  dronedarone 400 mg oral tablet: 1 tab(s) orally 2 times a day  Eliquis 2.5 mg oral tablet: 1 tab(s) orally 2 times a day  famotidine 20 mg oral tablet: 1 tab(s) orally once a day  ferrous sulfate 325 mg (65 mg elemental iron) oral tablet: 1 tab(s) orally once a day  folic acid 1 mg oral tablet: 1 tab(s) orally once a day  hydrALAZINE 25 mg oral tablet: 1 tab(s) orally 3 times a day  isosorbide dinitrate 20 mg oral tablet: 0.5 tab(s) orally 2 times a day  Lipitor 80 mg oral tablet: 1 tab(s) orally once a day  metoprolol tartrate 25 mg oral tablet: 1 tab(s) orally every 12 hours  sevelamer hydrochloride 800 mg oral tablet: 2 tab(s) orally 3 times a day   aspirin 81 mg oral tablet, chewable: 1 tab(s) orally once a day  calcitriol 0.25 mcg oral capsule: 1 cap(s) orally once a day  Colcrys 0.6 mg oral tablet: 1 tab(s) orally 2 times a day  dronedarone 400 mg oral tablet: 1 tab(s) orally 2 times a day  Eliquis 2.5 mg oral tablet: 1 tab(s) orally 2 times a day  famotidine 20 mg oral tablet: 1 tab(s) orally once a day  ferrous sulfate 325 mg (65 mg elemental iron) oral tablet: 1 tab(s) orally once a day  folic acid 1 mg oral tablet: 1 tab(s) orally once a day  hydrALAZINE 25 mg oral tablet: 1 tab(s) orally 3 times a day  isosorbide mononitrate 30 mg oral tablet, extended release: 1 tab(s) orally once a day  Lipitor 80 mg oral tablet: 1 tab(s) orally once a day  metoprolol tartrate 25 mg oral tablet: 1 tab(s) orally 3 times a day   sevelamer hydrochloride 800 mg oral tablet: 2 tab(s) orally 3 times a day

## 2021-01-27 NOTE — PHARMACOTHERAPY INTERVENTION NOTE - COMMENTS
Patient identified by [facility] for Acute MI. Medication list reviewed for appropriateness. No additional interventions at this time.

## 2021-01-27 NOTE — CONSULT NOTE ADULT - ASSESSMENT
74 yo male from [facility] with hx of HTN, Parox Afib, cva ( 10 years ago) with right sided residual deficit,  ESRD on HD, (M, W ,F), presented with acute onset chest pain which started 11pm tonight,PAF with RVR and borderline troponins.  1.Tele monitoring.  2.Stress test in am.  3.PAF-asa, was on eliquis unclear why off now,cont multaq,inc lopressor 25mg q8h.  4.HTN-cont hydralazine,d/c isordil.  5.ESRD-HD as per renal.  6.CVA-asa,statin.  7.GI and DVT prophylaxis.
ESRD - continue with dialysis MWF  PAF follow up with cardiology  Follow up troponin .

## 2021-01-27 NOTE — ED ADULT NURSE NOTE - OBJECTIVE STATEMENT
Patient brought in by EMS with chest pain and tachycardia. Patient denies any c/o pain. Rt arm AV Fistula.

## 2021-01-27 NOTE — ED ADULT NURSE NOTE - ED CARDIAC RATE
12/20/18 1108   Rehab Treatment   Discipline physical therapy assistant   Reason Treatment Not Performed unable to treat, medical status change  (Pt is on hold today per NINO Morales due tp o2 issues PT to follow up tomorrow.)   Recommendation   PT - Next Appointment 12/21/18      tachycardic

## 2021-01-28 PROCEDURE — 99233 SBSQ HOSP IP/OBS HIGH 50: CPT | Mod: GC

## 2021-01-28 RX ORDER — COLCHICINE 0.6 MG
0.6 TABLET ORAL DAILY
Refills: 0 | Status: DISCONTINUED | OUTPATIENT
Start: 2021-01-28 | End: 2021-01-29

## 2021-01-28 RX ORDER — APIXABAN 2.5 MG/1
2.5 TABLET, FILM COATED ORAL
Refills: 0 | Status: DISCONTINUED | OUTPATIENT
Start: 2021-01-28 | End: 2021-01-29

## 2021-01-28 RX ORDER — HEPARIN SODIUM 5000 [USP'U]/ML
5000 INJECTION INTRAVENOUS; SUBCUTANEOUS EVERY 12 HOURS
Refills: 0 | Status: DISCONTINUED | OUTPATIENT
Start: 2021-01-28 | End: 2021-01-28

## 2021-01-28 RX ADMIN — Medication 0.6 MILLIGRAM(S): at 12:23

## 2021-01-28 RX ADMIN — APIXABAN 2.5 MILLIGRAM(S): 2.5 TABLET, FILM COATED ORAL at 17:25

## 2021-01-28 RX ADMIN — SEVELAMER CARBONATE 1600 MILLIGRAM(S): 2400 POWDER, FOR SUSPENSION ORAL at 05:25

## 2021-01-28 RX ADMIN — DRONEDARONE 400 MILLIGRAM(S): 400 TABLET, FILM COATED ORAL at 17:25

## 2021-01-28 RX ADMIN — Medication 25 MILLIGRAM(S): at 05:25

## 2021-01-28 RX ADMIN — Medication 25 MILLIGRAM(S): at 12:23

## 2021-01-28 RX ADMIN — DRONEDARONE 400 MILLIGRAM(S): 400 TABLET, FILM COATED ORAL at 05:26

## 2021-01-28 RX ADMIN — Medication 25 MILLIGRAM(S): at 21:08

## 2021-01-28 RX ADMIN — Medication 325 MILLIGRAM(S): at 12:23

## 2021-01-28 RX ADMIN — FAMOTIDINE 20 MILLIGRAM(S): 10 INJECTION INTRAVENOUS at 12:23

## 2021-01-28 RX ADMIN — SEVELAMER CARBONATE 1600 MILLIGRAM(S): 2400 POWDER, FOR SUSPENSION ORAL at 21:08

## 2021-01-28 RX ADMIN — Medication 1 MILLIGRAM(S): at 12:23

## 2021-01-28 RX ADMIN — Medication 81 MILLIGRAM(S): at 12:23

## 2021-01-28 RX ADMIN — SEVELAMER CARBONATE 1600 MILLIGRAM(S): 2400 POWDER, FOR SUSPENSION ORAL at 12:22

## 2021-01-28 RX ADMIN — ATORVASTATIN CALCIUM 80 MILLIGRAM(S): 80 TABLET, FILM COATED ORAL at 21:08

## 2021-01-28 NOTE — PROGRESS NOTE ADULT - PROBLEM SELECTOR PLAN 5
Health Maintenance Summary     Topic Due On Due Status Completed On Postpone Until Reason    Colorectal Cancer Screening - Colonoscopy Jan 1, 2018 Postponed Jan 1, 2008 Jun 16, 2018 Patient Refused    Immunization - Pneumococcal Jan 16, 2009 Postponed  Jun 16, 2018 Patient Refused    Diabetes Eye Exam Aug 28, 2018 Not Due Aug 28, 2017      Glycohemoglobin A1C  (Diabetes Sugar)  Jul 18, 2018 Not Due Apr 18, 2018      GFR  (Kidney Function Test)  Apr 20, 2019 Not Due Apr 20, 2018      Diabetes Foot Exam  Jan 16, 1962 Postponed  Jun 16, 2018 Patient Refused    Medicare Wellness Visit Aug 7, 2018 Due Soon Aug 7, 2017      IMMUNIZATION - DTaP/Tdap/Td Jan 16, 1963 Postponed  Jun 16, 2018 Patient Refused    Immunization-Influenza Sep 1, 2018 Not Due Oct 19, 2016            Patient is due for topics as listed above, he wishes to decline at this time .         IMPROVE VTE Individual Risk Assessment  RISK                                                         Points  [  ] Previous VTE                                      3  [  ] Thrombophilia                                   2  [  ] Lower limb paralysis                         2 (unable to hold up >15 seconds)    [  ] Current Cancer                                  2       (within 6 months)  [ x ] Immobilization > 24 hrs                    1  [  ] ICU/CCU stay > 24 hrs                         1  [ x ] Age > 60                                              1  cw heparin for dvt ppx

## 2021-01-28 NOTE — ADVANCED PRACTICE NURSE CONSULT - ASSESSMENT
This is a 75yr old male patient admitted for NSTEMI, to which upon assessment the patient is without pressure injury

## 2021-01-28 NOTE — PROGRESS NOTE ADULT - PROBLEM SELECTOR PLAN 2
-Pt presented with symptomatic svt s/p adenosine in ED   -will cw metoprolol 25 bid and Eliquis   -as per pt he not on AC; Nursing records show pt is on Eliquis 2.5 bid  -Pt mentioned in the morning that he doesn't want to take Eliquis

## 2021-01-28 NOTE — PROGRESS NOTE ADULT - PROBLEM SELECTOR PLAN 4
-Pt on HD m/w/f  -continue with home medications  -will be d/c after hemodialysis tomorrow   Will consult Dr. Jacinto

## 2021-01-28 NOTE — PROGRESS NOTE ADULT - ATTENDING COMMENTS
pt refused treatment  spoke with Dr. Salinas , if remain stable for 24 hrs w/out rapid afib or svt than will dc after HD tomorrow

## 2021-01-28 NOTE — PROGRESS NOTE ADULT - PROBLEM SELECTOR PLAN 1
-EKG showed  Pt presented with chest pain associated with palpitations  - EKG showed SVT s/p adenosine in ED--> cp now resolved   - pt pw similar episode few months ago and refused stress test on that admission   - echo in 202o showed EF > 55%, moderate AS   - cw ASA, Lipitor + BB  - Check Troponins:  0.18 >0.4>0.8  - probnp [id number]  - Telemetry monitoring  -Refused stress test in the morning   -Will monitor 24 hours for any arrhythmia and will d/c tomorrow  - Cardiology Dr. Salinas consulted

## 2021-01-29 ENCOUNTER — TRANSCRIPTION ENCOUNTER (OUTPATIENT)
Age: 76
End: 2021-01-29

## 2021-01-29 VITALS
OXYGEN SATURATION: 100 % | HEART RATE: 59 BPM | DIASTOLIC BLOOD PRESSURE: 50 MMHG | SYSTOLIC BLOOD PRESSURE: 130 MMHG | RESPIRATION RATE: 17 BRPM | TEMPERATURE: 97 F

## 2021-01-29 LAB
ALBUMIN SERPL ELPH-MCNC: 3.1 G/DL — LOW (ref 3.5–5)
ALP SERPL-CCNC: 118 U/L — SIGNIFICANT CHANGE UP (ref 40–120)
ALT FLD-CCNC: 37 U/L DA — SIGNIFICANT CHANGE UP (ref 10–60)
ANION GAP SERPL CALC-SCNC: 10 MMOL/L — SIGNIFICANT CHANGE UP (ref 5–17)
AST SERPL-CCNC: 18 U/L — SIGNIFICANT CHANGE UP (ref 10–40)
BILIRUB SERPL-MCNC: 0.5 MG/DL — SIGNIFICANT CHANGE UP (ref 0.2–1.2)
BUN SERPL-MCNC: 65 MG/DL — HIGH (ref 7–18)
CALCIUM SERPL-MCNC: 8.6 MG/DL — SIGNIFICANT CHANGE UP (ref 8.4–10.5)
CHLORIDE SERPL-SCNC: 97 MMOL/L — SIGNIFICANT CHANGE UP (ref 96–108)
CO2 SERPL-SCNC: 29 MMOL/L — SIGNIFICANT CHANGE UP (ref 22–31)
CREAT SERPL-MCNC: 11.1 MG/DL — HIGH (ref 0.5–1.3)
GLUCOSE SERPL-MCNC: 136 MG/DL — HIGH (ref 70–99)
HCT VFR BLD CALC: 34.6 % — LOW (ref 39–50)
HGB BLD-MCNC: 11.4 G/DL — LOW (ref 13–17)
MAGNESIUM SERPL-MCNC: 2.2 MG/DL — SIGNIFICANT CHANGE UP (ref 1.6–2.6)
MCHC RBC-ENTMCNC: 32.9 GM/DL — SIGNIFICANT CHANGE UP (ref 32–36)
MCHC RBC-ENTMCNC: 32.9 PG — SIGNIFICANT CHANGE UP (ref 27–34)
MCV RBC AUTO: 100 FL — SIGNIFICANT CHANGE UP (ref 80–100)
NRBC # BLD: 0 /100 WBCS — SIGNIFICANT CHANGE UP (ref 0–0)
PHOSPHATE SERPL-MCNC: 5.3 MG/DL — HIGH (ref 2.5–4.5)
PLATELET # BLD AUTO: 151 K/UL — SIGNIFICANT CHANGE UP (ref 150–400)
POTASSIUM SERPL-MCNC: 5 MMOL/L — SIGNIFICANT CHANGE UP (ref 3.5–5.3)
POTASSIUM SERPL-SCNC: 5 MMOL/L — SIGNIFICANT CHANGE UP (ref 3.5–5.3)
PROT SERPL-MCNC: 7 G/DL — SIGNIFICANT CHANGE UP (ref 6–8.3)
RBC # BLD: 3.46 M/UL — LOW (ref 4.2–5.8)
RBC # FLD: 14.6 % — HIGH (ref 10.3–14.5)
SODIUM SERPL-SCNC: 136 MMOL/L — SIGNIFICANT CHANGE UP (ref 135–145)
WBC # BLD: 4.55 K/UL — SIGNIFICANT CHANGE UP (ref 3.8–10.5)
WBC # FLD AUTO: 4.55 K/UL — SIGNIFICANT CHANGE UP (ref 3.8–10.5)

## 2021-01-29 PROCEDURE — 82607 VITAMIN B-12: CPT

## 2021-01-29 PROCEDURE — 84443 ASSAY THYROID STIM HORMONE: CPT

## 2021-01-29 PROCEDURE — 82553 CREATINE MB FRACTION: CPT

## 2021-01-29 PROCEDURE — 85025 COMPLETE CBC W/AUTO DIFF WBC: CPT

## 2021-01-29 PROCEDURE — 99239 HOSP IP/OBS DSCHRG MGMT >30: CPT

## 2021-01-29 PROCEDURE — 83735 ASSAY OF MAGNESIUM: CPT

## 2021-01-29 PROCEDURE — 82746 ASSAY OF FOLIC ACID SERUM: CPT

## 2021-01-29 PROCEDURE — 82550 ASSAY OF CK (CPK): CPT

## 2021-01-29 PROCEDURE — 84100 ASSAY OF PHOSPHORUS: CPT

## 2021-01-29 PROCEDURE — 80061 LIPID PANEL: CPT

## 2021-01-29 PROCEDURE — 99497 ADVNCD CARE PLAN 30 MIN: CPT | Mod: 25

## 2021-01-29 PROCEDURE — 86769 SARS-COV-2 COVID-19 ANTIBODY: CPT

## 2021-01-29 PROCEDURE — 83880 ASSAY OF NATRIURETIC PEPTIDE: CPT

## 2021-01-29 PROCEDURE — 96374 THER/PROPH/DIAG INJ IV PUSH: CPT

## 2021-01-29 PROCEDURE — 84484 ASSAY OF TROPONIN QUANT: CPT

## 2021-01-29 PROCEDURE — 82728 ASSAY OF FERRITIN: CPT

## 2021-01-29 PROCEDURE — 36415 COLL VENOUS BLD VENIPUNCTURE: CPT

## 2021-01-29 PROCEDURE — 99291 CRITICAL CARE FIRST HOUR: CPT

## 2021-01-29 PROCEDURE — 83540 ASSAY OF IRON: CPT

## 2021-01-29 PROCEDURE — 71045 X-RAY EXAM CHEST 1 VIEW: CPT

## 2021-01-29 PROCEDURE — 93306 TTE W/DOPPLER COMPLETE: CPT

## 2021-01-29 PROCEDURE — 85027 COMPLETE CBC AUTOMATED: CPT

## 2021-01-29 PROCEDURE — 83036 HEMOGLOBIN GLYCOSYLATED A1C: CPT

## 2021-01-29 PROCEDURE — 85610 PROTHROMBIN TIME: CPT

## 2021-01-29 PROCEDURE — U0005: CPT

## 2021-01-29 PROCEDURE — 93005 ELECTROCARDIOGRAM TRACING: CPT

## 2021-01-29 PROCEDURE — 80048 BASIC METABOLIC PNL TOTAL CA: CPT

## 2021-01-29 PROCEDURE — 99261: CPT

## 2021-01-29 PROCEDURE — 83550 IRON BINDING TEST: CPT

## 2021-01-29 PROCEDURE — 85730 THROMBOPLASTIN TIME PARTIAL: CPT

## 2021-01-29 PROCEDURE — 87635 SARS-COV-2 COVID-19 AMP PRB: CPT

## 2021-01-29 PROCEDURE — 80053 COMPREHEN METABOLIC PANEL: CPT

## 2021-01-29 RX ORDER — METOPROLOL TARTRATE 50 MG
1 TABLET ORAL
Qty: 90 | Refills: 0
Start: 2021-01-29 | End: 2021-02-27

## 2021-01-29 RX ORDER — ISOSORBIDE MONONITRATE 60 MG/1
1 TABLET, EXTENDED RELEASE ORAL
Qty: 30 | Refills: 0
Start: 2021-01-29 | End: 2021-02-27

## 2021-01-29 RX ORDER — ISOSORBIDE DINITRATE 5 MG/1
0.5 TABLET ORAL
Qty: 0 | Refills: 0 | DISCHARGE

## 2021-01-29 RX ORDER — ISOSORBIDE MONONITRATE 60 MG/1
30 TABLET, EXTENDED RELEASE ORAL DAILY
Refills: 0 | Status: DISCONTINUED | OUTPATIENT
Start: 2021-01-29 | End: 2021-01-29

## 2021-01-29 RX ADMIN — Medication 325 MILLIGRAM(S): at 14:35

## 2021-01-29 RX ADMIN — ISOSORBIDE MONONITRATE 30 MILLIGRAM(S): 60 TABLET, EXTENDED RELEASE ORAL at 14:34

## 2021-01-29 RX ADMIN — Medication 1 MILLIGRAM(S): at 14:35

## 2021-01-29 RX ADMIN — SEVELAMER CARBONATE 1600 MILLIGRAM(S): 2400 POWDER, FOR SUSPENSION ORAL at 05:24

## 2021-01-29 RX ADMIN — Medication 25 MILLIGRAM(S): at 14:35

## 2021-01-29 RX ADMIN — APIXABAN 2.5 MILLIGRAM(S): 2.5 TABLET, FILM COATED ORAL at 05:25

## 2021-01-29 RX ADMIN — Medication 81 MILLIGRAM(S): at 14:35

## 2021-01-29 RX ADMIN — DRONEDARONE 400 MILLIGRAM(S): 400 TABLET, FILM COATED ORAL at 17:12

## 2021-01-29 RX ADMIN — Medication 25 MILLIGRAM(S): at 05:25

## 2021-01-29 RX ADMIN — SEVELAMER CARBONATE 1600 MILLIGRAM(S): 2400 POWDER, FOR SUSPENSION ORAL at 14:34

## 2021-01-29 RX ADMIN — DRONEDARONE 400 MILLIGRAM(S): 400 TABLET, FILM COATED ORAL at 05:24

## 2021-01-29 RX ADMIN — FAMOTIDINE 20 MILLIGRAM(S): 10 INJECTION INTRAVENOUS at 14:35

## 2021-01-29 RX ADMIN — Medication 0.6 MILLIGRAM(S): at 14:35

## 2021-01-29 RX ADMIN — APIXABAN 2.5 MILLIGRAM(S): 2.5 TABLET, FILM COATED ORAL at 17:12

## 2021-01-29 NOTE — PROGRESS NOTE ADULT - ASSESSMENT
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  1.D/C Tele monitoring.  2.Stress test -refused by patient.  3.PAF-asa, eliquis,cont multaq, lopressor 25mg q8h.  4.HTN-cont hydralazine,add imdur 30mg qd.  5.ESRD-HD as per renal.  6.CVA-asa,statin,eliquis.  7.GI and DVT prophylaxis.
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  1.Tele monitoring.  2.Stress test -refused by patient.  3.PAF-asa, resume eliquis,cont multaq, lopressor 25mg q8h.  4.HTN-cont hydralazine,d/c isordil.  5.ESRD-HD as per renal.  6.CVA-asa,statin,resume eliquis.  7.GI and DVT prophylaxis.
ESRD  Chest pain with PAF resolved  Hyperpo4 , refused to take  binders in his NH  Compliance with all medications is not good. Refuses stress test now and refused in previous admissions  He understands the need for medications.    At present will dc calcitriol , restart binders.   Will follow with dialysis in am
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 Pt is found to be in 's-150's, in SVT, pt was given dose of adenosine and was converted to SR. Pt was also found to have elevated trop. Trend Trop, cardiology and nephology following.
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 Pt is admitted for further evaluation of NSTEMI

## 2021-01-29 NOTE — DIETITIAN INITIAL EVALUATION ADULT. - ORAL INTAKE PTA/DIET HISTORY
visited patient in dialysis, reports adequate oral intake PTA. follows renal diet, 2-3gNa, 2gK, NCS, Low fat, low chol. regular consistency and thin liquids , 1200ml fluid restriction visited patient in dialysis, reports adequate oral intake PTA. follows renal diet, 2-3gNa, 2gK, NCS, Low fat, low chol. regular consistency and thin liquids , 1200ml fluid restriction at nursing home

## 2021-01-29 NOTE — PROGRESS NOTE ADULT - SUBJECTIVE AND OBJECTIVE BOX
CHIEF COMPLAINT:Patient is a 75y old  Male who presents with a chief complaint of chest pain.Pt refusing to go for stress test this am.    	  REVIEW OF SYSTEMS:  CONSTITUTIONAL: No fever, weight loss, or fatigue  EYES: No eye pain, visual disturbances, or discharge  ENT:  No difficulty hearing, tinnitus, vertigo; No sinus or throat pain  NECK: No pain or stiffness  RESPIRATORY: No cough, wheezing, chills or hemoptysis; No Shortness of Breath  CARDIOVASCULAR: No chest pain, palpitations, passing out, dizziness, or leg swelling  GASTROINTESTINAL: No abdominal or epigastric pain. No nausea, vomiting, or hematemesis; No diarrhea or constipation. No melena or hematochezia.  GENITOURINARY: No dysuria, frequency, hematuria, or incontinence  NEUROLOGICAL: No headaches, memory loss, loss of strength, numbness, or tremors  SKIN: No itching, burning, rashes, or lesions   LYMPH Nodes: No enlarged glands  ENDOCRINE: No heat or cold intolerance; No hair loss  MUSCULOSKELETAL: No joint pain or swelling; No muscle, back, or extremity pain  PSYCHIATRIC: No depression, anxiety, mood swings, or difficulty sleeping  HEME/LYMPH: No easy bruising, or bleeding gums  ALLERGY AND IMMUNOLOGIC: No hives or eczema	        PHYSICAL EXAM:  T(C): 36.7 (01-28-21 @ 07:24), Max: 36.9 (01-27-21 @ 17:15)  HR: 87 (01-28-21 @ 07:24) (56 - 87)  BP: 146/87 (01-28-21 @ 07:24) (136/59 - 189/74)  RR: 18 (01-28-21 @ 07:24) (17 - 18)  SpO2: 97% (01-28-21 @ 07:24) (96% - 100%)  Wt(kg): --  I&O's Summary    27 Jan 2021 07:01  -  28 Jan 2021 07:00  --------------------------------------------------------  IN: 718 mL / OUT: 2600 mL / NET: -1882 mL        Appearance: Normal	  HEENT:   Normal oral mucosa, PERRL, EOMI	  Lymphatic: No lymphadenopathy  Cardiovascular: Normal S1 S2, No JVD, No murmurs, No edema  Respiratory: Lungs clear to auscultation	  Psychiatry: A & O x 3, Mood & affect appropriate  Gastrointestinal:  Soft, Non-tender, + BS	  Skin: No rashes, No ecchymoses, No cyanosis	  Neurologic: Non-focal  Extremities: Normal range of motion, No clubbing, cyanosis or edema  Vascular: Peripheral pulses palpable 2+ bilaterally    MEDICATIONS  (STANDING):  aspirin  chewable 81 milliGRAM(s) Oral daily  atorvastatin 80 milliGRAM(s) Oral at bedtime  calcitriol   Capsule 0.25 MICROGram(s) Oral daily  colchicine 0.6 milliGRAM(s) Oral two times a day  dronedarone 400 milliGRAM(s) Oral two times a day  famotidine    Tablet 20 milliGRAM(s) Oral daily  ferrous    sulfate 325 milliGRAM(s) Oral daily  folic acid 1 milliGRAM(s) Oral daily  heparin   Injectable 5000 Unit(s) SubCutaneous every 12 hours  hydrALAZINE 25 milliGRAM(s) Oral three times a day  metoprolol tartrate 25 milliGRAM(s) Oral every 8 hours  sevelamer carbonate 1600 milliGRAM(s) Oral every 8 hours      TELEMETRY: 	nsr    	  	  LABS:	 	      CARDIAC MARKERS ( 27 Jan 2021 13:47 )  0.888 ng/mL / x     / x     / x     / x      CARDIAC MARKERS ( 27 Jan 2021 06:09 )  0.437 ng/mL / x     / 195 U/L / x     / 5.0 ng/mL  CARDIAC MARKERS ( 27 Jan 2021 01:59 )  0.181 ng/mL / x     / x     / x     / x                                    10.7   4.73  )-----------( 103      ( 27 Jan 2021 06:09 )             32.9     01-27    135  |  95<L>  |  72<H>  ----------------------------<  79  5.1   |  29  |  10.60<H>    Ca    8.6      27 Jan 2021 06:09  Phos  6.9     01-27  Mg     2.1     01-27    TPro  7.3  /  Alb  3.3<L>  /  TBili  0.4  /  DBili  x   /  AST  20  /  ALT  46  /  AlkPhos  127<H>  01-27    proBNP: Serum Pro-Brain Natriuretic Peptide: 62852 pg/mL (01-27 @ 01:59)    Lipid Profile: Cholesterol 120  LDL --  HDL 58  TG 58      TSH: Thyroid Stimulating Hormone, Serum: 2.42 uU/mL (01-27 @ 06:09)      	        
Patient is a 75y old  Male who presents with a chief complaint of chest pain (27 Jan 2021 03:48)    INTERVAL HPI/OVERNIGHT EVENTS: no new complaints    REVIEW OF SYSTEMS:  CONSTITUTIONAL: No fever, chills  ENMT:  No difficulty hearing, no change in vision  NECK: No pain or stiffness  RESPIRATORY: No cough, SOB  CARDIOVASCULAR: No chest pain, palpitations  GASTROINTESTINAL: No abdominal pain. No nausea, vomiting, or diarrhea  GENITOURINARY: No dysuria  NEUROLOGICAL: No HA  SKIN: No itching, burning, rashes, or lesions   LYMPH NODES: No enlarged glands  ENDOCRINE: No heat or cold intolerance; No hair loss  MUSCULOSKELETAL: No joint pain or swelling; No muscle, back, or extremity pain  PSYCHIATRIC: No depression, anxiety  HEME/LYMPH: No easy bruising, or bleeding gums    T(C): 36.6 (01-27-21 @ 07:49), Max: 36.6 (01-27-21 @ 07:49)  HR: 65 (01-27-21 @ 07:49) (65 - 153)  BP: 137/68 (01-27-21 @ 07:49) (137/68 - 178/98)  RR: 18 (01-27-21 @ 07:49) (16 - 19)  SpO2: 100% (01-27-21 @ 07:49) (96% - 100%)  Wt(kg): --Vital Signs Last 24 Hrs  T(C): 36.6 (27 Jan 2021 07:49), Max: 36.6 (27 Jan 2021 07:49)  T(F): 97.9 (27 Jan 2021 07:49), Max: 97.9 (27 Jan 2021 07:49)  HR: 65 (27 Jan 2021 07:49) (65 - 153)  BP: 137/68 (27 Jan 2021 07:49) (137/68 - 178/98)  BP(mean): --  RR: 18 (27 Jan 2021 07:49) (16 - 19)  SpO2: 100% (27 Jan 2021 07:49) (96% - 100%)    MEDICATIONS  (STANDING):  aspirin  chewable 81 milliGRAM(s) Oral daily  atorvastatin 80 milliGRAM(s) Oral at bedtime  calcitriol   Capsule 0.25 MICROGram(s) Oral daily  colchicine 0.6 milliGRAM(s) Oral two times a day  dronedarone 400 milliGRAM(s) Oral two times a day  famotidine    Tablet 20 milliGRAM(s) Oral daily  ferrous    sulfate 325 milliGRAM(s) Oral daily  folic acid 1 milliGRAM(s) Oral daily  heparin   Injectable 5000 Unit(s) SubCutaneous every 8 hours  hydrALAZINE 25 milliGRAM(s) Oral three times a day  isosorbide   dinitrate Tablet (ISORDIL) 10 milliGRAM(s) Oral two times a day  metoprolol tartrate 25 milliGRAM(s) Oral every 12 hours  sevelamer carbonate 1600 milliGRAM(s) Oral every 8 hours    MEDICATIONS  (PRN):    PHYSICAL EXAM:  GENERAL: NAD  EYES: clear conjunctiva; EOMI  ENMT: Moist mucous membranes  NECK: Supple, No JVD, Normal thyroid  CHEST/LUNG: Clear to auscultation bilaterally; No rales, rhonchi, wheezing, or rubs  HEART: S1, S2, Regular rate and rhythm  ABDOMEN: Soft, Nontender, Nondistended; Bowel sounds present  NEURO: Alert & Oriented X3  EXTREMITIES: No LE edema, no calf tenderness  LYMPH: No lymphadenopathy noted  SKIN: No rashes or lesions    Consultant(s) Notes Reviewed:  [x ] YES  [ ] NO  Care Discussed with Consultants/Other Providers [ x] YES  [ ] NO    LABS:                        10.7   4.73  )-----------( 103      ( 27 Jan 2021 06:09 )             32.9     01-27    135  |  95<L>  |  72<H>  ----------------------------<  79  5.1   |  29  |  10.60<H>    Ca    8.6      27 Jan 2021 06:09  Phos  6.9     01-27  Mg     2.1     01-27    TPro  7.3  /  Alb  3.3<L>  /  TBili  0.4  /  DBili  x   /  AST  20  /  ALT  46  /  AlkPhos  127<H>  01-27    PT/INR - ( 27 Jan 2021 01:59 )   PT: 11.4 sec;   INR: 0.96 ratio         PTT - ( 27 Jan 2021 01:59 )  PTT:36.4 sec  CAPILLARY BLOOD GLUCOSE    RADIOLOGY & ADDITIONAL TESTS:    Imaging Personally Reviewed:  [ x] YES  [ ] NO  < from: Xray Chest 1 View-PORTABLE IMMEDIATE (01.27.21 @ 02:09) >  EXAM:  XR CHEST PORTABLE IMMED 1V                            PROCEDURE DATE:  01/27/2021          INTERPRETATION:  Chest portable.    Clinical History: Chest pain.    Comparison: 9/18/2020.    Single AP view submitted.    The evaluation of the cardiomediastinal silhouette is limited on portable technique.  External pacer lead overlies the left hemithorax limiting evaluation.      There is prominence of the central pulmonary vasculature, finding which may reflect pulmonary venous hypertension.  There is mild prominence of the bilateral bronchovascular markings.  No lobar lung consolidation or significant pleural effusion is noted.    Right subclavian and axillary vascular stent is again noted.    Impression:    Findings as discussed above.      CHEIKH FRAGOSO M.D., ATTENDING RADIOLOGIST  This document has been electronically signed. Jan 27 2021  8:10AM    < end of copied text >    
  CHIEF COMPLAINT:Patient is a 75y old  Male who presents with a chief complaint of chest pain.Pt has had no further chest pain or SVT.    	  REVIEW OF SYSTEMS:  CONSTITUTIONAL: No fever, weight loss, or fatigue  EYES: No eye pain, visual disturbances, or discharge  ENT:  No difficulty hearing, tinnitus, vertigo; No sinus or throat pain  NECK: No pain or stiffness  RESPIRATORY: No cough, wheezing, chills or hemoptysis; No Shortness of Breath  CARDIOVASCULAR: No chest pain, palpitations, passing out, dizziness, or leg swelling  GASTROINTESTINAL: No abdominal or epigastric pain. No nausea, vomiting, or hematemesis; No diarrhea or constipation. No melena or hematochezia.  GENITOURINARY: No dysuria, frequency, hematuria, or incontinence  NEUROLOGICAL: No headaches, memory loss, loss of strength, numbness, or tremors  SKIN: No itching, burning, rashes, or lesions   LYMPH Nodes: No enlarged glands  ENDOCRINE: No heat or cold intolerance; No hair loss  MUSCULOSKELETAL: No joint pain or swelling; No muscle, back, or extremity pain  PSYCHIATRIC: No depression, anxiety, mood swings, or difficulty sleeping  HEME/LYMPH: No easy bruising, or bleeding gums  ALLERGY AND IMMUNOLOGIC: No hives or eczema	    PHYSICAL EXAM:  T(C): 36.2 (01-29-21 @ 08:11), Max: 37.2 (01-28-21 @ 19:35)  HR: 70 (01-29-21 @ 08:11) (66 - 81)  BP: 160/66 (01-29-21 @ 08:11) (157/69 - 185/76)  RR: 19 (01-29-21 @ 08:11) (17 - 19)  SpO2: 99% (01-29-21 @ 08:11) (94% - 100%)  Wt(kg): --  I&O's Summary    28 Jan 2021 07:01  -  29 Jan 2021 07:00  --------------------------------------------------------  IN: 430 mL / OUT: 0 mL / NET: 430 mL        Appearance: Normal	  HEENT:   Normal oral mucosa, PERRL, EOMI	  Lymphatic: No lymphadenopathy  Cardiovascular: Normal S1 S2, No JVD, No murmurs, No edema  Respiratory: Lungs clear to auscultation	  Psychiatry: A & O x 3, Mood & affect appropriate  Gastrointestinal:  Soft, Non-tender, + BS	  Skin: No rashes, No ecchymoses, No cyanosis	  Neurologic: Non-focal  Extremities: Normal range of motion, No clubbing, cyanosis or edema  Vascular: Peripheral pulses palpable 2+ bilaterally    MEDICATIONS  (STANDING):  apixaban 2.5 milliGRAM(s) Oral two times a day  aspirin  chewable 81 milliGRAM(s) Oral daily  atorvastatin 80 milliGRAM(s) Oral at bedtime  colchicine 0.6 milliGRAM(s) Oral daily  dronedarone 400 milliGRAM(s) Oral two times a day  famotidine    Tablet 20 milliGRAM(s) Oral daily  ferrous    sulfate 325 milliGRAM(s) Oral daily  folic acid 1 milliGRAM(s) Oral daily  hydrALAZINE 25 milliGRAM(s) Oral three times a day  metoprolol tartrate 25 milliGRAM(s) Oral every 8 hours  sevelamer carbonate 1600 milliGRAM(s) Oral every 8 hours      TELEMETRY: 	nsr,coupletes    	  	  LABS:	 	      CARDIAC MARKERS ( 27 Jan 2021 13:47 )  0.888 ng/mL / x     / x     / x     / x                      proBNP: Serum Pro-Brain Natriuretic Peptide: 61702 pg/mL (01-27 @ 01:59)    Lipid Profile: Cholesterol 120  LDL --  HDL 58  TG 58      TSH: Thyroid Stimulating Hormone, Serum: 2.42 uU/mL (01-27 @ 06:09)      	        
Problem List:  ESRD  PAF, refused anticoagulants treatment and stress test  Feels well no more CP, no palpitation.    PAST MEDICAL & SURGICAL HISTORY:  Cerebrovascular accident (CVA), unspecified mechanism    Anemia    Hypertension    ESRD (end stage renal disease) on dialysis    A-V fistula        No Known Allergies      MEDICATIONS  (STANDING):  apixaban 2.5 milliGRAM(s) Oral two times a day  aspirin  chewable 81 milliGRAM(s) Oral daily  atorvastatin 80 milliGRAM(s) Oral at bedtime  calcitriol   Capsule 0.25 MICROGram(s) Oral daily  colchicine 0.6 milliGRAM(s) Oral two times a day  dronedarone 400 milliGRAM(s) Oral two times a day  famotidine    Tablet 20 milliGRAM(s) Oral daily  ferrous    sulfate 325 milliGRAM(s) Oral daily  folic acid 1 milliGRAM(s) Oral daily  hydrALAZINE 25 milliGRAM(s) Oral three times a day  metoprolol tartrate 25 milliGRAM(s) Oral every 8 hours  sevelamer carbonate 1600 milliGRAM(s) Oral every 8 hours    MEDICATIONS  (PRN):                            10.7   4.73  )-----------( 103      ( 27 Jan 2021 06:09 )             32.9     01-27    135  |  95<L>  |  72<H>  ----------------------------<  79  5.1   |  29  |  10.60<H>    Ca    8.6      27 Jan 2021 06:09  Phos  6.9     01-27  Mg     2.1     01-27    TPro  7.3  /  Alb  3.3<L>  /  TBili  0.4  /  DBili  x   /  AST  20  /  ALT  46  /  AlkPhos  127<H>  01-27    PT/INR - ( 27 Jan 2021 01:59 )   PT: 11.4 sec;   INR: 0.96 ratio         PTT - ( 27 Jan 2021 01:59 )  PTT:36.4 sec        REVIEW OF SYSTEMS:  General: no fever no chills, no weight loss.  EYES/ENT: No visual changes;  No vertigo, no headache.  NECK: No pain or stiffness  RESPIRATORY: No cough, wheezing, hemoptysis; No shortness of breath  CARDIOVASCULAR: No chest pain or palpitations. No Edema  GASTROINTESTINAL: No abdominal or epigastric pain. No nausea, vomiting. No diarrhea or constipation. No melena.  GENITOURINARY: No dysuria, reduce urine output          VITALS:  T(F): 98 (01-28-21 @ 07:24), Max: 98.5 (01-27-21 @ 17:15)  HR: 87 (01-28-21 @ 07:24)  BP: 146/87 (01-28-21 @ 07:24)  RR: 18 (01-28-21 @ 07:24)  SpO2: 97% (01-28-21 @ 07:24)  Wt(kg): --    01-27 @ 07:01  -  01-28 @ 07:00  --------------------------------------------------------  IN: 718 mL / OUT: 2600 mL / NET: -1882 mL    01-28 @ 07:01  -  01-28 @ 10:28  --------------------------------------------------------  IN: 200 mL / OUT: 0 mL / NET: 200 mL        PHYSICAL EXAM:  Constitutional: no diaphoresis, no distress.  Neck: No JVD, no carotid bruit, supple, no adenopathy  Respiratory: air entrance B/L, no wheezes, rales or rhonchi  Cardiovascular: S1, S2, RRR, no pericardial rub, no murmur  Abdomen: BS+, soft, no tenderness, no bruit  Pelvis: bladder nondistended  Extremities: No cyanosis or clubbing. No peripheral edema.   Alert awake oriented by 3  Vascular Access: left upper arm AVG with bruit and thrill    
PGY-1 Progress Note discussed with attending    PAGER #: [[phone]] TILL 5:00 PM  PLEASE CONTACT ON CALL TEAM:  - On Call Team (Please refer to Mary) FROM 5:00 PM - 8:30PM  - Nightfloat Team FROM 8:30 -7:30 AM    CHIEF COMPLAINT & BRIEF HOSPITAL COURSE: 76 yo male from [facility] with hx of HTN, Afib, cva ( 10 years ago) with right sided residual deficit,  ESRD on HD, (M, W ,F), presented with acute onset chest pain which started 11pm tonight. Pt states he was watching tv when the pain started and lasted approx 15 mins. Pt describes the pain as left sided heaviness with no radiation. pain was associated with palpitations. Pt denied sob, n/v with this episode of chest pain. Pt stated he has had similar episodes in the past. Pt ambulates on wheelchair and denies exertional chest pain or dyspnea on exertion. Pt is found to be in 's-150's, in SVT, pt was given dose of adenosine and was converted to SR. Pt was also found to have elevated trop X3 . Refused stress test. Pt would be satying for 24 hours to monitor for any arrhythmia D/C tomorrow     INTERVAL HPI/OVERNIGHT EVENTS: No active event overnight. Pt refused stress test in the morning Pt examined at bedside this morning . A&O3 but very poor insight of his medical condition. He stated that he should not be on Eliquis and not even on dialysis which was irrational. Pt endorsed that he refused stress test because he doesnt have chest pain and he is alright.       REVIEW OF SYSTEMS:  no complaints on every question     Vital Signs Last 24 Hrs  T(C): 36.7 (28 Jan 2021 11:11), Max: 36.9 (27 Jan 2021 17:15)  T(F): 98.1 (28 Jan 2021 11:11), Max: 98.5 (27 Jan 2021 17:15)  HR: 81 (28 Jan 2021 11:11) (56 - 87)  BP: 159/80 (28 Jan 2021 11:11) (146/87 - 189/74)  BP(mean): --  RR: 18 (28 Jan 2021 11:11) (17 - 18)  SpO2: 97% (28 Jan 2021 11:11) (96% - 100%)    PHYSICAL EXAMINATION:  GENERAL: NAD,  AV fistula on R arm   HEAD:  Atraumatic, Normocephalic  EYES:  conjunctiva and sclera clear  NECK: Supple, No JVD, Normal thyroid  CHEST/LUNG: Clear to auscultation. Clear to percussion bilaterally; No rales, rhonchi, wheezing, or rubs  HEART: Regular rate and rhythm; No murmurs, rubs, or gallops  ABDOMEN: Soft, Nontender, Nondistended; Bowel sounds present  NERVOUS SYSTEM:  Alert & Oriented X3,    EXTREMITIES:  2+ Peripheral Pulses, No clubbing, cyanosis, or edema  SKIN: warm dry    MEDICATIONS  (STANDING):  apixaban 2.5 milliGRAM(s) Oral two times a day  aspirin  chewable 81 milliGRAM(s) Oral daily  atorvastatin 80 milliGRAM(s) Oral at bedtime  colchicine 0.6 milliGRAM(s) Oral daily  dronedarone 400 milliGRAM(s) Oral two times a day  famotidine    Tablet 20 milliGRAM(s) Oral daily  ferrous    sulfate 325 milliGRAM(s) Oral daily  folic acid 1 milliGRAM(s) Oral daily  hydrALAZINE 25 milliGRAM(s) Oral three times a day  metoprolol tartrate 25 milliGRAM(s) Oral every 8 hours  sevelamer carbonate 1600 milliGRAM(s) Oral every 8 hours    MEDICATIONS  (PRN):                            10.7   4.73  )-----------( 103      ( 27 Jan 2021 06:09 )             32.9     01-27    135  |  95<L>  |  72<H>  ----------------------------<  79  5.1   |  29  |  10.60<H>    Ca    8.6      27 Jan 2021 06:09  Phos  6.9     01-27  Mg     2.1     01-27    TPro  7.3  /  Alb  3.3<L>  /  TBili  0.4  /  DBili  x   /  AST  20  /  ALT  46  /  AlkPhos  127<H>  01-27    LIVER FUNCTIONS - ( 27 Jan 2021 01:59 )  Alb: 3.3 g/dL / Pro: 7.3 g/dL / ALK PHOS: 127 U/L / ALT: 46 U/L DA / AST: 20 U/L / GGT: x           CARDIAC MARKERS ( 27 Jan 2021 13:47 )  0.888 ng/mL / x     / x     / x     / x      CARDIAC MARKERS ( 27 Jan 2021 06:09 )  0.437 ng/mL / x     / 195 U/L / x     / 5.0 ng/mL  CARDIAC MARKERS ( 27 Jan 2021 01:59 )  0.181 ng/mL / x     / x     / x     / x          PT/INR - ( 27 Jan 2021 01:59 )   PT: 11.4 sec;   INR: 0.96 ratio         PTT - ( 27 Jan 2021 01:59 )  PTT:36.4 sec    CAPILLARY BLOOD GLUCOSE      RADIOLOGY & ADDITIONAL TESTS:

## 2021-01-29 NOTE — DIETITIAN INITIAL EVALUATION ADULT. - CHIEF COMPLAINT
Plan of care forms from birth to 3 program placed in provider's mailbox needed signature.   The patient is a 75y Male complaining of chest pain.

## 2021-01-29 NOTE — DIETITIAN INITIAL EVALUATION ADULT. - PROBLEM SELECTOR PLAN 1
-EKG showed  Pt presented with chest pain associated with palpitations  - EKG showed SVT s/p adenosine in ED--> cp now resolved   - pt pw similar episode few months ago and refused stress test on that admission   - echo in 202o showed EF > 55%, moderate AS   - cw ASA, Lipitor + BB  - Check Troponins: T1: .18 will trend and check T2 and T3  - probnp 21007  - Telemetry monitoring  - repeat TTE ordered  - Cardiology consult Dr. Salinas consulted

## 2021-01-29 NOTE — GOALS OF CARE CONVERSATION - ADVANCED CARE PLANNING - CONVERSATION DETAILS
Discussed goals of care with the pt in detail. Spoke with the pt regarding his medical condition.  Explained to the pt about intubation and CPR in detail. Pt mentioned that he wants every possible treatment if needed. He wants intubation and or cpr if necessary in the future.   Pt is full code.

## 2021-01-29 NOTE — DISCHARGE NOTE NURSING/CASE MANAGEMENT/SOCIAL WORK - PATIENT PORTAL LINK FT
You can access the FollowMyHealth Patient Portal offered by [facility] by registering at the following website: [email or web address]. By joining [employer]s FollowMyHealth portal, you will also be able to view your health information using other applications (apps) compatible with our system.

## 2021-01-29 NOTE — CHART NOTE - NSCHARTNOTEFT_GEN_A_CORE
Discussed goals of care with the pt in detail. Explained to the pt about intubation and CPR in detail. Pt mentioned that he wants every possible treatment if needed. He wants intubation and or cpr if necessary in the future.   Pt is full code.

## 2021-01-29 NOTE — DIETITIAN INITIAL EVALUATION ADULT. - OTHER INFO
Patient has No information On weight loss PTA. Reports adequate oral intake in-house. No complaints of Nausea, vomiting or diarrhea. No problems with chewing or swallowing food . No food allergies. skin- No pressure ulcers. Follows renal dialysis/DM diet PTA, declined additional education. given dialysis history, suggest to change diet to renal dialysis, DASH/TLC

## 2021-01-29 NOTE — DIETITIAN INITIAL EVALUATION ADULT. - PERTINENT LABORATORY DATA
01-29 Na136 mmol/L Glu 136 mg/dL<H> K+ 5.0 mmol/L Cr  11.10 mg/dL<H> BUN 65 mg/dL<H> 01-29 Phos 5.3 mg/dL<H> 01-29 Alb 3.1 g/dL<L> 01-27 Chol 120 mg/dL LDL --    HDL 58 mg/dL Trig 58 mg/dL

## 2021-05-11 ENCOUNTER — APPOINTMENT (OUTPATIENT)
Dept: VASCULAR SURGERY | Facility: CLINIC | Age: 76
End: 2021-05-11
Payer: MEDICARE

## 2021-05-11 PROCEDURE — 93990 DOPPLER FLOW TESTING: CPT

## 2021-05-11 PROCEDURE — 99213 OFFICE O/P EST LOW 20 MIN: CPT

## 2021-05-11 NOTE — HISTORY OF PRESENT ILLNESS
[FreeTextEntry1] : 76yo male with history of esrd on hd presents for follow up of right upper extremity avg.  pt denies any difficulty with the avg during hd.   no recurrent issues.   [] : in right upper arm

## 2021-05-11 NOTE — PHYSICAL EXAM
[Thrill] : thrill [Pulsatile Thrill] : no pulsatile thrill [Aneurysm] : no aneurysm [Bleeding] : no bleeding [Hand well perfused] : hand well perfused [Warm Extremities] : warm extremities [Ulcer] : no ulcer [Gangrene] : no gangrene [2+] : right 2+ [Normal] : coordination grossly intact, no focal deficits [de-identified] : intact

## 2021-06-07 ENCOUNTER — INPATIENT (INPATIENT)
Facility: HOSPITAL | Age: 76
LOS: 2 days | Discharge: NURSING, REHAB OR LONG-TERM CARE | DRG: 640 | End: 2021-06-10
Attending: INTERNAL MEDICINE | Admitting: INTERNAL MEDICINE
Payer: MEDICARE

## 2021-06-07 VITALS
RESPIRATION RATE: 18 BRPM | TEMPERATURE: 99 F | SYSTOLIC BLOOD PRESSURE: 202 MMHG | OXYGEN SATURATION: 98 % | HEIGHT: 65 IN | HEART RATE: 80 BPM | DIASTOLIC BLOOD PRESSURE: 79 MMHG

## 2021-06-07 DIAGNOSIS — R77.8 OTHER SPECIFIED ABNORMALITIES OF PLASMA PROTEINS: ICD-10-CM

## 2021-06-07 DIAGNOSIS — Z29.9 ENCOUNTER FOR PROPHYLACTIC MEASURES, UNSPECIFIED: ICD-10-CM

## 2021-06-07 DIAGNOSIS — I50.32 CHRONIC DIASTOLIC (CONGESTIVE) HEART FAILURE: ICD-10-CM

## 2021-06-07 DIAGNOSIS — I10 ESSENTIAL (PRIMARY) HYPERTENSION: ICD-10-CM

## 2021-06-07 DIAGNOSIS — E87.5 HYPERKALEMIA: ICD-10-CM

## 2021-06-07 DIAGNOSIS — J96.01 ACUTE RESPIRATORY FAILURE WITH HYPOXIA: ICD-10-CM

## 2021-06-07 DIAGNOSIS — I48.91 UNSPECIFIED ATRIAL FIBRILLATION: ICD-10-CM

## 2021-06-07 DIAGNOSIS — N18.6 END STAGE RENAL DISEASE: ICD-10-CM

## 2021-06-07 DIAGNOSIS — D64.9 ANEMIA, UNSPECIFIED: ICD-10-CM

## 2021-06-07 DIAGNOSIS — I77.0 ARTERIOVENOUS FISTULA, ACQUIRED: Chronic | ICD-10-CM

## 2021-06-07 LAB
ALBUMIN SERPL ELPH-MCNC: 3.2 G/DL — LOW (ref 3.5–5)
ALP SERPL-CCNC: 169 U/L — HIGH (ref 40–120)
ALT FLD-CCNC: 40 U/L DA — SIGNIFICANT CHANGE UP (ref 10–60)
ANION GAP SERPL CALC-SCNC: 13 MMOL/L — SIGNIFICANT CHANGE UP (ref 5–17)
ANION GAP SERPL CALC-SCNC: 14 MMOL/L — SIGNIFICANT CHANGE UP (ref 5–17)
APTT BLD: 33.2 SEC — SIGNIFICANT CHANGE UP (ref 27.5–35.5)
AST SERPL-CCNC: 21 U/L — SIGNIFICANT CHANGE UP (ref 10–40)
BASOPHILS # BLD AUTO: 0.02 K/UL — SIGNIFICANT CHANGE UP (ref 0–0.2)
BASOPHILS NFR BLD AUTO: 0.3 % — SIGNIFICANT CHANGE UP (ref 0–2)
BILIRUB SERPL-MCNC: 0.4 MG/DL — SIGNIFICANT CHANGE UP (ref 0.2–1.2)
BUN SERPL-MCNC: 68 MG/DL — HIGH (ref 7–18)
BUN SERPL-MCNC: 70 MG/DL — HIGH (ref 7–18)
CALCIUM SERPL-MCNC: 8.3 MG/DL — LOW (ref 8.4–10.5)
CALCIUM SERPL-MCNC: 8.7 MG/DL — SIGNIFICANT CHANGE UP (ref 8.4–10.5)
CHLORIDE SERPL-SCNC: 96 MMOL/L — SIGNIFICANT CHANGE UP (ref 96–108)
CHLORIDE SERPL-SCNC: 97 MMOL/L — SIGNIFICANT CHANGE UP (ref 96–108)
CO2 SERPL-SCNC: 27 MMOL/L — SIGNIFICANT CHANGE UP (ref 22–31)
CO2 SERPL-SCNC: 29 MMOL/L — SIGNIFICANT CHANGE UP (ref 22–31)
CREAT SERPL-MCNC: 10.7 MG/DL — HIGH (ref 0.5–1.3)
CREAT SERPL-MCNC: 10.8 MG/DL — HIGH (ref 0.5–1.3)
EOSINOPHIL # BLD AUTO: 0.1 K/UL — SIGNIFICANT CHANGE UP (ref 0–0.5)
EOSINOPHIL NFR BLD AUTO: 1.6 % — SIGNIFICANT CHANGE UP (ref 0–6)
GLUCOSE SERPL-MCNC: 92 MG/DL — SIGNIFICANT CHANGE UP (ref 70–99)
GLUCOSE SERPL-MCNC: 94 MG/DL — SIGNIFICANT CHANGE UP (ref 70–99)
HAV IGM SER-ACNC: SIGNIFICANT CHANGE UP
HBV CORE IGM SER-ACNC: SIGNIFICANT CHANGE UP
HBV SURFACE AG SER-ACNC: SIGNIFICANT CHANGE UP
HCT VFR BLD CALC: 28.9 % — LOW (ref 39–50)
HCV AB S/CO SERPL IA: 0.14 S/CO — SIGNIFICANT CHANGE UP (ref 0–0.99)
HCV AB SERPL-IMP: SIGNIFICANT CHANGE UP
HGB BLD-MCNC: 9.4 G/DL — LOW (ref 13–17)
IMM GRANULOCYTES NFR BLD AUTO: 0.5 % — SIGNIFICANT CHANGE UP (ref 0–1.5)
INR BLD: 1.02 RATIO — SIGNIFICANT CHANGE UP (ref 0.88–1.16)
LACTATE SERPL-SCNC: 1.4 MMOL/L — SIGNIFICANT CHANGE UP (ref 0.7–2)
LYMPHOCYTES # BLD AUTO: 1 K/UL — SIGNIFICANT CHANGE UP (ref 1–3.3)
LYMPHOCYTES # BLD AUTO: 16.1 % — SIGNIFICANT CHANGE UP (ref 13–44)
MCHC RBC-ENTMCNC: 32.4 PG — SIGNIFICANT CHANGE UP (ref 27–34)
MCHC RBC-ENTMCNC: 32.5 GM/DL — SIGNIFICANT CHANGE UP (ref 32–36)
MCV RBC AUTO: 99.7 FL — SIGNIFICANT CHANGE UP (ref 80–100)
MONOCYTES # BLD AUTO: 0.62 K/UL — SIGNIFICANT CHANGE UP (ref 0–0.9)
MONOCYTES NFR BLD AUTO: 10 % — SIGNIFICANT CHANGE UP (ref 2–14)
NEUTROPHILS # BLD AUTO: 4.45 K/UL — SIGNIFICANT CHANGE UP (ref 1.8–7.4)
NEUTROPHILS NFR BLD AUTO: 71.5 % — SIGNIFICANT CHANGE UP (ref 43–77)
NRBC # BLD: 0 /100 WBCS — SIGNIFICANT CHANGE UP (ref 0–0)
NT-PROBNP SERPL-SCNC: HIGH PG/ML (ref 0–450)
PLATELET # BLD AUTO: 143 K/UL — LOW (ref 150–400)
POTASSIUM SERPL-MCNC: 5.3 MMOL/L — SIGNIFICANT CHANGE UP (ref 3.5–5.3)
POTASSIUM SERPL-MCNC: 6 MMOL/L — HIGH (ref 3.5–5.3)
POTASSIUM SERPL-SCNC: 5.3 MMOL/L — SIGNIFICANT CHANGE UP (ref 3.5–5.3)
POTASSIUM SERPL-SCNC: 6 MMOL/L — HIGH (ref 3.5–5.3)
PROT SERPL-MCNC: 7.3 G/DL — SIGNIFICANT CHANGE UP (ref 6–8.3)
PROTHROM AB SERPL-ACNC: 12.1 SEC — SIGNIFICANT CHANGE UP (ref 10.6–13.6)
RAPID RVP RESULT: SIGNIFICANT CHANGE UP
RBC # BLD: 2.9 M/UL — LOW (ref 4.2–5.8)
RBC # FLD: 14.6 % — HIGH (ref 10.3–14.5)
SARS-COV-2 RNA SPEC QL NAA+PROBE: SIGNIFICANT CHANGE UP
SODIUM SERPL-SCNC: 138 MMOL/L — SIGNIFICANT CHANGE UP (ref 135–145)
SODIUM SERPL-SCNC: 138 MMOL/L — SIGNIFICANT CHANGE UP (ref 135–145)
TROPONIN I SERPL-MCNC: 0.18 NG/ML — HIGH (ref 0–0.04)
WBC # BLD: 6.22 K/UL — SIGNIFICANT CHANGE UP (ref 3.8–10.5)
WBC # FLD AUTO: 6.22 K/UL — SIGNIFICANT CHANGE UP (ref 3.8–10.5)

## 2021-06-07 PROCEDURE — 71045 X-RAY EXAM CHEST 1 VIEW: CPT | Mod: 26

## 2021-06-07 PROCEDURE — 99285 EMERGENCY DEPT VISIT HI MDM: CPT

## 2021-06-07 RX ORDER — ASPIRIN/CALCIUM CARB/MAGNESIUM 324 MG
81 TABLET ORAL DAILY
Refills: 0 | Status: DISCONTINUED | OUTPATIENT
Start: 2021-06-07 | End: 2021-06-10

## 2021-06-07 RX ORDER — CALCITRIOL 0.5 UG/1
1 CAPSULE ORAL
Qty: 0 | Refills: 0 | DISCHARGE

## 2021-06-07 RX ORDER — INSULIN HUMAN 100 [IU]/ML
10 INJECTION, SOLUTION SUBCUTANEOUS ONCE
Refills: 0 | Status: COMPLETED | OUTPATIENT
Start: 2021-06-07 | End: 2021-06-07

## 2021-06-07 RX ORDER — DRONEDARONE 400 MG/1
400 TABLET, FILM COATED ORAL
Refills: 0 | Status: DISCONTINUED | OUTPATIENT
Start: 2021-06-07 | End: 2021-06-10

## 2021-06-07 RX ORDER — APIXABAN 2.5 MG/1
2.5 TABLET, FILM COATED ORAL EVERY 12 HOURS
Refills: 0 | Status: DISCONTINUED | OUTPATIENT
Start: 2021-06-07 | End: 2021-06-10

## 2021-06-07 RX ORDER — DEXTROSE 50 % IN WATER 50 %
50 SYRINGE (ML) INTRAVENOUS ONCE
Refills: 0 | Status: COMPLETED | OUTPATIENT
Start: 2021-06-07 | End: 2021-06-07

## 2021-06-07 RX ORDER — LACTULOSE 10 G/15ML
20 SOLUTION ORAL AT BEDTIME
Refills: 0 | Status: DISCONTINUED | OUTPATIENT
Start: 2021-06-07 | End: 2021-06-10

## 2021-06-07 RX ORDER — FAMOTIDINE 10 MG/ML
20 INJECTION INTRAVENOUS DAILY
Refills: 0 | Status: DISCONTINUED | OUTPATIENT
Start: 2021-06-07 | End: 2021-06-10

## 2021-06-07 RX ORDER — HYDRALAZINE HCL 50 MG
25 TABLET ORAL EVERY 8 HOURS
Refills: 0 | Status: DISCONTINUED | OUTPATIENT
Start: 2021-06-07 | End: 2021-06-10

## 2021-06-07 RX ORDER — ATORVASTATIN CALCIUM 80 MG/1
80 TABLET, FILM COATED ORAL AT BEDTIME
Refills: 0 | Status: DISCONTINUED | OUTPATIENT
Start: 2021-06-07 | End: 2021-06-10

## 2021-06-07 RX ORDER — SEVELAMER CARBONATE 2400 MG/1
800 POWDER, FOR SUSPENSION ORAL EVERY 8 HOURS
Refills: 0 | Status: DISCONTINUED | OUTPATIENT
Start: 2021-06-07 | End: 2021-06-10

## 2021-06-07 RX ORDER — ISOSORBIDE MONONITRATE 60 MG/1
30 TABLET, EXTENDED RELEASE ORAL DAILY
Refills: 0 | Status: DISCONTINUED | OUTPATIENT
Start: 2021-06-07 | End: 2021-06-10

## 2021-06-07 RX ORDER — ACETAMINOPHEN 500 MG
650 TABLET ORAL EVERY 6 HOURS
Refills: 0 | Status: DISCONTINUED | OUTPATIENT
Start: 2021-06-07 | End: 2021-06-10

## 2021-06-07 RX ORDER — METOPROLOL TARTRATE 50 MG
25 TABLET ORAL THREE TIMES A DAY
Refills: 0 | Status: DISCONTINUED | OUTPATIENT
Start: 2021-06-07 | End: 2021-06-10

## 2021-06-07 RX ORDER — SENNA PLUS 8.6 MG/1
2 TABLET ORAL AT BEDTIME
Refills: 0 | Status: DISCONTINUED | OUTPATIENT
Start: 2021-06-07 | End: 2021-06-10

## 2021-06-07 RX ORDER — ALBUTEROL 90 UG/1
2.5 AEROSOL, METERED ORAL
Refills: 0 | Status: DISCONTINUED | OUTPATIENT
Start: 2021-06-07 | End: 2021-06-10

## 2021-06-07 RX ORDER — COLCHICINE 0.6 MG
0.6 TABLET ORAL
Refills: 0 | Status: DISCONTINUED | OUTPATIENT
Start: 2021-06-07 | End: 2021-06-10

## 2021-06-07 RX ORDER — FOLIC ACID 0.8 MG
1 TABLET ORAL DAILY
Refills: 0 | Status: DISCONTINUED | OUTPATIENT
Start: 2021-06-07 | End: 2021-06-10

## 2021-06-07 RX ORDER — FERROUS SULFATE 325(65) MG
325 TABLET ORAL DAILY
Refills: 0 | Status: DISCONTINUED | OUTPATIENT
Start: 2021-06-07 | End: 2021-06-10

## 2021-06-07 RX ORDER — CALCIUM GLUCONATE 100 MG/ML
1 VIAL (ML) INTRAVENOUS ONCE
Refills: 0 | Status: COMPLETED | OUTPATIENT
Start: 2021-06-07 | End: 2021-06-07

## 2021-06-07 RX ADMIN — FAMOTIDINE 20 MILLIGRAM(S): 10 INJECTION INTRAVENOUS at 17:57

## 2021-06-07 RX ADMIN — Medication 1 MILLIGRAM(S): at 17:56

## 2021-06-07 RX ADMIN — ISOSORBIDE MONONITRATE 30 MILLIGRAM(S): 60 TABLET, EXTENDED RELEASE ORAL at 17:57

## 2021-06-07 RX ADMIN — ATORVASTATIN CALCIUM 80 MILLIGRAM(S): 80 TABLET, FILM COATED ORAL at 22:18

## 2021-06-07 RX ADMIN — Medication 25 MILLIGRAM(S): at 22:20

## 2021-06-07 RX ADMIN — ALBUTEROL 2.5 MILLIGRAM(S): 90 AEROSOL, METERED ORAL at 05:09

## 2021-06-07 RX ADMIN — Medication 100 GRAM(S): at 05:09

## 2021-06-07 RX ADMIN — SEVELAMER CARBONATE 800 MILLIGRAM(S): 2400 POWDER, FOR SUSPENSION ORAL at 22:18

## 2021-06-07 RX ADMIN — INSULIN HUMAN 10 UNIT(S): 100 INJECTION, SOLUTION SUBCUTANEOUS at 05:09

## 2021-06-07 RX ADMIN — Medication 25 MILLIGRAM(S): at 17:57

## 2021-06-07 RX ADMIN — Medication 81 MILLIGRAM(S): at 17:56

## 2021-06-07 RX ADMIN — Medication 325 MILLIGRAM(S): at 17:57

## 2021-06-07 RX ADMIN — DRONEDARONE 400 MILLIGRAM(S): 400 TABLET, FILM COATED ORAL at 17:56

## 2021-06-07 RX ADMIN — Medication 1 GRAM(S): at 08:20

## 2021-06-07 RX ADMIN — LACTULOSE 20 GRAM(S): 10 SOLUTION ORAL at 22:18

## 2021-06-07 RX ADMIN — SENNA PLUS 2 TABLET(S): 8.6 TABLET ORAL at 22:19

## 2021-06-07 RX ADMIN — Medication 0.6 MILLIGRAM(S): at 17:56

## 2021-06-07 RX ADMIN — Medication 25 MILLIGRAM(S): at 09:35

## 2021-06-07 RX ADMIN — Medication 50 MILLILITER(S): at 05:09

## 2021-06-07 NOTE — H&P ADULT - HISTORY OF PRESENT ILLNESS
74yo M with hx ESRD on HD (M-W-F), HTN, DM, CAD, Afib, CVA with right sided hemiparesis anemia, vascular dementia, mood disorder presents with shortness of breath. Patient reports he was in his usual state of health when he sddenly stated having trouble breathing And it worsened since morning.   Patient denies cough, chest pain, new leg swelling, missing dialysis, headaches, dizziness,   Per NH paperwork patient complained of shortness of breath at 227am, noted to have O2sat 90-91% on RA,  and RR 26 and placed on NC 2L with O2sat 95%.    ED course:   Patient endorses feeling better and saturating 100% on 4L NC.  Troponin 0.182, Hb 9.4  EKG: Non specific ST and T wave changes   Vital Signs Last 24 Hrs  T(C): 36.5 (07 Jun 2021 11:25), Max: 37.2 (07 Jun 2021 03:14)  T(F): 97.7 (07 Jun 2021 11:25), Max: 98.9 (07 Jun 2021 03:14)  HR: 74 (07 Jun 2021 11:25) (74 - 80)  BP: 165/84 (07 Jun 2021 11:25) (165/84 - 202/79)  RR: 18 (07 Jun 2021 11:25) (18 - 18)  SpO2: 100% (07 Jun 2021 11:25) (98% - 100%)

## 2021-06-07 NOTE — H&P ADULT - NSHPPHYSICALEXAM_GEN_ALL_CORE
PHYSICAL EXAM:  GENERAL: NAD, speaks in full sentences, no signs of respiratory distress  HEAD:  Atraumatic, Normocephalic  EYES: EOMI, PERRLA, conjunctiva and sclera clear  NECK: Supple, No JVD  CHEST/LUNG: Clear to auscultation bilaterally; No wheeze; Mild crackles; No accessory muscles used  HEART: Regular rate and rhythm; Holosystolic murmur  ABDOMEN: Soft, Nontender, Nondistended; Bowel sounds present; No guarding  EXTREMITIES:  2+ Peripheral Pulses, No cyanosis or edema  PSYCH: AAOx3  NEUROLOGY: non-focal  SKIN: Bruise on right shin

## 2021-06-07 NOTE — H&P ADULT - PROBLEM SELECTOR PLAN 1
Patient is presenting with SOB , desaturating to 90's on RA requiring supplemental O2  Usually saturates well on RA  High blood pressure on presentation, last HD was on Friday   Also has history of CHF  Covid negative, No signs of pneumonia on CXR   Differentials include Fluid overload due to ESRD (incomplete dialysis or medication and diet non compliance) or CHF exacerbation.  Will get urgent dialysis, Dr Jacinto consulted by ED physician   Close monitoring on Tele floor   Will also get cardiac evaluation

## 2021-06-07 NOTE — ED PROVIDER NOTE - PMH
Anemia    Cerebrovascular accident (CVA), unspecified mechanism    ESRD (end stage renal disease) on dialysis    Hypertension

## 2021-06-07 NOTE — H&P ADULT - PROBLEM SELECTOR PLAN 3
Troponin 0.18, Elevated at baseline   Patient denies chest pain, nausea, vomiting ,pressure like symptoms  Likely demand ischemia in setting of ESRD.   Will trend cardiac enzymes   continue Aspirin, Statin and B blockers, Continue Imdur   Admit to tele floor   Will get cardio consult for further recommendations regarding medicine optimization  Echo from Jan 2021 shows GIDD and normal Systolic function and EF

## 2021-06-07 NOTE — H&P ADULT - PROBLEM SELECTOR PLAN 4
Echo from Jan 2021 shows GIDD and normal Systolic function and EF  Elevated pro BNP likely due to ESRD   Will start home medications  HD today   Renal and dash diet

## 2021-06-07 NOTE — H&P ADULT - PROBLEM SELECTOR PLAN 8
Hb 9.4  Likely anemia of chronic disease, Not on epogen   Will continue Iron supplements   Bowel regimen

## 2021-06-07 NOTE — CONSULT NOTE ADULT - SUBJECTIVE AND OBJECTIVE BOX
Chief complain/HPI  History of ESRD.   Atrial fibrillation  He was sent to the hospital from his NH for sudden SOB that woke him up in early am  Michael CP and palpitation. No fever and no sweats.  He felt better with O2 therapy.      PAST MEDICAL & SURGICAL HISTORY:  ESRD (end stage renal disease) on dialysis    Hypertension    Anemia    Cerebrovascular accident (CVA), unspecified mechanism    A-V fistula        Home Medications Reviewed    Hospital Medications:   MEDICATIONS  (STANDING):  ALBUTerol    0.083%.. 2.5 milliGRAM(s) Nebulizer every 20 minutes  apixaban 2.5 milliGRAM(s) Oral every 12 hours  aspirin  chewable 81 milliGRAM(s) Oral daily  atorvastatin 80 milliGRAM(s) Oral at bedtime  colchicine 0.6 milliGRAM(s) Oral two times a day  dronedarone 400 milliGRAM(s) Oral two times a day  famotidine    Tablet 20 milliGRAM(s) Oral daily  ferrous    sulfate 325 milliGRAM(s) Oral daily  folic acid 1 milliGRAM(s) Oral daily  hydrALAZINE 25 milliGRAM(s) Oral every 8 hours  isosorbide   mononitrate ER Tablet (IMDUR) 30 milliGRAM(s) Oral daily  lactulose Syrup 20 Gram(s) Oral at bedtime  metoprolol tartrate 25 milliGRAM(s) Oral three times a day  senna 2 Tablet(s) Oral at bedtime  sevelamer carbonate 800 milliGRAM(s) Oral every 8 hours    MEDICATIONS  (PRN):  acetaminophen   Tablet .. 650 milliGRAM(s) Oral every 6 hours PRN Temp greater or equal to 38C (100.4F), Mild Pain (1 - 3)      Allergies    No Known Allergies    Intolerances                              9.4    6.22  )-----------( 143      ( 07 Jun 2021 04:21 )             28.9     06-07    138  |  97  |  70<H>  ----------------------------<  92  5.3   |  27  |  10.80<H>    Ca    8.7      07 Jun 2021 06:27    TPro  7.3  /  Alb  3.2<L>  /  TBili  0.4  /  DBili  x   /  AST  21  /  ALT  40  /  AlkPhos  169<H>  06-07    PT/INR - ( 07 Jun 2021 04:21 )   PT: 12.1 sec;   INR: 1.02 ratio         PTT - ( 07 Jun 2021 04:21 )  PTT:33.2 sec    Troponin 0.182        RADIOLOGY & ADDITIONAL STUDIES:  < from: Xray Chest 1 View-PORTABLE IMMEDIATE (06.07.21 @ 04:21) >  Heart is likely enlarged and obscures left base.    On January 27 of thisyear there was a mild central congestive picture. Present film suggests some CHF but diminished from prior.    Very extensive vascular stent going from the right upper arm into the right innominate vein again noted and vascular stent in the left upper arm again seen.    IMPRESSION: Heart enlargement. Rather mild CHF somewhat improved from prior. Vascular stents again noted.      < end of copied text >    SOCIAL HISTORY: Denies ETOh,Smoking,     FAMILY HISTORY:  Family history of diabetes mellitus        REVIEW OF SYSTEMS:  CONSTITUTIONAL: No malaise, No fatigue, No fevers or chills, well developed, no diaphoresis    RESPIRATORY: No cough, wheezing, hemoptysis; No shortness of breath  CARDIOVASCULAR: No chest pain or palpitations. No edema  GASTROINTESTINAL: No abdominal or epigastric pain. No nausea, vomiting, or hematemesis;   GENITOURINARY: urinates once a day    VITALS:  Vital Signs Last 24 Hrs  T(C): 36.7 (07 Jun 2021 15:56), Max: 37.2 (07 Jun 2021 03:14)  T(F): 98.1 (07 Jun 2021 15:56), Max: 98.9 (07 Jun 2021 03:14)  HR: 69 (07 Jun 2021 15:56) (69 - 80)  BP: 138/54 (07 Jun 2021 15:56) (138/54 - 202/79)  BP(mean): --  RR: 18 (07 Jun 2021 15:56) (18 - 18)  SpO2: 99% (07 Jun 2021 15:56) (98% - 100%)    Height (cm): 165.1 (06-07 @ 03:14)    PHYSICAL EXAM:  Constitutional: NAD  HEENT: anicteric sclera.  Neck: No JVD  Respiratory:  air entrance B/L, no wheezes, rales or rhonchi  Cardiovascular: S1, S2, RRR, no pericardial rub, no murmur  Gastrointestinal: BS+, soft, no tenderness, no distension, no bruit  Pelvis: bladder non-distended, no CVA tenderness  Extremities: No cyanosis or clubbing. No peripheral edema  Neurological: A/O x 3, no focal deficits    Access: right AVG

## 2021-06-07 NOTE — ED PROVIDER NOTE - OBJECTIVE STATEMENT
74yo M with hx ESRD on HD, HTN, DM, CAD, Afib, CVA with right sided hemiparesis anemia, vascular dementia, mood disorder presents with shortness of breath. Patient reports he has been having trouble breathing but it worsened this morning. Denies cough, chest pain, new leg swelling. Per NH paperwork patient complained of shortness of breath at 227am, noted to have O2sat 90-91% on RA,  and RR 26 and placed on NC 2L with O2sat 95%.

## 2021-06-07 NOTE — ED ADULT TRIAGE NOTE - CHIEF COMPLAINT QUOTE
episode of shortness  of breath , gasping of air at the NH oxygen sat 90-91% RA was given oxygen 2L/min sat went up to 95% denies SOB on ED arrival

## 2021-06-07 NOTE — ED PROVIDER NOTE - CLINICAL SUMMARY MEDICAL DECISION MAKING FREE TEXT BOX
74yo M with hx ESRD on HD, HTN, DM, CAD, Afib, CVA with right sided hemiparesis anemia, vascular dementia, mood disorder presents with shortness of breath. Will obtain ekg, labs, CXR    ekg nonischemic, trop 0.182, proBNP >10K, CXR shows no new infiltrates  K 6-given IV calcium/insulin/dextrose/alb nebs, will repeat level 74yo M with hx ESRD on HD, HTN, DM, CAD, Afib, CVA with right sided hemiparesis anemia, vascular dementia, mood disorder presents with shortness of breath. Will obtain ekg, labs, CXR    ekg nonischemic, trop 0.182 (previously 0.88), proBNP >10K, CXR shows no new infiltrates  K 6-given IV calcium/insulin/dextrose/alb nebs, will repeat level  Will consult Dr. Naqvi for HD 76yo M with hx ESRD on HD, HTN, DM, CAD, Afib, CVA with right sided hemiparesis anemia, vascular dementia, mood disorder presents with shortness of breath. Will obtain ekg, labs, CXR    ekg nonischemic, trop 0.182 (previously 0.88), proBNP >10K, CXR shows no new infiltrates  K 6-given IV calcium/insulin/dextrose/alb nebs, will repeat level  Will consult Dr. Naqvi for HD-he reports patient belongs to Dr. Jacinto's group  Dr. Jacinto will schedule patient for inpatient HD today

## 2021-06-07 NOTE — CONSULT NOTE ADULT - ASSESSMENT
ESRD admitted for acute dyspnea, DIALYSIS TODAY WITH uf 2.8 KG.  Possible CHF pre dialysis that improved with O2 versus rapid atrial fibrillation episode  Mild increased Tropnin due to ESRD and ischemia on demand.    Hyperkalemia K 6.0 pre dialysis with no ekg CHANGES.    Hypertension.    Anemia will continue ALICIA    Patient a times refuses to take his NH medications.

## 2021-06-07 NOTE — ED PROVIDER NOTE - MUSCULOSKELETAL, MLM
Spine appears normal, range of motion is not limited, no muscle or joint tenderness. 3 shallow ulcerations over right anterior lower leg

## 2021-06-07 NOTE — ED PROVIDER NOTE - CARE PLAN
Principal Discharge DX:	Hyperkalemia  Secondary Diagnosis:	NSTEMI (non-ST elevated myocardial infarction)

## 2021-06-07 NOTE — H&P ADULT - ASSESSMENT
74yo M with hx ESRD on HD, HTN, DM, CAD, Afib, CVA with right sided hemiparesis anemia, vascular dementia, mood disorder presents with shortness of breath

## 2021-06-07 NOTE — H&P ADULT - ATTENDING COMMENTS
patient with acute respiratory failure , chf , elevated troponin for acute hospital management   continue tele   02  bronchodilator as needed  hd as per renal   cardiology  eval   care plan d/w nh rn, er attending , resident , patient and his family.

## 2021-06-07 NOTE — H&P ADULT - PROBLEM SELECTOR PLAN 9
RISK                                                          Points  [] Previous VTE                                           3  [] Thrombophilia                                        2  [] Lower limb paralysis                              2   [] Current Cancer                                       2   [x] Immobilization > 24 hrs                        1  [] ICU/CCU stay > 24 hours                       1  [x] Age > 60                                                   1    Eliquis 2.5mg BID

## 2021-06-08 RX ADMIN — Medication 25 MILLIGRAM(S): at 13:01

## 2021-06-08 RX ADMIN — SEVELAMER CARBONATE 800 MILLIGRAM(S): 2400 POWDER, FOR SUSPENSION ORAL at 06:48

## 2021-06-08 RX ADMIN — DRONEDARONE 400 MILLIGRAM(S): 400 TABLET, FILM COATED ORAL at 05:37

## 2021-06-08 RX ADMIN — DRONEDARONE 400 MILLIGRAM(S): 400 TABLET, FILM COATED ORAL at 17:14

## 2021-06-08 RX ADMIN — FAMOTIDINE 20 MILLIGRAM(S): 10 INJECTION INTRAVENOUS at 13:01

## 2021-06-08 RX ADMIN — SEVELAMER CARBONATE 800 MILLIGRAM(S): 2400 POWDER, FOR SUSPENSION ORAL at 13:01

## 2021-06-08 RX ADMIN — Medication 325 MILLIGRAM(S): at 11:32

## 2021-06-08 RX ADMIN — Medication 81 MILLIGRAM(S): at 11:31

## 2021-06-08 RX ADMIN — Medication 0.6 MILLIGRAM(S): at 17:14

## 2021-06-08 RX ADMIN — Medication 0.6 MILLIGRAM(S): at 05:37

## 2021-06-08 RX ADMIN — ISOSORBIDE MONONITRATE 30 MILLIGRAM(S): 60 TABLET, EXTENDED RELEASE ORAL at 11:32

## 2021-06-08 RX ADMIN — Medication 1 MILLIGRAM(S): at 11:32

## 2021-06-08 RX ADMIN — Medication 25 MILLIGRAM(S): at 05:37

## 2021-06-08 NOTE — PROGRESS NOTE ADULT - SUBJECTIVE AND OBJECTIVE BOX
`Problem List:  ESRD  PND on admission, improved    PAST MEDICAL & SURGICAL HISTORY:  ESRD (end stage renal disease) on dialysis    Hypertension    Anemia    Cerebrovascular accident (CVA), unspecified mechanism    A-V fistula        No Known Allergies      MEDICATIONS  (STANDING):  ALBUTerol    0.083%.. 2.5 milliGRAM(s) Nebulizer every 20 minutes  apixaban 2.5 milliGRAM(s) Oral every 12 hours  aspirin  chewable 81 milliGRAM(s) Oral daily  atorvastatin 80 milliGRAM(s) Oral at bedtime  colchicine 0.6 milliGRAM(s) Oral two times a day  dronedarone 400 milliGRAM(s) Oral two times a day  famotidine    Tablet 20 milliGRAM(s) Oral daily  ferrous    sulfate 325 milliGRAM(s) Oral daily  folic acid 1 milliGRAM(s) Oral daily  hydrALAZINE 25 milliGRAM(s) Oral every 8 hours  isosorbide   mononitrate ER Tablet (IMDUR) 30 milliGRAM(s) Oral daily  lactulose Syrup 20 Gram(s) Oral at bedtime  metoprolol tartrate 25 milliGRAM(s) Oral three times a day  senna 2 Tablet(s) Oral at bedtime  sevelamer carbonate 800 milliGRAM(s) Oral every 8 hours    MEDICATIONS  (PRN):  acetaminophen   Tablet .. 650 milliGRAM(s) Oral every 6 hours PRN Temp greater or equal to 38C (100.4F), Mild Pain (1 - 3)                            9.4    6.22  )-----------( 143      ( 07 Jun 2021 04:21 )             28.9     06-07    138  |  97  |  70<H>  ----------------------------<  92  5.3   |  27  |  10.80<H>    Ca    8.7      07 Jun 2021 06:27    TPro  7.3  /  Alb  3.2<L>  /  TBili  0.4  /  DBili  x   /  AST  21  /  ALT  40  /  AlkPhos  169<H>  06-07    PT/INR - ( 07 Jun 2021 04:21 )   PT: 12.1 sec;   INR: 1.02 ratio         PTT - ( 07 Jun 2021 04:21 )  PTT:33.2 sec        REVIEW OF SYSTEMS:  General: no fever no chills.  RESPIRATORY: No SOB  CARDIOVASCULAR: No chest pain or palpitations. No Edema  GASTROINTESTINAL: No abdominal or epigastric pain. No nausea, vomiting. No diarrhea or constipation. No melena.  GENITOURINARY: No dysuria, frequency, foamy urine, urinary urgency, incontinence or hematuria  NEUROLOGICAL: No numbness or weakness, no tremor , no dizziness.   Muscle skeletal : no joint pain and no swelling of joints and limbs.  SKIN: No itching, burning, rashes.        VITALS:  T(F): 98 (06-08-21 @ 15:38), Max: 98.6 (06-07-21 @ 19:09)  HR: 58 (06-08-21 @ 15:38)  BP: 158/45 (06-08-21 @ 15:38)  RR: 18 (06-08-21 @ 15:38)  SpO2: 100% (06-08-21 @ 15:38)  Wt(kg): --      PHYSICAL EXAM:  Constitutional: well developed, no diaphoresis, no distress.  Neck: No JVD, no carotid bruit.  Respiratory: Good air entrance B/L, no wheezes, rales or rhonchi  Cardiovascular: S1, S2, RRR, no pericardial rub, no murmur  Abdomen: BS+, soft, no tenderness, no bruit  Pelvis: bladder nondistended  Extremities: No cyanosis or clubbing. No peripheral edema.   Vascular Access: right AVG

## 2021-06-08 NOTE — PROGRESS NOTE ADULT - SUBJECTIVE AND OBJECTIVE BOX
INTERVAL HPI/OVERNIGHT EVENTS:   Pt refusing eliquis this AM as he believes eliquis will give him prostate cancer. When probed about who told him eliquis causes prostate Ca, pt reports an unrecalled doctor told him it causes prostate CA. Pt is able to recall his medical conditions but denies ever having afib. Despite extensive explanation given to patient for benefits of afib, he continues to refuse.    Pt has no complaints. Pt denies SOB now. Pt refusing labs this AM, would only like labs during dialysis.    REVIEW OF SYSTEMS:  CONSTITUTIONAL: No fever, weight loss, or fatigue  RESPIRATORY: No cough, wheezing, chills or hemoptysis; No shortness of breath  CARDIOVASCULAR: No chest pain, palpitations, dizziness, or leg swelling  GASTROINTESTINAL: No abdominal pain. No nausea, vomiting, or hematemesis; No diarrhea or constipation. No melena or hematochezia.  GENITOURINARY: No dysuria or hematuria, urinary frequency  NEUROLOGICAL: No headaches, memory loss, loss of strength, numbness, or tremors  SKIN: No itching, burning, rashes, or lesions     Vital Signs Last 24 Hrs  T(C): 36.9 (08 Jun 2021 07:46), Max: 37 (07 Jun 2021 14:30)  T(F): 98.4 (08 Jun 2021 07:46), Max: 98.6 (07 Jun 2021 14:30)  HR: 60 (08 Jun 2021 07:46) (60 - 79)  BP: 131/57 (08 Jun 2021 07:46) (129/58 - 189/75)  BP(mean): --  RR: 17 (08 Jun 2021 07:46) (17 - 18)  SpO2: 100% (08 Jun 2021 07:46) (99% - 100%)    PHYSICAL EXAMINATION:  GENERAL: NAD, elderly  HEAD:  Atraumatic, Normocephalic  EYES:  conjunctiva and sclera clear  NECK: Supple, No JVD, Normal thyroid  CHEST/LUNG: Clear to auscultation. Clear to percussion bilaterally; No rales, rhonchi, wheezing, or rubs  HEART: Regular rate and rhythm, (+) 3/6 systolic murmur most pronounced 2nd ics  ABDOMEN: Soft, Nontender, Nondistended; Bowel sounds present  NERVOUS SYSTEM:  Alert & Oriented X3,    EXTREMITIES:  2+ Peripheral Pulses, No clubbing, cyanosis, or edema  SKIN: warm dry                          9.4    6.22  )-----------( 143      ( 07 Jun 2021 04:21 )             28.9     06-07    138  |  97  |  70<H>  ----------------------------<  92  5.3   |  27  |  10.80<H>    Ca    8.7      07 Jun 2021 06:27    TPro  7.3  /  Alb  3.2<L>  /  TBili  0.4  /  DBili  x   /  AST  21  /  ALT  40  /  AlkPhos  169<H>  06-07    LIVER FUNCTIONS - ( 07 Jun 2021 04:21 )  Alb: 3.2 g/dL / Pro: 7.3 g/dL / ALK PHOS: 169 U/L / ALT: 40 U/L DA / AST: 21 U/L / GGT: x           CARDIAC MARKERS ( 07 Jun 2021 04:21 )  0.182 ng/mL / x     / x     / x     / x          PT/INR - ( 07 Jun 2021 04:21 )   PT: 12.1 sec;   INR: 1.02 ratio         PTT - ( 07 Jun 2021 04:21 )  PTT:33.2 sec    CAPILLARY BLOOD GLUCOSE      RADIOLOGY & ADDITIONAL TESTS:

## 2021-06-08 NOTE — PHYSICAL THERAPY INITIAL EVALUATION ADULT - GENERAL OBSERVATIONS, REHAB EVAL
Patient received supine in bed, NAD, +nasal cannula (1L), +tele monitoring. Patient cooperative, informative and motivated during initial evaluation.

## 2021-06-08 NOTE — PROGRESS NOTE ADULT - SUBJECTIVE AND OBJECTIVE BOX
PGY-1 Progress Note discussed with attending    PAGER #: [[phone]] TILL 5:00 PM  PLEASE CONTACT ON CALL TEAM:  - On Call Team (Please refer to Mary) FROM 5:00 PM - 8:30PM  - Nightfloat Team FROM 8:30 -7:30 AM    INTERVAL HPI/OVERNIGHT EVENTS:   Pt refusing eliquis this AM as he believes eliquis will give him prostate cancer. When probed about who told him eliquis causes prostate Ca, pt reports an unrecalled doctor told him it causes prostate CA. Pt is able to recall his medical conditions but denies ever having afib. Despite extensive explanation given to patient for benefits of afib, he continues to refuse.    REVIEW OF SYSTEMS:  CONSTITUTIONAL: No fever, weight loss, or fatigue  RESPIRATORY: No cough, wheezing, chills or hemoptysis; No shortness of breath  CARDIOVASCULAR: No chest pain, palpitations, dizziness, or leg swelling  GASTROINTESTINAL: No abdominal pain. No nausea, vomiting, or hematemesis; No diarrhea or constipation. No melena or hematochezia.  GENITOURINARY: No dysuria or hematuria, urinary frequency  NEUROLOGICAL: No headaches, memory loss, loss of strength, numbness, or tremors  SKIN: No itching, burning, rashes, or lesions     Vital Signs Last 24 Hrs  T(C): 36.9 (08 Jun 2021 07:46), Max: 37 (07 Jun 2021 14:30)  T(F): 98.4 (08 Jun 2021 07:46), Max: 98.6 (07 Jun 2021 14:30)  HR: 60 (08 Jun 2021 07:46) (60 - 79)  BP: 131/57 (08 Jun 2021 07:46) (129/58 - 189/75)  BP(mean): --  RR: 17 (08 Jun 2021 07:46) (17 - 18)  SpO2: 100% (08 Jun 2021 07:46) (99% - 100%)    PHYSICAL EXAMINATION:  GENERAL: NAD, elderly  HEAD:  Atraumatic, Normocephalic  EYES:  conjunctiva and sclera clear  NECK: Supple, No JVD, Normal thyroid  CHEST/LUNG: Clear to auscultation. Clear to percussion bilaterally; No rales, rhonchi, wheezing, or rubs  HEART: Regular rate and rhythm, (+) 3/6 systolic murmur most pronounced 2nd ics  ABDOMEN: Soft, Nontender, Nondistended; Bowel sounds present  NERVOUS SYSTEM:  Alert & Oriented X3,    EXTREMITIES:  2+ Peripheral Pulses, No clubbing, cyanosis, or edema  SKIN: warm dry                          9.4    6.22  )-----------( 143      ( 07 Jun 2021 04:21 )             28.9     06-07    138  |  97  |  70<H>  ----------------------------<  92  5.3   |  27  |  10.80<H>    Ca    8.7      07 Jun 2021 06:27    TPro  7.3  /  Alb  3.2<L>  /  TBili  0.4  /  DBili  x   /  AST  21  /  ALT  40  /  AlkPhos  169<H>  06-07    LIVER FUNCTIONS - ( 07 Jun 2021 04:21 )  Alb: 3.2 g/dL / Pro: 7.3 g/dL / ALK PHOS: 169 U/L / ALT: 40 U/L DA / AST: 21 U/L / GGT: x           CARDIAC MARKERS ( 07 Jun 2021 04:21 )  0.182 ng/mL / x     / x     / x     / x          PT/INR - ( 07 Jun 2021 04:21 )   PT: 12.1 sec;   INR: 1.02 ratio         PTT - ( 07 Jun 2021 04:21 )  PTT:33.2 sec    CAPILLARY BLOOD GLUCOSE      RADIOLOGY & ADDITIONAL TESTS:                   PGY-1 Progress Note discussed with attending    PAGER #: [[phone]] TILL 5:00 PM  PLEASE CONTACT ON CALL TEAM:  - On Call Team (Please refer to Mary) FROM 5:00 PM - 8:30PM  - Nightfloat Team FROM 8:30 -7:30 AM    INTERVAL HPI/OVERNIGHT EVENTS:   Pt refusing eliquis this AM as he believes eliquis will give him prostate cancer. When probed about who told him eliquis causes prostate Ca, pt reports an unrecalled doctor told him it causes prostate CA. Pt is able to recall his medical conditions but denies ever having afib. Despite extensive explanation given to patient for benefits of afib, he continues to refuse.    Pt has no complaints. Pt denies SOB now. Pt refusing labs this AM, would only like labs during dialysis.    REVIEW OF SYSTEMS:  CONSTITUTIONAL: No fever, weight loss, or fatigue  RESPIRATORY: No cough, wheezing, chills or hemoptysis; No shortness of breath  CARDIOVASCULAR: No chest pain, palpitations, dizziness, or leg swelling  GASTROINTESTINAL: No abdominal pain. No nausea, vomiting, or hematemesis; No diarrhea or constipation. No melena or hematochezia.  GENITOURINARY: No dysuria or hematuria, urinary frequency  NEUROLOGICAL: No headaches, memory loss, loss of strength, numbness, or tremors  SKIN: No itching, burning, rashes, or lesions     Vital Signs Last 24 Hrs  T(C): 36.9 (08 Jun 2021 07:46), Max: 37 (07 Jun 2021 14:30)  T(F): 98.4 (08 Jun 2021 07:46), Max: 98.6 (07 Jun 2021 14:30)  HR: 60 (08 Jun 2021 07:46) (60 - 79)  BP: 131/57 (08 Jun 2021 07:46) (129/58 - 189/75)  BP(mean): --  RR: 17 (08 Jun 2021 07:46) (17 - 18)  SpO2: 100% (08 Jun 2021 07:46) (99% - 100%)    PHYSICAL EXAMINATION:  GENERAL: NAD, elderly  HEAD:  Atraumatic, Normocephalic  EYES:  conjunctiva and sclera clear  NECK: Supple, No JVD, Normal thyroid  CHEST/LUNG: Clear to auscultation. Clear to percussion bilaterally; No rales, rhonchi, wheezing, or rubs  HEART: Regular rate and rhythm, (+) 3/6 systolic murmur most pronounced 2nd ics  ABDOMEN: Soft, Nontender, Nondistended; Bowel sounds present  NERVOUS SYSTEM:  Alert & Oriented X3,    EXTREMITIES:  2+ Peripheral Pulses, No clubbing, cyanosis, or edema  SKIN: warm dry                          9.4    6.22  )-----------( 143      ( 07 Jun 2021 04:21 )             28.9     06-07    138  |  97  |  70<H>  ----------------------------<  92  5.3   |  27  |  10.80<H>    Ca    8.7      07 Jun 2021 06:27    TPro  7.3  /  Alb  3.2<L>  /  TBili  0.4  /  DBili  x   /  AST  21  /  ALT  40  /  AlkPhos  169<H>  06-07    LIVER FUNCTIONS - ( 07 Jun 2021 04:21 )  Alb: 3.2 g/dL / Pro: 7.3 g/dL / ALK PHOS: 169 U/L / ALT: 40 U/L DA / AST: 21 U/L / GGT: x           CARDIAC MARKERS ( 07 Jun 2021 04:21 )  0.182 ng/mL / x     / x     / x     / x          PT/INR - ( 07 Jun 2021 04:21 )   PT: 12.1 sec;   INR: 1.02 ratio         PTT - ( 07 Jun 2021 04:21 )  PTT:33.2 sec    CAPILLARY BLOOD GLUCOSE      RADIOLOGY & ADDITIONAL TESTS:

## 2021-06-08 NOTE — PHYSICAL THERAPY INITIAL EVALUATION ADULT - GAIT DEVIATIONS NOTED, PT EVAL
decreased bandar/increased time in double stance/decreased velocity of limb motion/decreased step length/decreased stride length/decreased weight-shifting ability

## 2021-06-08 NOTE — PHYSICAL THERAPY INITIAL EVALUATION ADULT - DIAGNOSIS, PT EVAL
Patient presents with deficits in strength, balance and endurance. All of which are affecting his ability to independently complete bed mobility activities, transfers and ambulation.

## 2021-06-08 NOTE — PHYSICAL THERAPY INITIAL EVALUATION ADULT - ADDITIONAL COMMENTS
Patient says that he would primarily use a wheelchair to get around but can also sometimes walk very short distances using a rolling walker with assist.

## 2021-06-09 LAB
GLUCOSE BLDC GLUCOMTR-MCNC: 102 MG/DL — HIGH (ref 70–99)
GLUCOSE BLDC GLUCOMTR-MCNC: 111 MG/DL — HIGH (ref 70–99)

## 2021-06-09 RX ADMIN — Medication 25 MILLIGRAM(S): at 23:53

## 2021-06-09 RX ADMIN — Medication 25 MILLIGRAM(S): at 14:28

## 2021-06-09 RX ADMIN — Medication 0.6 MILLIGRAM(S): at 17:45

## 2021-06-09 RX ADMIN — Medication 25 MILLIGRAM(S): at 23:52

## 2021-06-09 RX ADMIN — SENNA PLUS 2 TABLET(S): 8.6 TABLET ORAL at 23:53

## 2021-06-09 RX ADMIN — SEVELAMER CARBONATE 800 MILLIGRAM(S): 2400 POWDER, FOR SUSPENSION ORAL at 23:53

## 2021-06-09 RX ADMIN — LACTULOSE 20 GRAM(S): 10 SOLUTION ORAL at 23:53

## 2021-06-09 RX ADMIN — Medication 325 MILLIGRAM(S): at 11:28

## 2021-06-09 RX ADMIN — ISOSORBIDE MONONITRATE 30 MILLIGRAM(S): 60 TABLET, EXTENDED RELEASE ORAL at 11:28

## 2021-06-09 RX ADMIN — Medication 0.6 MILLIGRAM(S): at 05:58

## 2021-06-09 RX ADMIN — Medication 25 MILLIGRAM(S): at 05:58

## 2021-06-09 RX ADMIN — FAMOTIDINE 20 MILLIGRAM(S): 10 INJECTION INTRAVENOUS at 11:29

## 2021-06-09 RX ADMIN — SEVELAMER CARBONATE 800 MILLIGRAM(S): 2400 POWDER, FOR SUSPENSION ORAL at 14:28

## 2021-06-09 RX ADMIN — APIXABAN 2.5 MILLIGRAM(S): 2.5 TABLET, FILM COATED ORAL at 17:45

## 2021-06-09 RX ADMIN — DRONEDARONE 400 MILLIGRAM(S): 400 TABLET, FILM COATED ORAL at 17:44

## 2021-06-09 RX ADMIN — Medication 1 MILLIGRAM(S): at 11:28

## 2021-06-09 RX ADMIN — DRONEDARONE 400 MILLIGRAM(S): 400 TABLET, FILM COATED ORAL at 05:58

## 2021-06-09 RX ADMIN — Medication 81 MILLIGRAM(S): at 11:28

## 2021-06-09 RX ADMIN — SEVELAMER CARBONATE 800 MILLIGRAM(S): 2400 POWDER, FOR SUSPENSION ORAL at 05:58

## 2021-06-09 RX ADMIN — ATORVASTATIN CALCIUM 80 MILLIGRAM(S): 80 TABLET, FILM COATED ORAL at 23:52

## 2021-06-09 NOTE — DIETITIAN INITIAL EVALUATION ADULT. - ORAL INTAKE PTA/DIET HISTORY
diet Rx at skilled nursing facility PTA: No concentrated sweets, 2 to 3g Na, low fat/chol, regular, thin liquid

## 2021-06-09 NOTE — PROGRESS NOTE ADULT - SUBJECTIVE AND OBJECTIVE BOX
Problem List:  ESRD  Dyspnea on admission , possible CHF, resolved after dialysis  Hypoxia resolved.    PAST MEDICAL & SURGICAL HISTORY:  ESRD (end stage renal disease) on dialysis    Hypertension    Anemia    Cerebrovascular accident (CVA), unspecified mechanism    A-V fistula        No Known Allergies      MEDICATIONS  (STANDING):  ALBUTerol    0.083%.. 2.5 milliGRAM(s) Nebulizer every 20 minutes  apixaban 2.5 milliGRAM(s) Oral every 12 hours  aspirin  chewable 81 milliGRAM(s) Oral daily  atorvastatin 80 milliGRAM(s) Oral at bedtime  colchicine 0.6 milliGRAM(s) Oral two times a day  dronedarone 400 milliGRAM(s) Oral two times a day  famotidine    Tablet 20 milliGRAM(s) Oral daily  ferrous    sulfate 325 milliGRAM(s) Oral daily  folic acid 1 milliGRAM(s) Oral daily  hydrALAZINE 25 milliGRAM(s) Oral every 8 hours  isosorbide   mononitrate ER Tablet (IMDUR) 30 milliGRAM(s) Oral daily  lactulose Syrup 20 Gram(s) Oral at bedtime  metoprolol tartrate 25 milliGRAM(s) Oral three times a day  senna 2 Tablet(s) Oral at bedtime  sevelamer carbonate 800 milliGRAM(s) Oral every 8 hours    MEDICATIONS  (PRN):  acetaminophen   Tablet .. 650 milliGRAM(s) Oral every 6 hours PRN Temp greater or equal to 38C (100.4F), Mild Pain (1 - 3)                      REVIEW OF SYSTEMS:  General: no fever no chills, no weight loss.    RESPIRATORY: No cough, wheezing, hemoptysis; No shortness of breath  CARDIOVASCULAR: No chest pain or palpitations. No Edema  GASTROINTESTINAL: No abdominal or epigastric pain. No nausea, vomiting. No diarrhea or constipation. No melena.  GENITOURINARY: No dysuria, frequency, foamy urine, urinary urgency, incontinence or hematuria          VITALS:  T(F): 98 (06-09-21 @ 15:35), Max: 98.2 (06-09-21 @ 07:19)  HR: 65 (06-09-21 @ 15:35)  BP: 163/59 (06-09-21 @ 15:35)  RR: 18 (06-09-21 @ 15:35)  SpO2: 100% (06-09-21 @ 15:35)  Wt(kg): --    06-08 @ 07:01  -  06-09 @ 07:00  --------------------------------------------------------  IN: 50 mL / OUT: 0 mL / NET: 50 mL        PHYSICAL EXAM:  Constitutional:  no diaphoresis, no distress.  Neck: No JVD, no carotid bruit, supple, no adenopathy  Respiratory: Good air entrance B/L, no wheezes, rales or rhonchi  Cardiovascular: S1, S2, RRR, no pericardial rub, no murmur  Abdomen: BS+, soft, no tenderness, no bruit  Pelvis: bladder nondistended      Vascular Access: right AVG with bruit and thrill

## 2021-06-09 NOTE — DIETITIAN INITIAL EVALUATION ADULT. - PHYSCIAL ASSESSMENT
skin overweight skin  Wts in [facility] EMR reviewed, a bit fluctuated, may due to fluid shift from HD and/or scale variance skin tear  Wts in [facility] EMR reviewed, a bit fluctuated, may due to fluid shift from HD and/or scale variance

## 2021-06-09 NOTE — PROGRESS NOTE ADULT - SUBJECTIVE AND OBJECTIVE BOX
INTERVAL HPI/OVERNIGHT EVENTS:   No acute events overnight. Pt without complaints this AM. As per nephrology, pt likely not to get dialyzed today due to logistic/water issue, likely 6/10. Pt denies SOB.    REVIEW OF SYSTEMS:  CONSTITUTIONAL: No fever, weight loss, or fatigue  RESPIRATORY: No cough, wheezing, chills or hemoptysis; No shortness of breath  CARDIOVASCULAR: No chest pain, palpitations, dizziness, or leg swelling  GASTROINTESTINAL: No abdominal pain. No nausea, vomiting, or hematemesis; No diarrhea or constipation. No melena or hematochezia.  GENITOURINARY: No dysuria or hematuria, urinary frequency  NEUROLOGICAL: No headaches, memory loss, loss of strength, numbness, or tremors  SKIN: No itching, burning, rashes, or lesions     Vital Signs Last 24 Hrs  T(C): 36.8 (09 Jun 2021 07:19), Max: 36.8 (08 Jun 2021 11:10)  T(F): 98.2 (09 Jun 2021 07:19), Max: 98.2 (08 Jun 2021 11:10)  HR: 62 (09 Jun 2021 07:19) (58 - 64)  BP: 156/58 (09 Jun 2021 07:19) (128/52 - 158/45)  BP(mean): 70 (08 Jun 2021 10:19) (70 - 70)  RR: 18 (09 Jun 2021 07:19) (18 - 19)  SpO2: 100% (09 Jun 2021 07:19) (96% - 100%)    PHYSICAL EXAMINATION:  GENERAL: NAD, elderly  HEAD:  Atraumatic, Normocephalic  EYES:  conjunctiva and sclera clear  NECK: Supple, No JVD, Normal thyroid  CHEST/LUNG: Clear to auscultation. Clear to percussion bilaterally; No rales, rhonchi, wheezing, or rubs  HEART: Regular rate and rhythm, (+) 3/6 systolic murmur most pronounced 2nd ics  ABDOMEN: Soft, Nontender, obese; Bowel sounds present  NERVOUS SYSTEM:  Alert & Oriented X3,    EXTREMITIES:  2+ Peripheral Pulses, No clubbing, cyanosis, or edema  SKIN: warm dry                    CAPILLARY BLOOD GLUCOSE      RADIOLOGY & ADDITIONAL TESTS:

## 2021-06-09 NOTE — DIETITIAN INITIAL EVALUATION ADULT. - PERTINENT MEDS FT
MEDICATIONS  (STANDING):  ALBUTerol    0.083%.. 2.5 milliGRAM(s) Nebulizer every 20 minutes  apixaban 2.5 milliGRAM(s) Oral every 12 hours  aspirin  chewable 81 milliGRAM(s) Oral daily  atorvastatin 80 milliGRAM(s) Oral at bedtime  colchicine 0.6 milliGRAM(s) Oral two times a day  dronedarone 400 milliGRAM(s) Oral two times a day  famotidine    Tablet 20 milliGRAM(s) Oral daily  ferrous    sulfate 325 milliGRAM(s) Oral daily  folic acid 1 milliGRAM(s) Oral daily  hydrALAZINE 25 milliGRAM(s) Oral every 8 hours  isosorbide   mononitrate ER Tablet (IMDUR) 30 milliGRAM(s) Oral daily  lactulose Syrup 20 Gram(s) Oral at bedtime  metoprolol tartrate 25 milliGRAM(s) Oral three times a day  senna 2 Tablet(s) Oral at bedtime  sevelamer carbonate 800 milliGRAM(s) Oral every 8 hours

## 2021-06-09 NOTE — DIETITIAN INITIAL EVALUATION ADULT. - PERTINENT LABORATORY DATA
06-07 Na138 mmol/L Glu 92 mg/dL K+ 5.3 mmol/L Cr  10.80 mg/dL<H> BUN 70 mg/dL<H>     06-07 Alb 3.2 g/dL<L>

## 2021-06-09 NOTE — PROGRESS NOTE ADULT - SUBJECTIVE AND OBJECTIVE BOX
DATE OF SERVICE:  06/09/2021  Patient was seen and examined on 06/09/2021 .Interim events noted.Consultant notes ,Labs,Telemetry reviewed by me    PRESENTING CC:Dyspnea    HPI and HOSPITAL COURSE: HPI:  76yo M with hx ESRD on HD (M-W-F), HTN, DM, CAD, Atrial Fib, CVA with right sided hemiparesis anemia, vascular dementia, mood disorder presents with shortness of breath. Patient reports he was in his usual state of health when he sddenly stated having trouble breathing And it worsened since morning.   Patient denies cough, chest pain, new leg swelling, missing dialysis, headaches, dizziness,   Per NH paperwork patient complained of shortness of breath at 227am, noted to have O2sat 90-91% on RA,  and RR 26 and placed on NC 2L with O2sat 95%.      INTERIM EVENTS:Awake is less dyspneac      PMH -reviewed admission note, no change since admission  Heart Failure: Acute [ ] Chronic [ ] Acute on Chronic [ ] Diastolic [ ] Systolic [ ] Combined Systolic and Diastolic[ ]  RUBIO[ ]  ATN[ ]  CKD I [ ] CKDII [ ] CKD III [ ] CKD IV [ ] CKD V [ ] ESRD[x ]  HTN[x ] CVA[x ] DM[x ] COPD[ ] COVID[ ] AF[x ]  PPM[ ] ICD[ ]    MEDICATIONS  (STANDING):  ALBUTerol    0.083%.. 2.5 milliGRAM(s) Nebulizer every 20 minutes  apixaban 2.5 milliGRAM(s) Oral every 12 hours  aspirin  chewable 81 milliGRAM(s) Oral daily  atorvastatin 80 milliGRAM(s) Oral at bedtime  colchicine 0.6 milliGRAM(s) Oral two times a day  dronedarone 400 milliGRAM(s) Oral two times a day  famotidine    Tablet 20 milliGRAM(s) Oral daily  ferrous    sulfate 325 milliGRAM(s) Oral daily  folic acid 1 milliGRAM(s) Oral daily  hydrALAZINE 25 milliGRAM(s) Oral every 8 hours  isosorbide   mononitrate ER Tablet (IMDUR) 30 milliGRAM(s) Oral daily  lactulose Syrup 20 Gram(s) Oral at bedtime  metoprolol tartrate 25 milliGRAM(s) Oral three times a day  senna 2 Tablet(s) Oral at bedtime  sevelamer carbonate 800 milliGRAM(s) Oral every 8 hours    MEDICATIONS  (PRN):  acetaminophen   Tablet .. 650 milliGRAM(s) Oral every 6 hours PRN Temp greater or equal to 38C (100.4F), Mild Pain (1 - 3)            REVIEW OF SYSTEMS:  Constitutional: [ ] fever, [ ]weight loss,  [x ]fatigue  Eyes: [ ] visual changes  Respiratory: [x ]shortness of breath;  [ ] cough, [ ]wheezing, [ ]chills, [ ]hemoptysis  Cardiovascular: [ ] chest pain, [ ]palpitations, [ ]dizziness,  [ ]leg swelling[ ]orthopnea[ ]PND  Gastrointestinal: [ ] abdominal pain, [ ]nausea, [ ]vomiting,  [ ]diarrhea [ ]Constipation [ ]Melena  Genitourinary: [ ] dysuria, [ ] hematuria [ ]Sanderson  Neurologic: [ ] headaches [ ] tremors[ ]weakness [ ]Paralysis Right[ ] Left[ ]  Skin: [ ] itching, [ ]burning, [ ] rashes  Endocrine: [ ] heat or cold intolerance  Musculoskeletal: [ ] joint pain or swelling; [ ] muscle, back, or extremity pain  Psychiatric: [ ] depression, [ ]anxiety, [ ]mood swings, or [ ]difficulty sleeping  Hematologic: [ ] easy bruising, [ ] bleeding gums    [x] All remaining systems negative except as per above.   [ ]Unable to obtain.    Vital Signs Last 24 Hrs  T(C): 36.8 (09 Jun 2021 07:19), Max: 36.8 (08 Jun 2021 11:10)  T(F): 98.2 (09 Jun 2021 07:19), Max: 98.2 (08 Jun 2021 11:10)  HR: 62 (09 Jun 2021 07:19) (58 - 64)  BP: 156/58 (09 Jun 2021 07:19) (133/67 - 158/45)  RR: 18 (09 Jun 2021 07:19) (18 - 19)  SpO2: 100% (09 Jun 2021 07:19) (97% - 100%)  I&O's Summary    08 Jun 2021 07:01  -  09 Jun 2021 07:00  --------------------------------------------------------  IN: 50 mL / OUT: 0 mL / NET: 50 mL        PHYSICAL EXAM:  General: No acute distress BMI-30  HEENT: EOMI, PERRL  Neck: Supple, [ ] JVD  Lungs: Equal air entry bilaterally; [ ] rales [ ] wheezing [ ] rhonchi  Heart: Irregular rate and rhythm; [x ] murmur   2/6 [x ] systolic [ ] diastolic [ ] radiation[ ] rubs [ ]  gallops  Abdomen: Nontender, bowel sounds present  Extremities: No clubbing, cyanosis, [ ] edema [ ]Pulses  equal and intact  Nervous system:  Alert & Oriented X3, no focal deficits  Psychiatric: Normal affect  Skin: No rashes or lesions    LABS:        Creatinine Trend: 10.80<--, 10.70<--      IMPRESSION AND PLAN:  74 yo male from [facility] with hx of HTN, Afib, cva ( 10 years ago) with right sided residual deficit,  ESRD on HD, (M, W ,F), presented with acute onset chest pain which started 11pm tonight. Pt is admitted for further evaluation of NSTEMI     Problem/Plan - 1:  ·  Problem: Acute respiratory failure with hypoxia.  Plan: Patient is presenting with SOB , desaturating to 90's on RA requiring supplemental O2  Usually saturates well on RA, taper down NC as tolerated  Also has history of CHF  Covid negative, No signs of pneumonia on CXR   Fluid overload from ESRD vs CHF  Dialysis 6/7, next HD 6/10  Monitor on tele  Problem/Plan - 2:  ·  Problem: Hyperkalemia.  Plan: K 6.0 on admission, labs with dialysis  EKG shows non specific ST and T wave changes, No tall T waves or sine waves   S/p Insulin IV and calcium gluconate   Repeat K 4.3  Monitor BMP  Renal Diet.     Problem/Plan - 3:  ·  Problem: Elevated troponin.  Plan: Troponin 0.18, Elevated at baseline  - unable to trend due to patient refusing blood work  Patient denies chest pain, nausea, vomiting ,pressure like symptoms  Likely demand ischemia in setting of ESRD.   continue Aspirin, Statin and B blockers, Continue Imdur   Admit to tele floor   Echo from Jan 2021 shows GIDD and normal Systolic function and EF  No evidence for MI    Problem/Plan - 4:  ·  Problem: Chronic diastolic congestive heart failure.  Plan: Echo from Jan 2021 shows GIDD and normal Systolic function and EF  Elevated pro BNP likely due to ESRD   c/w home medications  HD MWF - 6/8 last dialysis  Renal and dash diet.     Problem/Plan - 5:  ·  Problem: Hypertension.  Plan: Patient has high blood pressure   Will continue home medications and also will get cardiac eval for medical optimization   Dash diet   lipid profile wnl.     Problem/Plan - 6:  Problem: ESRD (end stage renal disease) on dialysis. Plan: Plan as above   Nephro consult Dr thomas.    Problem/Plan - 7:  ·  Problem: Atrial fibrillation.  Plan: Rate controlled   Continue lopressor and Eliquis  refusing eliquis.  Start on Xarelto     Problem/Plan - 8:  ·  Problem: Anemia.  Plan: Hb 9.4  Likely anemia of chronic disease, Not on epogen   Will continue Iron supplements   Bowel regimen.     Problem/Plan - 9:  ·  Problem: Prophylactic measure.  Plan: RISK                                                          Points  [] Previous VTE                                           3  [] Thrombophilia                                        2  [] Lower limb paralysis                              2   [] Current Cancer                                       2   [x] Immobilization > 24 hrs                        1  [] ICU/CCU stay > 24 hours                       1  [x] Age > 60                                                   1

## 2021-06-09 NOTE — DIETITIAN INITIAL EVALUATION ADULT. - ORAL NUTRITION SUPPLEMENTS
May consider oral nutritional supplement ( Nepro ) if persistently decreased intake as medically feasible

## 2021-06-09 NOTE — DIETITIAN INITIAL EVALUATION ADULT. - FACTORS AFF FOOD INTAKE
acute on chronic comorbidities including ESRD on HD, s/p acute respiratory failure, h/o CVA/Jainism/ethnic/cultural/personal food preferences

## 2021-06-09 NOTE — DIETITIAN INITIAL EVALUATION ADULT. - NAME AND PHONE
Pt to be followed by dietitian at skilled nursing facility and dialysis center when medically ready to be discharged

## 2021-06-09 NOTE — CHART NOTE - NSCHARTNOTEFT_GEN_A_CORE
Patient refused all PM medications and fingerstick. Last fingersticks wnl. Per review of chart is for DC to NH after HD tmw in AM. Primary team to be aware.

## 2021-06-09 NOTE — DIETITIAN INITIAL EVALUATION ADULT. - OTHER INFO
Pf from skilled nursing facility, alert, oriented, weak, but well-communicated; appetite good, unclear recent wt changes, diarrhea x1d, denied chewing or swallowing problem at present, no specific food choices reported; on ESRD on HD for 13y, followed by RD at [facility] nursing facility and Dialysis center; tolerating meals well, 75% at time per flow sheet Pf from skilled nursing facility, alert, oriented, weak, but well-communicated; appetite good, unclear recent wt changes, diarrhea x1d ( RN aware), denied chewing or swallowing problem at present, no specific food choices reported; on ESRD on HD for 13y, followed by RD at skilled nursing facility and Dialysis center; tolerating meals well, 75% at time per flow sheet; Pending discharge planning to skilled nursing facility when medically ready

## 2021-06-09 NOTE — PROGRESS NOTE ADULT - SUBJECTIVE AND OBJECTIVE BOX
PGY-1 Progress Note discussed with attending    PAGER #: [[phone]] TILL 5:00 PM  PLEASE CONTACT ON CALL TEAM:  - On Call Team (Please refer to Mary) FROM 5:00 PM - 8:30PM  - Nightfloat Team FROM 8:30 -7:30 AM    INTERVAL HPI/OVERNIGHT EVENTS:   No acute events overnight. Pt without complaints this AM. As per nephrology, pt likely not to get dialyzed today due to logistic/water issue, likely 6/10. Pt denies SOB.    REVIEW OF SYSTEMS:  CONSTITUTIONAL: No fever, weight loss, or fatigue  RESPIRATORY: No cough, wheezing, chills or hemoptysis; No shortness of breath  CARDIOVASCULAR: No chest pain, palpitations, dizziness, or leg swelling  GASTROINTESTINAL: No abdominal pain. No nausea, vomiting, or hematemesis; No diarrhea or constipation. No melena or hematochezia.  GENITOURINARY: No dysuria or hematuria, urinary frequency  NEUROLOGICAL: No headaches, memory loss, loss of strength, numbness, or tremors  SKIN: No itching, burning, rashes, or lesions     Vital Signs Last 24 Hrs  T(C): 36.8 (09 Jun 2021 07:19), Max: 36.8 (08 Jun 2021 11:10)  T(F): 98.2 (09 Jun 2021 07:19), Max: 98.2 (08 Jun 2021 11:10)  HR: 62 (09 Jun 2021 07:19) (58 - 64)  BP: 156/58 (09 Jun 2021 07:19) (128/52 - 158/45)  BP(mean): 70 (08 Jun 2021 10:19) (70 - 70)  RR: 18 (09 Jun 2021 07:19) (18 - 19)  SpO2: 100% (09 Jun 2021 07:19) (96% - 100%)    PHYSICAL EXAMINATION:  GENERAL: NAD, elderly  HEAD:  Atraumatic, Normocephalic  EYES:  conjunctiva and sclera clear  NECK: Supple, No JVD, Normal thyroid  CHEST/LUNG: Clear to auscultation. Clear to percussion bilaterally; No rales, rhonchi, wheezing, or rubs  HEART: Regular rate and rhythm, (+) 3/6 systolic murmur most pronounced 2nd ics  ABDOMEN: Soft, Nontender, obese; Bowel sounds present  NERVOUS SYSTEM:  Alert & Oriented X3,    EXTREMITIES:  2+ Peripheral Pulses, No clubbing, cyanosis, or edema  SKIN: warm dry                    CAPILLARY BLOOD GLUCOSE      RADIOLOGY & ADDITIONAL TESTS:

## 2021-06-10 ENCOUNTER — TRANSCRIPTION ENCOUNTER (OUTPATIENT)
Age: 76
End: 2021-06-10

## 2021-06-10 VITALS
RESPIRATION RATE: 18 BRPM | OXYGEN SATURATION: 96 % | HEART RATE: 66 BPM | SYSTOLIC BLOOD PRESSURE: 125 MMHG | DIASTOLIC BLOOD PRESSURE: 52 MMHG | TEMPERATURE: 98 F

## 2021-06-10 PROCEDURE — 84484 ASSAY OF TROPONIN QUANT: CPT

## 2021-06-10 PROCEDURE — 99285 EMERGENCY DEPT VISIT HI MDM: CPT | Mod: 25

## 2021-06-10 PROCEDURE — 85610 PROTHROMBIN TIME: CPT

## 2021-06-10 PROCEDURE — 83605 ASSAY OF LACTIC ACID: CPT

## 2021-06-10 PROCEDURE — 83880 ASSAY OF NATRIURETIC PEPTIDE: CPT

## 2021-06-10 PROCEDURE — 99261: CPT

## 2021-06-10 PROCEDURE — 93005 ELECTROCARDIOGRAM TRACING: CPT

## 2021-06-10 PROCEDURE — 36415 COLL VENOUS BLD VENIPUNCTURE: CPT

## 2021-06-10 PROCEDURE — 97162 PT EVAL MOD COMPLEX 30 MIN: CPT

## 2021-06-10 PROCEDURE — 80053 COMPREHEN METABOLIC PANEL: CPT

## 2021-06-10 PROCEDURE — 80074 ACUTE HEPATITIS PANEL: CPT

## 2021-06-10 PROCEDURE — 71045 X-RAY EXAM CHEST 1 VIEW: CPT

## 2021-06-10 PROCEDURE — 80048 BASIC METABOLIC PNL TOTAL CA: CPT

## 2021-06-10 PROCEDURE — 85025 COMPLETE CBC W/AUTO DIFF WBC: CPT

## 2021-06-10 PROCEDURE — 94640 AIRWAY INHALATION TREATMENT: CPT

## 2021-06-10 PROCEDURE — 82962 GLUCOSE BLOOD TEST: CPT

## 2021-06-10 PROCEDURE — 87040 BLOOD CULTURE FOR BACTERIA: CPT

## 2021-06-10 PROCEDURE — 85730 THROMBOPLASTIN TIME PARTIAL: CPT

## 2021-06-10 PROCEDURE — 0225U NFCT DS DNA&RNA 21 SARSCOV2: CPT

## 2021-06-10 RX ORDER — ERYTHROPOIETIN 10000 [IU]/ML
6000 INJECTION, SOLUTION INTRAVENOUS; SUBCUTANEOUS ONCE
Refills: 0 | Status: COMPLETED | OUTPATIENT
Start: 2021-06-10 | End: 2021-06-10

## 2021-06-10 RX ORDER — APIXABAN 2.5 MG/1
2.5 TABLET, FILM COATED ORAL EVERY 12 HOURS
Refills: 0 | Status: DISCONTINUED | OUTPATIENT
Start: 2021-06-10 | End: 2021-06-10

## 2021-06-10 RX ADMIN — Medication 25 MILLIGRAM(S): at 05:55

## 2021-06-10 RX ADMIN — Medication 1 MILLIGRAM(S): at 11:44

## 2021-06-10 RX ADMIN — SEVELAMER CARBONATE 800 MILLIGRAM(S): 2400 POWDER, FOR SUSPENSION ORAL at 05:55

## 2021-06-10 RX ADMIN — ISOSORBIDE MONONITRATE 30 MILLIGRAM(S): 60 TABLET, EXTENDED RELEASE ORAL at 11:44

## 2021-06-10 RX ADMIN — Medication 25 MILLIGRAM(S): at 13:25

## 2021-06-10 RX ADMIN — Medication 81 MILLIGRAM(S): at 11:44

## 2021-06-10 RX ADMIN — Medication 0.6 MILLIGRAM(S): at 05:55

## 2021-06-10 RX ADMIN — APIXABAN 2.5 MILLIGRAM(S): 2.5 TABLET, FILM COATED ORAL at 05:55

## 2021-06-10 RX ADMIN — ERYTHROPOIETIN 6000 UNIT(S): 10000 INJECTION, SOLUTION INTRAVENOUS; SUBCUTANEOUS at 15:33

## 2021-06-10 RX ADMIN — Medication 325 MILLIGRAM(S): at 11:44

## 2021-06-10 RX ADMIN — FAMOTIDINE 20 MILLIGRAM(S): 10 INJECTION INTRAVENOUS at 11:44

## 2021-06-10 RX ADMIN — DRONEDARONE 400 MILLIGRAM(S): 400 TABLET, FILM COATED ORAL at 05:55

## 2021-06-10 RX ADMIN — SEVELAMER CARBONATE 800 MILLIGRAM(S): 2400 POWDER, FOR SUSPENSION ORAL at 13:25

## 2021-06-10 NOTE — PROGRESS NOTE ADULT - SUBJECTIVE AND OBJECTIVE BOX
Problem List:  ESRD   CHD and hypoxia on admisison    PAST MEDICAL & SURGICAL HISTORY:  ESRD (end stage renal disease) on dialysis    Hypertension    Anemia    Cerebrovascular accident (CVA), unspecified mechanism    A-V fistula        No Known Allergies      MEDICATIONS  (STANDING):  ALBUTerol    0.083%.. 2.5 milliGRAM(s) Nebulizer every 20 minutes  apixaban 2.5 milliGRAM(s) Oral every 12 hours  aspirin  chewable 81 milliGRAM(s) Oral daily  atorvastatin 80 milliGRAM(s) Oral at bedtime  colchicine 0.6 milliGRAM(s) Oral two times a day  dronedarone 400 milliGRAM(s) Oral two times a day  famotidine    Tablet 20 milliGRAM(s) Oral daily  ferrous    sulfate 325 milliGRAM(s) Oral daily  folic acid 1 milliGRAM(s) Oral daily  hydrALAZINE 25 milliGRAM(s) Oral every 8 hours  isosorbide   mononitrate ER Tablet (IMDUR) 30 milliGRAM(s) Oral daily  lactulose Syrup 20 Gram(s) Oral at bedtime  metoprolol tartrate 25 milliGRAM(s) Oral three times a day  senna 2 Tablet(s) Oral at bedtime  sevelamer carbonate 800 milliGRAM(s) Oral every 8 hours    MEDICATIONS  (PRN):  acetaminophen   Tablet .. 650 milliGRAM(s) Oral every 6 hours PRN Temp greater or equal to 38C (100.4F), Mild Pain (1 - 3)                      REVIEW OF SYSTEMS:  General: no fever no chills.    RESPIRATORY: No cough, wheezing, hemoptysis; No shortness of breath  CARDIOVASCULAR: No chest pain or palpitations. No Edema  GASTROINTESTINAL: No abdominal or epigastric pain. No nausea, vomiting. No diarrhea. Appetite good  GENITOURINARY: No dysuria..        VITALS:  T(F): 98.8 (06-10-21 @ 11:18), Max: 98.8 (06-10-21 @ 11:18)  HR: 62 (06-10-21 @ 11:18)  BP: 154/60 (06-10-21 @ 11:18)  RR: 18 (06-10-21 @ 11:18)  SpO2: 96% (06-10-21 @ 11:18)  Wt(kg): --      PHYSICAL EXAM:  Constitutional: no diaphoresis, no distress.  Neck: No JVD, no carotid bruit, supple.  Respiratory:  air entrance B/L, no wheezes, rales or rhonchi  Cardiovascular: S1, S2, RRR, no pericardial rub, no murmur  Abdomen: BS+, soft, no tenderness, no bruit  Pelvis: bladder nondistended  Extremities: No cyanosis or clubbing. No peripheral edema.     Vascular Access: right AVG with bruit and thrill

## 2021-06-10 NOTE — PROGRESS NOTE ADULT - PROBLEM SELECTOR PLAN 3
Troponin 0.18, Elevated at baseline  - unable to trend due to patient refusing blood work  Patient denies chest pain, nausea, vomiting ,pressure like symptoms  Likely demand ischemia in setting of ESRD.   continue Aspirin, Statin and B blockers, Continue Imdur   Admit to tele floor   Echo from Jan 2021 shows GIDD and normal Systolic function and EF  Cardio Dr Morales
Troponin 0.18, Elevated at baseline   Patient denies chest pain, nausea, vomiting ,pressure like symptoms  Likely demand ischemia in setting of ESRD.   Will trend cardiac enzymes   continue Aspirin, Statin and B blockers, Continue Imdur   Admit to tele floor   Will get cardio consult for further recommendations regarding medicine optimization  Echo from Jan 2021 shows GIDD and normal Systolic function and EF
Troponin 0.18, Elevated at baseline   Patient denies chest pain, nausea, vomiting ,pressure like symptoms  Likely demand ischemia in setting of ESRD.   Will trend cardiac enzymes   continue Aspirin, Statin and B blockers, Continue Imdur   Admit to tele floor   Will get cardio consult for further recommendations regarding medicine optimization  Echo from Jan 2021 shows GIDD and normal Systolic function and EF
Troponin 0.18, Elevated at baseline  - unable to trend due to patient refusing blood work  Patient denies chest pain, nausea, vomiting ,pressure like symptoms  Likely demand ischemia in setting of ESRD.   continue Aspirin, Statin and B blockers, Continue Imdur   Admit to tele floor   Echo from Jan 2021 shows GIDD and normal Systolic function and EF  Cardio Dr Morales

## 2021-06-10 NOTE — PROGRESS NOTE ADULT - SUBJECTIVE AND OBJECTIVE BOX
PGY-1 Progress Note discussed with attending    PAGER #: [[phone]] TILL 5:00 PM  PLEASE CONTACT ON CALL TEAM:  - On Call Team (Please refer to Mary) FROM 5:00 PM - 8:30PM  - Nightfloat Team FROM 8:30 -7:30 AM    INTERVAL HPI/OVERNIGHT EVENTS:   No acute events overnight. Pt notes SOB this AM. Pt did not get his dialysis yesterday. Pt to get dialysis today then plan for dc to NH    REVIEW OF SYSTEMS:  CONSTITUTIONAL: No fever, weight loss, or fatigue  RESPIRATORY: No cough, wheezing, chills or hemoptysis; No shortness of breath  CARDIOVASCULAR: No chest pain, palpitations, dizziness, or leg swelling  GASTROINTESTINAL: No abdominal pain. No nausea, vomiting, or hematemesis; No diarrhea or constipation. No melena or hematochezia.  GENITOURINARY: No dysuria or hematuria, urinary frequency  NEUROLOGICAL: No headaches, memory loss, loss of strength, numbness, or tremors  SKIN: No itching, burning, rashes, or lesions     Vital Signs Last 24 Hrs  T(C): 36.5 (10 Herrera 2021 07:36), Max: 36.8 (09 Jun 2021 19:57)  T(F): 97.7 (10 Herrera 2021 07:36), Max: 98.3 (09 Jun 2021 19:57)  HR: 66 (10 Herrera 2021 07:36) (58 - 72)  BP: 189/58 (10 Herrera 2021 07:36) (139/48 - 189/58)  BP(mean): --  RR: 18 (10 Herrera 2021 07:36) (18 - 18)  SpO2: 95% (10 Herrera 2021 07:36) (95% - 100%)    PHYSICAL EXAMINATION:  GENERAL: NAD, elderly  HEAD:  Atraumatic, Normocephalic  EYES:  conjunctiva and sclera clear  NECK: Supple, No JVD, Normal thyroid  CHEST/LUNG: Clear to auscultation. Clear to percussion bilaterally; No rales, rhonchi, wheezing, or rubs  HEART: Regular rate and rhythm, (+) 3/6 systolic murmur most pronounced 2nd ics  ABDOMEN: Soft, Nontender, obese; Bowel sounds present  NERVOUS SYSTEM:  Alert & Oriented X3,    EXTREMITIES:  2+ Peripheral Pulses, No clubbing, cyanosis, or edema  SKIN: warm dry                    CAPILLARY BLOOD GLUCOSE      RADIOLOGY & ADDITIONAL TESTS:

## 2021-06-10 NOTE — PROGRESS NOTE ADULT - PROBLEM SELECTOR PLAN 8
Hb 9.4  Likely anemia of chronic disease, Not on epogen   Will continue Iron supplements   Bowel regimen
Likely anemia of chronic disease, Not on epogen   Will continue Iron supplements   Bowel regimen
Hb 9.4  Likely anemia of chronic disease, Not on epogen   Will continue Iron supplements   Bowel regimen
Hb 9.4  Likely anemia of chronic disease, Not on epogen   Will continue Iron supplements   Bowel regimen

## 2021-06-10 NOTE — PROGRESS NOTE ADULT - ASSESSMENT
76 yo male from [facility] with hx of HTN, Afib, cva ( 10 years ago) with right sided residual deficit,  ESRD on HD, (M, W ,F), presented with acute onset chest pain which started 11pm tonight. Pt is admitted for further evaluation of NSTEMI 
ESRD admitted for acute dyspnea, improved with dialysis  Possible CHF pre dialysis that improved with O2 versus rapid atrial fibrillation episode  Mild increased Tropnin due to ESRD and ischemia on demand.    Hyperkalemia K 6.0 pre dialysis resolved    Hypertension.    Anemia will continue ALICIA        
ESRD admitted for acute dyspnea, improved with dialysis  Possible CHF pre dialysis that improved with O2 versus rapid atrial fibrillation episode  Mild increased Tropnin due to ESRD and ischemia on demand.    Hyperkalemia K 6.0 pre dialysis with no ekg CHANGES.    Hypertension.    Anemia will continue ALICIA        
ESRD  Dialysis days MWF , yesterday dialysis postponed for today due to increased chlorine in the water supply.  Dialysis today , 2.5 kg fluid removal  
74 yo male from [facility] with hx of HTN, Afib, cva ( 10 years ago) with right sided residual deficit,  ESRD on HD, (M, W ,F), presented with acute onset chest pain which started 11pm tonight. Pt is admitted for further evaluation of chest pain r/o acs
76 yo male from [facility] with hx of HTN, Afib, cva ( 10 years ago) with right sided residual deficit,  ESRD on HD, (M, W ,F), presented with acute onset chest pain which started 11pm tonight. Pt is admitted for further evaluation of NSTEMI 
74 yo male from [facility] with hx of HTN, Afib, cva ( 10 years ago) with right sided residual deficit,  ESRD on HD, (M, W ,F), presented with acute onset chest pain which started 11pm tonight. Pt is admitted for further evaluation of NSTEMI 
74 yo male from [facility] with hx of HTN, Afib, cva ( 10 years ago) with right sided residual deficit,  ESRD on HD, (M, W ,F), presented with acute onset chest pain which started 11pm tonight. Pt is admitted for further evaluation of NSTEMI 
74 yo male from [facility] with hx of HTN, Afib, cva ( 10 years ago) with right sided residual deficit,  ESRD on HD, (M, W ,F), presented with acute onset chest pain which started 11pm tonight. Pt is admitted for further evaluation of NSTEMI

## 2021-06-10 NOTE — PROGRESS NOTE ADULT - PROBLEM SELECTOR PLAN 1
Patient is presenting with SOB , desaturating to 90's on RA requiring supplemental O2  Usually saturates well on RA, taper down NC as tolerated  Also has history of CHF  Covid negative, No signs of pneumonia on CXR   Fluid overload from ESRD vs CHF  Dialysis 6/7  Monitor on tele  F/U Cardiac eval
Patient is presenting with SOB , desaturating to 90's on RA requiring supplemental O2  Usually saturates well on RA, taper down NC as tolerated  Also has history of CHF  Covid negative, No signs of pneumonia on CXR   Fluid overload from ESRD vs CHF  Dialysis 6/7, next HD 6/10  Monitor on tele  Cardio - Dr Morales
Patient is presenting with SOB , desaturating to 90's on RA requiring supplemental O2  Usually saturates well on RA, taper down NC as tolerated  Also has history of CHF  Covid negative, No signs of pneumonia on CXR   Fluid overload from ESRD vs CHF  Dialysis 6/7  Monitor on tele  F/U Cardiac eval
Patient is presenting with SOB , desaturating to 90's on RA requiring supplemental O2  Usually saturates well on RA, taper down NC as tolerated  Also has history of CHF  Covid negative, No signs of pneumonia on CXR   Fluid overload from ESRD vs CHF  Dialysis 6/7, next HD 6/10  Monitor on tele  Cardio - Dr Morales

## 2021-06-10 NOTE — PROGRESS NOTE ADULT - PROBLEM SELECTOR PLAN 6
Plan as above   Nephro consult Dr thomas
Plan as above   Nephro consult Dr thomas
Plan as above   Nephro consult Dr thoams
Plan as above   Nephro consult Dr thomas

## 2021-06-10 NOTE — DISCHARGE NOTE PROVIDER - CARE PROVIDER_API CALL
RINA WATTERS  Cardiology  [address] [place] [address]  Phone: [phone]  Fax: [phone]  Follow Up Time: 1 week

## 2021-06-10 NOTE — PROGRESS NOTE ADULT - PROBLEM SELECTOR PLAN 5
Patient has high blood pressure   Will continue home medications and also will get cardiac eval for medical optimization   Dash diet   lipid profile wnl

## 2021-06-10 NOTE — PROGRESS NOTE ADULT - PROBLEM SELECTOR PLAN 4
Echo from Jan 2021 shows GIDD and normal Systolic function and EF  Elevated pro BNP likely due to ESRD   Will start home medications  HD MWF - 6/8 last dialysis  Renal and dash diet
Echo from Jan 2021 shows GIDD and normal Systolic function and EF  Elevated pro BNP likely due to ESRD   c/w home medications  HD MWF - 6/8 last dialysis  Renal and dash diet
Echo from Jan 2021 shows GIDD and normal Systolic function and EF  Elevated pro BNP likely due to ESRD   Will start home medications  HD MWF - 6/8 last dialysis  Renal and dash diet

## 2021-06-10 NOTE — PROGRESS NOTE ADULT - PROBLEM SELECTOR PLAN 2
resolved
K 6.0 on admission  EKG shows non specific ST and T wave changes, No tall T waves or sine waves   S/p Insulin IV and calcium gluconate   Repeat K 4.3  Monitor BMP  Renal Diet
K 6.0 on admission, labs with dialysis  EKG shows non specific ST and T wave changes, No tall T waves or sine waves   S/p Insulin IV and calcium gluconate   Repeat K 4.3  Monitor BMP  Renal Diet
K 6.0 on admission  EKG shows non specific ST and T wave changes, No tall T waves or sine waves   S/p Insulin IV and calcium gluconate   Repeat K 4.3  Monitor BMP  Renal Diet

## 2021-06-10 NOTE — DISCHARGE NOTE PROVIDER - HOSPITAL COURSE
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 Pt is admitted for further evaluation of chest pain r/o acs and AHRF secondary to fluid overload.    Acute respiratory failure with hypoxia  Patient is presenting with SOB , desaturating to 90's on RA requiring supplemental O2, tapered to RA following dialysis. Dialysis 6/7 and 6/10    Elevated troponin  Troponin 0.18, Elevated at baseline  - unable to trend due to patient refusing blood work. Patient denies chest pain, nausea, vomiting ,pressure like symptoms  Likely demand ischemia in setting of ESRD. Continue Aspirin, Statin and B blockers, Continue Imdur. Echo from Jan 2021 shows GIDD and normal Systolic function and EF    Afib  Rate controlled on lopressor and Eliquis. Pt is refusing eliquis, planned to switch to xarelto if pt continued to refuse. Pt agreed to take eliquis 6/9.    Patient is stable for discharge per attending and is advised to follow up with PCP as outpatient  Please refer to patient's complete medical chart with documents for a full hospital course, for this is only a brief summary.

## 2021-06-10 NOTE — PROGRESS NOTE ADULT - SUBJECTIVE AND OBJECTIVE BOX
INTERVAL HPI/OVERNIGHT EVENTS:   No acute events overnight. no c/o offered    REVIEW OF SYSTEMS:  CONSTITUTIONAL: No fever, weight loss, or fatigue  RESPIRATORY: No cough, wheezing, chills or hemoptysis; No shortness of breath  CARDIOVASCULAR: No chest pain, palpitations, dizziness, or leg swelling  GASTROINTESTINAL: No abdominal pain. No nausea, vomiting, or hematemesis; No diarrhea or constipation. No melena or hematochezia.  GENITOURINARY: No dysuria or hematuria, urinary frequency  NEUROLOGICAL: No headaches, memory loss, loss of strength, numbness, or tremors  SKIN: No itching, burning, rashes, or lesions       Vital Signs Last 24 Hrs  T(C): 37.1 (10 Herrera 2021 11:18), Max: 37.1 (10 Herrera 2021 11:18)  T(F): 98.8 (10 Herrera 2021 11:18), Max: 98.8 (10 Herrera 2021 11:18)  HR: 62 (10 Herrera 2021 11:18) (62 - 72)  BP: 154/60 (10 Herrera 2021 11:18) (152/59 - 189/58)  BP(mean): --  RR: 18 (10 Herrera 2021 11:18) (18 - 18)  SpO2: 96% (10 Herrera 2021 11:18) (95% - 100%)      PHYSICAL EXAMINATION:  GENERAL: NAD, elderly  HEAD:  Atraumatic, Normocephalic  EYES:  conjunctiva and sclera clear  NECK: Supple, No JVD, Normal thyroid  CHEST/LUNG: Clear to auscultation. Clear to percussion bilaterally; No rales, rhonchi, wheezing, or rubs  HEART: Regular rate and rhythm, (+) 3/6 systolic murmur most pronounced 2nd ics  ABDOMEN: Soft, Nontender, obese; Bowel sounds present  NERVOUS SYSTEM:  Alert & Oriented X3,    EXTREMITIES:  2+ Peripheral Pulses, No clubbing, cyanosis, or edema  SKIN: warm dry      LABS:                                            CAPILLARY BLOOD GLUCOSE      RADIOLOGY & ADDITIONAL TESTS:

## 2021-06-10 NOTE — PROGRESS NOTE ADULT - PROVIDER SPECIALTY LIST ADULT
Nephrology
Internal Medicine
Cardiology
Internal Medicine

## 2021-06-10 NOTE — DISCHARGE NOTE PROVIDER - NSDCMRMEDTOKEN_GEN_ALL_CORE_FT
acetaminophen 325 mg oral tablet: 2 tab(s) orally every 6 hours, As Needed  aspirin 81 mg oral tablet, chewable: 1 tab(s) orally once a day  Colace 100 mg oral capsule: 1 cap(s) orally once a day (at bedtime)  Colcrys 0.6 mg oral tablet: 1 tab(s) orally 2 times a day  Eliquis 2.5 mg oral tablet: 1 tab(s) orally 2 times a day  famotidine 20 mg oral tablet: 1 tab(s) orally once a day  ferrous sulfate 325 mg (65 mg elemental iron) oral tablet: 1 tab(s) orally once a day  folic acid 1 mg oral tablet: 1 tab(s) orally once a day  hydrALAZINE 25 mg oral tablet: 1 tab(s) orally 3 times a day  isosorbide mononitrate 30 mg oral tablet, extended release: 1 tab(s) orally once a day  lactulose 10 g/15 mL oral syrup: 30 milliliter(s) orally once a day (at bedtime)  Lipitor 80 mg oral tablet: 1 tab(s) orally once a day  metoprolol tartrate 25 mg oral tablet: 1 tab(s) orally 3 times a day   Multaq 400 mg oral tablet: 1 tab(s) orally 2 times a day  sevelamer hydrochloride 800 mg oral tablet: 2 tab(s) orally 3 times a day

## 2021-06-10 NOTE — PROGRESS NOTE ADULT - PROBLEM SELECTOR PLAN 7
Rate controlled   Continue lopressor and Eliquis  took eliquis this am
Rate controlled   Continue lopressor and Eliquis  refusing eliquis

## 2021-06-10 NOTE — DISCHARGE NOTE NURSING/CASE MANAGEMENT/SOCIAL WORK - PATIENT PORTAL LINK FT
You can access the FollowMyHealth Patient Portal offered by [facility] by registering at the following website: [email or web address]. By joining [employer]’s FollowMyHealth portal, you will also be able to view your health information using other applications (apps) compatible with our system.

## 2021-06-10 NOTE — PROGRESS NOTE ADULT - TIME BILLING
patient with chf , fluid overload , esrd on hd , high troponin  seen by renal , cardiology   no hd today   so d/c back to [facility] tomorrow after hd in am  care plan d/w resident and patient
- Review of records, telemetry, vital signs and daily labs.   - General and cardiovascular physical examination.  - Generation of cardiovascular treatment plan.  - Coordination of care.      Patient was seen and examined by me on 06/09/2021,interim events noted,labs and radiology studies reviewed.  Bola Morales MD,FACC.  [address] [place], [place] [id number].  [phone]
patient with chf due to fluid over load , elevated troponin  hd as per renal   cardiology follow up   if stable and cleared by cardiology then d/c in am to [facility]  care plan d/w resident , patient and his rep.
patient with chf , fluid overload , esrd on hd , high troponin  seen by renal , cardiology  hd today   so d/c back to [facility]  after hd   care plan d/w resident and patient

## 2021-06-10 NOTE — DISCHARGE NOTE PROVIDER - NSDCCPCAREPLAN_GEN_ALL_CORE_FT
PRINCIPAL DISCHARGE DIAGNOSIS  Diagnosis: Acute respiratory failure with hypoxia  Assessment and Plan of Treatment: You came in with shortness of breath that was due to fluid overload from your end stage renal disease. Following dialysis, your shortness of breath improved. Please follow up with your primary care physician in one week to inform them of your recent hospitalization and further management of your medical conditions.        SECONDARY DISCHARGE DIAGNOSES  Diagnosis: Atrial fibrillation  Assessment and Plan of Treatment: PLEASE CONTINUE TO TAKE ELIQUIS AS YOU HAVE AN INCREASED RISK FOR STROKE. Please continue to take metoprolol and multaq to aid controlling your heart rate. Please follow up with your primary care physician in one week to inform them of your recent hospitalization and further management of your medical conditions.      Diagnosis: ESRD (end stage renal disease) on dialysis  Assessment and Plan of Treatment: You are on dialysis. Please resume your regular dialysis schedule. Please follow up with your primary care physician in one week to inform them of your recent hospitalization and further management of your medical conditions.       PRINCIPAL DISCHARGE DIAGNOSIS  Diagnosis: Acute respiratory failure with hypoxia  Assessment and Plan of Treatment: You came in with shortness of breath that was due to fluid overload from your end stage renal disease. Following dialysis, your shortness of breath improved. Please follow up with your primary care physician in one week to inform them of your recent hospitalization and further management of your medical conditions.         SECONDARY DISCHARGE DIAGNOSES  Diagnosis: ESRD (end stage renal disease) on dialysis  Assessment and Plan of Treatment: You are on dialysis. Please resume your regular dialysis schedule. Please follow up with your primary care physician in one week to inform them of your recent hospitalization and further management of your medical conditions.       Diagnosis: Atrial fibrillation  Assessment and Plan of Treatment: PLEASE CONTINUE TO TAKE ELIQUIS AS YOU HAVE AN INCREASED RISK FOR STROKE. Please continue to take Metoprolol and multaq to aid controlling your heart rate. Please follow up with your primary care physician in one week to inform them of your recent hospitalization and further management of your medical conditions.

## 2021-06-12 LAB
CULTURE RESULTS: SIGNIFICANT CHANGE UP
CULTURE RESULTS: SIGNIFICANT CHANGE UP
SPECIMEN SOURCE: SIGNIFICANT CHANGE UP
SPECIMEN SOURCE: SIGNIFICANT CHANGE UP

## 2021-06-23 PROBLEM — T82.898A INADEQUATE FLOW OF DIALYSIS ARTERIOVENOUS FISTULA: Status: ACTIVE | Noted: 2020-07-14

## 2021-06-23 PROBLEM — N18.6 ESRD (END STAGE RENAL DISEASE) ON DIALYSIS: Status: ACTIVE | Noted: 2020-07-14

## 2021-06-23 NOTE — PHYSICAL EXAM
[Pulsatile Thrill] : pulsatile thrill [No Rash or Lesion] : No rash or lesion [Alert] : alert [Calm] : calm [FreeTextEntry1] : ulcer noted over [JVD] : no jugular venous distention  [Ankle Swelling (On Exam)] : not present [Varicose Veins Of Lower Extremities] : not present [] : not present [Skin Ulcer] : no ulcer [de-identified] : appears well

## 2021-06-23 NOTE — REASON FOR VISIT
[Other ___] : a [unfilled] visit for [Formal Caregiver] : formal caregiver [High Venous Pressure] : high venous pressure

## 2021-06-23 NOTE — HISTORY OF PRESENT ILLNESS
[] : in right upper arm [FreeTextEntry1] : referred from dialysis\par accompanied by mya MA\par feels ok\par  no meds this morning\par uses w/c, no reported falls\par refuses to take Eliquis, only medication he takes is Renvela\par  [FreeTextEntry3] : 10/8/2013 Dr. Chaudhary [FreeTextEntry4] : Wednesday  [FreeTextEntry5] : yesterday at 6 pm [FreeTextEntry6] : Dr. Yadav

## 2021-06-24 ENCOUNTER — APPOINTMENT (OUTPATIENT)
Dept: ENDOVASCULAR SURGERY | Facility: CLINIC | Age: 76
End: 2021-06-24
Payer: MEDICARE

## 2021-06-24 ENCOUNTER — RESULT REVIEW (OUTPATIENT)
Age: 76
End: 2021-06-24

## 2021-06-24 VITALS
DIASTOLIC BLOOD PRESSURE: 62 MMHG | SYSTOLIC BLOOD PRESSURE: 159 MMHG | RESPIRATION RATE: 16 BRPM | HEART RATE: 68 BPM | WEIGHT: 178 LBS | BODY MASS INDEX: 30.39 KG/M2 | HEIGHT: 64 IN | OXYGEN SATURATION: 100 %

## 2021-06-24 DIAGNOSIS — I63.9 CEREBRAL INFARCTION, UNSPECIFIED: ICD-10-CM

## 2021-06-24 DIAGNOSIS — Z99.2 END STAGE RENAL DISEASE: ICD-10-CM

## 2021-06-24 DIAGNOSIS — F01.50 VASCULAR DEMENTIA W/OUT BEHAVIORAL DISTURBANCE: ICD-10-CM

## 2021-06-24 DIAGNOSIS — I51.7 CARDIOMEGALY: ICD-10-CM

## 2021-06-24 DIAGNOSIS — N18.6 END STAGE RENAL DISEASE: ICD-10-CM

## 2021-06-24 DIAGNOSIS — T82.898A OTHER SPECIFIED COMPLICATION OF VASCULAR PROSTHETIC DEVICES, IMPLANTS AND GRAFTS, INITIAL ENCOUNTER: ICD-10-CM

## 2021-06-24 PROCEDURE — 36901Z: CUSTOM

## 2021-06-24 NOTE — HISTORY OF PRESENT ILLNESS
[] : in right upper arm [FreeTextEntry1] : referred from dialysis\par accompanied by mya Moore [phone]\par feels ok\par  no meds this morning\par uses w/c, no reported falls\par refuses to take Eliquis, only medication he takes is Renvela and sensipar\par  [FreeTextEntry3] : 10/8/2013 Dr. Chaudhary [FreeTextEntry4] : yesterday [FreeTextEntry5] : yesterday at 6pm [FreeTextEntry6] : Dr. Yadav

## 2021-06-24 NOTE — PHYSICAL EXAM
[Pulsatile Thrill] : pulsatile thrill [No Rash or Lesion] : No rash or lesion [Alert] : alert [Calm] : calm [FreeTextEntry1] : ulcer noted over [JVD] : no jugular venous distention  [Ankle Swelling (On Exam)] : not present [Varicose Veins Of Lower Extremities] : not present [] : not present [Skin Ulcer] : no ulcer [de-identified] : appears well

## 2021-06-24 NOTE — ASSESSMENT
[FreeTextEntry1] : referred from dialysis, high venous pressures with thin skin over AVG. plan for fistulogram and possible intervention

## 2021-06-24 NOTE — REASON FOR VISIT
[Other ___] : a [unfilled] visit for [High Venous Pressure] : high venous pressure [Formal Caregiver] : formal caregiver [FreeTextEntry2] : thin skin over AVG

## 2021-09-07 ENCOUNTER — RESULT REVIEW (OUTPATIENT)
Age: 76
End: 2021-09-07

## 2021-09-07 ENCOUNTER — APPOINTMENT (OUTPATIENT)
Dept: ENDOVASCULAR SURGERY | Facility: CLINIC | Age: 76
End: 2021-09-07
Payer: MEDICARE

## 2021-09-07 VITALS
BODY MASS INDEX: 30.39 KG/M2 | SYSTOLIC BLOOD PRESSURE: 205 MMHG | HEART RATE: 72 BPM | DIASTOLIC BLOOD PRESSURE: 78 MMHG | WEIGHT: 178 LBS | RESPIRATION RATE: 16 BRPM | TEMPERATURE: 98.6 F | OXYGEN SATURATION: 97 % | HEIGHT: 64 IN

## 2021-09-07 PROCEDURE — 36901Z: CUSTOM

## 2021-09-07 NOTE — PHYSICAL EXAM
[No Rash or Lesion] : No rash or lesion [Alert] : alert [Calm] : calm [Pulsatile Thrill] : pulsatile thrill [FreeTextEntry1] : thin skin over arterial aneurysm, plan for fistulogram [JVD] : no jugular venous distention  [Ankle Swelling (On Exam)] : not present [Varicose Veins Of Lower Extremities] : not present [] : not present [Skin Ulcer] : no ulcer [de-identified] : appears well

## 2021-09-07 NOTE — HISTORY OF PRESENT ILLNESS
[] : in right upper arm [FreeTextEntry1] : alert and orientated x 3\par referred for thin skin over aneurysm\par accompanied by daughter Kylie [phone]\par feels ok\par no meds this morning, does not take any meds\par uses w/c, no reported falls\par covid not detected \par \par  [FreeTextEntry3] : 10/8/2013 Dr. Chaudhary [FreeTextEntry5] : yesterday  [FreeTextEntry6] : Dr. Yadav

## 2021-09-07 NOTE — REASON FOR VISIT
[Other ___] : a [unfilled] visit for [Inadequate Flow within AVF] : inadequate flow within AVF [Formal Caregiver] : formal caregiver

## 2021-09-07 NOTE — PROCEDURE
[Resume diet] : resume diet [Site check for bleeding/hematoma/thrill/bruit] : Site check for bleeding/hematoma/thrill/bruit [Vital signs on admission the q 15 mins x2] : Vital signs on admission the q 15 mins x2 [FreeTextEntry1] : right arm  AVG fistulogram

## 2021-09-27 ENCOUNTER — INPATIENT (INPATIENT)
Facility: HOSPITAL | Age: 76
LOS: 7 days | Discharge: NURSING, REHAB OR LONG-TERM CARE | DRG: 304 | End: 2021-10-05
Attending: INTERNAL MEDICINE | Admitting: INTERNAL MEDICINE
Payer: MEDICARE

## 2021-09-27 VITALS
RESPIRATION RATE: 17 BRPM | SYSTOLIC BLOOD PRESSURE: 203 MMHG | DIASTOLIC BLOOD PRESSURE: 77 MMHG | WEIGHT: 171.08 LBS | HEIGHT: 65 IN | HEART RATE: 76 BPM | TEMPERATURE: 98 F | OXYGEN SATURATION: 100 %

## 2021-09-27 DIAGNOSIS — I48.0 PAROXYSMAL ATRIAL FIBRILLATION: ICD-10-CM

## 2021-09-27 DIAGNOSIS — N18.6 END STAGE RENAL DISEASE: ICD-10-CM

## 2021-09-27 DIAGNOSIS — I25.10 ATHEROSCLEROTIC HEART DISEASE OF NATIVE CORONARY ARTERY WITHOUT ANGINA PECTORIS: ICD-10-CM

## 2021-09-27 DIAGNOSIS — D64.9 ANEMIA, UNSPECIFIED: ICD-10-CM

## 2021-09-27 DIAGNOSIS — I77.0 ARTERIOVENOUS FISTULA, ACQUIRED: Chronic | ICD-10-CM

## 2021-09-27 DIAGNOSIS — Z29.9 ENCOUNTER FOR PROPHYLACTIC MEASURES, UNSPECIFIED: ICD-10-CM

## 2021-09-27 DIAGNOSIS — J96.91 RESPIRATORY FAILURE, UNSPECIFIED WITH HYPOXIA: ICD-10-CM

## 2021-09-27 DIAGNOSIS — I16.1 HYPERTENSIVE EMERGENCY: ICD-10-CM

## 2021-09-27 DIAGNOSIS — E87.5 HYPERKALEMIA: ICD-10-CM

## 2021-09-27 DIAGNOSIS — R74.8 ABNORMAL LEVELS OF OTHER SERUM ENZYMES: ICD-10-CM

## 2021-09-27 LAB
ALBUMIN SERPL ELPH-MCNC: 3.6 G/DL — SIGNIFICANT CHANGE UP (ref 3.5–5)
ALP SERPL-CCNC: 184 U/L — HIGH (ref 40–120)
ALT FLD-CCNC: 59 U/L DA — SIGNIFICANT CHANGE UP (ref 10–60)
ANION GAP SERPL CALC-SCNC: 11 MMOL/L — SIGNIFICANT CHANGE UP (ref 5–17)
ANION GAP SERPL CALC-SCNC: 9 MMOL/L — SIGNIFICANT CHANGE UP (ref 5–17)
AST SERPL-CCNC: 23 U/L — SIGNIFICANT CHANGE UP (ref 10–40)
BASE EXCESS BLDV CALC-SCNC: 7.4 MMOL/L — SIGNIFICANT CHANGE UP
BASOPHILS # BLD AUTO: 0.02 K/UL — SIGNIFICANT CHANGE UP (ref 0–0.2)
BASOPHILS NFR BLD AUTO: 0.3 % — SIGNIFICANT CHANGE UP (ref 0–2)
BILIRUB SERPL-MCNC: 0.5 MG/DL — SIGNIFICANT CHANGE UP (ref 0.2–1.2)
BUN SERPL-MCNC: 36 MG/DL — HIGH (ref 7–18)
BUN SERPL-MCNC: 71 MG/DL — HIGH (ref 7–18)
CALCIUM SERPL-MCNC: 9.3 MG/DL — SIGNIFICANT CHANGE UP (ref 8.4–10.5)
CALCIUM SERPL-MCNC: 9.4 MG/DL — SIGNIFICANT CHANGE UP (ref 8.4–10.5)
CHLORIDE SERPL-SCNC: 99 MMOL/L — SIGNIFICANT CHANGE UP (ref 96–108)
CHLORIDE SERPL-SCNC: 99 MMOL/L — SIGNIFICANT CHANGE UP (ref 96–108)
CO2 SERPL-SCNC: 27 MMOL/L — SIGNIFICANT CHANGE UP (ref 22–31)
CO2 SERPL-SCNC: 30 MMOL/L — SIGNIFICANT CHANGE UP (ref 22–31)
CREAT SERPL-MCNC: 10.8 MG/DL — HIGH (ref 0.5–1.3)
CREAT SERPL-MCNC: 6.69 MG/DL — HIGH (ref 0.5–1.3)
EOSINOPHIL # BLD AUTO: 0.07 K/UL — SIGNIFICANT CHANGE UP (ref 0–0.5)
EOSINOPHIL NFR BLD AUTO: 1.1 % — SIGNIFICANT CHANGE UP (ref 0–6)
GLUCOSE SERPL-MCNC: 136 MG/DL — HIGH (ref 70–99)
GLUCOSE SERPL-MCNC: 83 MG/DL — SIGNIFICANT CHANGE UP (ref 70–99)
HCO3 BLDV-SCNC: 31 MMOL/L — HIGH (ref 22–29)
HCT VFR BLD CALC: 29.7 % — LOW (ref 39–50)
HCT VFR BLD CALC: 32.8 % — LOW (ref 39–50)
HGB BLD-MCNC: 10.6 G/DL — LOW (ref 13–17)
HGB BLD-MCNC: 9.7 G/DL — LOW (ref 13–17)
IMM GRANULOCYTES NFR BLD AUTO: 0.5 % — SIGNIFICANT CHANGE UP (ref 0–1.5)
LIDOCAIN IGE QN: 230 U/L — SIGNIFICANT CHANGE UP (ref 73–393)
LIDOCAIN IGE QN: 579 U/L — HIGH (ref 73–393)
LYMPHOCYTES # BLD AUTO: 1.06 K/UL — SIGNIFICANT CHANGE UP (ref 1–3.3)
LYMPHOCYTES # BLD AUTO: 16.2 % — SIGNIFICANT CHANGE UP (ref 13–44)
MAGNESIUM SERPL-MCNC: 2.1 MG/DL — SIGNIFICANT CHANGE UP (ref 1.6–2.6)
MCHC RBC-ENTMCNC: 32.3 GM/DL — SIGNIFICANT CHANGE UP (ref 32–36)
MCHC RBC-ENTMCNC: 32.4 PG — SIGNIFICANT CHANGE UP (ref 27–34)
MCHC RBC-ENTMCNC: 32.7 GM/DL — SIGNIFICANT CHANGE UP (ref 32–36)
MCHC RBC-ENTMCNC: 32.7 PG — SIGNIFICANT CHANGE UP (ref 27–34)
MCV RBC AUTO: 101.2 FL — HIGH (ref 80–100)
MCV RBC AUTO: 99.3 FL — SIGNIFICANT CHANGE UP (ref 80–100)
MONOCYTES # BLD AUTO: 0.64 K/UL — SIGNIFICANT CHANGE UP (ref 0–0.9)
MONOCYTES NFR BLD AUTO: 9.8 % — SIGNIFICANT CHANGE UP (ref 2–14)
NEUTROPHILS # BLD AUTO: 4.71 K/UL — SIGNIFICANT CHANGE UP (ref 1.8–7.4)
NEUTROPHILS NFR BLD AUTO: 72.1 % — SIGNIFICANT CHANGE UP (ref 43–77)
NRBC # BLD: 0 /100 WBCS — SIGNIFICANT CHANGE UP (ref 0–0)
NRBC # BLD: 0 /100 WBCS — SIGNIFICANT CHANGE UP (ref 0–0)
NT-PROBNP SERPL-SCNC: HIGH PG/ML (ref 0–450)
PCO2 BLDV: 40 MMHG — LOW (ref 42–55)
PH BLDV: 7.5 — HIGH (ref 7.32–7.43)
PHOSPHATE SERPL-MCNC: 4.3 MG/DL — SIGNIFICANT CHANGE UP (ref 2.5–4.5)
PLATELET # BLD AUTO: 143 K/UL — LOW (ref 150–400)
PLATELET # BLD AUTO: 153 K/UL — SIGNIFICANT CHANGE UP (ref 150–400)
PO2 BLDV: 64 MMHG — SIGNIFICANT CHANGE UP
POTASSIUM SERPL-MCNC: 4.3 MMOL/L — SIGNIFICANT CHANGE UP (ref 3.5–5.3)
POTASSIUM SERPL-MCNC: 6.5 MMOL/L — CRITICAL HIGH (ref 3.5–5.3)
POTASSIUM SERPL-SCNC: 4.3 MMOL/L — SIGNIFICANT CHANGE UP (ref 3.5–5.3)
POTASSIUM SERPL-SCNC: 6.5 MMOL/L — CRITICAL HIGH (ref 3.5–5.3)
PROT SERPL-MCNC: 7.7 G/DL — SIGNIFICANT CHANGE UP (ref 6–8.3)
RBC # BLD: 2.99 M/UL — LOW (ref 4.2–5.8)
RBC # BLD: 3.24 M/UL — LOW (ref 4.2–5.8)
RBC # FLD: 14.6 % — HIGH (ref 10.3–14.5)
RBC # FLD: 14.7 % — HIGH (ref 10.3–14.5)
SAO2 % BLDV: 93 % — SIGNIFICANT CHANGE UP
SARS-COV-2 RNA SPEC QL NAA+PROBE: SIGNIFICANT CHANGE UP
SODIUM SERPL-SCNC: 137 MMOL/L — SIGNIFICANT CHANGE UP (ref 135–145)
SODIUM SERPL-SCNC: 138 MMOL/L — SIGNIFICANT CHANGE UP (ref 135–145)
TROPONIN I SERPL-MCNC: 0.13 NG/ML — HIGH (ref 0–0.04)
TROPONIN I SERPL-MCNC: 0.25 NG/ML — HIGH (ref 0–0.04)
WBC # BLD: 4.62 K/UL — SIGNIFICANT CHANGE UP (ref 3.8–10.5)
WBC # BLD: 6.53 K/UL — SIGNIFICANT CHANGE UP (ref 3.8–10.5)
WBC # FLD AUTO: 4.62 K/UL — SIGNIFICANT CHANGE UP (ref 3.8–10.5)
WBC # FLD AUTO: 6.53 K/UL — SIGNIFICANT CHANGE UP (ref 3.8–10.5)

## 2021-09-27 PROCEDURE — 71045 X-RAY EXAM CHEST 1 VIEW: CPT | Mod: 26

## 2021-09-27 PROCEDURE — 93010 ELECTROCARDIOGRAM REPORT: CPT | Mod: 76

## 2021-09-27 PROCEDURE — 99285 EMERGENCY DEPT VISIT HI MDM: CPT

## 2021-09-27 RX ORDER — METOPROLOL TARTRATE 50 MG
25 TABLET ORAL ONCE
Refills: 0 | Status: COMPLETED | OUTPATIENT
Start: 2021-09-27 | End: 2021-09-27

## 2021-09-27 RX ORDER — FAMOTIDINE 10 MG/ML
20 INJECTION INTRAVENOUS DAILY
Refills: 0 | Status: DISCONTINUED | OUTPATIENT
Start: 2021-09-27 | End: 2021-09-30

## 2021-09-27 RX ORDER — SENNA PLUS 8.6 MG/1
2 TABLET ORAL AT BEDTIME
Refills: 0 | Status: DISCONTINUED | OUTPATIENT
Start: 2021-09-27 | End: 2021-10-05

## 2021-09-27 RX ORDER — SEVELAMER CARBONATE 2400 MG/1
1600 POWDER, FOR SUSPENSION ORAL THREE TIMES A DAY
Refills: 0 | Status: DISCONTINUED | OUTPATIENT
Start: 2021-09-27 | End: 2021-10-05

## 2021-09-27 RX ORDER — HYDRALAZINE HCL 50 MG
25 TABLET ORAL THREE TIMES A DAY
Refills: 0 | Status: DISCONTINUED | OUTPATIENT
Start: 2021-09-27 | End: 2021-09-29

## 2021-09-27 RX ORDER — FOLIC ACID 0.8 MG
1 TABLET ORAL DAILY
Refills: 0 | Status: DISCONTINUED | OUTPATIENT
Start: 2021-09-27 | End: 2021-10-05

## 2021-09-27 RX ORDER — ACETAMINOPHEN 500 MG
650 TABLET ORAL EVERY 6 HOURS
Refills: 0 | Status: DISCONTINUED | OUTPATIENT
Start: 2021-09-27 | End: 2021-10-05

## 2021-09-27 RX ORDER — APIXABAN 2.5 MG/1
2.5 TABLET, FILM COATED ORAL
Refills: 0 | Status: DISCONTINUED | OUTPATIENT
Start: 2021-09-27 | End: 2021-10-05

## 2021-09-27 RX ORDER — ATORVASTATIN CALCIUM 80 MG/1
1 TABLET, FILM COATED ORAL
Qty: 0 | Refills: 0 | DISCHARGE

## 2021-09-27 RX ORDER — DRONEDARONE 400 MG/1
400 TABLET, FILM COATED ORAL
Refills: 0 | Status: DISCONTINUED | OUTPATIENT
Start: 2021-09-27 | End: 2021-10-05

## 2021-09-27 RX ORDER — FERROUS SULFATE 325(65) MG
325 TABLET ORAL DAILY
Refills: 0 | Status: DISCONTINUED | OUTPATIENT
Start: 2021-09-27 | End: 2021-09-29

## 2021-09-27 RX ORDER — ISOSORBIDE MONONITRATE 60 MG/1
30 TABLET, EXTENDED RELEASE ORAL DAILY
Refills: 0 | Status: DISCONTINUED | OUTPATIENT
Start: 2021-09-27 | End: 2021-10-05

## 2021-09-27 RX ORDER — HYDRALAZINE HCL 50 MG
25 TABLET ORAL ONCE
Refills: 0 | Status: COMPLETED | OUTPATIENT
Start: 2021-09-27 | End: 2021-09-27

## 2021-09-27 RX ORDER — DRONEDARONE 400 MG/1
1 TABLET, FILM COATED ORAL
Qty: 0 | Refills: 0 | DISCHARGE

## 2021-09-27 RX ORDER — CALCIUM GLUCONATE 100 MG/ML
1 VIAL (ML) INTRAVENOUS ONCE
Refills: 0 | Status: COMPLETED | OUTPATIENT
Start: 2021-09-27 | End: 2021-09-27

## 2021-09-27 RX ORDER — LACTULOSE 10 G/15ML
30 SOLUTION ORAL AT BEDTIME
Refills: 0 | Status: DISCONTINUED | OUTPATIENT
Start: 2021-09-27 | End: 2021-10-05

## 2021-09-27 RX ORDER — METOPROLOL TARTRATE 50 MG
25 TABLET ORAL THREE TIMES A DAY
Refills: 0 | Status: DISCONTINUED | OUTPATIENT
Start: 2021-09-27 | End: 2021-09-30

## 2021-09-27 RX ORDER — COLCHICINE 0.6 MG
0.6 TABLET ORAL
Refills: 0 | Status: DISCONTINUED | OUTPATIENT
Start: 2021-09-27 | End: 2021-10-05

## 2021-09-27 RX ORDER — ASPIRIN/CALCIUM CARB/MAGNESIUM 324 MG
81 TABLET ORAL DAILY
Refills: 0 | Status: DISCONTINUED | OUTPATIENT
Start: 2021-09-27 | End: 2021-10-05

## 2021-09-27 RX ORDER — ATORVASTATIN CALCIUM 80 MG/1
80 TABLET, FILM COATED ORAL AT BEDTIME
Refills: 0 | Status: DISCONTINUED | OUTPATIENT
Start: 2021-09-27 | End: 2021-10-05

## 2021-09-27 RX ADMIN — Medication 25 MILLIGRAM(S): at 12:19

## 2021-09-27 RX ADMIN — Medication 25 MILLIGRAM(S): at 12:18

## 2021-09-27 RX ADMIN — Medication 25 MILLIGRAM(S): at 21:01

## 2021-09-27 RX ADMIN — Medication 0.6 MILLIGRAM(S): at 21:01

## 2021-09-27 RX ADMIN — ATORVASTATIN CALCIUM 80 MILLIGRAM(S): 80 TABLET, FILM COATED ORAL at 21:02

## 2021-09-27 RX ADMIN — Medication 100 GRAM(S): at 12:19

## 2021-09-27 NOTE — PATIENT PROFILE ADULT - DO YOU FEEL LIKE HURTING YOURSELF OR OTHERS?
Call 911 for stroke/Need for follow up after discharge/Prescribed medications/Risk factors for stroke/Stroke education booklet/Stroke support groups for patients, families, and friends/Stroke warning signs and symptoms/Signs and symptoms of stroke no

## 2021-09-27 NOTE — H&P ADULT - PROBLEM SELECTOR PLAN 3
- ESRD Maintenance HD (M/W/F ) via right AVF   - Due for HD today   - Cr 10.8 on admission  - No crackles, pedal edema or fluid overload  - Avoid Nephrotoxic Meds/ Agents such as (NSAIDs, IV contrast, Aminoglycosides such as gentamicin, Gadolinium contrast, Phosphate containing enemas, etc..)  - Adjust Medications according to eGFR  - Renal diet  - Nephrology Dr Jacinto consulted

## 2021-09-27 NOTE — H&P ADULT - PROBLEM SELECTOR PLAN 5
- On admission, patient with hg 10.6 around baseline   - Patient denies dark black tarry stool, hx NSAIDs abuse, episodes of BRBPR or hematemesis  - Anemia is multifactorial - CHARITO and ACD  - Patient has no signs of hemorrhagic shock  or hemodynamic instability   - Type and Screen, IV access, Transfuse if Hg <7  - Monitor for any signs of active bleeding - Patient with hx of PAF on Eliquis, metoprolol at [facility] (as per patient sometimes he refuses to take AC among other medications)  - EKG showed NSR, no changes  - C/w home meds for now

## 2021-09-27 NOTE — H&P ADULT - NSHPPHYSICALEXAM_GEN_ALL_CORE
ICU Vital Signs Last 24 Hrs  T(C): 36.5 (27 Sep 2021 11:17), Max: 36.9 (27 Sep 2021 08:57)  T(F): 97.7 (27 Sep 2021 11:17), Max: 98.4 (27 Sep 2021 08:57)  HR: 78 (27 Sep 2021 11:17) (76 - 80)  BP: 190/73 (27 Sep 2021 11:17) (190/73 - 203/77)  RR: 18 (27 Sep 2021 11:17) (17 - 19)  SpO2: 95% (27 Sep 2021 11:17) (95% - 100%)    GENERAL: NAD, overweight, sat well on RA, speaking in full sentences   HEAD:  Atraumatic, Normocephalic  EYES:  conjunctiva and sclera clear  NECK: Supple, No JVD, Normal thyroid  CHEST/LUNG: Clear to auscultation. Clear to percussion bilaterally; No rales, rhonchi, wheezing, or rubs  HEART: Regular rate and rhythm; No murmurs, rubs, or gallops  ABDOMEN: Soft, Nontender, Nondistended; Bowel sounds present, no pain or masses on palpation   NERVOUS SYSTEM:  Alert & Oriented X3, R sided residual deficit   EXTREMITIES:  2+ Peripheral Pulses, No clubbing, cyanosis, or edema   SKIN: warm, dry, hypopigmented areas on LE, no skin breakdown noted

## 2021-09-27 NOTE — ED PROVIDER NOTE - PHYSICAL EXAMINATION
GENERAL: well appearing, no acute distress   HEAD: atraumatic   EYES: EOMI, pink conjunctiva   ENT: moist oral mucosa   CARDIAC: RRR, no edema, distal pulses present, RUE AV fistula   RESPIRATORY: lungs CTAB, no increased work of breathing   GASTROINTESTINAL: no abdominal tenderness, no rebound or guarding, bowel sounds presents  GENITOURINARY: no CVA tenderness   MUSCULOSKELETAL: no deformity   NEUROLOGICAL: AAOx3, spontaneous movement of extremities   SKIN: hyperpigmentation of b/l LE's   PSYCHIATRIC: cooperative  HEME LYMPH: no lymphadenopathy

## 2021-09-27 NOTE — H&P ADULT - PROBLEM SELECTOR PLAN 1
- On admission, patients /77  most likely 2/2 HD due today   - Patient c/o sob for one episode at NH  - Treated with Home meds in ED  - Target BP in the first hr <180/110 and < 160/110 in the next 23 hrs   - Avoid reduction in MAP more than 20% in the first hour and more than 15% in the next 23 hrs   - Monitor BP and adjust meds as needed - On admission, patients /77  most likely 2/2 medication non compliance, HD due today, episode of PAF today causing sob?, sat well on RA  - Patient c/o sob for one episode at [facility]  - CXR unremarkable   - Echo 01/2021 showed EF >55, GIDD  - Treated with Home meds in ED  - Target BP in the first hr <180/110 and < 160/110 in the next 23 hrs   - Avoid reduction in MAP more than 20% in the first hour and more than 15% in the next 23 hrs   - Monitor BP and adjust meds as needed - On admission, patients /77  most likely 2/2 medication non compliance, HD due today, episode of PAF today causing sob?, sat well on RA, NAD  - Patient c/o sob for one episode at [facility]  - CXR unremarkable, ProBNP 20k around baseline    - Echo 01/2021 showed EF >55, GIDD  - Treated with Home meds in ED  - Target BP in the first hr <180/110 and < 160/110 in the next 23 hrs   - Avoid reduction in MAP more than 20% in the first hour and more than 15% in the next 23 hrs   - Monitor BP and adjust meds as needed - On admission, patients /77  most likely 2/2 medication non compliance, HD due today, episode of PAF today causing sob?, sat well on RA, NAD  - Patient c/o sob for one episode at [facility]  - CXR unremarkable, ProBNP 20k around baseline    - Echo 01/2021 showed EF >55, GIDD  - Treated with Home meds in ED  - Target BP in the first hr <180/110 and < 160/110 in the next 23 hrs   - Avoid reduction in MAP more than 20% in the first hour and more than 15% in the next 23 hrs   - Monitor BP and adjust meds as needed  - F/u Echo

## 2021-09-27 NOTE — H&P ADULT - PROBLEM SELECTOR PLAN 6
- On admission, patient with hg 10.6 around baseline   - Patient denies dark black tarry stool, hx NSAIDs abuse, episodes of BRBPR or hematemesis  - Anemia is multifactorial - CHARITO and ACD  - Patient has no signs of hemorrhagic shock  or hemodynamic instability   - Type and Screen, IV access, Transfuse if Hg <7  - Monitor for any signs of active bleeding

## 2021-09-27 NOTE — H&P ADULT - PROBLEM SELECTOR PLAN 2
- Patient presented with hyperkalemia 6.5  - EKG showed NSR, no acute changes   - S/p Calcium gluconate 1 g x1  - Monitor BMP post HD

## 2021-09-27 NOTE — ED ADULT TRIAGE NOTE - BMI (KG/M2)
Writer ARRON for the patient instructing him to send a remote transmission since we have not received it yet. He can call tech support with any problems/questions regarding transmitting or he can call our office if that is his preference.    28.5

## 2021-09-27 NOTE — H&P ADULT - NSHPREVIEWOFSYSTEMS_GEN_ALL_CORE
REVIEW OF SYSTEMS:  CONSTITUTIONAL: No fever, weight loss, or fatigue  RESPIRATORY: No cough, wheezing, chills or hemoptysis; mild shortness of breath  CARDIOVASCULAR: No chest pain, palpitations, dizziness, or leg swelling  GASTROINTESTINAL: No abdominal pain. No nausea, vomiting, or hematemesis; No diarrhea or constipation. No melena or hematochezia.  GENITOURINARY: No dysuria or hematuria, urinary frequency  NEUROLOGICAL: No headaches, memory loss, loss of strength, numbness, or tremors  SKIN: No itching, burning, rashes, or lesions

## 2021-09-27 NOTE — H&P ADULT - ASSESSMENT
75 yo M from , has past medical hx of ESRD on HD (M-W-F), HTN, DM, CAD, Afib, CVA with right sided hemiparesis, anemia, vascular dementia, mood disorder presented to the ED c/o one episode of SOB. Patient admitted for hypertensive emergency and hyperkalemia

## 2021-09-27 NOTE — H&P ADULT - PROBLEM SELECTOR PLAN 8
IMPROVE VTE Individual Risk Assessment  RISK                                                                Points  [  ] Previous VTE                                                  3  [  ] Thrombophilia                                               2  [  ] Lower limb paralysis                                      2        (unable to hold up >15 seconds)    [  ] Current Cancer                                              2         (within 6 months)  [  ] Immobilization > 24 hrs                                1  [  ] ICU/CCU stay > 24 hours                              1  [  ] Age > 60                                                      1  IMPROVE VTE Score _________    Famotidine for GI prophylaxis  C/w Eliquis full AC

## 2021-09-27 NOTE — H&P ADULT - PROBLEM SELECTOR PLAN 7
IMPROVE VTE Individual Risk Assessment  RISK                                                                Points  [  ] Previous VTE                                                  3  [  ] Thrombophilia                                               2  [  ] Lower limb paralysis                                      2        (unable to hold up >15 seconds)    [  ] Current Cancer                                              2         (within 6 months)  [  ] Immobilization > 24 hrs                                1  [  ] ICU/CCU stay > 24 hours                              1  [  ] Age > 60                                                      1  IMPROVE VTE Score _________    Famotidine for GI prophylaxis  C/w Eliquis full AC - Patient noted to have elevated ALP, denies any abdominal pain at this time  - Monitor for now

## 2021-09-27 NOTE — CONSULT NOTE ADULT - SUBJECTIVE AND OBJECTIVE BOX
Chief complain/HPI  ESRD, came to the ER for acute dyspnea that lasted 20 minutes, ASSOCAITED WITH PALPITATION BUT WITH NO CHEST PIAN  XR chest small pleural effusion  K was 6.5 on admission  Patient is not taking all his NH meds, a time refusing certain meds  Admission /77      PAST MEDICAL & SURGICAL HISTORY:  ESRD (end stage renal disease) on dialysis    Hypertension    Anemia    Cerebrovascular accident (CVA), unspecified mechanism    Paroxysmal atrial fibrillation    CAD (coronary artery disease)    A-V fistula        Home Medications Reviewed    Hospital Medications:   MEDICATIONS  (STANDING):  apixaban 2.5 milliGRAM(s) Oral two times a day  aspirin  chewable 81 milliGRAM(s) Oral daily  atorvastatin 80 milliGRAM(s) Oral at bedtime  colchicine 0.6 milliGRAM(s) Oral two times a day  dronedarone 400 milliGRAM(s) Oral two times a day  famotidine    Tablet 20 milliGRAM(s) Oral daily  ferrous    sulfate 325 milliGRAM(s) Oral daily  folic acid 1 milliGRAM(s) Oral daily  hydrALAZINE 25 milliGRAM(s) Oral three times a day  isosorbide   mononitrate ER Tablet (IMDUR) 30 milliGRAM(s) Oral daily  lactulose Syrup 30 Gram(s) Oral at bedtime  metoprolol tartrate 25 milliGRAM(s) Oral three times a day  senna 2 Tablet(s) Oral at bedtime  sevelamer carbonate 1600 milliGRAM(s) Oral three times a day    MEDICATIONS  (PRN):  acetaminophen   Tablet .. 650 milliGRAM(s) Oral every 6 hours PRN Temp greater or equal to 38C (100.4F), Mild Pain (1 - 3)      Allergies    No Known Allergies    Intolerances                              10.6   6.53  )-----------( 153      ( 27 Sep 2021 10:20 )             32.8     09-27    137  |  99  |  71<H>  ----------------------------<  83  6.5<HH>   |  27  |  10.80<H>    Ca    9.4      27 Sep 2021 10:20    TPro  7.7  /  Alb  3.6  /  TBili  0.5  /  DBili  x   /  AST  23  /  ALT  59  /  AlkPhos  184<H>  09-27              RADIOLOGY & ADDITIONAL STUDIES:    SOCIAL HISTORY: Denies ETOh,Smoking,     FAMILY HISTORY:  Family history of diabetes mellitus        REVIEW OF SYSTEMS:  CONSTITUTIONAL: No malaise, No fatigue, No fevers or chills, well developed, no diaphoresis    RESPIRATORY: No cough, wheezing, hemoptysis; No shortness of breath  CARDIOVASCULAR: No chest pain or palpitations. No edema  GASTROINTESTINAL: No abdominal or epigastric pain. No nausea, vomiting, or hematemesis; No diarrhea or constipation. No melena or hematochezia.  GENITOURINARY: No dysuria, frequency, foamy urine, urinary urgency, incontinence or hematuria  NEUROLOGICAL: weakness of lower extermities    VITALS:  Vital Signs Last 24 Hrs  T(C): 36.5 (27 Sep 2021 11:17), Max: 36.9 (27 Sep 2021 08:57)  T(F): 97.7 (27 Sep 2021 11:17), Max: 98.4 (27 Sep 2021 08:57)  HR: 78 (27 Sep 2021 11:17) (76 - 80)  BP: 190/73 (27 Sep 2021 11:17) (190/73 - 203/77)  BP(mean): --  RR: 18 (27 Sep 2021 11:17) (17 - 19)  SpO2: 95% (27 Sep 2021 11:17) (95% - 100%)    Height (cm): 165.1 (09-27 @ 08:57)  Weight (kg): 77.6 (09-27 @ 08:57)  BMI (kg/m2): 28.5 (09-27 @ 08:57)  BSA (m2): 1.85 (09-27 @ 08:57)    PHYSICAL EXAM:  Constitutional: NAD  HEENT: anicteric sclera, oropharynx clear.  Neck: No JVD  Respiratory:  air entrance B/L, no wheezes, rales or rhonchi  Cardiovascular: S1, S2, RRR, no pericardial rub, no murmur  Gastrointestinal: BS+, soft, no tenderness, no distension, no bruit  Pelvis: bladder non-distended, no CVA tenderness  Extremities: No edema

## 2021-09-27 NOTE — ED ADULT NURSE NOTE - NSICDXPASTSURGICALHX_GEN_ALL_CORE_FT
36F with pmhx endometriosis s/p diagnostic laparoscopy (on OCPs) presents to ED sent from Dr. John's office with 3rd digit extensor tendon injury; Pt reports she was cutting potatoes 2 days ago, cut the 3rd digit and came to the ED and had the laceration sutured. Pt was placed on Cefalexin and has been taking it as directed (last dose taken this morning). Pt was seen at Dr. John's office and was told she has a 3rd digit extensor tendon injury in need of repair. Pt was admitted to the hospital, underwent an exploration and repair of left third digit extensor tendon on 5/6/21. Pt tolerated the procedure well and was stable for discharge the same day with instructions to finish the course of her oral antibiotics and follow-up with Dr. John. PAST SURGICAL HISTORY:  A-V fistula

## 2021-09-27 NOTE — ED PROVIDER NOTE - IV ALTEPLASE INCLUSION HIDDEN
If you are a smoker, it is important for your health to stop smoking. Please be aware that second hand smoke is also harmful.
show

## 2021-09-27 NOTE — H&P ADULT - NSICDXPASTMEDICALHX_GEN_ALL_CORE_FT
PAST MEDICAL HISTORY:  Anemia     CAD (coronary artery disease)     Cerebrovascular accident (CVA), unspecified mechanism     ESRD (end stage renal disease) on dialysis     Hypertension     Paroxysmal atrial fibrillation

## 2021-09-27 NOTE — H&P ADULT - HISTORY OF PRESENT ILLNESS
77 yo M from , has past medical hx of ESRD on HD (M-W-F), HTN, DM, CAD, Afib, CVA with right sided hemiparesis, anemia, vascular dementia, mood disorder presented to the ED c/o one episode of SOB. As per NH documents, patient was requiring oxygen supplementation however while in ED, patient was saturating >96% on RA without any signs of respiratory distress. Patient denies cough, chest pain, fever, chills, leg swelling, missing dialysis, headaches, dizziness, palpitations, abdominal pain, nausea, vomiting or diarrhea.    75 yo M from [facility], has past medical hx of ESRD on HD (M-W-F), HTN, DM, CAD, Afib, CVA with right sided hemiparesis, anemia, vascular dementia, mood disorder presented to the ED c/o one episode of SOB. As per NH documents, patient was requiring oxygen supplementation however while in ED, patient was saturating >96% on RA without any signs of respiratory distress. Patient denies cough, chest pain, fever, chills, leg swelling, missing dialysis, headaches, dizziness, palpitations, abdominal pain, nausea, vomiting or diarrhea.

## 2021-09-27 NOTE — ED PROVIDER NOTE - PROGRESS NOTE DETAILS
EKG - nsr, rate 77, QTc 459, no ectopy labs - Hgb 10, K 6.5 (not hemolyzed), ESRD, trop 0.1  CXR - L effusion; central congestion   Discussed with Dr Jacinto who will set up HD. Discussed with PCP Dr Siu who requests admission to Dr Bustamante. Endorsed to MAR.

## 2021-09-27 NOTE — CHART NOTE - NSCHARTNOTEFT_GEN_A_CORE
EVENT: Troponin I, Serum: 0.247:  ng/mL (09.27.21 @ 21:15)  Troponin I, Serum: 0.127: ng/mL (09.27.21 @ 10:20)    BRIEF HPI: 75 yo M from [facility], has past medical hx of ESRD on HD (M-W-F), HTN, DM, CAD, Afib, CVA with right sided hemiparesis, anemia, vascular dementia, mood disorder presented to the ED c/o one episode of SOB. As per NH documents, patient was requiring oxygen supplementation however while in ED, patient was saturating >96% on RA without any signs of respiratory distress.        OBJECTIVE:  Vital Signs Last 24 Hrs  T(C): 37.2 (27 Sep 2021 20:59), Max: 37.2 (27 Sep 2021 20:59)  T(F): 99 (27 Sep 2021 20:59), Max: 99 (27 Sep 2021 20:59)  HR: 73 (27 Sep 2021 20:59) (67 - 80)  BP: 147/65 (27 Sep 2021 20:59) (101/54 - 203/77)  BP(mean): --  RR: 16 (27 Sep 2021 20:59) (15 - 19)  SpO2: 92% (27 Sep 2021 20:59) (92% - 100%)    FOCUSED PHYSICAL EXAM:    LABS:                        9.7    4.62  )-----------( 143      ( 27 Sep 2021 21:15 )             29.7   CARDIAC MARKERS ( 27 Sep 2021 21:15 )  0.247 ng/mL / x     / x     / x     / x      CARDIAC MARKERS ( 27 Sep 2021 10:20 )  0.127 ng/mL / x     / x     / x     / x        09-27    138  |  99  |  36<H>  ----------------------------<  136<H>  4.3   |  30  |  6.69<H>    Ca    9.3      27 Sep 2021 21:15  Phos  4.3     09-27  Mg     2.1     09-27    TPro  7.7  /  Alb  3.6  /  TBili  0.5  /  DBili  x   /  AST  23  /  ALT  59  /  AlkPhos  184<H>  09-27      EKG:   IMGAGING:    ASSESSMENT:  HPI:  75 yo M from [facility], has past medical hx of ESRD on HD (M-W-F), HTN, DM, CAD, Afib, CVA with right sided hemiparesis, anemia, vascular dementia, mood disorder presented to the ED c/o one episode of SOB. As per NH documents, patient was requiring oxygen supplementation however while in ED, patient was saturating >96% on RA without any signs of respiratory distress. Patient denies cough, chest pain, fever, chills, leg swelling, missing dialysis, headaches, dizziness, palpitations, abdominal pain, nausea, vomiting or diarrhea.    (27 Sep 2021 12:30)      PLAN:     FOLLOW UP / RESULT: EVENT: Troponin I, Serum: 0.247:  ng/mL (09.27.21 @ 21:15)  Troponin I, Serum: 0.127: ng/mL (09.27.21 @ 10:20)    BRIEF HPI: 77 yo M from [facility], has past medical hx of ESRD on HD (M-W-F), HTN, DM, CAD, Afib, CVA with right sided hemiparesis, anemia, vascular dementia, mood disorder presented to the ED c/o one episode of SOB. As per NH documents, patient was requiring oxygen supplementation however while in ED, patient was saturating >96% on RA without any signs of respiratory distress. History of CAD but refused angiogram in the past. S/p HD. Assigned [id number]. Now trops increasing, denies chest pain.    OBJECTIVE:  Vital Signs Last 24 Hrs  T(C): 37.2 (27 Sep 2021 20:59), Max: 37.2 (27 Sep 2021 20:59)  T(F): 99 (27 Sep 2021 20:59), Max: 99 (27 Sep 2021 20:59)  HR: 73 (27 Sep 2021 20:59) (67 - 80)  BP: 147/65 (27 Sep 2021 20:59) (101/54 - 203/77)  BP(mean): --  RR: 16 (27 Sep 2021 20:59) (15 - 19)  SpO2: 92% (27 Sep 2021 20:59) (92% - 100%)    FOCUSED PHYSICAL EXAM:  CV: S1 S2, irregular  RESP: mild use of accessory muscle on exertion  NEURO: Alert, oriented to name    LABS:                        9.7    4.62  )-----------( 143      ( 27 Sep 2021 21:15 )             29.7   CARDIAC MARKERS ( 27 Sep 2021 21:15 )  0.247 ng/mL / x     / x     / x     / x      CARDIAC MARKERS ( 27 Sep 2021 10:20 )  0.127 ng/mL / x     / x     / x     / x        09-27    138  |  99  |  36<H>  ----------------------------<  136<H>  4.3   |  30  |  6.69<H>    Ca    9.3      27 Sep 2021 21:15  Phos  4.3     09-27  Mg     2.1     09-27    TPro  7.7  /  Alb  3.6  /  TBili  0.5  /  DBili  x   /  AST  23  /  ALT  59  /  AlkPhos  184<H>  09-27      EKG:   IMGAGING:    ASSESSMENT:  HPI:  77 yo M from [facility], has past medical hx of ESRD on HD (M-W-F), HTN, DM, CAD, Afib, CVA with right sided hemiparesis, anemia, vascular dementia, mood disorder presented to the ED c/o one episode of SOB. As per NH documents, patient was requiring oxygen supplementation however while in ED, patient was saturating >96% on RA without any signs of respiratory distress. Patient denies cough, chest pain, fever, chills, leg swelling, missing dialysis, headaches, dizziness, palpitations, abdominal pain, nausea, vomiting or diarrhea.    (27 Sep 2021 12:30)  PROBLEM:   PLAN:     FOLLOW UP / RESULT: EVENT: Troponin I, Serum: 0.247:  ng/mL (09.27.21 @ 21:15)  Troponin I, Serum: 0.127: ng/mL (09.27.21 @ 10:20)    BRIEF HPI: 77 yo M from [facility], has past medical hx of ESRD on HD (M-W-F), HTN, DM, CAD, Afib, CVA with right sided hemiparesis, anemia, vascular dementia, mood disorder presented to the ED c/o one episode of SOB. As per NH documents, patient was requiring oxygen supplementation however while in ED, patient was saturating >96% on RA without any signs of respiratory distress. History of CAD but refused angiogram in the past. S/p HD. Assigned [id number]. Now trops increasing, denies chest pain.    OBJECTIVE:  Vital Signs Last 24 Hrs  T(C): 37.2 (27 Sep 2021 20:59), Max: 37.2 (27 Sep 2021 20:59)  T(F): 99 (27 Sep 2021 20:59), Max: 99 (27 Sep 2021 20:59)  HR: 73 (27 Sep 2021 20:59) (67 - 80)  BP: 147/65 (27 Sep 2021 20:59) (101/54 - 203/77)  BP(mean): --  RR: 16 (27 Sep 2021 20:59) (15 - 19)  SpO2: 92% (27 Sep 2021 20:59) (92% - 100%)    FOCUSED PHYSICAL EXAM:  CV: S1 S2, irregular, denies chest pain  RESP: mild use of accessory muscle on exertion  NEURO: Alert, oriented to name    LABS:                        9.7    4.62  )-----------( 143      ( 27 Sep 2021 21:15 )             29.7   CARDIAC MARKERS ( 27 Sep 2021 21:15 )  0.247 ng/mL / x     / x     / x     / x      CARDIAC MARKERS ( 27 Sep 2021 10:20 )  0.127 ng/mL / x     / x     / x     / x        09-27    138  |  99  |  36<H>  ----------------------------<  136<H>  4.3   |  30  |  6.69<H>    Ca    9.3      27 Sep 2021 21:15  Phos  4.3     09-27  Mg     2.1     09-27    TPro  7.7  /  Alb  3.6  /  TBili  0.5  /  DBili  x   /  AST  23  /  ALT  59  /  AlkPhos  184<H>  09-27    Serum Pro-Brain Natriuretic Peptide: 31248: pg/mL (09.27.21 @ 10:20)  Serum Pro-Brain Natriuretic Peptide: 79378 pg/mL (06.07.21 @ 04:21)    EKG: (p)    IMAGING:  EXAM:  XR CHEST PORTABLE IMMED 1V                        PROCEDURE DATE:  09/27/2021    INTERPRETATION:  AP erect chest on September 27, 2021 at 10:18 AM. Patient has chest pain.  Heart enlargement again noted.  There is a persistent mild left base effusion.  Slight central CHF also unchanged.  Extensive vascular study from the right arm into the right innominate area continuously again noted.  Chest is similar to June 7 of this year.  IMPRESSION: Unchanged chest as above.    PROBLEM: Elevated troponin probably due to demand ischemia due to CAD  PLAN:   1. EKG now  2. Cont telemetry monitoring  3. Trop in AM  4. Cont apixaban 2.5 kesha GRAM(s) Oral two times a day  5. Cont aspirin  chewable 81 kesha GRAM(s) Oral daily  6. Cont atorvastatin 80 kesha GRAM(s) Oral at bedtime  7. Cont hydralazine 25 kesha GRAM(s) Oral three times a day  8. Cont isosorbide  mononitrate ER Tablet (IMDUR) 30 kesha GRAM(s) Oral daily  9. Cont metoprolol tartrate 25 kesha GRAM(s) Oral three times a day    FOLLOW UP / RESULT: AM Trops, ECHO EVENT: Troponin I, Serum: 0.247:  ng/mL (21 @ 21:15)  Troponin I, Serum: 0.127: ng/mL (21 @ 10:20)    BRIEF HPI: 75 yo M from [facility], has past medical hx of ESRD on HD (M-W-F), HTN, DM, CAD, Afib, CVA with right sided hemiparesis, anemia, vascular dementia, mood disorder presented to the ED c/o one episode of SOB. As per NH documents, patient was requiring oxygen supplementation however while in ED, patient was saturating >96% on RA without any signs of respiratory distress. History of CAD but refused angiogram in the past. S/p HD. Assigned [id number]. Now trops increasing, denies chest pain.    OBJECTIVE:  Vital Signs Last 24 Hrs  T(C): 37.2 (27 Sep 2021 20:59), Max: 37.2 (27 Sep 2021 20:59)  T(F): 99 (27 Sep 2021 20:59), Max: 99 (27 Sep 2021 20:59)  HR: 73 (27 Sep 2021 20:59) (67 - 80)  BP: 147/65 (27 Sep 2021 20:59) (101/54 - 203/77)  BP(mean): --  RR: 16 (27 Sep 2021 20:59) (15 - 19)  SpO2: 92% (27 Sep 2021 20:59) (92% - 100%)    FOCUSED PHYSICAL EXAM:  CV: S1 S2, irregular, denies chest pain  RESP: mild use of accessory muscle on exertion  NEURO: Alert, oriented to name    LABS:                        9.7    4.62  )-----------( 143      ( 27 Sep 2021 21:15 )             29.7   CARDIAC MARKERS ( 27 Sep 2021 21:15 )  0.247 ng/mL / x     / x     / x     / x      CARDIAC MARKERS ( 27 Sep 2021 10:20 )  0.127 ng/mL / x     / x     / x     / x            138  |  99  |  36<H>  ----------------------------<  136<H>  4.3   |  30  |  6.69<H>    Ca    9.3      27 Sep 2021 21:15  Phos  4.3       Mg     2.1         TPro  7.7  /  Alb  3.6  /  TBili  0.5  /  DBili  x   /  AST  23  /  ALT  59  /  AlkPhos  184<H>      Serum Pro-Brain Natriuretic Peptide: 73697: pg/mL (21 @ 10:20)  Serum Pro-Brain Natriuretic Peptide: 87693 pg/mL (21 @ 04:21)    EK21 22:52  VENT RATE 62 BPM  FL INTERVAL 182 MS  QRS INTERVAL 90 MS  QT/QTc 46/472 ms     IMAGING:  EXAM:  XR CHEST PORTABLE IMMED 1V                        PROCEDURE DATE:  2021    INTERPRETATION:  AP erect chest on 2021 at 10:18 AM. Patient has chest pain.  Heart enlargement again noted.  There is a persistent mild left base effusion.  Slight central CHF also unchanged.  Extensive vascular study from the right arm into the right innominate area continuously again noted.  Chest is similar to  of this year.  IMPRESSION: Unchanged chest as above.    PROBLEM: Elevated troponin probably due to demand ischemia due to CAD  PLAN:   1. EKG now  2. Cont telemetry monitoring  3. Trop in AM  4. Cont apixaban 2.5 kesha GRAM(s) Oral two times a day  5. Cont aspirin  chewable 81 kesha GRAM(s) Oral daily  6. Cont atorvastatin 80 kesha GRAM(s) Oral at bedtime  7. Cont hydralazine 25 kesha GRAM(s) Oral three times a day  8. Cont isosorbide  mononitrate ER Tablet (IMDUR) 30 kesha GRAM(s) Oral daily  9. Cont metoprolol tartrate 25 kesha GRAM(s) Oral three times a day    FOLLOW UP / RESULT: AM Trops, ECHO

## 2021-09-27 NOTE — ED PROVIDER NOTE - OBJECTIVE STATEMENT
75 yo M pmh of ESRD (on HD MWF), HTN, CVA, PVD, afib, DM, presents with episode of hypoxia. Per NH paperwork, pt was saturating 90% on 15L NRB and was diaphoretic. Pt states he felt fine at NH and still w/o complaints.

## 2021-09-28 DIAGNOSIS — I16.0 HYPERTENSIVE URGENCY: ICD-10-CM

## 2021-09-28 LAB
A1C WITH ESTIMATED AVERAGE GLUCOSE RESULT: 5.3 % — SIGNIFICANT CHANGE UP (ref 4–5.6)
ANION GAP SERPL CALC-SCNC: 7 MMOL/L — SIGNIFICANT CHANGE UP (ref 5–17)
BUN SERPL-MCNC: 42 MG/DL — HIGH (ref 7–18)
CALCIUM SERPL-MCNC: 9.5 MG/DL — SIGNIFICANT CHANGE UP (ref 8.4–10.5)
CHLORIDE SERPL-SCNC: 99 MMOL/L — SIGNIFICANT CHANGE UP (ref 96–108)
CHOLEST SERPL-MCNC: 163 MG/DL — SIGNIFICANT CHANGE UP
CO2 SERPL-SCNC: 30 MMOL/L — SIGNIFICANT CHANGE UP (ref 22–31)
COVID-19 SPIKE DOMAIN AB INTERP: POSITIVE
COVID-19 SPIKE DOMAIN ANTIBODY RESULT: >250 U/ML — HIGH
CREAT SERPL-MCNC: 7.92 MG/DL — HIGH (ref 0.5–1.3)
ESTIMATED AVERAGE GLUCOSE: 105 MG/DL — SIGNIFICANT CHANGE UP (ref 68–114)
GLUCOSE SERPL-MCNC: 86 MG/DL — SIGNIFICANT CHANGE UP (ref 70–99)
HCT VFR BLD CALC: 29.4 % — LOW (ref 39–50)
HDLC SERPL-MCNC: 55 MG/DL — SIGNIFICANT CHANGE UP
HGB BLD-MCNC: 9.6 G/DL — LOW (ref 13–17)
LIPID PNL WITH DIRECT LDL SERPL: 93 MG/DL — SIGNIFICANT CHANGE UP
MAGNESIUM SERPL-MCNC: 2.3 MG/DL — SIGNIFICANT CHANGE UP (ref 1.6–2.6)
MCHC RBC-ENTMCNC: 32.5 PG — SIGNIFICANT CHANGE UP (ref 27–34)
MCHC RBC-ENTMCNC: 32.7 GM/DL — SIGNIFICANT CHANGE UP (ref 32–36)
MCV RBC AUTO: 99.7 FL — SIGNIFICANT CHANGE UP (ref 80–100)
NON HDL CHOLESTEROL: 108 MG/DL — SIGNIFICANT CHANGE UP
NRBC # BLD: 0 /100 WBCS — SIGNIFICANT CHANGE UP (ref 0–0)
PHOSPHATE SERPL-MCNC: 5.7 MG/DL — HIGH (ref 2.5–4.5)
PLATELET # BLD AUTO: 152 K/UL — SIGNIFICANT CHANGE UP (ref 150–400)
POTASSIUM SERPL-MCNC: 4.8 MMOL/L — SIGNIFICANT CHANGE UP (ref 3.5–5.3)
POTASSIUM SERPL-SCNC: 4.8 MMOL/L — SIGNIFICANT CHANGE UP (ref 3.5–5.3)
RBC # BLD: 2.95 M/UL — LOW (ref 4.2–5.8)
RBC # FLD: 14.6 % — HIGH (ref 10.3–14.5)
SARS-COV-2 IGG+IGM SERPL QL IA: >250 U/ML — HIGH
SARS-COV-2 IGG+IGM SERPL QL IA: POSITIVE
SODIUM SERPL-SCNC: 136 MMOL/L — SIGNIFICANT CHANGE UP (ref 135–145)
TRIGL SERPL-MCNC: 77 MG/DL — SIGNIFICANT CHANGE UP
TROPONIN I SERPL-MCNC: 0.24 NG/ML — HIGH (ref 0–0.04)
TSH SERPL-MCNC: 1.51 UU/ML — SIGNIFICANT CHANGE UP (ref 0.34–4.82)
WBC # BLD: 4.38 K/UL — SIGNIFICANT CHANGE UP (ref 3.8–10.5)
WBC # FLD AUTO: 4.38 K/UL — SIGNIFICANT CHANGE UP (ref 3.8–10.5)

## 2021-09-28 RX ORDER — CHLORHEXIDINE GLUCONATE 213 G/1000ML
1 SOLUTION TOPICAL
Refills: 0 | Status: DISCONTINUED | OUTPATIENT
Start: 2021-09-28 | End: 2021-10-05

## 2021-09-28 RX ADMIN — Medication 25 MILLIGRAM(S): at 05:55

## 2021-09-28 RX ADMIN — SEVELAMER CARBONATE 1600 MILLIGRAM(S): 2400 POWDER, FOR SUSPENSION ORAL at 15:14

## 2021-09-28 RX ADMIN — Medication 1 MILLIGRAM(S): at 12:26

## 2021-09-28 RX ADMIN — Medication 25 MILLIGRAM(S): at 15:14

## 2021-09-28 RX ADMIN — Medication 0.6 MILLIGRAM(S): at 05:56

## 2021-09-28 RX ADMIN — ISOSORBIDE MONONITRATE 30 MILLIGRAM(S): 60 TABLET, EXTENDED RELEASE ORAL at 12:25

## 2021-09-28 RX ADMIN — SEVELAMER CARBONATE 1600 MILLIGRAM(S): 2400 POWDER, FOR SUSPENSION ORAL at 08:29

## 2021-09-28 RX ADMIN — Medication 325 MILLIGRAM(S): at 12:27

## 2021-09-28 RX ADMIN — DRONEDARONE 400 MILLIGRAM(S): 400 TABLET, FILM COATED ORAL at 17:38

## 2021-09-28 RX ADMIN — FAMOTIDINE 20 MILLIGRAM(S): 10 INJECTION INTRAVENOUS at 12:25

## 2021-09-28 RX ADMIN — Medication 81 MILLIGRAM(S): at 12:25

## 2021-09-28 RX ADMIN — Medication 0.6 MILLIGRAM(S): at 17:38

## 2021-09-28 RX ADMIN — CHLORHEXIDINE GLUCONATE 1 APPLICATION(S): 213 SOLUTION TOPICAL at 12:25

## 2021-09-28 RX ADMIN — DRONEDARONE 400 MILLIGRAM(S): 400 TABLET, FILM COATED ORAL at 05:55

## 2021-09-28 NOTE — PHYSICAL THERAPY INITIAL EVALUATION ADULT - GENERAL OBSERVATIONS, REHAB EVAL
pt transferred from facility, esrd with dialysis, on room air, requires assistance of hob for bedmobility long sitting activities to weak trunk control

## 2021-09-28 NOTE — PROGRESS NOTE ADULT - SUBJECTIVE AND OBJECTIVE BOX
PGY-1 Progress Note discussed with attending    PAGER #: [[phone]] TILL 5:00 PM  PLEASE CONTACT ON CALL TEAM:  - On Call Team (Please refer to Mary) FROM 5:00 PM - 8:30PM  - Nightfloat Team FROM 8:30 -7:30 AM    HPI-    75 yo M from [facility], has past medical hx of ESRD on HD (M-W-F), HTN, DM, CAD, Afib, CVA with right sided hemiparesis, anemia, vascular dementia, mood disorder presented to the ED c/o one episode of SOB. As per NH documents, patient was requiring oxygen supplementation however while in ED, patient was saturating >96% on RA without any signs of respiratory distress. Patient denies cough, chest pain, fever, chills, leg swelling, missing dialysis, headaches, dizziness, palpitations, abdominal pain, nausea, vomiting or diarrhea.    (27 Sep 2021 12:30)    As of today (28 Sep 2021), patient reports that he is feeling much better, and is not experiencing shortness of breath. Chest x-ray results from yesterday (27 Sep 2021) demonstrated no changes from different X-ray performed on June 7th, 2021. Significant findings (which were unchanged from X-ray on June 7th, 2021), were heart enlargement, a mild left base effusion and slight central CHF. Of note during yesterday (27 Sep 2021), patient did have decreased RBC count, Hemoglobin, Hematocrit and Platelet Count. Patient's potassium was significantly elevated (6.5), but has been converted back to a value of 4.3. BNP was also significantly elevated yesterday, and troponins were elevated as well.     OVERNIGHT EVENTS:   -     REVIEW OF SYSTEMS:  CONSTITUTIONAL: No fever, weight loss, or fatigue  RESPIRATORY: No cough, wheezing, chills or hemoptysis; No shortness of breath  CARDIOVASCULAR: No chest pain, palpitations, dizziness, or leg swelling  GASTROINTESTINAL: No abdominal pain. No nausea, vomiting, or hematemesis; No diarrhea or constipation. No melena or hematochezia.  GENITOURINARY: No dysuria or hematuria, urinary frequency  NEUROLOGICAL: No headaches, memory loss, loss of strength, numbness, or tremors  SKIN: No itching, burning, rashes, or lesions     MEDICATIONS  (STANDING):  apixaban 2.5 milliGRAM(s) Oral two times a day  aspirin  chewable 81 milliGRAM(s) Oral daily  atorvastatin 80 milliGRAM(s) Oral at bedtime  colchicine 0.6 milliGRAM(s) Oral two times a day  dronedarone 400 milliGRAM(s) Oral two times a day  famotidine    Tablet 20 milliGRAM(s) Oral daily  ferrous    sulfate 325 milliGRAM(s) Oral daily  folic acid 1 milliGRAM(s) Oral daily  hydrALAZINE 25 milliGRAM(s) Oral three times a day  isosorbide   mononitrate ER Tablet (IMDUR) 30 milliGRAM(s) Oral daily  lactulose Syrup 30 Gram(s) Oral at bedtime  metoprolol tartrate 25 milliGRAM(s) Oral three times a day  senna 2 Tablet(s) Oral at bedtime  sevelamer carbonate 1600 milliGRAM(s) Oral three times a day    MEDICATIONS  (PRN):  acetaminophen   Tablet .. 650 milliGRAM(s) Oral every 6 hours PRN Temp greater or equal to 38C (100.4F), Mild Pain (1 - 3)      Vital Signs Last 24 Hrs  T(C): 36.4 (28 Sep 2021 04:39), Max: 37.2 (27 Sep 2021 20:59)  T(F): 97.6 (28 Sep 2021 04:39), Max: 99 (27 Sep 2021 20:59)  HR: 63 (28 Sep 2021 04:39) (63 - 80)  BP: 174/63 (28 Sep 2021 04:39) (101/54 - 203/77)  BP(mean): --  RR: 17 (28 Sep 2021 04:39) (15 - 19)  SpO2: 95% (28 Sep 2021 04:39) (92% - 100%)    PHYSICAL EXAMINATION:  GENERAL: NAD, AAOx  HEAD: AT/NC  EYES: conjunctiva and sclera clear  NECK: supple, No JVD noted, Normal thyroid  CHEST/LUNG: CTABL; no rales, rhonchi, wheezing, or rubs  HEART: regular rate and rhythm; no murmurs, rubs, or gallops  ABDOMEN: soft, nontender, nondistended; Bowel sounds present  EXTREMITIES:  2+ Peripheral Pulses, No clubbing, cyanosis, or edema  SKIN: warm dry                          9.7    4.62  )-----------( 143      ( 27 Sep 2021 21:15 )             29.7     09-27    138  |  99  |  36<H>  ----------------------------<  136<H>  4.3   |  30  |  6.69<H>    Ca    9.3      27 Sep 2021 21:15  Phos  4.3     09-27  Mg     2.1     09-27    TPro  7.7  /  Alb  3.6  /  TBili  0.5  /  DBili  x   /  AST  23  /  ALT  59  /  AlkPhos  184<H>  09-27    LIVER FUNCTIONS - ( 27 Sep 2021 10:20 )  Alb: 3.6 g/dL / Pro: 7.7 g/dL / ALK PHOS: 184 U/L / ALT: 59 U/L DA / AST: 23 U/L / GGT: x           CARDIAC MARKERS ( 27 Sep 2021 21:15 )  0.247 ng/mL / x     / x     / x     / x      CARDIAC MARKERS ( 27 Sep 2021 10:20 )  0.127 ng/mL / x     / x     / x     / x            COVID-19 PCR: NotDetec (27 Sep 2021 10:55)  SARS-CoV-2: NotDetec (07 Jun 2021 04:21)      CAPILLARY BLOOD GLUCOSE          RADIOLOGY & ADDITIONAL TESTS:    EXAM:  XR CHEST PORTABLE IMMED 1V                            PROCEDURE DATE:  09/27/2021      INTERPRETATION:  AP erect chest on September 27, 2021 at 10:18 AM. Patient has chest pain.    Heart enlargement again noted.    There is a persistent mild left base effusion.    --- End of Report ---        Slight central CHF also unchanged.    Extensive vascular study from the right arm into the right innominate area continuously again noted.    Chest is similar to June 7 of this year.    IMPRESSION: Unchanged chest as above.                     PGY-1 Progress Note discussed with attending    PAGER #: [[phone]] TILL 5:00 PM  PLEASE CONTACT ON CALL TEAM:  - On Call Team (Please refer to Mary) FROM 5:00 PM - 8:30PM  - Nightfloat Team FROM 8:30 -7:30 AM    HPI-    77 yo M from [facility], has past medical hx of ESRD on HD (M-W-F), HTN, DM, CAD, Afib, CVA with right sided hemiparesis, anemia, vascular dementia, mood disorder presented to the ED c/o one episode of SOB. As per NH documents, patient was requiring oxygen supplementation however while in ED, patient was saturating >96% on RA without any signs of respiratory distress. Patient denies cough, chest pain, fever, chills, leg swelling, missing dialysis, headaches, dizziness, palpitations, abdominal pain, nausea, vomiting or diarrhea.    (27 Sep 2021 12:30)    As of today (28 Sep 2021), patient reports that he is feeling much better, and is not experiencing shortness of breath. Chest x-ray results from yesterday (27 Sep 2021) demonstrated no changes from different X-ray performed on June 7th, 2021. Significant findings (which were unchanged from X-ray on June 7th, 2021), were heart enlargement, a mild left base effusion and slight central CHF. Of note during yesterday (27 Sep 2021), patient did have decreased RBC count, Hemoglobin, Hematocrit and Platelet Count. Patient's potassium was significantly elevated (6.5), but has been converted back to a value of 4.3. BNP was also significantly elevated yesterday, and troponins were elevated as well.       REVIEW OF SYSTEMS:  CONSTITUTIONAL: No fever, weight loss, or fatigue  RESPIRATORY: No cough, wheezing, chills or hemoptysis; No shortness of breath  CARDIOVASCULAR: No chest pain, palpitations, dizziness, or leg swelling  GASTROINTESTINAL: No abdominal pain. No nausea, vomiting, or hematemesis; No diarrhea or constipation. No melena or hematochezia.  GENITOURINARY: No dysuria or hematuria, urinary frequency  NEUROLOGICAL: No headaches, memory loss, loss of strength, numbness, or tremors  SKIN: No itching, burning, rashes, or lesions     MEDICATIONS  (STANDING):  apixaban 2.5 milliGRAM(s) Oral two times a day  aspirin  chewable 81 milliGRAM(s) Oral daily  atorvastatin 80 milliGRAM(s) Oral at bedtime  colchicine 0.6 milliGRAM(s) Oral two times a day  dronedarone 400 milliGRAM(s) Oral two times a day  famotidine    Tablet 20 milliGRAM(s) Oral daily  ferrous    sulfate 325 milliGRAM(s) Oral daily  folic acid 1 milliGRAM(s) Oral daily  hydrALAZINE 25 milliGRAM(s) Oral three times a day  isosorbide   mononitrate ER Tablet (IMDUR) 30 milliGRAM(s) Oral daily  lactulose Syrup 30 Gram(s) Oral at bedtime  metoprolol tartrate 25 milliGRAM(s) Oral three times a day  senna 2 Tablet(s) Oral at bedtime  sevelamer carbonate 1600 milliGRAM(s) Oral three times a day    MEDICATIONS  (PRN):  acetaminophen   Tablet .. 650 milliGRAM(s) Oral every 6 hours PRN Temp greater or equal to 38C (100.4F), Mild Pain (1 - 3)      Vital Signs Last 24 Hrs  T(C): 36.4 (28 Sep 2021 04:39), Max: 37.2 (27 Sep 2021 20:59)  T(F): 97.6 (28 Sep 2021 04:39), Max: 99 (27 Sep 2021 20:59)  HR: 63 (28 Sep 2021 04:39) (63 - 80)  BP: 174/63 (28 Sep 2021 04:39) (101/54 - 203/77)  BP(mean): --  RR: 17 (28 Sep 2021 04:39) (15 - 19)  SpO2: 95% (28 Sep 2021 04:39) (92% - 100%)    PHYSICAL EXAMINATION:  GENERAL: NAD, AAOx3, obese  HEAD: AT/NC  EYES: conjunctiva and sclera clear  NECK: supple, No JVD noted, Normal thyroid  CHEST/LUNG: CTABL; no rales, rhonchi, wheezing, or rubs  HEART: regular rate and rhythm; no murmurs, rubs, or gallops  ABDOMEN: soft, nontender, nondistended; Bowel sounds present  EXTREMITIES:  2+ Peripheral Pulses, No clubbing, cyanosis, or edema. Rt sided weakness  SKIN: warm dry, hypopigmented areas on b/l shins                          9.7    4.62  )-----------( 143      ( 27 Sep 2021 21:15 )             29.7     09-27    138  |  99  |  36<H>  ----------------------------<  136<H>  4.3   |  30  |  6.69<H>    Ca    9.3      27 Sep 2021 21:15  Phos  4.3     09-27  Mg     2.1     09-27    TPro  7.7  /  Alb  3.6  /  TBili  0.5  /  DBili  x   /  AST  23  /  ALT  59  /  AlkPhos  184<H>  09-27    LIVER FUNCTIONS - ( 27 Sep 2021 10:20 )  Alb: 3.6 g/dL / Pro: 7.7 g/dL / ALK PHOS: 184 U/L / ALT: 59 U/L DA / AST: 23 U/L / GGT: x           CARDIAC MARKERS ( 27 Sep 2021 21:15 )  0.247 ng/mL / x     / x     / x     / x      CARDIAC MARKERS ( 27 Sep 2021 10:20 )  0.127 ng/mL / x     / x     / x     / x            COVID-19 PCR: NotDetec (27 Sep 2021 10:55)  SARS-CoV-2: NotDetec (07 Jun 2021 04:21)      CAPILLARY BLOOD GLUCOSE          RADIOLOGY & ADDITIONAL TESTS:    EXAM:  XR CHEST PORTABLE IMMED 1V                            PROCEDURE DATE:  09/27/2021      INTERPRETATION:  AP erect chest on September 27, 2021 at 10:18 AM. Patient has chest pain.    Heart enlargement again noted.    There is a persistent mild left base effusion.    --- End of Report ---        Slight central CHF also unchanged.    Extensive vascular study from the right arm into the right innominate area continuously again noted.    Chest is similar to June 7 of this year.    IMPRESSION: Unchanged chest as above.

## 2021-09-28 NOTE — PROGRESS NOTE ADULT - PROBLEM SELECTOR PLAN 5
- Patient with hx of PAF on Eliquis, metoprolol at [facility] (as per patient sometimes he refuses to take AC among other medications)  - EKG showed NSR, no changes  - C/w home meds for now

## 2021-09-28 NOTE — PROGRESS NOTE ADULT - SUBJECTIVE AND OBJECTIVE BOX
Problem List:  ESRD  Hypertension  PAF    PAST MEDICAL & SURGICAL HISTORY:  ESRD (end stage renal disease) on dialysis    Hypertension    Anemia    Cerebrovascular accident (CVA), unspecified mechanism    Paroxysmal atrial fibrillation    CAD (coronary artery disease)    A-V fistula        No Known Allergies      MEDICATIONS  (STANDING):  apixaban 2.5 milliGRAM(s) Oral two times a day  aspirin  chewable 81 milliGRAM(s) Oral daily  atorvastatin 80 milliGRAM(s) Oral at bedtime  chlorhexidine 2% Cloths 1 Application(s) Topical <User Schedule>  colchicine 0.6 milliGRAM(s) Oral two times a day  dronedarone 400 milliGRAM(s) Oral two times a day  famotidine    Tablet 20 milliGRAM(s) Oral daily  ferrous    sulfate 325 milliGRAM(s) Oral daily  folic acid 1 milliGRAM(s) Oral daily  hydrALAZINE 25 milliGRAM(s) Oral three times a day  isosorbide   mononitrate ER Tablet (IMDUR) 30 milliGRAM(s) Oral daily  lactulose Syrup 30 Gram(s) Oral at bedtime  metoprolol tartrate 25 milliGRAM(s) Oral three times a day  senna 2 Tablet(s) Oral at bedtime  sevelamer carbonate 1600 milliGRAM(s) Oral three times a day    MEDICATIONS  (PRN):  acetaminophen   Tablet .. 650 milliGRAM(s) Oral every 6 hours PRN Temp greater or equal to 38C (100.4F), Mild Pain (1 - 3)                            9.6    4.38  )-----------( 152      ( 28 Sep 2021 07:45 )             29.4     09-28    136  |  99  |  42<H>  ----------------------------<  86  4.8   |  30  |  7.92<H>    Ca    9.5      28 Sep 2021 07:45  Phos  5.7     09-28  Mg     2.3     09-28    TPro  7.7  /  Alb  3.6  /  TBili  0.5  /  DBili  x   /  AST  23  /  ALT  59  /  AlkPhos  184<H>  09-27            REVIEW OF SYSTEMS:  General: no fever no chills, no weight loss.    RESPIRATORY: No cough, wheezing, hemoptysis; No shortness of breath  CARDIOVASCULAR: No chest pain or palpitations. No Edema  GASTROINTESTINAL: No abdominal or epigastric pain. No nausea, vomiting. No diarrhea or constipation. No melena.  GENITOURINARY: No dysuria          VITALS:  T(F): 98.6 (09-28-21 @ 11:09), Max: 99 (09-27-21 @ 20:59)  HR: 70 (09-28-21 @ 11:09)  BP: 144/66 (09-28-21 @ 11:09)  RR: 18 (09-28-21 @ 11:09)  SpO2: 97% (09-28-21 @ 11:09)  Wt(kg): --    09-27 @ 07:01  -  09-28 @ 07:00  --------------------------------------------------------  IN: 600 mL / OUT: 2308 mL / NET: -1708 mL    09-28 @ 07:01  -  09-28 @ 11:49  --------------------------------------------------------  IN: 200 mL / OUT: 0 mL / NET: 200 mL        PHYSICAL EXAM:  Constitutional: well developed, no diaphoresis, no distress.  Neck: No JVD, no carotid bruit, supple.  Respiratory: air entrance B/L, no wheezes, rales or rhonchi  Cardiovascular: S1, S2, RRR, no pericardial rub, no murmur  Abdomen: BS+, soft, no tenderness, no bruit  Pelvis: bladder nondistended  Extremities: No edema  Skin: No rashes  Vascular Access: Right AVG

## 2021-09-28 NOTE — PROGRESS NOTE ADULT - PROBLEM SELECTOR PLAN 2
- Patient presented with hyperkalemia 6.5  - EKG showed NSR, no acute changes   - S/p Calcium gluconate 1 g x1  - Monitor BMP post HD - Patient presented with hyperkalemia 6.5  - EKG showed NSR, no acute changes   - S/p Calcium gluconate 1 g x1  - Improved to 4.8 in a.m.  - BMP qd and post HD  - c.w tele

## 2021-09-28 NOTE — PROGRESS NOTE ADULT - PROBLEM SELECTOR PLAN 1
- On admission, patients /77  most likely 2/2 medication non compliance, HD due today, episode of PAF today causing sob?, sat well on RA, NAD  - Patient c/o sob for one episode at [facility]  - CXR unremarkable, ProBNP 20k around baseline    - Echo 01/2021 showed EF >55, GIDD  - Treated with Home meds in ED  - Target BP in the first hr <180/110 and < 160/110 in the next 23 hrs   - Avoid reduction in MAP more than 20% in the first hour and more than 15% in the next 23 hrs   - Monitor BP and adjust meds as needed  - F/u Echo - On admission, patients /77  most likely 2/2 medication non compliance vs fluid overload  - No signs of end organ damage, patient denies chest pain, visual changes   - Patient c/o sob for one episode at [facility]  -  ProBNP 20k around baseline    - Echo 01/2021 showed EF >55, GIDD.  - Target BP < 160/110  - /71 in a.m.   - Repeat BP  - C/w current meds  - F/u Echo - On admission, patients /77  most likely 2/2 medication non compliance vs fluid overload  - No signs of end organ damage, patient denies chest pain, visual changes   - Patient c/o sob for one episode at [facility]  -  ProBNP 20k around baseline    - Echo 01/2021 showed EF >55, GIDD.   Trops 0.127 > 0.247 > 0.241  - Target BP < 160/110  - /71 in a.m.   - Repeat BP  - C/w current meds  - F/u Echo

## 2021-09-28 NOTE — PROGRESS NOTE ADULT - PROBLEM SELECTOR PLAN 3
- ESRD Maintenance HD (M/W/F ) via right AVF   - Due for HD today   - Cr 10.8 on admission  - No crackles, pedal edema or fluid overload  - Avoid Nephrotoxic Meds/ Agents such as (NSAIDs, IV contrast, Aminoglycosides such as gentamicin, Gadolinium contrast, Phosphate containing enemas, etc..)  - Adjust Medications according to eGFR  - Renal diet  - Nephrology Dr Jacinto consulted - ESRD Maintenance HD (M/W/F ) via right AVF   - Last HD yesterday net -1700 cc, next HD on 9/29  - Cr 10.8 on admission  - No crackles, pedal edema or fluid overload  - Avoid Nephrotoxic Meds/ Agents such as (NSAIDs, IV contrast, Aminoglycosides such as gentamicin, Gadolinium contrast, Phosphate containing enemas, etc..)  - Adjust Medications according to eGFR  - Renal diet  - Nephrology Dr Jacinto consulted

## 2021-09-29 LAB
ANION GAP SERPL CALC-SCNC: 13 MMOL/L — SIGNIFICANT CHANGE UP (ref 5–17)
BUN SERPL-MCNC: 66 MG/DL — HIGH (ref 7–18)
CALCIUM SERPL-MCNC: 9.1 MG/DL — SIGNIFICANT CHANGE UP (ref 8.4–10.5)
CHLORIDE SERPL-SCNC: 98 MMOL/L — SIGNIFICANT CHANGE UP (ref 96–108)
CO2 SERPL-SCNC: 26 MMOL/L — SIGNIFICANT CHANGE UP (ref 22–31)
CREAT SERPL-MCNC: 10.5 MG/DL — HIGH (ref 0.5–1.3)
GLUCOSE SERPL-MCNC: 92 MG/DL — SIGNIFICANT CHANGE UP (ref 70–99)
HCT VFR BLD CALC: 29.8 % — LOW (ref 39–50)
HGB BLD-MCNC: 10 G/DL — LOW (ref 13–17)
MAGNESIUM SERPL-MCNC: 2.3 MG/DL — SIGNIFICANT CHANGE UP (ref 1.6–2.6)
MCHC RBC-ENTMCNC: 33 PG — SIGNIFICANT CHANGE UP (ref 27–34)
MCHC RBC-ENTMCNC: 33.6 GM/DL — SIGNIFICANT CHANGE UP (ref 32–36)
MCV RBC AUTO: 98.3 FL — SIGNIFICANT CHANGE UP (ref 80–100)
NRBC # BLD: 0 /100 WBCS — SIGNIFICANT CHANGE UP (ref 0–0)
PHOSPHATE SERPL-MCNC: 6.1 MG/DL — HIGH (ref 2.5–4.5)
PLATELET # BLD AUTO: 165 K/UL — SIGNIFICANT CHANGE UP (ref 150–400)
POTASSIUM SERPL-MCNC: 5.3 MMOL/L — SIGNIFICANT CHANGE UP (ref 3.5–5.3)
POTASSIUM SERPL-SCNC: 5.3 MMOL/L — SIGNIFICANT CHANGE UP (ref 3.5–5.3)
RBC # BLD: 3.03 M/UL — LOW (ref 4.2–5.8)
RBC # FLD: 14.7 % — HIGH (ref 10.3–14.5)
SODIUM SERPL-SCNC: 137 MMOL/L — SIGNIFICANT CHANGE UP (ref 135–145)
WBC # BLD: 4.92 K/UL — SIGNIFICANT CHANGE UP (ref 3.8–10.5)
WBC # FLD AUTO: 4.92 K/UL — SIGNIFICANT CHANGE UP (ref 3.8–10.5)

## 2021-09-29 RX ORDER — HYDRALAZINE HCL 50 MG
50 TABLET ORAL EVERY 8 HOURS
Refills: 0 | Status: DISCONTINUED | OUTPATIENT
Start: 2021-09-29 | End: 2021-10-04

## 2021-09-29 RX ORDER — HYDRALAZINE HCL 50 MG
25 TABLET ORAL ONCE
Refills: 0 | Status: COMPLETED | OUTPATIENT
Start: 2021-09-29 | End: 2021-09-29

## 2021-09-29 RX ORDER — ERYTHROPOIETIN 10000 [IU]/ML
3000 INJECTION, SOLUTION INTRAVENOUS; SUBCUTANEOUS
Refills: 0 | Status: DISCONTINUED | OUTPATIENT
Start: 2021-09-29 | End: 2021-10-05

## 2021-09-29 RX ORDER — HYDRALAZINE HCL 50 MG
100 TABLET ORAL THREE TIMES A DAY
Refills: 0 | Status: DISCONTINUED | OUTPATIENT
Start: 2021-09-29 | End: 2021-09-29

## 2021-09-29 RX ORDER — HYDRALAZINE HCL 50 MG
50 TABLET ORAL EVERY 8 HOURS
Refills: 0 | Status: DISCONTINUED | OUTPATIENT
Start: 2021-09-29 | End: 2021-09-29

## 2021-09-29 RX ADMIN — ATORVASTATIN CALCIUM 80 MILLIGRAM(S): 80 TABLET, FILM COATED ORAL at 21:45

## 2021-09-29 RX ADMIN — SEVELAMER CARBONATE 1600 MILLIGRAM(S): 2400 POWDER, FOR SUSPENSION ORAL at 06:25

## 2021-09-29 RX ADMIN — Medication 25 MILLIGRAM(S): at 08:01

## 2021-09-29 RX ADMIN — FAMOTIDINE 20 MILLIGRAM(S): 10 INJECTION INTRAVENOUS at 11:04

## 2021-09-29 RX ADMIN — Medication 325 MILLIGRAM(S): at 11:04

## 2021-09-29 RX ADMIN — Medication 25 MILLIGRAM(S): at 06:24

## 2021-09-29 RX ADMIN — Medication 50 MILLIGRAM(S): at 21:46

## 2021-09-29 RX ADMIN — Medication 81 MILLIGRAM(S): at 11:04

## 2021-09-29 RX ADMIN — DRONEDARONE 400 MILLIGRAM(S): 400 TABLET, FILM COATED ORAL at 06:24

## 2021-09-29 RX ADMIN — Medication 1 MILLIGRAM(S): at 11:04

## 2021-09-29 RX ADMIN — Medication 25 MILLIGRAM(S): at 21:46

## 2021-09-29 RX ADMIN — CHLORHEXIDINE GLUCONATE 1 APPLICATION(S): 213 SOLUTION TOPICAL at 06:25

## 2021-09-29 RX ADMIN — DRONEDARONE 400 MILLIGRAM(S): 400 TABLET, FILM COATED ORAL at 18:43

## 2021-09-29 RX ADMIN — ERYTHROPOIETIN 3000 UNIT(S): 10000 INJECTION, SOLUTION INTRAVENOUS; SUBCUTANEOUS at 15:34

## 2021-09-29 RX ADMIN — Medication 25 MILLIGRAM(S): at 13:09

## 2021-09-29 RX ADMIN — Medication 0.6 MILLIGRAM(S): at 18:43

## 2021-09-29 RX ADMIN — Medication 50 MILLIGRAM(S): at 13:09

## 2021-09-29 RX ADMIN — ISOSORBIDE MONONITRATE 30 MILLIGRAM(S): 60 TABLET, EXTENDED RELEASE ORAL at 11:04

## 2021-09-29 RX ADMIN — SEVELAMER CARBONATE 1600 MILLIGRAM(S): 2400 POWDER, FOR SUSPENSION ORAL at 21:46

## 2021-09-29 RX ADMIN — Medication 0.6 MILLIGRAM(S): at 06:25

## 2021-09-29 RX ADMIN — SEVELAMER CARBONATE 1600 MILLIGRAM(S): 2400 POWDER, FOR SUSPENSION ORAL at 13:09

## 2021-09-29 NOTE — PROGRESS NOTE ADULT - PROBLEM SELECTOR PLAN 3
- ESRD Maintenance HD (M/W/F ) via right AVF   - Last HD 9/29, next on Oct 1st  - Cr 10.8 on admission  - No crackles, pedal edema or fluid overload  - Avoid Nephrotoxic Meds/ Agents such as (NSAIDs, IV contrast, Aminoglycosides such as gentamicin, Gadolinium contrast, Phosphate containing enemas, etc..)  - Adjust Medications according to eGFR  - Renal diet  - Nephrology Dr Jacinto consulted

## 2021-09-29 NOTE — PROGRESS NOTE ADULT - PROBLEM SELECTOR PLAN 5
- Patient with hx of PAF on Eliquis, metoprolol at [facility] (as per patient sometimes he refuses to take AC among other medications)  - EKG showed NSR, no changes  - C/w home meds for now

## 2021-09-29 NOTE — PROGRESS NOTE ADULT - PROBLEM SELECTOR PLAN 1
- On admission, patients /77  most likely 2/2 medication non compliance vs fluid overload  - No signs of end organ damage, patient denies chest pain, visual changes   - Patient c/o sob for one episode at [facility]  -  ProBNP 20k around baseline    - Echo 01/2021 showed EF >55, GIDD.  - Trops trended down  - Target BP < 160/110  - SBP >200 this a.m., gave extra dose of hydralazine, decreased to 114/71 pre-dialysis  - Repeat BP  - C/w current meds  - F/u Echo

## 2021-09-29 NOTE — CONSULT NOTE ADULT - ASSESSMENT
ESRD, dialysis days MWF  Fluid removal 2 KG  in dialysis ,   follow bp during dialysis and after dialysis.  1K bath for first hour and after 2 k    Admitted for PND , etiology not clear , PAF versus ACS. In dialysis his HR is around 56-60. At present in Sinus rythm .  Placed on Metoprolol Patient refusing Eliquis intake. I explained him why he gets it in the NH , still refuses as some told him that side effect of the medication is prostate cancer    History of CAD but refused angiogram in the past.    Elevated BP , a times patient refuses to take his meds. Continue Metoprolol and Hydralazine and follow BP after dialysis    Anemia, hold Epogen today.            
77 yo M from [facility], has past medical hx of ESRD on HD (M-W-F), HTN, DM, CAD, Afib, CVA with right sided hemiparesis, anemia, vascular dementia, mood disorder presented to the ED c/o one episode of SOB,uncontrolled htn.  1.Tele monitoring.  2.Uncontrolled HTN-inc hydralazine 50mg q8, cont lopressor,imdur.  3.PAF-multaq,lopressor,eliquis.  4.CVA-eliquis,statin.  5.CAD-asa,b blocker,statin,imdur.  6.ESRD-HD as per renal.  7.PPI.
Lt Pl effusion   Obesity with Pulm Htn r/o ARSENIO vs Cardiac  Hcvd with Severe Aortic stenosis with PAF   DM with ESRD on H/D with anemia  old CVa       Plan-- H/d TIW  Cardio rx as per Dr Salinas? TAVR  Sleep studies to r/o ARSENIO  Prognosis guarded  Discussed with Dr Bustamante  Thanks for consult

## 2021-09-29 NOTE — PROGRESS NOTE ADULT - SUBJECTIVE AND OBJECTIVE BOX
PGY-1 Progress Note discussed with attending    PAGER #: [[phone]] TILL 5:00 PM  PLEASE CONTACT ON CALL TEAM:  - On Call Team (Please refer to Mary) FROM 5:00 PM - 8:30PM  - Nightfloat Team FROM 8:30 -7:30 AM    HPI:  75 yo M from [facility], has past medical hx of ESRD on HD (M-W-F), HTN, DM, CAD, Afib, CVA with right sided hemiparesis, anemia, vascular dementia, mood disorder presented to the ED c/o one episode of SOB. As per NH documents, patient was requiring oxygen supplementation however while in ED, patient was saturating >96% on RA without any signs of respiratory distress. Patient denies cough, chest pain, fever, chills, leg swelling, missing dialysis, headaches, dizziness, palpitations, abdominal pain, nausea, vomiting or diarrhea.    (27 Sep 2021 12:30)      OVERNIGHT EVENTS:   -     REVIEW OF SYSTEMS:  CONSTITUTIONAL: No fever, weight loss, or fatigue  RESPIRATORY: No cough, wheezing, chills or hemoptysis; No shortness of breath  CARDIOVASCULAR: No chest pain, palpitations, dizziness, or leg swelling  GASTROINTESTINAL: No abdominal pain. No nausea, vomiting, or hematemesis; No diarrhea or constipation. No melena or hematochezia.  GENITOURINARY: No dysuria or hematuria, urinary frequency  NEUROLOGICAL: No headaches, memory loss, loss of strength, numbness, or tremors  SKIN: No itching, burning, rashes, or lesions     MEDICATIONS  (STANDING):  apixaban 2.5 milliGRAM(s) Oral two times a day  aspirin  chewable 81 milliGRAM(s) Oral daily  atorvastatin 80 milliGRAM(s) Oral at bedtime  chlorhexidine 2% Cloths 1 Application(s) Topical <User Schedule>  colchicine 0.6 milliGRAM(s) Oral two times a day  dronedarone 400 milliGRAM(s) Oral two times a day  epoetin ximena-epbx (RETACRIT) Injectable 3000 Unit(s) IV Push <User Schedule>  famotidine    Tablet 20 milliGRAM(s) Oral daily  folic acid 1 milliGRAM(s) Oral daily  hydrALAZINE 50 milliGRAM(s) Oral every 8 hours  isosorbide   mononitrate ER Tablet (IMDUR) 30 milliGRAM(s) Oral daily  lactulose Syrup 30 Gram(s) Oral at bedtime  metoprolol tartrate 25 milliGRAM(s) Oral three times a day  senna 2 Tablet(s) Oral at bedtime  sevelamer carbonate 1600 milliGRAM(s) Oral three times a day    MEDICATIONS  (PRN):  acetaminophen   Tablet .. 650 milliGRAM(s) Oral every 6 hours PRN Temp greater or equal to 38C (100.4F), Mild Pain (1 - 3)      Vital Signs Last 24 Hrs  T(C): 37.1 (29 Sep 2021 15:15), Max: 37.1 (29 Sep 2021 05:05)  T(F): 98.7 (29 Sep 2021 15:15), Max: 98.7 (29 Sep 2021 05:05)  HR: 58 (29 Sep 2021 15:15) (58 - 73)  BP: 144/71 (29 Sep 2021 15:15) (144/71 - 205/87)  BP(mean): --  RR: 17 (29 Sep 2021 15:15) (17 - 19)  SpO2: 100% (29 Sep 2021 15:15) (92% - 100%)    PHYSICAL EXAMINATION:  GENERAL: NAD, AAOx3  HEAD: AT/NC  EYES: conjunctiva and sclera clear  NECK: supple, No JVD noted, Normal thyroid  CHEST/LUNG: CTABL; no rales, rhonchi, wheezing, or rubs  HEART: regular rate and rhythm; no murmurs, rubs, or gallops  ABDOMEN: soft, nontender, nondistended; Bowel sounds present  EXTREMITIES:  2+ Peripheral Pulses, No clubbing, cyanosis, or edema  NEURO: mild right sided weakness  SKIN: warm dry                          10.0   4.92  )-----------( 165      ( 29 Sep 2021 15:21 )             29.8     09-29    137  |  98  |  66<H>  ----------------------------<  92  5.3   |  26  |  10.50<H>    Ca    9.1      29 Sep 2021 15:21  Phos  6.1     09-29  Mg     2.3     09-29        CARDIAC MARKERS ( 28 Sep 2021 07:45 )  0.241 ng/mL / x     / x     / x     / x      CARDIAC MARKERS ( 27 Sep 2021 21:15 )  0.247 ng/mL / x     / x     / x     / x            COVID-19 PCR: NotDetec (27 Sep 2021 10:55)  SARS-CoV-2: NotDetec (07 Jun 2021 04:21)      CAPILLARY BLOOD GLUCOSE          RADIOLOGY & ADDITIONAL TESTS:

## 2021-09-29 NOTE — CONSULT NOTE ADULT - SUBJECTIVE AND OBJECTIVE BOX
PULMONARY CONSULT NOTE      DORSAINVIL, JEANCLAUDE  MRN-[id number]    Patient is a 76y old  Male who presents with a chief complaint of HYPERTENSIVE EMERGENCY (29 Sep 2021 09:44)  Hx chart lab and xrays reviewd    HISTORY OF PRESENT ILLNESS:    Home Medications:  acetaminophen 325 mg oral tablet: 2 tab(s) orally every 6 hours, As Needed (27 Sep 2021 12:03)  aspirin 81 mg oral tablet, chewable: 1 tab(s) orally once a day (27 Sep 2021 12:03)  atorvastatin 80 mg oral tablet: 1 tab(s) orally once a day (27 Sep 2021 12:14)  Colace 100 mg oral capsule: 1 cap(s) orally once a day (at bedtime) (27 Sep 2021 12:03)  Colcrys 0.6 mg oral tablet: 1 tab(s) orally 2 times a day (27 Sep 2021 12:03)  Eliquis 2.5 mg oral tablet: 1 tab(s) orally 2 times a day (27 Sep 2021 12:03)  famotidine 20 mg oral tablet: 1 tab(s) orally once a day (27 Sep 2021 12:03)  ferrous sulfate 325 mg (65 mg elemental iron) oral tablet: 1 tab(s) orally once a day (27 Sep 2021 12:03)  folic acid 1 mg oral tablet: 1 tab(s) orally once a day (27 Sep 2021 12:03)  hydrALAZINE 25 mg oral tablet: 1 tab(s) orally 3 times a day (27 Sep 2021 12:03)  lactulose 10 g/15 mL oral syrup: 30 milliliter(s) orally once a day (at bedtime) (27 Sep 2021 12:03)  Multaq 400 mg oral tablet: 1 tab(s) orally 2 times a day (27 Sep 2021 12:14)  sevelamer hydrochloride 800 mg oral tablet: 2 tab(s) orally 3 times a day (27 Sep 2021 12:03)      MEDICATIONS  (STANDING):  apixaban 2.5 milliGRAM(s) Oral two times a day  aspirin  chewable 81 milliGRAM(s) Oral daily  atorvastatin 80 milliGRAM(s) Oral at bedtime  chlorhexidine 2% Cloths 1 Application(s) Topical <User Schedule>  colchicine 0.6 milliGRAM(s) Oral two times a day  dronedarone 400 milliGRAM(s) Oral two times a day  epoetin ximena-epbx (RETACRIT) Injectable 3000 Unit(s) IV Push <User Schedule>  famotidine    Tablet 20 milliGRAM(s) Oral daily  ferrous    sulfate 325 milliGRAM(s) Oral daily  folic acid 1 milliGRAM(s) Oral daily  hydrALAZINE 50 milliGRAM(s) Oral every 8 hours  isosorbide   mononitrate ER Tablet (IMDUR) 30 milliGRAM(s) Oral daily  lactulose Syrup 30 Gram(s) Oral at bedtime  metoprolol tartrate 25 milliGRAM(s) Oral three times a day  senna 2 Tablet(s) Oral at bedtime  sevelamer carbonate 1600 milliGRAM(s) Oral three times a day      MEDICATIONS  (PRN):  acetaminophen   Tablet .. 650 milliGRAM(s) Oral every 6 hours PRN Temp greater or equal to 38C (100.4F), Mild Pain (1 - 3)      Allergies    No Known Allergies    Intolerances        PAST MEDICAL & SURGICAL HISTORY:  ESRD (end stage renal disease) on dialysis    Hypertension    Anemia    Cerebrovascular accident (CVA), unspecified mechanism    Paroxysmal atrial fibrillation    CAD (coronary artery disease)    A-V fistula        FAMILY HISTORY:  Family history of diabetes mellitus    HTN    DM   IHD   Asthma  COPD     SOCIAL HISTORY SMOKING    ETOH     DRUGS    REVIEW OF SYSTEMS:  CONSTITUTIONAL: No fever, weight loss, or fatigue   EYES: No eye pain, visual disturbances, or discharge  ENT:  No difficulty hearing, tinnitus, vertigo; No sinus or throat pain  NECK: No pain or stiffness   RESPIRATORY:  cough   wheezing   chills   hemoptysis    Shortness of Breath  CARDIOVASCULAR: No chest pain, palpitations, passing out, dizziness, or leg swelling  GASTROINTESTINAL: No abdominal or epigastric pain. No nausea, vomiting, or hematemesis; No diarrhea or constipation. No melena or hematochezia.  GENITOURINARY: No dysuria, frequency, hematuria, or incontinence  NEUROLOGICAL: No headaches, memory loss, loss of strength, numbness, or tremors  SKIN: No itching, burning, rashes, or lesions   LYMPH Nodes: No enlarged glands  ENDOCRINE: No heat or cold intolerance; No hair loss  MUSCULOSKELETAL: No joint pain or swelling; No muscle, back, or extremity pain  PSYCHIATRIC: No depression, anxiety, mood swings, or difficulty sleeping  HEME/LYMPH: No easy bruising, or bleeding gums  ALLERGY AND IMMUNOLOGIC: No hives or eczema      Vital Signs Last 24 Hrs  T(C): 36.7 (29 Sep 2021 11:09), Max: 37.1 (29 Sep 2021 05:05)  T(F): 98.1 (29 Sep 2021 11:09), Max: 98.7 (29 Sep 2021 05:05)  HR: 60 (29 Sep 2021 11:09) (60 - 73)  BP: 175/52 (29 Sep 2021 11:09) (159/58 - 205/87)  BP(mean): --  RR: 18 (29 Sep 2021 11:09) (18 - 19)  SpO2: 98% (29 Sep 2021 11:09) (92% - 98%)  I&O's Detail    28 Sep 2021 07:01  -  29 Sep 2021 07:00  --------------------------------------------------------  IN:    Oral Fluid: 200 mL  Total IN: 200 mL    OUT:  Total OUT: 0 mL    Total NET: 200 mL      29 Sep 2021 07:01  -  29 Sep 2021 11:11  --------------------------------------------------------  IN:    Oral Fluid: 230 mL  Total IN: 230 mL    OUT:  Total OUT: 0 mL    Total NET: 230 mL          PHYSICAL EXAMINATION:    GENERAL: The patient is a well-developed, well-nourished in no apparent distress.   SKIN: No rashes ecchymoses or cyanosis  HEENT: Head is normocephalic and atraumatic. Extraocular muscles are intact. Mucous membranes are moist.   Neck supple LN not felt, JVP not increased  Thyroid not enlarged  Lymphatic: No lymphadenopathy  Cardiovascular:  S1 S2  heard ,RSR , JVP not increased , syst murmur at apex, No  gallop or rub  Respiratory:  Symmetrical chest wall movements Breathing vesicular , Percussion note normal no dulness   with   rales   wheeze  ABDOMEN:  Soft, Non-tender,   No  hepatosplenomegaly ,BS positive		  Extremities: Normal range of motion, No clubbing, cyanosis or edema , No calf tenderness  Vascular: Peripheral pulses palpable 2+ bilaterally  CNS: Alert and oriented x3,  Mood and affect appropriate  Cranial nerves intact  sensory intact  motor Power5/5, DTR 2+   Babinski neg    LABS:                        9.6    4.38  )-----------( 152      ( 28 Sep 2021 07:45 )             29.4     09-28    136  |  99  |  42<H>  ----------------------------<  86  4.8   |  30  |  7.92<H>    Ca    9.5      28 Sep 2021 07:45  Phos  5.7     09-28  Mg     2.3     09-28            CARDIAC MARKERS ( 28 Sep 2021 07:45 )  0.241 ng/mL / x     / x     / x     / x      CARDIAC MARKERS ( 27 Sep 2021 21:15 )  0.247 ng/mL / x     / x     / x     / x            Serum Pro-Brain Natriuretic Peptide: [id number] pg/mL (09-27-21 @ 10:20)        TSH  MICROBIOLOGY:      RADIOLOGY & ADDITIONAL STUDIES:    CXR:    Ct scan chest:    ekg;    echo: PULMONARY CONSULT NOTE      DORSAINVIL, JEANCLAUDE  MRN-[id number]    Patient is a 76y old  Male who presents with a chief complaint of HYPERTENSIVE EMERGENCY (29 Sep 2021 09:44)  Hx chart lab and xrays  reviewed, sob with o2 desat in NH  Pt examined this am at 11.10 am    HISTORY OF PRESENT ILLNESS:75 yo M from [facility], has past medical hx of ESRD on HD (M-W-F), HTN, DM, CAD, Afib, CVA with right sided hemiparesis, anemia, vascular dementia, mood disorder presented to the ED c/o one episode of SOB. As per NH documents, patient was requiring oxygen supplementation however while in ED, patient was saturating >96% on RA without any signs of respiratory distress. Patient denies cough, chest pain, fever, chills, leg swelling, missing dialysis, headaches, dizziness, palpitations, abdominal pain, nausea, vomiting or diarrhea.     Home Medications:  acetaminophen 325 mg oral tablet: 2 tab(s) orally every 6 hours, As Needed (27 Sep 2021 12:03)  aspirin 81 mg oral tablet, chewable: 1 tab(s) orally once a day (27 Sep 2021 12:03)  atorvastatin 80 mg oral tablet: 1 tab(s) orally once a day (27 Sep 2021 12:14)  Colace 100 mg oral capsule: 1 cap(s) orally once a day (at bedtime) (27 Sep 2021 12:03)  Colcrys 0.6 mg oral tablet: 1 tab(s) orally 2 times a day (27 Sep 2021 12:03)  Eliquis 2.5 mg oral tablet: 1 tab(s) orally 2 times a day (27 Sep 2021 12:03)  famotidine 20 mg oral tablet: 1 tab(s) orally once a day (27 Sep 2021 12:03)  ferrous sulfate 325 mg (65 mg elemental iron) oral tablet: 1 tab(s) orally once a day (27 Sep 2021 12:03)  folic acid 1 mg oral tablet: 1 tab(s) orally once a day (27 Sep 2021 12:03)  hydrALAZINE 25 mg oral tablet: 1 tab(s) orally 3 times a day (27 Sep 2021 12:03)  lactulose 10 g/15 mL oral syrup: 30 milliliter(s) orally once a day (at bedtime) (27 Sep 2021 12:03)  Multaq 400 mg oral tablet: 1 tab(s) orally 2 times a day (27 Sep 2021 12:14)  sevelamer hydrochloride 800 mg oral tablet: 2 tab(s) orally 3 times a day (27 Sep 2021 12:03)      MEDICATIONS  (STANDING):  apixaban 2.5 milliGRAM(s) Oral two times a day  aspirin  chewable 81 milliGRAM(s) Oral daily  atorvastatin 80 milliGRAM(s) Oral at bedtime  chlorhexidine 2% Cloths 1 Application(s) Topical <User Schedule>  colchicine 0.6 milliGRAM(s) Oral two times a day  dronedarone 400 milliGRAM(s) Oral two times a day  epoetin ximena-epbx (RETACRIT) Injectable 3000 Unit(s) IV Push <User Schedule>  famotidine    Tablet 20 milliGRAM(s) Oral daily  ferrous    sulfate 325 milliGRAM(s) Oral daily  folic acid 1 milliGRAM(s) Oral daily  hydrALAZINE 50 milliGRAM(s) Oral every 8 hours  isosorbide   mononitrate ER Tablet (IMDUR) 30 milliGRAM(s) Oral daily  lactulose Syrup 30 Gram(s) Oral at bedtime  metoprolol tartrate 25 milliGRAM(s) Oral three times a day  senna 2 Tablet(s) Oral at bedtime  sevelamer carbonate 1600 milliGRAM(s) Oral three times a day      MEDICATIONS  (PRN):  acetaminophen   Tablet .. 650 milliGRAM(s) Oral every 6 hours PRN Temp greater or equal to 38C (100.4F), Mild Pain (1 - 3)      Allergies    No Known Allergies            PAST MEDICAL & SURGICAL HISTORY:  ESRD (end stage renal disease) on dialysis    Hypertension    Anemia    Cerebrovascular accident (CVA), unspecified mechanism    Paroxysmal atrial fibrillation    CAD (coronary artery disease)    A-V fistula        FAMILY HISTORY:  Family history of diabetes mellitus    HTN --   DM+   IHD --  Asthma--  COPD--     SOCIAL HISTORY SMOKING--    ETOH--     DRUGS--    REVIEW OF SYSTEMS:  CONSTITUTIONAL: No fever, weight loss, or fatigue   EYES: No eye pain, visual disturbances, or discharge  ENT:  No difficulty hearing, tinnitus, vertigo; No sinus or throat pain  NECK: No pain or stiffness   RESPIRATORY:  cough--   wheezing--   chills--   hemoptysis--    Shortness of Breath+  CARDIOVASCULAR: No chest pain, palpitations, passing out, dizziness, or leg swelling  GASTROINTESTINAL: No abdominal or epigastric pain. No nausea, vomiting,   GENITOURINARY: No dysuria, frequency, hematuria, or incontinence  NEUROLOGICAL: No headaches, memory loss+, loss of strength+ rt side, no, numbness, or tremors  SKIN: No itching, burning, rashes, or lesions   LYMPH Nodes: No enlarged glands  ENDOCRINE: No heat or cold intolerance; No hair loss  ALLERGY AND IMMUNOLOGIC: No hives or eczema      Vital Signs Last 24 Hrs  T(C): 36.7 (29 Sep 2021 11:09), Max: 37.1 (29 Sep 2021 05:05)  T(F): 98.1 (29 Sep 2021 11:09), Max: 98.7 (29 Sep 2021 05:05)  HR: 60 (29 Sep 2021 11:09) (60 - 73)  BP: 175/52 (29 Sep 2021 11:09) (159/58 - 205/87)  BP(mean): --  RR: 18 (29 Sep 2021 11:09) (18 - 19)  SpO2: 98% (29 Sep 2021 11:09) (92% - 98%)  I&O's Detail    28 Sep 2021 07:01  -  29 Sep 2021 07:00  --------------------------------------------------------  IN:    Oral Fluid: 200 mL  Total IN: 200 mL    OUT:  Total OUT: 0 mL    Total NET: 200 mL      29 Sep 2021 07:01  -  29 Sep 2021 11:11  --------------------------------------------------------  IN:    Oral Fluid: 230 mL  Total IN: 230 mL    OUT:  Total OUT: 0 mL    Total NET: 230 mL          PHYSICAL EXAMINATION:    GENERAL: The patient is a well-developed,obese in no apparent distress.   SKIN: No rashes ecchymoses or cyanosis  HEENT: Head is normocephalic and atraumatic. Extraocular muscles are intact. Mucous membranes are moist.   Neck supple LN not felt, JVP not increased  Thyroid not enlarged, short  Lymphatic: No lymphadenopathy  Cardiovascular:  S1 S2  heard ,RSR , JVP not increased , systolic  murmur at apex, No  gallop or rub  Respiratory:  Symmetrical chest wall movements Breathing vesicular , Percussion note normal no dulness   with   rales lt base, no  wheeze  ABDOMEN:  Soft, Non-tender, obese,  No  hepatosplenomegaly ,BS positive		  Extremities: , No clubbing, cyanosis or edema , No calf tenderness  Vascular: Peripheral pulses palpable 2+ bilaterally  CNS: Alert and responsive  Cranial nerves intact  sensory intact  Rt sided weakness   Babinski neg      LABS:                        9.6    4.38  )-----------( 152      ( 28 Sep 2021 07:45 )             29.4     09-28    136  |  99  |  42<H>  ----------------------------<  86  4.8   |  30  |  7.92<H>    Ca    9.5      28 Sep 2021 07:45  Phos  5.7     09-28  Mg     2.3     09-28            CARDIAC MARKERS ( 28 Sep 2021 07:45 )  0.241 ng/mL / x     / x     / x     / x      CARDIAC MARKERS ( 27 Sep 2021 21:15 )  0.247 ng/mL / x     / x     / x     / x            Serum Pro-Brain Natriuretic Peptide: [id number] pg/mL (09-27-21 @ 10:20)      RADIOLOGY & ADDITIONAL STUDIES:    CXR:< from: Xray Chest 1 View-PORTABLE IMMEDIATE (09.27.21 @ 10:23) >    INTERPRETATION:  AP erect chest on September 27, 2021 at 10:18 AM. Patient has chest pain.    Heart enlargement again noted.    There is a persistent mild left base effusion.    Slight central CHF also unchanged.    Extensive vascular study from the right arm into the right innominate area continuously again noted.    Chest is similar to June 7 of this year.          ekg; NSR, Non specific St-T changes    echo:< from: Transthoracic Echocardiogram (09.28.21 @ 08:26) >  1. Mitral annular calcification, and calcified mitral  leaflets with reduced diastolic opening. Mean transmitral  valve gradient equals 2.6 mm Hg (at a heart rate of 68  BPM), consistent with mild mitral stenosis.  2. Calcified  aortic valve with decreased opening. Peak  transaortic valve gradient equals 52 mm Hg, mean  transaortic valve gradient equals 28mm Hg, estimated  aortic valve area equals 1 sqcm (by continuity equation),  consistent with severe aortic stenosis. Mild aortic  regurgitation.  3. Mildly dilated left atrium.  LA volume index = 36 cc/m2.  4. Moderate concentric left ventricular hypertrophy.  5. Endocardium not well visualized; grossly normal left  ventricular systolic function.  6. Grade II diastolic dysfunction (moderate).  7. Right ventricle not well visualized.   Probably normal  right ventricular size and systolic function.  8. RV systolic pressure is 69 mm Hg. Severe pulmonary  hypertension.

## 2021-09-29 NOTE — PROGRESS NOTE ADULT - PROBLEM SELECTOR PLAN 2
- Patient presented with hyperkalemia 6.5  - EKG showed NSR, no acute changes   - S/p Calcium gluconate 1 g x1  - BMP qd and post HD  - c.w tele

## 2021-09-29 NOTE — CONSULT NOTE ADULT - SUBJECTIVE AND OBJECTIVE BOX
CHIEF COMPLAINT:Patient is a 76y old  Male who presents with a chief complaint of HYPERTENSIVE EMERGENCY (28 Sep 2021 11:49)      HPI:  75 yo M from [facility], has past medical hx of ESRD on HD (M-W-F), HTN, DM, CAD, Afib, CVA with right sided hemiparesis, anemia, vascular dementia, mood disorder presented to the ED c/o one episode of SOB. As per NH documents, patient was requiring oxygen supplementation however while in ED, patient was saturating >96% on RA without any signs of respiratory distress. Patient denies cough, chest pain, fever, chills, leg swelling, missing dialysis, headaches, dizziness, palpitations, abdominal pain, nausea, vomiting or diarrhea.    (27 Sep 2021 12:30)      PAST MEDICAL & SURGICAL HISTORY:  ESRD (end stage renal disease) on dialysis    Hypertension    Anemia    Cerebrovascular accident (CVA), unspecified mechanism    Paroxysmal atrial fibrillation    CAD (coronary artery disease)    A-V fistula        MEDICATIONS  (STANDING):  apixaban 2.5 milliGRAM(s) Oral two times a day  aspirin  chewable 81 milliGRAM(s) Oral daily  atorvastatin 80 milliGRAM(s) Oral at bedtime  chlorhexidine 2% Cloths 1 Application(s) Topical <User Schedule>  colchicine 0.6 milliGRAM(s) Oral two times a day  dronedarone 400 milliGRAM(s) Oral two times a day  epoetin ximena-epbx (RETACRIT) Injectable 3000 Unit(s) IV Push <User Schedule>  famotidine    Tablet 20 milliGRAM(s) Oral daily  ferrous    sulfate 325 milliGRAM(s) Oral daily  folic acid 1 milliGRAM(s) Oral daily  hydrALAZINE 25 milliGRAM(s) Oral three times a day  isosorbide   mononitrate ER Tablet (IMDUR) 30 milliGRAM(s) Oral daily  lactulose Syrup 30 Gram(s) Oral at bedtime  metoprolol tartrate 25 milliGRAM(s) Oral three times a day  senna 2 Tablet(s) Oral at bedtime  sevelamer carbonate 1600 milliGRAM(s) Oral three times a day    MEDICATIONS  (PRN):  acetaminophen   Tablet .. 650 milliGRAM(s) Oral every 6 hours PRN Temp greater or equal to 38C (100.4F), Mild Pain (1 - 3)      FAMILY HISTORY:  Family history of diabetes mellitus        SOCIAL HISTORY:    [ x] Non-smoker    [x ] Alcohol-denies    Allergies    No Known Allergies    Intolerances    	    REVIEW OF SYSTEMS:  CONSTITUTIONAL: No fever, weight loss, or fatigue  EYES: No eye pain, visual disturbances, or discharge  ENT:  No difficulty hearing, tinnitus, vertigo; No sinus or throat pain  NECK: No pain or stiffness  RESPIRATORY: No cough, wheezing, chills or hemoptysis; + Shortness of Breath  CARDIOVASCULAR: + chest pain, No palpitations, passing out, dizziness, or leg swelling  GASTROINTESTINAL: No abdominal or epigastric pain. No nausea, vomiting, or hematemesis; No diarrhea or constipation. No melena or hematochezia.  GENITOURINARY: No dysuria, frequency, hematuria, or incontinence  NEUROLOGICAL: No headaches, memory loss, loss of strength, numbness, or tremors  SKIN: No itching, burning, rashes, or lesions   LYMPH Nodes: No enlarged glands  ENDOCRINE: No heat or cold intolerance; No hair loss  MUSCULOSKELETAL: No joint pain or swelling; No muscle, back, or extremity pain  PSYCHIATRIC: No depression, anxiety, mood swings, or difficulty sleeping  HEME/LYMPH: No easy bruising, or bleeding gums  ALLERGY AND IMMUNOLOGIC: No hives or eczema	        PHYSICAL EXAM:  T(C): 36.7 (09-29-21 @ 07:32), Max: 37.1 (09-29-21 @ 05:05)  HR: 66 (09-29-21 @ 09:30) (64 - 73)  BP: 176/75 (09-29-21 @ 09:30) (144/66 - 205/87)  RR: 18 (09-29-21 @ 07:32) (18 - 19)  SpO2: 97% (09-29-21 @ 07:32) (92% - 97%)  Wt(kg): --  I&O's Summary    28 Sep 2021 07:01  -  29 Sep 2021 07:00  --------------------------------------------------------  IN: 200 mL / OUT: 0 mL / NET: 200 mL    29 Sep 2021 07:01  -  29 Sep 2021 09:45  --------------------------------------------------------  IN: 230 mL / OUT: 0 mL / NET: 230 mL        Appearance: Normal	  HEENT:   Normal oral mucosa, PERRL, EOMI	  Lymphatic: No lymphadenopathy  Cardiovascular: Normal S1 S2, No JVD, No murmurs, No edema  Respiratory: Lungs clear to auscultation	  Psychiatry: A & O x 3, Mood & affect appropriate  Gastrointestinal:  Soft, Non-tender, + BS	  Skin: No rashes, No ecchymoses, No cyanosis	  Neurologic: Non-focal  Extremities: Normal range of motion, No clubbing, cyanosis or edema  Vascular: Peripheral pulses palpable 2+ bilaterally        ECG:  	nsr,pac's,lae,T wave inversion infero-lateral leads  	  	  LABS:	 	      CARDIAC MARKERS ( 28 Sep 2021 07:45 )  0.241 ng/mL / x     / x     / x     / x      CARDIAC MARKERS ( 27 Sep 2021 21:15 )  0.247 ng/mL / x     / x     / x     / x      CARDIAC MARKERS ( 27 Sep 2021 10:20 )  0.127 ng/mL / x     / x     / x     / x                                  9.6    4.38  )-----------( 152      ( 28 Sep 2021 07:45 )             29.4     09-28    136  |  99  |  42<H>  ----------------------------<  86  4.8   |  30  |  7.92<H>    Ca    9.5      28 Sep 2021 07:45  Phos  5.7     09-28  Mg     2.3     09-28    TPro  7.7  /  Alb  3.6  /  TBili  0.5  /  DBili  x   /  AST  23  /  ALT  59  /  AlkPhos  184<H>  09-27    < from: Xray Chest 1 View-PORTABLE IMMEDIATE (09.27.21 @ 10:23) >  EXAM:  XR CHEST PORTABLE IMMED 1V                            PROCEDURE DATE:  09/27/2021          INTERPRETATION:  AP erect chest on September 27, 2021 at 10:18 AM. Patient has chest pain.    Heart enlargement again noted.    There is a persistent mild left base effusion.    Slight central CHF also unchanged.    Extensive vascular study from the right arm into the right innominate area continuously again noted.    Chest is similar to June 7 of this year.    IMPRESSION: Unchanged chest as above.        < from: Transthoracic Echocardiogram (01.27.21 @ 08:28) >  ------------------------------------------------------------------------  OBSERVATIONS:  Mitral Valve: Mitral annular calcification, and calcified  mitral leaflets with normal diastolic opening.  Aortic Root: Normal aortic root.  Aortic Valve: Calcified trileaflet aortic valve with  decreased opening. Peak transaortic valve gradient equals  38.4 mm Hg, mean transaortic valve gradient equals 24 mm  Hg, estimated aortic valve areaequals 1.1 sqcm (by  continuity equation), consistent with moderate aortic  stenosis. Mild aortic regurgitation.  Left Atrium: Normal left atrium.  LA volume index = 29  cc/m2.  Left Ventricle: Endocardium not well visualized; grossly  normal left ventricular systolic function.   Mild diastolic  dysfunction (stage I). Increased relative wall thickness  with normal left ventricular (LV) mass index, consistent  with concentric LV remodeling.  Right Heart: Normal right atrium. Normal right ventricular  size and function. There is trace tricuspid regurgitation.  There is trace pulmonic regurgitation.  Pericardium/PleuraNormal pericardium with no pericardial  effusion.  Hemodynamic: RA Pressure is 8 mm Hg. RV systolic pressure  is 29 mm Hg. Agitated saline injection was negative for  intracardiac shunt.

## 2021-09-29 NOTE — PROGRESS NOTE ADULT - SUBJECTIVE AND OBJECTIVE BOX
Problem List:  ESRD  Hypertension  History of PAF  Bradycardia    PAST MEDICAL & SURGICAL HISTORY:  ESRD (end stage renal disease) on dialysis    Hypertension    Anemia    Cerebrovascular accident (CVA), unspecified mechanism    Paroxysmal atrial fibrillation    CAD (coronary artery disease)    A-V fistula        No Known Allergies      MEDICATIONS  (STANDING):  apixaban 2.5 milliGRAM(s) Oral two times a day  aspirin  chewable 81 milliGRAM(s) Oral daily  atorvastatin 80 milliGRAM(s) Oral at bedtime  chlorhexidine 2% Cloths 1 Application(s) Topical <User Schedule>  colchicine 0.6 milliGRAM(s) Oral two times a day  dronedarone 400 milliGRAM(s) Oral two times a day  epoetin ximena-epbx (RETACRIT) Injectable 3000 Unit(s) IV Push <User Schedule>  famotidine    Tablet 20 milliGRAM(s) Oral daily  folic acid 1 milliGRAM(s) Oral daily  hydrALAZINE 50 milliGRAM(s) Oral every 8 hours  isosorbide   mononitrate ER Tablet (IMDUR) 30 milliGRAM(s) Oral daily  lactulose Syrup 30 Gram(s) Oral at bedtime  metoprolol tartrate 25 milliGRAM(s) Oral three times a day  senna 2 Tablet(s) Oral at bedtime  sevelamer carbonate 1600 milliGRAM(s) Oral three times a day    MEDICATIONS  (PRN):  acetaminophen   Tablet .. 650 milliGRAM(s) Oral every 6 hours PRN Temp greater or equal to 38C (100.4F), Mild Pain (1 - 3)                            9.6    4.38  )-----------( 152      ( 28 Sep 2021 07:45 )             29.4     09-28    136  |  99  |  42<H>  ----------------------------<  86  4.8   |  30  |  7.92<H>    Ca    9.5      28 Sep 2021 07:45  Phos  5.7     09-28  Mg     2.3     09-28              REVIEW OF SYSTEMS:  General: no fever no chills, no weight loss.  RESPIRATORY: No cough, wheezing, hemoptysis; No shortness of breath  CARDIOVASCULAR: No chest pain or palpitations. No Edema  GASTROINTESTINAL: patient c/o vomit upon eating during breakfast and lunch, no nausea and no epigastric or abdominal pain  No chest pain.  GENITOURINARY: No dysuria, reduced urine output.          VITALS:  T(F): 98.1 (09-29-21 @ 11:09), Max: 98.7 (09-29-21 @ 05:05)  HR: 64 (09-29-21 @ 13:09)  BP: 171/62 (09-29-21 @ 13:09)  RR: 18 (09-29-21 @ 11:09)  SpO2: 98% (09-29-21 @ 11:09)  Wt(kg): --    09-28 @ 07:01  -  09-29 @ 07:00  --------------------------------------------------------  IN: 200 mL / OUT: 0 mL / NET: 200 mL    09-29 @ 07:01  -  09-29 @ 14:19  --------------------------------------------------------  IN: 230 mL / OUT: 0 mL / NET: 230 mL        PHYSICAL EXAM:  Constitutional: well developed, no diaphoresis, no distress.  Neck: No JVD, no carotid bruit, supple.  Respiratory: Good air entrance B/L, no wheezes, rales or rhonchi  Cardiovascular: S1, S2, RRR, no pericardial rub, systolic M 2/6 at base right and left.  Abdomen: BS+, soft, no tenderness, no bruit  Pelvis: bladder nondistended  Extremities: No edema  Alert and awake , oriented by 3.  Vascular Access: right AVG with bruit and thrill

## 2021-09-30 RX ORDER — PANTOPRAZOLE SODIUM 20 MG/1
40 TABLET, DELAYED RELEASE ORAL
Refills: 0 | Status: DISCONTINUED | OUTPATIENT
Start: 2021-09-30 | End: 2021-10-05

## 2021-09-30 RX ORDER — METOPROLOL TARTRATE 50 MG
25 TABLET ORAL
Refills: 0 | Status: DISCONTINUED | OUTPATIENT
Start: 2021-09-30 | End: 2021-10-01

## 2021-09-30 RX ADMIN — Medication 81 MILLIGRAM(S): at 11:35

## 2021-09-30 RX ADMIN — Medication 1 MILLIGRAM(S): at 11:35

## 2021-09-30 RX ADMIN — Medication 50 MILLIGRAM(S): at 14:15

## 2021-09-30 RX ADMIN — CHLORHEXIDINE GLUCONATE 1 APPLICATION(S): 213 SOLUTION TOPICAL at 06:52

## 2021-09-30 RX ADMIN — SEVELAMER CARBONATE 1600 MILLIGRAM(S): 2400 POWDER, FOR SUSPENSION ORAL at 21:25

## 2021-09-30 RX ADMIN — SEVELAMER CARBONATE 1600 MILLIGRAM(S): 2400 POWDER, FOR SUSPENSION ORAL at 14:15

## 2021-09-30 RX ADMIN — DRONEDARONE 400 MILLIGRAM(S): 400 TABLET, FILM COATED ORAL at 06:52

## 2021-09-30 RX ADMIN — ATORVASTATIN CALCIUM 80 MILLIGRAM(S): 80 TABLET, FILM COATED ORAL at 21:25

## 2021-09-30 RX ADMIN — SENNA PLUS 2 TABLET(S): 8.6 TABLET ORAL at 21:25

## 2021-09-30 RX ADMIN — ISOSORBIDE MONONITRATE 30 MILLIGRAM(S): 60 TABLET, EXTENDED RELEASE ORAL at 11:35

## 2021-09-30 RX ADMIN — Medication 50 MILLIGRAM(S): at 06:52

## 2021-09-30 RX ADMIN — Medication 0.6 MILLIGRAM(S): at 06:52

## 2021-09-30 RX ADMIN — SEVELAMER CARBONATE 1600 MILLIGRAM(S): 2400 POWDER, FOR SUSPENSION ORAL at 06:52

## 2021-09-30 RX ADMIN — FAMOTIDINE 20 MILLIGRAM(S): 10 INJECTION INTRAVENOUS at 11:35

## 2021-09-30 RX ADMIN — Medication 25 MILLIGRAM(S): at 06:52

## 2021-09-30 RX ADMIN — Medication 0.6 MILLIGRAM(S): at 17:06

## 2021-09-30 RX ADMIN — Medication 50 MILLIGRAM(S): at 21:25

## 2021-09-30 RX ADMIN — LACTULOSE 30 GRAM(S): 10 SOLUTION ORAL at 21:24

## 2021-09-30 NOTE — PROGRESS NOTE ADULT - SUBJECTIVE AND OBJECTIVE BOX
Problem List:  ESRD admitted with SOB.      PAST MEDICAL & SURGICAL HISTORY:  ESRD (end stage renal disease) on dialysis    Hypertension    Anemia    Cerebrovascular accident (CVA), unspecified mechanism    Paroxysmal atrial fibrillation    CAD (coronary artery disease)    A-V fistula        No Known Allergies      MEDICATIONS  (STANDING):  apixaban 2.5 milliGRAM(s) Oral two times a day  aspirin  chewable 81 milliGRAM(s) Oral daily  atorvastatin 80 milliGRAM(s) Oral at bedtime  chlorhexidine 2% Cloths 1 Application(s) Topical <User Schedule>  colchicine 0.6 milliGRAM(s) Oral two times a day  dronedarone 400 milliGRAM(s) Oral two times a day  epoetin ximena-epbx (RETACRIT) Injectable 3000 Unit(s) IV Push <User Schedule>  famotidine    Tablet 20 milliGRAM(s) Oral daily  folic acid 1 milliGRAM(s) Oral daily  hydrALAZINE 50 milliGRAM(s) Oral every 8 hours  isosorbide   mononitrate ER Tablet (IMDUR) 30 milliGRAM(s) Oral daily  lactulose Syrup 30 Gram(s) Oral at bedtime  metoprolol tartrate 25 milliGRAM(s) Oral two times a day  senna 2 Tablet(s) Oral at bedtime  sevelamer carbonate 1600 milliGRAM(s) Oral three times a day    MEDICATIONS  (PRN):  acetaminophen   Tablet .. 650 milliGRAM(s) Oral every 6 hours PRN Temp greater or equal to 38C (100.4F), Mild Pain (1 - 3)                            10.0   4.92  )-----------( 165      ( 29 Sep 2021 15:21 )             29.8     09-29    137  |  98  |  66<H>  ----------------------------<  92  5.3   |  26  |  10.50<H>    Ca    9.1      29 Sep 2021 15:21  Phos  6.1     09-29  Mg     2.3     09-29              REVIEW OF SYSTEMS:  General: no fever no chills, no weight loss.    RESPIRATORY: No cough, wheezing, hemoptysis; No shortness of breath  CARDIOVASCULAR: No chest pain or palpitations. No Edema  GASTROINTESTINAL: No abdominal or epigastric pain. No nausea, vomiting. No diarrhea or constipation. No melena.  GENITOURINARY: No dysuria, REDUCED URINE OUTPUT  s.        VITALS:  T(F): 97.9 (09-30-21 @ 11:09), Max: 98.7 (09-29-21 @ 15:15)  HR: 60 (09-30-21 @ 14:14)  BP: 155/62 (09-30-21 @ 14:14)  RR: 17 (09-30-21 @ 11:09)  SpO2: 98% (09-30-21 @ 11:09)  Wt(kg): --    09-29 @ 07:01  -  09-30 @ 07:00  --------------------------------------------------------  IN: 1030 mL / OUT: 2127 mL / NET: -1097 mL        PHYSICAL EXAM:  Constitutional: no diaphoresis, no distress.  Neck: No JVD, no carotid bruit.  Respiratory: Good air entrance B/L, no wheezes, rales or rhonchi  Cardiovascular: S1, S2, RRR, no pericardial rub, no murmur  Abdomen: BS+, soft, no tenderness, no bruit  Pelvis: bladder nondistended  Extremities: No cyanosis or clubbing. No peripheral edema.     Vascular Access: Left AVG with bruit and thrill

## 2021-09-30 NOTE — PROGRESS NOTE ADULT - SUBJECTIVE AND OBJECTIVE BOX
PULMONARY  progress note    DORSAINVIL, JEANCLAUDE  MRN-[id number]    Patient is a 76y old  Male who presents with a chief complaint of HYPERTENSIVE EMERGENCY (30 Sep 2021 09:37)  Feels better, no sob or fever, no chest pain      MEDICATIONS  (STANDING):  apixaban 2.5 milliGRAM(s) Oral two times a day  aspirin  chewable 81 milliGRAM(s) Oral daily  atorvastatin 80 milliGRAM(s) Oral at bedtime  chlorhexidine 2% Cloths 1 Application(s) Topical <User Schedule>  colchicine 0.6 milliGRAM(s) Oral two times a day  dronedarone 400 milliGRAM(s) Oral two times a day  epoetin ximena-epbx (RETACRIT) Injectable 3000 Unit(s) IV Push <User Schedule>  famotidine    Tablet 20 milliGRAM(s) Oral daily  folic acid 1 milliGRAM(s) Oral daily  hydrALAZINE 50 milliGRAM(s) Oral every 8 hours  isosorbide   mononitrate ER Tablet (IMDUR) 30 milliGRAM(s) Oral daily  lactulose Syrup 30 Gram(s) Oral at bedtime  metoprolol tartrate 25 milliGRAM(s) Oral two times a day  senna 2 Tablet(s) Oral at bedtime  sevelamer carbonate 1600 milliGRAM(s) Oral three times a day      No Known Allergies            PAST MEDICAL & SURGICAL HISTORY:  ESRD (end stage renal disease) on dialysis    Hypertension    Anemia    Cerebrovascular accident (CVA), unspecified mechanism    Paroxysmal atrial fibrillation    CAD (coronary artery disease)    A-V fistula             REVIEW OF SYSTEMS:  CONSTITUTIONAL: No fever, weight loss, or fatigue   EYES: No eye pain, visual disturbances, or discharge  ENT:  No difficulty hearing, tinnitus, vertigo; No sinus or throat pain  NECK: No pain or stiffness or nodes  RESPIRATORY:  cough--  wheezing --  chills--   hemoptysis--  Shortness of Breath--  CARDIOVASCULAR: No chest pain, palpitations, passing out, dizziness, or leg swelling  GASTROINTESTINAL: No abdominal or epigastric pain. No nausea, vomiting,   GENITOURINARY: No dysuria, frequency, hematuria, or incontinence  NEUROLOGICAL: No headaches, memory loss, loss of strength, numbness, or tremors  SKIN: No itching, burning, rashes, or lesions   LYMPH Nodes: No enlarged glands  ENDOCRINE: No heat or cold intolerance; No hair loss  MUSCULOSKELETAL: No joint pain or swelling; No muscle, back, or extremity pain  PSYCHIATRIC: No depression, anxiety, mood swings, or difficulty sleeping  HEME/LYMPH: No easy bruising, or bleeding gums  ALLERGY AND IMMUNOLOGIC: No hives or eczema        Vital Signs Last 24 Hrs  T(C): 36.9 (30 Sep 2021 08:00), Max: 37.1 (29 Sep 2021 15:15)  T(F): 98.4 (30 Sep 2021 08:00), Max: 98.7 (29 Sep 2021 15:15)  HR: 64 (30 Sep 2021 08:00) (58 - 69)  BP: 145/47 (30 Sep 2021 08:00) (119/54 - 175/52)  BP(mean): --  RR: 18 (30 Sep 2021 08:00) (17 - 18)  SpO2: 98% (30 Sep 2021 08:00) (96% - 100%)  I&O's Detail    29 Sep 2021 07:01  -  30 Sep 2021 07:00  --------------------------------------------------------  IN:    Oral Fluid: 430 mL    Other (mL): 600 mL  Total IN: 1030 mL    OUT:    Other (mL): 2127 mL  Total OUT: 2127 mL    Total NET: -1097 mL          PHYSICAL EXAMINATION:    GENERAL: The patient is a well-developed, well-nourished in no apparent distress.   SKIN no rash ecchymoses or bruises  HEENT: Head is normocephalic and atraumatic  VIVIANA , Extraocular muscles are intact. Mucous membranes  moist.   Neck supple ,No LN felt JVP not increased  Thyroid not enlarged  Cardiovascular:  S1 S2 heard, RSR, No JVD , systolic  murmur at apex, No gallop or rub  Respiratory: Chest wall symmetrical with good air entry ,Percussion note normal,    Lungs vesicular breathing with  lt basilar fine  rales , no   wheeze	  ABDOMEN:  Soft, Non-tender, obese,  no hepatomegaly or splenomegaly BS positive	  Extremities: Normal range of motion, No clubbing, cyanosis or edema  Vascular: Peripheral pulses palpable 2+ bilaterally  CNS:  Alert and oriented x3  Babinski neg      LABS:                        10.0   4.92  )-----------( 165      ( 29 Sep 2021 15:21 )             29.8     09-29    137  |  98  |  66<H>  ----------------------------<  92  5.3   |  26  |  10.50<H>    Ca    9.1      29 Sep 2021 15:21  Phos  6.1     09-29  Mg     2.3     09-29                  RADIOLOGY & ADDITIONAL STUDIES:        ECHO:< from: Transthoracic Echocardiogram (09.28.21 @ 08:26) >  1. Mitral annular calcification, and calcified mitral  leaflets with reduced diastolic opening. Mean transmitral  valve gradient equals 2.6 mm Hg (at a heart rate of 68  BPM), consistent with mild mitral stenosis.  2. Calcified  aortic valve with decreased opening. Peak  transaortic valve gradient equals 52 mm Hg, mean  transaortic valve gradient equals 28mm Hg, estimated  aortic valve area equals 1 sqcm (by continuity equation),  consistent with severe aortic stenosis. Mild aortic  regurgitation.  3. Mildly dilated left atrium.  LA volume index = 36 cc/m2.  4. Moderate concentric left ventricular hypertrophy.  5. Endocardium not well visualized; grossly normal left  ventricular systolic function.  6. Grade II diastolic dysfunction (moderate).  7. Right ventricle not well visualized.   Probably normal  right ventricular size and systolic function.  8. RV systolic pressure is 69 mm Hg. Severe pulmonary  hypertension.  9. EF >55%

## 2021-09-30 NOTE — PROGRESS NOTE ADULT - PROBLEM SELECTOR PLAN 1
- On admission, patients /77  most likely 2/2 medication non compliance vs fluid overload  - Patient c/o sob for one episode at [facility]  -  ProBNP 20k around baseline    - Echo showed severe aortic stenosis, mild mitral stenosis  - SBP >200 this a.m., gave extra dose of hydralazine, decreased to 114/71 pre-dialysis  - Repeat BP  - C/w current meds  - F/u Echo - On admission, patients /77  most likely 2/2 medication non compliance vs fluid overload  -  ProBNP 20k around baseline    - Echo showed severe aortic stenosis, mild mitral stenosis  - Possible SSS, c/w tele, however patient refusing ICD  - lopressor 25 qd. Hydralazine increased to 50 q8, c/w imdur  - SBP >200 this a.m., gave extra dose of hydralazine, decreased to 114/71 pre-dialysis  - Repeat BP  - C/w current meds  - F/u Echo

## 2021-09-30 NOTE — PROGRESS NOTE ADULT - SUBJECTIVE AND OBJECTIVE BOX
CHIEF COMPLAINT:Patient is a 76y old  Male who presents with a chief complaint of HYPERTENSIVE EMERGENCY.Pt appears comfortable.    	  REVIEW OF SYSTEMS:  CONSTITUTIONAL: No fever, weight loss, or fatigue  EYES: No eye pain, visual disturbances, or discharge  ENT:  No difficulty hearing, tinnitus, vertigo; No sinus or throat pain  NECK: No pain or stiffness  RESPIRATORY: No cough, wheezing, chills or hemoptysis; No Shortness of Breath  CARDIOVASCULAR: No chest pain, palpitations, passing out, dizziness, or leg swelling  GASTROINTESTINAL: No abdominal or epigastric pain. No nausea, vomiting, or hematemesis; No diarrhea or constipation. No melena or hematochezia.  GENITOURINARY: No dysuria, frequency, hematuria, or incontinence  NEUROLOGICAL: No headaches, memory loss, loss of strength, numbness, or tremors  SKIN: No itching, burning, rashes, or lesions   LYMPH Nodes: No enlarged glands  ENDOCRINE: No heat or cold intolerance; No hair loss  MUSCULOSKELETAL: No joint pain or swelling; No muscle, back, or extremity pain  PSYCHIATRIC: No depression, anxiety, mood swings, or difficulty sleeping  HEME/LYMPH: No easy bruising, or bleeding gums  ALLERGY AND IMMUNOLOGIC: No hives or eczema	        PHYSICAL EXAM:  T(C): 36.9 (09-30-21 @ 08:00), Max: 37.1 (09-29-21 @ 15:15)  HR: 64 (09-30-21 @ 08:00) (58 - 69)  BP: 145/47 (09-30-21 @ 08:00) (119/54 - 175/52)  RR: 18 (09-30-21 @ 08:00) (17 - 18)  SpO2: 98% (09-30-21 @ 08:00) (96% - 100%)  Wt(kg): --  I&O's Summary    29 Sep 2021 07:01  -  30 Sep 2021 07:00  --------------------------------------------------------  IN: 1030 mL / OUT: 2127 mL / NET: -1097 mL        Appearance: Normal	  HEENT:   Normal oral mucosa, PERRL, EOMI	  Lymphatic: No lymphadenopathy  Cardiovascular: Normal S1 S2, No JVD, No murmurs, No edema  Respiratory: Lungs clear to auscultation	  Psychiatry: A & O x 3, Mood & affect appropriate  Gastrointestinal:  Soft, Non-tender, + BS	  Skin: No rashes, No ecchymoses, No cyanosis	  Neurologic: Non-focal  Extremities: Normal range of motion, No clubbing, cyanosis or edema  Vascular: Peripheral pulses palpable 2+ bilaterally    MEDICATIONS  (STANDING):  apixaban 2.5 milliGRAM(s) Oral two times a day  aspirin  chewable 81 milliGRAM(s) Oral daily  atorvastatin 80 milliGRAM(s) Oral at bedtime  chlorhexidine 2% Cloths 1 Application(s) Topical <User Schedule>  colchicine 0.6 milliGRAM(s) Oral two times a day  dronedarone 400 milliGRAM(s) Oral two times a day  epoetin ximena-epbx (RETACRIT) Injectable 3000 Unit(s) IV Push <User Schedule>  famotidine    Tablet 20 milliGRAM(s) Oral daily  folic acid 1 milliGRAM(s) Oral daily  hydrALAZINE 50 milliGRAM(s) Oral every 8 hours  isosorbide   mononitrate ER Tablet (IMDUR) 30 milliGRAM(s) Oral daily  lactulose Syrup 30 Gram(s) Oral at bedtime  metoprolol tartrate 25 milliGRAM(s) Oral two times a day  senna 2 Tablet(s) Oral at bedtime  sevelamer carbonate 1600 milliGRAM(s) Oral three times a day      TELEMETRY: sinus bradycardia with pauses >3 sec,down to 30's	    	  	  LABS:	 	                        10.0   4.92  )-----------( 165      ( 29 Sep 2021 15:21 )             29.8     09-29    137  |  98  |  66<H>  ----------------------------<  92  5.3   |  26  |  10.50<H>    Ca    9.1      29 Sep 2021 15:21  Phos  6.1     09-29  Mg     2.3     09-29      proBNP: Serum Pro-Brain Natriuretic Peptide: 32934 pg/mL (09-27 @ 10:20)    Lipid Profile: Cholesterol 163  HDL 55  TG 77      TSH: Thyroid Stimulating Hormone, Serum: 1.51 uU/mL (09-28 @ 07:45)

## 2021-09-30 NOTE — PROGRESS NOTE ADULT - PROBLEM SELECTOR PLAN 5
- Patient with hx of PAF on Eliquis, metoprolol at [facility] (as per patient sometimes he refuses to take AC among other medications)  - EKG showed NSR, no changes  - C/w home meds for now - Patient with hx of PAF on Eliquis, metoprolol at [facility] (as per patient sometimes he refuses to take AC among other medications)  - EKG showed NSR, no changes  -c/w metoprol  - Patient refusing afib

## 2021-09-30 NOTE — PROGRESS NOTE ADULT - SUBJECTIVE AND OBJECTIVE BOX
PGY-1 Progress Note discussed with attending    PAGER #: [[phone]] TILL 5:00 PM  PLEASE CONTACT ON CALL TEAM:  - On Call Team (Please refer to Mary) FROM 5:00 PM - 8:30PM  - Nightfloat Team FROM 8:30 -7:30 AM    HPI:  75 yo M from [facility], has past medical hx of ESRD on HD (M-W-F), HTN, DM, CAD, Afib, CVA with right sided hemiparesis, anemia, vascular dementia, mood disorder presented to the ED c/o one episode of SOB. As per NH documents, patient was requiring oxygen supplementation however while in ED, patient was saturating >96% on RA without any signs of respiratory distress. Patient denies cough, chest pain, fever, chills, leg swelling, missing dialysis, headaches, dizziness, palpitations, abdominal pain, nausea, vomiting or diarrhea.    (27 Sep 2021 12:30)      REVIEW OF SYSTEMS:  CONSTITUTIONAL: No fever, weight loss, or fatigue  RESPIRATORY: No cough, wheezing, chills or hemoptysis; No shortness of breath  CARDIOVASCULAR: No chest pain, palpitations, dizziness, or leg swelling  GASTROINTESTINAL: No abdominal pain. No nausea, vomiting, or hematemesis; No diarrhea or constipation. No melena or hematochezia.  GENITOURINARY: No dysuria or hematuria, urinary frequency  NEUROLOGICAL: No headaches, memory loss, loss of strength, numbness, or tremors  SKIN: No itching, burning, rashes, or lesions     MEDICATIONS  (STANDING):  apixaban 2.5 milliGRAM(s) Oral two times a day  aspirin  chewable 81 milliGRAM(s) Oral daily  atorvastatin 80 milliGRAM(s) Oral at bedtime  chlorhexidine 2% Cloths 1 Application(s) Topical <User Schedule>  colchicine 0.6 milliGRAM(s) Oral two times a day  dronedarone 400 milliGRAM(s) Oral two times a day  epoetin ximena-epbx (RETACRIT) Injectable 3000 Unit(s) IV Push <User Schedule>  famotidine    Tablet 20 milliGRAM(s) Oral daily  folic acid 1 milliGRAM(s) Oral daily  hydrALAZINE 50 milliGRAM(s) Oral every 8 hours  isosorbide   mononitrate ER Tablet (IMDUR) 30 milliGRAM(s) Oral daily  lactulose Syrup 30 Gram(s) Oral at bedtime  metoprolol tartrate 25 milliGRAM(s) Oral two times a day  senna 2 Tablet(s) Oral at bedtime  sevelamer carbonate 1600 milliGRAM(s) Oral three times a day    MEDICATIONS  (PRN):  acetaminophen   Tablet .. 650 milliGRAM(s) Oral every 6 hours PRN Temp greater or equal to 38C (100.4F), Mild Pain (1 - 3)      Vital Signs Last 24 Hrs  T(C): 36.6 (30 Sep 2021 11:09), Max: 37.1 (29 Sep 2021 15:15)  T(F): 97.9 (30 Sep 2021 11:09), Max: 98.7 (29 Sep 2021 15:15)  HR: 60 (30 Sep 2021 14:14) (58 - 69)  BP: 155/62 (30 Sep 2021 14:14) (119/54 - 159/57)  BP(mean): --  RR: 17 (30 Sep 2021 11:09) (17 - 18)  SpO2: 98% (30 Sep 2021 11:09) (96% - 100%)    PHYSICAL EXAMINATION:  GENERAL: NAD, AAOx  HEAD: AT/NC  EYES: conjunctiva and sclera clear  NECK: supple, No JVD noted, Normal thyroid  CHEST/LUNG: CTABL; no rales, rhonchi, wheezing, or rubs  HEART: regular rate and rhythm; no murmurs, rubs, or gallops  ABDOMEN: soft, nontender, nondistended; Bowel sounds present  EXTREMITIES:  2+ Peripheral Pulses, No clubbing, cyanosis, or edema  SKIN: warm dry                          10.0   4.92  )-----------( 165      ( 29 Sep 2021 15:21 )             29.8     09-29    137  |  98  |  66<H>  ----------------------------<  92  5.3   |  26  |  10.50<H>    Ca    9.1      29 Sep 2021 15:21  Phos  6.1     09-29  Mg     2.3     09-29              COVID-19 PCR: [place] (27 Sep 2021 10:55)  SARS-CoV-2: NotDetec (07 Jun 2021 04:21)      CAPILLARY BLOOD GLUCOSE          RADIOLOGY & ADDITIONAL TESTS:                   PGY-1 Progress Note discussed with attending    PAGER #: [[phone]] TILL 5:00 PM  PLEASE CONTACT ON CALL TEAM:  - On Call Team (Please refer to Mary) FROM 5:00 PM - 8:30PM  - Nightfloat Team FROM 8:30 -7:30 AM    HPI:  75 yo M from [facility], has past medical hx of ESRD on HD (M-W-F), HTN, DM, CAD, Afib, CVA with right sided hemiparesis, anemia, vascular dementia, mood disorder presented to the ED c/o one episode of SOB. As per NH documents, patient was requiring oxygen supplementation however while in ED, patient was saturating >96% on RA without any signs of respiratory distress. Patient denies cough, chest pain, fever, chills, leg swelling, missing dialysis, headaches, dizziness, palpitations, abdominal pain, nausea, vomiting or diarrhea.    (27 Sep 2021 12:30)      REVIEW OF SYSTEMS:  CONSTITUTIONAL: No fever, weight loss, or fatigue  RESPIRATORY: No cough, wheezing, chills or hemoptysis; No shortness of breath  CARDIOVASCULAR: No chest pain, palpitations, dizziness, or leg swelling  GASTROINTESTINAL: +dysphagia. No nausea, vomiting, or hematemesis; No diarrhea or constipation. No melena or hematochezia.  GENITOURINARY: No dysuria or hematuria, urinary frequency  NEUROLOGICAL: No headaches, memory loss, loss of strength, numbness, or tremors  SKIN: No itching, burning, rashes, or lesions     MEDICATIONS  (STANDING):  apixaban 2.5 milliGRAM(s) Oral two times a day  aspirin  chewable 81 milliGRAM(s) Oral daily  atorvastatin 80 milliGRAM(s) Oral at bedtime  chlorhexidine 2% Cloths 1 Application(s) Topical <User Schedule>  colchicine 0.6 milliGRAM(s) Oral two times a day  dronedarone 400 milliGRAM(s) Oral two times a day  epoetin ximena-epbx (RETACRIT) Injectable 3000 Unit(s) IV Push <User Schedule>  famotidine    Tablet 20 milliGRAM(s) Oral daily  folic acid 1 milliGRAM(s) Oral daily  hydrALAZINE 50 milliGRAM(s) Oral every 8 hours  isosorbide   mononitrate ER Tablet (IMDUR) 30 milliGRAM(s) Oral daily  lactulose Syrup 30 Gram(s) Oral at bedtime  metoprolol tartrate 25 milliGRAM(s) Oral two times a day  senna 2 Tablet(s) Oral at bedtime  sevelamer carbonate 1600 milliGRAM(s) Oral three times a day    MEDICATIONS  (PRN):  acetaminophen   Tablet .. 650 milliGRAM(s) Oral every 6 hours PRN Temp greater or equal to 38C (100.4F), Mild Pain (1 - 3)      Vital Signs Last 24 Hrs  T(C): 36.6 (30 Sep 2021 11:09), Max: 37.1 (29 Sep 2021 15:15)  T(F): 97.9 (30 Sep 2021 11:09), Max: 98.7 (29 Sep 2021 15:15)  HR: 60 (30 Sep 2021 14:14) (58 - 69)  BP: 155/62 (30 Sep 2021 14:14) (119/54 - 159/57)  BP(mean): --  RR: 17 (30 Sep 2021 11:09) (17 - 18)  SpO2: 98% (30 Sep 2021 11:09) (96% - 100%)    PHYSICAL EXAMINATION:  GENERAL: NAD, AAOx3  HEAD: AT/NC  EYES: conjunctiva and sclera clear  NECK: supple, No JVD noted, Normal thyroid  CHEST/LUNG: CTABL; no rales, rhonchi, wheezing, or rubs  HEART: regular rate and rhythm; no murmurs, rubs, or gallops  ABDOMEN: soft, nontender, nondistended; Bowel sounds present  EXTREMITIES:  2+ Peripheral Pulses, No clubbing, cyanosis, or edema  SKIN: warm dry                          10.0   4.92  )-----------( 165      ( 29 Sep 2021 15:21 )             29.8     09-29    137  |  98  |  66<H>  ----------------------------<  92  5.3   |  26  |  10.50<H>    Ca    9.1      29 Sep 2021 15:21  Phos  6.1     09-29  Mg     2.3     09-29              COVID-19 PCR: [place] (27 Sep 2021 10:55)  SARS-CoV-2: NotDetec (07 Jun 2021 04:21)      CAPILLARY BLOOD GLUCOSE          RADIOLOGY & ADDITIONAL TESTS:

## 2021-09-30 NOTE — PROGRESS NOTE ADULT - PROBLEM SELECTOR PLAN 2
Yes - Patient presented with hyperkalemia 6.5  - EKG showed NSR, no acute changes   - S/p Calcium gluconate 1 g x1  - BMP qd and post HD  - c.w tele

## 2021-09-30 NOTE — PHARMACOTHERAPY INTERVENTION NOTE - COMMENTS
Recommended to dc famotidine and change to pantoprazole. Famotidine is not recommended in renal insufficiency.

## 2021-10-01 ENCOUNTER — TRANSCRIPTION ENCOUNTER (OUTPATIENT)
Age: 76
End: 2021-10-01

## 2021-10-01 LAB
ANION GAP SERPL CALC-SCNC: 15 MMOL/L — SIGNIFICANT CHANGE UP (ref 5–17)
BUN SERPL-MCNC: 55 MG/DL — HIGH (ref 7–18)
CALCIUM SERPL-MCNC: 9.3 MG/DL — SIGNIFICANT CHANGE UP (ref 8.4–10.5)
CHLORIDE SERPL-SCNC: 98 MMOL/L — SIGNIFICANT CHANGE UP (ref 96–108)
CO2 SERPL-SCNC: 27 MMOL/L — SIGNIFICANT CHANGE UP (ref 22–31)
CREAT SERPL-MCNC: 9.42 MG/DL — HIGH (ref 0.5–1.3)
GLUCOSE SERPL-MCNC: 83 MG/DL — SIGNIFICANT CHANGE UP (ref 70–99)
HCT VFR BLD CALC: 30.2 % — LOW (ref 39–50)
HGB BLD-MCNC: 9.8 G/DL — LOW (ref 13–17)
MAGNESIUM SERPL-MCNC: 2.3 MG/DL — SIGNIFICANT CHANGE UP (ref 1.6–2.6)
MCHC RBC-ENTMCNC: 32.3 PG — SIGNIFICANT CHANGE UP (ref 27–34)
MCHC RBC-ENTMCNC: 32.5 GM/DL — SIGNIFICANT CHANGE UP (ref 32–36)
MCV RBC AUTO: 99.7 FL — SIGNIFICANT CHANGE UP (ref 80–100)
NRBC # BLD: 0 /100 WBCS — SIGNIFICANT CHANGE UP (ref 0–0)
PHOSPHATE SERPL-MCNC: 5.2 MG/DL — HIGH (ref 2.5–4.5)
PLATELET # BLD AUTO: 151 K/UL — SIGNIFICANT CHANGE UP (ref 150–400)
POTASSIUM SERPL-MCNC: 4.4 MMOL/L — SIGNIFICANT CHANGE UP (ref 3.5–5.3)
POTASSIUM SERPL-SCNC: 4.4 MMOL/L — SIGNIFICANT CHANGE UP (ref 3.5–5.3)
RBC # BLD: 3.03 M/UL — LOW (ref 4.2–5.8)
RBC # FLD: 14.6 % — HIGH (ref 10.3–14.5)
SODIUM SERPL-SCNC: 140 MMOL/L — SIGNIFICANT CHANGE UP (ref 135–145)
WBC # BLD: 4.29 K/UL — SIGNIFICANT CHANGE UP (ref 3.8–10.5)
WBC # FLD AUTO: 4.29 K/UL — SIGNIFICANT CHANGE UP (ref 3.8–10.5)

## 2021-10-01 RX ORDER — METOPROLOL TARTRATE 50 MG
12.5 TABLET ORAL
Refills: 0 | Status: DISCONTINUED | OUTPATIENT
Start: 2021-10-01 | End: 2021-10-05

## 2021-10-01 RX ADMIN — Medication 50 MILLIGRAM(S): at 21:28

## 2021-10-01 RX ADMIN — APIXABAN 2.5 MILLIGRAM(S): 2.5 TABLET, FILM COATED ORAL at 05:28

## 2021-10-01 RX ADMIN — PANTOPRAZOLE SODIUM 40 MILLIGRAM(S): 20 TABLET, DELAYED RELEASE ORAL at 05:30

## 2021-10-01 RX ADMIN — Medication 50 MILLIGRAM(S): at 05:28

## 2021-10-01 RX ADMIN — Medication 0.6 MILLIGRAM(S): at 05:29

## 2021-10-01 RX ADMIN — CHLORHEXIDINE GLUCONATE 1 APPLICATION(S): 213 SOLUTION TOPICAL at 05:32

## 2021-10-01 RX ADMIN — DRONEDARONE 400 MILLIGRAM(S): 400 TABLET, FILM COATED ORAL at 05:28

## 2021-10-01 RX ADMIN — SEVELAMER CARBONATE 1600 MILLIGRAM(S): 2400 POWDER, FOR SUSPENSION ORAL at 05:28

## 2021-10-01 RX ADMIN — ERYTHROPOIETIN 3000 UNIT(S): 10000 INJECTION, SOLUTION INTRAVENOUS; SUBCUTANEOUS at 12:27

## 2021-10-01 RX ADMIN — Medication 25 MILLIGRAM(S): at 05:28

## 2021-10-01 NOTE — DISCHARGE NOTE PROVIDER - CARE PROVIDER_API CALL
RINA WATTERS  Cardiology  [address] [place] [address]  Phone: [phone]  Fax: [phone]  Follow Up Time: 2 weeks

## 2021-10-01 NOTE — DISCHARGE NOTE PROVIDER - HOSPITAL COURSE
HPI:  77 yo M from [facility], has past medical hx of ESRD on HD (M-W-F), HTN, DM, CAD, Afib, CVA with right sided hemiparesis, anemia, vascular dementia, mood disorder presented to the ED c/o one episode of SOB. As per NH documents, patient was requiring oxygen supplementation however while in ED, patient was saturating >96% on RA without any signs of respiratory distress. Patient denies cough, chest pain, fever, chills, leg swelling, missing dialysis, headaches, dizziness, palpitations, abdominal pain, nausea, vomiting or diarrhea.    (27 Sep 2021 12:30)    As per NH documents, patient was requiring oxygen supplementation however while in ED, patient was saturating >96% on RA without any signs of respiratory distress. History of CAD but refused angiogram in the past. S/p HD. Assigned 508A. Now trops increasing, denies chest pain. (27 Sep 2021 22:13)    Patient reports that he is feeling much better, and is not experiencing shortness of breath. Chest x-ray results from yesterday (27 Sep 2021) demonstrated no changes from different X-ray performed on June 7th, 2021. Significant findings (which were unchanged from X-ray on June 7th, 2021), were heart enlargement, a mild left base effusion and slight central CHF. Of note during yesterday (27 Sep 2021), patient did have decreased RBC count, Hemoglobin, Hematocrit and Platelet Count. Patient's potassium was significantly elevated (6.5), but has been converted back to a value of 4.3. BNP was also significantly elevated yesterday, and troponins were elevated as well. (28 Sep 2021 07:15)    Patient evaluated by physical therpay, with following results:   · Criteria for Skilled Therapeutic Interventions	impairments found; functional limitations in following categories; risk reduction/prevention  · Impairments Found: gross motor; muscle strength; posture  · Functional Limitations in: self-care; home management  · Risk Areas: Neglect  · Rehab Potential: Good, to achieve stated therapy goals  · Therapy Frequency: 3-5x/week  · Predicted Duration of Therapy Intervention (days/wks): on site follow up available during medical course  · Anticipated Discharge Recommendation: return to facility (29 Sep 2021 16:30)    Patient feels better, no sob or fever, no chest pain (30 Sep 2021 10:28)    Patient discontinued from famotidine and started on pantoprazole. (30 Sep 2021 16:53)                           HPI:  77 yo M from [facility], has past medical hx of ESRD on HD (M-W-F), HTN, DM, CAD, Afib, CVA with right sided hemiparesis, anemia, vascular dementia, mood disorder presented to the ED c/o one episode of SOB. As per NH documents, patient was requiring oxygen supplementation however while in ED, patient was saturating >96% on RA without any signs of respiratory distress. Patient denies cough, chest pain, fever, chills, leg swelling, missing dialysis, headaches, dizziness, palpitations, abdominal pain, nausea, vomiting or diarrhea. As per NH documents, patient was requiring oxygen supplementation however while in ED, patient was saturating >96% on RA without any signs of respiratory distress. History of CAD but refused angiogram in the past.        Problem/Plan - 1:  ·  Problem: Hypertensive urgency.   ·  Plan: - On admission, patients /77  most likely 2/2 medication non compliance vs fluid overload  - Echo showed severe aortic stenosis, mild mitral stenosis  - c/w Hydralazine 50 q8, imdur 30 qd, lopressor decreased to lopressor 12.5 bid  - Repeat BP  - C/w current meds.     Problem/Plan - 2:  ·  Problem: Aortic stenosis.   ·  Plan: Echo shows severe aortic stenosis, with EF of 10-15%  Possible SSS, c/w tele, however patient refusing ICD.     Problem/Plan - 3:  ·  Problem: ESRD on dialysis.   ·  Plan: - ESRD Maintenance HD (M/W/F ) via right AVF   - HD today Oct 1st  - Cr 10.8 on admission  - No crackles, pedal edema or fluid overload  - Avoid Nephrotoxic Meds/ Agents such as (NSAIDs, IV contrast, Aminoglycosides such as gentamicin, Gadolinium contrast, Phosphate containing enemas, etc..)  - Adjust Medications according to eGFR  - Renal diet  - Nephrology Dr Jacinto consulted.     Problem/Plan - 4:  ·  Problem: Hyperkalemia.   ·  Plan: - Patient presented with hyperkalemia 6.5  - EKG showed NSR, no acute changes   - S/p Calcium gluconate 1 g x1  - BMP qd and post HD  - c.w tele.     Problem/Plan - 5:  ·  Problem: CAD (coronary artery disease).   ·  Plan: - Patient has hx of CAD on ASA, BB, Statin at home   - C/w home meds.     Problem/Plan - 6:  ·  Problem: PAF (paroxysmal atrial fibrillation).   ·  Plan: - Patient with hx of PAF on Eliquis, metoprolol at [facility] (as per patient sometimes he refuses to take AC among other medications)  -c/w metoprol  - Patient refusing eliquis.     Problem/Plan - 7:  ·  Problem: Anemia.   ·  Plan: - On admission, patient with hg 10.6 around baseline   - Patient denies dark black tarry stool, hx NSAIDs abuse, episodes of BRBPR or hematemesis  - Anemia is multifactorial - CHARITO and ACD  - Patient has no signs of hemorrhagic shock  or hemodynamic instability   - Type and Screen, IV access, Transfuse if Hg <7  - Monitor for any signs of active bleeding.                         HPI:  77 yo M from [facility], has past medical hx of ESRD on HD (M-W-F), HTN, DM, CAD, Afib, CVA with right sided hemiparesis, anemia, vascular dementia, mood disorder presented to the ED c/o one episode of SOB. As per NH documents, patient was requiring oxygen supplementation however while in ED, patient was saturating >96% on RA without any signs of respiratory distress. Patient denies cough, chest pain, fever, chills, leg swelling, missing dialysis, headaches, dizziness, palpitations, abdominal pain, nausea, vomiting or diarrhea. As per NH documents, patient was requiring oxygen supplementation however while in ED, patient was saturating >96% on RA without any signs of respiratory distress. History of CAD but refused angiogram in the past.    Patient arrived with BP of 203/77 likely secondary to noncompliance and fluid overload. Troponins were mildly elevated but trended down, likely demand ischemia. His home antihypertensives were adjusted and at time of discharge he is being given hydralazine 50 q8, imdur 30 qd, and lopressor 12.5 bid. His metoprolol needed to be decreased due to bradycardia and possible SSS. Echocardiogram showed severe AS, mild MS, GIIID, and pulmonary hypertension. Due too likely SSS, patient we recommended a pacemaker, however patient refused. The patient has history of PAF on metoprolol and Eliquis. However, patient refuses eliquis because he believes it causes prostate cancer. Patient has ESRD, we kept him on his MWF HD schedule. Patient was found to be anemic likely ESRD but his hemoglobin was stable. He was give 3000 units of epoetin during his HD session.    Given patient's improved clinical status and current hemodynamic stability, decision was made to discharge. Please refer to patient's complete medical chart with documents for a full hospital course, for this is only a brief summary.                         HPI:  75 yo M from [facility], has past medical hx of ESRD on HD (M-W-F), HTN, DM, CAD, Afib, CVA with right sided hemiparesis, anemia, vascular dementia, mood disorder presented to the ED c/o one episode of SOB. As per NH documents, patient was requiring oxygen supplementation however while in ED, patient was saturating >96% on RA without any signs of respiratory distress. Patient denies cough, chest pain, fever, chills, leg swelling, missing dialysis, headaches, dizziness, palpitations, abdominal pain, nausea, vomiting or diarrhea. As per NH documents, patient was requiring oxygen supplementation however while in ED, patient was saturating >96% on RA without any signs of respiratory distress. History of CAD but refused angiogram in the past.    Patient arrived with BP of 203/77 likely secondary to noncompliance and fluid overload. Troponins were mildly elevated but trended down, likely demand ischemia. His home antihypertensives were adjusted and at time of discharge he is being given dronedarone 400 mg BID and lopressor 12.5 bid. His metoprolol needed to be decreased due to bradycardia and possible SSS. Echocardiogram showed severe AS, mild MS, GIIID, and pulmonary hypertension. Due too likely SSS, we recommended a pacemaker, however patient refused. The patient has history of PAF on metoprolol and Eliquis. However, patient refuses eliquis because he believes it causes prostate cancer. Patient has ESRD, we kept him on his MWF HD schedule. Patient was found to be anemic likely ESRD but his hemoglobin was stable. He was given 3000 units of epoetin during his HD session. Last  HD session was done 10/4/21.     Given patient's improved clinical status and current hemodynamic stability, decision was made to discharge today [facility]. Pt will need to continue HD as per his schedule.                          HPI:  75 yo M from [facility], has past medical hx of ESRD on HD (M-W-F), HTN, DM, CAD, Afib, CVA with right sided hemiparesis, anemia, vascular dementia, mood disorder presented to the ED c/o one episode of SOB. As per NH documents, patient was requiring oxygen supplementation however while in ED, patient was saturating >96% on RA without any signs of respiratory distress. Patient denies cough, chest pain, fever, chills, leg swelling, missing dialysis, headaches, dizziness, palpitations, abdominal pain, nausea, vomiting or diarrhea. As per NH documents, patient was requiring oxygen supplementation however while in ED, patient was saturating >96% on RA without any signs of respiratory distress. History of CAD but refused angiogram in the past.    Patient arrived with BP of 203/77 likely secondary to noncompliance and fluid overload. Troponins were mildly elevated but trended down, likely demand ischemia. His home antihypertensives were adjusted, blood pressure within therapeutic goals and at time of discharge he is being given dronedarone 400 mg BID, lopressor 12.5 bid and Imdur 30 mg po qd. His metoprolol needed to be decreased due to bradycardia and possible SSS. Echocardiogram showed severe AS, mild MS, GIIID, and pulmonary hypertension. Due to likely SSS, we recommended a pacemaker, however patient refused. The patient has history of PAF on metoprolol and Eliquis. However, patient refuses eliquis because he believes it causes prostate cancer. Patient has ESRD, we kept him on his MWF HD schedule. Patient was found to be anemic likely ESRD but his hemoglobin was stable. He was given 3000 units of epoetin during his HD session. Last  HD session was done 10/4/21.     Given patient's improved clinical status and current hemodynamic stability, decision was made to discharge today [facility]. Pt will need to continue HD as per his schedule.

## 2021-10-01 NOTE — PROGRESS NOTE ADULT - SUBJECTIVE AND OBJECTIVE BOX
PGY-1 Progress Note discussed with attending    PAGER #: [[phone]] TILL 5:00 PM  PLEASE CONTACT ON CALL TEAM:  - On Call Team (Please refer to Mary) FROM 5:00 PM - 8:30PM  - Nightfloat Team FROM 8:30 -7:30 AM    HPI:  75 yo M from [facility], has past medical hx of ESRD on HD (M-W-F), HTN, DM, CAD, Afib, CVA with right sided hemiparesis, anemia, vascular dementia, mood disorder presented to the ED c/o one episode of SOB. As per NH documents, patient was requiring oxygen supplementation however while in ED, patient was saturating >96% on RA without any signs of respiratory distress. Patient denies cough, chest pain, fever, chills, leg swelling, missing dialysis, headaches, dizziness, palpitations, abdominal pain, nausea, vomiting or diarrhea.    (27 Sep 2021 12:30)      OVERNIGHT EVENTS:   - No acute events    REVIEW OF SYSTEMS:  CONSTITUTIONAL: No fever, weight loss, or fatigue  RESPIRATORY: No cough, wheezing, chills or hemoptysis; No shortness of breath  CARDIOVASCULAR: No chest pain, palpitations, dizziness, or leg swelling  GASTROINTESTINAL: No abdominal pain. No nausea, vomiting, or hematemesis; No diarrhea or constipation. No melena or hematochezia.  GENITOURINARY: No dysuria or hematuria, urinary frequency  NEUROLOGICAL: No headaches, memory loss, loss of strength, numbness, or tremors  SKIN: No itching, burning, rashes, or lesions     MEDICATIONS  (STANDING):  apixaban 2.5 milliGRAM(s) Oral two times a day  aspirin  chewable 81 milliGRAM(s) Oral daily  atorvastatin 80 milliGRAM(s) Oral at bedtime  chlorhexidine 2% Cloths 1 Application(s) Topical <User Schedule>  colchicine 0.6 milliGRAM(s) Oral two times a day  dronedarone 400 milliGRAM(s) Oral two times a day  epoetin ximena-epbx (RETACRIT) Injectable 3000 Unit(s) IV Push <User Schedule>  folic acid 1 milliGRAM(s) Oral daily  hydrALAZINE 50 milliGRAM(s) Oral every 8 hours  isosorbide   mononitrate ER Tablet (IMDUR) 30 milliGRAM(s) Oral daily  lactulose Syrup 30 Gram(s) Oral at bedtime  metoprolol tartrate 12.5 milliGRAM(s) Oral two times a day  pantoprazole    Tablet 40 milliGRAM(s) Oral before breakfast  senna 2 Tablet(s) Oral at bedtime  sevelamer carbonate 1600 milliGRAM(s) Oral three times a day    MEDICATIONS  (PRN):  acetaminophen   Tablet .. 650 milliGRAM(s) Oral every 6 hours PRN Temp greater or equal to 38C (100.4F), Mild Pain (1 - 3)      Vital Signs Last 24 Hrs  T(C): 36.9 (01 Oct 2021 16:05), Max: 36.9 (01 Oct 2021 04:39)  T(F): 98.4 (01 Oct 2021 16:05), Max: 98.4 (01 Oct 2021 04:39)  HR: 64 (01 Oct 2021 16:05) (60 - 76)  BP: 155/55 (01 Oct 2021 16:05) (134/46 - 182/67)  BP(mean): --  RR: 18 (01 Oct 2021 16:05) (18 - 19)  SpO2: 100% (01 Oct 2021 16:05) (97% - 100%)    PHYSICAL EXAMINATION:  GENERAL: NAD, AAOx  HEAD: AT/NC  EYES: conjunctiva and sclera clear  NECK: supple, No JVD noted, Normal thyroid  CHEST/LUNG: CTABL; no rales, rhonchi, wheezing, or rubs  HEART: regular rate and rhythm; no murmurs, rubs, or gallops  ABDOMEN: soft, nontender, nondistended; Bowel sounds present  EXTREMITIES:  2+ Peripheral Pulses, No clubbing, cyanosis, or edema  SKIN: warm dry                          9.8    4.29  )-----------( 151      ( 01 Oct 2021 10:59 )             30.2     10-01    140  |  98  |  55<H>  ----------------------------<  83  4.4   |  27  |  9.42<H>    Ca    9.3      01 Oct 2021 10:59  Phos  5.2     10-01  Mg     2.3     10-01              COVID-19 PCR: NotDetec (27 Sep 2021 10:55)  SARS-CoV-2: NotDeisy (07 Jun 2021 04:21)      CAPILLARY BLOOD GLUCOSE          RADIOLOGY & ADDITIONAL TESTS:                   PGY-1 Progress Note discussed with attending    PAGER #: [[phone]] TILL 5:00 PM  PLEASE CONTACT ON CALL TEAM:  - On Call Team (Please refer to Mary) FROM 5:00 PM - 8:30PM  - Nightfloat Team FROM 8:30 -7:30 AM    HPI:  77 yo M from [facility], has past medical hx of ESRD on HD (M-W-F), HTN, DM, CAD, Afib, CVA with right sided hemiparesis, anemia, vascular dementia, mood disorder presented to the ED c/o one episode of SOB. As per NH documents, patient was requiring oxygen supplementation however while in ED, patient was saturating >96% on RA without any signs of respiratory distress. Patient denies cough, chest pain, fever, chills, leg swelling, missing dialysis, headaches, dizziness, palpitations, abdominal pain, nausea, vomiting or diarrhea.    (27 Sep 2021 12:30)      OVERNIGHT EVENTS:   - No acute events    REVIEW OF SYSTEMS:  CONSTITUTIONAL: No fever, weight loss, or fatigue  RESPIRATORY: No cough, wheezing, chills or hemoptysis; No shortness of breath  CARDIOVASCULAR: No chest pain, palpitations, dizziness, or leg swelling  GASTROINTESTINAL: No abdominal pain. No nausea, vomiting, or hematemesis; No diarrhea or constipation. No melena or hematochezia.  GENITOURINARY: No dysuria or hematuria, urinary frequency  NEUROLOGICAL: No headaches, memory loss, loss of strength, numbness, or tremors  SKIN: No itching, burning, rashes, or lesions     MEDICATIONS  (STANDING):  apixaban 2.5 milliGRAM(s) Oral two times a day  aspirin  chewable 81 milliGRAM(s) Oral daily  atorvastatin 80 milliGRAM(s) Oral at bedtime  chlorhexidine 2% Cloths 1 Application(s) Topical <User Schedule>  colchicine 0.6 milliGRAM(s) Oral two times a day  dronedarone 400 milliGRAM(s) Oral two times a day  epoetin ximena-epbx (RETACRIT) Injectable 3000 Unit(s) IV Push <User Schedule>  folic acid 1 milliGRAM(s) Oral daily  hydrALAZINE 50 milliGRAM(s) Oral every 8 hours  isosorbide   mononitrate ER Tablet (IMDUR) 30 milliGRAM(s) Oral daily  lactulose Syrup 30 Gram(s) Oral at bedtime  metoprolol tartrate 12.5 milliGRAM(s) Oral two times a day  pantoprazole    Tablet 40 milliGRAM(s) Oral before breakfast  senna 2 Tablet(s) Oral at bedtime  sevelamer carbonate 1600 milliGRAM(s) Oral three times a day    MEDICATIONS  (PRN):  acetaminophen   Tablet .. 650 milliGRAM(s) Oral every 6 hours PRN Temp greater or equal to 38C (100.4F), Mild Pain (1 - 3)      Vital Signs Last 24 Hrs  T(C): 36.9 (01 Oct 2021 16:05), Max: 36.9 (01 Oct 2021 04:39)  T(F): 98.4 (01 Oct 2021 16:05), Max: 98.4 (01 Oct 2021 04:39)  HR: 64 (01 Oct 2021 16:05) (60 - 76)  BP: 155/55 (01 Oct 2021 16:05) (134/46 - 182/67)  BP(mean): --  RR: 18 (01 Oct 2021 16:05) (18 - 19)  SpO2: 100% (01 Oct 2021 16:05) (97% - 100%)    PHYSICAL EXAMINATION:  GENERAL: NAD, AAOx3  HEAD: AT/NC  EYES: conjunctiva and sclera clear  NECK: supple, No JVD noted, Normal thyroid  CHEST/LUNG: CTABL; no rales, rhonchi, wheezing, or rubs  HEART: regular rate and rhythm; blowing systolic murmur  ABDOMEN: soft, nontender, nondistended; Bowel sounds present  EXTREMITIES:  2+ Peripheral Pulses, No clubbing, cyanosis, or edema  SKIN: warm dry                          9.8    4.29  )-----------( 151      ( 01 Oct 2021 10:59 )             30.2     10-01    140  |  98  |  55<H>  ----------------------------<  83  4.4   |  27  |  9.42<H>    Ca    9.3      01 Oct 2021 10:59  Phos  5.2     10-01  Mg     2.3     10-01              COVID-19 PCR: NotDetec (27 Sep 2021 10:55)  SARS-CoV-2: Jaytegeraldo (07 Jun 2021 04:21)      CAPILLARY BLOOD GLUCOSE          RADIOLOGY & ADDITIONAL TESTS:

## 2021-10-01 NOTE — PROGRESS NOTE ADULT - PROBLEM SELECTOR PLAN 3
- ESRD Maintenance HD (M/W/F ) via right AVF   - HD today Oct 1st  - Cr 10.8 on admission  - No crackles, pedal edema or fluid overload  - Avoid Nephrotoxic Meds/ Agents such as (NSAIDs, IV contrast, Aminoglycosides such as gentamicin, Gadolinium contrast, Phosphate containing enemas, etc..)  - Adjust Medications according to eGFR  - Renal diet  - Nephrology Dr Jacinto consulted

## 2021-10-01 NOTE — PROGRESS NOTE ADULT - SUBJECTIVE AND OBJECTIVE BOX
PULMONARY  progress note    DORSAINVIL, JEANCLAUDE  MRN-[id number]    Patient is a 76y old  Male who presents with a chief complaint of HYPERTENSIVE EMERGENCY (30 Sep 2021 14:42)  No cough sob or chest pain      MEDICATIONS  (STANDING):  apixaban 2.5 milliGRAM(s) Oral two times a day  aspirin  chewable 81 milliGRAM(s) Oral daily  atorvastatin 80 milliGRAM(s) Oral at bedtime  chlorhexidine 2% Cloths 1 Application(s) Topical <User Schedule>  colchicine 0.6 milliGRAM(s) Oral two times a day  dronedarone 400 milliGRAM(s) Oral two times a day  epoetin ximena-epbx (RETACRIT) Injectable 3000 Unit(s) IV Push <User Schedule>  folic acid 1 milliGRAM(s) Oral daily  hydrALAZINE 50 milliGRAM(s) Oral every 8 hours  isosorbide   mononitrate ER Tablet (IMDUR) 30 milliGRAM(s) Oral daily  lactulose Syrup 30 Gram(s) Oral at bedtime  metoprolol tartrate 25 milliGRAM(s) Oral two times a day  pantoprazole    Tablet 40 milliGRAM(s) Oral before breakfast  senna 2 Tablet(s) Oral at bedtime  sevelamer carbonate 1600 milliGRAM(s) Oral three times a day        Allergies    No Known Allergies            PAST MEDICAL & SURGICAL HISTORY:  ESRD (end stage renal disease) on dialysis    Hypertension    Anemia    Cerebrovascular accident (CVA), unspecified mechanism    Paroxysmal atrial fibrillation    CAD (coronary artery disease)    A-V fistula             REVIEW OF SYSTEMS:  CONSTITUTIONAL: No fever, weight loss, or fatigue   EYES: No eye pain, visual disturbances, or discharge  ENT:  No difficulty hearing, tinnitus, vertigo; No sinus or throat pain  NECK: No pain or stiffness or nodes  RESPIRATORY:  cough--   wheezing --  chills--   hemoptysis--  Shortness of Breath--  CARDIOVASCULAR: No chest pain, palpitations, passing out, dizziness, or leg swelling  GASTROINTESTINAL: No abdominal or epigastric pain. No nausea, vomiting,  GENITOURINARY: No dysuria, frequency, hematuria, or incontinence  NEUROLOGICAL: No headaches, memory loss, loss of strength, numbness, or tremors  SKIN: No itching, burning, rashes, or lesions   LYMPH Nodes: No enlarged glands  ENDOCRINE: No heat or cold intolerance; No hair loss  MUSCULOSKELETAL: No joint pain or swelling; No muscle, back, or extremity pain        Vital Signs Last 24 Hrs  T(C): 36.4 (01 Oct 2021 07:46), Max: 36.9 (01 Oct 2021 04:39)  T(F): 97.6 (01 Oct 2021 07:46), Max: 98.4 (01 Oct 2021 04:39)  HR: 65 (01 Oct 2021 07:46) (60 - 76)  BP: 166/55 (01 Oct 2021 07:46) (140/61 - 166/55)  BP(mean): --  RR: 18 (01 Oct 2021 07:46) (17 - 19)  SpO2: 100% (01 Oct 2021 07:46) (97% - 100%)  I&O's Detail    30 Sep 2021 07:01  -  01 Oct 2021 07:00  --------------------------------------------------------  IN:    Oral Fluid: 275 mL  Total IN: 275 mL    OUT:  Total OUT: 0 mL    Total NET: 275 mL          PHYSICAL EXAMINATION:    GENERAL: The patient is a well-developed, obese B/M in no apparent distress.   SKIN no rash ecchymoses or bruises  HEENT: Head is normocephalic and atraumatic  VIVIANA , Extraocular muscles are intact. Mucous membranes  moist.   Neck supple ,No LN felt JVP not increased  Thyroid not enlarged  Cardiovascular:  S1 S2 heard, RSR, No JVD , systolic  murmur at apex, No gallop or rub  Respiratory: Chest wall symmetrical with good air entry ,Percussion note normal,    Lungs vesicular breathing with  lt basilar  rales , no   wheeze	  ABDOMEN:  Soft, Non-tender, obese, no hepatomegaly or splenomegaly BS positive	  Extremities: Normal range of motion, No clubbing, cyanosis or edema  Vascular: Peripheral pulses palpable 2+ bilaterally  CNS:  Alert and oriented x 3   Cranial nerves intact  sensory intact  motor power5/5  dtr 2+   Babinski neg    LABS:                        10.0   4.92  )-----------( 165      ( 29 Sep 2021 15:21 )             29.8     09-29    137  |  98  |  66<H>  ----------------------------<  92  5.3   |  26  |  10.50<H>    Ca    9.1      29 Sep 2021 15:21  Phos  6.1     09-29  Mg     2.3     09-29

## 2021-10-01 NOTE — DISCHARGE NOTE PROVIDER - NSDCFUSCHEDAPPT_GEN_ALL_CORE_FT
DORSAINVIL, JEANCLAUDE ; 12/07/2021 ; [facility] [address] DORSAINVIL, JEANCLAUDE ; 12/07/2021 ; N[facility] [address]

## 2021-10-01 NOTE — DISCHARGE NOTE PROVIDER - NSDCMRMEDTOKEN_GEN_ALL_CORE_FT
acetaminophen 325 mg oral tablet: 2 tab(s) orally every 6 hours, As Needed  aspirin 81 mg oral tablet, chewable: 1 tab(s) orally once a day  atorvastatin 80 mg oral tablet: 1 tab(s) orally once a day  Colace 100 mg oral capsule: 1 cap(s) orally once a day (at bedtime)  Colcrys 0.6 mg oral tablet: 1 tab(s) orally 2 times a day  Eliquis 2.5 mg oral tablet: 1 tab(s) orally 2 times a day  famotidine 20 mg oral tablet: 1 tab(s) orally once a day  ferrous sulfate 325 mg (65 mg elemental iron) oral tablet: 1 tab(s) orally once a day  folic acid 1 mg oral tablet: 1 tab(s) orally once a day  hydrALAZINE 25 mg oral tablet: 1 tab(s) orally 3 times a day  isosorbide mononitrate 30 mg oral tablet, extended release: 1 tab(s) orally once a day  lactulose 10 g/15 mL oral syrup: 30 milliliter(s) orally once a day (at bedtime)  metoprolol tartrate 25 mg oral tablet: 1 tab(s) orally 3 times a day   Multaq 400 mg oral tablet: 1 tab(s) orally 2 times a day  sevelamer hydrochloride 800 mg oral tablet: 2 tab(s) orally 3 times a day   acetaminophen 325 mg oral tablet: 2 tab(s) orally every 6 hours, As Needed  aspirin 81 mg oral tablet, chewable: 1 tab(s) orally once a day  atorvastatin 80 mg oral tablet: 1 tab(s) orally once a day  Colace 100 mg oral capsule: 1 cap(s) orally once a day (at bedtime)  Colcrys 0.6 mg oral tablet: 1 tab(s) orally 2 times a day  Eliquis 2.5 mg oral tablet: 1 tab(s) orally 2 times a day  famotidine 20 mg oral tablet: 1 tab(s) orally once a day  ferrous sulfate 325 mg (65 mg elemental iron) oral tablet: 1 tab(s) orally once a day  folic acid 1 mg oral tablet: 1 tab(s) orally once a day  isosorbide mononitrate 30 mg oral tablet, extended release: 1 tab(s) orally once a day  lactulose 10 g/15 mL oral syrup: 30 milliliter(s) orally once a day (at bedtime)  metoprolol tartrate 25 mg oral tablet: 1 tab(s) orally 3 times a day   Multaq 400 mg oral tablet: 1 tab(s) orally 2 times a day  sevelamer hydrochloride 800 mg oral tablet: 2 tab(s) orally 3 times a day

## 2021-10-01 NOTE — PROGRESS NOTE ADULT - PROBLEM SELECTOR PLAN 1
- On admission, patients /77  most likely 2/2 medication non compliance vs fluid overload  - Echo showed severe aortic stenosis, mild mitral stenosis  - Possible SSS, c/w tele, however patient refusing ICD  - c/w Hydralazine 50 q8, imdur 30 qd, lopressor decreased to lopressor 12.5 bid  - Repeat BP  - C/w current meds

## 2021-10-01 NOTE — DISCHARGE NOTE PROVIDER - EXTENDED VTE YES NO FOR MLM ENOXAPARIN
Penelope Lamb Patient Age: 74 year old  MESSAGE:   Patient scheduled her annual wellness with Nelda on 3.25.19 and would like to know if she is suppose to have labs done prior to her appt and if so please order labs.  Patient is requesting a call back with response.        Next and Last Visit with Provider and Department  Last visit with this provider: Visit date not found  Last visit with this department: Visit date not found    Next visit with this provider: 3/25/2019  Next visit with this department: 3/25/2019    WEIGHT AND HEIGHT:   Wt Readings from Last 1 Encounters:   01/17/19 49.9 kg (110 lb)     Ht Readings from Last 1 Encounters:   01/17/19 5' 3\" (1.6 m)     BMI Readings from Last 1 Encounters:   01/17/19 19.49 kg/m²       ALLERGIES:  Tramadol and Fosamax  Current Outpatient Medications   Medication   • calcium carbonate (OS-JOHN) 1250 (500 Ca) MG tablet   • vitamin D, Cholecalciferol, 1000 units capsule     No current facility-administered medications for this visit.      PHARMACY to use: ask pt           Pharmacy preference(s) on file: No Pharmacies Listed    CALL BACK INFO: Ok to leave response (including medical information) on answering machine  ROUTING: Patient's physician/staff        PCP: Nelda Baer CNP         INS: Payor: MEDICARE / Plan: PARTA AND B / Product Type: MEDICARE   PATIENT ADDRESS:  [address] [place] [id number]     ,

## 2021-10-01 NOTE — PROGRESS NOTE ADULT - PROBLEM SELECTOR PLAN 5
- Patient with hx of PAF on Eliquis, metoprolol at [facility] (as per patient sometimes he refuses to take AC among other medications)  - EKG showed NSR, no changes  -c/w metoprol  - Patient refusing afib

## 2021-10-01 NOTE — PROGRESS NOTE ADULT - SUBJECTIVE AND OBJECTIVE BOX
Problem List:  ESRD  Cardiac arrythmia with Bradycardia, still seen on cardiac monitor in the last 24 hours      PAST MEDICAL & SURGICAL HISTORY:  ESRD (end stage renal disease) on dialysis    Hypertension    Anemia    Cerebrovascular accident (CVA), unspecified mechanism    Paroxysmal atrial fibrillation    CAD (coronary artery disease)    A-V fistula        No Known Allergies      MEDICATIONS  (STANDING):  apixaban 2.5 milliGRAM(s) Oral two times a day  aspirin  chewable 81 milliGRAM(s) Oral daily  atorvastatin 80 milliGRAM(s) Oral at bedtime  chlorhexidine 2% Cloths 1 Application(s) Topical <User Schedule>  colchicine 0.6 milliGRAM(s) Oral two times a day  dronedarone 400 milliGRAM(s) Oral two times a day  epoetin ximena-epbx (RETACRIT) Injectable 3000 Unit(s) IV Push <User Schedule>  folic acid 1 milliGRAM(s) Oral daily  hydrALAZINE 50 milliGRAM(s) Oral every 8 hours  isosorbide   mononitrate ER Tablet (IMDUR) 30 milliGRAM(s) Oral daily  lactulose Syrup 30 Gram(s) Oral at bedtime  metoprolol tartrate 12.5 milliGRAM(s) Oral two times a day  pantoprazole    Tablet 40 milliGRAM(s) Oral before breakfast  senna 2 Tablet(s) Oral at bedtime  sevelamer carbonate 1600 milliGRAM(s) Oral three times a day    MEDICATIONS  (PRN):  acetaminophen   Tablet .. 650 milliGRAM(s) Oral every 6 hours PRN Temp greater or equal to 38C (100.4F), Mild Pain (1 - 3)                            9.8    4.29  )-----------( 151      ( 01 Oct 2021 10:59 )             30.2     10-01    140  |  98  |  55<H>  ----------------------------<  83  4.4   |  27  |  9.42<H>    Ca    9.3      01 Oct 2021 10:59  Phos  5.2     10-01  Mg     2.3     10-01              REVIEW OF SYSTEMS:  General: no fever no chills, no weight loss.    RESPIRATORY: No cough, wheezing, hemoptysis; No shortness of breath  CARDIOVASCULAR: No chest pain or palpitations. No Edema  GASTROINTESTINAL: No abdominal or epigastric pain. No nausea, vomiting. No diarrhea or constipation. No melena.  GENITOURINARY: No dysuria, frequency, foamy urine, urinary urgency, incontinence or hematuria  NEUROLOGICAL: weakness of lower extermities        VITALS:  T(F): 98.4 (10-01-21 @ 16:05), Max: 98.4 (10-01-21 @ 04:39)  HR: 64 (10-01-21 @ 16:05)  BP: 155/55 (10-01-21 @ 16:05)  RR: 18 (10-01-21 @ 16:05)  SpO2: 100% (10-01-21 @ 16:05)  Wt(kg): --    09-30 @ 07:01  -  10-01 @ 07:00  --------------------------------------------------------  IN: 275 mL / OUT: 0 mL / NET: 275 mL    10-01 @ 07:01  -  10-01 @ 16:49  --------------------------------------------------------  IN: 600 mL / OUT: 2600 mL / NET: -2000 mL        PHYSICAL EXAM:  Constitutional: appears well , no diaphoresis, no distress.  Neck: No JVD, no carotid bruit, supple.  Respiratory: Good air entrance B/L, no wheezes, rales or rhonchi  Cardiovascular: S1, S2, RRR, no pericardial rub, no murmur  Abdomen: BS+, soft, no tenderness, no bruit  Pelvis: bladder nondistended  Extremities: No edema  Vascular Access: right AVG with bruit and thrill

## 2021-10-01 NOTE — DISCHARGE NOTE PROVIDER - NSDCCPCAREPLAN_GEN_ALL_CORE_FT
PRINCIPAL DISCHARGE DIAGNOSIS  Diagnosis: Hypertensive urgency  Assessment and Plan of Treatment: Your blood pressure on admission was 203/77  . A systolic blood pressure of greater than 180, or a diastolic blood pressure above 120, constitutes a condition known as hypertensive urgency. In hypertensive urgency, you are at risk for a progression to hypertensive emergency, in which organ damage may result due to your elevated blood pressure. Please continue to take your medications for blood pressure on a regular basis in conjunction with prescripton directions, as to prevent another episode of hypertensive urgency, and/or other problems associated with high blood pressure (cardiovascular, respiratory, GI, Endocrine disorders).      SECONDARY DISCHARGE DIAGNOSES  Diagnosis: Shortness of breath  Assessment and Plan of Treatment: Before presenting to the hospital, you reported severe shortness of breath. Your respiratory failure was likely a consequence of your very elevated blood pressures (which were originally 203/77 on admission). Your shortness of breath returned to normal breathing shortly after presentation to the hospital. If you ever experience severe shortness of breath again in the future, try to contact or have someone else contact your nearest hospital emergency room, as severe shortness of breath may indicate serious conditions such as respiratory failure, a pulmonary embolism, and/or heart attack.     PRINCIPAL DISCHARGE DIAGNOSIS  Diagnosis: Hypertensive urgency  Assessment and Plan of Treatment: Your blood pressure on admission was 203/77, which is extremely high and required immediate intervention. You were intitially treated with IV medications and later converted to oral meds. We treated you with many blood pressure medications until we found a regiemen that works for you. At discharge, you will prescribed ---------. It is important to continue to take your medications on time,  monitor your blood pressure at home, keep a log of your home readings and follow up with your Primary Care Physician. As per AHA/ACC guidelines, it is important to adhere to a DASH Diet of fresh fruits, vegetables, lean meats such as poultry and fish, and whole wheat carbs. 30 minutes of aerobic exercise per day 3-4 times a week, reducing salt intake <1.5g/day, and cutting down on highly processed foods are also shown to reduce BP. Uncontrolled BP may result in organ damage to your eyes, brain, heart, and kidneys by causing strokes, heart attacks, kidney failure, and bleeds in your eye.      SECONDARY DISCHARGE DIAGNOSES  Diagnosis: Aortic stenosis  Assessment and Plan of Treatment: On echo you were found to have severe aoritc stenosis, which means a major valve in your heart does not open correctly. Your ejection fraction at the time of our test indicated that you have normal sytolic function, however aortic stenosis can cause symptoms such as shortness of breath, chest pain, and syncope. If you experience any of these symptoms, please seek medical attention.    Diagnosis: PAF (paroxysmal atrial fibrillation)  Assessment and Plan of Treatment: You have a history of paroxysmal atrial fibrillation, which is where your heart does not beat correctly and may beat too fast. This is a condition of the heart which can be associated with an increased risk of blood clotting and stroke. Please  take Eliquis and metoprolol as prescribed on a regular basis to help monitor this condition. ELIQUIS HAS NOT BEEN SHOWN TO CAUSE PROSTATE CANCER, BUT HAS BEEN SHOWN TO DECREASE BLOOD CLOTS. PLEASE TAKE.    Diagnosis: ESRD on dialysis  Assessment and Plan of Treatment: You have a history of End-Stage Renal Disease (ESRD). For this condition, you were monitored at the hospital by being placed on dialysis, as well as monitoring of your creatinine (your original creatinine was 10.8 on admission, which was elevated). Additionally, your medications were adjusted according to your eGFR (a measure of kidney filtration rate), and medications with potential kidney-damaging side effects were avoided in your treatment.    Diagnosis: Anemia  Assessment and Plan of Treatment: You presented to the hospital with a hemoglobin concentration of 10.6, which was below normal rnages. Hemoglobin is a protein complex in red blood cells that correlate with oxygen perfusion throghout the body. Despite your low hemoglobin levels, you had no signs of hemorrhagic shock or blood instability. Still, your hemoglobin levels were continously monitored throghout treatment, and you were also monitored for any potential active bleeding.    Diagnosis: CAD (coronary artery disease)  Assessment and Plan of Treatment: You have a history of coronary artery disease. This condition is associated with high blood pressure and can lead to cardiovascular events such as heart attacks and strokes. Please continue to take your aspirin, beta-blocker and statin medications at home.    Diagnosis: Hyperkalemia  Assessment and Plan of Treatment: On admission to the hospital, you presented with a blood potassium level of 6.5 likely as a result of your kidney disease. Hyperkalemia can be a dangerous condition, as having to much potassium in your blood can result in arrythmias of the heart. You were treated for this condition with calcium gluconate. Potassium levels can be detected at home, or at an inpatient or outpatient settting. Generally, maintaining a healthy, balanced diet (such as the DASH diet), and maintaining blood pressure control should be sufficient to ensure that your potassium levels are in a normal range.     PRINCIPAL DISCHARGE DIAGNOSIS  Diagnosis: Hypertensive urgency  Assessment and Plan of Treatment: Your blood pressure on admission was 203/77, which is extremely high and required immediate intervention. You were intitially treated with IV medications and later converted to oral meds. We treated you with many blood pressure medications until we found a regimen that works for you. At discharge, you will prescribed Metoprolol tartrate 12.5 mg oral every 12 hours, dronedarone 400 mg oral twice a day, and isosorbide mononitrate 30 mg oral daily . It is important to continue to take your medications on time,  monitor your blood pressure at home, keep a log of your home readings and follow up with your Primary Care Physician. As per AHA/ACC guidelines, it is important to adhere to a DASH Diet of fresh fruits, vegetables, lean meats such as poultry and fish, and whole wheat carbs. 30 minutes of aerobic exercise per day 3-4 times a week, reducing salt intake <1.5g/day, and cutting down on highly processed foods are also shown to reduce BP. Uncontrolled BP may result in organ damage to your eyes, brain, heart, and kidneys by causing strokes, heart attacks, kidney failure, and bleeds in your eye.  You need to follow up with your PCP in 2 wks and with your cardiologist in 2 wks      SECONDARY DISCHARGE DIAGNOSES  Diagnosis: Aortic stenosis  Assessment and Plan of Treatment: On echo you were found to have severe aoritc stenosis, which means a major valve in your heart does not open correctly. Your ejection fraction at the time of our test indicated that you have normal sytolic function, however aortic stenosis can cause symptoms such as shortness of breath, chest pain, and syncope. If you experience any of these symptoms, please seek medical attention.    Diagnosis: ESRD on dialysis  Assessment and Plan of Treatment: You have a history of End-Stage Renal Disease (ESRD). For this condition, you were monitored at the hospital by being placed on dialysis, as well as monitoring of your creatinine (your original creatinine was 10.8 on admission, which was elevated). Additionally, your medications were adjusted according to your eGFR (a measure of kidney filtration rate), and medications with potential kidney-damaging side effects were avoided in your treatment.  You need to continue hemodyalsis as per your Monday/Wednsday/Friday schedule    Diagnosis: Hyperkalemia  Assessment and Plan of Treatment: On admission to the hospital, you presented with a blood potassium level of 6.5 likely as a result of your kidney disease. Hyperkalemia can be a dangerous condition, as having to much potassium in your blood can result in arrythmias of the heart. You were treated for this condition with calcium gluconate. Potassium levels can be detected at home, or at an inpatient or outpatient settting. Generally, maintaining a healthy, balanced diet (such as the DASH diet), maintaining blood pressure control, and being compliant with dialsys sessions should be sufficient to ensure that your potassium levels are in a normal range.    Diagnosis: CAD (coronary artery disease)  Assessment and Plan of Treatment: You have a history of coronary artery disease. This condition is associated with high blood pressure and can lead to cardiovascular events such as heart attacks and strokes. Please continue to take your aspirin, beta-blocker and statin medications at home.   You need to follow up with your primary care provider in 2 weeks    Diagnosis: PAF (paroxysmal atrial fibrillation)  Assessment and Plan of Treatment: You have a history of paroxysmal atrial fibrillation, which is where your heart does not beat correctly and may beat too fast. This is a condition of the heart which can be associated with an increased risk of blood clotting and stroke. Please  take Eliquis and metoprolol as prescribed on a regular basis to help monitor this condition.  Please be compliant with the medication  You need to follow up with your cardiologist in 2 wks    Diagnosis: Anemia  Assessment and Plan of Treatment: You presented to the hospital with a hemoglobin concentration of 10.6, which was below normal rnages. Hemoglobin is a protein complex in red blood cells that correlate with oxygen perfusion throghout the body. Despite your low hemoglobin levels, you had no signs of hemorrhagic shock or blood instability. Still, your hemoglobin levels were continously monitored throghout treatment, and you were also monitored for any potential active bleeding. Your hemoglobin has been stable during your hospital stay. You need to continue receiving EPO injectons during your dialysis sessions to sustain your Hemoglobing levels

## 2021-10-01 NOTE — PROGRESS NOTE ADULT - SUBJECTIVE AND OBJECTIVE BOX
CHIEF COMPLAINT:Patient is a 76y old  Male who presents with a chief complaint of HYPERTENSIVE EMERGENCY.Pt appears comfortable.    	  REVIEW OF SYSTEMS:  CONSTITUTIONAL: No fever, weight loss, or fatigue  EYES: No eye pain, visual disturbances, or discharge  ENT:  No difficulty hearing, tinnitus, vertigo; No sinus or throat pain  NECK: No pain or stiffness  RESPIRATORY: No cough, wheezing, chills or hemoptysis; No Shortness of Breath  CARDIOVASCULAR: No chest pain, palpitations, passing out, dizziness, or leg swelling  GASTROINTESTINAL: No abdominal or epigastric pain. No nausea, vomiting, or hematemesis; No diarrhea or constipation. No melena or hematochezia.  GENITOURINARY: No dysuria, frequency, hematuria, or incontinence  NEUROLOGICAL: No headaches, memory loss, loss of strength, numbness, or tremors  SKIN: No itching, burning, rashes, or lesions   LYMPH Nodes: No enlarged glands  ENDOCRINE: No heat or cold intolerance; No hair loss  MUSCULOSKELETAL: No joint pain or swelling; No muscle, back, or extremity pain  PSYCHIATRIC: No depression, anxiety, mood swings, or difficulty sleeping  HEME/LYMPH: No easy bruising, or bleeding gums  ALLERGY AND IMMUNOLOGIC: No hives or eczema	      PHYSICAL EXAM:  T(C): 36.4 (10-01-21 @ 07:46), Max: 36.9 (10-01-21 @ 04:39)  HR: 65 (10-01-21 @ 07:46) (60 - 76)  BP: 166/55 (10-01-21 @ 07:46) (140/61 - 166/55)  RR: 18 (10-01-21 @ 07:46) (17 - 19)  SpO2: 100% (10-01-21 @ 07:46) (97% - 100%)  Wt(kg): --  I&O's Summary    30 Sep 2021 07:01  -  01 Oct 2021 07:00  --------------------------------------------------------  IN: 275 mL / OUT: 0 mL / NET: 275 mL        Appearance: Normal	  HEENT:   Normal oral mucosa, PERRL, EOMI	  Lymphatic: No lymphadenopathy  Cardiovascular: Normal S1 S2, No JVD, No murmurs, No edema  Respiratory: Lungs clear to auscultation	  Psychiatry: A & O x 3, Mood & affect appropriate  Gastrointestinal:  Soft, Non-tender, + BS	  Skin: No rashes, No ecchymoses, No cyanosis	  Neurologic: Non-focal  Extremities: Normal range of motion, No clubbing, cyanosis or edema  Vascular: Peripheral pulses palpable 2+ bilaterally    MEDICATIONS  (STANDING):  apixaban 2.5 milliGRAM(s) Oral two times a day  aspirin  chewable 81 milliGRAM(s) Oral daily  atorvastatin 80 milliGRAM(s) Oral at bedtime  chlorhexidine 2% Cloths 1 Application(s) Topical <User Schedule>  colchicine 0.6 milliGRAM(s) Oral two times a day  dronedarone 400 milliGRAM(s) Oral two times a day  epoetin ximena-epbx (RETACRIT) Injectable 3000 Unit(s) IV Push <User Schedule>  folic acid 1 milliGRAM(s) Oral daily  hydrALAZINE 50 milliGRAM(s) Oral every 8 hours  isosorbide   mononitrate ER Tablet (IMDUR) 30 milliGRAM(s) Oral daily  lactulose Syrup 30 Gram(s) Oral at bedtime  metoprolol tartrate 12.5 milliGRAM(s) Oral two times a day  pantoprazole    Tablet 40 milliGRAM(s) Oral before breakfast  senna 2 Tablet(s) Oral at bedtime  sevelamer carbonate 1600 milliGRAM(s) Oral three times a day      TELEMETRY: 	  sinus bradycardia down to 30's    	  LABS:	 	                         9.8    4.29  )-----------( 151      ( 01 Oct 2021 10:59 )             30.2     09-29    137  |  98  |  66<H>  ----------------------------<  92  5.3   |  26  |  10.50<H>    Ca    9.1      29 Sep 2021 15:21  Phos  6.1     09-29  Mg     2.3     09-29      proBNP: Serum Pro-Brain Natriuretic Peptide: 16962 pg/mL (09-27 @ 10:20)    Lipid Profile: Cholesterol 163  LDL --  HDL 55  TG 77    TSH: Thyroid Stimulating Hormone, Serum: 1.51 uU/mL (09-28 @ 07:45)

## 2021-10-02 DIAGNOSIS — I50.20 UNSPECIFIED SYSTOLIC (CONGESTIVE) HEART FAILURE: ICD-10-CM

## 2021-10-02 DIAGNOSIS — I35.0 NONRHEUMATIC AORTIC (VALVE) STENOSIS: ICD-10-CM

## 2021-10-02 LAB — HBV SURFACE AG SER-ACNC: SIGNIFICANT CHANGE UP

## 2021-10-02 RX ADMIN — DRONEDARONE 400 MILLIGRAM(S): 400 TABLET, FILM COATED ORAL at 05:47

## 2021-10-02 RX ADMIN — PANTOPRAZOLE SODIUM 40 MILLIGRAM(S): 20 TABLET, DELAYED RELEASE ORAL at 07:49

## 2021-10-02 RX ADMIN — Medication 0.6 MILLIGRAM(S): at 05:47

## 2021-10-02 RX ADMIN — Medication 0.6 MILLIGRAM(S): at 17:00

## 2021-10-02 RX ADMIN — CHLORHEXIDINE GLUCONATE 1 APPLICATION(S): 213 SOLUTION TOPICAL at 05:47

## 2021-10-02 RX ADMIN — ISOSORBIDE MONONITRATE 30 MILLIGRAM(S): 60 TABLET, EXTENDED RELEASE ORAL at 11:11

## 2021-10-02 RX ADMIN — APIXABAN 2.5 MILLIGRAM(S): 2.5 TABLET, FILM COATED ORAL at 05:47

## 2021-10-02 RX ADMIN — SEVELAMER CARBONATE 1600 MILLIGRAM(S): 2400 POWDER, FOR SUSPENSION ORAL at 13:01

## 2021-10-02 RX ADMIN — Medication 50 MILLIGRAM(S): at 13:01

## 2021-10-02 RX ADMIN — LACTULOSE 30 GRAM(S): 10 SOLUTION ORAL at 22:09

## 2021-10-02 RX ADMIN — Medication 12.5 MILLIGRAM(S): at 17:00

## 2021-10-02 RX ADMIN — Medication 50 MILLIGRAM(S): at 05:47

## 2021-10-02 RX ADMIN — Medication 1 MILLIGRAM(S): at 11:11

## 2021-10-02 RX ADMIN — DRONEDARONE 400 MILLIGRAM(S): 400 TABLET, FILM COATED ORAL at 17:00

## 2021-10-02 RX ADMIN — Medication 81 MILLIGRAM(S): at 11:11

## 2021-10-02 RX ADMIN — SEVELAMER CARBONATE 1600 MILLIGRAM(S): 2400 POWDER, FOR SUSPENSION ORAL at 05:47

## 2021-10-02 RX ADMIN — Medication 50 MILLIGRAM(S): at 22:09

## 2021-10-02 RX ADMIN — Medication 12.5 MILLIGRAM(S): at 05:47

## 2021-10-02 RX ADMIN — ATORVASTATIN CALCIUM 80 MILLIGRAM(S): 80 TABLET, FILM COATED ORAL at 22:11

## 2021-10-02 RX ADMIN — SEVELAMER CARBONATE 1600 MILLIGRAM(S): 2400 POWDER, FOR SUSPENSION ORAL at 22:09

## 2021-10-02 RX ADMIN — APIXABAN 2.5 MILLIGRAM(S): 2.5 TABLET, FILM COATED ORAL at 17:00

## 2021-10-02 NOTE — PROGRESS NOTE ADULT - SUBJECTIVE AND OBJECTIVE BOX
CHIEF COMPLAINT:Patient is a 76y old  Male who presents with a chief complaint of HYPERTENSIVE EMERGENCY.Pt appears comfortable.    	  REVIEW OF SYSTEMS:  CONSTITUTIONAL: No fever, weight loss, or fatigue  EYES: No eye pain, visual disturbances, or discharge  ENT:  No difficulty hearing, tinnitus, vertigo; No sinus or throat pain  NECK: No pain or stiffness  RESPIRATORY: No cough, wheezing, chills or hemoptysis; No Shortness of Breath  CARDIOVASCULAR: No chest pain, palpitations, passing out, dizziness, or leg swelling  GASTROINTESTINAL: No abdominal or epigastric pain. No nausea, vomiting, or hematemesis; No diarrhea or constipation. No melena or hematochezia.  GENITOURINARY: No dysuria, frequency, hematuria, or incontinence  NEUROLOGICAL: No headaches, memory loss, loss of strength, numbness, or tremors  SKIN: No itching, burning, rashes, or lesions   LYMPH Nodes: No enlarged glands  ENDOCRINE: No heat or cold intolerance; No hair loss  MUSCULOSKELETAL: No joint pain or swelling; No muscle, back, or extremity pain  PSYCHIATRIC: No depression, anxiety, mood swings, or difficulty sleeping  HEME/LYMPH: No easy bruising, or bleeding gums  ALLERGY AND IMMUNOLOGIC: No hives or eczema	      PHYSICAL EXAM:  T(C): 36.9 (10-02-21 @ 08:04), Max: 37.2 (10-01-21 @ 19:17)  HR: 65 (10-02-21 @ 08:04) (60 - 78)  BP: 153/41 (10-02-21 @ 08:04) (133/67 - 157/63)  RR: 20 (10-02-21 @ 08:04) (17 - 20)  SpO2: 97% (10-02-21 @ 08:04) (96% - 100%)  Wt(kg): --  I&O's Summary    01 Oct 2021 07:01  -  02 Oct 2021 07:00  --------------------------------------------------------  IN: 600 mL / OUT: 2600 mL / NET: -2000 mL        Appearance: Normal	  HEENT:   Normal oral mucosa, PERRL, EOMI	  Lymphatic: No lymphadenopathy  Cardiovascular: Normal S1 S2, No JVD, No murmurs, No edema  Respiratory: Lungs clear to auscultation	  Psychiatry: A & O x 3, Mood & affect appropriate  Gastrointestinal:  Soft, Non-tender, + BS	  Skin: No rashes, No ecchymoses, No cyanosis	  Neurologic: Non-focal  Extremities: Normal range of motion, No clubbing, cyanosis or edema  Vascular: Peripheral pulses palpable 2+ bilaterally    MEDICATIONS  (STANDING):  apixaban 2.5 milliGRAM(s) Oral two times a day  aspirin  chewable 81 milliGRAM(s) Oral daily  atorvastatin 80 milliGRAM(s) Oral at bedtime  chlorhexidine 2% Cloths 1 Application(s) Topical <User Schedule>  colchicine 0.6 milliGRAM(s) Oral two times a day  dronedarone 400 milliGRAM(s) Oral two times a day  epoetin ximena-epbx (RETACRIT) Injectable 3000 Unit(s) IV Push <User Schedule>  folic acid 1 milliGRAM(s) Oral daily  hydrALAZINE 50 milliGRAM(s) Oral every 8 hours  isosorbide   mononitrate ER Tablet (IMDUR) 30 milliGRAM(s) Oral daily  lactulose Syrup 30 Gram(s) Oral at bedtime  metoprolol tartrate 12.5 milliGRAM(s) Oral two times a day  pantoprazole    Tablet 40 milliGRAM(s) Oral before breakfast  senna 2 Tablet(s) Oral at bedtime  sevelamer carbonate 1600 milliGRAM(s) Oral three times a day      TELEMETRY: 	nsr 60-70's,pvc's    	  	  LABS:	 	                       9.8    4.29  )-----------( 151      ( 01 Oct 2021 10:59 )             30.2     10-01    140  |  98  |  55<H>  ----------------------------<  83  4.4   |  27  |  9.42<H>    Ca    9.3      01 Oct 2021 10:59  Phos  5.2     10-01  Mg     2.3     10-01      proBNP: Serum Pro-Brain Natriuretic Peptide: 38584 pg/mL (09-27 @ 10:20)    Lipid Profile: Cholesterol 163  LDL --  HDL 55  TG 77      TSH: Thyroid Stimulating Hormone, Serum: 1.51 uU/mL (09-28 @ 07:45)

## 2021-10-02 NOTE — PROGRESS NOTE ADULT - SUBJECTIVE AND OBJECTIVE BOX
PGY-1 Progress Note discussed with attending    PAGER #: [[phone]] TILL 5:00 PM  PLEASE CONTACT ON CALL TEAM:  - On Call Team (Please refer to Mary) FROM 5:00 PM - 8:30PM  - Nightfloat Team FROM 8:30 -7:30 AM    HPI:  77 yo M from [facility], has past medical hx of ESRD on HD (M-W-F), HTN, DM, CAD, Afib, CVA with right sided hemiparesis, anemia, vascular dementia, mood disorder presented to the ED c/o one episode of SOB. As per NH documents, patient was requiring oxygen supplementation however while in ED, patient was saturating >96% on RA without any signs of respiratory distress. Patient denies cough, chest pain, fever, chills, leg swelling, missing dialysis, headaches, dizziness, palpitations, abdominal pain, nausea, vomiting or diarrhea.    (27 Sep 2021 12:30)      REVIEW OF SYSTEMS:  CONSTITUTIONAL: No fever, weight loss, or fatigue  RESPIRATORY: No cough, wheezing, chills or hemoptysis; No shortness of breath  CARDIOVASCULAR: No chest pain, palpitations, dizziness, or leg swelling  GASTROINTESTINAL: No abdominal pain. No nausea, vomiting, or hematemesis; No diarrhea or constipation. No melena or hematochezia.  GENITOURINARY: No dysuria or hematuria, urinary frequency  NEUROLOGICAL: No headaches, memory loss, loss of strength, numbness, or tremors  SKIN: No itching, burning, rashes, or lesions     MEDICATIONS  (STANDING):  apixaban 2.5 milliGRAM(s) Oral two times a day  aspirin  chewable 81 milliGRAM(s) Oral daily  atorvastatin 80 milliGRAM(s) Oral at bedtime  chlorhexidine 2% Cloths 1 Application(s) Topical <User Schedule>  colchicine 0.6 milliGRAM(s) Oral two times a day  dronedarone 400 milliGRAM(s) Oral two times a day  epoetin ximena-epbx (RETACRIT) Injectable 3000 Unit(s) IV Push <User Schedule>  folic acid 1 milliGRAM(s) Oral daily  hydrALAZINE 50 milliGRAM(s) Oral every 8 hours  isosorbide   mononitrate ER Tablet (IMDUR) 30 milliGRAM(s) Oral daily  lactulose Syrup 30 Gram(s) Oral at bedtime  metoprolol tartrate 12.5 milliGRAM(s) Oral two times a day  pantoprazole    Tablet 40 milliGRAM(s) Oral before breakfast  senna 2 Tablet(s) Oral at bedtime  sevelamer carbonate 1600 milliGRAM(s) Oral three times a day    MEDICATIONS  (PRN):  acetaminophen   Tablet .. 650 milliGRAM(s) Oral every 6 hours PRN Temp greater or equal to 38C (100.4F), Mild Pain (1 - 3)      Vital Signs Last 24 Hrs  T(C): 36.9 (02 Oct 2021 11:08), Max: 37.2 (01 Oct 2021 19:17)  T(F): 98.4 (02 Oct 2021 11:08), Max: 98.9 (01 Oct 2021 19:17)  HR: 64 (02 Oct 2021 11:08) (60 - 78)  BP: 138/49 (02 Oct 2021 11:08) (133/67 - 157/63)  BP(mean): --  RR: 19 (02 Oct 2021 11:08) (17 - 20)  SpO2: 97% (02 Oct 2021 11:08) (96% - 100%)    PHYSICAL EXAMINATION:  GENERAL: NAD, AAOx3  HEAD: AT/NC  EYES: conjunctiva and sclera clear  NECK: supple, No JVD noted  CHEST/LUNG: CTABL; no rales, rhonchi, wheezing, or rubs  HEART: irregular rate and rhythm; blowing systolic murmur  ABDOMEN: soft, nontender, nondistended; Bowel sounds present  EXTREMITIES:  2+ Peripheral Pulses, No clubbing, cyanosis, or edema  SKIN: warm dry                          9.8    4.29  )-----------( 151      ( 01 Oct 2021 10:59 )             30.2     10-01    140  |  98  |  55<H>  ----------------------------<  83  4.4   |  27  |  9.42<H>    Ca    9.3      01 Oct 2021 10:59  Phos  5.2     10-01  Mg     2.3     10-01              COVID-19 PCR: NotDetec (27 Sep 2021 10:55)  SARS-CoV-2: NotDetec (07 Jun 2021 04:21)      CAPILLARY BLOOD GLUCOSE          RADIOLOGY & ADDITIONAL TESTS:

## 2021-10-02 NOTE — PROGRESS NOTE ADULT - SUBJECTIVE AND OBJECTIVE BOX
Problem List:  ESRD  Cardiac arrythmia bradycardia and history of PAF      PAST MEDICAL & SURGICAL HISTORY:  ESRD (end stage renal disease) on dialysis    Hypertension    Anemia    Cerebrovascular accident (CVA), unspecified mechanism    Paroxysmal atrial fibrillation    CAD (coronary artery disease)    A-V fistula        No Known Allergies      MEDICATIONS  (STANDING):  apixaban 2.5 milliGRAM(s) Oral two times a day  aspirin  chewable 81 milliGRAM(s) Oral daily  atorvastatin 80 milliGRAM(s) Oral at bedtime  chlorhexidine 2% Cloths 1 Application(s) Topical <User Schedule>  colchicine 0.6 milliGRAM(s) Oral two times a day  dronedarone 400 milliGRAM(s) Oral two times a day  epoetin ximena-epbx (RETACRIT) Injectable 3000 Unit(s) IV Push <User Schedule>  folic acid 1 milliGRAM(s) Oral daily  hydrALAZINE 50 milliGRAM(s) Oral every 8 hours  isosorbide   mononitrate ER Tablet (IMDUR) 30 milliGRAM(s) Oral daily  lactulose Syrup 30 Gram(s) Oral at bedtime  metoprolol tartrate 12.5 milliGRAM(s) Oral two times a day  pantoprazole    Tablet 40 milliGRAM(s) Oral before breakfast  senna 2 Tablet(s) Oral at bedtime  sevelamer carbonate 1600 milliGRAM(s) Oral three times a day    MEDICATIONS  (PRN):  acetaminophen   Tablet .. 650 milliGRAM(s) Oral every 6 hours PRN Temp greater or equal to 38C (100.4F), Mild Pain (1 - 3)                            9.8    4.29  )-----------( 151      ( 01 Oct 2021 10:59 )             30.2     10-01    140  |  98  |  55<H>  ----------------------------<  83  4.4   |  27  |  9.42<H>    Ca    9.3      01 Oct 2021 10:59  Phos  5.2     10-01  Mg     2.3     10-01              REVIEW OF SYSTEMS:  General: no fever no chills, no weight loss.    RESPIRATORY: No cough, wheezing, hemoptysis; No shortness of breath  CARDIOVASCULAR: No chest pain or palpitations. No Edema  GASTROINTESTINAL: No abdominal or epigastric pain. No nausea, vomiting. Appetite good  GENITOURINARY: No dysuria.        VITALS:  T(F): 98.5 (10-02-21 @ 08:04), Max: 98.9 (10-01-21 @ 19:17)  HR: 65 (10-02-21 @ 08:04)  BP: 153/41 (10-02-21 @ 08:04)  RR: 20 (10-02-21 @ 08:04)  SpO2: 97% (10-02-21 @ 08:04)  Wt(kg): --    10-01 @ 07:01  -  10-02 @ 07:00  --------------------------------------------------------  IN: 600 mL / OUT: 2600 mL / NET: -2000 mL        PHYSICAL EXAM:  Constitutional: well developed, no diaphoresis, no distress.  Neck: No JVD, no carotid bruit, supple, no adenopathy  Respiratory: Good air entrance B/L, no wheezes, rales or rhonchi  Cardiovascular: S1, S2, RRR, no pericardial rub, no murmur  Abdomen: BS+, soft, no tenderness, no bruit  Pelvis: bladder nondistended  No edema  Vascular Access: right AVG with bruit and thrill

## 2021-10-02 NOTE — PROGRESS NOTE ADULT - SUBJECTIVE AND OBJECTIVE BOX
PULMONARY  progress note    DORSAINVIL, JEANCLAUDE  MRN-[id number]    Patient is a 76y old  Male who presents with a chief complaint of HYPERTENSIVE EMERGENCY (02 Oct 2021 12:21)  no cough or sob      MEDICATIONS  (STANDING):  apixaban 2.5 milliGRAM(s) Oral two times a day  aspirin  chewable 81 milliGRAM(s) Oral daily  atorvastatin 80 milliGRAM(s) Oral at bedtime  chlorhexidine 2% Cloths 1 Application(s) Topical <User Schedule>  colchicine 0.6 milliGRAM(s) Oral two times a day  dronedarone 400 milliGRAM(s) Oral two times a day  epoetin ximena-epbx (RETACRIT) Injectable 3000 Unit(s) IV Push <User Schedule>  folic acid 1 milliGRAM(s) Oral daily  hydrALAZINE 50 milliGRAM(s) Oral every 8 hours  isosorbide   mononitrate ER Tablet (IMDUR) 30 milliGRAM(s) Oral daily  lactulose Syrup 30 Gram(s) Oral at bedtime  metoprolol tartrate 12.5 milliGRAM(s) Oral two times a day  pantoprazole    Tablet 40 milliGRAM(s) Oral before breakfast  senna 2 Tablet(s) Oral at bedtime  sevelamer carbonate 1600 milliGRAM(s) Oral three times a day      Allergies    No Known Allergies          PAST MEDICAL & SURGICAL HISTORY:  ESRD (end stage renal disease) on dialysis    Hypertension    Anemia    Cerebrovascular accident (CVA), unspecified mechanism    Paroxysmal atrial fibrillation    CAD (coronary artery disease)    A-V fistula             REVIEW OF SYSTEMS:  CONSTITUTIONAL: No fever, weight loss, or fatigue   EYES: No eye pain, visual disturbances, or discharge  ENT:  No difficulty hearing, tinnitus, vertigo; No sinus or throat pain  NECK: No pain or stiffness or nodes  RESPIRATORY:  cough--   wheezing--   chills--   hemoptysis--  Shortness of Breath--  CARDIOVASCULAR: No chest pain, palpitations, passing out, dizziness, or leg swelling  GASTROINTESTINAL: No abdominal or epigastric pain. No nausea, vomiting, hezia.  GENITOURINARY: No dysuria, frequency, hematuria, or incontinence  NEUROLOGICAL: No headaches, memory loss, loss of strength, numbness, or tremors  SKIN: No itching, burning, rashes, or lesions   LYMPH Nodes: No enlarged glands  ENDOCRINE: No heat or cold intolerance; No hair loss  ALLERGY AND IMMUNOLOGIC: No hives or eczema        Vital Signs Last 24 Hrs  T(C): 36.7 (02 Oct 2021 15:09), Max: 37.2 (01 Oct 2021 19:17)  T(F): 98 (02 Oct 2021 15:09), Max: 98.9 (01 Oct 2021 19:17)  HR: 63 (02 Oct 2021 15:09) (63 - 78)  BP: 147/54 (02 Oct 2021 15:09) (133/67 - 153/41)  BP(mean): --  RR: 18 (02 Oct 2021 15:09) (17 - 20)  SpO2: 98% (02 Oct 2021 15:09) (96% - 98%)  I&O's Detail    01 Oct 2021 07:01  -  02 Oct 2021 07:00  --------------------------------------------------------  IN:    Other (mL): 600 mL  Total IN: 600 mL    OUT:    Other (mL): 2600 mL  Total OUT: 2600 mL    Total NET: -2000 mL          PHYSICAL EXAMINATION:    GENERAL: The patient is a well-developed, obese b/f in no apparent distress.   SKIN no rash ecchymoses or bruises  HEENT: Head is normocephalic and atraumatic  VIVIANA , Extraocular muscles are intact.   Neck supple ,No LN felt JVP not increased  Thyroid not enlarged  Cardiovascular:  S1 S2 heard, RSR, No JVD , systolic  murmur at apex, No gallop or rub  Respiratory: Chest wall symmetrical with good air entry ,Percussion note normal,    Lungs vesicular breathing with fine    rales  or  wheeze	  ABDOMEN:  Soft, Non-tender,  no hepatomegaly or splenomegaly BS positive	  Extremities: Normal range of motion, No clubbing, cyanosis or edema  Vascular: Peripheral pulses palpable 2+ bilaterally  CNS:  Alert and oriented x3   Cranial nerves intact  sensory intact  motor power5/5  dtr 2+   Babinski neg    LABS:                        9.8    4.29  )-----------( 151      ( 01 Oct 2021 10:59 )             30.2     10-01    140  |  98  |  55<H>  ----------------------------<  83  4.4   |  27  |  9.42<H>    Ca    9.3      01 Oct 2021 10:59  Phos  5.2     10-01  Mg     2.3     10-01

## 2021-10-02 NOTE — PROGRESS NOTE ADULT - PROBLEM SELECTOR PLAN 1
- On admission, patients /77  most likely 2/2 medication non compliance vs fluid overload  - Echo showed severe aortic stenosis, mild mitral stenosis  - c/w Hydralazine 50 q8, imdur 30 qd, lopressor decreased to lopressor 12.5 bid  - Repeat BP  - C/w current meds

## 2021-10-02 NOTE — PROGRESS NOTE ADULT - PROBLEM SELECTOR PLAN 2
Echo shows severe aortic stenosis, with EF of 10-15%  Possible SSS, c/w tele, however patient refusing ICD Echo shows severe aortic stenosis, GIIDD, and PH, but with normal EF  Possible SSS, c/w tele, however patient refusing ICD

## 2021-10-03 RX ADMIN — SEVELAMER CARBONATE 1600 MILLIGRAM(S): 2400 POWDER, FOR SUSPENSION ORAL at 05:39

## 2021-10-03 RX ADMIN — Medication 81 MILLIGRAM(S): at 12:00

## 2021-10-03 RX ADMIN — Medication 1 MILLIGRAM(S): at 12:00

## 2021-10-03 RX ADMIN — Medication 50 MILLIGRAM(S): at 05:40

## 2021-10-03 RX ADMIN — APIXABAN 2.5 MILLIGRAM(S): 2.5 TABLET, FILM COATED ORAL at 05:38

## 2021-10-03 RX ADMIN — ISOSORBIDE MONONITRATE 30 MILLIGRAM(S): 60 TABLET, EXTENDED RELEASE ORAL at 12:00

## 2021-10-03 RX ADMIN — Medication 0.6 MILLIGRAM(S): at 05:38

## 2021-10-03 RX ADMIN — CHLORHEXIDINE GLUCONATE 1 APPLICATION(S): 213 SOLUTION TOPICAL at 05:39

## 2021-10-03 RX ADMIN — SEVELAMER CARBONATE 1600 MILLIGRAM(S): 2400 POWDER, FOR SUSPENSION ORAL at 14:01

## 2021-10-03 RX ADMIN — Medication 0.6 MILLIGRAM(S): at 17:05

## 2021-10-03 RX ADMIN — Medication 50 MILLIGRAM(S): at 14:01

## 2021-10-03 RX ADMIN — ATORVASTATIN CALCIUM 80 MILLIGRAM(S): 80 TABLET, FILM COATED ORAL at 21:49

## 2021-10-03 RX ADMIN — DRONEDARONE 400 MILLIGRAM(S): 400 TABLET, FILM COATED ORAL at 17:05

## 2021-10-03 RX ADMIN — Medication 50 MILLIGRAM(S): at 21:49

## 2021-10-03 RX ADMIN — Medication 12.5 MILLIGRAM(S): at 17:05

## 2021-10-03 RX ADMIN — Medication 12.5 MILLIGRAM(S): at 05:40

## 2021-10-03 RX ADMIN — DRONEDARONE 400 MILLIGRAM(S): 400 TABLET, FILM COATED ORAL at 05:37

## 2021-10-03 RX ADMIN — PANTOPRAZOLE SODIUM 40 MILLIGRAM(S): 20 TABLET, DELAYED RELEASE ORAL at 05:37

## 2021-10-03 NOTE — PROGRESS NOTE ADULT - SUBJECTIVE AND OBJECTIVE BOX
CHIEF COMPLAINT:Patient is a 76y old  Male who presents with a chief complaint of HYPERTENSIVE EMERGENCY.Pt appears comfortable.    	  REVIEW OF SYSTEMS:  CONSTITUTIONAL: No fever, weight loss, or fatigue  EYES: No eye pain, visual disturbances, or discharge  ENT:  No difficulty hearing, tinnitus, vertigo; No sinus or throat pain  NECK: No pain or stiffness  RESPIRATORY: No cough, wheezing, chills or hemoptysis; No Shortness of Breath  CARDIOVASCULAR: No chest pain, palpitations, passing out, dizziness, or leg swelling  GASTROINTESTINAL: No abdominal or epigastric pain. No nausea, vomiting, or hematemesis; No diarrhea or constipation. No melena or hematochezia.  GENITOURINARY: No dysuria, frequency, hematuria, or incontinence  NEUROLOGICAL: No headaches, memory loss, loss of strength, numbness, or tremors  SKIN: No itching, burning, rashes, or lesions   LYMPH Nodes: No enlarged glands  ENDOCRINE: No heat or cold intolerance; No hair loss  MUSCULOSKELETAL: No joint pain or swelling; No muscle, back, or extremity pain  PSYCHIATRIC: No depression, anxiety, mood swings, or difficulty sleeping  HEME/LYMPH: No easy bruising, or bleeding gums  ALLERGY AND IMMUNOLOGIC: No hives or eczema	        PHYSICAL EXAM:  T(C): 36.7 (10-03-21 @ 07:33), Max: 37.2 (10-02-21 @ 20:48)  HR: 60 (10-03-21 @ 07:33) (60 - 64)  BP: 135/67 (10-03-21 @ 07:33) (135/67 - 160/56)  RR: 20 (10-03-21 @ 07:33) (18 - 20)  SpO2: 98% (10-03-21 @ 07:33) (97% - 100%)  Wt(kg): --  I&O's Summary    03 Oct 2021 07:01  -  03 Oct 2021 09:44  --------------------------------------------------------  IN: 220 mL / OUT: 0 mL / NET: 220 mL        Appearance: Normal	  HEENT:   Normal oral mucosa, PERRL, EOMI	  Lymphatic: No lymphadenopathy  Cardiovascular: Normal S1 S2, No JVD, No murmurs, No edema  Respiratory: Lungs clear to auscultation	  Psychiatry: A & O x 3, Mood & affect appropriate  Gastrointestinal:  Soft, Non-tender, + BS	  Skin: No rashes, No ecchymoses, No cyanosis	  Neurologic: Non-focal  Extremities: Normal range of motion, No clubbing, cyanosis or edema  Vascular: Peripheral pulses palpable 2+ bilaterally    MEDICATIONS  (STANDING):  apixaban 2.5 milliGRAM(s) Oral two times a day  aspirin  chewable 81 milliGRAM(s) Oral daily  atorvastatin 80 milliGRAM(s) Oral at bedtime  chlorhexidine 2% Cloths 1 Application(s) Topical <User Schedule>  colchicine 0.6 milliGRAM(s) Oral two times a day  dronedarone 400 milliGRAM(s) Oral two times a day  epoetin ximena-epbx (RETACRIT) Injectable 3000 Unit(s) IV Push <User Schedule>  folic acid 1 milliGRAM(s) Oral daily  hydrALAZINE 50 milliGRAM(s) Oral every 8 hours  isosorbide   mononitrate ER Tablet (IMDUR) 30 milliGRAM(s) Oral daily  lactulose Syrup 30 Gram(s) Oral at bedtime  metoprolol tartrate 12.5 milliGRAM(s) Oral two times a day  pantoprazole    Tablet 40 milliGRAM(s) Oral before breakfast  senna 2 Tablet(s) Oral at bedtime  sevelamer carbonate 1600 milliGRAM(s) Oral three times a day      TELEMETRY: nsr,pac's,pvc's,aivr	    	  	  LABS:	 	                        9.8    4.29  )-----------( 151      ( 01 Oct 2021 10:59 )             30.2     10-01    140  |  98  |  55<H>  ----------------------------<  83  4.4   |  27  |  9.42<H>    Ca    9.3      01 Oct 2021 10:59  Phos  5.2     10-01  Mg     2.3     10-01      proBNP: Serum Pro-Brain Natriuretic Peptide: 51660 pg/mL (09-27 @ 10:20)    Lipid Profile: Cholesterol 163  LDL --  HDL 55  TG 77      TSH: Thyroid Stimulating Hormone, Serum: 1.51 uU/mL (09-28 @ 07:45)      	  Transthoracic Echocardiogram (09.28.21 @ 08:26) >  OBSERVATIONS:  Mitral Valve: Mitral annular calcification, and calcified  mitral leaflets with reduced diastolic opening. Mean  transmitral valve gradient equals 2.6 mm Hg (at a heart  rate of 68 BPM), consistent with mild mitral stenosis.  Aortic Root: Normal aortic root.  Aortic Valve: Calcified  aortic valve with decreased  opening. Peak transaortic valve gradient equals 52 mm Hg,  mean transaortic valve gradient equals 28 mm Hg, estimated  aortic valve area equals 1 sqcm (by continuity equation),  consistent with severe aortic stenosis. Mild aortic  regurgitation.  Left Atrium: Mildly dilated left atrium.  LA volume index =  36 cc/m2.  Left Ventricle: Endocardium not well visualized; grossly  normal left ventricular systolic function. Moderate  concentric left ventricular hypertrophy. Grade II diastolic  dysfunction (moderate).  Right Heart: Normal right atrium. Right ventricle not well  visualized.   Probably normal right ventricular size and  systolic function. There is mild-moderate tricuspid  regurgitation. There is mild pulmonic regurgitation.  Pericardium/PleuraNormal pericardium with no pericardial  effusion.  Hemodynamic: RA Pressure is 8 mm Hg. RV systolicpressure  is 69 mm Hg. Severe pulmonary hypertension.

## 2021-10-03 NOTE — PROGRESS NOTE ADULT - SUBJECTIVE AND OBJECTIVE BOX
Problem List:  ESRD      PAST MEDICAL & SURGICAL HISTORY:  ESRD (end stage renal disease) on dialysis    Hypertension    Anemia    Cerebrovascular accident (CVA), unspecified mechanism    Paroxysmal atrial fibrillation    CAD (coronary artery disease)    A-V fistula        No Known Allergies      MEDICATIONS  (STANDING):  apixaban 2.5 milliGRAM(s) Oral two times a day  aspirin  chewable 81 milliGRAM(s) Oral daily  atorvastatin 80 milliGRAM(s) Oral at bedtime  chlorhexidine 2% Cloths 1 Application(s) Topical <User Schedule>  colchicine 0.6 milliGRAM(s) Oral two times a day  dronedarone 400 milliGRAM(s) Oral two times a day  epoetin ximena-epbx (RETACRIT) Injectable 3000 Unit(s) IV Push <User Schedule>  folic acid 1 milliGRAM(s) Oral daily  hydrALAZINE 50 milliGRAM(s) Oral every 8 hours  isosorbide   mononitrate ER Tablet (IMDUR) 30 milliGRAM(s) Oral daily  lactulose Syrup 30 Gram(s) Oral at bedtime  metoprolol tartrate 12.5 milliGRAM(s) Oral two times a day  pantoprazole    Tablet 40 milliGRAM(s) Oral before breakfast  senna 2 Tablet(s) Oral at bedtime  sevelamer carbonate 1600 milliGRAM(s) Oral three times a day    MEDICATIONS  (PRN):  acetaminophen   Tablet .. 650 milliGRAM(s) Oral every 6 hours PRN Temp greater or equal to 38C (100.4F), Mild Pain (1 - 3)                      REVIEW OF SYSTEMS:  General: no fever no chillS.  RESPIRATORY: No cough, wheezing, hemoptysis; No shortness of breath  CARDIOVASCULAR: No chest pain or palpitations. No Edema  GASTROINTESTINAL: No abdominal or epigastric pain. No nausea, vomiting. No diarrhea or constipation. No melena.  GENITOURINARY: REDUCED URINE OUTPUT        VITALS:  T(F): 98.2 (10-03-21 @ 11:23), Max: 98.9 (10-02-21 @ 20:48)  HR: 61 (10-03-21 @ 11:23)  BP: 144/64 (10-03-21 @ 11:23)  RR: 18 (10-03-21 @ 11:23)  SpO2: 97% (10-03-21 @ 11:23)  Wt(kg): --    10-03 @ 07:01  -  10-03 @ 14:25  --------------------------------------------------------  IN: 450 mL / OUT: 0 mL / NET: 450 mL        PHYSICAL EXAM:  Constitutional: well developed, no diaphoresis, no distress.  Neck: No JVD, no carotid bruit, supple, no adenopathy  Respiratory:  air entrance B/L, no wheezes, rales or rhonchi  Cardiovascular: S1, S2, RRR, no pericardial rub, systolic  murmur 2/6 at base  Abdomen: BS+, soft, no tenderness, no bruit  Pelvis: bladder nondistended  Extremities: No edema  Psychiatric: Normal,  Vascular Acces: Right AVG with bruit and thrill

## 2021-10-03 NOTE — PROGRESS NOTE ADULT - PROBLEM SELECTOR PLAN 2
Echo shows severe aortic stenosis, GIIDD, and PH, but with normal EF  Possible SSS, c/w tele, however patient refusing ICD

## 2021-10-03 NOTE — PROGRESS NOTE ADULT - SUBJECTIVE AND OBJECTIVE BOX
pt seen and examined    REVIEW OF SYSTEMS:  CONSTITUTIONAL: No fever, weight loss, or fatigue  RESPIRATORY: No cough, wheezing, chills or hemoptysis; No shortness of breath  CARDIOVASCULAR: No chest pain, palpitations, dizziness, or leg swelling  GASTROINTESTINAL: No abdominal pain. No nausea, vomiting, or hematemesis; No diarrhea or constipation. No melena or hematochezia.  GENITOURINARY: No dysuria or hematuria, urinary frequency  NEUROLOGICAL: No headaches, memory loss, loss of strength, numbness, or tremors  SKIN: No itching, burning, rashes, or lesions     MEDICATIONS  (STANDING):  apixaban 2.5 milliGRAM(s) Oral two times a day  aspirin  chewable 81 milliGRAM(s) Oral daily  atorvastatin 80 milliGRAM(s) Oral at bedtime  chlorhexidine 2% Cloths 1 Application(s) Topical <User Schedule>  colchicine 0.6 milliGRAM(s) Oral two times a day  dronedarone 400 milliGRAM(s) Oral two times a day  epoetin ximena-epbx (RETACRIT) Injectable 3000 Unit(s) IV Push <User Schedule>  folic acid 1 milliGRAM(s) Oral daily  hydrALAZINE 50 milliGRAM(s) Oral every 8 hours  isosorbide   mononitrate ER Tablet (IMDUR) 30 milliGRAM(s) Oral daily  lactulose Syrup 30 Gram(s) Oral at bedtime  metoprolol tartrate 12.5 milliGRAM(s) Oral two times a day  pantoprazole    Tablet 40 milliGRAM(s) Oral before breakfast  senna 2 Tablet(s) Oral at bedtime  sevelamer carbonate 1600 milliGRAM(s) Oral three times a day    MEDICATIONS  (PRN):  acetaminophen   Tablet .. 650 milliGRAM(s) Oral every 6 hours PRN Temp greater or equal to 38C (100.4F), Mild Pain (1 - 3)      Vital Signs Last 24 Hrs  T(C): 36.4 (10-03-21 @ 19:10), Max: 36.8 (10-03-21 @ 11:23)  T(F): 97.5 (10-03-21 @ 19:10), Max: 98.2 (10-03-21 @ 11:23)  HR: 62 (10-03-21 @ 19:10) (59 - 64)  BP: 124/42 (10-03-21 @ 19:10) (120/40 - 154/51)  BP(mean): --  RR: 18 (10-03-21 @ 19:10) (18 - 20)  SpO2: 98% (10-03-21 @ 19:10) (97% - 100%)    PHYSICAL EXAMINATION:  GENERAL: NAD, AAOx3  HEAD: AT/NC  EYES: conjunctiva and sclera clear  NECK: supple, No JVD noted  CHEST/LUNG: CTABL; no rales, rhonchi, wheezing, or rubs  HEART: irregular rate and rhythm; blowing systolic murmur  ABDOMEN: soft, nontender, nondistended; Bowel sounds present  EXTREMITIES:  2+ Peripheral Pulses, No clubbing, cyanosis, or edema  SKIN: warm dry        LABS:        Creatinine Trend: 9.42 <--, 10.50 <--, 7.92 <--, 6.69 <--, 10.80 <--    Urine Studies:                    COVID-19 PCR: Maikol (27 Sep 2021 10:55)  SARS-CoV-2: Jaytegeraldo (07 Jun 2021 04:21)      CAPILLARY BLOOD GLUCOSE          RADIOLOGY & ADDITIONAL TESTS:

## 2021-10-04 LAB
ANION GAP SERPL CALC-SCNC: 15 MMOL/L — SIGNIFICANT CHANGE UP (ref 5–17)
BUN SERPL-MCNC: 82 MG/DL — HIGH (ref 7–18)
CALCIUM SERPL-MCNC: 9.3 MG/DL — SIGNIFICANT CHANGE UP (ref 8.4–10.5)
CHLORIDE SERPL-SCNC: 97 MMOL/L — SIGNIFICANT CHANGE UP (ref 96–108)
CO2 SERPL-SCNC: 26 MMOL/L — SIGNIFICANT CHANGE UP (ref 22–31)
CREAT SERPL-MCNC: 11.5 MG/DL — HIGH (ref 0.5–1.3)
GLUCOSE SERPL-MCNC: 101 MG/DL — HIGH (ref 70–99)
HCT VFR BLD CALC: 28.8 % — LOW (ref 39–50)
HGB BLD-MCNC: 9.8 G/DL — LOW (ref 13–17)
MAGNESIUM SERPL-MCNC: 2 MG/DL — SIGNIFICANT CHANGE UP (ref 1.6–2.6)
MCHC RBC-ENTMCNC: 32.9 PG — SIGNIFICANT CHANGE UP (ref 27–34)
MCHC RBC-ENTMCNC: 34 GM/DL — SIGNIFICANT CHANGE UP (ref 32–36)
MCV RBC AUTO: 96.6 FL — SIGNIFICANT CHANGE UP (ref 80–100)
NRBC # BLD: 0 /100 WBCS — SIGNIFICANT CHANGE UP (ref 0–0)
PHOSPHATE SERPL-MCNC: 4.3 MG/DL — SIGNIFICANT CHANGE UP (ref 2.5–4.5)
PLATELET # BLD AUTO: 179 K/UL — SIGNIFICANT CHANGE UP (ref 150–400)
POTASSIUM SERPL-MCNC: 4 MMOL/L — SIGNIFICANT CHANGE UP (ref 3.5–5.3)
POTASSIUM SERPL-SCNC: 4 MMOL/L — SIGNIFICANT CHANGE UP (ref 3.5–5.3)
RBC # BLD: 2.98 M/UL — LOW (ref 4.2–5.8)
RBC # FLD: 14.5 % — SIGNIFICANT CHANGE UP (ref 10.3–14.5)
SARS-COV-2 RNA SPEC QL NAA+PROBE: SIGNIFICANT CHANGE UP
SODIUM SERPL-SCNC: 138 MMOL/L — SIGNIFICANT CHANGE UP (ref 135–145)
WBC # BLD: 4.51 K/UL — SIGNIFICANT CHANGE UP (ref 3.8–10.5)
WBC # FLD AUTO: 4.51 K/UL — SIGNIFICANT CHANGE UP (ref 3.8–10.5)

## 2021-10-04 RX ORDER — LANOLIN ALCOHOL/MO/W.PET/CERES
5 CREAM (GRAM) TOPICAL AT BEDTIME
Refills: 0 | Status: DISCONTINUED | OUTPATIENT
Start: 2021-10-04 | End: 2021-10-04

## 2021-10-04 RX ADMIN — DRONEDARONE 400 MILLIGRAM(S): 400 TABLET, FILM COATED ORAL at 06:03

## 2021-10-04 RX ADMIN — Medication 81 MILLIGRAM(S): at 11:50

## 2021-10-04 RX ADMIN — Medication 50 MILLIGRAM(S): at 06:04

## 2021-10-04 RX ADMIN — Medication 1 MILLIGRAM(S): at 11:50

## 2021-10-04 RX ADMIN — Medication 0.6 MILLIGRAM(S): at 17:08

## 2021-10-04 RX ADMIN — Medication 12.5 MILLIGRAM(S): at 17:09

## 2021-10-04 RX ADMIN — Medication 12.5 MILLIGRAM(S): at 06:06

## 2021-10-04 RX ADMIN — DRONEDARONE 400 MILLIGRAM(S): 400 TABLET, FILM COATED ORAL at 17:08

## 2021-10-04 RX ADMIN — SEVELAMER CARBONATE 1600 MILLIGRAM(S): 2400 POWDER, FOR SUSPENSION ORAL at 06:04

## 2021-10-04 RX ADMIN — ATORVASTATIN CALCIUM 80 MILLIGRAM(S): 80 TABLET, FILM COATED ORAL at 22:49

## 2021-10-04 RX ADMIN — APIXABAN 2.5 MILLIGRAM(S): 2.5 TABLET, FILM COATED ORAL at 06:04

## 2021-10-04 RX ADMIN — PANTOPRAZOLE SODIUM 40 MILLIGRAM(S): 20 TABLET, DELAYED RELEASE ORAL at 06:06

## 2021-10-04 RX ADMIN — ISOSORBIDE MONONITRATE 30 MILLIGRAM(S): 60 TABLET, EXTENDED RELEASE ORAL at 11:50

## 2021-10-04 RX ADMIN — Medication 0.6 MILLIGRAM(S): at 06:03

## 2021-10-04 RX ADMIN — CHLORHEXIDINE GLUCONATE 1 APPLICATION(S): 213 SOLUTION TOPICAL at 06:04

## 2021-10-04 RX ADMIN — APIXABAN 2.5 MILLIGRAM(S): 2.5 TABLET, FILM COATED ORAL at 17:08

## 2021-10-04 RX ADMIN — ERYTHROPOIETIN 3000 UNIT(S): 10000 INJECTION, SOLUTION INTRAVENOUS; SUBCUTANEOUS at 14:44

## 2021-10-04 NOTE — PROGRESS NOTE ADULT - PROBLEM SELECTOR PROBLEM 7
Anemia
Elevated alkaline phosphatase level
Anemia
Elevated alkaline phosphatase level
Anemia

## 2021-10-04 NOTE — PROGRESS NOTE ADULT - PROBLEM SELECTOR PLAN 1
- On admission, patients /77  most likely 2/2 medication non compliance vs fluid overload  - Echo showed severe aortic stenosis, mild mitral stenosis  - Started on Hydralazine 50 q8, imdur 30 qd, lopressor decreased to lopressor 12.5 bid  -d/c hydralazine today d/t soft BP  -continue monitoring BP

## 2021-10-04 NOTE — PROGRESS NOTE ADULT - PROBLEM SELECTOR PLAN 9
IMPROVE VTE Individual Risk Assessment: currently on Apixaban 2.5 mg po BID  RISK                                                                Points  [  ] Previous VTE                                                  3  [  ] Thrombophilia                                               2  [  ] Lower limb paralysis                                      2        (unable to hold up >15 seconds)    [  ] Current Cancer                                              2         (within 6 months)  [ x ] Immobilization > 24 hrs                                1  [  ] ICU/CCU stay > 24 hours                              1  [  ] Age > 60                                                      1  IMPROVE VTE Score _________    Famotidine for GI prophylaxis  C/w Eliquis full AC
IMPROVE VTE Individual Risk Assessment  RISK                                                                Points  [  ] Previous VTE                                                  3  [  ] Thrombophilia                                               2  [  ] Lower limb paralysis                                      2        (unable to hold up >15 seconds)    [  ] Current Cancer                                              2         (within 6 months)  [  ] Immobilization > 24 hrs                                1  [  ] ICU/CCU stay > 24 hours                              1  [  ] Age > 60                                                      1  IMPROVE VTE Score _________    Famotidine for GI prophylaxis  C/w Eliquis full AC
IMPROVE VTE Individual Risk Assessment  RISK                                                                Points  [  ] Previous VTE                                                  3  [  ] Thrombophilia                                               2  [  ] Lower limb paralysis                                      2        (unable to hold up >15 seconds)    [  ] Current Cancer                                              2         (within 6 months)  [  ] Immobilization > 24 hrs                                1  [  ] ICU/CCU stay > 24 hours                              1  [  ] Age > 60                                                      1  IMPROVE VTE Score _________    Famotidine for GI prophylaxis  C/w Eliquis full AC

## 2021-10-04 NOTE — DIETITIAN INITIAL EVALUATION ADULT. - PERTINENT LABORATORY DATA
10-04 Na138 mmol/L Glu 101 mg/dL<H> K+ 4.0 mmol/L Cr  11.50 mg/dL<H> BUN 82 mg/dL<H> 10-04 Phos 4.3 mg/dL 09-28 Chol 163 mg/dL LDL --    HDL 55 mg/dL Trig 77 mg/dL

## 2021-10-04 NOTE — PROGRESS NOTE ADULT - SUBJECTIVE AND OBJECTIVE BOX
PULMONARY  progress note    DORSAINVIL, JEANCLAUDE  MRN-[id number]    Patient is a 76y old  Male who presents with a chief complaint of HYPERTENSIVE EMERGENCY (04 Oct 2021 09:48)  Feels good, no c/o pt, On H/D TIW      MEDICATIONS  (STANDING):  apixaban 2.5 milliGRAM(s) Oral two times a day  aspirin  chewable 81 milliGRAM(s) Oral daily  atorvastatin 80 milliGRAM(s) Oral at bedtime  chlorhexidine 2% Cloths 1 Application(s) Topical <User Schedule>  colchicine 0.6 milliGRAM(s) Oral two times a day  dronedarone 400 milliGRAM(s) Oral two times a day  epoetin ximena-epbx (RETACRIT) Injectable 3000 Unit(s) IV Push <User Schedule>  folic acid 1 milliGRAM(s) Oral daily  isosorbide   mononitrate ER Tablet (IMDUR) 30 milliGRAM(s) Oral daily  lactulose Syrup 30 Gram(s) Oral at bedtime  metoprolol tartrate 12.5 milliGRAM(s) Oral two times a day  pantoprazole    Tablet 40 milliGRAM(s) Oral before breakfast  senna 2 Tablet(s) Oral at bedtime  sevelamer carbonate 1600 milliGRAM(s) Oral three times a day      MEDICATIONS  (PRN):  acetaminophen   Tablet .. 650 milliGRAM(s) Oral every 6 hours PRN Temp greater or equal to 38C (100.4F), Mild Pain (1 - 3)      Allergies    No Known Allergies    Intolerances        PAST MEDICAL & SURGICAL HISTORY:  ESRD (end stage renal disease) on dialysis    Hypertension    Anemia    Cerebrovascular accident (CVA), unspecified mechanism    Paroxysmal atrial fibrillation    CAD (coronary artery disease)    A-V fistula             REVIEW OF SYSTEMS:  CONSTITUTIONAL: No fever, weight loss, or fatigue   EYES: No eye pain, visual disturbances, or discharge  ENT:  No difficulty hearing, tinnitus, vertigo; No sinus or throat pain  NECK: No pain or stiffness or nodes  RESPIRATORY:  cough --  wheezing --  chills --  hemoptysis -- Shortness of Breath--  CARDIOVASCULAR: No chest pain, palpitations, passing out, dizziness, or leg swelling  GASTROINTESTINAL: No abdominal or epigastric pain. No nausea, vomiting, or hematochezia.  GENITOURINARY: No dysuria, frequency, hematuria, or incontinence  NEUROLOGICAL: No headaches, memory loss, loss of strength, numbness, or tremors  SKIN: No itching, burning, rashes, or lesions   LYMPH Nodes: No enlarged glands  ENDOCRINE: No heat or cold intolerance; No hair loss  MUSCULOSKELETAL: No joint pain or swelling; No muscle, back, or extremity pain  PSYCHIATRIC: No depression, anxiety, mood swings, or difficulty sleeping  HEME/LYMPH: No easy bruising, or bleeding gums  ALLERGY AND IMMUNOLOGIC: No hives or eczema        Vital Signs Last 24 Hrs  T(C): 36.7 (04 Oct 2021 07:26), Max: 36.8 (03 Oct 2021 11:23)  T(F): 98.1 (04 Oct 2021 07:26), Max: 98.2 (03 Oct 2021 11:23)  HR: 61 (04 Oct 2021 07:26) (59 - 62)  BP: 104/57 (04 Oct 2021 07:26) (104/57 - 144/64)  BP(mean): --  RR: 18 (04 Oct 2021 07:26) (18 - 100)  SpO2: 98% (04 Oct 2021 07:26) (97% - 100%)  I&O's Detail    03 Oct 2021 07:01  -  04 Oct 2021 07:00  --------------------------------------------------------  IN:    Oral Fluid: 450 mL  Total IN: 450 mL    OUT:  Total OUT: 0 mL    Total NET: 450 mL      04 Oct 2021 07:01  -  04 Oct 2021 10:38  --------------------------------------------------------  IN:    Oral Fluid: 200 mL  Total IN: 200 mL    OUT:  Total OUT: 0 mL    Total NET: 200 mL          PHYSICAL EXAMINATION:    GENERAL: The patient is a well-developed, obese b/m in no apparent distress.   SKIN no rash ecchymoses or bruises  HEENT: Head is normocephalic and atraumatic  VIVIANA , Extraocular muscles are intact. Mucous membranes  moist.   Neck supple ,No LN felt JVP not increased  Thyroid not enlarged  Cardiovascular:  S1 S2 heard, RSR, No JVD , systolic  murmur at apex, No gallop or rub  Respiratory: Chest wall symmetrical with good air entry ,Percussion note normal,    Lungs vesicular breathing with no    rales  or  wheeze	  ABDOMEN:  Soft, Non-tender, obese, no hepatomegaly or splenomegaly BS positive	  Extremities: Normal range of motion, No clubbing, cyanosis or edema  Vascular: Peripheral pulses palpable 2+ bilaterally  CNS:  Alert and oriented x 2   Cranial nerves intact  sensory intact  motor power5/5  dtr 2+   Babinski neg

## 2021-10-04 NOTE — DIETITIAN INITIAL EVALUATION ADULT. - OTHER INFO
no information on wt loss available . No complaints of Nausea, vomiting or diarrhea. No problems with chewing or swallowing food . No food allergies. given DM history, suggest To change diet To carbohydrate consistent , DASH/TLC, renal replacement.

## 2021-10-04 NOTE — PROGRESS NOTE ADULT - PROBLEM SELECTOR PROBLEM 4
Hyperkalemia
CAD (coronary artery disease)

## 2021-10-04 NOTE — PROGRESS NOTE ADULT - PROBLEM SELECTOR PLAN 4
- Patient presented with hyperkalemia 6.5  - EKG showed NSR, no acute changes   - S/p Calcium gluconate 1 g x1  - BMP qd and post HD  - c.w tele
- Patient presented with hyperkalemia 6.5  - EKG showed NSR, no acute changes   - S/p Calcium gluconate 1 g x1  - BMP qd and post HD  - c.w tele
- Patient has hx of CAD on ASA, BB, Statin at home   - C/w home meds
- Patient presented with hyperkalemia 6.5  - EKG showed NSR, no acute changes   - S/p Calcium gluconate 1 g x1  - BMP qd and post HD  - c.w tele
- Patient has hx of CAD on ASA, BB, Statin at home   - C/w home meds

## 2021-10-04 NOTE — PROGRESS NOTE ADULT - PROBLEM SELECTOR PLAN 3
- ESRD Maintenance HD (M/W/F ) via right AVF   - Cr 10.8 on admission  - No crackles, pedal edema or fluid overload  - Avoid Nephrotoxic Meds/ Agents such as (NSAIDs, IV contrast, Aminoglycosides such as gentamicin, Gadolinium contrast, Phosphate containing enemas, etc..)  - Adjust Medications according to eGFR  - Renal diet  - Nephrology Dr Jacinto on board

## 2021-10-04 NOTE — PROGRESS NOTE ADULT - PROBLEM SELECTOR PLAN 7
- On admission, patient with hg 10.6 around baseline   - Patient denies dark black tarry stool, hx NSAIDs abuse, episodes of BRBPR or hematemesis  - Anemia is multifactorial - CHARITO and ACD  - Patient has no signs of hemorrhagic shock  or hemodynamic instability   - Type and Screen, IV access, Transfuse if Hg <7  - Monitor for any signs of active bleeding
- On admission, patient with hg 10.6 around baseline   - Patient denies dark black tarry stool, hx NSAIDs abuse, episodes of BRBPR or hematemesis  - Anemia is multifactorial - CHARITO and ACD  - Patient has no signs of hemorrhagic shock  or hemodynamic instability   - Type and Screen, IV access, Transfuse if Hg <7  - Monitor for any signs of active bleeding
- Patient noted to have elevated ALP, denies any abdominal pain at this time  - Monitor for now
- On admission, patient with hg 10.6 around baseline   - Patient denies dark black tarry stool, hx NSAIDs abuse, episodes of BRBPR or hematemesis  - Patient has no signs of hemorrhagic shock  or hemodynamic instability   - Type and Screen, IV access, Transfuse if Hg <7  - Monitor for any signs of active bleeding  -We'll repeat CBC tomorrow
Initial (On Arrival)

## 2021-10-04 NOTE — PROGRESS NOTE ADULT - PROBLEM SELECTOR PLAN 6
- On admission, patient with hg 10.6 around baseline   - Patient denies dark black tarry stool, hx NSAIDs abuse, episodes of BRBPR or hematemesis  - Anemia is multifactorial - CHARITO and ACD  - Patient has no signs of hemorrhagic shock  or hemodynamic instability   - Type and Screen, IV access, Transfuse if Hg <7  - Monitor for any signs of active bleeding
- Patient with hx of PAF on Eliquis, metoprolol at [facility] (as per patient sometimes he refuses to take AC among other medications)  -c/w metoprol  - Patient refusing eliquis
- Patient with hx of PAF on Eliquis, metoprolol at [facility] (as per patient sometimes he refuses to take AC among other medications)  -c/w metoprol  - Patient refusing eliquis
- On admission, patient with hg 10.6 around baseline   - Patient denies dark black tarry stool, hx NSAIDs abuse, episodes of BRBPR or hematemesis  - Anemia is multifactorial - CHARITO and ACD  - Patient has no signs of hemorrhagic shock  or hemodynamic instability   - Type and Screen, IV access, Transfuse if Hg <7  - Monitor for any signs of active bleeding
- Patient with hx of PAF on Eliquis, metoprolol at [facility] (as per patient sometimes he refuses to take AC among other medications)  -c/w metoprol  -c/w eliquis

## 2021-10-04 NOTE — DIETITIAN INITIAL EVALUATION ADULT. - PROBLEM SELECTOR PLAN 1
- On admission, patients /77  most likely 2/2 medication non compliance, HD due today, episode of PAF today causing sob?, sat well on RA, NAD  - Patient c/o sob for one episode at [facility]  - CXR unremarkable, ProBNP 20k around baseline    - Echo 01/2021 showed EF >55, GIDD  - Treated with Home meds in ED  - Target BP in the first hr <180/110 and < 160/110 in the next 23 hrs   - Avoid reduction in MAP more than 20% in the first hour and more than 15% in the next 23 hrs   - Monitor BP and adjust meds as needed  - F/u Echo

## 2021-10-04 NOTE — PROGRESS NOTE ADULT - PROBLEM SELECTOR PROBLEM 6
PAF (paroxysmal atrial fibrillation)
Anemia
PAF (paroxysmal atrial fibrillation)
Anemia
Anemia
PAF (paroxysmal atrial fibrillation)
Anemia

## 2021-10-04 NOTE — DIETITIAN INITIAL EVALUATION ADULT. - ORAL INTAKE PTA/DIET HISTORY
2-3g Na, 2gK, NCS, low fat, low chol, regular , thin liquids. 1200ml fluid /d. Reports adequate oral intake PTA

## 2021-10-04 NOTE — PROGRESS NOTE ADULT - PROBLEM SELECTOR PROBLEM 8
Elevated alkaline phosphatase level
Prophylactic measure
Elevated alkaline phosphatase level
Prophylactic measure
Elevated alkaline phosphatase level

## 2021-10-04 NOTE — PROGRESS NOTE ADULT - SUBJECTIVE AND OBJECTIVE BOX
PGY-1 Progress Note discussed with attending    PAGER #: [[phone]] TILL 5:00 PM  PLEASE CONTACT ON CALL TEAM:  - On Call Team (Please refer to Mary) FROM 5:00 PM - 8:30PM  - Nightfloat Team FROM 8:30 -7:30 AM    CHIEF COMPLAINT & BRIEF HOSPITAL COURSE:    INTERVAL HPI/OVERNIGHT EVENTS:       REVIEW OF SYSTEMS:  CONSTITUTIONAL: No fever, weight loss, or fatigue  RESPIRATORY: No cough, wheezing, chills or hemoptysis; No shortness of breath  CARDIOVASCULAR: No chest pain, palpitations, dizziness, or leg swelling  GASTROINTESTINAL: No abdominal pain. No nausea, vomiting, or hematemesis; No diarrhea or constipation. No melena or hematochezia.  GENITOURINARY: No dysuria or hematuria, urinary frequency  NEUROLOGICAL: No headaches, memory loss, loss of strength, numbness, or tremors  SKIN: No itching, burning, rashes, or lesions     Vital Signs Last 24 Hrs  T(C): 36.7 (04 Oct 2021 04:32), Max: 36.8 (03 Oct 2021 11:23)  T(F): 98 (04 Oct 2021 04:32), Max: 98.2 (03 Oct 2021 11:23)  HR: 60 (04 Oct 2021 04:32) (59 - 62)  BP: 133/56 (04 Oct 2021 04:32) (120/40 - 144/64)  BP(mean): --  RR: 100 (04 Oct 2021 04:32) (18 - 100)  SpO2: 99% (04 Oct 2021 04:32) (97% - 100%)    PHYSICAL EXAMINATION:  GENERAL: NAD, well built  HEAD:  Atraumatic, Normocephalic  EYES:  conjunctiva and sclera clear  NECK: Supple, No JVD, Normal thyroid  CHEST/LUNG: Clear to auscultation. Clear to percussion bilaterally; No rales, rhonchi, wheezing, or rubs  HEART: Regular rate and rhythm; No murmurs, rubs, or gallops  ABDOMEN: Soft, Nontender, Nondistended; Bowel sounds present  NERVOUS SYSTEM:  Alert & Oriented X3,    EXTREMITIES:  2+ Peripheral Pulses, No clubbing, cyanosis, or edema  SKIN: warm dry                      CAPILLARY BLOOD GLUCOSE      RADIOLOGY & ADDITIONAL TESTS:                   PGY-1 Progress Note discussed with attending    PAGER #: [[phone]] TILL 5:00 PM  PLEASE CONTACT ON CALL TEAM:  - On Call Team (Please refer to Mary) FROM 5:00 PM - 8:30PM  - Nightfloat Team FROM 8:30 -7:30 AM    INTERVAL HPI/OVERNIGHT EVENTS: no overnight events. Examined pt at bedside, no acute complaints at the time of evaluation      REVIEW OF SYSTEMS:  CONSTITUTIONAL: No fever, weight loss, or fatigue  RESPIRATORY: No cough, wheezing, chills or hemoptysis; No shortness of breath  CARDIOVASCULAR: No chest pain, palpitations, dizziness, or leg swelling  GASTROINTESTINAL: No abdominal pain. No nausea, vomiting, or hematemesis; No diarrhea or constipation. No melena or hematochezia.  GENITOURINARY: No dysuria or hematuria, urinary frequency  NEUROLOGICAL: No headaches, memory loss, loss of strength, numbness, or tremors  SKIN: No itching, burning, rashes, or lesions     Vital Signs Last 24 Hrs  T(C): 36.7 (04 Oct 2021 04:32), Max: 36.8 (03 Oct 2021 11:23)  T(F): 98 (04 Oct 2021 04:32), Max: 98.2 (03 Oct 2021 11:23)  HR: 60 (04 Oct 2021 04:32) (59 - 62)  BP: 133/56 (04 Oct 2021 04:32) (120/40 - 144/64)  BP(mean): --  RR: 100 (04 Oct 2021 04:32) (18 - 100)  SpO2: 99% (04 Oct 2021 04:32) (97% - 100%)    PHYSICAL EXAMINATION:  GENERAL: NAD, well built  HEAD:  Atraumatic, Normocephalic  EYES:  conjunctiva and sclera clear  NECK: Supple, No JVD, Normal thyroid  CHEST/LUNG: Clear to auscultation. Clear to percussion bilaterally; No rales, rhonchi, wheezing, or rubs  HEART: Regular rate and rhythm, systolic murmur, no rubs, or gallops  ABDOMEN: Soft, Nontender, Nondistended; Bowel sounds present  NERVOUS SYSTEM:  Alert & Oriented X3,    EXTREMITIES:  2+ Peripheral Pulses, No clubbing, cyanosis, or edema  SKIN: warm dry                      CAPILLARY BLOOD GLUCOSE      RADIOLOGY & ADDITIONAL TESTS:

## 2021-10-04 NOTE — DIETITIAN INITIAL EVALUATION ADULT. - PROBLEM SELECTOR PLAN 5
- Patient with hx of PAF on Eliquis, metoprolol at [facility] (as per patient sometimes he refuses to take AC among other medications)  - EKG showed NSR, no changes  - C/w home meds for now

## 2021-10-04 NOTE — PROGRESS NOTE ADULT - SUBJECTIVE AND OBJECTIVE BOX
Problem List:    PAST MEDICAL & SURGICAL HISTORY:  ESRD (end stage renal disease) on dialysis    Hypertension    Anemia    Cerebrovascular accident (CVA), unspecified mechanism    Paroxysmal atrial fibrillation    CAD (coronary artery disease)    A-V fistula        No Known Allergies      MEDICATIONS  (STANDING):  apixaban 2.5 milliGRAM(s) Oral two times a day  aspirin  chewable 81 milliGRAM(s) Oral daily  atorvastatin 80 milliGRAM(s) Oral at bedtime  chlorhexidine 2% Cloths 1 Application(s) Topical <User Schedule>  colchicine 0.6 milliGRAM(s) Oral two times a day  dronedarone 400 milliGRAM(s) Oral two times a day  epoetin ximena-epbx (RETACRIT) Injectable 3000 Unit(s) IV Push <User Schedule>  folic acid 1 milliGRAM(s) Oral daily  isosorbide   mononitrate ER Tablet (IMDUR) 30 milliGRAM(s) Oral daily  lactulose Syrup 30 Gram(s) Oral at bedtime  metoprolol tartrate 12.5 milliGRAM(s) Oral two times a day  pantoprazole    Tablet 40 milliGRAM(s) Oral before breakfast  senna 2 Tablet(s) Oral at bedtime  sevelamer carbonate 1600 milliGRAM(s) Oral three times a day    MEDICATIONS  (PRN):  acetaminophen   Tablet .. 650 milliGRAM(s) Oral every 6 hours PRN Temp greater or equal to 38C (100.4F), Mild Pain (1 - 3)                      REVIEW OF SYSTEMS:  General: no fever no chills, no weight loss.  EYES/ENT: No visual changes;  No vertigo, no headache.  NECK: No pain or stiffness  RESPIRATORY: No cough, wheezing, hemoptysis; No shortness of breath  CARDIOVASCULAR: No chest pain or palpitations. No Edema  GASTROINTESTINAL: No abdominal or epigastric pain. No nausea, vomiting. No diarrhea or constipation. No melena.  GENITOURINARY: No dysuria, frequency, foamy urine, urinary urgency, incontinence or hematuria  NEUROLOGICAL: No numbness or weakness, no tremor , no dizziness.   Muscle skeletal : no joint pain and no swelling of joints and limbs.  SKIN: No itching, burning, rashes.        VITALS:  T(F): 98.1 (10-04-21 @ 07:26), Max: 98.2 (10-03-21 @ 11:23)  HR: 61 (10-04-21 @ 07:26)  BP: 104/57 (10-04-21 @ 07:26)  RR: 18 (10-04-21 @ 07:26)  SpO2: 98% (10-04-21 @ 07:26)  Wt(kg): --    10-03 @ 07:01  -  10-04 @ 07:00  --------------------------------------------------------  IN: 450 mL / OUT: 0 mL / NET: 450 mL    10-04 @ 07:01  -  10-04 @ 09:49  --------------------------------------------------------  IN: 200 mL / OUT: 0 mL / NET: 200 mL        PHYSICAL EXAM:  Constitutional: well developed, no diaphoresis, no distress.  Neck: No JVD, no carotid bruit, supple, no adenopathy  Respiratory: Good air entrance B/L, no wheezes, rales or rhonchi  Cardiovascular: S1, S2, RRR, no pericardial rub, no murmur  Abdomen: BS+, soft, no tenderness, no bruit  Pelvis: bladder nondistended  Extremities: No cyanosis or clubbing. No peripheral edema.   Pulses: All present  Neurological: A/O x 3, no focal deficits  Psychiatric: Normal mood, normal affect  Skin: No rashes  Vascular Access:     Problem List:  ESRD      PAST MEDICAL & SURGICAL HISTORY:  ESRD (end stage renal disease) on dialysis    Hypertension    Anemia    Cerebrovascular accident (CVA), unspecified mechanism    Paroxysmal atrial fibrillation    CAD (coronary artery disease)    A-V fistula        No Known Allergies      MEDICATIONS  (STANDING):  apixaban 2.5 milliGRAM(s) Oral two times a day  aspirin  chewable 81 milliGRAM(s) Oral daily  atorvastatin 80 milliGRAM(s) Oral at bedtime  chlorhexidine 2% Cloths 1 Application(s) Topical <User Schedule>  colchicine 0.6 milliGRAM(s) Oral two times a day  dronedarone 400 milliGRAM(s) Oral two times a day  epoetin ximena-epbx (RETACRIT) Injectable 3000 Unit(s) IV Push <User Schedule>  folic acid 1 milliGRAM(s) Oral daily  isosorbide   mononitrate ER Tablet (IMDUR) 30 milliGRAM(s) Oral daily  lactulose Syrup 30 Gram(s) Oral at bedtime  metoprolol tartrate 12.5 milliGRAM(s) Oral two times a day  pantoprazole    Tablet 40 milliGRAM(s) Oral before breakfast  senna 2 Tablet(s) Oral at bedtime  sevelamer carbonate 1600 milliGRAM(s) Oral three times a day    MEDICATIONS  (PRN):  acetaminophen   Tablet .. 650 milliGRAM(s) Oral every 6 hours PRN Temp greater or equal to 38C (100.4F), Mild Pain (1 - 3)                      REVIEW OF SYSTEMS:  General: no fever no chills, no weight loss.    RESPIRATORY: No cough, wheezing, hemoptysis; No shortness of breath  CARDIOVASCULAR: No chest pain or palpitations. No Edema  GASTROINTESTINAL: No abdominal or epigastric pain. No nausea, vomiting. No diarrhea or constipation. No melena.  GENITOURINARY: No dysuria.            VITALS:  T(F): 98.1 (10-04-21 @ 07:26), Max: 98.2 (10-03-21 @ 11:23)  HR: 61 (10-04-21 @ 07:26)  BP: 104/57 (10-04-21 @ 07:26)  RR: 18 (10-04-21 @ 07:26)  SpO2: 98% (10-04-21 @ 07:26)  Wt(kg): --    10-03 @ 07:01  -  10-04 @ 07:00  --------------------------------------------------------  IN: 450 mL / OUT: 0 mL / NET: 450 mL    10-04 @ 07:01  -  10-04 @ 09:49  --------------------------------------------------------  IN: 200 mL / OUT: 0 mL / NET: 200 mL        PHYSICAL EXAM:  Constitutional: well developed, no diaphoresis, no distress.  Neck: No JVD, no carotid bruit, supple.  Respiratory:  air entrance B/L, no wheezes, rales or rhonchi  Cardiovascular: S1, S2, RRR, no pericardial rub, no murmur  Abdomen: BS+, soft, no tenderness, no bruit  Pelvis: bladder nondistended  Extremities: No cyanosis or clubbing. No peripheral edema.   =  Vascular Access: Right AVG with bruit and thrill

## 2021-10-04 NOTE — PROGRESS NOTE ADULT - SUBJECTIVE AND OBJECTIVE BOX
CHIEF COMPLAINT:Patient is a 76y old  Male who presents with a chief complaint of HYPERTENSIVE EMERGENCY.Pt appears comfortable.    	  REVIEW OF SYSTEMS:  CONSTITUTIONAL: No fever, weight loss, or fatigue  EYES: No eye pain, visual disturbances, or discharge  ENT:  No difficulty hearing, tinnitus, vertigo; No sinus or throat pain  NECK: No pain or stiffness  RESPIRATORY: No cough, wheezing, chills or hemoptysis; No Shortness of Breath  CARDIOVASCULAR: No chest pain, palpitations, passing out, dizziness, or leg swelling  GASTROINTESTINAL: No abdominal or epigastric pain. No nausea, vomiting, or hematemesis; No diarrhea or constipation. No melena or hematochezia.  GENITOURINARY: No dysuria, frequency, hematuria, or incontinence  NEUROLOGICAL: No headaches, memory loss, loss of strength, numbness, or tremors  SKIN: No itching, burning, rashes, or lesions   LYMPH Nodes: No enlarged glands  ENDOCRINE: No heat or cold intolerance; No hair loss  MUSCULOSKELETAL: No joint pain or swelling; No muscle, back, or extremity pain  PSYCHIATRIC: No depression, anxiety, mood swings, or difficulty sleeping  HEME/LYMPH: No easy bruising, or bleeding gums  ALLERGY AND IMMUNOLOGIC: No hives or eczema	        PHYSICAL EXAM:  T(C): 36.7 (10-04-21 @ 07:26), Max: 36.8 (10-03-21 @ 11:23)  HR: 61 (10-04-21 @ 07:26) (59 - 62)  BP: 104/57 (10-04-21 @ 07:26) (104/57 - 144/64)  RR: 18 (10-04-21 @ 07:26) (18 - 100)  SpO2: 98% (10-04-21 @ 07:26) (97% - 100%)  Wt(kg): --  I&O's Summary    03 Oct 2021 07:01  -  04 Oct 2021 07:00  --------------------------------------------------------  IN: 450 mL / OUT: 0 mL / NET: 450 mL    Tele-nsr 38-60's,pvc's    Appearance: Normal	  HEENT:   Normal oral mucosa, PERRL, EOMI	  Lymphatic: No lymphadenopathy  Cardiovascular: Normal S1 S2, No JVD, No murmurs, No edema  Respiratory: Lungs clear to auscultation	  Psychiatry: A & O x 3, Mood & affect appropriate  Gastrointestinal:  Soft, Non-tender, + BS	  Skin: No rashes, No ecchymoses, No cyanosis	  Neurologic: Non-focal  Extremities: Normal range of motion, No clubbing, cyanosis or edema  Vascular: Peripheral pulses palpable 2+ bilaterally    MEDICATIONS  (STANDING):  apixaban 2.5 milliGRAM(s) Oral two times a day  aspirin  chewable 81 milliGRAM(s) Oral daily  atorvastatin 80 milliGRAM(s) Oral at bedtime  chlorhexidine 2% Cloths 1 Application(s) Topical <User Schedule>  colchicine 0.6 milliGRAM(s) Oral two times a day  dronedarone 400 milliGRAM(s) Oral two times a day  epoetin ximena-epbx (RETACRIT) Injectable 3000 Unit(s) IV Push <User Schedule>  folic acid 1 milliGRAM(s) Oral daily  hydrALAZINE 50 milliGRAM(s) Oral every 8 hours  isosorbide   mononitrate ER Tablet (IMDUR) 30 milliGRAM(s) Oral daily  lactulose Syrup 30 Gram(s) Oral at bedtime  metoprolol tartrate 12.5 milliGRAM(s) Oral two times a day  pantoprazole    Tablet 40 milliGRAM(s) Oral before breakfast  senna 2 Tablet(s) Oral at bedtime  sevelamer carbonate 1600 milliGRAM(s) Oral three times a day      	  LABS:	 	      proBNP: Serum Pro-Brain Natriuretic Peptide: 79380 pg/mL (09-27 @ 10:20)    Lipid Profile: Cholesterol 163  LDL --  HDL 55  TG 77      TSH: Thyroid Stimulating Hormone, Serum: 1.51 uU/mL (09-28 @ 07:45)

## 2021-10-04 NOTE — PROGRESS NOTE ADULT - PROBLEM SELECTOR PROBLEM 5
PAF (paroxysmal atrial fibrillation)
CAD (coronary artery disease)
PAF (paroxysmal atrial fibrillation)
CAD (coronary artery disease)
PAF (paroxysmal atrial fibrillation)
CAD (coronary artery disease)
PAF (paroxysmal atrial fibrillation)

## 2021-10-04 NOTE — PROGRESS NOTE ADULT - PROBLEM SELECTOR PLAN 8
- Patient noted to have elevated ALP, denies any abdominal pain at this time  - Monitor for now
IMPROVE VTE Individual Risk Assessment  RISK                                                                Points  [  ] Previous VTE                                                  3  [  ] Thrombophilia                                               2  [  ] Lower limb paralysis                                      2        (unable to hold up >15 seconds)    [  ] Current Cancer                                              2         (within 6 months)  [  ] Immobilization > 24 hrs                                1  [  ] ICU/CCU stay > 24 hours                              1  [  ] Age > 60                                                      1  IMPROVE VTE Score _________    Famotidine for GI prophylaxis  C/w Eliquis full AC

## 2021-10-04 NOTE — PROGRESS NOTE ADULT - ATTENDING COMMENTS
discussed management plan with house staff

## 2021-10-05 ENCOUNTER — TRANSCRIPTION ENCOUNTER (OUTPATIENT)
Age: 76
End: 2021-10-05

## 2021-10-05 VITALS
OXYGEN SATURATION: 100 % | DIASTOLIC BLOOD PRESSURE: 50 MMHG | RESPIRATION RATE: 18 BRPM | TEMPERATURE: 98 F | HEART RATE: 65 BPM | SYSTOLIC BLOOD PRESSURE: 144 MMHG

## 2021-10-05 DIAGNOSIS — Z51.5 ENCOUNTER FOR PALLIATIVE CARE: ICD-10-CM

## 2021-10-05 DIAGNOSIS — R53.2 FUNCTIONAL QUADRIPLEGIA: ICD-10-CM

## 2021-10-05 DIAGNOSIS — F03.90 UNSPECIFIED DEMENTIA WITHOUT BEHAVIORAL DISTURBANCE: ICD-10-CM

## 2021-10-05 PROCEDURE — 97530 THERAPEUTIC ACTIVITIES: CPT

## 2021-10-05 PROCEDURE — 93005 ELECTROCARDIOGRAM TRACING: CPT

## 2021-10-05 PROCEDURE — 86769 SARS-COV-2 COVID-19 ANTIBODY: CPT

## 2021-10-05 PROCEDURE — 87340 HEPATITIS B SURFACE AG IA: CPT

## 2021-10-05 PROCEDURE — 85025 COMPLETE CBC W/AUTO DIFF WBC: CPT

## 2021-10-05 PROCEDURE — 99285 EMERGENCY DEPT VISIT HI MDM: CPT

## 2021-10-05 PROCEDURE — 80048 BASIC METABOLIC PNL TOTAL CA: CPT

## 2021-10-05 PROCEDURE — 83690 ASSAY OF LIPASE: CPT

## 2021-10-05 PROCEDURE — 85027 COMPLETE CBC AUTOMATED: CPT

## 2021-10-05 PROCEDURE — 36415 COLL VENOUS BLD VENIPUNCTURE: CPT

## 2021-10-05 PROCEDURE — 83735 ASSAY OF MAGNESIUM: CPT

## 2021-10-05 PROCEDURE — 83036 HEMOGLOBIN GLYCOSYLATED A1C: CPT

## 2021-10-05 PROCEDURE — 84100 ASSAY OF PHOSPHORUS: CPT

## 2021-10-05 PROCEDURE — 97161 PT EVAL LOW COMPLEX 20 MIN: CPT

## 2021-10-05 PROCEDURE — 80053 COMPREHEN METABOLIC PANEL: CPT

## 2021-10-05 PROCEDURE — 83880 ASSAY OF NATRIURETIC PEPTIDE: CPT

## 2021-10-05 PROCEDURE — 71045 X-RAY EXAM CHEST 1 VIEW: CPT

## 2021-10-05 PROCEDURE — 87635 SARS-COV-2 COVID-19 AMP PRB: CPT

## 2021-10-05 PROCEDURE — 80061 LIPID PANEL: CPT

## 2021-10-05 PROCEDURE — 99261: CPT

## 2021-10-05 PROCEDURE — 97110 THERAPEUTIC EXERCISES: CPT

## 2021-10-05 PROCEDURE — 93306 TTE W/DOPPLER COMPLETE: CPT

## 2021-10-05 PROCEDURE — 82803 BLOOD GASES ANY COMBINATION: CPT

## 2021-10-05 PROCEDURE — 84484 ASSAY OF TROPONIN QUANT: CPT

## 2021-10-05 PROCEDURE — 99223 1ST HOSP IP/OBS HIGH 75: CPT

## 2021-10-05 PROCEDURE — 84443 ASSAY THYROID STIM HORMONE: CPT

## 2021-10-05 RX ORDER — HYDRALAZINE HCL 50 MG
1 TABLET ORAL
Qty: 0 | Refills: 0 | DISCHARGE

## 2021-10-05 RX ADMIN — APIXABAN 2.5 MILLIGRAM(S): 2.5 TABLET, FILM COATED ORAL at 05:32

## 2021-10-05 RX ADMIN — Medication 81 MILLIGRAM(S): at 12:05

## 2021-10-05 RX ADMIN — SEVELAMER CARBONATE 1600 MILLIGRAM(S): 2400 POWDER, FOR SUSPENSION ORAL at 14:06

## 2021-10-05 RX ADMIN — Medication 1 MILLIGRAM(S): at 12:05

## 2021-10-05 RX ADMIN — SEVELAMER CARBONATE 1600 MILLIGRAM(S): 2400 POWDER, FOR SUSPENSION ORAL at 05:33

## 2021-10-05 RX ADMIN — DRONEDARONE 400 MILLIGRAM(S): 400 TABLET, FILM COATED ORAL at 05:32

## 2021-10-05 RX ADMIN — Medication 0.6 MILLIGRAM(S): at 05:32

## 2021-10-05 RX ADMIN — Medication 12.5 MILLIGRAM(S): at 05:32

## 2021-10-05 RX ADMIN — PANTOPRAZOLE SODIUM 40 MILLIGRAM(S): 20 TABLET, DELAYED RELEASE ORAL at 05:32

## 2021-10-05 RX ADMIN — CHLORHEXIDINE GLUCONATE 1 APPLICATION(S): 213 SOLUTION TOPICAL at 05:34

## 2021-10-05 NOTE — CONSULT NOTE ADULT - PROBLEM SELECTOR RECOMMENDATION 2
On HD (M-W-F).  Continue HD.  Nephrology following. Severe AS on echo, preserved EF, severe pulm HTN. PAF, on AC, multaq. SSS, patient refused PPM. Euvolemic at present.  Cardiology following.

## 2021-10-05 NOTE — PROGRESS NOTE ADULT - SUBJECTIVE AND OBJECTIVE BOX
PGY-1 Progress Note discussed with attending    PAGER #: [[phone]] TILL 5:00 PM  PLEASE CONTACT ON CALL TEAM:  - On Call Team (Please refer to Mary) FROM 5:00 PM - 8:30PM  - Nightfloat Team FROM 8:30 -7:30 AM    CHIEF COMPLAINT & BRIEF HOSPITAL COURSE:    INTERVAL HPI/OVERNIGHT EVENTS:       REVIEW OF SYSTEMS:  CONSTITUTIONAL: No fever, weight loss, or fatigue  RESPIRATORY: No cough, wheezing, chills or hemoptysis; No shortness of breath  CARDIOVASCULAR: No chest pain, palpitations, dizziness, or leg swelling  GASTROINTESTINAL: No abdominal pain. No nausea, vomiting, or hematemesis; No diarrhea or constipation. No melena or hematochezia.  GENITOURINARY: No dysuria or hematuria, urinary frequency  NEUROLOGICAL: No headaches, memory loss, loss of strength, numbness, or tremors  SKIN: No itching, burning, rashes, or lesions     Vital Signs Last 24 Hrs  T(C): 36.8 (05 Oct 2021 05:30), Max: 37.1 (04 Oct 2021 23:30)  T(F): 98.2 (05 Oct 2021 05:30), Max: 98.7 (04 Oct 2021 23:30)  HR: 61 (05 Oct 2021 05:30) (54 - 84)  BP: 126/49 (05 Oct 2021 05:30) (111/56 - 139/61)  BP(mean): --  RR: 18 (05 Oct 2021 05:30) (17 - 20)  SpO2: 95% (05 Oct 2021 05:30) (95% - 98%)    PHYSICAL EXAMINATION:  GENERAL: NAD, well built  HEAD:  Atraumatic, Normocephalic  EYES:  conjunctiva and sclera clear  NECK: Supple, No JVD, Normal thyroid  CHEST/LUNG: Clear to auscultation. Clear to percussion bilaterally; No rales, rhonchi, wheezing, or rubs  HEART: Regular rate and rhythm; No murmurs, rubs, or gallops  ABDOMEN: Soft, Nontender, Nondistended; Bowel sounds present  NERVOUS SYSTEM:  Alert & Oriented X3,    EXTREMITIES:  2+ Peripheral Pulses, No clubbing, cyanosis, or edema  SKIN: warm dry                          9.8    4.51  )-----------( 179      ( 04 Oct 2021 13:13 )             28.8     10-04    138  |  97  |  82<H>  ----------------------------<  101<H>  4.0   |  26  |  11.50<H>    Ca    9.3      04 Oct 2021 13:13  Phos  4.3     10-04  Mg     2.0     10-04                CAPILLARY BLOOD GLUCOSE      RADIOLOGY & ADDITIONAL TESTS:

## 2021-10-05 NOTE — CONSULT NOTE ADULT - ATTENDING COMMENTS
76 y M with multiple comorbidities incl CVA w R hemiparesis, bedbound, vascular dementia, ESRD on HD, HTN, resident at [facility] adm for SOB. Tolerating HD. Noted to have evidence of SSS, cardio following, patient refused PPM. Echo showed severe AS, severe pulm HTN, pEF. Pt w poor insight into condition. Daughter deferring to patient's sister Aleta who wishes to discuss GOC and code status with the rest of the family prior to making decisions. palliative will follow.

## 2021-10-05 NOTE — PROGRESS NOTE ADULT - SUBJECTIVE AND OBJECTIVE BOX
CHIEF COMPLAINT:Patient is a 76y old  Male who presents with a chief complaint of HYPERTENSIVE EMERGENCY.Pt appears comfortable.    	  REVIEW OF SYSTEMS:  CONSTITUTIONAL: No fever, weight loss, or fatigue  EYES: No eye pain, visual disturbances, or discharge  ENT:  No difficulty hearing, tinnitus, vertigo; No sinus or throat pain  NECK: No pain or stiffness  RESPIRATORY: No cough, wheezing, chills or hemoptysis; No Shortness of Breath  CARDIOVASCULAR: No chest pain, palpitations, passing out, dizziness, or leg swelling  GASTROINTESTINAL: No abdominal or epigastric pain. No nausea, vomiting, or hematemesis; No diarrhea or constipation. No melena or hematochezia.  GENITOURINARY: No dysuria, frequency, hematuria, or incontinence  NEUROLOGICAL: No headaches, memory loss, loss of strength, numbness, or tremors  SKIN: No itching, burning, rashes, or lesions   LYMPH Nodes: No enlarged glands  ENDOCRINE: No heat or cold intolerance; No hair loss  MUSCULOSKELETAL: No joint pain or swelling; No muscle, back, or extremity pain  PSYCHIATRIC: No depression, anxiety, mood swings, or difficulty sleeping  HEME/LYMPH: No easy bruising, or bleeding gums  ALLERGY AND IMMUNOLOGIC: No hives or eczema	      PHYSICAL EXAM:  T(C): 36.8 (10-05-21 @ 08:47), Max: 37.1 (10-04-21 @ 23:30)  HR: 61 (10-05-21 @ 08:47) (54 - 84)  BP: 126/68 (10-05-21 @ 08:47) (111/56 - 139/61)  RR: 18 (10-05-21 @ 08:47) (17 - 20)  SpO2: 99% (10-05-21 @ 08:47) (95% - 99%)    I&O's Summary    04 Oct 2021 07:01  -  05 Oct 2021 07:00  --------------------------------------------------------  IN: 900 mL / OUT: 2500 mL / NET: -1600 mL        Appearance: Normal	  HEENT:   Normal oral mucosa, PERRL, EOMI	  Lymphatic: No lymphadenopathy  Cardiovascular: Normal S1 S2, No JVD, No murmurs, No edema  Respiratory: Lungs clear to auscultation	  Psychiatry: A & O x 3, Mood & affect appropriate  Gastrointestinal:  Soft, Non-tender, + BS	  Skin: No rashes, No ecchymoses, No cyanosis	  Neurologic: Non-focal  Extremities: Normal range of motion, No clubbing, cyanosis or edema  Vascular: Peripheral pulses palpable 2+ bilaterally    MEDICATIONS  (STANDING):  apixaban 2.5 milliGRAM(s) Oral two times a day  aspirin  chewable 81 milliGRAM(s) Oral daily  atorvastatin 80 milliGRAM(s) Oral at bedtime  chlorhexidine 2% Cloths 1 Application(s) Topical <User Schedule>  colchicine 0.6 milliGRAM(s) Oral two times a day  dronedarone 400 milliGRAM(s) Oral two times a day  epoetin ximena-epbx (RETACRIT) Injectable 3000 Unit(s) IV Push <User Schedule>  folic acid 1 milliGRAM(s) Oral daily  isosorbide   mononitrate ER Tablet (IMDUR) 30 milliGRAM(s) Oral daily  lactulose Syrup 30 Gram(s) Oral at bedtime  metoprolol tartrate 12.5 milliGRAM(s) Oral two times a day  pantoprazole    Tablet 40 milliGRAM(s) Oral before breakfast  senna 2 Tablet(s) Oral at bedtime  sevelamer carbonate 1600 milliGRAM(s) Oral three times a day      TELEMETRY: 	 nsr,[facility]   	  	  LABS:	 	                       9.8    4.51  )-----------( 179      ( 04 Oct 2021 13:13 )             28.8     10-04    138  |  97  |  82<H>  ----------------------------<  101<H>  4.0   |  26  |  11.50<H>    Ca    9.3      04 Oct 2021 13:13  Phos  4.3     10-04  Mg     2.0     10-04      proBNP: Serum Pro-Brain Natriuretic Peptide: 68326 pg/mL (09-27 @ 10:20)    Lipid Profile: Cholesterol 163  HDL 55  TG 77      TSH: Thyroid Stimulating Hormone, Serum: 1.51 uU/mL (09-28 @ 07:45)

## 2021-10-05 NOTE — CONSULT NOTE ADULT - PROBLEM SELECTOR RECOMMENDATION 4
Please see discussion noted above in GOC conversation 2/2 CVA with residual right sided hemiparesis.  Bedbound. Dependent.  Skin care per protocol  Frequent positioning

## 2021-10-05 NOTE — CONSULT NOTE ADULT - PROBLEM SELECTOR RECOMMENDATION 9
Likely vascular dementia.  Pt is Aox2.  Lacks insight as to his clinical condition.    Explained the dementia trajectory and provided anticipatory guidance.    Pt remains a FULL CODE.    CT head: chronic ischemic changes again noted with volume loss Likely vascular dementia.  Pt is Aox2.  Lacks insight as to his clinical condition.    Explained the dementia trajectory and provided anticipatory guidance.    Pt remains a FULL CODE. Sister wishes to discuss w family    CT head: chronic ischemic changes again noted with volume loss

## 2021-10-05 NOTE — PROGRESS NOTE ADULT - SUBJECTIVE AND OBJECTIVE BOX
Problem List:  ESRD    PAST MEDICAL & SURGICAL HISTORY:  ESRD (end stage renal disease) on dialysis    Hypertension    Anemia    Cerebrovascular accident (CVA), unspecified mechanism    Paroxysmal atrial fibrillation    CAD (coronary artery disease)    A-V fistula        No Known Allergies      MEDICATIONS  (STANDING):  apixaban 2.5 milliGRAM(s) Oral two times a day  aspirin  chewable 81 milliGRAM(s) Oral daily  atorvastatin 80 milliGRAM(s) Oral at bedtime  chlorhexidine 2% Cloths 1 Application(s) Topical <User Schedule>  colchicine 0.6 milliGRAM(s) Oral two times a day  dronedarone 400 milliGRAM(s) Oral two times a day  epoetin ximena-epbx (RETACRIT) Injectable 3000 Unit(s) IV Push <User Schedule>  folic acid 1 milliGRAM(s) Oral daily  isosorbide   mononitrate ER Tablet (IMDUR) 30 milliGRAM(s) Oral daily  lactulose Syrup 30 Gram(s) Oral at bedtime  metoprolol tartrate 12.5 milliGRAM(s) Oral two times a day  pantoprazole    Tablet 40 milliGRAM(s) Oral before breakfast  senna 2 Tablet(s) Oral at bedtime  sevelamer carbonate 1600 milliGRAM(s) Oral three times a day    MEDICATIONS  (PRN):  acetaminophen   Tablet .. 650 milliGRAM(s) Oral every 6 hours PRN Temp greater or equal to 38C (100.4F), Mild Pain (1 - 3)                            9.8    4.51  )-----------( 179      ( 04 Oct 2021 13:13 )             28.8     10-04    138  |  97  |  82<H>  ----------------------------<  101<H>  4.0   |  26  |  11.50<H>    Ca    9.3      04 Oct 2021 13:13  Phos  4.3     10-04  Mg     2.0     10-04              REVIEW OF SYSTEMS:  General: no fever no chills, no weight loss.  EYES/ENT: No visual changes;  No vertigo, no headache.  NECK: No pain or stiffness  RESPIRATORY: No sob.  CARDIOVASCULAR: No chest pain or palpitations. No Edema  GASTROINTESTINAL: No abdominal or epigastric pain. No nausea, vomiting. No diarrhea or constipation.   GENITOURINARY: No dysuria, reduced urine output          VITALS:  T(F): 98.2 (10-05-21 @ 05:30), Max: 98.7 (10-04-21 @ 23:30)  HR: 61 (10-05-21 @ 05:30)  BP: 126/49 (10-05-21 @ 05:30)  RR: 18 (10-05-21 @ 05:30)  SpO2: 95% (10-05-21 @ 05:30)  Wt(kg): --    10-04 @ 07:01  -  10-05 @ 07:00  --------------------------------------------------------  IN: 900 mL / OUT: 2500 mL / NET: -1600 mL        PHYSICAL EXAM:  Constitutional: well developed, no diaphoresis, no distress.  Neck: No JVD, no carotid bruit, supple, no adenopathy  Respiratory: air entrance B/L, no wheezes, rales or rhonchi  Cardiovascular: S1, S2, RRR, no pericardial rub, no murmur, systolic m at the base 2/6  Abdomen: BS+, soft, no tenderness, no bruit  Pelvis: bladder nondistended  Extremities: No edema  Vascular Access: right AVG with bruit and thrill

## 2021-10-05 NOTE — PROGRESS NOTE ADULT - REASON FOR ADMISSION
HYPERTENSIVE EMERGENCY

## 2021-10-05 NOTE — PROGRESS NOTE ADULT - ASSESSMENT
77 yo M from [facility], has past medical hx of ESRD on HD (M-W-F), HTN, DM, CAD, Afib, CVA with right sided hemiparesis, anemia, vascular dementia, mood disorder presented to the ED c/o one episode of SOB,uncontrolled htn.  1.Tele monitoring,R/O SSS.D/W pt may need PPM which he declines at this time.  2.Uncontrolled HTN- hydralazine 50mg q8, lopressor 12.5mg bid,cont,imdur.  3.PAF-multaq,lopressor,eliquis.  4.CVA-eliquis-refused by patient,statin.  5.CAD-asa,b blocker,statin,imdur.  6.ESRD-HD as per renal.  7.PPI.
ESRD, dialysis days  MWF  Follow with dialysis today  , fluid removal  2-2.5 KG IN DIALYSIS,  follow bp during dialysis, avoid hypotensive episodes.  Anemia, continue ALICIA  Iron studies sat  29%, no need for Iron , DC po Iron.    Renal bone disease, on Sevelamer , po4 5.7. SHPTH 431.   Hepatitis profile negative.    Dyspnea , orthopnea on admission, possible cardiac arrythmia, episodes of bradycardia on 09/29 and 09/30.  Metoprolol reduced to 12.5 mg BID by cardiology , today.  Continue telemetry, as per cardiology  Patient refuses to take Apixaban.      MS, AS and AR, dilated LA , LVH , pulmonary hypertension severe.  No SOB at present.     
Lt Pl effusion   Obesity with Pulm Htn r/o ARSENIO vs Cardiac  Hcvd with Severe Aortic stenosis with PAF   DM with ESRD on H/D with anemia  old CVa       Plan-- H/d TIW  CardioDr Nabatian? TAVR  Sleep studies to r/o ARSENIO  Prognosis guarded  OOB in chair / BRP
ESRD, dialysis days  MWF  Follow with dialysis in am , fluid removal  2-2.5 KG IN DIALYSIS,  follow bp during dialysis, avoid hypotensive episodes.  Anemia, continue ALICIA  Follow iron studies, Iron say 29%, no need for Iron , DC po Iron.    Renal bone disease, on Sevelamer , po4 5.7. SHPTH 431.   Hepatitis profile negative.    Dyspnea , orthopnea on admission, possible cardiac arrythmia, episodes of bradycardia on 09/29 and 09/30.  Metoprolol reduced to 25 mg q 12 hours.  Continue telemetry  Patient refuses to take Apixaban.  Refuses pacemaker .    MS, AS and AR, dilated LA , LVH , pulmonary hypertension severe.  No SOB at present.     
75 yo M from [facility], has past medical hx of ESRD on HD (M-W-F), HTN, DM, CAD, Afib, CVA with right sided hemiparesis, anemia, vascular dementia, mood disorder presented to the ED c/o one episode of SOB,uncontrolled htn.  1.Tele monitoring,R/O SSS.D/W pt may need PPM which he declines at this time.  2.Uncontrolled HTN- hydralazine 50mg q8, dec lopressor 25mg bid,cont,imdur.  3.PAF-multaq,lopressor,eliquis.  4.CVA-eliquis-refused by patient,statin.  5.CAD-asa,b blocker,statin,imdur.  6.ESRD-HD as per renal.  7.PPI.
ESRD, dialysis days  MWF  Follow with dialysis today  , fluid removal  2-2.5 KG IN DIALYSIS,  follow bp during dialysis, avoid hypotensive episodes.  Anemia, continue ALICIA  Iron studies sat  29%, no need for Iron , DC po Iron.    Renal bone disease, on Sevelamer , po4 5.7. SHPTH 431.   Hepatitis profile negative.    Dyspnea , orthopnea on admission, possible cardiac arrythmia, episodes of bradycardia on 09/29 and 09/30.  Metoprolol reduced to 12.5 mg BID by cardiology , today.  Continue telemetry, follow rhythm bradycardia episodes improved in the last 16 hours  Patient refuses to take Apixaban.  Refuses pacemaker .    MS, AS and AR, dilated LA , LVH , pulmonary hypertension severe.  No SOB at present.     
Lt Pl effusion   Obesity with Pulm Htn r/o ARSENIO vs Cardiac  Hcvd with Severe Aortic stenosis with PAF   DM with ESRD on H/D with anemia  old CVa       Plan-- H/d TIW  Cardio rx as per Dr Salinas? TAVR  Sleep studies to r/o ARSENIO  Prognosis guarded  OOB in chair / BRP
Lt Pl effusion   Obesity with Pulm Htn r/o ARSENIO vs Cardiac  Hcvd with Severe Aortic stenosis with PAF   DM with ESRD on H/D with anemia  old CVa       Plan-- H/d TIW  Cardio rx as per Dr Salinas? TAVR  Sleep studies to r/o ARSENIO  Prognosis guarded  OOB in chair / BRP
75 yo M from [facility], has past medical hx of ESRD on HD (M-W-F), HTN, DM, CAD, Afib, CVA with right sided hemiparesis, anemia, vascular dementia, mood disorder presented to the ED c/o one episode of SOB,uncontrolled htn,SSS,AIVR.  1.Tele monitoring, SSS.D/W pt benefit from  PPM which he declines at this time.  2.Uncontrolled HTN- hydralazine 50mg q8, lopressor 12.5mg bid,cont,imdur.  3.PAF-multaq,lopressor,eliquis.  4.CVA-eliquis-refused by patient,statin.  5.CAD-asa,b blocker,statin,imdur.  6.ESRD-HD as per renal.  7.PPI.
75 yo M from [facility], has past medical hx of ESRD on HD (M-W-F), HTN, DM, CAD, Afib, CVA with right sided hemiparesis, anemia, vascular dementia, mood disorder presented to the ED c/o one episode of SOB,uncontrolled htn,SSS,AIVR.  1.D/C Tele monitoring, SSS.D/W pt benefit from  PPM which he declines at this time.  2.Uncontrolled HTN- hydralazine 50mg q8, lopressor 12.5mg bid,cont,imdur.  3.PAF-multaq,lopressor,eliquis.  4.CVA-eliquis-refused by patient,statin.  5.CAD-asa,b blocker,statin,imdur.  6.ESRD-HD as per renal.  7.PPI.  8.Pulm HTN-sleep study as outpatient-R/O ARSENIO.
75 yo M from [facility], has past medical hx of ESRD on HD (M-W-F), HTN, DM, CAD, Afib, CVA with right sided hemiparesis, anemia, vascular dementia, mood disorder presented to the ED c/o one episode of SOB,uncontrolled htn.  1.Tele monitoring,R/O SSS.D/W pt may need PPM which he declines at this time.  2.Uncontrolled HTN- hydralazine 50mg q8, dec lopressor 12.5mg bid,cont,imdur.  3.PAF-multaq,lopressor,eliquis.  4.CVA-eliquis-refused by patient,statin.  5.CAD-asa,b blocker,statin,imdur.  6.ESRD-HD as per renal.  7.PPI.
77 yo M from [facility], has past medical hx of ESRD on HD (M-W-F), HTN, DM, CAD, Afib, CVA with right sided hemiparesis, anemia, vascular dementia, mood disorder presented to the ED c/o one episode of SOB,uncontrolled htn,SSS,AIVR.  1.D/C Tele monitoring, SSS.D/W pt benefit from  PPM which he declines at this time.  2.Uncontrolled HTN- hydralazine 50mg q8, lopressor 12.5mg bid,cont,imdur.  3.PAF-multaq,lopressor,eliquis.  4.CVA-eliquis-refused by patient,statin.  5.CAD-asa,b blocker,statin,imdur.  6.ESRD-HD as per renal.  7.PPI.  8.Pulm HTN-sleep study as outpatient-R/O ARSENIO.
ESRD, dialysis days MWF  Fluid removal 2 KG  in dialysis yesterday  1K bath for first hour and after 2 k. Repeat K 4.8    Admitted for PND , etiology not clear , PAF versus ACS.   Placed on Metoprolol Patient refusing Eliquis intake. I explained him why he gets it in the NH , still refuses as some told him that side effect of the medication is prostate cancer  Follow echo result    History of CAD but refused angiogram in the past.              
ESRD, dialysis days  MWF  Follow with dialysis today  , fluid removal  2-2.5 KG IN DIALYSIS,  follow bp during dialysis, avoid hypotensive episodes.  Anemia, continue ALICIA  Iron studies sat  29%, no need for Iron , DC po Iron.    Renal bone disease, on Sevelamer , po4 5.7. SHPTH 431.   Hepatitis profile negative.    Dyspnea , orthopnea on admission, possible cardiac arrythmia, episodes of bradycardia on 09/29 and 09/30.  Metoprolol reduced to 12.5 mg BID by cardiology , today.  Continue telemetry, follow rhythm bradycardia episodes improved in the last 24 hours  Patient refuses to take Apixaban.  Refuses pacemaker .    MS, AS and AR, dilated LA , LVH , pulmonary hypertension severe.  No SOB at present.     
ESRD, dialysis days  MWF  Fluid removal  2-2.5 KG IN DIALYSIS,  follow bp during dialysis, avoid hypotensive episodes.  Anemia, continue ALICIA  Follow iron studies, Iron say 29%, no need for Iron , DC po Iron.    Renal bone disease, on Sevelamer , po4 5.7. SHPTH 431.   Hepatitis profile negative.    Dyspnea , orthopnea on admission, possible cardiac arrythmia, episodes of bradycardia on 09/29  Sinus block? and Mobitz type 2 on EKG admission. Follow with Telemetry  No SOB since admission  MS, AS and AR, dilated LA , lvh, pulmonary hypertension severe     
ESRD, dialysis days  MWF  Follow with dialysis today  , fluid removal  2-2.5 KG IN DIALYSIS,  follow bp during dialysis, avoid hypotensive episodes.  Anemia, continue ALICIA  Iron studies sat  29%, no need for Iron , DC po Iron.    Renal bone disease, on Sevelamer , po4 5.7. SHPTH 431.   Hepatitis profile negative.    Dyspnea , orthopnea on admission, possible cardiac arrythmia, episodes of bradycardia on 09/29 and 09/30.  Metoprolol reduced to 12.5 mg BID by cardiology , today.  Continue telemetry, follow rhythm bradycardia episodes improved in the last 16 hours  Patient refuses to take Apixaban.  Refuses pacemaker .    MS, AS and AR, dilated LA , LVH , pulmonary hypertension severe.  No SOB at present.     
ESRD, dialysis days  MWF  Follow with dialysis today  , fluid removal  2-2.5 KG IN DIALYSIS,  follow bp during dialysis, avoid hypotensive episodes.  Anemia, continue ALICIA  Iron studies sat  29%, no need for Iron , DC po Iron.    Renal bone disease, on Sevelamer , po4 5.7. SHPTH 431.   Hepatitis profile negative.    Dyspnea , orthopnea on admission, possible cardiac arrythmia, episodes of bradycardia on 09/29 and 09/30.  Metoprolol reduced to 12.5 mg BID by cardiology , today.  Continue telemetry, follow rythm  Patient refuses to take Apixaban.  Refuses pacemaker .    MS, AS and AR, dilated LA , LVH , pulmonary hypertension severe.  No SOB at present.     
Lt Pl effusion   Obesity with Pulm Htn r/o ARSENIO vs Cardiac  Hcvd with Severe Aortic stenosis with PAF   DM with ESRD on H/D with anemia  old CVa       Plan-- H/d TIW  Cardio Dr Salinas? TAVR  Sleep studies to r/o ARSENIO  Prognosis guarded  OOB in chair / BRP
Lt Pl effusion   Obesity with Pulm Htn r/o ARSENIO vs Cardiac  Hcvd with Severe Aortic stenosis with PAF   DM with ESRD on H/D with anemia  old CVa       Plan-- H/d TIW  Cardio rx as per Dr Salinas? TAVR  Sleep studies to r/o ARSENIO  Prognosis guarded  OOB in chair / BRP
75 yo M from , has past medical hx of ESRD on HD (M-W-F), HTN, DM, CAD, Afib, CVA with right sided hemiparesis, anemia, vascular dementia, mood disorder presented to the ED c/o one episode of SOB. Patient admitted for hypertensive emergency and hyperkalemia.    
77 yo M from , has past medical hx of ESRD on HD (M-W-F), HTN, DM, CAD, Afib, CVA with right sided hemiparesis, anemia, vascular dementia, mood disorder presented to the ED c/o one episode of SOB. Patient admitted for hypertensive emergency and hyperkalemia.    
75 yo M from , has past medical hx of ESRD on HD (M-W-F), HTN, DM, CAD, Afib, CVA with right sided hemiparesis, anemia, vascular dementia, mood disorder presented to the ED c/o one episode of SOB. Patient admitted for hypertensive emergency and hyperkalemia.

## 2021-10-05 NOTE — PROGRESS NOTE ADULT - SUBJECTIVE AND OBJECTIVE BOX
PULMONARY  progress note    DORSAINVIL, JEANCLAUDE  MRN-[id number]    Patient is a 76y old  Male who presents with a chief complaint of HYPERTENSIVE EMERGENCY (05 Oct 2021 09:13)  no cough or sob      MEDICATIONS  (STANDING):  apixaban 2.5 milliGRAM(s) Oral two times a day  aspirin  chewable 81 milliGRAM(s) Oral daily  atorvastatin 80 milliGRAM(s) Oral at bedtime  chlorhexidine 2% Cloths 1 Application(s) Topical <User Schedule>  colchicine 0.6 milliGRAM(s) Oral two times a day  dronedarone 400 milliGRAM(s) Oral two times a day  epoetin ximena-epbx (RETACRIT) Injectable 3000 Unit(s) IV Push <User Schedule>  folic acid 1 milliGRAM(s) Oral daily  isosorbide   mononitrate ER Tablet (IMDUR) 30 milliGRAM(s) Oral daily  lactulose Syrup 30 Gram(s) Oral at bedtime  metoprolol tartrate 12.5 milliGRAM(s) Oral two times a day  pantoprazole    Tablet 40 milliGRAM(s) Oral before breakfast  senna 2 Tablet(s) Oral at bedtime  sevelamer carbonate 1600 milliGRAM(s) Oral three times a day          PAST MEDICAL & SURGICAL HISTORY:  ESRD (end stage renal disease) on dialysis    Hypertension    Anemia    Cerebrovascular accident (CVA), unspecified mechanism    Paroxysmal atrial fibrillation    CAD (coronary artery disease)    A-V fistula             REVIEW OF SYSTEMS:  CONSTITUTIONAL: No fever, weight loss, or fatigue   EYES: No eye pain, visual disturbances, or discharge  ENT:  No difficulty hearing, tinnitus, vertigo; No sinus or throat pain  NECK: No pain or stiffness or nodes  RESPIRATORY:  cough--   wheezing --  chills--   hemoptysis--  Shortness of Breath--  CARDIOVASCULAR: No chest pain, palpitations, passing out, dizziness, or leg swelling  GASTROINTESTINAL: No abdominal or epigastric pain. No nausea, vomiting,   NEUROLOGICAL: No headaches, memory loss, loss of strength, numbness, or tremors  SKIN: No itching, burning, rashes, or lesions   LYMPH Nodes: No enlarged glands  ENDOCRINE: No heat or cold intolerance; No hair loss  MUSCULOSKELETAL: No joint pain or swelling; No muscle, back, or extremity pain  PSYCHIATRIC: No depression, anxiety, mood swings, or difficulty sleeping  HEME/LYMPH: No easy bruising, or bleeding gums  ALLERGY AND IMMUNOLOGIC: No hives or eczema        Vital Signs Last 24 Hrs  T(C): 36.8 (05 Oct 2021 08:47), Max: 37.1 (04 Oct 2021 23:30)  T(F): 98.2 (05 Oct 2021 08:47), Max: 98.7 (04 Oct 2021 23:30)  HR: 61 (05 Oct 2021 08:47) (54 - 84)  BP: 126/68 (05 Oct 2021 08:47) (111/56 - 139/61)  BP(mean): --  RR: 18 (05 Oct 2021 08:47) (17 - 20)  SpO2: 99% (05 Oct 2021 08:47) (95% - 99%)  I&O's Detail    04 Oct 2021 07:01  -  05 Oct 2021 07:00  --------------------------------------------------------  IN:    Oral Fluid: 400 mL    Other (mL): 500 mL  Total IN: 900 mL    OUT:    Other (mL): 1600 mL    Voided (mL): 900 mL  Total OUT: 2500 mL    Total NET: -1600 mL          PHYSICAL EXAMINATION:    GENERAL: The patient is a well-developed, well-nourished in no apparent distress.   SKIN no rash ecchymoses or bruises  HEENT: Head is normocephalic and atraumatic  VIVIANA , Extraocular muscles are intact. Mucous membranes  moist.   Neck supple ,No LN felt JVP not increased  Thyroid not enlarged  Cardiovascular:  S1 S2 heard, RSR, No JVD , systolic  murmur at apex/ LSB, No gallop or rub  Respiratory: Chest wall symmetrical with good air entry ,Percussion note normal,    Lungs vesicular breathing with no   rales  or  wheeze	  ABDOMEN:  Soft, Non-tender, obese, no hepatomegaly or splenomegaly BS positive	  Extremities: Normal range of motion, No clubbing, cyanosis or edema  Vascular: Peripheral pulses palpable 2+ bilaterally  CNS:  Alert and oriented x 3   Cranial nerves intact  sensory intact  motor power5/5  dtr 2+   Babinski neg        LABS:                        9.8    4.51  )-----------( 179      ( 04 Oct 2021 13:13 )             28.8     10-04    138  |  97  |  82<H>  ----------------------------<  101<H>  4.0   |  26  |  11.50<H>    Ca    9.3      04 Oct 2021 13:13  Phos  4.3     10-04  Mg     2.0     10-04

## 2021-10-05 NOTE — PROGRESS NOTE ADULT - TIME BILLING
I have examined pt personally Hx chart lab and xrays reviewed and pt discussed with residents
I have examined pt personally Hx chart lab and xrays reviewed and pt discussed with residents
discussed with the patient and medical team
I have examined pt personally Hx chart lab and xrays reviewed and pt discussed with residents
I have examined pt personally Hx chart lab and xrays reviewed and pt discussed with residents
case discussed with medical team
discussed with the patient and medical team
I have examined pt personally Hx chart lab and xrays reviewed and pt discussed with residents
all issues discussed with the patient and medical team  Plan of care and reduced dose of Metoprolol discussed  Patient continue to refuse pacemaker and AC. He agreed to reduce dose of Metoprolol.

## 2021-10-05 NOTE — CONSULT NOTE ADULT - SUBJECTIVE AND OBJECTIVE BOX
[facility] Geriatric and Palliative Consult Service:  Dr. Marleny Hart: cell ([phone])  Dr. Aleta Braun: cell ([phone])   Viridiana Ugarte NP: cell ([phone])  Neelima Mai NP: cell [phone])   Nii Joshiyordy LSW: cell ([phone])     HPI:  75 yo M from [facility], has past medical hx of ESRD on HD (M-W-F), HTN, DM, CAD, Afib, CVA with right sided hemiparesis, anemia, vascular dementia, mood disorder presented to the ED c/o one episode of SOB. As per NH documents, patient was requiring oxygen supplementation however while in ED, patient was saturating >96% on RA without any signs of respiratory distress. Patient denies cough, chest pain, fever, chills, leg swelling, missing dialysis, headaches, dizziness, palpitations, abdominal pain, nausea, vomiting or diarrhea.       Interval Hx: Pt seen and examined at the bedside, Aox2. Noted with PAF refusing PPM and medications.  Pt lacks insight as to his clinical condition.  NAD      PAST MEDICAL & SURGICAL HISTORY:  ESRD (end stage renal disease) on dialysis    Hypertension    Anemia    Cerebrovascular accident (CVA), unspecified mechanism    Paroxysmal atrial fibrillation    CAD (coronary artery disease)    A-V fistula        SOCIAL HISTORY:    Admitted from:  [facility]  Substance abuse history: none             Tobacco hx:  none                Alcohol hx:  none            Home Opioid hx: none  Rastafari:    Judaism                                Preferred Language: English      Kylie Almendarez (HCP)    Phone#  [phone]  Aleta Rodgers  (sister)  Phone# [phone]    FAMILY HISTORY:  Family history of diabetes mellitus      Baseline ADLs (prior to admission): dependent    Allergies    No Known Allergies    Intolerances      Present Symptoms:   Pain: denies  Fatigue: denies  Nausea: denies  Lack of Appetite: denies  SOB: denies  Depression: denies  Anxiety: denies  Review of Systems: [All others negative     MEDICATIONS  (STANDING):  apixaban 2.5 milliGRAM(s) Oral two times a day  aspirin  chewable 81 milliGRAM(s) Oral daily  atorvastatin 80 milliGRAM(s) Oral at bedtime  chlorhexidine 2% Cloths 1 Application(s) Topical <User Schedule>  colchicine 0.6 milliGRAM(s) Oral two times a day  dronedarone 400 milliGRAM(s) Oral two times a day  epoetin ximena-epbx (RETACRIT) Injectable 3000 Unit(s) IV Push <User Schedule>  folic acid 1 milliGRAM(s) Oral daily  isosorbide   mononitrate ER Tablet (IMDUR) 30 milliGRAM(s) Oral daily  lactulose Syrup 30 Gram(s) Oral at bedtime  metoprolol tartrate 12.5 milliGRAM(s) Oral two times a day  pantoprazole    Tablet 40 milliGRAM(s) Oral before breakfast  senna 2 Tablet(s) Oral at bedtime  sevelamer carbonate 1600 milliGRAM(s) Oral three times a day    MEDICATIONS  (PRN):  acetaminophen   Tablet .. 650 milliGRAM(s) Oral every 6 hours PRN Temp greater or equal to 38C (100.4F), Mild Pain (1 - 3)      PHYSICAL EXAM:  Vital Signs Last 24 Hrs  T(C): 36.9 (05 Oct 2021 12:08), Max: 37.1 (04 Oct 2021 23:30)  T(F): 98.4 (05 Oct 2021 12:08), Max: 98.7 (04 Oct 2021 23:30)  HR: 61 (05 Oct 2021 12:08) (54 - 84)  BP: 99/65 (05 Oct 2021 12:08) (99/65 - 139/61)  BP(mean): --  RR: 17 (05 Oct 2021 12:08) (17 - 20)  SpO2: 99% (05 Oct 2021 12:08) (95% - 99%)    General: Elderly man, Aox2, lacks insight as to his clinical condition.  NAD    Palliative Performance Scale/Karnofsky Score: 40  ECOG Performance: n/a    HEENT: atraumatic, moist oropharynx  Lungs: unlabored on RA  CV: RRR, S1S2  GI: soft non distended, non tender on palpation  : on HD, oliguria  Musculoskeletal: bedbound, no edema  Skin: dry skin, hyperpigmentation to b/l LE  Neuro: able to follow commands  Oral intake ability: good po intake     LABS:                        9.8    4.51  )-----------( 179      ( 04 Oct 2021 13:13 )             28.8     10-04    138  |  97  |  82<H>  ----------------------------<  101<H>  4.0   |  26  |  11.50<H>    Ca    9.3      04 Oct 2021 13:13  Phos  4.3     10-04  Mg     2.0     10-04      < from: CT Head No Cont (09.21.20 @ 13:56) >    EXAM:  CT BRAIN                            PROCEDURE DATE:  09/21/2020          INTERPRETATION:  INDICATION:  Stroke  TECHNIQUE:  A non contrast 2.5mm axial CT study of the brain was performed from skull base to vertex. Coronal and sagittal reformations were generated from the axial data.  COMPARISON EXAMINATION:  CT 3/20/2012    FINDINGS:    HEMISPHERES:  Again noted are small vessel ischemic changes throughout the white matter of both hemispheres in conjunction with an old right basal ganglialacunar infarct. Volume loss and involutional changes are stable when compared with the prior exam. No acute abnormality or hemorrhagic lesion is noted.  VENTRICLES:  Midline and normal in size.  POSTERIOR FOSSA:  No acute infarct or hemorrhage is noted.  EXTRACEREBRAL SPACES:  No subdural or epidural collections are noted.  SKULL BASE AND CALVARIUM:  Appears intact.  No fracture or destructive lesion is identified.  SINUSES AND MASTOIDS:  Clear.  MISCELLANEOUS:  Carotid and vertebral arteries are calcified.    IMPRESSION:  1)  chronic ischemic changes again noted with volume loss, as outlined above. No new or acute abnormality suggested.  2)  follow-up MR imaging may be considered for further assessment.    < end of copied text >      RADIOLOGY & ADDITIONAL STUDIES: reviewed  ADVANCED DIRECTIVES: FULL CODE         [facility] Geriatric and Palliative Consult Service:  Dr. Marleny Hart: cell ([phone])  Dr. Aleta Braun: cell ([phone])   Viridiana Ugarte NP: cell ([phone])  Neelima Mai NP: cell ([phone])   Nii Joshiyordy LSW: cell ([phone])     HPI:  75 yo M from [facility], has past medical hx of ESRD on HD (M-W-F), HTN, DM, CAD, Afib, CVA with right sided hemiparesis, anemia, vascular dementia, mood disorder presented to the ED c/o one episode of SOB. As per NH documents, patient was requiring oxygen supplementation however while in ED, patient was saturating >96% on RA without any signs of respiratory distress. Patient denies cough, chest pain, fever, chills, leg swelling, missing dialysis, headaches, dizziness, palpitations, abdominal pain, nausea, vomiting or diarrhea.       Interval Hx: Pt seen and examined at the bedside, Aox2. Noted with PAF refusing PPM and medications.  Pt lacks insight as to his clinical condition.  NAD      PAST MEDICAL & SURGICAL HISTORY:  ESRD (end stage renal disease) on dialysis    Hypertension    Anemia    Cerebrovascular accident (CVA), unspecified mechanism    Paroxysmal atrial fibrillation    CAD (coronary artery disease)    A-V fistula        SOCIAL HISTORY:    Admitted from:  [facility]  Substance abuse history: none             Tobacco hx:  none                Alcohol hx:  none            Home Opioid hx: none  Druze:    Congregational                                Preferred Language: English      Kylie Almendarez (HCP)    Phone#  [phone]  Aleta Gong  (sister)  Phone# [phone]    FAMILY HISTORY:  Family history of diabetes mellitus      Baseline ADLs (prior to admission): dependent    Allergies    No Known Allergies    Intolerances      Present Symptoms:   Pain: denies  Fatigue: denies  Nausea: denies  Lack of Appetite: denies  SOB: denies  Depression: denies  Anxiety: denies  Review of Systems: [All others negative     MEDICATIONS  (STANDING):  apixaban 2.5 milliGRAM(s) Oral two times a day  aspirin  chewable 81 milliGRAM(s) Oral daily  atorvastatin 80 milliGRAM(s) Oral at bedtime  chlorhexidine 2% Cloths 1 Application(s) Topical <User Schedule>  colchicine 0.6 milliGRAM(s) Oral two times a day  dronedarone 400 milliGRAM(s) Oral two times a day  epoetin ximena-epbx (RETACRIT) Injectable 3000 Unit(s) IV Push <User Schedule>  folic acid 1 milliGRAM(s) Oral daily  isosorbide   mononitrate ER Tablet (IMDUR) 30 milliGRAM(s) Oral daily  lactulose Syrup 30 Gram(s) Oral at bedtime  metoprolol tartrate 12.5 milliGRAM(s) Oral two times a day  pantoprazole    Tablet 40 milliGRAM(s) Oral before breakfast  senna 2 Tablet(s) Oral at bedtime  sevelamer carbonate 1600 milliGRAM(s) Oral three times a day    MEDICATIONS  (PRN):  acetaminophen   Tablet .. 650 milliGRAM(s) Oral every 6 hours PRN Temp greater or equal to 38C (100.4F), Mild Pain (1 - 3)      PHYSICAL EXAM:  Vital Signs Last 24 Hrs  T(C): 36.9 (05 Oct 2021 12:08), Max: 37.1 (04 Oct 2021 23:30)  T(F): 98.4 (05 Oct 2021 12:08), Max: 98.7 (04 Oct 2021 23:30)  HR: 61 (05 Oct 2021 12:08) (54 - 84)  BP: 99/65 (05 Oct 2021 12:08) (99/65 - 139/61)  BP(mean): --  RR: 17 (05 Oct 2021 12:08) (17 - 20)  SpO2: 99% (05 Oct 2021 12:08) (95% - 99%)    General: Elderly man, Aox2, lacks insight as to his clinical condition.  NAD    Palliative Performance Scale/Karnofsky Score: 40  ECOG Performance: n/a    HEENT: atraumatic, moist oropharynx  Lungs: unlabored on RA  CV: RRR, S1S2  GI: soft non distended, non tender on palpation  : on HD, oliguria  Musculoskeletal: bedbound, no edema  Skin: dry skin, hyperpigmentation to b/l LE  Neuro: able to follow commands  Oral intake ability: good po intake     LABS:                        9.8    4.51  )-----------( 179      ( 04 Oct 2021 13:13 )             28.8     10-04    138  |  97  |  82<H>  ----------------------------<  101<H>  4.0   |  26  |  11.50<H>    Ca    9.3      04 Oct 2021 13:13  Phos  4.3     10-04  Mg     2.0     10-04      < from: CT Head No Cont (09.21.20 @ 13:56) >    EXAM:  CT BRAIN                            PROCEDURE DATE:  09/21/2020          INTERPRETATION:  INDICATION:  Stroke  TECHNIQUE:  A non contrast 2.5mm axial CT study of the brain was performed from skull base to vertex. Coronal and sagittal reformations were generated from the axial data.  COMPARISON EXAMINATION:  CT 3/20/2012    FINDINGS:    HEMISPHERES:  Again noted are small vessel ischemic changes throughout the white matter of both hemispheres in conjunction with an old right basal ganglialacunar infarct. Volume loss and involutional changes are stable when compared with the prior exam. No acute abnormality or hemorrhagic lesion is noted.  VENTRICLES:  Midline and normal in size.  POSTERIOR FOSSA:  No acute infarct or hemorrhage is noted.  EXTRACEREBRAL SPACES:  No subdural or epidural collections are noted.  SKULL BASE AND CALVARIUM:  Appears intact.  No fracture or destructive lesion is identified.  SINUSES AND MASTOIDS:  Clear.  MISCELLANEOUS:  Carotid and vertebral arteries are calcified.    IMPRESSION:  1)  chronic ischemic changes again noted with volume loss, as outlined above. No new or acute abnormality suggested.  2)  follow-up MR imaging may be considered for further assessment.    < end of copied text >      RADIOLOGY & ADDITIONAL STUDIES: reviewed  < from: Transthoracic Echocardiogram (09.28.21 @ 08:26) >  Patient name: DORSAINVIL, JEANCLAUDE  YOB: 1945   Age: 76 (M)   MR#: [id number]  Study Date: 9/28/2021  Location: [address] [place], [place] [address]Sonographer: José Younger RDCS  Study quality: Technically difficult  Referring Physician: Mandeep Bustamante MD  Blood Pressure: 144/66 mmHg  Height: 165 cm  Weight: 77 kg  BSA: 1.9 m2  ------------------------------------------------------------------------    PROCEDURE: Transthoracic echocardiogram with 2-D, M-Mode  and complete spectral and color flow Doppler.  INDICATION: Heart failure, unspecified (I50.9)  HISTORY:  ------------------------------------------------------------------------  DIMENSIONS:  Dimensions:     Normal Values:  LA:     4.4 cm    2.0 - 4.0 cm  Ao:     3.1 cm    2.0 - 3.8 cm  SEPTUM: 1.2 cm    0.6 - 1.2 cm  PWT:    1.3 cm    0.6 - 1.1 cm  LVIDd:  4.5 cm    3.0 - 5.6 cm  LVIDs:  2.9 cm    1.8 - 4.0 cm      Derived Variables:  LVMI: 114 g/m2  RWT: 0.57  Ejection Fraction Visual Estimate: >55 %  Peak Velocity (m/sec): AoV=3.6  ------------------------------------------------------------------------  OBSERVATIONS:  Mitral Valve: Mitral annular calcification, and calcified  mitral leaflets with reduced diastolic opening. Mean  transmitral valve gradient equals 2.6 mm Hg (at a heart  rate of 68 BPM), consistent with mild mitral stenosis.  Aortic Root: Normal aortic root.  Aortic Valve: Calcified  aortic valve with decreased  opening. Peak transaortic valve gradient equals 52 mm Hg,  mean transaortic valve gradient equals 28 mm Hg, estimated  aortic valve area equals 1 sqcm (by continuity equation),  consistent with severe aortic stenosis. Mild aortic  regurgitation.  Left Atrium: Mildly dilated left atrium.  LA volume index =  36 cc/m2.  Left Ventricle: Endocardium not well visualized; grossly  normal left ventricular systolic function. Moderate  concentric left ventricular hypertrophy. Grade II diastolic  dysfunction (moderate).  Right Heart: Normal right atrium. Right ventricle not well  visualized.   Probably normal right ventricular size and  systolic function. There is mild-moderate tricuspid  regurgitation. There is mild pulmonic regurgitation.  Pericardium/PleuraNormal pericardium with no pericardial  effusion.  Hemodynamic: RA Pressure is 8 mm Hg. RV systolicpressure  is 69 mm Hg. Severe pulmonary hypertension.  ------------------------------------------------------------------------  CONCLUSIONS:  1. Mitral annular calcification, and calcified mitral  leaflets with reduced diastolic opening. Mean transmitral  valve gradient equals 2.6 mm Hg (at a heart rate of 68  BPM), consistent with mild mitral stenosis.  2. Calcified  aortic valve with decreased opening. Peak  transaortic valve gradient equals 52 mm Hg, mean  transaortic valve gradient equals 28mm Hg, estimated  aortic valve area equals 1 sqcm (by continuity equation),  consistent with severe aortic stenosis. Mild aortic  regurgitation.  3. Mildly dilated left atrium.  LA volume index = 36 cc/m2.  4. Moderate concentric left ventricular hypertrophy.  5. Endocardium not well visualized; grossly normal left  ventricular systolic function.  6. Grade II diastolic dysfunction (moderate).  7. Right ventricle not well visualized.   Probably normal  right ventricular size and systolic function.  8. RV systolic pressure is 69 mm Hg. Severe pulmonary  hypertension.    ------------------------------------------------------------------------  Confirmed on  9/29/2021 - 12:06:44 by Yaya Baez MD  ------------------------------------------------------------------------    < end of copied text >    ADVANCED DIRECTIVES: FULL CODE

## 2021-10-05 NOTE — DISCHARGE NOTE NURSING/CASE MANAGEMENT/SOCIAL WORK - PATIENT PORTAL LINK FT
You can access the FollowMyHealth Patient Portal offered by [facility] by registering at the following website: [email or web address]. By joining [employer]’s FollowMyHealth portal, you will also be able to view your health information using other applications (apps) compatible with our system.

## 2021-10-05 NOTE — PROGRESS NOTE ADULT - PROVIDER SPECIALTY LIST ADULT
Cardiology
Cardiology
Internal Medicine
Cardiology
Cardiology
Internal Medicine
Nephrology
Cardiology
Internal Medicine
Nephrology
Pulmonology
Pulmonology
Cardiology
Nephrology
Pulmonology
Nephrology
Nephrology
Internal Medicine

## 2021-10-05 NOTE — DISCHARGE NOTE NURSING/CASE MANAGEMENT/SOCIAL WORK - NSDCPEFALRISK_GEN_ALL_CORE
For information on Fall & injury Prevention, visit [email or web address]
no radiation

## 2021-10-05 NOTE — CONSULT NOTE ADULT - PROBLEM SELECTOR RECOMMENDATION 3
2/2 CVA with residual right sided hemiparesis.  Bedbound. Dependent.  Skin care per protocol  Frequent positioning On HD (M-W-F).  Continue HD.  Nephrology following.

## 2021-11-28 ENCOUNTER — INPATIENT (INPATIENT)
Facility: HOSPITAL | Age: 76
LOS: 17 days | Discharge: NURSING, REHAB OR LONG-TERM CARE | DRG: 228 | End: 2021-12-16
Attending: INTERNAL MEDICINE | Admitting: INTERNAL MEDICINE
Payer: MEDICARE

## 2021-11-28 VITALS
OXYGEN SATURATION: 100 % | HEART RATE: 68 BPM | DIASTOLIC BLOOD PRESSURE: 77 MMHG | RESPIRATION RATE: 18 BRPM | TEMPERATURE: 98 F | WEIGHT: 169.98 LBS | HEIGHT: 65 IN | SYSTOLIC BLOOD PRESSURE: 181 MMHG

## 2021-11-28 DIAGNOSIS — I27.20 PULMONARY HYPERTENSION, UNSPECIFIED: ICD-10-CM

## 2021-11-28 DIAGNOSIS — I63.9 CEREBRAL INFARCTION, UNSPECIFIED: ICD-10-CM

## 2021-11-28 DIAGNOSIS — N18.6 END STAGE RENAL DISEASE: ICD-10-CM

## 2021-11-28 DIAGNOSIS — I77.0 ARTERIOVENOUS FISTULA, ACQUIRED: Chronic | ICD-10-CM

## 2021-11-28 DIAGNOSIS — I16.1 HYPERTENSIVE EMERGENCY: ICD-10-CM

## 2021-11-28 DIAGNOSIS — Z86.73 PERSONAL HISTORY OF TRANSIENT ISCHEMIC ATTACK (TIA), AND CEREBRAL INFARCTION WITHOUT RESIDUAL DEFICITS: ICD-10-CM

## 2021-11-28 DIAGNOSIS — I48.0 PAROXYSMAL ATRIAL FIBRILLATION: ICD-10-CM

## 2021-11-28 DIAGNOSIS — I10 ESSENTIAL (PRIMARY) HYPERTENSION: ICD-10-CM

## 2021-11-28 DIAGNOSIS — N18.9 CHRONIC KIDNEY DISEASE, UNSPECIFIED: ICD-10-CM

## 2021-11-28 DIAGNOSIS — Z29.9 ENCOUNTER FOR PROPHYLACTIC MEASURES, UNSPECIFIED: ICD-10-CM

## 2021-11-28 DIAGNOSIS — I50.9 HEART FAILURE, UNSPECIFIED: ICD-10-CM

## 2021-11-28 PROBLEM — I25.10 ATHEROSCLEROTIC HEART DISEASE OF NATIVE CORONARY ARTERY WITHOUT ANGINA PECTORIS: Chronic | Status: ACTIVE | Noted: 2021-09-27

## 2021-11-28 LAB
ALBUMIN SERPL ELPH-MCNC: 3.4 G/DL — LOW (ref 3.5–5)
ALP SERPL-CCNC: 225 U/L — HIGH (ref 40–120)
ALT FLD-CCNC: 53 U/L DA — SIGNIFICANT CHANGE UP (ref 10–60)
ANION GAP SERPL CALC-SCNC: 3 MMOL/L — LOW (ref 5–17)
ANION GAP SERPL CALC-SCNC: 8 MMOL/L — SIGNIFICANT CHANGE UP (ref 5–17)
ANION GAP SERPL CALC-SCNC: 9 MMOL/L — SIGNIFICANT CHANGE UP (ref 5–17)
AST SERPL-CCNC: 23 U/L — SIGNIFICANT CHANGE UP (ref 10–40)
BASE EXCESS BLDV CALC-SCNC: 5.2 MMOL/L — SIGNIFICANT CHANGE UP
BASOPHILS # BLD AUTO: 0.03 K/UL — SIGNIFICANT CHANGE UP (ref 0–0.2)
BASOPHILS NFR BLD AUTO: 0.5 % — SIGNIFICANT CHANGE UP (ref 0–2)
BILIRUB SERPL-MCNC: 0.6 MG/DL — SIGNIFICANT CHANGE UP (ref 0.2–1.2)
BUN SERPL-MCNC: 38 MG/DL — HIGH (ref 7–18)
BUN SERPL-MCNC: 63 MG/DL — HIGH (ref 7–18)
BUN SERPL-MCNC: 67 MG/DL — HIGH (ref 7–18)
CALCIUM SERPL-MCNC: 8.5 MG/DL — SIGNIFICANT CHANGE UP (ref 8.4–10.5)
CALCIUM SERPL-MCNC: 8.7 MG/DL — SIGNIFICANT CHANGE UP (ref 8.4–10.5)
CALCIUM SERPL-MCNC: 9.8 MG/DL — SIGNIFICANT CHANGE UP (ref 8.4–10.5)
CHLORIDE SERPL-SCNC: 100 MMOL/L — SIGNIFICANT CHANGE UP (ref 96–108)
CHLORIDE SERPL-SCNC: 100 MMOL/L — SIGNIFICANT CHANGE UP (ref 96–108)
CHLORIDE SERPL-SCNC: 99 MMOL/L — SIGNIFICANT CHANGE UP (ref 96–108)
CO2 SERPL-SCNC: 31 MMOL/L — SIGNIFICANT CHANGE UP (ref 22–31)
CO2 SERPL-SCNC: 31 MMOL/L — SIGNIFICANT CHANGE UP (ref 22–31)
CO2 SERPL-SCNC: 34 MMOL/L — HIGH (ref 22–31)
CREAT SERPL-MCNC: 5.73 MG/DL — HIGH (ref 0.5–1.3)
CREAT SERPL-MCNC: 8.46 MG/DL — HIGH (ref 0.5–1.3)
CREAT SERPL-MCNC: 8.82 MG/DL — HIGH (ref 0.5–1.3)
EOSINOPHIL # BLD AUTO: 0.11 K/UL — SIGNIFICANT CHANGE UP (ref 0–0.5)
EOSINOPHIL NFR BLD AUTO: 1.7 % — SIGNIFICANT CHANGE UP (ref 0–6)
GLUCOSE SERPL-MCNC: 118 MG/DL — HIGH (ref 70–99)
GLUCOSE SERPL-MCNC: 80 MG/DL — SIGNIFICANT CHANGE UP (ref 70–99)
GLUCOSE SERPL-MCNC: 96 MG/DL — SIGNIFICANT CHANGE UP (ref 70–99)
HCO3 BLDV-SCNC: 33 MMOL/L — HIGH (ref 22–29)
HCT VFR BLD CALC: 30.7 % — LOW (ref 39–50)
HGB BLD-MCNC: 9.8 G/DL — LOW (ref 13–17)
IMM GRANULOCYTES NFR BLD AUTO: 0.2 % — SIGNIFICANT CHANGE UP (ref 0–1.5)
LYMPHOCYTES # BLD AUTO: 1.21 K/UL — SIGNIFICANT CHANGE UP (ref 1–3.3)
LYMPHOCYTES # BLD AUTO: 18.4 % — SIGNIFICANT CHANGE UP (ref 13–44)
MAGNESIUM SERPL-MCNC: 2 MG/DL — SIGNIFICANT CHANGE UP (ref 1.6–2.6)
MAGNESIUM SERPL-MCNC: 2.5 MG/DL — SIGNIFICANT CHANGE UP (ref 1.6–2.6)
MAGNESIUM SERPL-MCNC: 2.5 MG/DL — SIGNIFICANT CHANGE UP (ref 1.6–2.6)
MCHC RBC-ENTMCNC: 31.9 GM/DL — LOW (ref 32–36)
MCHC RBC-ENTMCNC: 32.8 PG — SIGNIFICANT CHANGE UP (ref 27–34)
MCV RBC AUTO: 102.7 FL — HIGH (ref 80–100)
MONOCYTES # BLD AUTO: 0.65 K/UL — SIGNIFICANT CHANGE UP (ref 0–0.9)
MONOCYTES NFR BLD AUTO: 9.9 % — SIGNIFICANT CHANGE UP (ref 2–14)
NEUTROPHILS # BLD AUTO: 4.56 K/UL — SIGNIFICANT CHANGE UP (ref 1.8–7.4)
NEUTROPHILS NFR BLD AUTO: 69.3 % — SIGNIFICANT CHANGE UP (ref 43–77)
NRBC # BLD: 0 /100 WBCS — SIGNIFICANT CHANGE UP (ref 0–0)
NT-PROBNP SERPL-SCNC: HIGH PG/ML (ref 0–450)
PCO2 BLDV: 59 MMHG — HIGH (ref 42–55)
PH BLDV: 7.35 — SIGNIFICANT CHANGE UP (ref 7.32–7.43)
PHOSPHATE SERPL-MCNC: 4.2 MG/DL — SIGNIFICANT CHANGE UP (ref 2.5–4.5)
PHOSPHATE SERPL-MCNC: 6.2 MG/DL — HIGH (ref 2.5–4.5)
PLATELET # BLD AUTO: 152 K/UL — SIGNIFICANT CHANGE UP (ref 150–400)
PO2 BLDV: 35 MMHG — SIGNIFICANT CHANGE UP
POTASSIUM SERPL-MCNC: 4.4 MMOL/L — SIGNIFICANT CHANGE UP (ref 3.5–5.3)
POTASSIUM SERPL-MCNC: 5.7 MMOL/L — HIGH (ref 3.5–5.3)
POTASSIUM SERPL-MCNC: 6.3 MMOL/L — CRITICAL HIGH (ref 3.5–5.3)
POTASSIUM SERPL-SCNC: 4.4 MMOL/L — SIGNIFICANT CHANGE UP (ref 3.5–5.3)
POTASSIUM SERPL-SCNC: 5.7 MMOL/L — HIGH (ref 3.5–5.3)
POTASSIUM SERPL-SCNC: 6.3 MMOL/L — CRITICAL HIGH (ref 3.5–5.3)
PROT SERPL-MCNC: 8 G/DL — SIGNIFICANT CHANGE UP (ref 6–8.3)
RBC # BLD: 2.99 M/UL — LOW (ref 4.2–5.8)
RBC # FLD: 17.1 % — HIGH (ref 10.3–14.5)
SAO2 % BLDV: 54.7 % — SIGNIFICANT CHANGE UP
SARS-COV-2 RNA SPEC QL NAA+PROBE: SIGNIFICANT CHANGE UP
SODIUM SERPL-SCNC: 137 MMOL/L — SIGNIFICANT CHANGE UP (ref 135–145)
SODIUM SERPL-SCNC: 138 MMOL/L — SIGNIFICANT CHANGE UP (ref 135–145)
SODIUM SERPL-SCNC: 140 MMOL/L — SIGNIFICANT CHANGE UP (ref 135–145)
TROPONIN I, HIGH SENSITIVITY RESULT: 182.3 NG/L — HIGH
TROPONIN I, HIGH SENSITIVITY RESULT: 195.8 NG/L — HIGH
TROPONIN I, HIGH SENSITIVITY RESULT: 204.6 NG/L — HIGH
WBC # BLD: 6.57 K/UL — SIGNIFICANT CHANGE UP (ref 3.8–10.5)
WBC # FLD AUTO: 6.57 K/UL — SIGNIFICANT CHANGE UP (ref 3.8–10.5)

## 2021-11-28 PROCEDURE — 99291 CRITICAL CARE FIRST HOUR: CPT

## 2021-11-28 PROCEDURE — 71045 X-RAY EXAM CHEST 1 VIEW: CPT | Mod: 26

## 2021-11-28 RX ORDER — DEXTROSE 50 % IN WATER 50 %
50 SYRINGE (ML) INTRAVENOUS ONCE
Refills: 0 | Status: COMPLETED | OUTPATIENT
Start: 2021-11-28 | End: 2021-11-28

## 2021-11-28 RX ORDER — ASPIRIN/CALCIUM CARB/MAGNESIUM 324 MG
81 TABLET ORAL DAILY
Refills: 0 | Status: DISCONTINUED | OUTPATIENT
Start: 2021-11-28 | End: 2021-12-07

## 2021-11-28 RX ORDER — FERROUS SULFATE 325(65) MG
1 TABLET ORAL
Qty: 0 | Refills: 0 | DISCHARGE

## 2021-11-28 RX ORDER — CALCIUM GLUCONATE 100 MG/ML
1 VIAL (ML) INTRAVENOUS ONCE
Refills: 0 | Status: COMPLETED | OUTPATIENT
Start: 2021-11-28 | End: 2021-11-28

## 2021-11-28 RX ORDER — APIXABAN 2.5 MG/1
2.5 TABLET, FILM COATED ORAL EVERY 12 HOURS
Refills: 0 | Status: DISCONTINUED | OUTPATIENT
Start: 2021-11-28 | End: 2021-12-04

## 2021-11-28 RX ORDER — LACTULOSE 10 G/15ML
30 SOLUTION ORAL
Qty: 0 | Refills: 0 | DISCHARGE

## 2021-11-28 RX ORDER — DRONEDARONE 400 MG/1
400 TABLET, FILM COATED ORAL
Refills: 0 | Status: DISCONTINUED | OUTPATIENT
Start: 2021-11-28 | End: 2021-12-04

## 2021-11-28 RX ORDER — FUROSEMIDE 40 MG
80 TABLET ORAL ONCE
Refills: 0 | Status: COMPLETED | OUTPATIENT
Start: 2021-11-28 | End: 2021-11-28

## 2021-11-28 RX ORDER — ATORVASTATIN CALCIUM 80 MG/1
1 TABLET, FILM COATED ORAL
Qty: 0 | Refills: 0 | DISCHARGE

## 2021-11-28 RX ORDER — ACETAMINOPHEN 500 MG
650 TABLET ORAL EVERY 6 HOURS
Refills: 0 | Status: DISCONTINUED | OUTPATIENT
Start: 2021-11-28 | End: 2021-12-07

## 2021-11-28 RX ORDER — COLCHICINE 0.6 MG
0.6 TABLET ORAL
Refills: 0 | Status: DISCONTINUED | OUTPATIENT
Start: 2021-11-28 | End: 2021-12-07

## 2021-11-28 RX ORDER — FAMOTIDINE 10 MG/ML
1 INJECTION INTRAVENOUS
Qty: 0 | Refills: 0 | DISCHARGE

## 2021-11-28 RX ORDER — APIXABAN 2.5 MG/1
1 TABLET, FILM COATED ORAL
Qty: 0 | Refills: 0 | DISCHARGE

## 2021-11-28 RX ORDER — NITROGLYCERIN 6.5 MG
2 CAPSULE, EXTENDED RELEASE ORAL ONCE
Refills: 0 | Status: COMPLETED | OUTPATIENT
Start: 2021-11-28 | End: 2021-11-28

## 2021-11-28 RX ORDER — FAMOTIDINE 10 MG/ML
20 INJECTION INTRAVENOUS DAILY
Refills: 0 | Status: DISCONTINUED | OUTPATIENT
Start: 2021-11-28 | End: 2021-12-07

## 2021-11-28 RX ORDER — METOPROLOL TARTRATE 50 MG
25 TABLET ORAL THREE TIMES A DAY
Refills: 0 | Status: DISCONTINUED | OUTPATIENT
Start: 2021-11-28 | End: 2021-11-30

## 2021-11-28 RX ORDER — FOLIC ACID 0.8 MG
1 TABLET ORAL DAILY
Refills: 0 | Status: DISCONTINUED | OUTPATIENT
Start: 2021-11-28 | End: 2021-12-07

## 2021-11-28 RX ORDER — ATORVASTATIN CALCIUM 80 MG/1
80 TABLET, FILM COATED ORAL AT BEDTIME
Refills: 0 | Status: DISCONTINUED | OUTPATIENT
Start: 2021-11-28 | End: 2021-12-07

## 2021-11-28 RX ORDER — DOCUSATE SODIUM 100 MG
1 CAPSULE ORAL
Qty: 0 | Refills: 0 | DISCHARGE

## 2021-11-28 RX ORDER — INSULIN HUMAN 100 [IU]/ML
8 INJECTION, SOLUTION SUBCUTANEOUS ONCE
Refills: 0 | Status: COMPLETED | OUTPATIENT
Start: 2021-11-28 | End: 2021-11-28

## 2021-11-28 RX ORDER — SEVELAMER CARBONATE 2400 MG/1
1600 POWDER, FOR SUSPENSION ORAL THREE TIMES A DAY
Refills: 0 | Status: DISCONTINUED | OUTPATIENT
Start: 2021-11-28 | End: 2021-12-07

## 2021-11-28 RX ORDER — HEPARIN SODIUM 5000 [USP'U]/ML
5000 INJECTION INTRAVENOUS; SUBCUTANEOUS EVERY 8 HOURS
Refills: 0 | Status: DISCONTINUED | OUTPATIENT
Start: 2021-11-28 | End: 2021-11-28

## 2021-11-28 RX ORDER — ACETAMINOPHEN 500 MG
2 TABLET ORAL
Qty: 0 | Refills: 0 | DISCHARGE

## 2021-11-28 RX ORDER — LACTULOSE 10 G/15ML
20 SOLUTION ORAL AT BEDTIME
Refills: 0 | Status: DISCONTINUED | OUTPATIENT
Start: 2021-11-28 | End: 2021-12-07

## 2021-11-28 RX ORDER — ASPIRIN/CALCIUM CARB/MAGNESIUM 324 MG
1 TABLET ORAL
Qty: 0 | Refills: 0 | DISCHARGE

## 2021-11-28 RX ORDER — HYDRALAZINE HCL 50 MG
10 TABLET ORAL ONCE
Refills: 0 | Status: COMPLETED | OUTPATIENT
Start: 2021-11-28 | End: 2021-11-28

## 2021-11-28 RX ORDER — ISOSORBIDE MONONITRATE 60 MG/1
30 TABLET, EXTENDED RELEASE ORAL DAILY
Refills: 0 | Status: DISCONTINUED | OUTPATIENT
Start: 2021-11-28 | End: 2021-12-07

## 2021-11-28 RX ORDER — SEVELAMER CARBONATE 2400 MG/1
2 POWDER, FOR SUSPENSION ORAL
Qty: 0 | Refills: 0 | DISCHARGE

## 2021-11-28 RX ORDER — FERROUS SULFATE 325(65) MG
325 TABLET ORAL DAILY
Refills: 0 | Status: DISCONTINUED | OUTPATIENT
Start: 2021-11-28 | End: 2021-12-07

## 2021-11-28 RX ADMIN — Medication 0.6 MILLIGRAM(S): at 20:30

## 2021-11-28 RX ADMIN — Medication 50 MILLILITER(S): at 11:27

## 2021-11-28 RX ADMIN — DRONEDARONE 400 MILLIGRAM(S): 400 TABLET, FILM COATED ORAL at 20:30

## 2021-11-28 RX ADMIN — SEVELAMER CARBONATE 1600 MILLIGRAM(S): 2400 POWDER, FOR SUSPENSION ORAL at 22:32

## 2021-11-28 RX ADMIN — ATORVASTATIN CALCIUM 80 MILLIGRAM(S): 80 TABLET, FILM COATED ORAL at 22:33

## 2021-11-28 RX ADMIN — Medication 2 INCH(S): at 10:19

## 2021-11-28 RX ADMIN — Medication 80 MILLIGRAM(S): at 10:18

## 2021-11-28 RX ADMIN — Medication 10 MILLIGRAM(S): at 13:09

## 2021-11-28 RX ADMIN — LACTULOSE 20 GRAM(S): 10 SOLUTION ORAL at 22:32

## 2021-11-28 RX ADMIN — Medication 5 MILLIGRAM(S): at 22:30

## 2021-11-28 RX ADMIN — INSULIN HUMAN 8 UNIT(S): 100 INJECTION, SOLUTION SUBCUTANEOUS at 11:26

## 2021-11-28 RX ADMIN — Medication 25 MILLIGRAM(S): at 22:31

## 2021-11-28 RX ADMIN — APIXABAN 2.5 MILLIGRAM(S): 2.5 TABLET, FILM COATED ORAL at 20:29

## 2021-11-28 RX ADMIN — Medication 100 GRAM(S): at 11:27

## 2021-11-28 NOTE — ED PROVIDER NOTE - OBJECTIVE STATEMENT
76 year old male with a PHMx of DM, HTN, n-stage renal on dialysis (Monday, Wednesday, Friday), CVA, AFib presents to the ED with complaints of shortness of breath this morning. Patient states he had dialysis on Friday. States he had complaints of shortness of breath this morning at [facility]. As per EMS, oxygen saturation was at 90% on RA and was immediately given oxygen. Patient was immediately brought to the ED. Patient is now on 3 liters and states he feels much better. Now denies shortness of breath. Also denies chest pain, fever, abdominal pain, vomiting, and diarrhea.   NKDA.

## 2021-11-28 NOTE — H&P ADULT - ATTENDING COMMENTS
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 Admit for HTN emergency, acute hypoxia resp failure 2/2 fluid overload requiring urgent HD.         - ASSESSMENT     - Acute Hypoxic Resp Failure   - Pulmonary edema / Fluid over load   - Hypertensive Emergency   - Severe Pulmo HTN  - CAD  - AFIB   - Dementia       Plan   - O2 supp as needed alternate with BiPaP  - Emergent Dialysis   - Hemodynamic monitoring   - Fluid restriction   - Trend troponin   - After HD , re-evaluate BP meds   - Anticoag   - After Dialysis , re-consult icu

## 2021-11-28 NOTE — H&P ADULT - PROBLEM SELECTOR PLAN 5
continue iron + stool softner Hb 9.8 slight new macrocytosis .7  continue iron + stool softner  - f/u B12 and folate

## 2021-11-28 NOTE — H&P ADULT - NSHPREVIEWOFSYSTEMS_GEN_ALL_CORE
ROS unable to obtain  patient limited verbalization - denies pain, cough or subjective fever. NO chest pain.  Yes to improvement in SOB.

## 2021-11-28 NOTE — CONSULT NOTE ADULT - ASSESSMENT
Assesment:      PLAN:  Pt needs urgent dialysis. ICU will re-eval for need of bipap after dialysis.    -----------------CNS:------------------    -----------------CVS:------------------    -----------------RESPIRATORY:--------    -----------------GI:----------------------    -----------------RENAL: ---------------    -----------------EXTREMITIES:-------    ----------------PROPHYLAXIX: -------  DVT   GI    -----------------DISPOSITION-------- Assesment:   6 year old male with a PHMx of DM, HTN, end-stage renal on dialysis (Monday, Wednesday, Friday), CVA, AFib presents to the ED with complaints of shortness of breath this morning. Icu consulted for new bipap. pt x-ray looks fluid overload. Pt needs urgent dialysis. After dialysis pt can be on nc.  No need for icu admission at this time. Reconsult icu PRN.       PLAN:      -----------------CNS:------------------  AOOX 3  -----------------CVS:------------------  #Hypertensive urgency:  p/w 240/80s  recommend not lower bp moer than 25% of presentation numbers    #elevated bnp  p/w 29k  baseline 20k from sept /21  in setting of ckd low suspicion of any acute cardiac event    #elevated troponin:  p/w trop 0.1  baseline form sep 0.2   in setting of esrd very low suspiscion of any caridiac event acutely  f/u t2,t3    -----------------RESPIRATORY:--------  Acute hypoxic respiratory failure:   Pt in ed on bipap  xray shows some pulmonary edema - more than his last xray  Pt need emergency dialysis for taking out fluid   f/u repeat xray after dialysis   f/u official read    ---------------Heamatology-----------  #Macrocytic Anemia :  p/w hb 9.8  baseline around 9.8  check for causes of macrocytosis b12/folate/ tsh/ alcohol    -----------------GI:----------------------  no acute event   -----------------RENAL: ---------------  hypoerkalemia:  p/w 6.3 with ekg changes  s/p hyperkalemia cocktail in ed  k 5.7  f/u bmp post dialysis   repeat bmp  q 4 if pt dont go to dialysis   treat medically till dialysis       -----------------EXTREMITIES:-------  chronic skin changes presen tin lower extremity    ----------------PROPHYLAXIX: -------  DVT heparin  GI ppi    -----------------DISPOSITION--------   not accepted to icu

## 2021-11-28 NOTE — ED ADULT TRIAGE NOTE - CHIEF COMPLAINT QUOTE
Sending by Hailey Raymond, difficulty breathing Sat was 91%, denied wheezing or cough, h/x HTN, ESRD, M/W/F Sending by Hailey Raymond, difficulty breathing Sat was 91% @NH, denied wheezing or cough, h/x HTN, ESRD, M/W/F

## 2021-11-28 NOTE — CONSULT NOTE ADULT - SUBJECTIVE AND OBJECTIVE BOX
Chief complain/HPI  ESRD due to hypertension dialysis days MWF  He came to the ER for acute SOB that started in am  No c/o CP and no palpitation. No cogh and no fever  He was placed on BIPAP, SOB improved and BIPAP was changed to o2 NC. Dialysis started at 15:15 pm  ER BP was 241/84  His compliance with medication is poor a times refuses to take the NH meds.      PAST MEDICAL & SURGICAL HISTORY:  ESRD (end stage renal disease) on dialysis    Hypertension    Anemia    Cerebrovascular accident (CVA), unspecified mechanism    Paroxysmal atrial fibrillation    CAD (coronary artery disease)    A-V fistula        Home Medications Reviewed    Hospital Medications:   MEDICATIONS  (STANDING):  apixaban 2.5 milliGRAM(s) Oral every 12 hours  aspirin  chewable 81 milliGRAM(s) Oral daily  atorvastatin 80 milliGRAM(s) Oral at bedtime  bisacodyl 5 milliGRAM(s) Oral at bedtime  colchicine 0.6 milliGRAM(s) Oral two times a day  dronedarone 400 milliGRAM(s) Oral two times a day  famotidine    Tablet 20 milliGRAM(s) Oral daily  ferrous    sulfate 325 milliGRAM(s) Oral daily  folic acid 1 milliGRAM(s) Oral daily  isosorbide   mononitrate ER Tablet (IMDUR) 30 milliGRAM(s) Oral daily  lactulose Syrup 20 Gram(s) Oral at bedtime  metoprolol tartrate 25 milliGRAM(s) Oral three times a day  sevelamer carbonate 1600 milliGRAM(s) Oral three times a day    MEDICATIONS  (PRN):  acetaminophen     Tablet .. 650 milliGRAM(s) Oral every 6 hours PRN Mild Pain (1 - 3), Moderate Pain (4 - 6)      Allergies    No Known Allergies    Intolerances                              9.8    6.57  )-----------( 152      ( 28 Nov 2021 10:10 )             30.7     11-28    140  |  100  |  67<H>  ----------------------------<  80  5.7<H>   |  31  |  8.82<H>    Ca    9.8      28 Nov 2021 13:20  Phos  6.2     11-28  Mg     2.5     11-28    TPro  8.0  /  Alb  3.4<L>  /  TBili  0.6  /  DBili  x   /  AST  23  /  ALT  53  /  AlkPhos  225<H>  11-28              RADIOLOGY & ADDITIONAL STUDIES:    SOCIAL HISTORY: Denies ETOh,Smoking,     FAMILY HISTORY:  Family history of diabetes mellitus        REVIEW OF SYSTEMS:  CONSTITUTIONAL: No malaise, No fatigue, No fevers or chills, well developed, no diaphoresis  EYES/ENT: reduced vision  RESPIRATORY:Feels better now and denies  shortness of breath  CARDIOVASCULAR: No chest pain or palpitations. No edema  GASTROINTESTINAL: No abdominal or epigastric pain. No nausea, vomiting, or hematemesis; No diarrhea or constipation. No melena or hematochezia.  GENITOURINARY: No dysuria, frequency, foamy urine, urinary urgency, incontinence or hematuria  NEUROLOGICAL: bed bound   VITALS:  Vital Signs Last 24 Hrs  T(C): 36.5 (28 Nov 2021 11:17), Max: 36.6 (28 Nov 2021 09:04)  T(F): 97.7 (28 Nov 2021 11:17), Max: 97.8 (28 Nov 2021 09:04)  HR: 66 (28 Nov 2021 12:11) (66 - 70)  BP: 195/78 (28 Nov 2021 11:17) (181/77 - 241/84)  BP(mean): --  RR: 17 (28 Nov 2021 11:17) (12 - 24)  SpO2: 100% (28 Nov 2021 12:11) (95% - 100%)    Height (cm): 165.1 (11-28 @ 09:04)  Weight (kg): 77.1 (11-28 @ 09:04)  BMI (kg/m2): 28.3 (11-28 @ 09:04)  BSA (m2): 1.85 (11-28 @ 09:04)    PHYSICAL EXAM:  Constitutional: NAD  HEENT: anicteric sclera, oropharynx clear, MMM  Neck: No JVD  Respiratory: Crackles at the base  Cardiovascular: S1, S2, RRR, no pericardial rub, no murmur  Gastrointestinal: BS+, soft, no tenderness, no distension, no bruit  Pelvis: bladder non-distended, no CVA tenderness  Extremities: No cyanosis or clubbing. No peripheral edema  Neurological: A/O x 3, no focal deficits  Psychiatric: Normal mood, normal affect  Access: right AVG with bruit and thrill

## 2021-11-28 NOTE — H&P ADULT - NSHPPHYSICALEXAM_GEN_ALL_CORE
PHYSICAL EXAMINATION:  GENERAL: NAD, overweight. on BiPAP sat 100%  HEAD:  Atraumatic, Normocephalic  EYES:  conjunctiva and sclera clear  NECK: Supple, No JVD, Normal thyroid  CHEST/LUNG: b/l crackles at lung bases; No rales, wheezing, or rubs  HEART: Regular rate and rhythm; No murmurs, rubs, or gallops  ABDOMEN: Soft, Nontender, Nondistended; Bowel sounds present  NERVOUS SYSTEM:  Alert , single word replies on BiPAP.   EXTREMITIES:  2+ Peripheral Pulses, No clubbing, cyanosis, +1 b/l LE pitting edema  SKIN: warm moist never used

## 2021-11-28 NOTE — H&P ADULT - ASSESSMENT
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 Admit for HTN emergency, hypoxia 2/2 fluid overload requiring urgent HD.

## 2021-11-28 NOTE — H&P ADULT - PROBLEM SELECTOR PLAN 3
on HD MWF last HD on Friday 11/26 , no missed HD  CXR with fluid OD, b/l LE pitting edema  - BiPAP in ED  - urgent HD today 11/28  - resume home meds sevelamer, colcrys   - monitor electrolytes, post HD labs  NEPHRO Dr. Pritchard

## 2021-11-28 NOTE — ED PROVIDER NOTE - PROGRESS NOTE DETAILS
hyperkalemia noted.  CXR with congestion.  Dr. Jacinto called for dialysis.  ICU consulted for new onset Bipap. emergency dialysis to be done.  Pt seen by ICU, recommend floor admission post dialysis.

## 2021-11-28 NOTE — H&P ADULT - PROBLEM SELECTOR PLAN 1
BP at NH wnl but elevated peak 241/84 w/ pulm edema on CXR  BNP 30k and Trop elevation  - s/p Lasix 80 and Hydral 10  - pending Urgent HD  - resume home meds  - labetalol or hydral PRN   - vs q 4  - trend trop  - transition to NC after HD before going to floor  - admit to tele   NEPHRO Dr. Pritchard BP at NH wnl but elevated peak 241/84 w/ pulm edema on CXR  BNP 30k and Trop elevation  - goal is reduce BP by 25% in 24 hours  - s/p Lasix 80 and Hydral 10  - pending Urgent HD  - resume home meds  - labetalol or hydral PRN   - vs q 4  - trend trop T3  - transition to NC after HD before going to floor  - admit to tele   NEPHRO Dr. Pritchard

## 2021-11-28 NOTE — CONSULT NOTE ADULT - ASSESSMENT
ESRD , dialysis days MWF  Dyspnea on admission with elevated BP  Possible cause Hypertensive emergency, diastolic HF , cardiac arrythmia, ACS, increased fluid intake.      Hyperkalemia, last dialysis Friday.   Dialysis today 2 K bath , 2gm K diet.    Patient is having dialysis 3 hours UF 2.5 kg  Follow BP after dialysis, continue Metoprolol  Follow troponin level .

## 2021-11-28 NOTE — H&P ADULT - HISTORY OF PRESENT ILLNESS
75 yo M from [facility], w/ ESRD (on HD MWF), HTN, Severe Pulm HTN, CHFpEF >55% w/ GIIDD, Anemia of CKD, NSTEMI, CAD, parox Afib, CVA w/ RIGHT dominant hemiparesis, Vascular Dementia and Mood disorder who was BIB EMS for acute onset SOB 2/2 fluid overload. Last HD was Fri 11/26; no missed dialysis. On 11/28 in AM was complaining of SOB. O2 sat in low 91% requiring 4L NC w/ improvement to 98% and VS has normal /82. Send to [facility].   Was started on BiPAP in ED due to tachypnea. BNP ~ 30,000. /84. CXR with b/l pulm edema. Nephro consulted for urgent HD. ICU consulted for new BiPAP - cleared patient to be admitted to floors after HD once tolerates NC.   s/p Lasix 80, Hydral 10 and Insulin 8U + dextrose for Hyperkalemia 6.3. On repeat reduced to 5.7. Trop elevated 182 > 204. Will continue trend. On medicine eval patient is sat 100% on BiPAP. Denies any chest pain, N/V, cough or subjective fevers. Limited 1-2 word replies yes/no questions. Reports improvement in SOB.  Kylie Almendarez Primary POC: [phone]

## 2021-11-28 NOTE — ED PROVIDER NOTE - NS ED ATTENDING STATEMENT MOD
Attending Only I have personally provided the amount of critical care time documented below concurrently with the resident/fellow.  This time excludes time spent on separate procedures and time spent teaching. I have reviewed the resident’s / fellow’s documentation and I agree with the history, exam, and assessment and plan of care.

## 2021-11-28 NOTE — ED PROVIDER NOTE - CLINICAL SUMMARY MEDICAL DECISION MAKING FREE TEXT BOX
Patient was sent for shortness of breath and hypoxia. Patient is currently comfortable with nasal cannula. Will do labs and chest x-ray.

## 2021-11-28 NOTE — H&P ADULT - PROBLEM SELECTOR PLAN 7
prior Echo on 9/28/21 showed RV systolic pressure is 69 mm Hg. Severe pulmonary hypertension.  - f/u repeat echo  - admit to tele  - [facility] meds IMDUR, metop, Multaq

## 2021-11-28 NOTE — ED ADULT NURSE NOTE - OBJECTIVE STATEMENT
patient biba for sob that started this am, pt is a&o x3, sat 95% on RA, 99% on 2L O2 respiratory rate is 24, dialysis pt, MWF

## 2021-11-28 NOTE — ED ADULT NURSE NOTE - CHIEF COMPLAINT QUOTE
Sending by Hailey Raymond, difficulty breathing Sat was 91%, denied wheezing or cough, h/x HTN, ESRD, M/W/F

## 2021-11-28 NOTE — CONSULT NOTE ADULT - SUBJECTIVE AND OBJECTIVE BOX
Patient is a 76y old  Male who presents with a chief complaint of esrd  chf (28 Nov 2021 11:36)      HPI:      Allergies    No Known Allergies    Intolerances        MEDICATIONS  (STANDING):    MEDICATIONS  (PRN):      Daily Height in cm: 165.1 (28 Nov 2021 09:04)    Daily     Drug Dosing Weight  Height (cm): 165.1 (28 Nov 2021 09:04)  Weight (kg): 77.1 (28 Nov 2021 09:04)  BMI (kg/m2): 28.3 (28 Nov 2021 09:04)  BSA (m2): 1.85 (28 Nov 2021 09:04)    PAST MEDICAL & SURGICAL HISTORY:  ESRD (end stage renal disease) on dialysis    Hypertension    Anemia    Cerebrovascular accident (CVA), unspecified mechanism    Paroxysmal atrial fibrillation    CAD (coronary artery disease)    A-V fistula        FAMILY HISTORY:  Family history of diabetes mellitus        SOCIAL HISTORY:    ADVANCE DIRECTIVES:    REVIEW OF SYSTEMS:    CONSTITUTIONAL: No fever, weight loss, or fatigue  EYES: No eye pain, visual disturbances, or discharge  ENMT:  No difficulty hearing, tinnitus, vertigo; No sinus or throat pain  NECK: No pain or stiffness  BREASTS: No pain, masses, or nipple discharge  RESPIRATORY: No cough, wheezing, chills or hemoptysis; No shortness of breath  CARDIOVASCULAR: No chest pain, palpitations, dizziness, or leg swelling  GASTROINTESTINAL: No abdominal or epigastric pain. No nausea, vomiting, or hematemesis; No diarrhea or constipation. No melena or hematochezia.  GENITOURINARY: No dysuria, frequency, hematuria, or incontinence  NEUROLOGICAL: No headaches, memory loss, loss of strength, numbness, or tremors  SKIN: No itching, burning, rashes, or lesions   LYMPH NODES: No enlarged glands  ENDOCRINE: No heat or cold intolerance; No hair loss  MUSCULOSKELETAL: No joint pain or swelling; No muscle, back, or extremity pain  PSYCHIATRIC: No depression, anxiety, mood swings, or difficulty sleeping  HEME/LYMPH: No easy bruising, or bleeding gums  ALLERGY AND IMMUNOLOGIC: No hives or eczema          ICU Vital Signs Last 24 Hrs  T(C): 36.5 (28 Nov 2021 11:17), Max: 36.6 (28 Nov 2021 09:04)  T(F): 97.7 (28 Nov 2021 11:17), Max: 97.8 (28 Nov 2021 09:04)  HR: 70 (28 Nov 2021 11:17) (68 - 70)  BP: 195/78 (28 Nov 2021 11:17) (181/77 - 241/84)  BP(mean): --  ABP: --  ABP(mean): --  RR: 17 (28 Nov 2021 11:17) (12 - 24)  SpO2: 99% (28 Nov 2021 11:17) (95% - 100%)          I&O's Detail      PHYSICAL EXAM:    GENERAL: NAD, well-groomed, well-developed  HEAD:  Atraumatic, Normocephalic  EYES: EOMI, PERRLA, conjunctiva and sclera clear  ENMT: No tonsillar erythema, exudates, or enlargement; Moist mucous membranes, Good dentition, No lesions  NECK: Supple, No JVD, Normal thyroid  NERVOUS SYSTEM:  Alert & Oriented X3, Good concentration; Motor Strength 5/5 B/L upper and lower extremities; DTRs 2+ intact and symmetric  CHEST/LUNG: Clear to percussion bilaterally; No rales, rhonchi, wheezing, or rubs  HEART: Regular rate and rhythm; No murmurs, rubs, or gallops  ABDOMEN: Soft, Nontender, Nondistended; Bowel sounds present  EXTREMITIES:  2+ Peripheral Pulses, No clubbing, cyanosis, or edema  LYMPH: No lymphadenopathy noted  SKIN: No rashes or lesions    LABS:  CBC Full  -  ( 28 Nov 2021 10:10 )  WBC Count : 6.57 K/uL  RBC Count : 2.99 M/uL  Hemoglobin : 9.8 g/dL  Hematocrit : 30.7 %  Platelet Count - Automated : 152 K/uL  Mean Cell Volume : 102.7 fl  Mean Cell Hemoglobin : 32.8 pg  Mean Cell Hemoglobin Concentration : 31.9 gm/dL  Auto Neutrophil # : 4.56 K/uL  Auto Lymphocyte # : 1.21 K/uL  Auto Monocyte # : 0.65 K/uL  Auto Eosinophil # : 0.11 K/uL  Auto Basophil # : 0.03 K/uL  Auto Neutrophil % : 69.3 %  Auto Lymphocyte % : 18.4 %  Auto Monocyte % : 9.9 %  Auto Eosinophil % : 1.7 %  Auto Basophil % : 0.5 %    11-28    138  |  99  |  63<H>  ----------------------------<  96  6.3<HH>   |  31  |  8.46<H>    Ca    8.5      28 Nov 2021 10:10  Mg     2.5     11-28    TPro  8.0  /  Alb  3.4<L>  /  TBili  0.6  /  DBili  x   /  AST  23  /  ALT  53  /  AlkPhos  225<H>  11-28    CAPILLARY BLOOD GLUCOSE                    EKG:    ECHO, US:    RADIOLOGY:    CRITICAL CARE TIME SPENT:   Patient is a 76y old  Male who presents with a chief complaint of esrd  chf (28 Nov 2021 11:36)      HPI:  76 year old male with a PHMx of DM, HTN, end-stage renal on dialysis (Monday, Wednesday, Friday), CVA, AFib presents to the ED with complaints of shortness of breath this morning. Patient states he had dialysis on Friday. Pt States he had complaints of shortness of breath this morning at [facility]. As per EMS, oxygen saturation was at 90% on RA and was immediately given oxygen. Patient was immediately brought to the ED. Also denies chest pain, fever, abdominal pain, vomiting, and diarrhea.       Allergies    No Known Allergies    Intolerances        MEDICATIONS  (STANDING):    MEDICATIONS  (PRN):      Daily Height in cm: 165.1 (28 Nov 2021 09:04)    Daily     Drug Dosing Weight  Height (cm): 165.1 (28 Nov 2021 09:04)  Weight (kg): 77.1 (28 Nov 2021 09:04)  BMI (kg/m2): 28.3 (28 Nov 2021 09:04)  BSA (m2): 1.85 (28 Nov 2021 09:04)    PAST MEDICAL & SURGICAL HISTORY:  ESRD (end stage renal disease) on dialysis    Hypertension    Anemia    Cerebrovascular accident (CVA), unspecified mechanism    Paroxysmal atrial fibrillation    CAD (coronary artery disease)    A-V fistula        FAMILY HISTORY:  Family history of diabetes mellitus        SOCIAL HISTORY:    ADVANCE DIRECTIVES:              ICU Vital Signs Last 24 Hrs  T(C): 36.5 (28 Nov 2021 11:17), Max: 36.6 (28 Nov 2021 09:04)  T(F): 97.7 (28 Nov 2021 11:17), Max: 97.8 (28 Nov 2021 09:04)  HR: 70 (28 Nov 2021 11:17) (68 - 70)  BP: 195/78 (28 Nov 2021 11:17) (181/77 - 241/84)  BP(mean): --  ABP: --  ABP(mean): --  RR: 17 (28 Nov 2021 11:17) (12 - 24)  SpO2: 99% (28 Nov 2021 11:17) (95% - 100%)          I&O's Detail      PHYSICAL EXAM:    GENERAL: pt on bipap  HEAD:  Atraumatic, Normocephalic  EYES: EOMI, PERRLA, conjunctiva and sclera clear  ENMT: limited  NECK: Supple, No JVD, Normal thyroid  NERVOUS SYSTEM:  awake, Alert, no new focal deficit  CHEST/LUNG: No rales, rhonchi, + wheezing, or rubs  HEART: s1,s2 , No murmurs, rubs, or gallops  ABDOMEN: Soft, Nontender, Nondistended; Bowel sounds present  EXTREMITIES:  2+ Peripheral Pulses, No clubbing, cyanosis, or edema  LYMPH: No lymphadenopathy noted  SKIN: No rashes or lesions    LABS:  CBC Full  -  ( 28 Nov 2021 10:10 )  WBC Count : 6.57 K/uL  RBC Count : 2.99 M/uL  Hemoglobin : 9.8 g/dL  Hematocrit : 30.7 %  Platelet Count - Automated : 152 K/uL  Mean Cell Volume : 102.7 fl  Mean Cell Hemoglobin : 32.8 pg  Mean Cell Hemoglobin Concentration : 31.9 gm/dL  Auto Neutrophil # : 4.56 K/uL  Auto Lymphocyte # : 1.21 K/uL  Auto Monocyte # : 0.65 K/uL  Auto Eosinophil # : 0.11 K/uL  Auto Basophil # : 0.03 K/uL  Auto Neutrophil % : 69.3 %  Auto Lymphocyte % : 18.4 %  Auto Monocyte % : 9.9 %  Auto Eosinophil % : 1.7 %  Auto Basophil % : 0.5 %    11-28    138  |  99  |  63<H>  ----------------------------<  96  6.3<HH>   |  31  |  8.46<H>    Ca    8.5      28 Nov 2021 10:10  Mg     2.5     11-28    TPro  8.0  /  Alb  3.4<L>  /  TBili  0.6  /  DBili  x   /  AST  23  /  ALT  53  /  AlkPhos  225<H>  11-28    CAPILLARY BLOOD GLUCOSE                    EKG:    ECHO, US:    RADIOLOGY:    CRITICAL CARE TIME SPENT:

## 2021-11-28 NOTE — PROGRESS NOTE ADULT - SUBJECTIVE AND OBJECTIVE BOX
Received a call from ER physician  Patient with SOB on BIPAP  ESRD  xr chest reviewed  I called the nurse supervisor for emergency dialysis

## 2021-11-29 ENCOUNTER — TRANSCRIPTION ENCOUNTER (OUTPATIENT)
Age: 76
End: 2021-11-29

## 2021-11-29 LAB
ALBUMIN SERPL ELPH-MCNC: 2.8 G/DL — LOW (ref 3.5–5)
ALP SERPL-CCNC: 190 U/L — HIGH (ref 40–120)
ALT FLD-CCNC: 36 U/L DA — SIGNIFICANT CHANGE UP (ref 10–60)
ANION GAP SERPL CALC-SCNC: 6 MMOL/L — SIGNIFICANT CHANGE UP (ref 5–17)
AST SERPL-CCNC: 13 U/L — SIGNIFICANT CHANGE UP (ref 10–40)
BASOPHILS # BLD AUTO: 0.02 K/UL — SIGNIFICANT CHANGE UP (ref 0–0.2)
BASOPHILS NFR BLD AUTO: 0.5 % — SIGNIFICANT CHANGE UP (ref 0–2)
BILIRUB SERPL-MCNC: 0.8 MG/DL — SIGNIFICANT CHANGE UP (ref 0.2–1.2)
BUN SERPL-MCNC: 42 MG/DL — HIGH (ref 7–18)
CALCIUM SERPL-MCNC: 8.7 MG/DL — SIGNIFICANT CHANGE UP (ref 8.4–10.5)
CHLORIDE SERPL-SCNC: 101 MMOL/L — SIGNIFICANT CHANGE UP (ref 96–108)
CO2 SERPL-SCNC: 31 MMOL/L — SIGNIFICANT CHANGE UP (ref 22–31)
CREAT SERPL-MCNC: 6.61 MG/DL — HIGH (ref 0.5–1.3)
EOSINOPHIL # BLD AUTO: 0.13 K/UL — SIGNIFICANT CHANGE UP (ref 0–0.5)
EOSINOPHIL NFR BLD AUTO: 3.3 % — SIGNIFICANT CHANGE UP (ref 0–6)
GLUCOSE SERPL-MCNC: 120 MG/DL — HIGH (ref 70–99)
HAV IGM SER-ACNC: SIGNIFICANT CHANGE UP
HBV CORE IGM SER-ACNC: SIGNIFICANT CHANGE UP
HBV SURFACE AG SER-ACNC: SIGNIFICANT CHANGE UP
HCT VFR BLD CALC: 27.8 % — LOW (ref 39–50)
HCV AB S/CO SERPL IA: 0.17 S/CO — SIGNIFICANT CHANGE UP (ref 0–0.99)
HCV AB SERPL-IMP: SIGNIFICANT CHANGE UP
HGB BLD-MCNC: 8.9 G/DL — LOW (ref 13–17)
IMM GRANULOCYTES NFR BLD AUTO: 0.3 % — SIGNIFICANT CHANGE UP (ref 0–1.5)
LYMPHOCYTES # BLD AUTO: 1 K/UL — SIGNIFICANT CHANGE UP (ref 1–3.3)
LYMPHOCYTES # BLD AUTO: 25.1 % — SIGNIFICANT CHANGE UP (ref 13–44)
MAGNESIUM SERPL-MCNC: 2.4 MG/DL — SIGNIFICANT CHANGE UP (ref 1.6–2.6)
MCHC RBC-ENTMCNC: 32 GM/DL — SIGNIFICANT CHANGE UP (ref 32–36)
MCHC RBC-ENTMCNC: 32.8 PG — SIGNIFICANT CHANGE UP (ref 27–34)
MCV RBC AUTO: 102.6 FL — HIGH (ref 80–100)
MONOCYTES # BLD AUTO: 0.55 K/UL — SIGNIFICANT CHANGE UP (ref 0–0.9)
MONOCYTES NFR BLD AUTO: 13.8 % — SIGNIFICANT CHANGE UP (ref 2–14)
NEUTROPHILS # BLD AUTO: 2.28 K/UL — SIGNIFICANT CHANGE UP (ref 1.8–7.4)
NEUTROPHILS NFR BLD AUTO: 57 % — SIGNIFICANT CHANGE UP (ref 43–77)
NRBC # BLD: 0 /100 WBCS — SIGNIFICANT CHANGE UP (ref 0–0)
PHOSPHATE SERPL-MCNC: 5.6 MG/DL — HIGH (ref 2.5–4.5)
PLATELET # BLD AUTO: 145 K/UL — LOW (ref 150–400)
POTASSIUM SERPL-MCNC: 4.8 MMOL/L — SIGNIFICANT CHANGE UP (ref 3.5–5.3)
POTASSIUM SERPL-SCNC: 4.8 MMOL/L — SIGNIFICANT CHANGE UP (ref 3.5–5.3)
PROT SERPL-MCNC: 6.9 G/DL — SIGNIFICANT CHANGE UP (ref 6–8.3)
RBC # BLD: 2.71 M/UL — LOW (ref 4.2–5.8)
RBC # FLD: 17 % — HIGH (ref 10.3–14.5)
SODIUM SERPL-SCNC: 138 MMOL/L — SIGNIFICANT CHANGE UP (ref 135–145)
WBC # BLD: 3.99 K/UL — SIGNIFICANT CHANGE UP (ref 3.8–10.5)
WBC # FLD AUTO: 3.99 K/UL — SIGNIFICANT CHANGE UP (ref 3.8–10.5)

## 2021-11-29 RX ADMIN — Medication 25 MILLIGRAM(S): at 21:39

## 2021-11-29 RX ADMIN — FAMOTIDINE 20 MILLIGRAM(S): 10 INJECTION INTRAVENOUS at 11:39

## 2021-11-29 RX ADMIN — LACTULOSE 20 GRAM(S): 10 SOLUTION ORAL at 21:35

## 2021-11-29 RX ADMIN — APIXABAN 2.5 MILLIGRAM(S): 2.5 TABLET, FILM COATED ORAL at 05:21

## 2021-11-29 RX ADMIN — Medication 325 MILLIGRAM(S): at 11:40

## 2021-11-29 RX ADMIN — ATORVASTATIN CALCIUM 80 MILLIGRAM(S): 80 TABLET, FILM COATED ORAL at 21:35

## 2021-11-29 RX ADMIN — ISOSORBIDE MONONITRATE 30 MILLIGRAM(S): 60 TABLET, EXTENDED RELEASE ORAL at 11:39

## 2021-11-29 RX ADMIN — Medication 25 MILLIGRAM(S): at 13:28

## 2021-11-29 RX ADMIN — Medication 5 MILLIGRAM(S): at 21:41

## 2021-11-29 RX ADMIN — Medication 1 MILLIGRAM(S): at 11:39

## 2021-11-29 RX ADMIN — SEVELAMER CARBONATE 1600 MILLIGRAM(S): 2400 POWDER, FOR SUSPENSION ORAL at 05:21

## 2021-11-29 RX ADMIN — SEVELAMER CARBONATE 1600 MILLIGRAM(S): 2400 POWDER, FOR SUSPENSION ORAL at 13:28

## 2021-11-29 RX ADMIN — Medication 81 MILLIGRAM(S): at 11:39

## 2021-11-29 RX ADMIN — Medication 0.6 MILLIGRAM(S): at 05:21

## 2021-11-29 RX ADMIN — DRONEDARONE 400 MILLIGRAM(S): 400 TABLET, FILM COATED ORAL at 05:23

## 2021-11-29 RX ADMIN — SEVELAMER CARBONATE 1600 MILLIGRAM(S): 2400 POWDER, FOR SUSPENSION ORAL at 21:35

## 2021-11-29 RX ADMIN — Medication 25 MILLIGRAM(S): at 05:21

## 2021-11-29 NOTE — DISCHARGE NOTE PROVIDER - CARE PROVIDER_API CALL
Rivera Preciado  [address] [place] [address]  Phone: [phone]  Fax: (   )    -  Follow Up Time:    Rivera Preciado  [address] [place] [address]  Phone: [phone]  Fax: (   )    -  Follow Up Time:     Elizabeth Salinas  INTERNAL MEDICINE  [address] [place] [address]  Phone: [phone]  Fax: [phone]  Follow Up Time:

## 2021-11-29 NOTE — PROGRESS NOTE ADULT - ASSESSMENT
ESRD dialysis days MWF  CHF resolved with dialysis  Follow up need for dialysis in am , or back to regular schedule on Wednesday.    Hypepo4 continue Sevelamer  Anemia continue with ALICIA    Hypertension stable  AF stable.

## 2021-11-29 NOTE — PROGRESS NOTE ADULT - PROBLEM SELECTOR PLAN 5
Hb 9.8 slight new macrocytosis .7  continue iron + stool softner  - f/u B12 and folate Hb 9.8 slight new macrocytosis .7  continue iron + stool softener  - f/u B12 and folate

## 2021-11-29 NOTE — DISCHARGE NOTE PROVIDER - HOSPITAL COURSE
Patient is a 77 y/o M from [facility]. He has past medical history of ESRD on HD (MWF), HTN, severe pulmonary hypertension (CHF w/ pEF > 55%; GIIDD), Anemia of CKD, NSTEMI, CAD, paroxysmal Atrial fibrillation, vascular dementia, CVA w/ R hemiparesis and mood disorder. He was brought in by EMS due to acute onset of shortness of breath secondary to fluid overload. His last HD was on 11/28/21. His O2 saturation was 91% requiring 4LPM of O2 via nasal cannula. His oxygen status improved to 98%. In the E.D, he was tachypneic and started on BiPAP. BNP was elevated at 30,000. Blood pressure was high at 241/84/ Chest Xray showed bilateral pulmonary edema. Nephrology (Dr. Pritchard) was consulted and he underwent emergency HD. ICU was also consulted and he was cleared to be admitted to the floors on nasal cannula. He was given dose of Lasix 80mg IV, Hydralazine 10 mg IV and Insulin 8u with dextrose for Hyperkalemia of 6.3. Serum potassium eventually improved. Troponin was initially elevated but trended down since.     Patient was stable, improved and was subsequently discharged. Patient is a 75 y/o M from [facility]. He has past medical history of ESRD on HD (MWF), HTN, severe pulmonary hypertension (CHF w/ pEF > 55%; GIIDD), Anemia of CKD, NSTEMI, CAD, paroxysmal Atrial fibrillation, vascular dementia, CVA w/ R hemiparesis and mood disorder. He was brought in by EMS due to acute onset of shortness of breath secondary to fluid overload. His last HD was on 11/28/21. His O2 saturation was 91% requiring 4LPM of O2 via nasal cannula. His oxygen status improved to 98%. In the E.D, he was tachypneic and started on BiPAP. BNP was elevated at 30,000. Blood pressure was high at 241/84/ Chest Xray showed bilateral pulmonary edema. Nephrology (Dr. Pritchard) was consulted and he underwent emergency HD. ICU was also consulted and he was cleared to be admitted to the floors on nasal cannula. He was given dose of Lasix 80mg IV, Hydralazine 10 mg IV and Insulin 8u with dextrose for Hyperkalemia of 6.3. Serum potassium eventually improved. Troponin was initially elevated but trended down since. He still has some episodes of bradycardia on telemetry. Lopressor was creased in half from 25mg to 12.5mg. He still has asymptomatic bradycardia. Cardiology was recommending AICD placement but patient keeps on refusing initially but finally consented. Micra was inserted on 12/7/21 c/o EP (Dr. Gay). Overnight he developed severe left-sided chest pain. He had similar episodes in the past. EKG was negative for any ischemia. Troponin was mildly elevated but downtrended. CTA Chest was was negative for pulmonary embolism..He was given tylenol, diluadid and NTG to relieve the pain. Chest pain continued to be severe and worsen. Repeat EKG showed NJ and ST changes suggesting STEMI. Cardiology came and suspected pericarditis. Repeat ECHO was done and showed moderate pericardial fluid and pericarditis. He was started on Colchicine. His chest pain improved. Palliative talked to the family for Goals of Care.     Patient was stable, improved and was subsequently discharged to [facility]. He may resume his home medication and hemodialysis as outpatient. He may follow-up with his primary care doctor.   Patient is a 75 y/o M from [facility]. He has past medical history of ESRD on HD (MWF), HTN, severe pulmonary hypertension (CHF w/ pEF > 55%; GIIDD), Anemia of CKD, NSTEMI, CAD, paroxysmal Atrial fibrillation, vascular dementia, CVA w/ R hemiparesis and mood disorder. He was brought in by EMS due to acute onset of shortness of breath secondary to fluid overload. His last HD was on 11/28/21. His O2 saturation was 91% requiring 4LPM of O2 via nasal cannula. His oxygen status improved to 98%. In the E.D, he was tachypneic and started on BiPAP. BNP was elevated at 30,000. Blood pressure was high at 241/84/ Chest Xray showed bilateral pulmonary edema. Nephrology (Dr. Pritchard) was consulted and he underwent emergency HD. ICU was also consulted and he was cleared to be admitted to the floors on nasal cannula. He was given dose of Lasix 80mg IV, Hydralazine 10 mg IV and Insulin 8u with dextrose for Hyperkalemia of 6.3. Serum potassium eventually improved. Troponin was initially elevated but trended down since. He still has some episodes of bradycardia on telemetry. Lopressor was creased in half from 25mg to 12.5mg. He still has asymptomatic bradycardia. Cardiology was recommending AICD placement but patient keeps on refusing initially but finally consented. Micra was inserted on 12/7/21 c/o EP (Dr. Gay). Overnight he developed severe left-sided chest pain. He had similar episodes in the past. EKG was negative for any ischemia. Troponin was mildly elevated but downtrended. CTA Chest was was negative for pulmonary embolism..He was given tylenol, diluadid and NTG to relieve the pain. Chest pain continued to be severe and worsen. Repeat EKG showed MT and ST changes suggesting STEMI. Cardiology came and suspected pericarditis. Repeat ECHO was done and showed moderate pericardial fluid and pericarditis. He was started on Colchicine. His chest pain improved. Serial cardiac ultrasounds showed intial worsening, then improvement of effusion. Palliative talked to the family for Goals of Care.     Patient was stable, improved and was subsequently discharged to [facility]. He may resume his home medication and hemodialysis as outpatient. He may follow-up with his primary care doctor.

## 2021-11-29 NOTE — PROGRESS NOTE ADULT - SUBJECTIVE AND OBJECTIVE BOX
Problem List:  ESRD  Admitted with CHF underwent dialysis yesterday   UF 2.6 kg    PAST MEDICAL & SURGICAL HISTORY:  ESRD (end stage renal disease) on dialysis    Hypertension    Anemia    Cerebrovascular accident (CVA), unspecified mechanism    Paroxysmal atrial fibrillation    CAD (coronary artery disease)    A-V fistula        No Known Allergies      MEDICATIONS  (STANDING):  apixaban 2.5 milliGRAM(s) Oral every 12 hours  aspirin  chewable 81 milliGRAM(s) Oral daily  atorvastatin 80 milliGRAM(s) Oral at bedtime  bisacodyl 5 milliGRAM(s) Oral at bedtime  colchicine 0.6 milliGRAM(s) Oral two times a day  dronedarone 400 milliGRAM(s) Oral two times a day  famotidine    Tablet 20 milliGRAM(s) Oral daily  ferrous    sulfate 325 milliGRAM(s) Oral daily  folic acid 1 milliGRAM(s) Oral daily  isosorbide   mononitrate ER Tablet (IMDUR) 30 milliGRAM(s) Oral daily  lactulose Syrup 20 Gram(s) Oral at bedtime  metoprolol tartrate 25 milliGRAM(s) Oral three times a day  sevelamer carbonate 1600 milliGRAM(s) Oral three times a day    MEDICATIONS  (PRN):  acetaminophen     Tablet .. 650 milliGRAM(s) Oral every 6 hours PRN Mild Pain (1 - 3), Moderate Pain (4 - 6)                            8.9    3.99  )-----------( 145      ( 29 Nov 2021 09:11 )             27.8     11-29    138  |  101  |  42<H>  ----------------------------<  120<H>  4.8   |  31  |  6.61<H>    Ca    8.7      29 Nov 2021 09:11  Phos  5.6     11-29  Mg     2.4     11-29    TPro  6.9  /  Alb  2.8<L>  /  TBili  0.8  /  DBili  x   /  AST  13  /  ALT  36  /  AlkPhos  190<H>  11-29            REVIEW OF SYSTEMS:  General: no fever no chills, no weight loss.  EYES/ENT: No visual changes;  No vertigo, no headache.    RESPIRATORY: No cough, wheezing, hemoptysis; No shortness of breath  CARDIOVASCULAR: No chest pain or palpitations. No Edema  GASTROINTESTINAL: No abdominal or epigastric pain. No nausea, vomiting. No diarrhea or constipation. No melena.          VITALS:  T(F): 98.2 (11-29-21 @ 11:07), Max: 98.5 (11-29-21 @ 00:01)  HR: 58 (11-29-21 @ 11:07)  BP: 136/46 (11-29-21 @ 11:07)  RR: 19 (11-29-21 @ 11:07)  SpO2: 100% (11-29-21 @ 11:37)  Wt(kg): --    11-28 @ 07:01  -  11-29 @ 07:00  --------------------------------------------------------  IN: 600 mL / OUT: 2500 mL / NET: -1900 mL        PHYSICAL EXAM:  Constitutional: well developed, no diaphoresis, no distress.  Neck: No JVD, no carotid bruit, supple, no adenopathy  Respiratory:  air entrance B/L, no wheezes, rales or rhonchi  Cardiovascular: S1, S2, RRR, no pericardial rub, no murmur  Abdomen: BS+, soft, no tenderness, no bruit  Pelvis: bladder nondistended  Extremities: No cyanosis or clubbing. No peripheral edema.     Vascular Access: right AVG with bruit and thrill

## 2021-11-29 NOTE — PROGRESS NOTE ADULT - SUBJECTIVE AND OBJECTIVE BOX
PGY-1 Progress Note discussed with attending    PAGER #: [[phone]] TILL 5:00 PM  PLEASE CONTACT ON CALL TEAM:  - On Call Team (Please refer to Mary) FROM 5:00 PM - 8:30PM  - Nightfloat Team FROM 8:30 -7:30 AM    CHIEF COMPLAINT & BRIEF HOSPITAL COURSE:      INTERVAL HPI/OVERNIGHT EVENTS:       REVIEW OF SYSTEMS:  CONSTITUTIONAL: No fever, weight loss, or fatigue  RESPIRATORY: No cough, wheezing, chills or hemoptysis; No shortness of breath  CARDIOVASCULAR: No chest pain, palpitations, dizziness, or leg swelling  GASTROINTESTINAL: No abdominal pain. No nausea, vomiting, or hematemesis; No diarrhea or constipation. No melena or hematochezia.  GENITOURINARY: No dysuria or hematuria, urinary frequency  NEUROLOGICAL: No headaches, memory loss, loss of strength, numbness, or tremors  SKIN: No itching, burning, rashes, or lesions     MEDICATIONS  (STANDING):  apixaban 2.5 milliGRAM(s) Oral every 12 hours  aspirin  chewable 81 milliGRAM(s) Oral daily  atorvastatin 80 milliGRAM(s) Oral at bedtime  bisacodyl 5 milliGRAM(s) Oral at bedtime  colchicine 0.6 milliGRAM(s) Oral two times a day  dronedarone 400 milliGRAM(s) Oral two times a day  famotidine    Tablet 20 milliGRAM(s) Oral daily  ferrous    sulfate 325 milliGRAM(s) Oral daily  folic acid 1 milliGRAM(s) Oral daily  isosorbide   mononitrate ER Tablet (IMDUR) 30 milliGRAM(s) Oral daily  lactulose Syrup 20 Gram(s) Oral at bedtime  metoprolol tartrate 25 milliGRAM(s) Oral three times a day  sevelamer carbonate 1600 milliGRAM(s) Oral three times a day    MEDICATIONS  (PRN):  acetaminophen     Tablet .. 650 milliGRAM(s) Oral every 6 hours PRN Mild Pain (1 - 3), Moderate Pain (4 - 6)      Vital Signs Last 24 Hrs  T(C): 36.7 (29 Nov 2021 09:14), Max: 36.9 (29 Nov 2021 00:01)  T(F): 98 (29 Nov 2021 09:14), Max: 98.5 (29 Nov 2021 00:01)  HR: 57 (29 Nov 2021 09:14) (57 - 70)  BP: 141/51 (29 Nov 2021 09:14) (121/53 - 241/84)  BP(mean): --  RR: 19 (29 Nov 2021 09:14) (12 - 22)  SpO2: 100% (29 Nov 2021 09:14) (99% - 100%)    PHYSICAL EXAMINATION:  GENERAL: NAD, well built  HEAD:  Atraumatic, Normocephalic  EYES:  conjunctiva and sclera clear  NECK: Supple, No JVD, Normal thyroid  CHEST/LUNG: Clear to auscultation. Clear to percussion bilaterally; No rales, rhonchi, wheezing, or rubs  HEART: Regular rate and rhythm; No murmurs, rubs, or gallops  ABDOMEN: Soft, Nontender, Nondistended; Bowel sounds present, no pain or masses on palpation  NERVOUS SYSTEM:  Alert & Oriented X3  : voiding well  EXTREMITIES:  2+ Peripheral Pulses, No clubbing, cyanosis, or edema  SKIN: warm dry                          8.9    3.99  )-----------( 145      ( 29 Nov 2021 09:11 )             27.8     11-28    137  |  100  |  38<H>  ----------------------------<  118<H>  4.4   |  34<H>  |  5.73<H>    Ca    8.7      28 Nov 2021 22:31  Phos  4.2     11-28  Mg     2.0     11-28    TPro  8.0  /  Alb  3.4<L>  /  TBili  0.6  /  DBili  x   /  AST  23  /  ALT  53  /  AlkPhos  225<H>  11-28    LIVER FUNCTIONS - ( 28 Nov 2021 10:10 )  Alb: 3.4 g/dL / Pro: 8.0 g/dL / ALK PHOS: 225 U/L / ALT: 53 U/L DA / AST: 23 U/L / GGT: x                   I&O's Summary    28 Nov 2021 07:01  -  29 Nov 2021 07:00  --------------------------------------------------------  IN: 600 mL / OUT: 2500 mL / NET: -1900 mL            CAPILLARY BLOOD GLUCOSE      RADIOLOGY & ADDITIONAL TESTS:                   PGY-1 Progress Note discussed with attending    PAGER #: [[phone]] TILL 5:00 PM  PLEASE CONTACT ON CALL TEAM:  - On Call Team (Please refer to Mary) FROM 5:00 PM - 8:30PM  - Nightfloat Team FROM 8:30 -7:30 AM    CHIEF COMPLAINT & BRIEF HOSPITAL COURSE:    77 yo M from [facility], w/ ESRD (on HD MWF), HTN, Severe Pulm HTN, CHFpEF >55% w/ GIIDD, Anemia of CKD, NSTEMI, CAD, parox Afib, CVA w/ RIGHT dominant hemiparesis, Vascular Dementia and Mood disorder who was BIB EMS for acute onset SOB 2/2 fluid overload. Last HD was Fri 11/26; no missed dialysis. On 11/28 in AM was complaining of SOB. O2 sat in low 91% requiring 4L NC w/ improvement to 98% and VS has normal /82. Send to [facility]. Was started on BiPAP in ED due to tachypnea. BNP ~ 30,000. /84. CXR with b/l pulm edema. Nephro consulted for urgent HD. ICU consulted for new BiPAP - cleared patient to be admitted to floors after HD once tolerates NC. s/p Lasix 80, Hydral 10 and Insulin 8U + dextrose for Hyperkalemia 6.3. On repeat reduced to 5.7. Trop elevated 182 > 204. Will continue trend. On medicine eval patient is sat 100% on BiPAP. Denies any chest pain, N/V, cough or subjective fevers. Limited 1-2 word replies yes/no questions. Reports improvement in SOB.  Kylie Almendarez Primary POC: [phone]    INTERVAL HPI/OVERNIGHT EVENTS:     Patient was examined at bedside, AAOx3, stable on 3LPM O2 via nasal cannula. He currently denies any chest pain, shortness of breath, palpitation. He had his emergency HD yesterday. There are no other concerns overnight.    REVIEW OF SYSTEMS:  CONSTITUTIONAL: No fever, weight loss, or fatigue  RESPIRATORY: No cough, wheezing, chills or hemoptysis; No shortness of breath  CARDIOVASCULAR: No chest pain, palpitations, dizziness, or leg swelling  GASTROINTESTINAL: No abdominal pain. No nausea, vomiting, or hematemesis; No diarrhea or constipation. No melena or hematochezia.  GENITOURINARY: No dysuria or hematuria, urinary frequency  NEUROLOGICAL: No headaches, memory loss, loss of strength, numbness, or tremors  SKIN: No itching, burning, rashes, or lesions     MEDICATIONS  (STANDING):  apixaban 2.5 milliGRAM(s) Oral every 12 hours  aspirin  chewable 81 milliGRAM(s) Oral daily  atorvastatin 80 milliGRAM(s) Oral at bedtime  bisacodyl 5 milliGRAM(s) Oral at bedtime  colchicine 0.6 milliGRAM(s) Oral two times a day  dronedarone 400 milliGRAM(s) Oral two times a day  famotidine    Tablet 20 milliGRAM(s) Oral daily  ferrous    sulfate 325 milliGRAM(s) Oral daily  folic acid 1 milliGRAM(s) Oral daily  isosorbide   mononitrate ER Tablet (IMDUR) 30 milliGRAM(s) Oral daily  lactulose Syrup 20 Gram(s) Oral at bedtime  metoprolol tartrate 25 milliGRAM(s) Oral three times a day  sevelamer carbonate 1600 milliGRAM(s) Oral three times a day    MEDICATIONS  (PRN):  acetaminophen     Tablet .. 650 milliGRAM(s) Oral every 6 hours PRN Mild Pain (1 - 3), Moderate Pain (4 - 6)      Vital Signs Last 24 Hrs  T(C): 36.7 (29 Nov 2021 09:14), Max: 36.9 (29 Nov 2021 00:01)  T(F): 98 (29 Nov 2021 09:14), Max: 98.5 (29 Nov 2021 00:01)  HR: 57 (29 Nov 2021 09:14) (57 - 70)  BP: 141/51 (29 Nov 2021 09:14) (121/53 - 241/84)  BP(mean): --  RR: 19 (29 Nov 2021 09:14) (12 - 22)  SpO2: 100% (29 Nov 2021 09:14) (99% - 100%)    PHYSICAL EXAMINATION:  GENERAL: NAD, well built  HEAD:  Atraumatic, Normocephalic  EYES:  conjunctiva and sclera clear  NECK: Supple, No JVD, Normal thyroid  CHEST/LUNG: Clear to auscultation. Clear to percussion bilaterally; No rales, rhonchi, wheezing, or rubs  HEART: Regular rate and rhythm; No murmurs, rubs, or gallops  ABDOMEN: Soft, Nontender, Nondistended; Bowel sounds present, no pain or masses on palpation  NERVOUS SYSTEM:  Alert & Oriented X3; mild R sided weakness  : voiding well  EXTREMITIES:  2+ Peripheral Pulses, No clubbing, cyanosis, or edema  SKIN: warm dry                          8.9    3.99  )-----------( 145      ( 29 Nov 2021 09:11 )             27.8     11-28    137  |  100  |  38<H>  ----------------------------<  118<H>  4.4   |  34<H>  |  5.73<H>    Ca    8.7      28 Nov 2021 22:31  Phos  4.2     11-28  Mg     2.0     11-28    TPro  8.0  /  Alb  3.4<L>  /  TBili  0.6  /  DBili  x   /  AST  23  /  ALT  53  /  AlkPhos  225<H>  11-28    LIVER FUNCTIONS - ( 28 Nov 2021 10:10 )  Alb: 3.4 g/dL / Pro: 8.0 g/dL / ALK PHOS: 225 U/L / ALT: 53 U/L DA / AST: 23 U/L / GGT: x                   I&O's Summary    28 Nov 2021 07:01  -  29 Nov 2021 07:00  --------------------------------------------------------  IN: 600 mL / OUT: 2500 mL / NET: -1900 mL            CAPILLARY BLOOD GLUCOSE      RADIOLOGY & ADDITIONAL TESTS:

## 2021-11-29 NOTE — PROGRESS NOTE ADULT - PROBLEM SELECTOR PLAN 8
eliquis  PPI IV RISK                                                          Points  [] Previous VTE                                           3  [] Thrombophilia                                        2  [] Lower limb paralysis                              2   [] Current Cancer                                       2   [x] Immobilization > 24 hrs                        1  [] ICU/CCU stay > 24 hours                       1  [x] Age > 60                                                   1    eliquis  PPI IV

## 2021-11-29 NOTE — PROGRESS NOTE ADULT - ASSESSMENT
77 yo M w/ ESRD (on HD MWF), HTN, Severe Pulm HTN, CHFpEF >55% w/ GIIDD, Anemia of CKD, NSTEMI, CAD, parox Afib, CVA w/ RIGHT dominant hemiparesis, Vascular Dementia and Mood disorder who was BIB EMS for acute onset SOB 2/2 fluid overload. Admit for HTN emergency, hypoxia 2/2 fluid overload requiring urgent HD.

## 2021-11-29 NOTE — DISCHARGE NOTE PROVIDER - PROVIDER TOKENS
FREE:[LAST:[Ozzy],FIRST:[Rivera],PHONE:[phone]],FAX:[(   )    -],ADDRESS:[[address] [place] [address]]] FREE:[LAST:[Ozzy],FIRST:[Ananunosotoprene],PHONE:[phone]],FAX:[(   )    -],ADDRESS:[[address] [place] [address]]],PROVIDER:[TOKEN:[[id number]]]

## 2021-11-29 NOTE — DISCHARGE NOTE PROVIDER - DISCHARGE DIET
DASH Diet/Low Sodium Diet Healthy 9yo male comes in for rash on palms, soles, perioral, arms, lower legs. Otherwise no symptoms, eating and drinking well, no fevers, acting appropriately. PE remarkable for macuopapular rash on palms, soles, perioral, forearms, lower legs, back of neck. No lesions in mouth or genital area. Likely due to coxsackie virus infection. No medications needed. Benadryl for itching PRN. Maintain adequate PO intake, no sharing food/drinks. Stable for d/c home.

## 2021-11-29 NOTE — CONSULT NOTE ADULT - SUBJECTIVE AND OBJECTIVE BOX
CHIEF COMPLAINT:Patient is a 76y old  Male who presents with a chief complaint of SOB.      HPI:  77 yo M from [facility], w/ ESRD (on HD MWF), HTN, Severe Pulm HTN, CHFpEF >55% w/ GIIDD, Anemia of CKD, NSTEMI, CAD, parox Afib, CVA w/ RIGHT dominant hemiparesis, Vascular Dementia and Mood disorder who was BIB EMS for acute onset SOB 2/2 fluid overload. Last HD was Fri 11/26; no missed dialysis. On 11/28 in AM was complaining of SOB. O2 sat in low 91% requiring 4L NC w/ improvement to 98% and VS has normal /82. Send to [facility].   Was started on BiPAP in ED due to tachypnea. BNP ~ 30,000. /84. CXR with b/l pulm edema. Nephro consulted for urgent HD. ICU consulted for new BiPAP - cleared patient to be admitted to floors after HD once tolerates NC.   s/p Lasix 80, Hydral 10 and Insulin 8U + dextrose for Hyperkalemia 6.3. On repeat reduced to 5.7. Trop elevated 182 > 204. Will continue trend. On medicine eval patient is sat 100% on BiPAP. Denies any chest pain, N/V, cough or subjective fevers. Limited 1-2 word replies yes/no questions. Reports improvement in SOB.  Kylie Almendarez Primary POC: [phone] (28 Nov 2021 15:03)      PAST MEDICAL & SURGICAL HISTORY:  ESRD (end stage renal disease) on dialysis    Hypertension    Anemia    Cerebrovascular accident (CVA), unspecified mechanism    Paroxysmal atrial fibrillation    CAD (coronary artery disease)    A-V fistula        MEDICATIONS  (STANDING):  apixaban 2.5 milliGRAM(s) Oral every 12 hours  aspirin  chewable 81 milliGRAM(s) Oral daily  atorvastatin 80 milliGRAM(s) Oral at bedtime  bisacodyl 5 milliGRAM(s) Oral at bedtime  colchicine 0.6 milliGRAM(s) Oral two times a day  dronedarone 400 milliGRAM(s) Oral two times a day  famotidine    Tablet 20 milliGRAM(s) Oral daily  ferrous    sulfate 325 milliGRAM(s) Oral daily  folic acid 1 milliGRAM(s) Oral daily  isosorbide   mononitrate ER Tablet (IMDUR) 30 milliGRAM(s) Oral daily  lactulose Syrup 20 Gram(s) Oral at bedtime  metoprolol tartrate 25 milliGRAM(s) Oral three times a day  sevelamer carbonate 1600 milliGRAM(s) Oral three times a day    MEDICATIONS  (PRN):  acetaminophen     Tablet .. 650 milliGRAM(s) Oral every 6 hours PRN Mild Pain (1 - 3), Moderate Pain (4 - 6)      FAMILY HISTORY:  Family history of diabetes mellitus        SOCIAL HISTORY:    [x ] Non-smoker    [x ] Alcohol-denies    Allergies    No Known Allergies    Intolerances    	    REVIEW OF SYSTEMS:  CONSTITUTIONAL: No fever, weight loss, or fatigue  EYES: No eye pain, visual disturbances, or discharge  ENT:  No difficulty hearing, tinnitus, vertigo; No sinus or throat pain  NECK: No pain or stiffness  RESPIRATORY: No cough, wheezing, chills or hemoptysis; + Shortness of Breath  CARDIOVASCULAR: No chest pain, palpitations, passing out, dizziness, or leg swelling  GASTROINTESTINAL: No abdominal or epigastric pain. No nausea, vomiting, or hematemesis; No diarrhea or constipation. No melena or hematochezia.  GENITOURINARY: No dysuria, frequency, hematuria, or incontinence  NEUROLOGICAL: No headaches, memory loss, loss of strength, numbness, or tremors  SKIN: No itching, burning, rashes, or lesions   LYMPH Nodes: No enlarged glands  ENDOCRINE: No heat or cold intolerance; No hair loss  MUSCULOSKELETAL: No joint pain or swelling; No muscle, back, or extremity pain  PSYCHIATRIC: No depression, anxiety, mood swings, or difficulty sleeping  HEME/LYMPH: No easy bruising, or bleeding gums  ALLERGY AND IMMUNOLOGIC: No hives or eczema	      PHYSICAL EXAM:  T(C): 36.7 (11-29-21 @ 09:14), Max: 36.9 (11-29-21 @ 00:01)  HR: 57 (11-29-21 @ 09:14) (57 - 70)  BP: 141/51 (11-29-21 @ 09:14) (121/53 - 195/78)  RR: 19 (11-29-21 @ 09:14) (17 - 22)  SpO2: 100% (11-29-21 @ 09:14) (99% - 100%)  Wt(kg): --  I&O's Summary    28 Nov 2021 07:01  -  29 Nov 2021 07:00  --------------------------------------------------------  IN: 600 mL / OUT: 2500 mL / NET: -1900 mL        Appearance: Normal	  HEENT:   Normal oral mucosa, PERRL, EOMI	  Lymphatic: No lymphadenopathy  Cardiovascular: Normal S1 S2, No JVD, No murmurs, No edema  Respiratory: Lungs clear to auscultation	  Psychiatry: A & O x 3, Mood & affect appropriate  Gastrointestinal:  Soft, Non-tender, + BS	  Skin: No rashes, No ecchymoses, No cyanosis	  Neurologic: Non-focal  Extremities: Normal range of motion, No clubbing, cyanosis or edema  Vascular: Peripheral pulses palpable 2+ bilaterally        ECG:    Normal sinus rhythm  Nonspecific ST and T wave abnormality  Prolonged QT      	  	  LABS:	 	  Troponin I, High Sensitivity Result: 195.8 ng/L (11-28-21 @ 22:31)  Troponin I, High Sensitivity Result: 204.6 ng/L (11-28-21 @ 13:20)  Troponin I, High Sensitivity Result: 182.3 ng/L (11-28-21 @ 10:10)                          8.9    3.99  )-----------( 145      ( 29 Nov 2021 09:11 )             27.8     11-29    138  |  101  |  42<H>  ----------------------------<  120<H>  4.8   |  31  |  6.61<H>    Ca    8.7      29 Nov 2021 09:11  Phos  5.6     11-29  Mg     2.4     11-29    TPro  6.9  /  Alb  2.8<L>  /  TBili  0.8  /  DBili  x   /  AST  13  /  ALT  36  /  AlkPhos  190<H>  11-29     Transthoracic Echocardiogram (09.28.21 @ 08:26) >  OBSERVATIONS:  Mitral Valve: Mitral annular calcification, and calcified  mitral leaflets with reduced diastolic opening. Mean  transmitral valve gradient equals 2.6 mm Hg (at a heart  rate of 68 BPM), consistent with mild mitral stenosis.  Aortic Root: Normal aortic root.  Aortic Valve: Calcified  aortic valve with decreased  opening. Peak transaortic valve gradient equals 52 mm Hg,  mean transaortic valve gradient equals 28 mm Hg, estimated  aortic valve area equals 1 sqcm (by continuity equation),  consistent with severe aortic stenosis. Mild aortic  regurgitation.  Left Atrium: Mildly dilated left atrium.  LA volume index =  36 cc/m2.  Left Ventricle: Endocardium not well visualized; grossly  normal left ventricular systolic function. Moderate  concentric left ventricular hypertrophy. Grade II diastolic  dysfunction (moderate).  Right Heart: Normal right atrium. Right ventricle not well  visualized.   Probably normal right ventricular size and  systolic function. There is mild-moderate tricuspid  regurgitation. There is mild pulmonic regurgitation.  Pericardium/PleuraNormal pericardium with no pericardial  effusion.  Hemodynamic: RA Pressure is 8 mm Hg. RV systolicpressure  is 69 mm Hg. Severe pulmonary hypertension.            EXAM:  XR CHEST PORTABLE IMMED 1V                            PROCEDURE DATE:  09/27/2021          INTERPRETATION:  AP erect chest on September 27, 2021 at 10:18 AM. Patient has chest pain.    Heart enlargement again noted.    There is a persistent mild left base effusion.    Slight central CHF also unchanged.    Extensive vascular study from the right arm into the right innominate area continuously again noted.    Chest is similar to June 7 of this year.    IMPRESSION: Unchanged chest as above.

## 2021-11-29 NOTE — PROGRESS NOTE ADULT - PROBLEM SELECTOR PLAN 1
BP at [facility] wnl but elevated peak 241/84 w/ pulm edema on CXR  BNP 30k and Trop elevation  - goal is reduce BP by 25% in 24 hours  - s/p Lasix 80 and Hydral 10  - pending Urgent HD  - resume home meds  - labetalol or hydral PRN   - vs q 4  - trend trop T3  - transition to NC after HD before going to floor  - admit to tele   NEPHRO Dr. Pritchard BP at NH wnl but elevated peak 241/84 w/ pulm edema on CXR  BNP 30k and Trop elevation  - goal is reduce BP by 25% in 24 hours  - s/p Lasix 80 and Hydral 10  - pending Urgent HD  - resume home meds  - labetalol or hydral PRN   - vs q 4  - trend trop T3 - trended downr  - admit to tele   NEPHRO Dr. Pritchard

## 2021-11-29 NOTE — DISCHARGE NOTE PROVIDER - NSDCCPCAREPLAN_GEN_ALL_CORE_FT
PRINCIPAL DISCHARGE DIAGNOSIS  Diagnosis: Hypertensive emergency  Assessment and Plan of Treatment: You were admitted due to Hypertensive Emergency with Blood Pressure of 241/84. You were also having shortness of breath. Chest Xray showed bilateral pulmonary edema. BNP was elevated at 30,000 and Troponins were elevated. We reduced your BP medications by 25% within 24 hours. Your Troponin also trended down. You were also given Lasix 8-0 mg IV and Hydralazine 10 mg IV. You were started on Labetalol and Hydralazine prn. Emergency HD was done. You were placed on O2 supplementation via nasal cannula. Eventually you were saturating well on room air and you were changed to room air. You were admitted to telemetry for monitoring. Cardiology (Dr. Salinas) was consulted.      SECONDARY DISCHARGE DIAGNOSES  Diagnosis: ESRD on dialysis  Assessment and Plan of Treatment: You have history of End-Stage Renal Disease on Hemodialysis. Your last HD session was 11/28/21. You developed shortness of breath with fluid overload. Chest Xray showed bilateral pulmonary edema. Ermegency dialysis was done. Nephrology (Dr. Pritchard) was consulted. Your home medications were resumed such as sevelamer, colcrys. We monitored your electrolytes.    Diagnosis: Hyperkalemia  Assessment and Plan of Treatment: You have Hyperkalemia on admission at 6.3 We started you on Insulin 8u plus Dextrose. You also underwent emergency HD. Your serum potassium has improved. We continued to monitor your electrolytes.    Diagnosis: Paroxysmal atrial fibrillation  Assessment and Plan of Treatment: You have histroy of paroxysmal atrial fibrillation. Your home medications were metoprolol tartrate and eliquis. We continued your home medications.    Diagnosis: History of anemia due to CKD  Assessment and Plan of Treatment: You have anemia secondary to chronic kidney disease. Your hemoglobin on admission was 9.8 with slight new macrocytosis (MCV of 102.7). We continued your iron and stool softener to prevent constipation. We ordered Vitamin B12 and Folic acid.    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 Your home medications are metorpolol tartrate, aspirin and atorvastatin. We continued your home medications. We monitored your blood pressure. There were no changes in your neurologic status.    Diagnosis: Pulmonary HTN  Assessment and Plan of Treatment: You have history of pulmonary hypertension. Last Echocardiogram on 9/2021 showed RV systolic pressure of 69. We admitted you to telemetry for close monitoring. We resumed your home medications Imdur, metoprolol and Multaq.    Diagnosis: Prophylactic measure  Assessment and Plan of Treatment: You were resumed on Eliquis for DVT prophylaxis.     PRINCIPAL DISCHARGE DIAGNOSIS  Diagnosis: Hypertensive emergency  Assessment and Plan of Treatment: You were admitted due to Hypertensive Emergency with Blood Pressure of 241/84. You were also having shortness of breath. Chest Xray showed bilateral pulmonary edema. BNP was elevated at 30,000 and Troponins were elevated. We reduced your BP medications by 25% within 24 hours. Your Troponin also trended down. You were also given Lasix 8-0 mg IV and Hydralazine 10 mg IV. You were started on Labetalol and Hydralazine prn. Emergency HD was done. You were placed on O2 supplementation via nasal cannula. Eventually you were saturating well on room air and you were changed to room air. You were admitted to telemetry for monitoring. Cardiology (Dr. Salinas) was consulted. Your blood pressure was monitored and maintained. Multaq was resumed. Hydralazine was discontinued. You may continue your home medications. You were stable, improved and was subsequently discharged. You may follow-up with your primary care doctor.      SECONDARY DISCHARGE DIAGNOSES  Diagnosis: Paroxysmal atrial fibrillation  Assessment and Plan of Treatment: You have histroy of paroxysmal atrial fibrillation. Your home medications were metoprolol tartrate and eliquis. We continued your home medications. You were monitored on telemetry. Cardiology (Dr. Salinas) was consulted. You were having episodes of asymptomatic bradycardia. Metoprolol was decreased from 25 to 12.5 mg. You remained to have bradycardia. Electrophysiology (Dr. Gay) was consulted. You were offered PPM but initially refused. You eventually consented and had Micra inserted. You were continued on telemetry monitoring and have alternating tachycardia and bradycardia. You were stable, improved and was subsequently discharged. You may resume your home medication. You may follow-up with your primary care doctor.    Diagnosis: Acute pericarditis  Assessment and Plan of Treatment: You started developing severe left sided chest pain after your PPM placement. According to the Cardiologist you have had similar episodes before. EKG was done initially and it was negative for any ischemia. Pain management with Tylenol, IV Dilaudid and NTG patch. You continued to have severe chest pain. Telemetry showed ST elevations. Repeat EKG showed TX changes and ST changes suggesting STEMI. Echocardiogram was done twice and it showed moderate pericardial effusion. Colchicine was started and your chest pain improved and was relieved on its own. You were stable, improved and was subsequently discharged. You may resume your home medication. You may follow-up with your primary care doctor.    Diagnosis: ESRD on dialysis  Assessment and Plan of Treatment: You have history of End-Stage Renal Disease on Hemodialysis. Your last HD session was 11/28/21. You developed shortness of breath with fluid overload. Chest Xray showed bilateral pulmonary edema. Ermegency dialysis was done. Nephrology (Dr. Jacinto) was consulted. Your home medications were resumed such as sevelamer, colcrys. We monitored your electrolytes and they were stable. You were stable, improved and was subsequently discharged. You may resume your regular hemodialysis sessions as outpatient. You may follow-up with your primary care doctor.    Diagnosis: Hyperkalemia  Assessment and Plan of Treatment: You have Hyperkalemia on admission at 6.3 We started you on Insulin 8u plus Dextrose. You also underwent emergency HD. Your serum potassium has improved. We continued to monitor your electrolytes. Your serum potassium has remained normal throughout your admission. You were stable, improved and was subsequently discharged. You may follow-up with your primary care doctor.    Diagnosis: History of anemia due to CKD  Assessment and Plan of Treatment: You have anemia secondary to chronic kidney disease. Your hemoglobin on admission was 9.8 with slight new macrocytosis (MCV of 102.7). We continued your iron and stool softener to prevent constipation. We ordered Vitamin B12 and Folic acid. Your hemoglobin dropped to 7.7. You were transfused 1 unit of packed red blood cell during one of your dialysis sessions. You may resume yoru regular hemodialysis sessions as outpatient. You may follow-up with your primary care doctor.    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 Your home medications are metorpolol tartrate, aspirin and atorvastatin. We continued your home medications. We monitored your blood pressure. There were no changes in your neurologic status. You may resume your home medication. You may follow-up with your primary care doctor.    Diagnosis: Pulmonary HTN  Assessment and Plan of Treatment: You have history of pulmonary hypertension. Last Echocardiogram on 9/2021 showed RV systolic pressure of 69. We admitted you to telemetry for close monitoring. We resumed your home medications Imdur, metoprolol and Multaq. You were stable, improved and was subsequently discharged. You may follow-up with your primary care doctor.    Diagnosis: Prophylactic measure  Assessment and Plan of Treatment: You were resumed on Eliquis for DVT prophylaxis.     PRINCIPAL DISCHARGE DIAGNOSIS  Diagnosis: Hypertensive emergency  Assessment and Plan of Treatment: You were admitted due to Hypertensive Emergency with Blood Pressure of 241/84. You were also having shortness of breath. Chest Xray showed bilateral pulmonary edema. BNP was elevated at 30,000 and Troponins were elevated. We reduced your BP medications by 25% within 24 hours. Your Troponin also trended down. You were also given Lasix 8-0 mg IV and Hydralazine 10 mg IV. You were started on Labetalol and Hydralazine prn. Emergency HD was done. You were placed on O2 supplementation via nasal cannula. Eventually you were saturating well on room air and you were changed to room air. You were admitted to telemetry for monitoring. Cardiology (Dr. Salinas) was consulted. Your blood pressure was monitored and maintained. Multaq was resumed. Hydralazine was discontinued. You may continue your home medications. You were stable, improved and was subsequently discharged. You may follow-up with your primary care doctor.      SECONDARY DISCHARGE DIAGNOSES  Diagnosis: Acute pericarditis  Assessment and Plan of Treatment: You started developing severe left sided chest pain after your PPM placement. According to the Cardiologist you have had similar episodes before. EKG was done initially and it was negative for any ischemia. Pain management with Tylenol, IV Dilaudid and NTG patch. You continued to have severe chest pain. Telemetry showed ST elevations. Repeat EKG showed ME changes and ST changes suggesting STEMI. Echocardiogram was done twice and it showed moderate pericardial effusion. Colchicine was started and your chest pain improved and was relieved on its own. Repeat echo showed improvement in the effusion. You were stable, improved and was subsequently discharged. You may resume your home medication. You may follow-up with your primary care doctor.    Diagnosis: Hyperkalemia  Assessment and Plan of Treatment: You have Hyperkalemia on admission at 6.3 We started you on Insulin 8u plus Dextrose. You also underwent emergency HD. Your serum potassium has improved. We continued to monitor your electrolytes. Your serum potassium has remained normal throughout your admission. You were stable, improved and was subsequently discharged. You may follow-up with your primary care doctor.    Diagnosis: ESRD on dialysis  Assessment and Plan of Treatment: You have history of End-Stage Renal Disease on Hemodialysis. Your last HD session was 11/28/21. You developed shortness of breath with fluid overload. Chest Xray showed bilateral pulmonary edema. Ermegency dialysis was done. Nephrology (Dr. Jacinto) was consulted. Your home medications were resumed such as sevelamer, colcrys. We monitored your electrolytes and they were stable. You were stable, improved and was subsequently discharged. You may resume your regular hemodialysis sessions as outpatient. You may follow-up with your primary care doctor.    Diagnosis: Paroxysmal atrial fibrillation  Assessment and Plan of Treatment: You have histroy of paroxysmal atrial fibrillation. Your home medications were metoprolol tartrate and eliquis. We continued your home medications. You were monitored on telemetry. Cardiology (Dr. Salinas) was consulted. You were having episodes of asymptomatic bradycardia. Metoprolol was decreased from 25 to 12.5 mg. You remained to have bradycardia. Electrophysiology (Dr. Gay) was consulted. You were offered PPM but initially refused. You eventually consented and had Micra inserted. You were continued on telemetry monitoring and have alternating tachycardia and bradycardia. You were stable, improved and was subsequently discharged. You may resume your home medication. You may follow-up with your primary care doctor.    Diagnosis: History of anemia due to CKD  Assessment and Plan of Treatment: You have anemia secondary to chronic kidney disease. Your hemoglobin on admission was 9.8 with slight new macrocytosis (MCV of 102.7). We continued your iron and stool softener to prevent constipation. We ordered Vitamin B12 and Folic acid. Your hemoglobin dropped to 7.7. You were transfused 1 unit of packed red blood cell during one of your dialysis sessions. You may resume yoru regular hemodialysis sessions as outpatient. You may follow-up with your primary care doctor.    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 Your home medications are metorpolol tartrate, aspirin and atorvastatin. We continued your home medications. We monitored your blood pressure. There were no changes in your neurologic status. You may resume your home medication. You may follow-up with your primary care doctor.    Diagnosis: Prophylactic measure  Assessment and Plan of Treatment: You were resumed on Eliquis for DVT prophylaxis.    Diagnosis: Pulmonary HTN  Assessment and Plan of Treatment: You have history of pulmonary hypertension. Last Echocardiogram on 9/2021 showed RV systolic pressure of 69. We admitted you to telemetry for close monitoring. We resumed your home medications Imdur, metoprolol and Multaq. You were stable, improved and was subsequently discharged. You may follow-up with your primary care doctor.     PRINCIPAL DISCHARGE DIAGNOSIS  Diagnosis: Hypertensive emergency  Assessment and Plan of Treatment: You were admitted due to Hypertensive Emergency with Blood Pressure of 241/84. You were also having shortness of breath. Chest Xray showed bilateral pulmonary edema. BNP was elevated at 30,000 and Troponins were elevated. We reduced your BP medications by 25% within 24 hours. Your Troponin also trended down. You were also given Lasix 8-0 mg IV and Hydralazine 10 mg IV. You were started on Labetalol and Hydralazine prn. Emergency HD was done. You were placed on O2 supplementation via nasal cannula. Eventually you were saturating well on room air and you were changed to room air. You were admitted to telemetry for monitoring. Cardiology (Dr. Salinas) was consulted. Your blood pressure was monitored and maintained. Multaq was resumed. Hydralazine and imdur were discontinued and midodrine was added.  You may continue your home medications. You were stable, improved and was subsequently discharged. You may follow-up with your primary care doctor.      SECONDARY DISCHARGE DIAGNOSES  Diagnosis: Hyperkalemia  Assessment and Plan of Treatment: You have Hyperkalemia on admission at 6.3 We started you on Insulin 8u plus Dextrose. You also underwent emergency HD. Your serum potassium has improved. We continued to monitor your electrolytes. Your serum potassium has remained normal throughout your admission. You were stable, improved and was subsequently discharged. You may follow-up with your primary care doctor.    Diagnosis: ESRD on dialysis  Assessment and Plan of Treatment: You have history of End-Stage Renal Disease on Hemodialysis. Your last HD session was 11/28/21. You developed shortness of breath with fluid overload. Chest Xray showed bilateral pulmonary edema. Ermegency dialysis was done. Nephrology (Dr. Jacinto) was consulted. Your home medications were resumed such as sevelamer, colcrys. We monitored your electrolytes and they were stable. You were stable, improved and was subsequently discharged. You may resume your regular hemodialysis sessions as outpatient. You may follow-up with your primary care doctor.    Diagnosis: Paroxysmal atrial fibrillation  Assessment and Plan of Treatment: You have histroy of paroxysmal atrial fibrillation. Your home medications were metoprolol tartrate and eliquis. We continued your home medications. You were monitored on telemetry. Cardiology (Dr. Salinas) was consulted. You were having episodes of asymptomatic bradycardia. Metoprolol was decreased from 25 to 12.5 mg. You remained to have bradycardia. Electrophysiology (Dr. Gay) was consulted. You were offered PPM but initially refused. You eventually consented and had Micra inserted. You were continued on telemetry monitoring and have alternating tachycardia and bradycardia. You were stable, improved and was subsequently discharged. You may resume your home medication. You may follow-up with your primary care doctor.    Diagnosis: History of anemia due to CKD  Assessment and Plan of Treatment: You have anemia secondary to chronic kidney disease. Your hemoglobin on admission was 9.8 with slight new macrocytosis (MCV of 102.7). We continued your iron and stool softener to prevent constipation. We ordered Vitamin B12 and Folic acid. Your hemoglobin dropped to 7.7. You were transfused 1 unit of packed red blood cell during one of your dialysis sessions. You may resume yoru regular hemodialysis sessions as outpatient. You may follow-up with your primary care doctor.    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 Your home medications are metorpolol tartrate, aspirin and atorvastatin. We continued your home medications. We monitored your blood pressure. There were no changes in your neurologic status. You may resume your home medication. You may follow-up with your primary care doctor.    Diagnosis: Prophylactic measure  Assessment and Plan of Treatment: You were resumed on Eliquis for DVT prophylaxis.    Diagnosis: Pulmonary HTN  Assessment and Plan of Treatment: You have history of pulmonary hypertension. Last Echocardiogram on 9/2021 showed RV systolic pressure of 69. We admitted you to telemetry for close monitoring. We resumed your home medications  metoprolol and Multaq. IMDur was held. You were stable, improved and was subsequently discharged. You may follow-up with your primary care doctor.    Diagnosis: Acute pericarditis  Assessment and Plan of Treatment: You started developing severe left sided chest pain after your PPM placement. According to the Cardiologist you have had similar episodes before. EKG was done initially and it was negative for any ischemia. Pain management with Tylenol, IV Dilaudid and NTG patch. You continued to have severe chest pain. Telemetry showed ST elevations. Repeat EKG showed PA changes and ST changes suggesting STEMI. Echocardiogram was done twice and it showed moderate pericardial effusion. Colchicine was started and your chest pain improved and was relieved on its own. Repeat echo showed improvement in the effusion. You were stable, improved and was subsequently discharged. You may resume your home medication. You may follow-up with your primary care doctor.

## 2021-11-29 NOTE — PROGRESS NOTE ADULT - PROBLEM SELECTOR PLAN 7
prior Echo on 9/28/21 showed RV systolic pressure is 69 mm Hg. Severe pulmonary hypertension.  - f/u repeat echo  - admit to tele  - [facility] home meds IMDUR, metop, Multaq prior Echo on 9/28/21 showed RV systolic pressure is 69 mm Hg. Severe pulmonary hypertension.  - admit to tele  - [facility] home meds IMDUR, metop, Multaq

## 2021-11-29 NOTE — PROGRESS NOTE ADULT - PROBLEM SELECTOR PLAN 3
on HD MWF last HD on Friday 11/26 , no missed HD  CXR with fluid OD, b/l LE pitting edema  - BiPAP in ED  - urgent HD today 11/28  - resume home meds sevelamer, colcrys   - monitor electrolytes, post HD labs  NEPHRO Dr. Pritchard on HD MWF last HD on Friday 11/26 , no missed HD  CXR with fluid OD, b/l LE pitting edema  - BiPAP in ED  - urgent HD 11/28  - resume home meds sevelamer, colcrys   - monitor electrolytes, post HD labs  NEPHRO Dr. Pritchard

## 2021-11-29 NOTE — DISCHARGE NOTE PROVIDER - NSDCMRMEDTOKEN_GEN_ALL_CORE_FT
acetaminophen 325 mg oral tablet: 2 tab(s) orally every 6 hours, As Needed  aspirin 81 mg oral tablet, chewable: 1 tab(s) orally once a day  atorvastatin 80 mg oral tablet: 1 tab(s) orally once a day  Colace 100 mg oral capsule: 1 cap(s) orally once a day (at bedtime)  Colcrys 0.6 mg oral tablet: 1 tab(s) orally 2 times a day  Eliquis 2.5 mg oral tablet: 1 tab(s) orally 2 times a day  famotidine 20 mg oral tablet: 1 tab(s) orally once a day  ferrous sulfate 325 mg (65 mg elemental iron) oral tablet: 1 tab(s) orally once a day  folic acid 1 mg oral tablet: 1 tab(s) orally once a day  isosorbide mononitrate 30 mg oral tablet, extended release: 1 tab(s) orally once a day  lactulose 10 g/15 mL oral syrup: 30 milliliter(s) orally once a day (at bedtime)  metoprolol tartrate 25 mg oral tablet: 1 tab(s) orally 3 times a day   Multaq 400 mg oral tablet: 1 tab(s) orally 2 times a day  sevelamer hydrochloride 800 mg oral tablet: 2 tab(s) orally 3 times a day   acetaminophen 325 mg oral tablet: 2 tab(s) orally every 6 hours, As Needed  aspirin 81 mg oral tablet, chewable: 1 tab(s) orally once a day  atorvastatin 80 mg oral tablet: 1 tab(s) orally once a day  Colace 100 mg oral capsule: 1 cap(s) orally once a day (at bedtime)  colchicine 0.6 mg oral tablet: 1 tab(s) orally once a day  dronedarone 400 mg oral tablet: 1 tab(s) orally 2 times a day  Eliquis 2.5 mg oral tablet: 1 tab(s) orally 2 times a day  famotidine 20 mg oral tablet: 1 tab(s) orally once a day  ferrous sulfate 325 mg (65 mg elemental iron) oral tablet: 1 tab(s) orally once a day  folic acid 1 mg oral tablet: 1 tab(s) orally once a day  lactulose 10 g/15 mL oral syrup: 30 milliliter(s) orally once a day (at bedtime)  metoprolol tartrate 25 mg oral tablet: 0.5 tab(s) orally 3 times a day   midodrine 5 mg oral tablet: 1 tab(s) orally 3 times a day  sevelamer hydrochloride 800 mg oral tablet: 2 tab(s) orally 3 times a day

## 2021-11-29 NOTE — CONSULT NOTE ADULT - ASSESSMENT
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  1.CHF and volume overload need for HD.  2.Pt declines any cardiac test/intervention.  3.ESRD-HD as per renal.  4.Parox Afib-Multaq, eliquis.  5.Lipid d/o-statin.  6.CVA-asa,statin.  7.CAD-asa,imdur,statin,lopressor.  8.PPI. show

## 2021-11-30 ENCOUNTER — TRANSCRIPTION ENCOUNTER (OUTPATIENT)
Age: 76
End: 2021-11-30

## 2021-11-30 LAB
ALBUMIN SERPL ELPH-MCNC: 3 G/DL — LOW (ref 3.5–5)
ALP SERPL-CCNC: 193 U/L — HIGH (ref 40–120)
ALT FLD-CCNC: 32 U/L DA — SIGNIFICANT CHANGE UP (ref 10–60)
ANION GAP SERPL CALC-SCNC: 9 MMOL/L — SIGNIFICANT CHANGE UP (ref 5–17)
AST SERPL-CCNC: 11 U/L — SIGNIFICANT CHANGE UP (ref 10–40)
BASOPHILS # BLD AUTO: 0.02 K/UL — SIGNIFICANT CHANGE UP (ref 0–0.2)
BASOPHILS NFR BLD AUTO: 0.4 % — SIGNIFICANT CHANGE UP (ref 0–2)
BILIRUB SERPL-MCNC: 0.7 MG/DL — SIGNIFICANT CHANGE UP (ref 0.2–1.2)
BUN SERPL-MCNC: 58 MG/DL — HIGH (ref 7–18)
CALCIUM SERPL-MCNC: 9 MG/DL — SIGNIFICANT CHANGE UP (ref 8.4–10.5)
CHLORIDE SERPL-SCNC: 100 MMOL/L — SIGNIFICANT CHANGE UP (ref 96–108)
CO2 SERPL-SCNC: 29 MMOL/L — SIGNIFICANT CHANGE UP (ref 22–31)
CREAT SERPL-MCNC: 8.54 MG/DL — HIGH (ref 0.5–1.3)
EOSINOPHIL # BLD AUTO: 0.19 K/UL — SIGNIFICANT CHANGE UP (ref 0–0.5)
EOSINOPHIL NFR BLD AUTO: 3.8 % — SIGNIFICANT CHANGE UP (ref 0–6)
FOLATE RBC-MCNC: 1401 NG/ML — SIGNIFICANT CHANGE UP (ref 499–1504)
GLUCOSE SERPL-MCNC: 90 MG/DL — SIGNIFICANT CHANGE UP (ref 70–99)
HCT VFR BLD CALC: 29.1 % — LOW (ref 39–50)
HCT VFR BLD CALC: 29.2 % — LOW (ref 39–50)
HGB BLD-MCNC: 9.5 G/DL — LOW (ref 13–17)
IMM GRANULOCYTES NFR BLD AUTO: 0.2 % — SIGNIFICANT CHANGE UP (ref 0–1.5)
LYMPHOCYTES # BLD AUTO: 1.4 K/UL — SIGNIFICANT CHANGE UP (ref 1–3.3)
LYMPHOCYTES # BLD AUTO: 27.9 % — SIGNIFICANT CHANGE UP (ref 13–44)
MAGNESIUM SERPL-MCNC: 2.3 MG/DL — SIGNIFICANT CHANGE UP (ref 1.6–2.6)
MCHC RBC-ENTMCNC: 32.6 GM/DL — SIGNIFICANT CHANGE UP (ref 32–36)
MCHC RBC-ENTMCNC: 33 PG — SIGNIFICANT CHANGE UP (ref 27–34)
MCV RBC AUTO: 101 FL — HIGH (ref 80–100)
MONOCYTES # BLD AUTO: 0.74 K/UL — SIGNIFICANT CHANGE UP (ref 0–0.9)
MONOCYTES NFR BLD AUTO: 14.7 % — HIGH (ref 2–14)
NEUTROPHILS # BLD AUTO: 2.66 K/UL — SIGNIFICANT CHANGE UP (ref 1.8–7.4)
NEUTROPHILS NFR BLD AUTO: 53 % — SIGNIFICANT CHANGE UP (ref 43–77)
NRBC # BLD: 0 /100 WBCS — SIGNIFICANT CHANGE UP (ref 0–0)
PHOSPHATE SERPL-MCNC: 6.3 MG/DL — HIGH (ref 2.5–4.5)
PLATELET # BLD AUTO: 168 K/UL — SIGNIFICANT CHANGE UP (ref 150–400)
POTASSIUM SERPL-MCNC: 5.1 MMOL/L — SIGNIFICANT CHANGE UP (ref 3.5–5.3)
POTASSIUM SERPL-SCNC: 5.1 MMOL/L — SIGNIFICANT CHANGE UP (ref 3.5–5.3)
PROT SERPL-MCNC: 7.3 G/DL — SIGNIFICANT CHANGE UP (ref 6–8.3)
RBC # BLD: 2.88 M/UL — LOW (ref 4.2–5.8)
RBC # FLD: 16.5 % — HIGH (ref 10.3–14.5)
SODIUM SERPL-SCNC: 138 MMOL/L — SIGNIFICANT CHANGE UP (ref 135–145)
VIT B12 SERPL-MCNC: 774 PG/ML — SIGNIFICANT CHANGE UP (ref 232–1245)
WBC # BLD: 5.02 K/UL — SIGNIFICANT CHANGE UP (ref 3.8–10.5)
WBC # FLD AUTO: 5.02 K/UL — SIGNIFICANT CHANGE UP (ref 3.8–10.5)

## 2021-11-30 RX ORDER — METOPROLOL TARTRATE 50 MG
25 TABLET ORAL
Refills: 0 | Status: DISCONTINUED | OUTPATIENT
Start: 2021-11-30 | End: 2021-12-02

## 2021-11-30 RX ORDER — CHLORHEXIDINE GLUCONATE 213 G/1000ML
1 SOLUTION TOPICAL
Refills: 0 | Status: DISCONTINUED | OUTPATIENT
Start: 2021-11-30 | End: 2021-12-07

## 2021-11-30 RX ORDER — HYDRALAZINE HCL 50 MG
25 TABLET ORAL EVERY 8 HOURS
Refills: 0 | Status: DISCONTINUED | OUTPATIENT
Start: 2021-11-30 | End: 2021-12-02

## 2021-11-30 RX ADMIN — Medication 25 MILLIGRAM(S): at 05:36

## 2021-11-30 RX ADMIN — Medication 325 MILLIGRAM(S): at 11:55

## 2021-11-30 RX ADMIN — SEVELAMER CARBONATE 1600 MILLIGRAM(S): 2400 POWDER, FOR SUSPENSION ORAL at 05:24

## 2021-11-30 RX ADMIN — SEVELAMER CARBONATE 1600 MILLIGRAM(S): 2400 POWDER, FOR SUSPENSION ORAL at 13:59

## 2021-11-30 RX ADMIN — FAMOTIDINE 20 MILLIGRAM(S): 10 INJECTION INTRAVENOUS at 11:55

## 2021-11-30 RX ADMIN — Medication 5 MILLIGRAM(S): at 21:50

## 2021-11-30 RX ADMIN — APIXABAN 2.5 MILLIGRAM(S): 2.5 TABLET, FILM COATED ORAL at 05:24

## 2021-11-30 RX ADMIN — Medication 25 MILLIGRAM(S): at 17:03

## 2021-11-30 RX ADMIN — Medication 25 MILLIGRAM(S): at 13:59

## 2021-11-30 RX ADMIN — Medication 1 MILLIGRAM(S): at 12:32

## 2021-11-30 RX ADMIN — APIXABAN 2.5 MILLIGRAM(S): 2.5 TABLET, FILM COATED ORAL at 17:03

## 2021-11-30 RX ADMIN — SEVELAMER CARBONATE 1600 MILLIGRAM(S): 2400 POWDER, FOR SUSPENSION ORAL at 21:50

## 2021-11-30 RX ADMIN — ISOSORBIDE MONONITRATE 30 MILLIGRAM(S): 60 TABLET, EXTENDED RELEASE ORAL at 11:55

## 2021-11-30 RX ADMIN — DRONEDARONE 400 MILLIGRAM(S): 400 TABLET, FILM COATED ORAL at 05:24

## 2021-11-30 RX ADMIN — LACTULOSE 20 GRAM(S): 10 SOLUTION ORAL at 21:50

## 2021-11-30 RX ADMIN — Medication 25 MILLIGRAM(S): at 21:50

## 2021-11-30 RX ADMIN — DRONEDARONE 400 MILLIGRAM(S): 400 TABLET, FILM COATED ORAL at 17:03

## 2021-11-30 RX ADMIN — Medication 81 MILLIGRAM(S): at 11:55

## 2021-11-30 RX ADMIN — ATORVASTATIN CALCIUM 80 MILLIGRAM(S): 80 TABLET, FILM COATED ORAL at 21:50

## 2021-11-30 RX ADMIN — Medication 0.6 MILLIGRAM(S): at 17:03

## 2021-11-30 RX ADMIN — Medication 0.6 MILLIGRAM(S): at 05:24

## 2021-11-30 NOTE — DISCHARGE NOTE NURSING/CASE MANAGEMENT/SOCIAL WORK - PATIENT PORTAL LINK FT
You can access the FollowMyHealth Patient Portal offered by [facility] by registering at the following website: [email or web address]. By joining [employer]’s FollowMyHealth portal, you will also be able to view your health information using other applications (apps) compatible with our system.

## 2021-11-30 NOTE — PROGRESS NOTE ADULT - PROBLEM SELECTOR PLAN 3
on HD MWF last HD on Friday 11/26 , no missed HD  CXR with fluid OD, b/l LE pitting edema  - BiPAP in ED  - urgent HD 11/28  - resume home meds sevelamer, colcrys   - monitor electrolytes, post HD labs  NEPHRO Dr. Pritchard cont BB metop tart 25 BID and Eliquis 2.5 BID

## 2021-11-30 NOTE — PROGRESS NOTE ADULT - SUBJECTIVE AND OBJECTIVE BOX
PGY-1 Progress Note discussed with attending    PAGER #: [[phone]] TILL 5:00 PM  PLEASE CONTACT ON CALL TEAM:  - On Call Team (Please refer to Mary) FROM 5:00 PM - 8:30PM  - Nightfloat Team FROM 8:30 -7:30 AM    CHIEF COMPLAINT & BRIEF HOSPITAL COURSE:      INTERVAL HPI/OVERNIGHT EVENTS:       REVIEW OF SYSTEMS:  CONSTITUTIONAL: No fever, weight loss, or fatigue  RESPIRATORY: No cough, wheezing, chills or hemoptysis; No shortness of breath  CARDIOVASCULAR: No chest pain, palpitations, dizziness, or leg swelling  GASTROINTESTINAL: No abdominal pain. No nausea, vomiting, or hematemesis; No diarrhea or constipation. No melena or hematochezia.  GENITOURINARY: No dysuria or hematuria, urinary frequency  NEUROLOGICAL: No headaches, memory loss, loss of strength, numbness, or tremors  SKIN: No itching, burning, rashes, or lesions     MEDICATIONS  (STANDING):  apixaban 2.5 milliGRAM(s) Oral every 12 hours  aspirin  chewable 81 milliGRAM(s) Oral daily  atorvastatin 80 milliGRAM(s) Oral at bedtime  bisacodyl 5 milliGRAM(s) Oral at bedtime  colchicine 0.6 milliGRAM(s) Oral two times a day  dronedarone 400 milliGRAM(s) Oral two times a day  famotidine    Tablet 20 milliGRAM(s) Oral daily  ferrous    sulfate 325 milliGRAM(s) Oral daily  folic acid 1 milliGRAM(s) Oral daily  isosorbide   mononitrate ER Tablet (IMDUR) 30 milliGRAM(s) Oral daily  lactulose Syrup 20 Gram(s) Oral at bedtime  metoprolol tartrate 25 milliGRAM(s) Oral three times a day  sevelamer carbonate 1600 milliGRAM(s) Oral three times a day    MEDICATIONS  (PRN):  acetaminophen     Tablet .. 650 milliGRAM(s) Oral every 6 hours PRN Mild Pain (1 - 3), Moderate Pain (4 - 6)      Vital Signs Last 24 Hrs  T(C): 36.6 (30 Nov 2021 07:38), Max: 36.8 (29 Nov 2021 11:07)  T(F): 97.8 (30 Nov 2021 07:38), Max: 98.3 (29 Nov 2021 15:18)  HR: 58 (30 Nov 2021 07:38) (58 - 68)  BP: 170/54 (30 Nov 2021 07:38) (132/55 - 184/69)  BP(mean): --  RR: 18 (30 Nov 2021 07:38) (18 - 20)  SpO2: 95% (30 Nov 2021 07:38) (94% - 100%)    PHYSICAL EXAMINATION:  GENERAL: NAD, well built  HEAD:  Atraumatic, Normocephalic  EYES:  conjunctiva and sclera clear  NECK: Supple, No JVD, Normal thyroid  CHEST/LUNG: Clear to auscultation. Clear to percussion bilaterally; No rales, rhonchi, wheezing, or rubs  HEART: Regular rate and rhythm; No murmurs, rubs, or gallops  ABDOMEN: Soft, Nontender, Nondistended; Bowel sounds present, no pain or masses on palpation  NERVOUS SYSTEM:  Alert & Oriented X3  : voiding well  EXTREMITIES:  2+ Peripheral Pulses, No clubbing, cyanosis, or edema  SKIN: warm dry                          9.5    5.02  )-----------( 168      ( 30 Nov 2021 09:02 )             29.1     11-29    138  |  101  |  42<H>  ----------------------------<  120<H>  4.8   |  31  |  6.61<H>    Ca    8.7      29 Nov 2021 09:11  Phos  5.6     11-29  Mg     2.4     11-29    TPro  6.9  /  Alb  2.8<L>  /  TBili  0.8  /  DBili  x   /  AST  13  /  ALT  36  /  AlkPhos  190<H>  11-29    LIVER FUNCTIONS - ( 29 Nov 2021 09:11 )  Alb: 2.8 g/dL / Pro: 6.9 g/dL / ALK PHOS: 190 U/L / ALT: 36 U/L DA / AST: 13 U/L / GGT: x                   I&O's Summary          CAPILLARY BLOOD GLUCOSE      RADIOLOGY & ADDITIONAL TESTS:                   PGY-1 Progress Note discussed with attending    PAGER #: [[phone]] TILL 5:00 PM  PLEASE CONTACT ON CALL TEAM:  - On Call Team (Please refer to Mary) FROM 5:00 PM - 8:30PM  - Nightfloat Team FROM 8:30 -7:30 AM    CHIEF COMPLAINT & BRIEF HOSPITAL COURSE:    Patient is a 75 y/o M from [facility]. He has past medical history of ESRD on HD (MWF), HTN, severe pulmonary hypertension (CHF w/ pEF > 55%; GIIDD), Anemia of CKD, NSTEMI, CAD, paroxysmal Atrial fibrillation, vascular dementia, CVA w/ R hemiparesis and mood disorder. He was brought in by EMS due to acute onset of shortness of breath secondary to fluid overload. His last HD was on 11/28/21. His O2 saturation was 91% requiring 4LPM of O2 via nasal cannula. His oxygen status improved to 98%. In the E.D, he was tachypneic and started on BiPAP. BNP was elevated at 30,000. Blood pressure was high at 241/84/ Chest Xray showed bilateral pulmonary edema. Nephrology (Dr. Pritchard) was consulted and he underwent emergency HD. ICU was also consulted and he was cleared to be admitted to the floors on nasal cannula. He was given dose of Lasix 80mg IV, Hydralazine 10 mg IV and Insulin 8u with dextrose for Hyperkalemia of 6.3. Serum potassium eventually improved. Troponin was initially elevated but trended down since.     INTERVAL HPI/OVERNIGHT EVENTS:     Patient was examined at bedside, AAOx3, stable, NAD. Currently denies any shortness of breath. No headache, lightheadedness, chest pain, palpitation. Creatinine is still elevated this morning. Awaiting Nephrology recommendations for HD. No other significant events overnight.     REVIEW OF SYSTEMS:  CONSTITUTIONAL: No fever, weight loss, or fatigue  RESPIRATORY: No cough, wheezing, chills or hemoptysis; No shortness of breath  CARDIOVASCULAR: No chest pain, palpitations, dizziness, or leg swelling  GASTROINTESTINAL: No abdominal pain. No nausea, vomiting, or hematemesis; No diarrhea or constipation. No melena or hematochezia.  GENITOURINARY: No dysuria or hematuria, urinary frequency  NEUROLOGICAL: No headaches, memory loss, loss of strength, numbness, or tremors  SKIN: No itching, burning, rashes, or lesions     MEDICATIONS  (STANDING):  apixaban 2.5 milliGRAM(s) Oral every 12 hours  aspirin  chewable 81 milliGRAM(s) Oral daily  atorvastatin 80 milliGRAM(s) Oral at bedtime  bisacodyl 5 milliGRAM(s) Oral at bedtime  colchicine 0.6 milliGRAM(s) Oral two times a day  dronedarone 400 milliGRAM(s) Oral two times a day  famotidine    Tablet 20 milliGRAM(s) Oral daily  ferrous    sulfate 325 milliGRAM(s) Oral daily  folic acid 1 milliGRAM(s) Oral daily  isosorbide   mononitrate ER Tablet (IMDUR) 30 milliGRAM(s) Oral daily  lactulose Syrup 20 Gram(s) Oral at bedtime  metoprolol tartrate 25 milliGRAM(s) Oral three times a day  sevelamer carbonate 1600 milliGRAM(s) Oral three times a day    MEDICATIONS  (PRN):  acetaminophen     Tablet .. 650 milliGRAM(s) Oral every 6 hours PRN Mild Pain (1 - 3), Moderate Pain (4 - 6)      Vital Signs Last 24 Hrs  T(C): 36.6 (30 Nov 2021 07:38), Max: 36.8 (29 Nov 2021 11:07)  T(F): 97.8 (30 Nov 2021 07:38), Max: 98.3 (29 Nov 2021 15:18)  HR: 58 (30 Nov 2021 07:38) (58 - 68)  BP: 170/54 (30 Nov 2021 07:38) (132/55 - 184/69)  BP(mean): --  RR: 18 (30 Nov 2021 07:38) (18 - 20)  SpO2: 95% (30 Nov 2021 07:38) (94% - 100%)    PHYSICAL EXAMINATION:  GENERAL: NAD  HEAD:  Atraumatic, Normocephalic  EYES:  conjunctiva and sclera clear  NECK: Supple, No JVD, Normal thyroid  CHEST/LUNG: Clear to auscultation. Clear to percussion bilaterally; No rales, rhonchi, wheezing, or rubs  HEART: Regular rate and rhythm; No murmurs, rubs, or gallops  ABDOMEN: Soft, Nontender, Nondistended; Bowel sounds present, no pain or masses on palpation  NERVOUS SYSTEM:  Alert & Oriented X3  : voiding well  EXTREMITIES:  2+ Peripheral Pulses, No clubbing, cyanosis, or edema  SKIN: warm dry                          9.5    5.02  )-----------( 168      ( 30 Nov 2021 09:02 )             29.1     11-29    138  |  101  |  42<H>  ----------------------------<  120<H>  4.8   |  31  |  6.61<H>    Ca    8.7      29 Nov 2021 09:11  Phos  5.6     11-29  Mg     2.4     11-29    TPro  6.9  /  Alb  2.8<L>  /  TBili  0.8  /  DBili  x   /  AST  13  /  ALT  36  /  AlkPhos  190<H>  11-29    LIVER FUNCTIONS - ( 29 Nov 2021 09:11 )  Alb: 2.8 g/dL / Pro: 6.9 g/dL / ALK PHOS: 190 U/L / ALT: 36 U/L DA / AST: 13 U/L / GGT: x                   I&O's Summary          CAPILLARY BLOOD GLUCOSE      RADIOLOGY & ADDITIONAL TESTS:

## 2021-11-30 NOTE — PROGRESS NOTE ADULT - ASSESSMENT
ESRD , dialysis days MWF  Dyspnea on admission with elevated BP  Possible cause Hypertensive emergency, diastolic HF , cardiac arrythmia, ACS, increased fluid intake  Hyperkalemia improved after dialysis and 2 gm K diet  Hyper po4 continue binders    Next dialysis tomorrow

## 2021-11-30 NOTE — PROGRESS NOTE ADULT - PROBLEM SELECTOR PLAN 7
prior Echo on 9/28/21 showed RV systolic pressure is 69 mm Hg. Severe pulmonary hypertension.  - admit to tele  - [facility] home meds IMDUR, metop, Multaq RISK                                                          Points  [] Previous VTE                                           3  [] Thrombophilia                                        2  [] Lower limb paralysis                              2   [] Current Cancer                                       2   [x] Immobilization > 24 hrs                        1  [] ICU/CCU stay > 24 hours                       1  [x] Age > 60                                                   1    eliquis  PPI IV

## 2021-11-30 NOTE — PROGRESS NOTE ADULT - PROBLEM SELECTOR PLAN 6
continue home meds  no neuro status change currently at baseline prior Echo on 9/28/21 showed RV systolic pressure is 69 mm Hg. Severe pulmonary hypertension.  - admit to tele  - [facility] home meds IMDUR, metop, Multaq

## 2021-11-30 NOTE — PROGRESS NOTE ADULT - PROBLEM SELECTOR PLAN 5
Hb 9.8 slight new macrocytosis .7  continue iron + stool softener  - f/u B12 and folate continue home meds  no neuro status change currently at baseline

## 2021-11-30 NOTE — PROGRESS NOTE ADULT - PROBLEM SELECTOR PLAN 4
cont BB metop tart 25 BID and Eliquis 2.5 BID Hb 9.8 slight new macrocytosis .7  continue iron + stool softener  - f/u B12 and folate

## 2021-11-30 NOTE — DISCHARGE NOTE NURSING/CASE MANAGEMENT/SOCIAL WORK - NSDCPEFALRISK_GEN_ALL_CORE
For information on Fall & Injury Prevention, visit: [email or web address] OR  [email or web address] OR  https://www.cdc.gov/steadi/patient.html

## 2021-11-30 NOTE — PROGRESS NOTE ADULT - ASSESSMENT
75 yo M from [facility], w/ ESRD (on HD MWF), HTN, Severe Pulm HTN, CHFpEF >55% w/ GIIDD, Anemia of CKD, NSTEMI, CAD, parox Afib, CVA w/ RIGHT dominant hemiparesis, Vascular Dementia and Mood disorder who was BIB EMS for acute onset SOB 2/2 fluid overload.  1.CHF and volume overload need for HD.  2.Pt declines any cardiac test/intervention.  3.ESRD-HD as per renal.  4.Parox Afib-Multaq, eliquis.  5.Lipid d/o-statin.  6.CVA-asa,statin.  7.CAD-asa,imdur,statin,dec lopressor.  8.HTN-add hydralazine 25mg q8h.  9.PPI.

## 2021-11-30 NOTE — PROGRESS NOTE ADULT - SUBJECTIVE AND OBJECTIVE BOX
Problem List:  ESRD   CHF    PAST MEDICAL & SURGICAL HISTORY:  ESRD (end stage renal disease) on dialysis    Hypertension    Anemia    Cerebrovascular accident (CVA), unspecified mechanism    Paroxysmal atrial fibrillation    CAD (coronary artery disease)    A-V fistula        No Known Allergies      MEDICATIONS  (STANDING):  apixaban 2.5 milliGRAM(s) Oral every 12 hours  aspirin  chewable 81 milliGRAM(s) Oral daily  atorvastatin 80 milliGRAM(s) Oral at bedtime  bisacodyl 5 milliGRAM(s) Oral at bedtime  chlorhexidine 2% Cloths 1 Application(s) Topical <User Schedule>  colchicine 0.6 milliGRAM(s) Oral two times a day  dronedarone 400 milliGRAM(s) Oral two times a day  famotidine    Tablet 20 milliGRAM(s) Oral daily  ferrous    sulfate 325 milliGRAM(s) Oral daily  folic acid 1 milliGRAM(s) Oral daily  hydrALAZINE 25 milliGRAM(s) Oral every 8 hours  isosorbide   mononitrate ER Tablet (IMDUR) 30 milliGRAM(s) Oral daily  lactulose Syrup 20 Gram(s) Oral at bedtime  metoprolol tartrate 25 milliGRAM(s) Oral two times a day  sevelamer carbonate 1600 milliGRAM(s) Oral three times a day    MEDICATIONS  (PRN):  acetaminophen     Tablet .. 650 milliGRAM(s) Oral every 6 hours PRN Mild Pain (1 - 3), Moderate Pain (4 - 6)                            x      x     )-----------( x        ( 30 Nov 2021 09:44 )             29.2     11-30    138  |  100  |  58<H>  ----------------------------<  90  5.1   |  29  |  8.54<H>    Ca    9.0      30 Nov 2021 09:02  Phos  6.3     11-30  Mg     2.3     11-30    TPro  7.3  /  Alb  3.0<L>  /  TBili  0.7  /  DBili  x   /  AST  11  /  ALT  32  /  AlkPhos  193<H>  11-30            REVIEW OF SYSTEMS:  General: no fever no chills, no weight loss.    RESPIRATORY: No cough, wheezing, hemoptysis; No shortness of breath  CARDIOVASCULAR: No chest pain or palpitations. No Edema  GASTROINTESTINAL: No abdominal or epigastric pain. No nausea, vomiting. No diarrhea or constipation. No melena.  GENITOURINARY: REDUCED URINE OUTPUT.        VITALS:  T(F): 96.8 (11-30-21 @ 15:49), Max: 98 (11-29-21 @ 23:53)  HR: 65 (11-30-21 @ 15:49)  BP: 177/59 (11-30-21 @ 15:49)  RR: 18 (11-30-21 @ 15:49)  SpO2: 98% (11-30-21 @ 15:49)  Wt(kg): --      PHYSICAL EXAM:  Constitutional: well developed, no diaphoresis, no distress.  Neck: No JVD, no carotid bruit, supple, no adenopathy  Respiratory: Good air entrance B/L, no wheezes, rales or rhonchi  Cardiovascular: S1, S2, RRR, no pericardial rub, no murmur  Abdomen: BS+, soft, no tenderness, no bruit  Pelvis: bladder nondistended  Extremities: No cyanosis or clubbing. No peripheral edema.   RIGHT  avg

## 2021-11-30 NOTE — PROGRESS NOTE ADULT - SUBJECTIVE AND OBJECTIVE BOX
CHIEF COMPLAINT:Patient is a 76y old  Male who presents with a chief complaint of SOB .Pt appears comfortable.    	  REVIEW OF SYSTEMS:  CONSTITUTIONAL: No fever, weight loss, or fatigue  EYES: No eye pain, visual disturbances, or discharge  ENT:  No difficulty hearing, tinnitus, vertigo; No sinus or throat pain  NECK: No pain or stiffness  RESPIRATORY: No cough, wheezing, chills or hemoptysis; No Shortness of Breath  CARDIOVASCULAR: No chest pain, palpitations, passing out, dizziness, or leg swelling  GASTROINTESTINAL: No abdominal or epigastric pain. No nausea, vomiting, or hematemesis; No diarrhea or constipation. No melena or hematochezia.  GENITOURINARY: No dysuria, frequency, hematuria, or incontinence  NEUROLOGICAL: No headaches, memory loss, loss of strength, numbness, or tremors  SKIN: No itching, burning, rashes, or lesions   LYMPH Nodes: No enlarged glands  ENDOCRINE: No heat or cold intolerance; No hair loss  MUSCULOSKELETAL: No joint pain or swelling; No muscle, back, or extremity pain  PSYCHIATRIC: No depression, anxiety, mood swings, or difficulty sleeping  HEME/LYMPH: No easy bruising, or bleeding gums  ALLERGY AND IMMUNOLOGIC: No hives or eczema	      PHYSICAL EXAM:  T(C): 36.6 (11-30-21 @ 07:38), Max: 36.8 (11-29-21 @ 11:07)  HR: 58 (11-30-21 @ 07:38) (58 - 68)  BP: 170/54 (11-30-21 @ 07:38) (132/55 - 184/69)  RR: 18 (11-30-21 @ 07:38) (18 - 20)  SpO2: 95% (11-30-21 @ 07:38) (94% - 100%)  Wt(kg): --  I&O's Summary      Appearance: Normal	  HEENT:   Normal oral mucosa, PERRL, EOMI	  Lymphatic: No lymphadenopathy  Cardiovascular: Normal S1 S2, No JVD, No murmurs, No edema  Respiratory: Lungs clear to auscultation	  Psychiatry: A & O x 3, Mood & affect appropriate  Gastrointestinal:  Soft, Non-tender, + BS	  Skin: No rashes, No ecchymoses, No cyanosis	  Neurologic: Non-focal  Extremities: Normal range of motion, No clubbing, cyanosis or edema  Vascular: Peripheral pulses palpable 2+ bilaterally    MEDICATIONS  (STANDING):  apixaban 2.5 milliGRAM(s) Oral every 12 hours  aspirin  chewable 81 milliGRAM(s) Oral daily  atorvastatin 80 milliGRAM(s) Oral at bedtime  bisacodyl 5 milliGRAM(s) Oral at bedtime  colchicine 0.6 milliGRAM(s) Oral two times a day  dronedarone 400 milliGRAM(s) Oral two times a day  famotidine    Tablet 20 milliGRAM(s) Oral daily  ferrous    sulfate 325 milliGRAM(s) Oral daily  folic acid 1 milliGRAM(s) Oral daily  isosorbide   mononitrate ER Tablet (IMDUR) 30 milliGRAM(s) Oral daily  lactulose Syrup 20 Gram(s) Oral at bedtime  metoprolol tartrate 25 milliGRAM(s) Oral three times a day  sevelamer carbonate 1600 milliGRAM(s) Oral three times a day      TELEMETRY: nsr down to 39	      LABS:	 	    troponin I, High Sensitivity Result: 195.8 ng/L (11-28 @ 22:31)  Troponin I, High Sensitivity Result: 204.6 ng/L (11-28 @ 13:20)  Troponin I, High Sensitivity Result: 182.3 ng/L (11-28 @ 10:10)                            9.5    5.02  )-----------( 168      ( 30 Nov 2021 09:02 )             29.1     11-30    138  |  100  |  58<H>  ----------------------------<  90  5.1   |  29  |  8.54<H>    Ca    9.0      30 Nov 2021 09:02  Phos  6.3     11-30  Mg     2.3     11-30    TPro  7.3  /  Alb  3.0<L>  /  TBili  0.7  /  DBili  x   /  AST  11  /  ALT  32  /  AlkPhos  193<H>  11-30    proBNP: Serum Pro-Brain Natriuretic Peptide: 65601 pg/mL (11-28 @ 10:10)    Lipid Profile:   HgA1c:   TSH:

## 2021-11-30 NOTE — PROGRESS NOTE ADULT - PROBLEM SELECTOR PLAN 8
RISK                                                          Points  [] Previous VTE                                           3  [] Thrombophilia                                        2  [] Lower limb paralysis                              2   [] Current Cancer                                       2   [x] Immobilization > 24 hrs                        1  [] ICU/CCU stay > 24 hours                       1  [x] Age > 60                                                   1    eliquis  PPI IV

## 2021-11-30 NOTE — PROGRESS NOTE ADULT - PROBLEM SELECTOR PLAN 2
management as above on HD MWF last HD on Friday 11/26 , no missed HD  CXR with fluid OD, b/l LE pitting edema  - BiPAP in ED  - urgent HD 11/28  - resume home meds sevelamer, colcrys   - monitor electrolytes, post HD labs  NEPHRO Dr. Pritchard

## 2021-11-30 NOTE — PROGRESS NOTE ADULT - PROBLEM SELECTOR PLAN 1
BP at NH wnl but elevated peak 241/84 w/ pulm edema on CXR  BNP 30k and Trop elevation  - goal is reduce BP by 25% in 24 hours  - s/p Lasix 80 and Hydral 10  - pending Urgent HD  - resume home meds  - labetalol or hydral PRN   - vs q 4  - trend trop T3 - trended downr  - admit to tele   NEPHRO Dr. Pritchard BP at NH wnl but elevated peak 241/84 w/ pulm edema on CXR  BNP 30k and Trop elevation  - goal is reduce BP by 25% in 24 hours  - s/p Lasix 80 and Hydral 10  - s/p urgent HD (11/28/21)  - resume home meds  - labetalol  - add hdyralazine 25 q8  - vs q 4  - trend trop T3 - trended down  - admit to tele   NEPHRO Dr. Pritchard  CARDIO Dr. Salinas

## 2021-12-01 LAB
ALBUMIN SERPL ELPH-MCNC: 2.9 G/DL — LOW (ref 3.5–5)
ALP SERPL-CCNC: 192 U/L — HIGH (ref 40–120)
ALT FLD-CCNC: 43 U/L DA — SIGNIFICANT CHANGE UP (ref 10–60)
ANION GAP SERPL CALC-SCNC: 13 MMOL/L — SIGNIFICANT CHANGE UP (ref 5–17)
AST SERPL-CCNC: 25 U/L — SIGNIFICANT CHANGE UP (ref 10–40)
BASOPHILS # BLD AUTO: 0.03 K/UL — SIGNIFICANT CHANGE UP (ref 0–0.2)
BASOPHILS NFR BLD AUTO: 0.7 % — SIGNIFICANT CHANGE UP (ref 0–2)
BILIRUB SERPL-MCNC: 0.6 MG/DL — SIGNIFICANT CHANGE UP (ref 0.2–1.2)
BUN SERPL-MCNC: 78 MG/DL — HIGH (ref 7–18)
CALCIUM SERPL-MCNC: 8.7 MG/DL — SIGNIFICANT CHANGE UP (ref 8.4–10.5)
CHLORIDE SERPL-SCNC: 99 MMOL/L — SIGNIFICANT CHANGE UP (ref 96–108)
CO2 SERPL-SCNC: 25 MMOL/L — SIGNIFICANT CHANGE UP (ref 22–31)
CREAT SERPL-MCNC: 10.6 MG/DL — HIGH (ref 0.5–1.3)
EOSINOPHIL # BLD AUTO: 0.08 K/UL — SIGNIFICANT CHANGE UP (ref 0–0.5)
EOSINOPHIL NFR BLD AUTO: 1.8 % — SIGNIFICANT CHANGE UP (ref 0–6)
GLUCOSE SERPL-MCNC: 116 MG/DL — HIGH (ref 70–99)
HCT VFR BLD CALC: 27.1 % — LOW (ref 39–50)
HGB BLD-MCNC: 8.9 G/DL — LOW (ref 13–17)
IMM GRANULOCYTES NFR BLD AUTO: 0 % — SIGNIFICANT CHANGE UP (ref 0–1.5)
LYMPHOCYTES # BLD AUTO: 0.99 K/UL — LOW (ref 1–3.3)
LYMPHOCYTES # BLD AUTO: 22.8 % — SIGNIFICANT CHANGE UP (ref 13–44)
MAGNESIUM SERPL-MCNC: 2.3 MG/DL — SIGNIFICANT CHANGE UP (ref 1.6–2.6)
MCHC RBC-ENTMCNC: 32.6 PG — SIGNIFICANT CHANGE UP (ref 27–34)
MCHC RBC-ENTMCNC: 32.8 GM/DL — SIGNIFICANT CHANGE UP (ref 32–36)
MCV RBC AUTO: 99.3 FL — SIGNIFICANT CHANGE UP (ref 80–100)
MONOCYTES # BLD AUTO: 0.55 K/UL — SIGNIFICANT CHANGE UP (ref 0–0.9)
MONOCYTES NFR BLD AUTO: 12.6 % — SIGNIFICANT CHANGE UP (ref 2–14)
NEUTROPHILS # BLD AUTO: 2.7 K/UL — SIGNIFICANT CHANGE UP (ref 1.8–7.4)
NEUTROPHILS NFR BLD AUTO: 62.1 % — SIGNIFICANT CHANGE UP (ref 43–77)
NRBC # BLD: 0 /100 WBCS — SIGNIFICANT CHANGE UP (ref 0–0)
PHOSPHATE SERPL-MCNC: 6.8 MG/DL — HIGH (ref 2.5–4.5)
PLATELET # BLD AUTO: 156 K/UL — SIGNIFICANT CHANGE UP (ref 150–400)
POTASSIUM SERPL-MCNC: 5.2 MMOL/L — SIGNIFICANT CHANGE UP (ref 3.5–5.3)
POTASSIUM SERPL-SCNC: 5.2 MMOL/L — SIGNIFICANT CHANGE UP (ref 3.5–5.3)
PROT SERPL-MCNC: 7.3 G/DL — SIGNIFICANT CHANGE UP (ref 6–8.3)
RBC # BLD: 2.73 M/UL — LOW (ref 4.2–5.8)
RBC # FLD: 16.4 % — HIGH (ref 10.3–14.5)
SODIUM SERPL-SCNC: 137 MMOL/L — SIGNIFICANT CHANGE UP (ref 135–145)
WBC # BLD: 4.35 K/UL — SIGNIFICANT CHANGE UP (ref 3.8–10.5)
WBC # FLD AUTO: 4.35 K/UL — SIGNIFICANT CHANGE UP (ref 3.8–10.5)

## 2021-12-01 RX ADMIN — ATORVASTATIN CALCIUM 80 MILLIGRAM(S): 80 TABLET, FILM COATED ORAL at 21:26

## 2021-12-01 RX ADMIN — Medication 0.6 MILLIGRAM(S): at 05:46

## 2021-12-01 RX ADMIN — Medication 325 MILLIGRAM(S): at 14:28

## 2021-12-01 RX ADMIN — APIXABAN 2.5 MILLIGRAM(S): 2.5 TABLET, FILM COATED ORAL at 17:48

## 2021-12-01 RX ADMIN — APIXABAN 2.5 MILLIGRAM(S): 2.5 TABLET, FILM COATED ORAL at 05:46

## 2021-12-01 RX ADMIN — Medication 0.6 MILLIGRAM(S): at 17:48

## 2021-12-01 RX ADMIN — Medication 25 MILLIGRAM(S): at 14:28

## 2021-12-01 RX ADMIN — DRONEDARONE 400 MILLIGRAM(S): 400 TABLET, FILM COATED ORAL at 05:46

## 2021-12-01 RX ADMIN — FAMOTIDINE 20 MILLIGRAM(S): 10 INJECTION INTRAVENOUS at 14:29

## 2021-12-01 RX ADMIN — Medication 25 MILLIGRAM(S): at 05:46

## 2021-12-01 RX ADMIN — Medication 1 MILLIGRAM(S): at 14:29

## 2021-12-01 RX ADMIN — SEVELAMER CARBONATE 1600 MILLIGRAM(S): 2400 POWDER, FOR SUSPENSION ORAL at 21:26

## 2021-12-01 RX ADMIN — SEVELAMER CARBONATE 1600 MILLIGRAM(S): 2400 POWDER, FOR SUSPENSION ORAL at 05:46

## 2021-12-01 RX ADMIN — Medication 25 MILLIGRAM(S): at 21:26

## 2021-12-01 RX ADMIN — Medication 5 MILLIGRAM(S): at 21:27

## 2021-12-01 RX ADMIN — Medication 81 MILLIGRAM(S): at 14:29

## 2021-12-01 RX ADMIN — SEVELAMER CARBONATE 1600 MILLIGRAM(S): 2400 POWDER, FOR SUSPENSION ORAL at 14:28

## 2021-12-01 RX ADMIN — DRONEDARONE 400 MILLIGRAM(S): 400 TABLET, FILM COATED ORAL at 17:48

## 2021-12-01 RX ADMIN — ISOSORBIDE MONONITRATE 30 MILLIGRAM(S): 60 TABLET, EXTENDED RELEASE ORAL at 14:29

## 2021-12-01 RX ADMIN — CHLORHEXIDINE GLUCONATE 1 APPLICATION(S): 213 SOLUTION TOPICAL at 05:46

## 2021-12-01 RX ADMIN — Medication 25 MILLIGRAM(S): at 17:48

## 2021-12-01 RX ADMIN — Medication 25 MILLIGRAM(S): at 05:47

## 2021-12-01 NOTE — PROGRESS NOTE ADULT - PROBLEM SELECTOR PLAN 3
cont BB metop tart 25 BID and Eliquis 2.5 BID cont BB metop tart 25 BID and Eliquis 2.5 BID  continue telemetry due to bradycardia  Cardio consult Dr. Salinas

## 2021-12-01 NOTE — PROGRESS NOTE ADULT - SUBJECTIVE AND OBJECTIVE BOX
CHIEF COMPLAINT:Patient is a 76y old  Male who presents with a chief complaint of SOB.Pt appears comfortable.    	  REVIEW OF SYSTEMS:  CONSTITUTIONAL: No fever, weight loss, or fatigue  EYES: No eye pain, visual disturbances, or discharge  ENT:  No difficulty hearing, tinnitus, vertigo; No sinus or throat pain  NECK: No pain or stiffness  RESPIRATORY: No cough, wheezing, chills or hemoptysis; No Shortness of Breath  CARDIOVASCULAR: No chest pain, palpitations, passing out, dizziness, or leg swelling  GASTROINTESTINAL: No abdominal or epigastric pain. No nausea, vomiting, or hematemesis; No diarrhea or constipation. No melena or hematochezia.  GENITOURINARY: No dysuria, frequency, hematuria, or incontinence  NEUROLOGICAL: No headaches, memory loss, loss of strength, numbness, or tremors  SKIN: No itching, burning, rashes, or lesions   LYMPH Nodes: No enlarged glands  ENDOCRINE: No heat or cold intolerance; No hair loss  MUSCULOSKELETAL: No joint pain or swelling; No muscle, back, or extremity pain  PSYCHIATRIC: No depression, anxiety, mood swings, or difficulty sleeping  HEME/LYMPH: No easy bruising, or bleeding gums  ALLERGY AND IMMUNOLOGIC: No hives or eczema	      PHYSICAL EXAM:  T(C): 36.7 (12-01-21 @ 10:45), Max: 37.1 (11-30-21 @ 19:09)  HR: 57 (12-01-21 @ 10:45) (57 - 79)  BP: 167/68 (12-01-21 @ 10:45) (156/66 - 177/59)  RR: 18 (12-01-21 @ 10:45) (17 - 18)  SpO2: 99% (12-01-21 @ 10:45) (96% - 100%)  Wt(kg): --  I&O's Summary      Appearance: Normal	  HEENT:   Normal oral mucosa, PERRL, EOMI	  Lymphatic: No lymphadenopathy  Cardiovascular: Normal S1 S2, No JVD, No murmurs, No edema  Respiratory: Lungs clear to auscultation	  Psychiatry: A & O x 3, Mood & affect appropriate  Gastrointestinal:  Soft, Non-tender, + BS	  Skin: No rashes, No ecchymoses, No cyanosis	  Neurologic: Non-focal  Extremities: Normal range of motion, No clubbing, cyanosis or edema  Vascular: Peripheral pulses palpable 2+ bilaterally    MEDICATIONS  (STANDING):  apixaban 2.5 milliGRAM(s) Oral every 12 hours  aspirin  chewable 81 milliGRAM(s) Oral daily  atorvastatin 80 milliGRAM(s) Oral at bedtime  bisacodyl 5 milliGRAM(s) Oral at bedtime  chlorhexidine 2% Cloths 1 Application(s) Topical <User Schedule>  colchicine 0.6 milliGRAM(s) Oral two times a day  dronedarone 400 milliGRAM(s) Oral two times a day  famotidine    Tablet 20 milliGRAM(s) Oral daily  ferrous    sulfate 325 milliGRAM(s) Oral daily  folic acid 1 milliGRAM(s) Oral daily  hydrALAZINE 25 milliGRAM(s) Oral every 8 hours  isosorbide   mononitrate ER Tablet (IMDUR) 30 milliGRAM(s) Oral daily  lactulose Syrup 20 Gram(s) Oral at bedtime  metoprolol tartrate 25 milliGRAM(s) Oral two times a day  sevelamer carbonate 1600 milliGRAM(s) Oral three times a day      	  	  LABS:	 	      Troponin I, High Sensitivity Result: 195.8 ng/L (11-28 @ 22:31)                            8.9    4.35  )-----------( 156      ( 01 Dec 2021 10:53 )             27.1     12-01    137  |  99  |  78<H>  ----------------------------<  116<H>  5.2   |  25  |  10.60<H>    Ca    8.7      01 Dec 2021 10:53  Phos  6.8     12-01  Mg     2.3     12-01    TPro  7.3  /  Alb  2.9<L>  /  TBili  0.6  /  DBili  x   /  AST  25  /  ALT  43  /  AlkPhos  192<H>  12-01    proBNP: Serum Pro-Brain Natriuretic Peptide: 21085 pg/mL (11-28 @ 10:10)    Lipid Profile:   HgA1c:   TSH:

## 2021-12-01 NOTE — PROGRESS NOTE ADULT - SUBJECTIVE AND OBJECTIVE BOX
Problem List:  ESRD  Had dialysis today 2.2 kg were removed    PAST MEDICAL & SURGICAL HISTORY:  ESRD (end stage renal disease) on dialysis    Hypertension    Anemia    Cerebrovascular accident (CVA), unspecified mechanism    Paroxysmal atrial fibrillation    CAD (coronary artery disease)    A-V fistula        No Known Allergies      MEDICATIONS  (STANDING):  apixaban 2.5 milliGRAM(s) Oral every 12 hours  aspirin  chewable 81 milliGRAM(s) Oral daily  atorvastatin 80 milliGRAM(s) Oral at bedtime  bisacodyl 5 milliGRAM(s) Oral at bedtime  chlorhexidine 2% Cloths 1 Application(s) Topical <User Schedule>  colchicine 0.6 milliGRAM(s) Oral two times a day  dronedarone 400 milliGRAM(s) Oral two times a day  famotidine    Tablet 20 milliGRAM(s) Oral daily  ferrous    sulfate 325 milliGRAM(s) Oral daily  folic acid 1 milliGRAM(s) Oral daily  hydrALAZINE 25 milliGRAM(s) Oral every 8 hours  isosorbide   mononitrate ER Tablet (IMDUR) 30 milliGRAM(s) Oral daily  lactulose Syrup 20 Gram(s) Oral at bedtime  metoprolol tartrate 25 milliGRAM(s) Oral two times a day  sevelamer carbonate 1600 milliGRAM(s) Oral three times a day    MEDICATIONS  (PRN):  acetaminophen     Tablet .. 650 milliGRAM(s) Oral every 6 hours PRN Mild Pain (1 - 3), Moderate Pain (4 - 6).                          8.9    4.35  )-----------( 156      ( 01 Dec 2021 10:53 )             27.1     12-01    137  |  99  |  78<H>  ----------------------------<  116<H>  5.2   |  25  |  10.60<H>    Ca    8.7      01 Dec 2021 10:53  Phos  6.8     12-01  Mg     2.3     12-01    TPro  7.3  /  Alb  2.9<L>  /  TBili  0.6  /  DBili  x   /  AST  25  /  ALT  43  /  AlkPhos  192<H>  12-01            REVIEW OF SYSTEMS:  General: no fever no chills, no weight loss.  EYES/ENT: No visual changes;  No vertigo, no headache.  NECK: No pain or stiffness  RESPIRATORY: No cough, wheezing, hemoptysis; No shortness of breath  CARDIOVASCULAR: No chest pain or palpitations. No Edema  GASTROINTESTINAL: No abdominal or epigastric pain. No nausea, vomiting. No diarrhea or constipation. No melena.  GENITOURINARY: No dysuria.      VITALS:  T(F): 97.7 (12-01-21 @ 15:34), Max: 98.7 (11-30-21 @ 19:09)  HR: 62 (12-01-21 @ 15:34)  BP: 135/46 (12-01-21 @ 15:34)  RR: 18 (12-01-21 @ 15:34)  SpO2: 99% (12-01-21 @ 15:34)  Wt(kg): --      PHYSICAL EXAM:  Constitutional: well developed, no diaphoresis, no distress.  Neck: No JVD, no carotid bruit, supple, no adenopathy  Respiratory: Good air entrance B/L, no wheezes, rales or rhonchi  Cardiovascular: S1, S2, RRR, no pericardial rub, no murmur  Abdomen: BS+, soft, no tenderness, no bruit  Pelvis: bladder nondistended  Extremities: No cyanosis or clubbing. No peripheral edema.   right avg with bruit and thrill

## 2021-12-01 NOTE — PROGRESS NOTE ADULT - ASSESSMENT
ESRD , dialysis days MWF  Dyspnea on admission with elevated BP  Possible cause Hypertensive emergency, diastolic HF , cardiac arrythmia, ACS, increased fluid intake  Hyperkalemia improved after dialysis and 2 gm K diet  Hyper po4 continue binders

## 2021-12-01 NOTE — PROGRESS NOTE ADULT - PROBLEM SELECTOR PLAN 6
prior Echo on 9/28/21 showed RV systolic pressure is 69 mm Hg. Severe pulmonary hypertension.  - admit to tele  - [facility] home meds IMDUR, metop, Multaq

## 2021-12-01 NOTE — PROGRESS NOTE ADULT - ASSESSMENT
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  1.CHF and volume overload need for HD.  2.Pt declines any cardiac test/intervention.  3.ESRD-HD as per renal.  4.Parox Afib-Multaq, eliquis.  5.Lipid d/o-statin.  6.CVA-asa,statin.  7.CAD-asa,imdur,statin,lopressor.  8.HTN- hydralazine 25mg q8h.  9.PPI.

## 2021-12-01 NOTE — PROGRESS NOTE ADULT - PROBLEM SELECTOR PLAN 7
RISK                                                          Points  [] Previous VTE                                           3  [] Thrombophilia                                        2  [] Lower limb paralysis                              2   [] Current Cancer                                       2   [x] Immobilization > 24 hrs                        1  [] ICU/CCU stay > 24 hours                       1  [x] Age > 60                                                   1    eliquis  PPI IV RISK                                                          Points  [] Previous VTE                                           3  [] Thrombophilia                                        2  [] Lower limb paralysis                              2   [] Current Cancer                                       2   [x] Immobilization > 24 hrs                        1  [] ICU/CCU stay > 24 hours                       1  [x] Age > 60                                                   1    eliquis for DVT prophylaxis  PPI IV for GI prophylaxis

## 2021-12-01 NOTE — PROGRESS NOTE ADULT - PROBLEM SELECTOR PLAN 2
on HD MWF last HD on Friday 11/26 , no missed HD  CXR with fluid OD, b/l LE pitting edema  - BiPAP in ED  - urgent HD 11/28  - resume home meds sevelamer, colcrys   - monitor electrolytes, post HD labs  NEPHRO Dr. Pritchard - on HD MWF last HD outpatient on Friday 11/26 , no missed HD  - CXR with fluid OD, b/l LE pitting edema  - BiPAP in ED  - urgent HD 11/28  - resume home meds sevelamer, colcrys   - monitor electrolytes, post HD labs  - scheduled for HD today (12/1/21)  - NEPHRO Dr. Pritchard

## 2021-12-01 NOTE — PROGRESS NOTE ADULT - PROBLEM SELECTOR PLAN 1
BP at NH wnl but elevated peak 241/84 w/ pulm edema on CXR  BNP 30k and Trop elevation  - goal is reduce BP by 25% in 24 hours  - s/p Lasix 80 and Hydral 10  - s/p urgent HD (11/28/21)  - resume home meds  - labetalol  - add hdyralazine 25 q8  - vs q 4  - trend trop T3 - trended down  - admit to tele   NEPHRO Dr. Pritchard  CARDIO Dr. Salinas - BP at NH wnl but elevated peak 241/84 w/ pulm edema on CXR  - BNP 30k and Trop elevation  - goal is reduce BP by 25% in 24 hours  - s/p Lasix 80 and Hydral 10  - s/p urgent HD (11/28/21)  - resume home meds  - labetalol  - add hdyralazine 25 q8  - vs q 4  - trend trop T3 - trended down  - admit to tele   - NEPHRO Dr. Pritchard  - CARDIO Dr. Salinas

## 2021-12-01 NOTE — PROGRESS NOTE ADULT - PROBLEM SELECTOR PLAN 4
Hb 9.8 slight new macrocytosis .7  continue iron + stool softener  - f/u B12 and folate Hb 9.8 slight new macrocytosis .7  continue iron + stool softener  B12 and folate- wnl

## 2021-12-01 NOTE — PROGRESS NOTE ADULT - SUBJECTIVE AND OBJECTIVE BOX
PGY-1 Progress Note discussed with attending    PAGER #: [[phone]] TILL 5:00 PM  PLEASE CONTACT ON CALL TEAM:  - On Call Team (Please refer to Mary) FROM 5:00 PM - 8:30PM  - Nightfloat Team FROM 8:30 -7:30 AM    CHIEF COMPLAINT & BRIEF HOSPITAL COURSE:      INTERVAL HPI/OVERNIGHT EVENTS:       REVIEW OF SYSTEMS:  CONSTITUTIONAL: No fever, weight loss, or fatigue  RESPIRATORY: No cough, wheezing, chills or hemoptysis; No shortness of breath  CARDIOVASCULAR: No chest pain, palpitations, dizziness, or leg swelling  GASTROINTESTINAL: No abdominal pain. No nausea, vomiting, or hematemesis; No diarrhea or constipation. No melena or hematochezia.  GENITOURINARY: No dysuria or hematuria, urinary frequency  NEUROLOGICAL: No headaches, memory loss, loss of strength, numbness, or tremors  SKIN: No itching, burning, rashes, or lesions     MEDICATIONS  (STANDING):  apixaban 2.5 milliGRAM(s) Oral every 12 hours  aspirin  chewable 81 milliGRAM(s) Oral daily  atorvastatin 80 milliGRAM(s) Oral at bedtime  bisacodyl 5 milliGRAM(s) Oral at bedtime  chlorhexidine 2% Cloths 1 Application(s) Topical <User Schedule>  colchicine 0.6 milliGRAM(s) Oral two times a day  dronedarone 400 milliGRAM(s) Oral two times a day  famotidine    Tablet 20 milliGRAM(s) Oral daily  ferrous    sulfate 325 milliGRAM(s) Oral daily  folic acid 1 milliGRAM(s) Oral daily  hydrALAZINE 25 milliGRAM(s) Oral every 8 hours  isosorbide   mononitrate ER Tablet (IMDUR) 30 milliGRAM(s) Oral daily  lactulose Syrup 20 Gram(s) Oral at bedtime  metoprolol tartrate 25 milliGRAM(s) Oral two times a day  sevelamer carbonate 1600 milliGRAM(s) Oral three times a day    MEDICATIONS  (PRN):  acetaminophen     Tablet .. 650 milliGRAM(s) Oral every 6 hours PRN Mild Pain (1 - 3), Moderate Pain (4 - 6)      Vital Signs Last 24 Hrs  T(C): 36.3 (01 Dec 2021 07:51), Max: 37.1 (30 Nov 2021 19:09)  T(F): 97.3 (01 Dec 2021 07:51), Max: 98.7 (30 Nov 2021 19:09)  HR: 58 (01 Dec 2021 07:51) (57 - 66)  BP: 172/55 (01 Dec 2021 07:51) (156/66 - 177/59)  BP(mean): 78 (30 Nov 2021 19:09) (78 - 88)  RR: 17 (01 Dec 2021 07:51) (17 - 19)  SpO2: 99% (01 Dec 2021 07:51) (96% - 99%)    PHYSICAL EXAMINATION:  GENERAL: NAD, well built  HEAD:  Atraumatic, Normocephalic  EYES:  conjunctiva and sclera clear  NECK: Supple, No JVD, Normal thyroid  CHEST/LUNG: Clear to auscultation. Clear to percussion bilaterally; No rales, rhonchi, wheezing, or rubs  HEART: Regular rate and rhythm; No murmurs, rubs, or gallops  ABDOMEN: Soft, Nontender, Nondistended; Bowel sounds present, no pain or masses on palpation  NERVOUS SYSTEM:  Alert & Oriented X3  : voiding well  EXTREMITIES:  2+ Peripheral Pulses, No clubbing, cyanosis, or edema  SKIN: warm dry                          x      x     )-----------( x        ( 30 Nov 2021 09:44 )             29.2     11-30    138  |  100  |  58<H>  ----------------------------<  90  5.1   |  29  |  8.54<H>    Ca    9.0      30 Nov 2021 09:02  Phos  6.3     11-30  Mg     2.3     11-30    TPro  7.3  /  Alb  3.0<L>  /  TBili  0.7  /  DBili  x   /  AST  11  /  ALT  32  /  AlkPhos  193<H>  11-30    LIVER FUNCTIONS - ( 30 Nov 2021 09:02 )  Alb: 3.0 g/dL / Pro: 7.3 g/dL / ALK PHOS: 193 U/L / ALT: 32 U/L DA / AST: 11 U/L / GGT: x                   I&O's Summary          CAPILLARY BLOOD GLUCOSE      RADIOLOGY & ADDITIONAL TESTS:                   PGY-1 Progress Note discussed with attending    PAGER #: [[phone]] TILL 5:00 PM  PLEASE CONTACT ON CALL TEAM:  - On Call Team (Please refer to Mary) FROM 5:00 PM - 8:30PM  - Nightfloat Team FROM 8:30 -7:30 AM    CHIEF COMPLAINT & BRIEF HOSPITAL COURSE:    Patient is a 77 y/o M from [facility]. He has past medical history of ESRD on HD (MWF), HTN, severe pulmonary hypertension (CHF w/ pEF > 55%; GIIDD), Anemia of CKD, NSTEMI, CAD, paroxysmal Atrial fibrillation, vascular dementia, CVA w/ R hemiparesis and mood disorder. He was brought in by EMS due to acute onset of shortness of breath secondary to fluid overload. His last HD was on 11/28/21. His O2 saturation was 91% requiring 4LPM of O2 via nasal cannula. His oxygen status improved to 98%. In the E.D, he was tachypneic and started on BiPAP. BNP was elevated at 30,000. Blood pressure was high at 241/84/ Chest Xray showed bilateral pulmonary edema. Nephrology (Dr. Pritchard) was consulted and he underwent emergency HD. ICU was also consulted and he was cleared to be admitted to the floors on nasal cannula. He was given dose of Lasix 80mg IV, Hydralazine 10 mg IV and Insulin 8u with dextrose for Hyperkalemia of 6.3. Serum potassium eventually improved. Troponin was initially elevated but trended down since.     INTERVAL HPI/OVERNIGHT EVENTS:     Patient was examined at bedside. AAOX3, stable, NAD.     REVIEW OF SYSTEMS:  CONSTITUTIONAL: No fever, weight loss, or fatigue  RESPIRATORY: No cough, wheezing, chills or hemoptysis; No shortness of breath  CARDIOVASCULAR: No chest pain, palpitations, dizziness, or leg swelling  GASTROINTESTINAL: No abdominal pain. No nausea, vomiting, or hematemesis; No diarrhea or constipation. No melena or hematochezia.  GENITOURINARY: No dysuria or hematuria, urinary frequency  NEUROLOGICAL: No headaches, memory loss, loss of strength, numbness, or tremors  SKIN: No itching, burning, rashes, or lesions     MEDICATIONS  (STANDING):  apixaban 2.5 milliGRAM(s) Oral every 12 hours  aspirin  chewable 81 milliGRAM(s) Oral daily  atorvastatin 80 milliGRAM(s) Oral at bedtime  bisacodyl 5 milliGRAM(s) Oral at bedtime  chlorhexidine 2% Cloths 1 Application(s) Topical <User Schedule>  colchicine 0.6 milliGRAM(s) Oral two times a day  dronedarone 400 milliGRAM(s) Oral two times a day  famotidine    Tablet 20 milliGRAM(s) Oral daily  ferrous    sulfate 325 milliGRAM(s) Oral daily  folic acid 1 milliGRAM(s) Oral daily  hydrALAZINE 25 milliGRAM(s) Oral every 8 hours  isosorbide   mononitrate ER Tablet (IMDUR) 30 milliGRAM(s) Oral daily  lactulose Syrup 20 Gram(s) Oral at bedtime  metoprolol tartrate 25 milliGRAM(s) Oral two times a day  sevelamer carbonate 1600 milliGRAM(s) Oral three times a day    MEDICATIONS  (PRN):  acetaminophen     Tablet .. 650 milliGRAM(s) Oral every 6 hours PRN Mild Pain (1 - 3), Moderate Pain (4 - 6)      Vital Signs Last 24 Hrs  T(C): 36.3 (01 Dec 2021 07:51), Max: 37.1 (30 Nov 2021 19:09)  T(F): 97.3 (01 Dec 2021 07:51), Max: 98.7 (30 Nov 2021 19:09)  HR: 58 (01 Dec 2021 07:51) (57 - 66)  BP: 172/55 (01 Dec 2021 07:51) (156/66 - 177/59)  BP(mean): 78 (30 Nov 2021 19:09) (78 - 88)  RR: 17 (01 Dec 2021 07:51) (17 - 19)  SpO2: 99% (01 Dec 2021 07:51) (96% - 99%)    PHYSICAL EXAMINATION:  GENERAL: NAD, well built  HEAD:  Atraumatic, Normocephalic  EYES:  conjunctiva and sclera clear  NECK: Supple, No JVD, Normal thyroid  CHEST/LUNG: Clear to auscultation. Clear to percussion bilaterally; No rales, rhonchi, wheezing, or rubs  HEART: Regular rate and rhythm; No murmurs, rubs, or gallops  ABDOMEN: Soft, Nontender, Nondistended; Bowel sounds present, no pain or masses on palpation  NERVOUS SYSTEM:  Alert & Oriented X3  : voiding well  EXTREMITIES:  2+ Peripheral Pulses, No clubbing, cyanosis, or edema  SKIN: warm dry                          x      x     )-----------( x        ( 30 Nov 2021 09:44 )             29.2     11-30    138  |  100  |  58<H>  ----------------------------<  90  5.1   |  29  |  8.54<H>    Ca    9.0      30 Nov 2021 09:02  Phos  6.3     11-30  Mg     2.3     11-30    TPro  7.3  /  Alb  3.0<L>  /  TBili  0.7  /  DBili  x   /  AST  11  /  ALT  32  /  AlkPhos  193<H>  11-30    LIVER FUNCTIONS - ( 30 Nov 2021 09:02 )  Alb: 3.0 g/dL / Pro: 7.3 g/dL / ALK PHOS: 193 U/L / ALT: 32 U/L DA / AST: 11 U/L / GGT: x                   I&O's Summary          CAPILLARY BLOOD GLUCOSE      RADIOLOGY & ADDITIONAL TESTS:                   PGY-1 Progress Note discussed with attending    PAGER #: [[phone]] TILL 5:00 PM  PLEASE CONTACT ON CALL TEAM:  - On Call Team (Please refer to Mary) FROM 5:00 PM - 8:30PM  - Nightfloat Team FROM 8:30 -7:30 AM    CHIEF COMPLAINT & BRIEF HOSPITAL COURSE:    Patient is a 75 y/o M from [facility]. He has past medical history of ESRD on HD (MWF), HTN, severe pulmonary hypertension (CHF w/ pEF > 55%; GIIDD), Anemia of CKD, NSTEMI, CAD, paroxysmal Atrial fibrillation, vascular dementia, CVA w/ R hemiparesis and mood disorder. He was brought in by EMS due to acute onset of shortness of breath secondary to fluid overload. His last HD was on 11/28/21. His O2 saturation was 91% requiring 4LPM of O2 via nasal cannula. His oxygen status improved to 98%. In the E.D, he was tachypneic and started on BiPAP. BNP was elevated at 30,000. Blood pressure was high at 241/84/ Chest Xray showed bilateral pulmonary edema. Nephrology (Dr. Pritchard) was consulted and he underwent emergency HD. ICU was also consulted and he was cleared to be admitted to the floors on nasal cannula. He was given dose of Lasix 80mg IV, Hydralazine 10 mg IV and Insulin 8u with dextrose for Hyperkalemia of 6.3. Serum potassium eventually improved. Troponin was initially elevated but trended down since.     INTERVAL HPI/OVERNIGHT EVENTS:     Patient was examined at bedside. AAOX3, stable, NAD. He denies any shortness of breath and is tolerating room air. No headache, dizziness, chest pain, palpitation. He is scheduled for HD today. Still on telemetry due to episode of bradycardia. No other significant events overnight.    REVIEW OF SYSTEMS:  CONSTITUTIONAL: No fever, weight loss, or fatigue  RESPIRATORY: No cough, wheezing, chills or hemoptysis; No shortness of breath  CARDIOVASCULAR: No chest pain, palpitations, dizziness, or leg swelling  GASTROINTESTINAL: No abdominal pain. No nausea, vomiting, or hematemesis; No diarrhea or constipation. No melena or hematochezia.  GENITOURINARY: No dysuria or hematuria, urinary frequency  NEUROLOGICAL: No headaches, memory loss, loss of strength, numbness, or tremors  SKIN: No itching, burning, rashes, or lesions     MEDICATIONS  (STANDING):  apixaban 2.5 milliGRAM(s) Oral every 12 hours  aspirin  chewable 81 milliGRAM(s) Oral daily  atorvastatin 80 milliGRAM(s) Oral at bedtime  bisacodyl 5 milliGRAM(s) Oral at bedtime  chlorhexidine 2% Cloths 1 Application(s) Topical <User Schedule>  colchicine 0.6 milliGRAM(s) Oral two times a day  dronedarone 400 milliGRAM(s) Oral two times a day  famotidine    Tablet 20 milliGRAM(s) Oral daily  ferrous    sulfate 325 milliGRAM(s) Oral daily  folic acid 1 milliGRAM(s) Oral daily  hydrALAZINE 25 milliGRAM(s) Oral every 8 hours  isosorbide   mononitrate ER Tablet (IMDUR) 30 milliGRAM(s) Oral daily  lactulose Syrup 20 Gram(s) Oral at bedtime  metoprolol tartrate 25 milliGRAM(s) Oral two times a day  sevelamer carbonate 1600 milliGRAM(s) Oral three times a day    MEDICATIONS  (PRN):  acetaminophen     Tablet .. 650 milliGRAM(s) Oral every 6 hours PRN Mild Pain (1 - 3), Moderate Pain (4 - 6)      Vital Signs Last 24 Hrs  T(C): 36.3 (01 Dec 2021 07:51), Max: 37.1 (30 Nov 2021 19:09)  T(F): 97.3 (01 Dec 2021 07:51), Max: 98.7 (30 Nov 2021 19:09)  HR: 58 (01 Dec 2021 07:51) (57 - 66)  BP: 172/55 (01 Dec 2021 07:51) (156/66 - 177/59)  BP(mean): 78 (30 Nov 2021 19:09) (78 - 88)  RR: 17 (01 Dec 2021 07:51) (17 - 19)  SpO2: 99% (01 Dec 2021 07:51) (96% - 99%)    PHYSICAL EXAMINATION:  GENERAL: NAD  HEAD:  Atraumatic, Normocephalic  EYES:  conjunctiva and sclera clear  NECK: Supple, No JVD, Normal thyroid  CHEST/LUNG: Clear to auscultation. Clear to percussion bilaterally; No rales, rhonchi, wheezing, or rubs  HEART: Regular rate and rhythm; No murmurs, rubs, or gallops  ABDOMEN: Soft, Nontender, Nondistended; Bowel sounds present, no pain or masses on palpation  NERVOUS SYSTEM:  Alert & Oriented X3  : voiding well  EXTREMITIES:  2+ Peripheral Pulses, No clubbing, cyanosis, or edema  SKIN: warm dry                          x      x     )-----------( x        ( 30 Nov 2021 09:44 )             29.2     11-30    138  |  100  |  58<H>  ----------------------------<  90  5.1   |  29  |  8.54<H>    Ca    9.0      30 Nov 2021 09:02  Phos  6.3     11-30  Mg     2.3     11-30    TPro  7.3  /  Alb  3.0<L>  /  TBili  0.7  /  DBili  x   /  AST  11  /  ALT  32  /  AlkPhos  193<H>  11-30    LIVER FUNCTIONS - ( 30 Nov 2021 09:02 )  Alb: 3.0 g/dL / Pro: 7.3 g/dL / ALK PHOS: 193 U/L / ALT: 32 U/L DA / AST: 11 U/L / GGT: x                   I&O's Summary          CAPILLARY BLOOD GLUCOSE      RADIOLOGY & ADDITIONAL TESTS:

## 2021-12-02 DIAGNOSIS — Z51.5 ENCOUNTER FOR PALLIATIVE CARE: ICD-10-CM

## 2021-12-02 DIAGNOSIS — R00.1 BRADYCARDIA, UNSPECIFIED: ICD-10-CM

## 2021-12-02 DIAGNOSIS — I50.9 HEART FAILURE, UNSPECIFIED: ICD-10-CM

## 2021-12-02 DIAGNOSIS — F01.50 VASCULAR DEMENTIA WITHOUT BEHAVIORAL DISTURBANCE: ICD-10-CM

## 2021-12-02 DIAGNOSIS — Z71.89 OTHER SPECIFIED COUNSELING: ICD-10-CM

## 2021-12-02 DIAGNOSIS — R53.2 FUNCTIONAL QUADRIPLEGIA: ICD-10-CM

## 2021-12-02 PROCEDURE — 99223 1ST HOSP IP/OBS HIGH 75: CPT

## 2021-12-02 RX ORDER — HYDRALAZINE HCL 50 MG
50 TABLET ORAL THREE TIMES A DAY
Refills: 0 | Status: DISCONTINUED | OUTPATIENT
Start: 2021-12-02 | End: 2021-12-07

## 2021-12-02 RX ORDER — METOPROLOL TARTRATE 50 MG
12.5 TABLET ORAL
Refills: 0 | Status: DISCONTINUED | OUTPATIENT
Start: 2021-12-02 | End: 2021-12-03

## 2021-12-02 RX ADMIN — SEVELAMER CARBONATE 1600 MILLIGRAM(S): 2400 POWDER, FOR SUSPENSION ORAL at 06:00

## 2021-12-02 RX ADMIN — FAMOTIDINE 20 MILLIGRAM(S): 10 INJECTION INTRAVENOUS at 11:36

## 2021-12-02 RX ADMIN — CHLORHEXIDINE GLUCONATE 1 APPLICATION(S): 213 SOLUTION TOPICAL at 06:01

## 2021-12-02 RX ADMIN — Medication 50 MILLIGRAM(S): at 13:12

## 2021-12-02 RX ADMIN — DRONEDARONE 400 MILLIGRAM(S): 400 TABLET, FILM COATED ORAL at 06:00

## 2021-12-02 RX ADMIN — Medication 25 MILLIGRAM(S): at 06:02

## 2021-12-02 RX ADMIN — APIXABAN 2.5 MILLIGRAM(S): 2.5 TABLET, FILM COATED ORAL at 05:59

## 2021-12-02 RX ADMIN — Medication 1 MILLIGRAM(S): at 11:36

## 2021-12-02 RX ADMIN — Medication 25 MILLIGRAM(S): at 05:59

## 2021-12-02 RX ADMIN — Medication 81 MILLIGRAM(S): at 11:36

## 2021-12-02 RX ADMIN — SEVELAMER CARBONATE 1600 MILLIGRAM(S): 2400 POWDER, FOR SUSPENSION ORAL at 13:12

## 2021-12-02 RX ADMIN — ISOSORBIDE MONONITRATE 30 MILLIGRAM(S): 60 TABLET, EXTENDED RELEASE ORAL at 11:36

## 2021-12-02 RX ADMIN — Medication 325 MILLIGRAM(S): at 11:36

## 2021-12-02 RX ADMIN — Medication 0.6 MILLIGRAM(S): at 06:00

## 2021-12-02 NOTE — PROGRESS NOTE ADULT - SUBJECTIVE AND OBJECTIVE BOX
PGY-1 Progress Note discussed with attending    PAGER #: [[phone]] TILL 5:00 PM  PLEASE CONTACT ON CALL TEAM:  - On Call Team (Please refer to Mary) FROM 5:00 PM - 8:30PM  - Nightfloat Team FROM 8:30 -7:30 AM    CHIEF COMPLAINT & BRIEF HOSPITAL COURSE:      INTERVAL HPI/OVERNIGHT EVENTS:       REVIEW OF SYSTEMS:  CONSTITUTIONAL: No fever, weight loss, or fatigue  RESPIRATORY: No cough, wheezing, chills or hemoptysis; No shortness of breath  CARDIOVASCULAR: No chest pain, palpitations, dizziness, or leg swelling  GASTROINTESTINAL: No abdominal pain. No nausea, vomiting, or hematemesis; No diarrhea or constipation. No melena or hematochezia.  GENITOURINARY: No dysuria or hematuria, urinary frequency  NEUROLOGICAL: No headaches, memory loss, loss of strength, numbness, or tremors  SKIN: No itching, burning, rashes, or lesions     MEDICATIONS  (STANDING):  apixaban 2.5 milliGRAM(s) Oral every 12 hours  aspirin  chewable 81 milliGRAM(s) Oral daily  atorvastatin 80 milliGRAM(s) Oral at bedtime  bisacodyl 5 milliGRAM(s) Oral at bedtime  chlorhexidine 2% Cloths 1 Application(s) Topical <User Schedule>  colchicine 0.6 milliGRAM(s) Oral two times a day  dronedarone 400 milliGRAM(s) Oral two times a day  famotidine    Tablet 20 milliGRAM(s) Oral daily  ferrous    sulfate 325 milliGRAM(s) Oral daily  folic acid 1 milliGRAM(s) Oral daily  hydrALAZINE 25 milliGRAM(s) Oral every 8 hours  isosorbide   mononitrate ER Tablet (IMDUR) 30 milliGRAM(s) Oral daily  lactulose Syrup 20 Gram(s) Oral at bedtime  metoprolol tartrate 25 milliGRAM(s) Oral two times a day  sevelamer carbonate 1600 milliGRAM(s) Oral three times a day    MEDICATIONS  (PRN):  acetaminophen     Tablet .. 650 milliGRAM(s) Oral every 6 hours PRN Mild Pain (1 - 3), Moderate Pain (4 - 6)      Vital Signs Last 24 Hrs  T(C): 36.9 (02 Dec 2021 07:52), Max: 37.1 (01 Dec 2021 14:16)  T(F): 98.4 (02 Dec 2021 07:52), Max: 98.7 (01 Dec 2021 14:16)  HR: 58 (02 Dec 2021 07:52) (57 - 79)  BP: 173/49 (02 Dec 2021 07:52) (130/76 - 173/49)  BP(mean): --  RR: 18 (02 Dec 2021 07:52) (17 - 19)  SpO2: 99% (02 Dec 2021 07:52) (94% - 100%)    PHYSICAL EXAMINATION:  GENERAL: NAD, well built  HEAD:  Atraumatic, Normocephalic  EYES:  conjunctiva and sclera clear  NECK: Supple, No JVD, Normal thyroid  CHEST/LUNG: Clear to auscultation. Clear to percussion bilaterally; No rales, rhonchi, wheezing, or rubs  HEART: Regular rate and rhythm; No murmurs, rubs, or gallops  ABDOMEN: Soft, Nontender, Nondistended; Bowel sounds present, no pain or masses on palpation  NERVOUS SYSTEM:  Alert & Oriented X3  : voiding well  EXTREMITIES:  2+ Peripheral Pulses, No clubbing, cyanosis, or edema  SKIN: warm dry                          8.9    4.35  )-----------( 156      ( 01 Dec 2021 10:53 )             27.1     12-01    137  |  99  |  78<H>  ----------------------------<  116<H>  5.2   |  25  |  10.60<H>    Ca    8.7      01 Dec 2021 10:53  Phos  6.8     12-01  Mg     2.3     12-01    TPro  7.3  /  Alb  2.9<L>  /  TBili  0.6  /  DBili  x   /  AST  25  /  ALT  43  /  AlkPhos  192<H>  12-01    LIVER FUNCTIONS - ( 01 Dec 2021 10:53 )  Alb: 2.9 g/dL / Pro: 7.3 g/dL / ALK PHOS: 192 U/L / ALT: 43 U/L DA / AST: 25 U/L / GGT: x                   I&O's Summary          CAPILLARY BLOOD GLUCOSE      RADIOLOGY & ADDITIONAL TESTS:                   PGY-1 Progress Note discussed with attending    PAGER #: [[phone]] TILL 5:00 PM  PLEASE CONTACT ON CALL TEAM:  - On Call Team (Please refer to Mary) FROM 5:00 PM - 8:30PM  - Nightfloat Team FROM 8:30 -7:30 AM    CHIEF COMPLAINT & BRIEF HOSPITAL COURSE:    Patient is a 77 y/o M from [facility]. He has past medical history of ESRD on HD (MWF), HTN, severe pulmonary hypertension (CHF w/ pEF > 55%; GIIDD), Anemia of CKD, NSTEMI, CAD, paroxysmal Atrial fibrillation, vascular dementia, CVA w/ R hemiparesis and mood disorder. He was brought in by EMS due to acute onset of shortness of breath secondary to fluid overload. His last HD was on 11/28/21. His O2 saturation was 91% requiring 4LPM of O2 via nasal cannula. His oxygen status improved to 98%. In the E.D, he was tachypneic and started on BiPAP. BNP was elevated at 30,000. Blood pressure was high at 241/84/ Chest Xray showed bilateral pulmonary edema. Nephrology (Dr. Pritchard) was consulted and he underwent emergency HD. ICU was also consulted and he was cleared to be admitted to the floors on nasal cannula. He was given dose of Lasix 80mg IV, Hydralazine 10 mg IV and Insulin 8u with dextrose for Hyperkalemia of 6.3. Serum potassium eventually improved. Troponin was initially elevated but trended down since.     INTERVAL HPI/OVERNIGHT EVENTS:     Patient was examined at bedside, AAOx3, stable, NAD. He denies any complaints such as headache, dizziness, chest pain, shortness of breath, palpitation. He had his HD session done yesterday. Still shows sinus bradycardia on telemetry. Lopressor decreased from 25 to 12.5. Continue on telemetry for the next 24 hours if HR improves. Palliative consult done and family still wants FULL CODE. No other significant events overnight.    REVIEW OF SYSTEMS:  CONSTITUTIONAL: No fever, weight loss, or fatigue  RESPIRATORY: No cough, wheezing, chills or hemoptysis; No shortness of breath  CARDIOVASCULAR: No chest pain, palpitations, dizziness, or leg swelling  GASTROINTESTINAL: No abdominal pain. No nausea, vomiting, or hematemesis; No diarrhea or constipation. No melena or hematochezia.  GENITOURINARY: No dysuria or hematuria, urinary frequency  NEUROLOGICAL: No headaches, memory loss, loss of strength, numbness, or tremors  SKIN: No itching, burning, rashes, or lesions     MEDICATIONS  (STANDING):  apixaban 2.5 milliGRAM(s) Oral every 12 hours  aspirin  chewable 81 milliGRAM(s) Oral daily  atorvastatin 80 milliGRAM(s) Oral at bedtime  bisacodyl 5 milliGRAM(s) Oral at bedtime  chlorhexidine 2% Cloths 1 Application(s) Topical <User Schedule>  colchicine 0.6 milliGRAM(s) Oral two times a day  dronedarone 400 milliGRAM(s) Oral two times a day  famotidine    Tablet 20 milliGRAM(s) Oral daily  ferrous    sulfate 325 milliGRAM(s) Oral daily  folic acid 1 milliGRAM(s) Oral daily  hydrALAZINE 25 milliGRAM(s) Oral every 8 hours  isosorbide   mononitrate ER Tablet (IMDUR) 30 milliGRAM(s) Oral daily  lactulose Syrup 20 Gram(s) Oral at bedtime  metoprolol tartrate 25 milliGRAM(s) Oral two times a day  sevelamer carbonate 1600 milliGRAM(s) Oral three times a day    MEDICATIONS  (PRN):  acetaminophen     Tablet .. 650 milliGRAM(s) Oral every 6 hours PRN Mild Pain (1 - 3), Moderate Pain (4 - 6)      Vital Signs Last 24 Hrs  T(C): 36.9 (02 Dec 2021 07:52), Max: 37.1 (01 Dec 2021 14:16)  T(F): 98.4 (02 Dec 2021 07:52), Max: 98.7 (01 Dec 2021 14:16)  HR: 58 (02 Dec 2021 07:52) (57 - 79)  BP: 173/49 (02 Dec 2021 07:52) (130/76 - 173/49)  BP(mean): --  RR: 18 (02 Dec 2021 07:52) (17 - 19)  SpO2: 99% (02 Dec 2021 07:52) (94% - 100%)    PHYSICAL EXAMINATION:  GENERAL: NAD  HEAD:  Atraumatic, Normocephalic  EYES:  conjunctiva and sclera clear  NECK: Supple, No JVD, Normal thyroid  CHEST/LUNG: Clear to auscultation. Clear to percussion bilaterally; No rales, rhonchi, wheezing, or rubs  HEART: Regular rate and rhythm; No murmurs, rubs, or gallops  ABDOMEN: Soft, Nontender, Nondistended; Bowel sounds present, no pain or masses on palpation  NERVOUS SYSTEM:  Alert & Oriented X3  : voiding well  EXTREMITIES:  2+ Peripheral Pulses, No clubbing, cyanosis, or edema  SKIN: warm dry, chronic skin changes on both lower extremities                          8.9    4.35  )-----------( 156      ( 01 Dec 2021 10:53 )             27.1     12-01    137  |  99  |  78<H>  ----------------------------<  116<H>  5.2   |  25  |  10.60<H>    Ca    8.7      01 Dec 2021 10:53  Phos  6.8     12-01  Mg     2.3     12-01    TPro  7.3  /  Alb  2.9<L>  /  TBili  0.6  /  DBili  x   /  AST  25  /  ALT  43  /  AlkPhos  192<H>  12-01    LIVER FUNCTIONS - ( 01 Dec 2021 10:53 )  Alb: 2.9 g/dL / Pro: 7.3 g/dL / ALK PHOS: 192 U/L / ALT: 43 U/L DA / AST: 25 U/L / GGT: x                   I&O's Summary          CAPILLARY BLOOD GLUCOSE      RADIOLOGY & ADDITIONAL TESTS:

## 2021-12-02 NOTE — PROGRESS NOTE ADULT - SUBJECTIVE AND OBJECTIVE BOX
CHIEF COMPLAINT:Patient is a 76y old  Male who presents with a chief complaint of SOB .Pt appears comfortable.    	  REVIEW OF SYSTEMS:  CONSTITUTIONAL: No fever, weight loss, or fatigue  EYES: No eye pain, visual disturbances, or discharge  ENT:  No difficulty hearing, tinnitus, vertigo; No sinus or throat pain  NECK: No pain or stiffness  RESPIRATORY: No cough, wheezing, chills or hemoptysis; No Shortness of Breath  CARDIOVASCULAR: No chest pain, palpitations, passing out, dizziness, or leg swelling  GASTROINTESTINAL: No abdominal or epigastric pain. No nausea, vomiting, or hematemesis; No diarrhea or constipation. No melena or hematochezia.  GENITOURINARY: No dysuria, frequency, hematuria, or incontinence  NEUROLOGICAL: No headaches, memory loss, loss of strength, numbness, or tremors  SKIN: No itching, burning, rashes, or lesions   LYMPH Nodes: No enlarged glands  ENDOCRINE: No heat or cold intolerance; No hair loss  MUSCULOSKELETAL: No joint pain or swelling; No muscle, back, or extremity pain  PSYCHIATRIC: No depression, anxiety, mood swings, or difficulty sleeping  HEME/LYMPH: No easy bruising, or bleeding gums  ALLERGY AND IMMUNOLOGIC: No hives or eczema	        PHYSICAL EXAM:  T(C): 37.1 (12-02-21 @ 11:16), Max: 37.1 (12-01-21 @ 14:16)  HR: 59 (12-02-21 @ 11:16) (57 - 62)  BP: 156/49 (12-02-21 @ 11:16) (130/76 - 173/49)  RR: 18 (12-02-21 @ 11:16) (17 - 19)  SpO2: 98% (12-02-21 @ 11:16) (94% - 100%)  Tele-sr down to 30's      Appearance: Normal	  HEENT:   Normal oral mucosa, PERRL, EOMI	  Lymphatic: No lymphadenopathy  Cardiovascular: Normal S1 S2, No JVD, No murmurs, No edema  Respiratory: Lungs clear to auscultation	  Psychiatry: A & O x 3, Mood & affect appropriate  Gastrointestinal:  Soft, Non-tender, + BS	  Skin: No rashes, No ecchymoses, No cyanosis	  Neurologic: Non-focal  Extremities: Normal range of motion, No clubbing, cyanosis or edema  Vascular: Peripheral pulses palpable 2+ bilaterally    MEDICATIONS  (STANDING):  apixaban 2.5 milliGRAM(s) Oral every 12 hours  aspirin  chewable 81 milliGRAM(s) Oral daily  atorvastatin 80 milliGRAM(s) Oral at bedtime  bisacodyl 5 milliGRAM(s) Oral at bedtime  chlorhexidine 2% Cloths 1 Application(s) Topical <User Schedule>  colchicine 0.6 milliGRAM(s) Oral two times a day  dronedarone 400 milliGRAM(s) Oral two times a day  famotidine    Tablet 20 milliGRAM(s) Oral daily  ferrous    sulfate 325 milliGRAM(s) Oral daily  folic acid 1 milliGRAM(s) Oral daily  hydrALAZINE 50 milliGRAM(s) Oral three times a day  isosorbide   mononitrate ER Tablet (IMDUR) 30 milliGRAM(s) Oral daily  lactulose Syrup 20 Gram(s) Oral at bedtime  metoprolol tartrate 12.5 milliGRAM(s) Oral two times a day  sevelamer carbonate 1600 milliGRAM(s) Oral three times a day    	  	  LABS:	 	                       8.9    4.35  )-----------( 156      ( 01 Dec 2021 10:53 )             27.1     12-01    137  |  99  |  78<H>  ----------------------------<  116<H>  5.2   |  25  |  10.60<H>    Ca    8.7      01 Dec 2021 10:53  Phos  6.8     12-01  Mg     2.3     12-01    TPro  7.3  /  Alb  2.9<L>  /  TBili  0.6  /  DBili  x   /  AST  25  /  ALT  43  /  AlkPhos  192<H>  12-01    proBNP: Serum Pro-Brain Natriuretic Peptide: 90418 pg/mL (11-28 @ 10:10)

## 2021-12-02 NOTE — CONSULT NOTE ADULT - ATTENDING COMMENTS
76 y M w multiple comorbidities, hx CVA w R hemiparesis, severe AS, severe pulm HTN, ESRD on HD, moderate vascular dementia, adm for fluid overload. Cardio recommending PPM, pt refusing.  Pt A&Ox2 but limited insight into his condition. Daughter and sister aware, state they have spoken with him and he maintains he would want CPR and intubation so they want to honor his wishes although it has been explained to them given his comorbidities and dementia, lacks insight into complex decisionmaking. Remains FULL CODE at this time. To return to [facility] once medically stable. Palliative remains available as needed.

## 2021-12-02 NOTE — CONSULT NOTE ADULT - PROBLEM SELECTOR RECOMMENDATION 3
ESRD, dialysis days MWF. p/w SOB due to fluid overload, Nephrology following    -continue  HD    Avoid NSAIDs

## 2021-12-02 NOTE — PROGRESS NOTE ADULT - PROBLEM SELECTOR PLAN 7
RISK                                                          Points  [] Previous VTE                                           3  [] Thrombophilia                                        2  [] Lower limb paralysis                              2   [] Current Cancer                                       2   [x] Immobilization > 24 hrs                        1  [] ICU/CCU stay > 24 hours                       1  [x] Age > 60                                                   1    eliquis for DVT prophylaxis  PPI IV for GI prophylaxis prior Echo on 9/28/21 showed RV systolic pressure is 69 mm Hg. Severe pulmonary hypertension.  - admit to tele  - [facility] home meds IMDUR, metop, Multaq

## 2021-12-02 NOTE — PROGRESS NOTE ADULT - ASSESSMENT
ESRD , dialysis days MWF, next dialysis in [place].    Dyspnea on admission with elevated BP improved after dailysis.  Possible cause Hypertensive emergency, diastolic HF , cardiac arrythmia, ACS, increased fluid intake  Hyperkalemia improved after dialysis and 2 gm K diet  Hyper po4 continue binders

## 2021-12-02 NOTE — PROGRESS NOTE ADULT - PROBLEM SELECTOR PLAN 4
Hb 9.8 slight new macrocytosis .7  continue iron + stool softener  B12 and folate- wnl Asymptomatic bradycardia on telemetry (30-50s)  refuses further cardiac management such as PPM  lopressor adjusted from 25 to 12.5 mg  continue telemetry monitoring for the next 24 hrs

## 2021-12-02 NOTE — PROGRESS NOTE ADULT - PROBLEM SELECTOR PLAN 6
prior Echo on 9/28/21 showed RV systolic pressure is 69 mm Hg. Severe pulmonary hypertension.  - admit to tele  - [facility] home meds IMDUR, metop, Multaq continue home meds  no neuro status change currently at baseline

## 2021-12-02 NOTE — PROGRESS NOTE ADULT - SUBJECTIVE AND OBJECTIVE BOX
Problem List:  ESRD      PAST MEDICAL & SURGICAL HISTORY:  ESRD (end stage renal disease) on dialysis    Hypertension    Anemia    Cerebrovascular accident (CVA), unspecified mechanism    Paroxysmal atrial fibrillation    CAD (coronary artery disease)    A-V fistula        No Known Allergies      MEDICATIONS  (STANDING):  apixaban 2.5 milliGRAM(s) Oral every 12 hours  aspirin  chewable 81 milliGRAM(s) Oral daily  atorvastatin 80 milliGRAM(s) Oral at bedtime  bisacodyl 5 milliGRAM(s) Oral at bedtime  chlorhexidine 2% Cloths 1 Application(s) Topical <User Schedule>  colchicine 0.6 milliGRAM(s) Oral two times a day  dronedarone 400 milliGRAM(s) Oral two times a day  famotidine    Tablet 20 milliGRAM(s) Oral daily  ferrous    sulfate 325 milliGRAM(s) Oral daily  folic acid 1 milliGRAM(s) Oral daily  hydrALAZINE 50 milliGRAM(s) Oral three times a day  isosorbide   mononitrate ER Tablet (IMDUR) 30 milliGRAM(s) Oral daily  lactulose Syrup 20 Gram(s) Oral at bedtime  metoprolol tartrate 12.5 milliGRAM(s) Oral two times a day  sevelamer carbonate 1600 milliGRAM(s) Oral three times a day    MEDICATIONS  (PRN):  acetaminophen     Tablet .. 650 milliGRAM(s) Oral every 6 hours PRN Mild Pain (1 - 3), Moderate Pain (4 - 6)                            8.9    4.35  )-----------( 156      ( 01 Dec 2021 10:53 )             27.1     12-01    137  |  99  |  78<H>  ----------------------------<  116<H>  5.2   |  25  |  10.60<H>    Ca    8.7      01 Dec 2021 10:53  Phos  6.8     12-01  Mg     2.3     12-01    TPro  7.3  /  Alb  2.9<L>  /  TBili  0.6  /  DBili  x   /  AST  25  /  ALT  43  /  AlkPhos  192<H>  12-01            REVIEW OF SYSTEMS:  General: no fever no chills, no weight loss.    RESPIRATORY: No cough, wheezing, hemoptysis; No shortness of breath  CARDIOVASCULAR: No chest pain or palpitations. No Edema  GASTROINTESTINAL: No abdominal or epigastric pain. No nausea, vomiting. No diarrhea or constipation. No melena.  GENITOURINARY: No dysuria, frequency, foamy urine, urinary urgency, incontinence or hematuria  NEUROLOGICAL: No numbness or weakness, no tremor , no dizziness.   Muscle skeletal : BED BOUND, GENERALIZED WEAKNESS      VITALS:  T(F): 98.8 (12-02-21 @ 11:16), Max: 98.8 (12-02-21 @ 11:16)  HR: 59 (12-02-21 @ 11:16)  BP: 156/49 (12-02-21 @ 11:16)  RR: 18 (12-02-21 @ 11:16)  SpO2: 98% (12-02-21 @ 11:16)  Wt(kg): --      PHYSICAL EXAM:  Constitutional:  no diaphoresis, no distress.  Neck: No JVD, no carotid bruit, supple, no adenopathy  Respiratory:  air entrance B/L, no wheezes, rales or rhonchi  Cardiovascular: S1, S2, RRR, no pericardial rub, no murmur  Abdomen: BS+, soft, no tenderness, no bruit  Pelvis: bladder nondistended  No lower extremities edema  Neurological: A/O x 3, no focal deficits  Vascular Access: right AVG with bruit and thrill

## 2021-12-02 NOTE — CONSULT NOTE ADULT - SUBJECTIVE AND OBJECTIVE BOX
[facility] Geriatric and Palliative Consult Service:  Dr. Marleny Hart: cell ([phone])  Dr. Aleta Braun: cell ([phone])   Viridiana Ugarte NP: cell ([phone])  Neelima Mai NP: cell ([phone])   Nii Joshiyordy LSW: cell ([phone])     HPI:  77 yo M from [facility], w/ ESRD (on HD MWF), HTN, Severe Pulm HTN, CHFpEF >55% w/ GIIDD, Anemia of CKD, NSTEMI, CAD, parox Afib, CVA w/ RIGHT dominant hemiparesis, Vascular Dementia and Mood disorder who was BIB EMS for acute onset SOB 2/2 fluid overload. Last HD was Fri 11/26; no missed dialysis. On 11/28 in AM was complaining of SOB. O2 sat in low 91% requiring 4L NC w/ improvement to 98% and VS has normal /82. Send to [facility].   Was started on BiPAP in ED due to tachypnea. BNP ~ 30,000. /84. CXR with b/l pulm edema. Nephro consulted for urgent HD. ICU consulted for new BiPAP - cleared patient to be admitted to floors after HD once tolerates NC.   s/p Lasix 80, Hydral 10 and Insulin 8U + dextrose for Hyperkalemia 6.3. On repeat reduced to 5.7. Trop elevated 182 > 204. Will continue trend. On medicine eval patient is sat 100% on BiPAP. Denies any chest pain, N/V, cough or subjective fevers. Limited 1-2 word replies yes/no questions. Reports improvement in SOB.      Interval hx: Pt seen and examined at the bedside, AOx2. Reports no SOB at the time of exam.        PAST MEDICAL & SURGICAL HISTORY:  ESRD (end stage renal disease) on dialysis    Hypertension    Anemia    Cerebrovascular accident (CVA), unspecified mechanism    Paroxysmal atrial fibrillation    CAD (coronary artery disease)    A-V fistula        SOCIAL HISTORY:    Admitted from:  [facility]  Substance abuse history: none             Tobacco hx:  none                Alcohol hx:  none            Home Opioid hx: none  Anglican:    Cheondoism                                Preferred Language: English      Kylie Almendarez ([facility])    Phone#  [phone] Gong  (sister)  Phone# [phone]    FAMILY HISTORY:  Family history of diabetes mellitus     unable to obtain from patient due to poor mentation  Baseline ADLs (prior to admission): dependent    Allergies    No Known Allergies    Intolerances      Present Symptoms:   Pain: denies  Fatigue: denies  Nausea: denies  Lack of Appetite: denies  SOB: denies  Depression: denies  Anxiety: denies  Review of Systems: [All others negative     MEDICATIONS  (STANDING):  apixaban 2.5 milliGRAM(s) Oral every 12 hours  aspirin  chewable 81 milliGRAM(s) Oral daily  atorvastatin 80 milliGRAM(s) Oral at bedtime  bisacodyl 5 milliGRAM(s) Oral at bedtime  chlorhexidine 2% Cloths 1 Application(s) Topical <User Schedule>  colchicine 0.6 milliGRAM(s) Oral two times a day  dronedarone 400 milliGRAM(s) Oral two times a day  famotidine    Tablet 20 milliGRAM(s) Oral daily  ferrous    sulfate 325 milliGRAM(s) Oral daily  folic acid 1 milliGRAM(s) Oral daily  hydrALAZINE 25 milliGRAM(s) Oral every 8 hours  isosorbide   mononitrate ER Tablet (IMDUR) 30 milliGRAM(s) Oral daily  lactulose Syrup 20 Gram(s) Oral at bedtime  metoprolol tartrate 25 milliGRAM(s) Oral two times a day  sevelamer carbonate 1600 milliGRAM(s) Oral three times a day    MEDICATIONS  (PRN):  acetaminophen     Tablet .. 650 milliGRAM(s) Oral every 6 hours PRN Mild Pain (1 - 3), Moderate Pain (4 - 6)      PHYSICAL EXAM:  Vital Signs Last 24 Hrs  T(C): 36.9 (02 Dec 2021 07:52), Max: 37.1 (01 Dec 2021 14:16)  T(F): 98.4 (02 Dec 2021 07:52), Max: 98.7 (01 Dec 2021 14:16)  HR: 58 (02 Dec 2021 07:52) (57 - 79)  BP: 173/49 (02 Dec 2021 07:52) (130/76 - 173/49)  BP(mean): --  RR: 18 (02 Dec 2021 07:52) (17 - 19)  SpO2: 99% (02 Dec 2021 07:52) (94% - 100%)    General: Obese elderly man, Aox2, lacks insight as to his clinical condition.  NAD    Palliative Performance Scale/Karnofsky Score: 40  ECOG Performance: n/a    HEENT: atraumatic, moist oropharynx  Lungs: unlabored on RA  CV: RRR, S1S2  GI: soft non distended, non tender on palpation  : on HD, oliguria  Musculoskeletal: bedbound, no edema  Skin: dry skin, hyperpigmentation to b/l LE  Neuro: able to follow commands  Oral intake ability: good po intake     LABS:                        8.9    4.35  )-----------( 156      ( 01 Dec 2021 10:53 )             27.1     12-01    137  |  99  |  78<H>  ----------------------------<  116<H>  5.2   |  25  |  10.60<H>    Ca    8.7      01 Dec 2021 10:53  Phos  6.8     12-01  Mg     2.3     12-01    TPro  7.3  /  Alb  2.9<L>  /  TBili  0.6  /  DBili  x   /  AST  25  /  ALT  43  /  AlkPhos  192<H>  12-01    < from: Xray Chest 1 View- PORTABLE-Urgent (Xray Chest 1 View- PORTABLE-Urgent .) (11.28.21 @ 10:27) >    EXAM:  XR CHEST PORTABLE URGENT 1V                            PROCEDURE DATE:  11/28/2021          INTERPRETATION:  AP supine chest on November 28, 2021 at 10:03 AM. Patient is short of breath.    Heart likely enlarged.    Present film shows mild to moderate congestive change with a small left base effusion. Congestion has increased from September 18, 2020.    IMPRESSION: Heart enlargement and CHF.    < end of copied text >    < from: Transthoracic Echocardiogram (09.28.21 @ 08:26) >    Patient name: DORSAINVIL, JEANCLAUDE  YOB: 1945   Age: 76 (M)   MR#: [id number]  Study Date: 9/28/2021  Location: [address] [place], [place] [address]Sonographer: José Younger RDCS  Study quality: Technically difficult  Referring Physician: Mandeep Bustamante MD  Blood Pressure: 144/66 mmHg  Height: 165 cm  Weight: 77 kg  BSA: 1.9 m2  ------------------------------------------------------------------------    PROCEDURE: Transthoracic echocardiogram with 2-D, M-Mode  and complete spectral and color flow Doppler.  INDICATION: Heart failure, unspecified (I50.9)  HISTORY:  ------------------------------------------------------------------------  DIMENSIONS:  Dimensions:     Normal Values:  LA:     4.4 cm    2.0 - 4.0 cm  Ao:     3.1 cm    2.0 - 3.8 cm  SEPTUM: 1.2 cm    0.6 - 1.2 cm  PWT:    1.3 cm    0.6 - 1.1 cm  LVIDd:  4.5 cm    3.0 - 5.6 cm  LVIDs:  2.9 cm    1.8 - 4.0 cm      Derived Variables:  LVMI: 114 g/m2  RWT: 0.57  Ejection Fraction Visual Estimate: >55 %  Peak Velocity (m/sec): AoV=3.6  ------------------------------------------------------------------------  OBSERVATIONS:  Mitral Valve: Mitral annular calcification, and calcified  mitral leaflets with reduced diastolic opening. Mean  transmitral valve gradient equals 2.6 mm Hg (at a heart  rate of 68 BPM), consistent with mild mitral stenosis.  Aortic Root: Normal aortic root.  Aortic Valve: Calcified  aortic valve with decreased  opening. Peak transaortic valve gradient equals 52 mm Hg,  mean transaortic valve gradient equals 28 mm Hg, estimated  aortic valve area equals 1 sqcm (by continuity equation),  consistent with severe aortic stenosis. Mild aortic  regurgitation.  Left Atrium: Mildly dilated left atrium.  LA volume index =  36 cc/m2.  Left Ventricle: Endocardium not well visualized; grossly  normal left ventricular systolic function. Moderate  concentric left ventricular hypertrophy. Grade II diastolic  dysfunction (moderate).  Right Heart: Normal right atrium. Right ventricle not well  visualized.   Probably normal right ventricular size and  systolic function. There is mild-moderate tricuspid  regurgitation. There is mild pulmonic regurgitation.  Pericardium/PleuraNormal pericardium with no pericardial  effusion.  Hemodynamic: RA Pressure is 8 mm Hg. RV systolicpressure  is 69 mm Hg. Severe pulmonary hypertension.  ------------------------------------------------------------------------  CONCLUSIONS:  1. Mitral annular calcification, and calcified mitral  leaflets with reduced diastolic opening. Mean transmitral  valve gradient equals 2.6 mm Hg (at a heart rate of 68  BPM), consistent with mild mitral stenosis.  2. Calcified  aortic valve with decreased opening. Peak  transaortic valve gradient equals 52 mm Hg, mean  transaortic valve gradient equals 28mm Hg, estimated  aortic valve area equals 1 sqcm (by continuity equation),  consistent with severe aortic stenosis. Mild aortic  regurgitation.  3. Mildly dilated left atrium.  LA volume index = 36 cc/m2.  4. Moderate concentric left ventricular hypertrophy.  5. Endocardium not well visualized; grossly normal left  ventricular systolic function.  6. Grade II diastolic dysfunction (moderate).  7. Right ventricle not well visualized.   Probably normal  right ventricular size and systolic function.  8. RV systolic pressure is 69 mm Hg. Severe pulmonary  hypertension.    < end of copied text >      RADIOLOGY & ADDITIONAL STUDIES: reviewed  ADVANCED DIRECTIVES:  FULL CODE

## 2021-12-02 NOTE — PROGRESS NOTE ADULT - PROBLEM SELECTOR PLAN 5
continue home meds  no neuro status change currently at baseline Hb 9.8 slight new macrocytosis .7  continue iron + stool softener  B12 and folate- wnl

## 2021-12-02 NOTE — CONSULT NOTE ADULT - PROBLEM SELECTOR RECOMMENDATION 4
2/2 CVA with residual right sided hemiparesis.  Bedbound. Dependent.  Skin care per protocol  Frequent positioning.

## 2021-12-02 NOTE — CONSULT NOTE ADULT - PROBLEM SELECTOR RECOMMENDATION 9
Acute on chronic CHF.  W/ severe AS on echo, preserved EF, severe pulm HTN. PAF and SSS.    Euvolemic at present.  Cardiology following.    Pt continues to refuses to PPM.    Pt remains a FULL CODE. Acute on chronic CHF.  W/ severe AS on echo, preserved EF, severe pulm HTN. PAF and SSS.    Euvolemic at present.  Cardiology following.    Pt continues to refuses to PPM.    Pt's daughter Kylie plans on speaking with the pt again about PPM .   Pt remains a FULL CODE.

## 2021-12-02 NOTE — PROGRESS NOTE ADULT - ASSESSMENT
77 yo M from [facility], w/ ESRD (on HD MWF), HTN, Severe Pulm HTN, CHFpEF >55% w/ GIIDD, Anemia of CKD, NSTEMI, CAD, parox Afib, CVA w/ RIGHT dominant hemiparesis, Vascular Dementia and Mood disorder who was BIB EMS for acute onset SOB 2/2 fluid overload.  1.CHF and volume overload need for HD.  2.Pt declines any cardiac test/intervention.  3.ESRD-HD as per renal.  4.Parox Afib-Multaq, eliquis.  5.Lipid d/o-statin.  6.CVA-asa,statin.  7.CAD-asa,imdur,statin,dec lopressor 12.5mg bid.  8.HTN- inc hydralazine 50mg q8h.  9.PPI.

## 2021-12-02 NOTE — PROGRESS NOTE ADULT - PROBLEM SELECTOR PLAN 8
RISK                                                          Points  [] Previous VTE                                           3  [] Thrombophilia                                        2  [] Lower limb paralysis                              2   [] Current Cancer                                       2   [x] Immobilization > 24 hrs                        1  [] ICU/CCU stay > 24 hours                       1  [x] Age > 60                                                   1    eliquis for DVT prophylaxis  PPI IV for GI prophylaxis

## 2021-12-02 NOTE — PROGRESS NOTE ADULT - PROBLEM SELECTOR PLAN 3
cont BB metop tart 25 BID and Eliquis 2.5 BID  continue telemetry due to bradycardia  Cardio consult Dr. Salinas started BB metop tart 25 BID and Eliquis 2.5 BID  decreased lopressor to 12.5  continue telemetry due to bradycardia  Cardio consult Dr. Salinas

## 2021-12-02 NOTE — PROGRESS NOTE ADULT - PROBLEM SELECTOR PLAN 2
- on HD MWF last HD outpatient on Friday 11/26 , no missed HD  - CXR with fluid OD, b/l LE pitting edema  - BiPAP in ED  - urgent HD 11/28  - resume home meds sevelamer, colcrys   - monitor electrolytes, post HD labs  - scheduled for HD today (12/1/21)  - NEPHRO Dr. Pritchard - on HD MWF last HD outpatient on Friday 11/26 , no missed HD  - CXR with fluid OD, b/l LE pitting edema  - BiPAP in ED  - urgent HD 11/28  - resume home meds sevelamer, colcrys   - monitor electrolytes, post HD labs  - scheduled for HD today (12/1/21)  - NEPHRO Dr. Jacinto

## 2021-12-02 NOTE — CONSULT NOTE ADULT - CONVERSATION DETAILS
Spoke with the pt's daughter Kylie discussed pt's current clinical status and goals of care.  Kylie  expressed frustration as she and her aunt Aleta (who is an RN) spoke extensively for 1 hour with the pt about PPM.  He had agreed for PPM, then changed his mind.  Regarding goals of care, discussed the risks vs benefits of cardiopulmonary resuscitation and intubation in context of advanced illness.  Kylie stated she spoke with her father and he wants CPR and intubation.  Explained that although the pt is AOX2,  he is not able understand the complexity of his clinical condition due to dementia and mood disorder.    Marlen plans on speaking with her aunt to discuss goals of care.  Pt remains a FULL CODE.  Palliative care will follow.  All questions answered.  Support provided. Spoke with the pt's daughter Kylie discussed pt's current clinical status and goals of care.  Kylie  expressed frustration as she and her aunt Aleta (who is an RN) spoke extensively for 1 hour with the pt about PPM.  He had agreed for PPM, then changed his mind.  Regarding goals of care, discussed the risks vs benefits of cardiopulmonary resuscitation and intubation in context of advanced illness.  Kylie stated she spoke with her father and he wants CPR and intubation.  Explained that although the pt is AOX2,  he is not able understand the complexity of his clinical condition due to dementia and mood disorder.    Marlen plans on speaking with her aunt to discuss goals of care.  Pt remains a FULL CODE.  Palliative care will follow.  All questions answered.  Support provided.      Later pt's sister Aleta called from [place] she reports having discussion with her brother about PPM he remained steadfast in his decision not to have any cardiology intervention.  She also address code status and explained that refusing cardiology intervention and wanting CPR and intubation is not congruent.   Pt wants CPR and intubation. Spoke with the pt's daughter Kylie discussed pt's current clinical status and goals of care.  Kylie  expressed frustration as she and her aunt Aleta (who is an RN) spoke extensively for 1 hour with the pt about PPM.  He had agreed for PPM, then changed his mind.  Regarding goals of care, discussed the risks vs benefits of cardiopulmonary resuscitation and intubation in context of advanced illness.  Kylie stated she spoke with her father and he wants CPR and intubation.  Explained that although the pt is AOX2,  he is not able understand the complexity of his clinical condition due to dementia and mood disorder.    Marlen plans on speaking with her aunt to discuss goals of care.  Pt remains a FULL CODE.  Palliative care will follow.  All questions answered.  Support provided.      Later pt's sister Aleta called from [place] she reports having discussion with her brother about PPM he remained steadfast in his decision not to have any cardiology intervention.  She also addressed code status and explained that refusing cardiology intervention and wanting CPR and intubation is not congruent.   Pt maintained that he wanted CPR and intubation.

## 2021-12-02 NOTE — CONSULT NOTE ADULT - PROBLEM SELECTOR RECOMMENDATION 2
Pt is Aox2.  Lacks insight as to his clinical condition.    Explained the dementia trajectory and provided anticipatory guidance to pt's daughter.    Pt remains a FULL CODE.   Daughter Kylie plans to engage her aunt to get input about GOC.    CT head: chronic ischemic changes again noted with volume loss.

## 2021-12-02 NOTE — PROGRESS NOTE ADULT - PROBLEM SELECTOR PLAN 1
- BP at NH wnl but elevated peak 241/84 w/ pulm edema on CXR  - BNP 30k and Trop elevation  - goal is reduce BP by 25% in 24 hours  - s/p Lasix 80 and Hydral 10  - s/p urgent HD (11/28/21)  - resume home meds  - labetalol  - add hdyralazine 25 q8  - vs q 4  - trend trop T3 - trended down  - admit to tele   - NEPHRO Dr. Pritchard  - CARDIO Dr. Salinas - BP at NH wnl but elevated peak 241/84 w/ pulm edema on CXR  - BNP 30k and Trop elevation  - goal is reduce BP by 25% in 24 hours  - s/p Lasix 80 and Hydral 10  - s/p urgent HD (11/28/21)  - add hydralazine 25 q8  - vs q 4  - trend trop T3 - trended down  - adjust lopressor to 12.5 in setting of bradycardia  - continue to monitor on telemetry  - NEPHRO Dr. Jacinto  - CARDIO Dr. Salinas

## 2021-12-03 RX ADMIN — SEVELAMER CARBONATE 1600 MILLIGRAM(S): 2400 POWDER, FOR SUSPENSION ORAL at 06:21

## 2021-12-03 RX ADMIN — SEVELAMER CARBONATE 1600 MILLIGRAM(S): 2400 POWDER, FOR SUSPENSION ORAL at 21:44

## 2021-12-03 RX ADMIN — Medication 0.6 MILLIGRAM(S): at 06:21

## 2021-12-03 RX ADMIN — SEVELAMER CARBONATE 1600 MILLIGRAM(S): 2400 POWDER, FOR SUSPENSION ORAL at 17:49

## 2021-12-03 RX ADMIN — LACTULOSE 20 GRAM(S): 10 SOLUTION ORAL at 21:44

## 2021-12-03 RX ADMIN — CHLORHEXIDINE GLUCONATE 1 APPLICATION(S): 213 SOLUTION TOPICAL at 06:23

## 2021-12-03 RX ADMIN — Medication 0.6 MILLIGRAM(S): at 17:49

## 2021-12-03 RX ADMIN — Medication 50 MILLIGRAM(S): at 06:21

## 2021-12-03 RX ADMIN — Medication 50 MILLIGRAM(S): at 17:49

## 2021-12-03 RX ADMIN — DRONEDARONE 400 MILLIGRAM(S): 400 TABLET, FILM COATED ORAL at 17:49

## 2021-12-03 RX ADMIN — ATORVASTATIN CALCIUM 80 MILLIGRAM(S): 80 TABLET, FILM COATED ORAL at 21:45

## 2021-12-03 RX ADMIN — Medication 50 MILLIGRAM(S): at 21:44

## 2021-12-03 RX ADMIN — DRONEDARONE 400 MILLIGRAM(S): 400 TABLET, FILM COATED ORAL at 06:21

## 2021-12-03 RX ADMIN — Medication 650 MILLIGRAM(S): at 10:39

## 2021-12-03 NOTE — PROGRESS NOTE ADULT - PROBLEM SELECTOR PLAN 3
started BB metop tart 25 BID and Eliquis 2.5 BID  decreased lopressor to 12.5  continue telemetry due to bradycardia  Cardio consult Dr. Salinas started BB metop tart 25 BID and Eliquis 2.5 BID  decreased lopressor to 12.5  continue telemetry due to bradycardia  offered AICD but keeps on refusing  Cardio consult Dr. Brad FOX. consulted by Dr. Salinas

## 2021-12-03 NOTE — PROGRESS NOTE ADULT - PROBLEM SELECTOR PLAN 2
- on HD MWF last HD outpatient on Friday 11/26 , no missed HD  - CXR with fluid OD, b/l LE pitting edema  - BiPAP in ED  - urgent HD 11/28  - resume home meds sevelamer, colcrys   - monitor electrolytes, post HD labs  - scheduled for HD today (12/1/21)  - NEPHRO Dr. Jacinto

## 2021-12-03 NOTE — PROGRESS NOTE ADULT - ASSESSMENT
75 yo M from [facility], w/ ESRD (on HD MWF), HTN, Severe Pulm HTN, CHFpEF >55% w/ GIIDD, Anemia of CKD, NSTEMI, CAD, parox Afib, CVA w/ RIGHT dominant hemiparesis, Vascular Dementia and Mood disorder who was BIB EMS for acute onset SOB 2/2 fluid overload.  1.CHF and volume overload need for HD.  2.Pt declines any cardiac test/intervention.  3.ESRD-HD as per renal.  4.Parox Afib-Multaq, eliquis.  5.Lipid d/o-statin.  6.CVA-asa,statin.  7.CAD-asa,imdur,statin,d/c lopressor 12.5mg bid.  8.HTN- hydralazine 50mg q8h.  9.PPI.

## 2021-12-03 NOTE — PROGRESS NOTE ADULT - SUBJECTIVE AND OBJECTIVE BOX
Problem List:  ESRD  CHF resolved   Bradycardia    PAST MEDICAL & SURGICAL HISTORY:  ESRD (end stage renal disease) on dialysis    Hypertension    Anemia    Cerebrovascular accident (CVA), unspecified mechanism    Paroxysmal atrial fibrillation    CAD (coronary artery disease)    A-V fistula        No Known Allergies      MEDICATIONS  (STANDING):  apixaban 2.5 milliGRAM(s) Oral every 12 hours  aspirin  chewable 81 milliGRAM(s) Oral daily  atorvastatin 80 milliGRAM(s) Oral at bedtime  bisacodyl 5 milliGRAM(s) Oral at bedtime  chlorhexidine 2% Cloths 1 Application(s) Topical <User Schedule>  colchicine 0.6 milliGRAM(s) Oral two times a day  dronedarone 400 milliGRAM(s) Oral two times a day  famotidine    Tablet 20 milliGRAM(s) Oral daily  ferrous    sulfate 325 milliGRAM(s) Oral daily  folic acid 1 milliGRAM(s) Oral daily  hydrALAZINE 50 milliGRAM(s) Oral three times a day  isosorbide   mononitrate ER Tablet (IMDUR) 30 milliGRAM(s) Oral daily  lactulose Syrup 20 Gram(s) Oral at bedtime  sevelamer carbonate 1600 milliGRAM(s) Oral three times a day    MEDICATIONS  (PRN):  acetaminophen     Tablet .. 650 milliGRAM(s) Oral every 6 hours PRN Mild Pain (1 - 3), Moderate Pain (4 - 6)                      REVIEW OF SYSTEMS:  General: no fever no chills, no weight loss.  EYES/ENT: No visual changes;  No vertigo, no headache.  NECK: No pain or stiffness  RESPIRATORY: No cough, wheezing, hemoptysis; No shortness of breath  CARDIOVASCULAR: No chest pain or palpitations. No Edema  GASTROINTESTINAL: No abdominal or epigastric pain. No nausea, vomiting. No diarrhea or constipation. No melena.  GENITOURINARY: No dysuria, frequency, foamy urine, urinary urgency, incontinence or hematuria          VITALS:  T(F): 97.8 (12-03-21 @ 12:40), Max: 98 (12-02-21 @ 19:14)  HR: 60 (12-03-21 @ 12:40)  BP: 114/39 (12-03-21 @ 12:40)  RR: 28 (12-03-21 @ 12:40)  SpO2: 100% (12-03-21 @ 12:40)  Wt(kg): --    12-03 @ 07:01  -  12-03 @ 16:09  --------------------------------------------------------  IN: 800 mL / OUT: 2237 mL / NET: -1437 mL        PHYSICAL EXAM:  Constitutional: well developed, no diaphoresis, no distress.  Neck: No JVD, no carotid bruit, supple, no adenopathy  Respiratory:  air entrance B/L, no wheezes, rales or rhonchi  Cardiovascular: S1, S2, RRR, no pericardial rub, systolic m 2/6  LSB  Abdomen: BS+, soft, no tenderness, no bruit  Pelvis: bladder nondistended  Extremities: No cyanosis or clubbing. No peripheral edema.   Pulses: All present  Neurological: A/O x 3, no focal deficits  Psychiatric: Normal mood, normal affect  Skin: No rashes  Vascular Access: right AVG with bruit and thrill

## 2021-12-03 NOTE — PROGRESS NOTE ADULT - PROBLEM SELECTOR PLAN 7
prior Echo on 9/28/21 showed RV systolic pressure is 69 mm Hg. Severe pulmonary hypertension.  - admit to tele  - [facility] home meds IMDUR, metop, Multaq

## 2021-12-03 NOTE — PROGRESS NOTE ADULT - PROBLEM SELECTOR PLAN 4
Asymptomatic bradycardia on telemetry (30-50s)  refuses further cardiac management such as PPM  lopressor adjusted from 25 to 12.5 mg  continue telemetry monitoring for the next 24 hrs

## 2021-12-03 NOTE — PROGRESS NOTE ADULT - SUBJECTIVE AND OBJECTIVE BOX
CHIEF COMPLAINT:Patient is a 76y old  Male who presents with a chief complaint of SOB .Pt appears comfortable.    	  REVIEW OF SYSTEMS:  CONSTITUTIONAL: No fever, weight loss, or fatigue  EYES: No eye pain, visual disturbances, or discharge  ENT:  No difficulty hearing, tinnitus, vertigo; No sinus or throat pain  NECK: No pain or stiffness  RESPIRATORY: No cough, wheezing, chills or hemoptysis; No Shortness of Breath  CARDIOVASCULAR: No chest pain, palpitations, passing out, dizziness, or leg swelling  GASTROINTESTINAL: No abdominal or epigastric pain. No nausea, vomiting, or hematemesis; No diarrhea or constipation. No melena or hematochezia.  GENITOURINARY: No dysuria, frequency, hematuria, or incontinence  NEUROLOGICAL: No headaches, memory loss, loss of strength, numbness, or tremors  SKIN: No itching, burning, rashes, or lesions   LYMPH Nodes: No enlarged glands  ENDOCRINE: No heat or cold intolerance; No hair loss  MUSCULOSKELETAL: No joint pain or swelling; No muscle, back, or extremity pain  PSYCHIATRIC: No depression, anxiety, mood swings, or difficulty sleeping  HEME/LYMPH: No easy bruising, or bleeding gums  ALLERGY AND IMMUNOLOGIC: No hives or eczema	      PHYSICAL EXAM:  T(C): 36.6 (12-03-21 @ 09:25), Max: 37.1 (12-02-21 @ 15:43)  HR: 63 (12-03-21 @ 09:25) (54 - 72)  BP: 133/66 (12-03-21 @ 09:25) (124/45 - 179/46)  RR: 29 (12-03-21 @ 09:25) (17 - 29)  SpO2: 97% (12-03-21 @ 07:51) (94% - 98%)  Wt(kg): --  I&O's Summary      Appearance: Normal	  HEENT:   Normal oral mucosa, PERRL, EOMI	  Lymphatic: No lymphadenopathy  Cardiovascular: Normal S1 S2, No JVD, No murmurs, No edema  Respiratory: Lungs clear to auscultation	  Psychiatry: A & O x 3, Mood & affect appropriate  Gastrointestinal:  Soft, Non-tender, + BS	  Skin: No rashes, No ecchymoses, No cyanosis	  Neurologic: Non-focal  Extremities: Normal range of motion, No clubbing, cyanosis or edema  Vascular: Peripheral pulses palpable 2+ bilaterally    MEDICATIONS  (STANDING):  apixaban 2.5 milliGRAM(s) Oral every 12 hours  aspirin  chewable 81 milliGRAM(s) Oral daily  atorvastatin 80 milliGRAM(s) Oral at bedtime  bisacodyl 5 milliGRAM(s) Oral at bedtime  chlorhexidine 2% Cloths 1 Application(s) Topical <User Schedule>  colchicine 0.6 milliGRAM(s) Oral two times a day  dronedarone 400 milliGRAM(s) Oral two times a day  famotidine    Tablet 20 milliGRAM(s) Oral daily  ferrous    sulfate 325 milliGRAM(s) Oral daily  folic acid 1 milliGRAM(s) Oral daily  hydrALAZINE 50 milliGRAM(s) Oral three times a day  isosorbide   mononitrate ER Tablet (IMDUR) 30 milliGRAM(s) Oral daily  lactulose Syrup 20 Gram(s) Oral at bedtime  sevelamer carbonate 1600 milliGRAM(s) Oral three times a day      TELEMETRY: 	nsr, junctional escape 30's     	  	  LABS:	 	    proBNP: Serum Pro-Brain Natriuretic Peptide: 52142 pg/mL (11-28 @ 10:10)

## 2021-12-03 NOTE — PROGRESS NOTE ADULT - ASSESSMENT
ESRD , dialysis days MWF, next dialysis in [place].  Bradycardia off B Blockers, refuses pacemaker    Dyspnea on admission with elevated BP improved after dailysis.  Possible cause Hypertensive emergency, diastolic HF , cardiac arrythmia, ACS, increased fluid intake  Hyperkalemia improved after dialysis and 2 gm K diet  Hyper po4 continue binders

## 2021-12-03 NOTE — PROGRESS NOTE ADULT - SUBJECTIVE AND OBJECTIVE BOX
PGY-1 Progress Note discussed with attending    PAGER #: [[phone]] TILL 5:00 PM  PLEASE CONTACT ON CALL TEAM:  - On Call Team (Please refer to Mary) FROM 5:00 PM - 8:30PM  - Nightfloat Team FROM 8:30 -7:30 AM    CHIEF COMPLAINT & BRIEF HOSPITAL COURSE:      INTERVAL HPI/OVERNIGHT EVENTS:       REVIEW OF SYSTEMS:  CONSTITUTIONAL: No fever, weight loss, or fatigue  RESPIRATORY: No cough, wheezing, chills or hemoptysis; No shortness of breath  CARDIOVASCULAR: No chest pain, palpitations, dizziness, or leg swelling  GASTROINTESTINAL: No abdominal pain. No nausea, vomiting, or hematemesis; No diarrhea or constipation. No melena or hematochezia.  GENITOURINARY: No dysuria or hematuria, urinary frequency  NEUROLOGICAL: No headaches, memory loss, loss of strength, numbness, or tremors  SKIN: No itching, burning, rashes, or lesions     MEDICATIONS  (STANDING):  apixaban 2.5 milliGRAM(s) Oral every 12 hours  aspirin  chewable 81 milliGRAM(s) Oral daily  atorvastatin 80 milliGRAM(s) Oral at bedtime  bisacodyl 5 milliGRAM(s) Oral at bedtime  chlorhexidine 2% Cloths 1 Application(s) Topical <User Schedule>  colchicine 0.6 milliGRAM(s) Oral two times a day  dronedarone 400 milliGRAM(s) Oral two times a day  famotidine    Tablet 20 milliGRAM(s) Oral daily  ferrous    sulfate 325 milliGRAM(s) Oral daily  folic acid 1 milliGRAM(s) Oral daily  hydrALAZINE 50 milliGRAM(s) Oral three times a day  isosorbide   mononitrate ER Tablet (IMDUR) 30 milliGRAM(s) Oral daily  lactulose Syrup 20 Gram(s) Oral at bedtime  sevelamer carbonate 1600 milliGRAM(s) Oral three times a day    MEDICATIONS  (PRN):  acetaminophen     Tablet .. 650 milliGRAM(s) Oral every 6 hours PRN Mild Pain (1 - 3), Moderate Pain (4 - 6)      Vital Signs Last 24 Hrs  T(C): 36.7 (03 Dec 2021 07:51), Max: 37.1 (02 Dec 2021 11:16)  T(F): 98 (03 Dec 2021 07:51), Max: 98.8 (02 Dec 2021 11:16)  HR: 72 (03 Dec 2021 07:51) (54 - 72)  BP: 179/46 (03 Dec 2021 07:51) (124/45 - 179/46)  BP(mean): --  RR: 18 (03 Dec 2021 07:51) (17 - 18)  SpO2: 97% (03 Dec 2021 07:51) (94% - 98%)    PHYSICAL EXAMINATION:  GENERAL: NAD, well built  HEAD:  Atraumatic, Normocephalic  EYES:  conjunctiva and sclera clear  NECK: Supple, No JVD, Normal thyroid  CHEST/LUNG: Clear to auscultation. Clear to percussion bilaterally; No rales, rhonchi, wheezing, or rubs  HEART: Regular rate and rhythm; No murmurs, rubs, or gallops  ABDOMEN: Soft, Nontender, Nondistended; Bowel sounds present, no pain or masses on palpation  NERVOUS SYSTEM:  Alert & Oriented X3  : voiding well  EXTREMITIES:  2+ Peripheral Pulses, No clubbing, cyanosis, or edema  SKIN: warm dry                          8.9    4.35  )-----------( 156      ( 01 Dec 2021 10:53 )             27.1     12-01    137  |  99  |  78<H>  ----------------------------<  116<H>  5.2   |  25  |  10.60<H>    Ca    8.7      01 Dec 2021 10:53  Phos  6.8     12-01  Mg     2.3     12-01    TPro  7.3  /  Alb  2.9<L>  /  TBili  0.6  /  DBili  x   /  AST  25  /  ALT  43  /  AlkPhos  192<H>  12-01    LIVER FUNCTIONS - ( 01 Dec 2021 10:53 )  Alb: 2.9 g/dL / Pro: 7.3 g/dL / ALK PHOS: 192 U/L / ALT: 43 U/L DA / AST: 25 U/L / GGT: x                   I&O's Summary          CAPILLARY BLOOD GLUCOSE      RADIOLOGY & ADDITIONAL TESTS:                   PGY-1 Progress Note discussed with attending    PAGER #: [[phone]] TILL 5:00 PM  PLEASE CONTACT ON CALL TEAM:  - On Call Team (Please refer to Mary) FROM 5:00 PM - 8:30PM  - Nightfloat Team FROM 8:30 -7:30 AM    CHIEF COMPLAINT & BRIEF HOSPITAL COURSE:    Patient is a 75 y/o M from [facility]. He has past medical history of ESRD on HD (MWF), HTN, severe pulmonary hypertension (CHF w/ pEF > 55%; GIIDD), Anemia of CKD, NSTEMI, CAD, paroxysmal Atrial fibrillation, vascular dementia, CVA w/ R hemiparesis and mood disorder. He was brought in by EMS due to acute onset of shortness of breath secondary to fluid overload. His last HD was on 11/28/21. His O2 saturation was 91% requiring 4LPM of O2 via nasal cannula. His oxygen status improved to 98%. In the E.D, he was tachypneic and started on BiPAP. BNP was elevated at 30,000. Blood pressure was high at 241/84/ Chest Xray showed bilateral pulmonary edema. Nephrology (Dr. Pritchard) was consulted and he underwent emergency HD. ICU was also consulted and he was cleared to be admitted to the floors on nasal cannula. He was given dose of Lasix 80mg IV, Hydralazine 10 mg IV and Insulin 8u with dextrose for Hyperkalemia of 6.3. Serum potassium eventually improved. Troponin was initially elevated but trended down since.     INTERVAL HPI/OVERNIGHT EVENTS:     Patient was examined at bedside, AAOx3, stable, NAD. He still has asymptomatic bradycardia with lowest at 27-29 bpm. on telemetry. He refuses further cardiac intervention such as pacemaker. No other significant events overnight.    REVIEW OF SYSTEMS:  CONSTITUTIONAL: No fever, weight loss, or fatigue  RESPIRATORY: No cough, wheezing, chills or hemoptysis; No shortness of breath  CARDIOVASCULAR: No chest pain, palpitations, dizziness, or leg swelling  GASTROINTESTINAL: No abdominal pain. No nausea, vomiting, or hematemesis; No diarrhea or constipation. No melena or hematochezia.  GENITOURINARY: No dysuria or hematuria, urinary frequency  NEUROLOGICAL: No headaches, memory loss, loss of strength, numbness, or tremors  SKIN: No itching, burning, rashes, or lesions     MEDICATIONS  (STANDING):  apixaban 2.5 milliGRAM(s) Oral every 12 hours  aspirin  chewable 81 milliGRAM(s) Oral daily  atorvastatin 80 milliGRAM(s) Oral at bedtime  bisacodyl 5 milliGRAM(s) Oral at bedtime  chlorhexidine 2% Cloths 1 Application(s) Topical <User Schedule>  colchicine 0.6 milliGRAM(s) Oral two times a day  dronedarone 400 milliGRAM(s) Oral two times a day  famotidine    Tablet 20 milliGRAM(s) Oral daily  ferrous    sulfate 325 milliGRAM(s) Oral daily  folic acid 1 milliGRAM(s) Oral daily  hydrALAZINE 50 milliGRAM(s) Oral three times a day  isosorbide   mononitrate ER Tablet (IMDUR) 30 milliGRAM(s) Oral daily  lactulose Syrup 20 Gram(s) Oral at bedtime  sevelamer carbonate 1600 milliGRAM(s) Oral three times a day    MEDICATIONS  (PRN):  acetaminophen     Tablet .. 650 milliGRAM(s) Oral every 6 hours PRN Mild Pain (1 - 3), Moderate Pain (4 - 6)      Vital Signs Last 24 Hrs  T(C): 36.7 (03 Dec 2021 07:51), Max: 37.1 (02 Dec 2021 11:16)  T(F): 98 (03 Dec 2021 07:51), Max: 98.8 (02 Dec 2021 11:16)  HR: 72 (03 Dec 2021 07:51) (54 - 72)  BP: 179/46 (03 Dec 2021 07:51) (124/45 - 179/46)  BP(mean): --  RR: 18 (03 Dec 2021 07:51) (17 - 18)  SpO2: 97% (03 Dec 2021 07:51) (94% - 98%)    PHYSICAL EXAMINATION:  GENERAL: NAD  HEAD:  Atraumatic, Normocephalic  EYES:  conjunctiva and sclera clear  NECK: Supple, No JVD, Normal thyroid  CHEST/LUNG: Clear to auscultation. Clear to percussion bilaterally; No rales, rhonchi, wheezing, or rubs  HEART: Regular rate and rhythm; No murmurs, rubs, or gallops  ABDOMEN: Soft, Nontender, Nondistended; Bowel sounds present, no pain or masses on palpation  NERVOUS SYSTEM:  Alert & Oriented X3  : voiding well  EXTREMITIES:  2+ Peripheral Pulses, No clubbing, cyanosis, or edema  SKIN: warm dry                          8.9    4.35  )-----------( 156      ( 01 Dec 2021 10:53 )             27.1     12-01    137  |  99  |  78<H>  ----------------------------<  116<H>  5.2   |  25  |  10.60<H>    Ca    8.7      01 Dec 2021 10:53  Phos  6.8     12-01  Mg     2.3     12-01    TPro  7.3  /  Alb  2.9<L>  /  TBili  0.6  /  DBili  x   /  AST  25  /  ALT  43  /  AlkPhos  192<H>  12-01    LIVER FUNCTIONS - ( 01 Dec 2021 10:53 )  Alb: 2.9 g/dL / Pro: 7.3 g/dL / ALK PHOS: 192 U/L / ALT: 43 U/L DA / AST: 25 U/L / GGT: x                   I&O's Summary          CAPILLARY BLOOD GLUCOSE      RADIOLOGY & ADDITIONAL TESTS:

## 2021-12-03 NOTE — PROGRESS NOTE ADULT - PROBLEM SELECTOR PLAN 1
- BP at NH wnl but elevated peak 241/84 w/ pulm edema on CXR  - BNP 30k and Trop elevation  - goal is reduce BP by 25% in 24 hours  - s/p Lasix 80 and Hydral 10  - s/p urgent HD (11/28/21)  - add hydralazine 25 q8  - vs q 4  - trend trop T3 - trended down  - adjust lopressor to 12.5 in setting of bradycardia  - continue to monitor on telemetry  - NEPHRO Dr. Jacinto  - CARDIO Dr. Salinas

## 2021-12-04 LAB
ALBUMIN SERPL ELPH-MCNC: 2.9 G/DL — LOW (ref 3.5–5)
ALP SERPL-CCNC: 158 U/L — HIGH (ref 40–120)
ALT FLD-CCNC: 40 U/L DA — SIGNIFICANT CHANGE UP (ref 10–60)
ANION GAP SERPL CALC-SCNC: 10 MMOL/L — SIGNIFICANT CHANGE UP (ref 5–17)
AST SERPL-CCNC: 14 U/L — SIGNIFICANT CHANGE UP (ref 10–40)
BASOPHILS # BLD AUTO: 0.01 K/UL — SIGNIFICANT CHANGE UP (ref 0–0.2)
BASOPHILS NFR BLD AUTO: 0.2 % — SIGNIFICANT CHANGE UP (ref 0–2)
BILIRUB SERPL-MCNC: 0.6 MG/DL — SIGNIFICANT CHANGE UP (ref 0.2–1.2)
BUN SERPL-MCNC: 42 MG/DL — HIGH (ref 7–18)
CALCIUM SERPL-MCNC: 9.2 MG/DL — SIGNIFICANT CHANGE UP (ref 8.4–10.5)
CHLORIDE SERPL-SCNC: 98 MMOL/L — SIGNIFICANT CHANGE UP (ref 96–108)
CO2 SERPL-SCNC: 30 MMOL/L — SIGNIFICANT CHANGE UP (ref 22–31)
CREAT SERPL-MCNC: 7.07 MG/DL — HIGH (ref 0.5–1.3)
EOSINOPHIL # BLD AUTO: 0.21 K/UL — SIGNIFICANT CHANGE UP (ref 0–0.5)
EOSINOPHIL NFR BLD AUTO: 4.3 % — SIGNIFICANT CHANGE UP (ref 0–6)
GLUCOSE SERPL-MCNC: 97 MG/DL — SIGNIFICANT CHANGE UP (ref 70–99)
HCT VFR BLD CALC: 24.9 % — LOW (ref 39–50)
HGB BLD-MCNC: 8.3 G/DL — LOW (ref 13–17)
IMM GRANULOCYTES NFR BLD AUTO: 0.2 % — SIGNIFICANT CHANGE UP (ref 0–1.5)
LYMPHOCYTES # BLD AUTO: 1.5 K/UL — SIGNIFICANT CHANGE UP (ref 1–3.3)
LYMPHOCYTES # BLD AUTO: 30.6 % — SIGNIFICANT CHANGE UP (ref 13–44)
MAGNESIUM SERPL-MCNC: 2.2 MG/DL — SIGNIFICANT CHANGE UP (ref 1.6–2.6)
MCHC RBC-ENTMCNC: 33.1 PG — SIGNIFICANT CHANGE UP (ref 27–34)
MCHC RBC-ENTMCNC: 33.3 GM/DL — SIGNIFICANT CHANGE UP (ref 32–36)
MCV RBC AUTO: 99.2 FL — SIGNIFICANT CHANGE UP (ref 80–100)
MONOCYTES # BLD AUTO: 0.73 K/UL — SIGNIFICANT CHANGE UP (ref 0–0.9)
MONOCYTES NFR BLD AUTO: 14.9 % — HIGH (ref 2–14)
NEUTROPHILS # BLD AUTO: 2.44 K/UL — SIGNIFICANT CHANGE UP (ref 1.8–7.4)
NEUTROPHILS NFR BLD AUTO: 49.8 % — SIGNIFICANT CHANGE UP (ref 43–77)
NRBC # BLD: 0 /100 WBCS — SIGNIFICANT CHANGE UP (ref 0–0)
PHOSPHATE SERPL-MCNC: 4.7 MG/DL — HIGH (ref 2.5–4.5)
PLATELET # BLD AUTO: 152 K/UL — SIGNIFICANT CHANGE UP (ref 150–400)
POTASSIUM SERPL-MCNC: 4.3 MMOL/L — SIGNIFICANT CHANGE UP (ref 3.5–5.3)
POTASSIUM SERPL-SCNC: 4.3 MMOL/L — SIGNIFICANT CHANGE UP (ref 3.5–5.3)
PROT SERPL-MCNC: 6.9 G/DL — SIGNIFICANT CHANGE UP (ref 6–8.3)
RBC # BLD: 2.51 M/UL — LOW (ref 4.2–5.8)
RBC # FLD: 15.9 % — HIGH (ref 10.3–14.5)
SODIUM SERPL-SCNC: 138 MMOL/L — SIGNIFICANT CHANGE UP (ref 135–145)
WBC # BLD: 4.9 K/UL — SIGNIFICANT CHANGE UP (ref 3.8–10.5)
WBC # FLD AUTO: 4.9 K/UL — SIGNIFICANT CHANGE UP (ref 3.8–10.5)

## 2021-12-04 RX ORDER — HEPARIN SODIUM 5000 [USP'U]/ML
5000 INJECTION INTRAVENOUS; SUBCUTANEOUS EVERY 12 HOURS
Refills: 0 | Status: DISCONTINUED | OUTPATIENT
Start: 2021-12-04 | End: 2021-12-07

## 2021-12-04 RX ORDER — ERYTHROPOIETIN 10000 [IU]/ML
6000 INJECTION, SOLUTION INTRAVENOUS; SUBCUTANEOUS
Refills: 0 | Status: DISCONTINUED | OUTPATIENT
Start: 2021-12-04 | End: 2021-12-07

## 2021-12-04 RX ADMIN — Medication 50 MILLIGRAM(S): at 21:39

## 2021-12-04 RX ADMIN — Medication 0.6 MILLIGRAM(S): at 17:11

## 2021-12-04 RX ADMIN — Medication 50 MILLIGRAM(S): at 06:19

## 2021-12-04 RX ADMIN — SEVELAMER CARBONATE 1600 MILLIGRAM(S): 2400 POWDER, FOR SUSPENSION ORAL at 13:46

## 2021-12-04 RX ADMIN — FAMOTIDINE 20 MILLIGRAM(S): 10 INJECTION INTRAVENOUS at 11:39

## 2021-12-04 RX ADMIN — SEVELAMER CARBONATE 1600 MILLIGRAM(S): 2400 POWDER, FOR SUSPENSION ORAL at 21:39

## 2021-12-04 RX ADMIN — SEVELAMER CARBONATE 1600 MILLIGRAM(S): 2400 POWDER, FOR SUSPENSION ORAL at 06:19

## 2021-12-04 RX ADMIN — CHLORHEXIDINE GLUCONATE 1 APPLICATION(S): 213 SOLUTION TOPICAL at 06:18

## 2021-12-04 RX ADMIN — Medication 325 MILLIGRAM(S): at 11:39

## 2021-12-04 RX ADMIN — Medication 50 MILLIGRAM(S): at 13:47

## 2021-12-04 RX ADMIN — Medication 1 MILLIGRAM(S): at 11:39

## 2021-12-04 RX ADMIN — DRONEDARONE 400 MILLIGRAM(S): 400 TABLET, FILM COATED ORAL at 06:20

## 2021-12-04 RX ADMIN — ISOSORBIDE MONONITRATE 30 MILLIGRAM(S): 60 TABLET, EXTENDED RELEASE ORAL at 11:39

## 2021-12-04 RX ADMIN — Medication 5 MILLIGRAM(S): at 21:39

## 2021-12-04 RX ADMIN — Medication 81 MILLIGRAM(S): at 11:39

## 2021-12-04 RX ADMIN — ATORVASTATIN CALCIUM 80 MILLIGRAM(S): 80 TABLET, FILM COATED ORAL at 21:39

## 2021-12-04 NOTE — PROGRESS NOTE ADULT - SUBJECTIVE AND OBJECTIVE BOX
PGY-1 Progress Note discussed with attending    PAGER #: [[phone]] TILL 5:00 PM  PLEASE CONTACT ON CALL TEAM:  - On Call Team (Please refer to Mary) FROM 5:00 PM - 8:30PM  - Nightfloat Team FROM 8:30 -7:30 AM    CHIEF COMPLAINT & BRIEF HOSPITAL COURSE:      INTERVAL HPI/OVERNIGHT EVENTS:       REVIEW OF SYSTEMS:  CONSTITUTIONAL: No fever, weight loss, or fatigue  RESPIRATORY: No cough, wheezing, chills or hemoptysis; No shortness of breath  CARDIOVASCULAR: No chest pain, palpitations, dizziness, or leg swelling  GASTROINTESTINAL: No abdominal pain. No nausea, vomiting, or hematemesis; No diarrhea or constipation. No melena or hematochezia.  GENITOURINARY: No dysuria or hematuria, urinary frequency  NEUROLOGICAL: No headaches, memory loss, loss of strength, numbness, or tremors  SKIN: No itching, burning, rashes, or lesions     MEDICATIONS  (STANDING):  apixaban 2.5 milliGRAM(s) Oral every 12 hours  aspirin  chewable 81 milliGRAM(s) Oral daily  atorvastatin 80 milliGRAM(s) Oral at bedtime  bisacodyl 5 milliGRAM(s) Oral at bedtime  chlorhexidine 2% Cloths 1 Application(s) Topical <User Schedule>  colchicine 0.6 milliGRAM(s) Oral two times a day  dronedarone 400 milliGRAM(s) Oral two times a day  famotidine    Tablet 20 milliGRAM(s) Oral daily  ferrous    sulfate 325 milliGRAM(s) Oral daily  folic acid 1 milliGRAM(s) Oral daily  hydrALAZINE 50 milliGRAM(s) Oral three times a day  isosorbide   mononitrate ER Tablet (IMDUR) 30 milliGRAM(s) Oral daily  lactulose Syrup 20 Gram(s) Oral at bedtime  sevelamer carbonate 1600 milliGRAM(s) Oral three times a day    MEDICATIONS  (PRN):  acetaminophen     Tablet .. 650 milliGRAM(s) Oral every 6 hours PRN Mild Pain (1 - 3), Moderate Pain (4 - 6)      Vital Signs Last 24 Hrs  T(C): 36.8 (04 Dec 2021 05:57), Max: 37.3 (03 Dec 2021 21:32)  T(F): 98.2 (04 Dec 2021 05:57), Max: 99.2 (03 Dec 2021 21:32)  HR: 67 (04 Dec 2021 05:57) (59 - 72)  BP: 138/44 (04 Dec 2021 05:57) (114/39 - 179/46)  BP(mean): --  RR: 18 (04 Dec 2021 05:57) (18 - 29)  SpO2: 100% (04 Dec 2021 05:57) (97% - 100%)    PHYSICAL EXAMINATION:  GENERAL: NAD, well built  HEAD:  Atraumatic, Normocephalic  EYES:  conjunctiva and sclera clear  NECK: Supple, No JVD, Normal thyroid  CHEST/LUNG: Clear to auscultation. Clear to percussion bilaterally; No rales, rhonchi, wheezing, or rubs  HEART: Regular rate and rhythm; No murmurs, rubs, or gallops  ABDOMEN: Soft, Nontender, Nondistended; Bowel sounds present, no pain or masses on palpation  NERVOUS SYSTEM:  Alert & Oriented X3  : voiding well  EXTREMITIES:  2+ Peripheral Pulses, No clubbing, cyanosis, or edema  SKIN: warm dry                          8.3    4.90  )-----------( 152      ( 04 Dec 2021 04:22 )             24.9     12-04    138  |  98  |  42<H>  ----------------------------<  97  4.3   |  30  |  7.07<H>    Ca    9.2      04 Dec 2021 04:22  Phos  4.7     12-04  Mg     2.2     12-04    TPro  6.9  /  Alb  2.9<L>  /  TBili  0.6  /  DBili  x   /  AST  14  /  ALT  40  /  AlkPhos  158<H>  12-04    LIVER FUNCTIONS - ( 04 Dec 2021 04:22 )  Alb: 2.9 g/dL / Pro: 6.9 g/dL / ALK PHOS: 158 U/L / ALT: 40 U/L DA / AST: 14 U/L / GGT: x                   I&O's Summary    03 Dec 2021 07:01  -  04 Dec 2021 07:00  --------------------------------------------------------  IN: 800 mL / OUT: 2237 mL / NET: -1437 mL            CAPILLARY BLOOD GLUCOSE      RADIOLOGY & ADDITIONAL TESTS:                   PGY-1 Progress Note discussed with attending    PAGER #: [[phone]] TILL 5:00 PM  PLEASE CONTACT ON CALL TEAM:  - On Call Team (Please refer to Mary) FROM 5:00 PM - 8:30PM  - Nightfloat Team FROM 8:30 -7:30 AM    CHIEF COMPLAINT & BRIEF HOSPITAL COURSE:    Patient is a 75 y/o M from [facility] ([place] He has past medical history of ESRD on HD (MWF), HTN, severe pulmonary hypertension (CHF w/ pEF > 55%; GIIDD), Anemia of CKD, NSTEMI, CAD, paroxysmal Atrial fibrillation, vascular dementia, CVA w/ R hemiparesis and mood disorder. He was brought in by EMS due to acute onset of shortness of breath secondary to fluid overload. His last HD was on 11/28/21. His O2 saturation was 91% requiring 4LPM of O2 via nasal cannula. His oxygen status improved to 98%. In the E.D, he was tachypneic and started on BiPAP. BNP was elevated at 30,000. Blood pressure was high at 241/84/ Chest Xray showed bilateral pulmonary edema. Nephrology (Dr. Pritchard) was consulted and he underwent emergency HD. ICU was also consulted and he was cleared to be admitted to the floors on nasal cannula. He was given dose of Lasix 80mg IV, Hydralazine 10 mg IV and Insulin 8u with dextrose for Hyperkalemia of 6.3. Serum potassium eventually improved. Troponin was initially elevated but trended down since.     INTERVAL HPI/OVERNIGHT EVENTS:     Patient was examined at bedside. AAOx3, stable, NAD. Heart rate improved but still bradycardic. Denies any chest pain, palpitation, shortness of breath. He still refuses AICD insertion. He got his HD yesterday. No other significant events overnight.    REVIEW OF SYSTEMS:  CONSTITUTIONAL: No fever, weight loss, or fatigue  RESPIRATORY: No cough, wheezing, chills or hemoptysis; No shortness of breath  CARDIOVASCULAR: No chest pain, palpitations, dizziness, or leg swelling  GASTROINTESTINAL: No abdominal pain. No nausea, vomiting, or hematemesis; No diarrhea or constipation. No melena or hematochezia.  GENITOURINARY: No dysuria or hematuria, urinary frequency  NEUROLOGICAL: No headaches, memory loss, loss of strength, numbness, or tremors  SKIN: No itching, burning, rashes, or lesions     MEDICATIONS  (STANDING):  apixaban 2.5 milliGRAM(s) Oral every 12 hours  aspirin  chewable 81 milliGRAM(s) Oral daily  atorvastatin 80 milliGRAM(s) Oral at bedtime  bisacodyl 5 milliGRAM(s) Oral at bedtime  chlorhexidine 2% Cloths 1 Application(s) Topical <User Schedule>  colchicine 0.6 milliGRAM(s) Oral two times a day  dronedarone 400 milliGRAM(s) Oral two times a day  famotidine    Tablet 20 milliGRAM(s) Oral daily  ferrous    sulfate 325 milliGRAM(s) Oral daily  folic acid 1 milliGRAM(s) Oral daily  hydrALAZINE 50 milliGRAM(s) Oral three times a day  isosorbide   mononitrate ER Tablet (IMDUR) 30 milliGRAM(s) Oral daily  lactulose Syrup 20 Gram(s) Oral at bedtime  sevelamer carbonate 1600 milliGRAM(s) Oral three times a day    MEDICATIONS  (PRN):  acetaminophen     Tablet .. 650 milliGRAM(s) Oral every 6 hours PRN Mild Pain (1 - 3), Moderate Pain (4 - 6)      Vital Signs Last 24 Hrs  T(C): 36.8 (04 Dec 2021 05:57), Max: 37.3 (03 Dec 2021 21:32)  T(F): 98.2 (04 Dec 2021 05:57), Max: 99.2 (03 Dec 2021 21:32)  HR: 67 (04 Dec 2021 05:57) (59 - 72)  BP: 138/44 (04 Dec 2021 05:57) (114/39 - 179/46)  BP(mean): --  RR: 18 (04 Dec 2021 05:57) (18 - 29)  SpO2: 100% (04 Dec 2021 05:57) (97% - 100%)    PHYSICAL EXAMINATION:  GENERAL: NAD, well built  HEAD:  Atraumatic, Normocephalic  EYES:  conjunctiva and sclera clear  NECK: Supple, No JVD, Normal thyroid  CHEST/LUNG: Clear to auscultation. Clear to percussion bilaterally; No rales, rhonchi, wheezing, or rubs  HEART: Regular rate and rhythm; No murmurs, rubs, or gallops  ABDOMEN: Soft, Nontender, Nondistended; Bowel sounds present, no pain or masses on palpation  NERVOUS SYSTEM:  Alert & Oriented X3  : voiding well  EXTREMITIES:  2+ Peripheral Pulses, No clubbing, cyanosis, or edema  SKIN: warm dry                          8.3    4.90  )-----------( 152      ( 04 Dec 2021 04:22 )             24.9     12-04    138  |  98  |  42<H>  ----------------------------<  97  4.3   |  30  |  7.07<H>    Ca    9.2      04 Dec 2021 04:22  Phos  4.7     12-04  Mg     2.2     12-04    TPro  6.9  /  Alb  2.9<L>  /  TBili  0.6  /  DBili  x   /  AST  14  /  ALT  40  /  AlkPhos  158<H>  12-04    LIVER FUNCTIONS - ( 04 Dec 2021 04:22 )  Alb: 2.9 g/dL / Pro: 6.9 g/dL / ALK PHOS: 158 U/L / ALT: 40 U/L DA / AST: 14 U/L / GGT: x                   I&O's Summary    03 Dec 2021 07:01  -  04 Dec 2021 07:00  --------------------------------------------------------  IN: 800 mL / OUT: 2237 mL / NET: -1437 mL            CAPILLARY BLOOD GLUCOSE      RADIOLOGY & ADDITIONAL TESTS:

## 2021-12-04 NOTE — PROGRESS NOTE ADULT - PROBLEM SELECTOR PLAN 3
started BB metop tart 25 BID and Eliquis 2.5 BID  decreased lopressor to 12.5  continue telemetry due to bradycardia  offered AICD but keeps on refusing  Cardio consult Dr. Brad FOX. consulted by Dr. Salinas

## 2021-12-04 NOTE — PROGRESS NOTE ADULT - SUBJECTIVE AND OBJECTIVE BOX
Problem List:  Bradycardia in am, no CP  No SOB Feels well     PAST MEDICAL & SURGICAL HISTORY:  ESRD (end stage renal disease) on dialysis    Hypertension    Anemia    Cerebrovascular accident (CVA), unspecified mechanism    Paroxysmal atrial fibrillation    CAD (coronary artery disease)    A-V fistula        No Known Allergies      MEDICATIONS  (STANDING):  aspirin  chewable 81 milliGRAM(s) Oral daily  atorvastatin 80 milliGRAM(s) Oral at bedtime  bisacodyl 5 milliGRAM(s) Oral at bedtime  chlorhexidine 2% Cloths 1 Application(s) Topical <User Schedule>  colchicine 0.6 milliGRAM(s) Oral two times a day  famotidine    Tablet 20 milliGRAM(s) Oral daily  ferrous    sulfate 325 milliGRAM(s) Oral daily  folic acid 1 milliGRAM(s) Oral daily  heparin   Injectable 5000 Unit(s) SubCutaneous every 12 hours  hydrALAZINE 50 milliGRAM(s) Oral three times a day  isosorbide   mononitrate ER Tablet (IMDUR) 30 milliGRAM(s) Oral daily  lactulose Syrup 20 Gram(s) Oral at bedtime  sevelamer carbonate 1600 milliGRAM(s) Oral three times a day    MEDICATIONS  (PRN):  acetaminophen     Tablet .. 650 milliGRAM(s) Oral every 6 hours PRN Mild Pain (1 - 3), Moderate Pain (4 - 6)                            8.3    4.90  )-----------( 152      ( 04 Dec 2021 04:22 )             24.9     12-04    138  |  98  |  42<H>  ----------------------------<  97  4.3   |  30  |  7.07<H>    Ca    9.2      04 Dec 2021 04:22  Phos  4.7     12-04  Mg     2.2     12-04    TPro  6.9  /  Alb  2.9<L>  /  TBili  0.6  /  DBili  x   /  AST  14  /  ALT  40  /  AlkPhos  158<H>  12-04            REVIEW OF SYSTEMS:  General: no fever no chills, no weight loss.  EYES/ENT: No visual changes;  No vertigo, no headache.  NECK: No pain or stiffness  RESPIRATORY: No cough, wheezing, hemoptysis; No shortness of breath  CARDIOVASCULAR: No chest pain or palpitations. No Edema  GASTROINTESTINAL: No abdominal or epigastric pain. No nausea, vomiting. No diarrhea or constipation. No melena.  GENITOURINARY: No dysuria, frequency, foamy urine, urinary urgency, incontinence or hematuria        VITALS:  T(F): 98.8 (12-04-21 @ 11:58), Max: 99.2 (12-03-21 @ 21:32)  HR: 76 (12-04-21 @ 11:58)  BP: 133/43 (12-04-21 @ 11:58)  RR: 18 (12-04-21 @ 11:58)  SpO2: 100% (12-04-21 @ 11:58)  Wt(kg): --    12-03 @ 07:01  -  12-04 @ 07:00  --------------------------------------------------------  IN: 800 mL / OUT: 2237 mL / NET: -1437 mL        PHYSICAL EXAM:  Constitutional: well developed, no diaphoresis, no distress.  Neck: No JVD, no carotid bruit, supple, no adenopathy  Respiratory:  air entrance B/L, no wheezes, rales or rhonchi  Cardiovascular: S1, S2, RRR, no pericardial rub, no murmur HR 67  Abdomen: BS+, soft, no tenderness, no bruit  Pelvis: bladder nondistended  no edema  Neurological: A/O x 3, no focal deficits  Psychiatric: Normal mood, normal affect  Skin: No rashes  Vascular Access: right AVG with bruit and thrill

## 2021-12-04 NOTE — CHART NOTE - NSCHARTNOTEFT_GEN_A_CORE
Paged by nurse that patient is refusing Eliquis.  Per patient his doctor told him he still bleeds on it.  Patient still refused despite education.    Signed out to primary team.

## 2021-12-04 NOTE — PROGRESS NOTE ADULT - PROBLEM SELECTOR PLAN 7
prior Echo on 9/28/21 showed RV systolic pressure is 69 mm Hg. Severe pulmonary hypertension.  - admit to tele  - [facility] home meds IMDUR, metop, Multaq

## 2021-12-04 NOTE — PROGRESS NOTE ADULT - SUBJECTIVE AND OBJECTIVE BOX
CHIEF COMPLAINT:Patient is a 76y old  Male who presents with a chief complaint of SOB.Pt appears comfortable.    	  REVIEW OF SYSTEMS:  CONSTITUTIONAL: No fever, weight loss, or fatigue  EYES: No eye pain, visual disturbances, or discharge  ENT:  No difficulty hearing, tinnitus, vertigo; No sinus or throat pain  NECK: No pain or stiffness  RESPIRATORY: No cough, wheezing, chills or hemoptysis; No Shortness of Breath  CARDIOVASCULAR: No chest pain, palpitations, passing out, dizziness, or leg swelling  GASTROINTESTINAL: No abdominal or epigastric pain. No nausea, vomiting, or hematemesis; No diarrhea or constipation. No melena or hematochezia.  GENITOURINARY: No dysuria, frequency, hematuria, or incontinence  NEUROLOGICAL: No headaches, memory loss, loss of strength, numbness, or tremors  SKIN: No itching, burning, rashes, or lesions   LYMPH Nodes: No enlarged glands  ENDOCRINE: No heat or cold intolerance; No hair loss  MUSCULOSKELETAL: No joint pain or swelling; No muscle, back, or extremity pain  PSYCHIATRIC: No depression, anxiety, mood swings, or difficulty sleeping  HEME/LYMPH: No easy bruising, or bleeding gums  ALLERGY AND IMMUNOLOGIC: No hives or eczema	      PHYSICAL EXAM:  T(C): 36.7 (12-04-21 @ 08:06), Max: 37.3 (12-03-21 @ 21:32)  HR: 73 (12-04-21 @ 08:06) (59 - 73)  BP: 126/44 (12-04-21 @ 08:06) (114/39 - 138/44)  RR: 18 (12-04-21 @ 08:06) (18 - 28)  SpO2: 100% (12-04-21 @ 08:06) (98% - 100%)  Wt(kg): --  I&O's Summary    03 Dec 2021 07:01  -  04 Dec 2021 07:00  --------------------------------------------------------  IN: 800 mL / OUT: 2237 mL / NET: -1437 mL        Appearance: Normal	  HEENT:   Normal oral mucosa, PERRL, EOMI	  Lymphatic: No lymphadenopathy  Cardiovascular: Normal S1 S2, No JVD, No murmurs, No edema  Respiratory: Lungs clear to auscultation	  Psychiatry: A & O x 3, Mood & affect appropriate  Gastrointestinal:  Soft, Non-tender, + BS	  Skin: No rashes, No ecchymoses, No cyanosis	  Neurologic: Non-focal  Extremities: Normal range of motion, No clubbing, cyanosis or edema  Vascular: Peripheral pulses palpable 2+ bilaterally    MEDICATIONS  (STANDING):  apixaban 2.5 milliGRAM(s) Oral every 12 hours  aspirin  chewable 81 milliGRAM(s) Oral daily  atorvastatin 80 milliGRAM(s) Oral at bedtime  bisacodyl 5 milliGRAM(s) Oral at bedtime  chlorhexidine 2% Cloths 1 Application(s) Topical <User Schedule>  colchicine 0.6 milliGRAM(s) Oral two times a day  famotidine    Tablet 20 milliGRAM(s) Oral daily  ferrous    sulfate 325 milliGRAM(s) Oral daily  folic acid 1 milliGRAM(s) Oral daily  hydrALAZINE 50 milliGRAM(s) Oral three times a day  isosorbide   mononitrate ER Tablet (IMDUR) 30 milliGRAM(s) Oral daily  lactulose Syrup 20 Gram(s) Oral at bedtime  sevelamer carbonate 1600 milliGRAM(s) Oral three times a day      TELEMETRY: 	nsr,down to 30's with ventricular escape    	  	  LABS:	 	                        8.3    4.90  )-----------( 152      ( 04 Dec 2021 04:22 )             24.9     12-04    138  |  98  |  42<H>  ----------------------------<  97  4.3   |  30  |  7.07<H>    Ca    9.2      04 Dec 2021 04:22  Phos  4.7     12-04  Mg     2.2     12-04    TPro  6.9  /  Alb  2.9<L>  /  TBili  0.6  /  DBili  x   /  AST  14  /  ALT  40  /  AlkPhos  158<H>  12-04    proBNP: Serum Pro-Brain Natriuretic Peptide: 58078 pg/mL (11-28 @ 10:10)

## 2021-12-04 NOTE — PROGRESS NOTE ADULT - ASSESSMENT
77 yo M from [facility] (formerly [facility]), w/ ESRD (on HD MWF), HTN, Severe Pulm HTN, CHFpEF >55% w/ GIIDD, Anemia of CKD, NSTEMI, CAD, parox Afib, CVA w/ RIGHT dominant hemiparesis, Vascular Dementia and Mood disorder who was BIB EMS for acute onset SOB 2/2 fluid overload.  1.CHF and volume overload need for HD.  2.Pt declines any cardiac test/intervention.  3.ESRD-HD as per renal.  4.Parox Afib-d/c Multaq, refusing eliquis.  5.Lipid d/o-statin.  6.CVA-asa,statin.  7.CAD-asa,imdur,statin.  8.HTN- hydralazine 50mg q8h.  9.SSS-declines PPM.  10.GI and DVT prophylaxis.

## 2021-12-04 NOTE — PROGRESS NOTE ADULT - ASSESSMENT
ESRD, dialysis days MWF  CHF improved with dialysis  Bradycardia discussed options with the patient.    Anemia place on ALICIA

## 2021-12-05 LAB — SARS-COV-2 RNA SPEC QL NAA+PROBE: SIGNIFICANT CHANGE UP

## 2021-12-05 RX ADMIN — HEPARIN SODIUM 5000 UNIT(S): 5000 INJECTION INTRAVENOUS; SUBCUTANEOUS at 17:07

## 2021-12-05 RX ADMIN — ATORVASTATIN CALCIUM 80 MILLIGRAM(S): 80 TABLET, FILM COATED ORAL at 21:26

## 2021-12-05 RX ADMIN — LACTULOSE 20 GRAM(S): 10 SOLUTION ORAL at 21:26

## 2021-12-05 RX ADMIN — FAMOTIDINE 20 MILLIGRAM(S): 10 INJECTION INTRAVENOUS at 11:37

## 2021-12-05 RX ADMIN — Medication 50 MILLIGRAM(S): at 21:26

## 2021-12-05 RX ADMIN — ISOSORBIDE MONONITRATE 30 MILLIGRAM(S): 60 TABLET, EXTENDED RELEASE ORAL at 11:37

## 2021-12-05 RX ADMIN — Medication 1 MILLIGRAM(S): at 11:37

## 2021-12-05 RX ADMIN — Medication 5 MILLIGRAM(S): at 21:26

## 2021-12-05 RX ADMIN — SEVELAMER CARBONATE 1600 MILLIGRAM(S): 2400 POWDER, FOR SUSPENSION ORAL at 21:26

## 2021-12-05 RX ADMIN — SEVELAMER CARBONATE 1600 MILLIGRAM(S): 2400 POWDER, FOR SUSPENSION ORAL at 13:24

## 2021-12-05 RX ADMIN — Medication 325 MILLIGRAM(S): at 11:36

## 2021-12-05 RX ADMIN — CHLORHEXIDINE GLUCONATE 1 APPLICATION(S): 213 SOLUTION TOPICAL at 05:58

## 2021-12-05 RX ADMIN — Medication 0.6 MILLIGRAM(S): at 17:07

## 2021-12-05 RX ADMIN — Medication 50 MILLIGRAM(S): at 13:24

## 2021-12-05 RX ADMIN — Medication 81 MILLIGRAM(S): at 11:36

## 2021-12-05 NOTE — DIETITIAN INITIAL EVALUATION ADULT. - FEEDING ASSISTANCE
Provide food choices within diet Rx as available/updated Provide food choices within diet Rx as available/updated; Nursing to continue feeding assistance and encouragement as needed

## 2021-12-05 NOTE — PROGRESS NOTE ADULT - SUBJECTIVE AND OBJECTIVE BOX
Problem List:  ESRD   History of multiple strokes in the past , bed bound  Multiple admission in the last 2 yeasr for CHF due to cardiac arrythmia  SSS refusing pacemaker placement  In AM SVT, rate 157 , he was asymptomtauc.  He has beed refusing his meds for several years        PAST MEDICAL & SURGICAL HISTORY:  ESRD (end stage renal disease) on dialysis    Hypertension    Anemia    Cerebrovascular accident (CVA), unspecified mechanism    Paroxysmal atrial fibrillation    CAD (coronary artery disease)    A-V fistula        No Known Allergies      MEDICATIONS  (STANDING):  aspirin  chewable 81 milliGRAM(s) Oral daily  atorvastatin 80 milliGRAM(s) Oral at bedtime  bisacodyl 5 milliGRAM(s) Oral at bedtime  chlorhexidine 2% Cloths 1 Application(s) Topical <User Schedule>  colchicine 0.6 milliGRAM(s) Oral two times a day  epoetin ximena-epbx (RETACRIT) Injectable 6000 Unit(s) IV Push <User Schedule>  famotidine    Tablet 20 milliGRAM(s) Oral daily  ferrous    sulfate 325 milliGRAM(s) Oral daily  folic acid 1 milliGRAM(s) Oral daily  heparin   Injectable 5000 Unit(s) SubCutaneous every 12 hours  hydrALAZINE 50 milliGRAM(s) Oral three times a day  isosorbide   mononitrate ER Tablet (IMDUR) 30 milliGRAM(s) Oral daily  lactulose Syrup 20 Gram(s) Oral at bedtime  sevelamer carbonate 1600 milliGRAM(s) Oral three times a day    MEDICATIONS  (PRN):  acetaminophen     Tablet .. 650 milliGRAM(s) Oral every 6 hours PRN Mild Pain (1 - 3), Moderate Pain (4 - 6)                            8.3    4.90  )-----------( 152      ( 04 Dec 2021 04:22 )             24.9     12-04    138  |  98  |  42<H>  ----------------------------<  97  4.3   |  30  |  7.07<H>    Ca    9.2      04 Dec 2021 04:22  Phos  4.7     12-04  Mg     2.2     12-04    TPro  6.9  /  Alb  2.9<L>  /  TBili  0.6  /  DBili  x   /  AST  14  /  ALT  40  /  AlkPhos  158<H>  12-04            REVIEW OF SYSTEMS:  General: no fever no chills, no weight loss.    RESPIRATORY: No cough, wheezing, hemoptysis; No shortness of breath  CARDIOVASCULAR: No chest pain or palpitations. No Edema  GASTROINTESTINAL: No abdominal or epigastric pain. No nausea, vomiting. No diarrhea or constipation. No melena.  GENITOURINARY: oliguria  NEUROLOGICAL: bed bound.        VITALS:  T(F): 98.5 (12-05-21 @ 07:34), Max: 99.7 (12-05-21 @ 04:36)  HR: 70 (12-05-21 @ 07:34)  BP: 127/50 (12-05-21 @ 07:34)  RR: 18 (12-05-21 @ 07:34)  SpO2: 100% (12-05-21 @ 07:34)  Wt(kg): --      PHYSICAL EXAM:  Constitutional: well developed, no diaphoresis, no distress.  Neck: No JVD, no carotid bruit, supple, no adenopathy  Respiratory: Good air entrance B/L, no wheezes, rales or rhonchi  Cardiovascular: S1, S2, RRR, no pericardial rub, no murmur  Abdomen: BS+, soft, no tenderness, no bruit  Pelvis: bladder nondistended  Extremities: No edema  Right AVG with bruit and thrill

## 2021-12-05 NOTE — PROGRESS NOTE ADULT - SUBJECTIVE AND OBJECTIVE BOX
CHIEF COMPLAINT:Patient is a 76y old  Male who presents with a chief complaint of SOB.Pt appears comfortable.    	  REVIEW OF SYSTEMS:  CONSTITUTIONAL: No fever, weight loss, or fatigue  EYES: No eye pain, visual disturbances, or discharge  ENT:  No difficulty hearing, tinnitus, vertigo; No sinus or throat pain  NECK: No pain or stiffness  RESPIRATORY: No cough, wheezing, chills or hemoptysis; No Shortness of Breath  CARDIOVASCULAR: No chest pain, palpitations, passing out, dizziness, or leg swelling  GASTROINTESTINAL: No abdominal or epigastric pain. No nausea, vomiting, or hematemesis; No diarrhea or constipation. No melena or hematochezia.  GENITOURINARY: No dysuria, frequency, hematuria, or incontinence  NEUROLOGICAL: No headaches, memory loss, loss of strength, numbness, or tremors  SKIN: No itching, burning, rashes, or lesions   LYMPH Nodes: No enlarged glands  ENDOCRINE: No heat or cold intolerance; No hair loss  MUSCULOSKELETAL: No joint pain or swelling; No muscle, back, or extremity pain  PSYCHIATRIC: No depression, anxiety, mood swings, or difficulty sleeping  HEME/LYMPH: No easy bruising, or bleeding gums  ALLERGY AND IMMUNOLOGIC: No hives or eczema	    PHYSICAL EXAM:  T(C): 36.9 (12-05-21 @ 07:34), Max: 37.6 (12-05-21 @ 04:36)  HR: 70 (12-05-21 @ 07:34) (65 - 76)  BP: 127/50 (12-05-21 @ 07:34) (114/51 - 139/50)  RR: 18 (12-05-21 @ 07:34) (18 - 18)  SpO2: 100% (12-05-21 @ 07:34) (97% - 100%)  Wt(kg): --  I&O's Summary      Appearance: Normal	  HEENT:   Normal oral mucosa, PERRL, EOMI	  Lymphatic: No lymphadenopathy  Cardiovascular: Normal S1 S2, No JVD, No murmurs, No edema  Respiratory: Lungs clear to auscultation	  Psychiatry: A & O x 3, Mood & affect appropriate  Gastrointestinal:  Soft, Non-tender, + BS	  Skin: No rashes, No ecchymoses, No cyanosis	  Neurologic: Non-focal  Extremities: Normal range of motion, No clubbing, cyanosis or edema  Vascular: Peripheral pulses palpable 2+ bilaterally    MEDICATIONS  (STANDING):  aspirin  chewable 81 milliGRAM(s) Oral daily  atorvastatin 80 milliGRAM(s) Oral at bedtime  bisacodyl 5 milliGRAM(s) Oral at bedtime  chlorhexidine 2% Cloths 1 Application(s) Topical <User Schedule>  colchicine 0.6 milliGRAM(s) Oral two times a day  epoetin ximena-epbx (RETACRIT) Injectable 6000 Unit(s) IV Push <User Schedule>  famotidine    Tablet 20 milliGRAM(s) Oral daily  ferrous    sulfate 325 milliGRAM(s) Oral daily  folic acid 1 milliGRAM(s) Oral daily  heparin   Injectable 5000 Unit(s) SubCutaneous every 12 hours  hydrALAZINE 50 milliGRAM(s) Oral three times a day  isosorbide   mononitrate ER Tablet (IMDUR) 30 milliGRAM(s) Oral daily  lactulose Syrup 20 Gram(s) Oral at bedtime  sevelamer carbonate 1600 milliGRAM(s) Oral three times a day      TELEMETRY: 	nsr,'s    	  	  LABS:	 	                        8.3    4.90  )-----------( 152      ( 04 Dec 2021 04:22 )             24.9     12-04    138  |  98  |  42<H>  ----------------------------<  97  4.3   |  30  |  7.07<H>    Ca    9.2      04 Dec 2021 04:22  Phos  4.7     12-04  Mg     2.2     12-04    TPro  6.9  /  Alb  2.9<L>  /  TBili  0.6  /  DBili  x   /  AST  14  /  ALT  40  /  AlkPhos  158<H>  12-04    proBNP: Serum Pro-Brain Natriuretic Peptide: 04564 pg/mL (11-28 @ 10:10)    Lipid Profile:   HgA1c:   TSH:

## 2021-12-05 NOTE — DIETITIAN INITIAL EVALUATION ADULT. - PERTINENT LABORATORY DATA
12-04 Na138 mmol/L Glu 97 mg/dL K+ 4.3 mmol/L Cr  7.07 mg/dL<H> BUN 42 mg/dL<H>   12-04 Phos 4.7 mg/dL<H>   12-04 Alb 2.9 g/dL<L>

## 2021-12-05 NOTE — DIETITIAN INITIAL EVALUATION ADULT. - ORAL INTAKE PTA/DIET HISTORY
Diet Rx at skilled nursing facility PTA: 2 to 3g Na, 2g K, No Concentrated Sweets, Low Fat, Low Chol, Regular, Thin fluid

## 2021-12-05 NOTE — PROGRESS NOTE ADULT - ASSESSMENT
75 yo M from [facility], w/ ESRD (on HD MWF), HTN, Severe Pulm HTN, CHFpEF >55% w/ GIIDD, Anemia of CKD, NSTEMI, CAD, parox Afib, CVA w/ RIGHT dominant hemiparesis, Vascular Dementia and Mood disorder who was BIB EMS for acute onset SOB 2/2 fluid overload.  1.CHF and volume overload need for HD.  2.Ygemj-Fdqxu-s/w pt and daughter, declines PPM.  3.ESRD-HD as per renal.  4.Parox Afib-d/c Multaq, refusing eliquis.  5.Lipid d/o-statin.  6.CVA-asa,statin."Refusing Eliquis-told cause prostate ca."  7.CAD-asa,imdur,statin.  8.HTN- hydralazine 50mg q8h.  9.GI and DVT prophylaxis.

## 2021-12-05 NOTE — DIETITIAN INITIAL EVALUATION ADULT. - FACTORS AFF FOOD INTAKE
acute on chronic comorbidities, ESRD on HD, extensive cardiac issues including CHF, CVA; functional quadriplegia/change in mental status/difficulty with food procurement/preparation/Ep[employer]/ethnic/cultural/personal food preferences

## 2021-12-05 NOTE — PROGRESS NOTE ADULT - ASSESSMENT
ESRD, dialysis days mwf  Fluid removal  2 liters ,  follow bp during dialysis, avoid hypotensive episodes  Anemia placed on ALICIA  Renal bone disease ON sEVELAMER    CHF on admission with SSS , patient continue to refuse pacemaker  Off antiarrythmic medications, he is refusing AC and off Eliquis at present.

## 2021-12-05 NOTE — PROGRESS NOTE ADULT - PROBLEM SELECTOR PLAN 7
prior Echo on 9/28/21 showed RV systolic pressure is 69 mm Hg. Severe pulmonary hypertension.  - admit to tele  - [facility] home meds IMDUR, metop, Multaq

## 2021-12-05 NOTE — DIETITIAN INITIAL EVALUATION ADULT. - PHYSCIAL ASSESSMENT
skin intact   Wts in [facility] EMR reviewed, a bit fluctuated, may due to scale/fluid variance, HD Tx overweight/debilitated

## 2021-12-05 NOTE — DIETITIAN INITIAL EVALUATION ADULT. - OTHER INFO
Pt from skilled nursing facility, recent admission noted; bedbound, alert, verbally responsive, able to answer simple questions, but confused, poor historian per chart; appetite good per pt, varied intake depending on food served, no specific food choices, breakfast >75% intake per RN, but lunch tray <5% intake today, "Service Recovery" provided, no other nutrition related complaints reported, on HD >11y per pt, followed by RD at [facility] nursing [facility] and dialysis center; s/p Palliative consult

## 2021-12-05 NOTE — DIETITIAN INITIAL EVALUATION ADULT. - NAME AND PHONE
Pt to be followed by dietitian at skilled nursing facility and dialysis center when medically ready to be discharged Pt to be followed by dietitian at skilled nursing facility and dialysis center when medically ready to be discharged.

## 2021-12-05 NOTE — PROGRESS NOTE ADULT - SUBJECTIVE AND OBJECTIVE BOX
INTERVAL HPI/OVERNIGHT EVENTS:     Patient was examined at bedside. AAOx3, stable, NAD. Heart rate improved but still bradycardic. Denies any chest pain, palpitation, shortness of breath. He still refuses AICD insertion. He got his HD yesterday. No other significant events overnight.    REVIEW OF SYSTEMS:  CONSTITUTIONAL: No fever, weight loss, or fatigue  RESPIRATORY: No cough, wheezing, chills or hemoptysis; No shortness of breath  CARDIOVASCULAR: No chest pain, palpitations, dizziness, or leg swelling  GASTROINTESTINAL: No abdominal pain. No nausea, vomiting, or hematemesis; No diarrhea or constipation. No melena or hematochezia.  GENITOURINARY: No dysuria or hematuria, urinary frequency  NEUROLOGICAL: No headaches, memory loss, loss of strength, numbness, or tremors  SKIN: No itching, burning, rashes, or lesions     MEDICATIONS  (STANDING):  aspirin  chewable 81 milliGRAM(s) Oral daily  atorvastatin 80 milliGRAM(s) Oral at bedtime  bisacodyl 5 milliGRAM(s) Oral at bedtime  chlorhexidine 2% Cloths 1 Application(s) Topical <User Schedule>  colchicine 0.6 milliGRAM(s) Oral two times a day  epoetin ximena-epbx (RETACRIT) Injectable 6000 Unit(s) IV Push <User Schedule>  famotidine    Tablet 20 milliGRAM(s) Oral daily  ferrous    sulfate 325 milliGRAM(s) Oral daily  folic acid 1 milliGRAM(s) Oral daily  heparin   Injectable 5000 Unit(s) SubCutaneous every 12 hours  hydrALAZINE 50 milliGRAM(s) Oral three times a day  isosorbide   mononitrate ER Tablet (IMDUR) 30 milliGRAM(s) Oral daily  lactulose Syrup 20 Gram(s) Oral at bedtime  sevelamer carbonate 1600 milliGRAM(s) Oral three times a day    MEDICATIONS  (PRN):  acetaminophen     Tablet .. 650 milliGRAM(s) Oral every 6 hours PRN Mild Pain (1 - 3), Moderate Pain (4 - 6)    Vital Signs Last 24 Hrs  T(C): 37 (12-05-21 @ 19:27), Max: 37.6 (12-05-21 @ 04:36)  T(F): 98.6 (12-05-21 @ 19:27), Max: 99.7 (12-05-21 @ 04:36)  HR: 70 (12-05-21 @ 19:27) (66 - 74)  BP: 140/51 (12-05-21 @ 19:27) (127/50 - 155/65)  BP(mean): --  RR: 18 (12-05-21 @ 19:27) (18 - 18)  SpO2: 100% (12-05-21 @ 19:27) (97% - 100%)      PHYSICAL EXAMINATION:  GENERAL: NAD, well built  HEAD:  Atraumatic, Normocephalic  EYES:  conjunctiva and sclera clear  NECK: Supple, No JVD, Normal thyroid  CHEST/LUNG: dec breath sounds at bases  HEART: Regular rate and rhythm; No murmurs, rubs, or gallops  ABDOMEN: Soft, Nontender, Nondistended; Bowel sounds present, no pain or masses on palpation  NERVOUS SYSTEM:  Alert awake   : voiding well  EXTREMITIES:  2+ Peripheral Pulses, No clubbing, cyanosis, or edema  SKIN: warm dry    LABS:  12-04    138  |  98  |  42<H>  ----------------------------<  97  4.3   |  30  |  7.07<H>    Ca    9.2      04 Dec 2021 04:22  Phos  4.7     12-04  Mg     2.2     12-04    TPro  6.9  /  Alb  2.9<L>  /  TBili  0.6  /  DBili      /  AST  14  /  ALT  40  /  AlkPhos  158<H>  12-04    Creatinine Trend: 7.07 <--, 10.60 <--, 8.54 <--, 6.61 <--                        8.3    4.90  )-----------( 152      ( 04 Dec 2021 04:22 )             24.9     Urine Studies:            LIVER FUNCTIONS - ( 04 Dec 2021 04:22 )  Alb: 2.9 g/dL / Pro: 6.9 g/dL / ALK PHOS: 158 U/L / ALT: 40 U/L DA / AST: 14 U/L / GGT: x

## 2021-12-06 ENCOUNTER — TRANSCRIPTION ENCOUNTER (OUTPATIENT)
Age: 76
End: 2021-12-06

## 2021-12-06 LAB
ALBUMIN SERPL ELPH-MCNC: 2.8 G/DL — LOW (ref 3.5–5)
ALP SERPL-CCNC: 154 U/L — HIGH (ref 40–120)
ALT FLD-CCNC: 32 U/L DA — SIGNIFICANT CHANGE UP (ref 10–60)
ANION GAP SERPL CALC-SCNC: 10 MMOL/L — SIGNIFICANT CHANGE UP (ref 5–17)
AST SERPL-CCNC: 9 U/L — LOW (ref 10–40)
BASOPHILS # BLD AUTO: 0.01 K/UL — SIGNIFICANT CHANGE UP (ref 0–0.2)
BASOPHILS NFR BLD AUTO: 0.2 % — SIGNIFICANT CHANGE UP (ref 0–2)
BILIRUB SERPL-MCNC: 0.3 MG/DL — SIGNIFICANT CHANGE UP (ref 0.2–1.2)
BUN SERPL-MCNC: 74 MG/DL — HIGH (ref 7–18)
CALCIUM SERPL-MCNC: 9.3 MG/DL — SIGNIFICANT CHANGE UP (ref 8.4–10.5)
CHLORIDE SERPL-SCNC: 99 MMOL/L — SIGNIFICANT CHANGE UP (ref 96–108)
CO2 SERPL-SCNC: 29 MMOL/L — SIGNIFICANT CHANGE UP (ref 22–31)
CREAT SERPL-MCNC: 11.6 MG/DL — HIGH (ref 0.5–1.3)
EOSINOPHIL # BLD AUTO: 0.17 K/UL — SIGNIFICANT CHANGE UP (ref 0–0.5)
EOSINOPHIL NFR BLD AUTO: 3.8 % — SIGNIFICANT CHANGE UP (ref 0–6)
GLUCOSE SERPL-MCNC: 126 MG/DL — HIGH (ref 70–99)
HCT VFR BLD CALC: 24.9 % — LOW (ref 39–50)
HGB BLD-MCNC: 8.1 G/DL — LOW (ref 13–17)
IMM GRANULOCYTES NFR BLD AUTO: 0 % — SIGNIFICANT CHANGE UP (ref 0–1.5)
LYMPHOCYTES # BLD AUTO: 1.25 K/UL — SIGNIFICANT CHANGE UP (ref 1–3.3)
LYMPHOCYTES # BLD AUTO: 27.7 % — SIGNIFICANT CHANGE UP (ref 13–44)
MAGNESIUM SERPL-MCNC: 2.4 MG/DL — SIGNIFICANT CHANGE UP (ref 1.6–2.6)
MCHC RBC-ENTMCNC: 32.5 GM/DL — SIGNIFICANT CHANGE UP (ref 32–36)
MCHC RBC-ENTMCNC: 32.7 PG — SIGNIFICANT CHANGE UP (ref 27–34)
MCV RBC AUTO: 100.4 FL — HIGH (ref 80–100)
MONOCYTES # BLD AUTO: 0.86 K/UL — SIGNIFICANT CHANGE UP (ref 0–0.9)
MONOCYTES NFR BLD AUTO: 19 % — HIGH (ref 2–14)
NEUTROPHILS # BLD AUTO: 2.23 K/UL — SIGNIFICANT CHANGE UP (ref 1.8–7.4)
NEUTROPHILS NFR BLD AUTO: 49.3 % — SIGNIFICANT CHANGE UP (ref 43–77)
NRBC # BLD: 0 /100 WBCS — SIGNIFICANT CHANGE UP (ref 0–0)
PHOSPHATE SERPL-MCNC: 5.7 MG/DL — HIGH (ref 2.5–4.5)
PLATELET # BLD AUTO: 152 K/UL — SIGNIFICANT CHANGE UP (ref 150–400)
POTASSIUM SERPL-MCNC: 4.8 MMOL/L — SIGNIFICANT CHANGE UP (ref 3.5–5.3)
POTASSIUM SERPL-SCNC: 4.8 MMOL/L — SIGNIFICANT CHANGE UP (ref 3.5–5.3)
PROT SERPL-MCNC: 7 G/DL — SIGNIFICANT CHANGE UP (ref 6–8.3)
RBC # BLD: 2.48 M/UL — LOW (ref 4.2–5.8)
RBC # FLD: 15.9 % — HIGH (ref 10.3–14.5)
SODIUM SERPL-SCNC: 138 MMOL/L — SIGNIFICANT CHANGE UP (ref 135–145)
WBC # BLD: 4.52 K/UL — SIGNIFICANT CHANGE UP (ref 3.8–10.5)
WBC # FLD AUTO: 4.52 K/UL — SIGNIFICANT CHANGE UP (ref 3.8–10.5)

## 2021-12-06 RX ADMIN — LACTULOSE 20 GRAM(S): 10 SOLUTION ORAL at 22:21

## 2021-12-06 RX ADMIN — FAMOTIDINE 20 MILLIGRAM(S): 10 INJECTION INTRAVENOUS at 12:52

## 2021-12-06 RX ADMIN — Medication 325 MILLIGRAM(S): at 12:52

## 2021-12-06 RX ADMIN — ATORVASTATIN CALCIUM 80 MILLIGRAM(S): 80 TABLET, FILM COATED ORAL at 22:21

## 2021-12-06 RX ADMIN — Medication 5 MILLIGRAM(S): at 22:21

## 2021-12-06 RX ADMIN — ERYTHROPOIETIN 6000 UNIT(S): 10000 INJECTION, SOLUTION INTRAVENOUS; SUBCUTANEOUS at 11:17

## 2021-12-06 RX ADMIN — SEVELAMER CARBONATE 1600 MILLIGRAM(S): 2400 POWDER, FOR SUSPENSION ORAL at 05:07

## 2021-12-06 RX ADMIN — SEVELAMER CARBONATE 1600 MILLIGRAM(S): 2400 POWDER, FOR SUSPENSION ORAL at 22:21

## 2021-12-06 RX ADMIN — Medication 50 MILLIGRAM(S): at 22:21

## 2021-12-06 RX ADMIN — Medication 0.6 MILLIGRAM(S): at 05:07

## 2021-12-06 RX ADMIN — Medication 50 MILLIGRAM(S): at 05:07

## 2021-12-06 RX ADMIN — Medication 50 MILLIGRAM(S): at 14:12

## 2021-12-06 RX ADMIN — ISOSORBIDE MONONITRATE 30 MILLIGRAM(S): 60 TABLET, EXTENDED RELEASE ORAL at 12:52

## 2021-12-06 RX ADMIN — CHLORHEXIDINE GLUCONATE 1 APPLICATION(S): 213 SOLUTION TOPICAL at 05:07

## 2021-12-06 RX ADMIN — Medication 81 MILLIGRAM(S): at 12:52

## 2021-12-06 RX ADMIN — Medication 1 MILLIGRAM(S): at 12:52

## 2021-12-06 RX ADMIN — SEVELAMER CARBONATE 1600 MILLIGRAM(S): 2400 POWDER, FOR SUSPENSION ORAL at 12:53

## 2021-12-06 NOTE — PROGRESS NOTE ADULT - PROBLEM SELECTOR PLAN 4
Asymptomatic bradycardia on telemetry (30-50s)  refuses further cardiac management such as PPM  lopressor adjusted from 25 to 12.5 mg  continue telemetry monitoring for the next 24 hrs Asymptomatic bradycardia on telemetry (30-50s)  scheduled for PPM on 12/7/21  continue lopressor 12.5 mg  continue telemetry monitoring  Cardio consult Dr. Brad KAPOOR (Dr. Gay) consulted by Dr. Salinas

## 2021-12-06 NOTE — PROGRESS NOTE ADULT - PROBLEM SELECTOR PLAN 7
prior Echo on 9/28/21 showed RV systolic pressure is 69 mm Hg. Severe pulmonary hypertension.  - admit to tele  - [facility] home meds IMDUR, metop, Multaq prior Echo on 9/28/21 showed RV systolic pressure is 69 mm Hg. Severe pulmonary hypertension.  - admit to tele  - [facility] home meds IMDUR, metop

## 2021-12-06 NOTE — PROGRESS NOTE ADULT - SUBJECTIVE AND OBJECTIVE BOX
CHIEF COMPLAINT:Patient is a 76y old  Male who presents with a chief complaint of SOB.Pt appears comfortable.    	  REVIEW OF SYSTEMS:  CONSTITUTIONAL: No fever, weight loss, or fatigue  EYES: No eye pain, visual disturbances, or discharge  ENT:  No difficulty hearing, tinnitus, vertigo; No sinus or throat pain  NECK: No pain or stiffness  RESPIRATORY: No cough, wheezing, chills or hemoptysis; No Shortness of Breath  CARDIOVASCULAR: No chest pain, palpitations, passing out, dizziness, or leg swelling  GASTROINTESTINAL: No abdominal or epigastric pain. No nausea, vomiting, or hematemesis; No diarrhea or constipation. No melena or hematochezia.  GENITOURINARY: No dysuria, frequency, hematuria, or incontinence  NEUROLOGICAL: No headaches, memory loss, loss of strength, numbness, or tremors  SKIN: No itching, burning, rashes, or lesions   LYMPH Nodes: No enlarged glands  ENDOCRINE: No heat or cold intolerance; No hair loss  MUSCULOSKELETAL: No joint pain or swelling; No muscle, back, or extremity pain  PSYCHIATRIC: No depression, anxiety, mood swings, or difficulty sleeping  HEME/LYMPH: No easy bruising, or bleeding gums  ALLERGY AND IMMUNOLOGIC: No hives or eczema	        PHYSICAL EXAM:  T(C): 36.6 (12-06-21 @ 04:36), Max: 37.2 (12-05-21 @ 11:47)  HR: 77 (12-06-21 @ 04:36) (66 - 78)  BP: 173/67 (12-06-21 @ 04:36) (140/51 - 175/58)  RR: 18 (12-06-21 @ 04:36) (18 - 18)  SpO2: 99% (12-06-21 @ 04:36) (98% - 100%)  Wt(kg): --  I&O's Summary      Appearance: Normal	  HEENT:   Normal oral mucosa, PERRL, EOMI	  Lymphatic: No lymphadenopathy  Cardiovascular: Normal S1 S2, No JVD, No murmurs, No edema  Respiratory: Lungs clear to auscultation	  Psychiatry: A & O x 3, Mood & affect appropriate  Gastrointestinal:  Soft, Non-tender, + BS	  Skin: No rashes, No ecchymoses, No cyanosis	  Neurologic: Non-focal  Extremities: Normal range of motion, No clubbing, cyanosis or edema  Vascular: Peripheral pulses palpable 2+ bilaterally    MEDICATIONS  (STANDING):  aspirin  chewable 81 milliGRAM(s) Oral daily  atorvastatin 80 milliGRAM(s) Oral at bedtime  bisacodyl 5 milliGRAM(s) Oral at bedtime  chlorhexidine 2% Cloths 1 Application(s) Topical <User Schedule>  colchicine 0.6 milliGRAM(s) Oral two times a day  epoetin ximena-epbx (RETACRIT) Injectable 6000 Unit(s) IV Push <User Schedule>  famotidine    Tablet 20 milliGRAM(s) Oral daily  ferrous    sulfate 325 milliGRAM(s) Oral daily  folic acid 1 milliGRAM(s) Oral daily  heparin   Injectable 5000 Unit(s) SubCutaneous every 12 hours  hydrALAZINE 50 milliGRAM(s) Oral three times a day  isosorbide   mononitrate ER Tablet (IMDUR) 30 milliGRAM(s) Oral daily  lactulose Syrup 20 Gram(s) Oral at bedtime  sevelamer carbonate 1600 milliGRAM(s) Oral three times a day      TELEMETRY: 	nsr    	  	  LABS:	 	  ProBNP: Serum Pro-Brain Natriuretic Peptide: 78431 pg/mL (11-28 @ 10:10)

## 2021-12-06 NOTE — PROGRESS NOTE ADULT - ASSESSMENT
77 yo M from [facility], w/ ESRD (on HD MWF), HTN, Severe Pulm HTN, CHFpEF >55% w/ GIIDD, Anemia of CKD, NSTEMI, CAD, parox Afib, CVA w/ RIGHT dominant hemiparesis, Vascular Dementia and Mood disorder who was BIB EMS for acute onset SOB 2/2 fluid overload.  1.CHF and volume overload need for HD.  2.Fbqyc-Cxbry-v/w pt and daughter, agree for PPM.D/W Dr. Victor Hugo BECKER, plan for [facility], no OR time today will schedule for tomorrow.  3.ESRD-HD as per renal.  4.Parox Afib-d/c Multaq, refusing eliquis.  5.Lipid d/o-statin.  6.CVA-asa,statin."Refusing Eliquis-told cause prostate ca."  7.CAD-asa,imdur,statin.  8.HTN- hydralazine 50mg q8h.  9.GI and DVT prophylaxis.

## 2021-12-06 NOTE — PROGRESS NOTE ADULT - PROBLEM SELECTOR PLAN 2
- on HD MWF last HD outpatient on Friday 11/26 , no missed HD  - CXR with fluid OD, b/l LE pitting edema  - BiPAP in ED  - urgent HD 11/28  - resume home meds sevelamer, colcrys   - monitor electrolytes, post HD labs  - scheduled for HD today (12/1/21)  - NEPHRO Dr. Jacinto - on HD MWF last HD outpatient on Friday 11/26 , no missed HD  - CXR with fluid OD, b/l LE pitting edema  - BiPAP in ED  - urgent HD (11/28)  - resume home meds sevelamer, colcrys   - monitor electrolytes, post HD labs  - scheduled for HD today (12/6/21)  - NEPHRO Dr. Jacinto

## 2021-12-06 NOTE — PROGRESS NOTE ADULT - SUBJECTIVE AND OBJECTIVE BOX
PGY-1 Progress Note discussed with attending    PAGER #: [[phone]] TILL 5:00 PM  PLEASE CONTACT ON CALL TEAM:  - On Call Team (Please refer to Mary) FROM 5:00 PM - 8:30PM  - Nightfloat Team FROM 8:30 -7:30 AM    CHIEF COMPLAINT & BRIEF HOSPITAL COURSE:      INTERVAL HPI/OVERNIGHT EVENTS:       REVIEW OF SYSTEMS:  CONSTITUTIONAL: No fever, weight loss, or fatigue  RESPIRATORY: No cough, wheezing, chills or hemoptysis; No shortness of breath  CARDIOVASCULAR: No chest pain, palpitations, dizziness, or leg swelling  GASTROINTESTINAL: No abdominal pain. No nausea, vomiting, or hematemesis; No diarrhea or constipation. No melena or hematochezia.  GENITOURINARY: No dysuria or hematuria, urinary frequency  NEUROLOGICAL: No headaches, memory loss, loss of strength, numbness, or tremors  SKIN: No itching, burning, rashes, or lesions     MEDICATIONS  (STANDING):  aspirin  chewable 81 milliGRAM(s) Oral daily  atorvastatin 80 milliGRAM(s) Oral at bedtime  bisacodyl 5 milliGRAM(s) Oral at bedtime  chlorhexidine 2% Cloths 1 Application(s) Topical <User Schedule>  colchicine 0.6 milliGRAM(s) Oral two times a day  epoetin ximena-epbx (RETACRIT) Injectable 6000 Unit(s) IV Push <User Schedule>  famotidine    Tablet 20 milliGRAM(s) Oral daily  ferrous    sulfate 325 milliGRAM(s) Oral daily  folic acid 1 milliGRAM(s) Oral daily  heparin   Injectable 5000 Unit(s) SubCutaneous every 12 hours  hydrALAZINE 50 milliGRAM(s) Oral three times a day  isosorbide   mononitrate ER Tablet (IMDUR) 30 milliGRAM(s) Oral daily  lactulose Syrup 20 Gram(s) Oral at bedtime  sevelamer carbonate 1600 milliGRAM(s) Oral three times a day    MEDICATIONS  (PRN):  acetaminophen     Tablet .. 650 milliGRAM(s) Oral every 6 hours PRN Mild Pain (1 - 3), Moderate Pain (4 - 6)      Vital Signs Last 24 Hrs  T(C): 36.5 (06 Dec 2021 07:46), Max: 37.2 (05 Dec 2021 11:47)  T(F): 97.7 (06 Dec 2021 07:46), Max: 98.9 (05 Dec 2021 11:47)  HR: 75 (06 Dec 2021 07:46) (66 - 78)  BP: 175/65 (06 Dec 2021 07:46) (140/51 - 175/65)  BP(mean): --  RR: 18 (06 Dec 2021 07:46) (18 - 18)  SpO2: 99% (06 Dec 2021 07:46) (98% - 100%)    PHYSICAL EXAMINATION:  GENERAL: NAD, well built  HEAD:  Atraumatic, Normocephalic  EYES:  conjunctiva and sclera clear  NECK: Supple, No JVD, Normal thyroid  CHEST/LUNG: Clear to auscultation. Clear to percussion bilaterally; No rales, rhonchi, wheezing, or rubs  HEART: Regular rate and rhythm; No murmurs, rubs, or gallops  ABDOMEN: Soft, Nontender, Nondistended; Bowel sounds present, no pain or masses on palpation  NERVOUS SYSTEM:  Alert & Oriented X3  : voiding well  EXTREMITIES:  2+ Peripheral Pulses, No clubbing, cyanosis, or edema  SKIN: warm dry                          8.1    4.52  )-----------( 152      ( 06 Dec 2021 09:20 )             24.9                     I&O's Summary          CAPILLARY BLOOD GLUCOSE      RADIOLOGY & ADDITIONAL TESTS:                   PGY-1 Progress Note discussed with attending    PAGER #: [[phone]] TILL 5:00 PM  PLEASE CONTACT ON CALL TEAM:  - On Call Team (Please refer to Mary) FROM 5:00 PM - 8:30PM  - Nightfloat Team FROM 8:30 -7:30 AM    CHIEF COMPLAINT & BRIEF HOSPITAL COURSE:    Patient is a 77 y/o M from [facility]. He has past medical history of ESRD on HD (MWF), HTN, severe pulmonary hypertension (CHF w/ pEF > 55%; GIIDD), Anemia of CKD, NSTEMI, CAD, paroxysmal Atrial fibrillation, vascular dementia, CVA w/ R hemiparesis and mood disorder. He was brought in by EMS due to acute onset of shortness of breath secondary to fluid overload. His last HD was on 11/28/21. His O2 saturation was 91% requiring 4LPM of O2 via nasal cannula. His oxygen status improved to 98%. In the E.D, he was tachypneic and started on BiPAP. BNP was elevated at 30,000. Blood pressure was high at 241/84/ Chest Xray showed bilateral pulmonary edema. Nephrology (Dr. Pritchard) was consulted and he underwent emergency HD. ICU was also consulted and he was cleared to be admitted to the floors on nasal cannula. He was given dose of Lasix 80mg IV, Hydralazine 10 mg IV and Insulin 8u with dextrose for Hyperkalemia of 6.3. Serum potassium eventually improved. Troponin was initially elevated but trended down since. He still has some episodes of bradycardia on telemetry. Lopressor was creased in half from 25mg to 12.5mg. He still has asymptomatic bradycardia. Cardiology was recommending AICD placement but patient keeps on refusing. Palliative talked to the family for Goals of Care. He finally agreed with PPM placement. Cardiology consulted EP Specialist (Dr. Gay).    INTERVAL HPI/OVERNIGHT EVENTS:     Patient examined at bedside. AAOX3, stable, NAD. Scheduled for HD today. Finally agreed with EP. Still has episodes of asymptomatic bradycardia and occ. tachycardia on telemetry. No other significant events overnight.    REVIEW OF SYSTEMS:  CONSTITUTIONAL: No fever, weight loss, or fatigue  RESPIRATORY: No cough, wheezing, chills or hemoptysis; No shortness of breath  CARDIOVASCULAR: No chest pain, palpitations, dizziness, or leg swelling  GASTROINTESTINAL: No abdominal pain. No nausea, vomiting, or hematemesis; No diarrhea or constipation. No melena or hematochezia.  GENITOURINARY: No dysuria or hematuria, urinary frequency  NEUROLOGICAL: No headaches, memory loss, loss of strength, numbness, or tremors  SKIN: No itching, burning, rashes, or lesions     MEDICATIONS  (STANDING):  aspirin  chewable 81 milliGRAM(s) Oral daily  atorvastatin 80 milliGRAM(s) Oral at bedtime  bisacodyl 5 milliGRAM(s) Oral at bedtime  chlorhexidine 2% Cloths 1 Application(s) Topical <User Schedule>  colchicine 0.6 milliGRAM(s) Oral two times a day  epoetin ximena-epbx (RETACRIT) Injectable 6000 Unit(s) IV Push <User Schedule>  famotidine    Tablet 20 milliGRAM(s) Oral daily  ferrous    sulfate 325 milliGRAM(s) Oral daily  folic acid 1 milliGRAM(s) Oral daily  heparin   Injectable 5000 Unit(s) SubCutaneous every 12 hours  hydrALAZINE 50 milliGRAM(s) Oral three times a day  isosorbide   mononitrate ER Tablet (IMDUR) 30 milliGRAM(s) Oral daily  lactulose Syrup 20 Gram(s) Oral at bedtime  sevelamer carbonate 1600 milliGRAM(s) Oral three times a day    MEDICATIONS  (PRN):  acetaminophen     Tablet .. 650 milliGRAM(s) Oral every 6 hours PRN Mild Pain (1 - 3), Moderate Pain (4 - 6)      Vital Signs Last 24 Hrs  T(C): 36.5 (06 Dec 2021 07:46), Max: 37.2 (05 Dec 2021 11:47)  T(F): 97.7 (06 Dec 2021 07:46), Max: 98.9 (05 Dec 2021 11:47)  HR: 75 (06 Dec 2021 07:46) (66 - 78)  BP: 175/65 (06 Dec 2021 07:46) (140/51 - 175/65)  BP(mean): --  RR: 18 (06 Dec 2021 07:46) (18 - 18)  SpO2: 99% (06 Dec 2021 07:46) (98% - 100%)    PHYSICAL EXAMINATION:  GENERAL: NAD  HEAD:  Atraumatic, Normocephalic  EYES:  conjunctiva and sclera clear  NECK: Supple, No JVD, Normal thyroid  CHEST/LUNG: Clear to auscultation. Clear to percussion bilaterally; No rales, rhonchi, wheezing, or rubs  HEART: Regular rate and rhythm; No murmurs, rubs, or gallops  ABDOMEN: Soft, Nontender, Nondistended; Bowel sounds present, no pain or masses on palpation  NERVOUS SYSTEM:  Alert & Oriented X3  : voiding well  EXTREMITIES:  2+ Peripheral Pulses, No clubbing, cyanosis, or edema  SKIN: warm dry                          8.1    4.52  )-----------( 152      ( 06 Dec 2021 09:20 )             24.9                     I&O's Summary          CAPILLARY BLOOD GLUCOSE      RADIOLOGY & ADDITIONAL TESTS:

## 2021-12-06 NOTE — PROGRESS NOTE ADULT - PROBLEM SELECTOR PLAN 3
started BB metop tart 25 BID and Eliquis 2.5 BID  decreased lopressor to 12.5  continue telemetry due to bradycardia  offered AICD but keeps on refusing  Cardio consult Dr. Brad FOX. consulted by Dr. Salinas started BB metop tart 25 BID and Eliquis 2.5 BID  decreased lopressor to 12.5  continue telemetry due to bradycardia  offered AICD initially refusing but finally consented  scheduled for PPM on 12/7/21  Cardio consult Dr. Brad KAPOOR (Dr. Gay) consulted by Dr. Salinas

## 2021-12-06 NOTE — PROGRESS NOTE ADULT - SUBJECTIVE AND OBJECTIVE BOX
Problem List:  ESRD  SSS    PAST MEDICAL & SURGICAL HISTORY:  ESRD (end stage renal disease) on dialysis    Hypertension    Anemia    Cerebrovascular accident (CVA), unspecified mechanism    Paroxysmal atrial fibrillation    CAD (coronary artery disease)    A-V fistula        No Known Allergies      MEDICATIONS  (STANDING):  aspirin  chewable 81 milliGRAM(s) Oral daily  atorvastatin 80 milliGRAM(s) Oral at bedtime  bisacodyl 5 milliGRAM(s) Oral at bedtime  chlorhexidine 2% Cloths 1 Application(s) Topical <User Schedule>  colchicine 0.6 milliGRAM(s) Oral two times a day  epoetin ximena-epbx (RETACRIT) Injectable 6000 Unit(s) IV Push <User Schedule>  famotidine    Tablet 20 milliGRAM(s) Oral daily  ferrous    sulfate 325 milliGRAM(s) Oral daily  folic acid 1 milliGRAM(s) Oral daily  heparin   Injectable 5000 Unit(s) SubCutaneous every 12 hours  hydrALAZINE 50 milliGRAM(s) Oral three times a day  isosorbide   mononitrate ER Tablet (IMDUR) 30 milliGRAM(s) Oral daily  lactulose Syrup 20 Gram(s) Oral at bedtime  sevelamer carbonate 1600 milliGRAM(s) Oral three times a day    MEDICATIONS  (PRN):  acetaminophen     Tablet .. 650 milliGRAM(s) Oral every 6 hours PRN Mild Pain (1 - 3), Moderate Pain (4 - 6)                            8.1    4.52  )-----------( 152      ( 06 Dec 2021 09:20 )             24.9     12-06    138  |  99  |  74<H>  ----------------------------<  126<H>  4.8   |  29  |  11.60<H>    Ca    9.3      06 Dec 2021 09:20  Phos  5.7     12-06  Mg     2.4     12-06    TPro  7.0  /  Alb  2.8<L>  /  TBili  0.3  /  DBili  x   /  AST  9<L>  /  ALT  32  /  AlkPhos  154<H>  12-06            REVIEW OF SYSTEMS:  General: no fever no chills, no weight loss.  EYES/ENT:   No vertigo, no headache.  NECK: No pain or stiffness  RESPIRATORY: No cough, wheezing, hemoptysis; No shortness of breath  CARDIOVASCULAR: No chest pain or palpitations. No Edema  GASTROINTESTINAL: No abdominal or epigastric pain. No nausea, vomiting. No diarrhea or constipation. No melena.          VITALS:  T(F): 98.2 (12-06-21 @ 15:34), Max: 98.6 (12-05-21 @ 19:27)  HR: 68 (12-06-21 @ 15:34)  BP: 115/56 (12-06-21 @ 15:34)  RR: 18 (12-06-21 @ 15:34)  SpO2: 98% (12-06-21 @ 15:34)  Wt(kg): --    12-06 @ 07:01  -  12-06 @ 16:02  --------------------------------------------------------  IN: 700 mL / OUT: 1920 mL / NET: -1220 mL        PHYSICAL EXAM:  Constitutional: well developed, no diaphoresis, no distress.  Neck: No JVD, no carotid bruit, supple, no adenopathy  Respiratory:  air entrance B/L, no wheezes, rales or rhonchi  Cardiovascular: S1, S2, RRR, no pericardial rub, no murmur  Abdomen: BS+, soft, no tenderness, no bruit  Pelvis: bladder nondistended  Extremities: No cyanosis or clubbing. No peripheral edema.   Pulses: All present  Neurological: A/O x 3, no focal deficits  Vascular Access: right AVG with bruti and thrill

## 2021-12-06 NOTE — PROGRESS NOTE ADULT - PROBLEM SELECTOR PLAN 1
- BP at NH wnl but elevated peak 241/84 w/ pulm edema on CXR  - BNP 30k and Trop elevation  - goal is reduce BP by 25% in 24 hours  - s/p Lasix 80 and Hydral 10  - s/p urgent HD (11/28/21)  - add hydralazine 25 q8  - vs q 4  - trend trop T3 - trended down  - adjust lopressor to 12.5 in setting of bradycardia  - continue to monitor on telemetry  - NEPHRO Dr. Jacinto  - CARDIO Dr. Salinas - BP at NH wnl but elevated peak 241/84 w/ pulm edema on CXR  - BNP 30k and Trop elevation  - goal is reduce BP by 25% in 24 hours  - s/p Lasix 80 and Hydral 10  - s/p urgent HD (11/28/21)  - add hydralazine 25 q8  - vs q 4  - trend trop T3 - trended down  - continue lopressor 12.5 in setting of bradycardia  - continue to monitor on telemetry  - Cardio consulted EP (Dr. Gay)  - pending PPM (scheduled 12/7/21)  - NEPHRO Dr. Jacinto  - CARDIO Dr. Salinas

## 2021-12-07 ENCOUNTER — APPOINTMENT (OUTPATIENT)
Dept: VASCULAR SURGERY | Facility: CLINIC | Age: 76
End: 2021-12-07

## 2021-12-07 LAB
ALBUMIN SERPL ELPH-MCNC: SIGNIFICANT CHANGE UP G/DL (ref 3.5–5)
ALP SERPL-CCNC: 49 U/L — SIGNIFICANT CHANGE UP (ref 40–120)
ALT FLD-CCNC: 28 U/L DA — SIGNIFICANT CHANGE UP (ref 10–60)
ANION GAP SERPL CALC-SCNC: SIGNIFICANT CHANGE UP MMOL/L (ref 5–17)
APTT BLD: 29.7 SEC — SIGNIFICANT CHANGE UP (ref 27.5–35.5)
AST SERPL-CCNC: 13 U/L — SIGNIFICANT CHANGE UP (ref 10–40)
BASOPHILS # BLD AUTO: 0.02 K/UL — SIGNIFICANT CHANGE UP (ref 0–0.2)
BASOPHILS NFR BLD AUTO: 0.4 % — SIGNIFICANT CHANGE UP (ref 0–2)
BILIRUB SERPL-MCNC: 0.4 MG/DL — SIGNIFICANT CHANGE UP (ref 0.2–1.2)
BLD GP AB SCN SERPL QL: SIGNIFICANT CHANGE UP
BUN SERPL-MCNC: SIGNIFICANT CHANGE UP MG/DL (ref 7–18)
CALCIUM SERPL-MCNC: SIGNIFICANT CHANGE UP MG/DL (ref 8.4–10.5)
CHLORIDE SERPL-SCNC: SIGNIFICANT CHANGE UP MMOL/L (ref 96–108)
CO2 SERPL-SCNC: SIGNIFICANT CHANGE UP MMOL/L (ref 22–31)
CREAT SERPL-MCNC: 8.39 MG/DL — HIGH (ref 0.5–1.3)
EOSINOPHIL # BLD AUTO: 0.15 K/UL — SIGNIFICANT CHANGE UP (ref 0–0.5)
EOSINOPHIL NFR BLD AUTO: 3 % — SIGNIFICANT CHANGE UP (ref 0–6)
GLUCOSE BLDC GLUCOMTR-MCNC: 93 MG/DL — SIGNIFICANT CHANGE UP (ref 70–99)
GLUCOSE SERPL-MCNC: SIGNIFICANT CHANGE UP MG/DL (ref 70–99)
HCT VFR BLD CALC: 25.1 % — LOW (ref 39–50)
HGB BLD-MCNC: 8.2 G/DL — LOW (ref 13–17)
IMM GRANULOCYTES NFR BLD AUTO: 0.2 % — SIGNIFICANT CHANGE UP (ref 0–1.5)
INR BLD: 0.98 RATIO — SIGNIFICANT CHANGE UP (ref 0.88–1.16)
LYMPHOCYTES # BLD AUTO: 1.68 K/UL — SIGNIFICANT CHANGE UP (ref 1–3.3)
LYMPHOCYTES # BLD AUTO: 33.7 % — SIGNIFICANT CHANGE UP (ref 13–44)
MAGNESIUM SERPL-MCNC: 1.9 MG/DL — SIGNIFICANT CHANGE UP (ref 1.6–2.6)
MCHC RBC-ENTMCNC: 32.7 GM/DL — SIGNIFICANT CHANGE UP (ref 32–36)
MCHC RBC-ENTMCNC: 32.7 PG — SIGNIFICANT CHANGE UP (ref 27–34)
MCV RBC AUTO: 100 FL — SIGNIFICANT CHANGE UP (ref 80–100)
MONOCYTES # BLD AUTO: 0.75 K/UL — SIGNIFICANT CHANGE UP (ref 0–0.9)
MONOCYTES NFR BLD AUTO: 15.1 % — HIGH (ref 2–14)
NEUTROPHILS # BLD AUTO: 2.37 K/UL — SIGNIFICANT CHANGE UP (ref 1.8–7.4)
NEUTROPHILS NFR BLD AUTO: 47.6 % — SIGNIFICANT CHANGE UP (ref 43–77)
NRBC # BLD: 0 /100 WBCS — SIGNIFICANT CHANGE UP (ref 0–0)
PHOSPHATE SERPL-MCNC: 5.1 MG/DL — HIGH (ref 2.5–4.5)
PLATELET # BLD AUTO: 163 K/UL — SIGNIFICANT CHANGE UP (ref 150–400)
POTASSIUM SERPL-MCNC: SIGNIFICANT CHANGE UP MMOL/L (ref 3.5–5.3)
POTASSIUM SERPL-SCNC: SIGNIFICANT CHANGE UP MMOL/L (ref 3.5–5.3)
PROT SERPL-MCNC: 6.7 G/DL — SIGNIFICANT CHANGE UP (ref 6–8.3)
PROTHROM AB SERPL-ACNC: 11.7 SEC — SIGNIFICANT CHANGE UP (ref 10.6–13.6)
RBC # BLD: 2.51 M/UL — LOW (ref 4.2–5.8)
RBC # FLD: 15.8 % — HIGH (ref 10.3–14.5)
SODIUM SERPL-SCNC: SIGNIFICANT CHANGE UP MMOL/L (ref 135–145)
WBC # BLD: 4.98 K/UL — SIGNIFICANT CHANGE UP (ref 3.8–10.5)
WBC # FLD AUTO: 4.98 K/UL — SIGNIFICANT CHANGE UP (ref 3.8–10.5)

## 2021-12-07 PROCEDURE — 76000 FLUOROSCOPY <1 HR PHYS/QHP: CPT | Mod: 26,59

## 2021-12-07 PROCEDURE — 33274 TCAT INSJ/RPL PERM LDLS PM: CPT | Mod: Q0

## 2021-12-07 RX ORDER — FAMOTIDINE 10 MG/ML
20 INJECTION INTRAVENOUS DAILY
Refills: 0 | Status: DISCONTINUED | OUTPATIENT
Start: 2021-12-07 | End: 2021-12-16

## 2021-12-07 RX ORDER — FOLIC ACID 0.8 MG
1 TABLET ORAL DAILY
Refills: 0 | Status: DISCONTINUED | OUTPATIENT
Start: 2021-12-07 | End: 2021-12-16

## 2021-12-07 RX ORDER — ACETAMINOPHEN 500 MG
650 TABLET ORAL EVERY 6 HOURS
Refills: 0 | Status: DISCONTINUED | OUTPATIENT
Start: 2021-12-07 | End: 2021-12-16

## 2021-12-07 RX ORDER — ATORVASTATIN CALCIUM 80 MG/1
80 TABLET, FILM COATED ORAL AT BEDTIME
Refills: 0 | Status: DISCONTINUED | OUTPATIENT
Start: 2021-12-07 | End: 2021-12-10

## 2021-12-07 RX ORDER — CHLORHEXIDINE GLUCONATE 213 G/1000ML
1 SOLUTION TOPICAL
Refills: 0 | Status: DISCONTINUED | OUTPATIENT
Start: 2021-12-07 | End: 2021-12-16

## 2021-12-07 RX ORDER — LACTULOSE 10 G/15ML
20 SOLUTION ORAL AT BEDTIME
Refills: 0 | Status: DISCONTINUED | OUTPATIENT
Start: 2021-12-07 | End: 2021-12-16

## 2021-12-07 RX ORDER — COLCHICINE 0.6 MG
0.6 TABLET ORAL
Refills: 0 | Status: DISCONTINUED | OUTPATIENT
Start: 2021-12-07 | End: 2021-12-09

## 2021-12-07 RX ORDER — ERYTHROPOIETIN 10000 [IU]/ML
6000 INJECTION, SOLUTION INTRAVENOUS; SUBCUTANEOUS
Refills: 0 | Status: DISCONTINUED | OUTPATIENT
Start: 2021-12-07 | End: 2021-12-13

## 2021-12-07 RX ORDER — FERROUS SULFATE 325(65) MG
325 TABLET ORAL DAILY
Refills: 0 | Status: DISCONTINUED | OUTPATIENT
Start: 2021-12-07 | End: 2021-12-16

## 2021-12-07 RX ORDER — HYDRALAZINE HCL 50 MG
50 TABLET ORAL THREE TIMES A DAY
Refills: 0 | Status: DISCONTINUED | OUTPATIENT
Start: 2021-12-07 | End: 2021-12-09

## 2021-12-07 RX ORDER — ISOSORBIDE MONONITRATE 60 MG/1
30 TABLET, EXTENDED RELEASE ORAL DAILY
Refills: 0 | Status: DISCONTINUED | OUTPATIENT
Start: 2021-12-07 | End: 2021-12-13

## 2021-12-07 RX ORDER — ASPIRIN/CALCIUM CARB/MAGNESIUM 324 MG
81 TABLET ORAL DAILY
Refills: 0 | Status: DISCONTINUED | OUTPATIENT
Start: 2021-12-07 | End: 2021-12-16

## 2021-12-07 RX ORDER — HEPARIN SODIUM 5000 [USP'U]/ML
5000 INJECTION INTRAVENOUS; SUBCUTANEOUS EVERY 12 HOURS
Refills: 0 | Status: DISCONTINUED | OUTPATIENT
Start: 2021-12-07 | End: 2021-12-09

## 2021-12-07 RX ORDER — SEVELAMER CARBONATE 2400 MG/1
1600 POWDER, FOR SUSPENSION ORAL THREE TIMES A DAY
Refills: 0 | Status: DISCONTINUED | OUTPATIENT
Start: 2021-12-07 | End: 2021-12-15

## 2021-12-07 RX ADMIN — Medication 50 MILLIGRAM(S): at 05:32

## 2021-12-07 RX ADMIN — CHLORHEXIDINE GLUCONATE 1 APPLICATION(S): 213 SOLUTION TOPICAL at 05:32

## 2021-12-07 RX ADMIN — LACTULOSE 20 GRAM(S): 10 SOLUTION ORAL at 23:02

## 2021-12-07 RX ADMIN — SEVELAMER CARBONATE 1600 MILLIGRAM(S): 2400 POWDER, FOR SUSPENSION ORAL at 05:32

## 2021-12-07 RX ADMIN — Medication 50 MILLIGRAM(S): at 23:02

## 2021-12-07 RX ADMIN — Medication 0.6 MILLIGRAM(S): at 05:32

## 2021-12-07 RX ADMIN — ATORVASTATIN CALCIUM 80 MILLIGRAM(S): 80 TABLET, FILM COATED ORAL at 23:02

## 2021-12-07 RX ADMIN — ISOSORBIDE MONONITRATE 30 MILLIGRAM(S): 60 TABLET, EXTENDED RELEASE ORAL at 23:02

## 2021-12-07 RX ADMIN — Medication 5 MILLIGRAM(S): at 23:02

## 2021-12-07 RX ADMIN — SEVELAMER CARBONATE 1600 MILLIGRAM(S): 2400 POWDER, FOR SUSPENSION ORAL at 23:02

## 2021-12-07 NOTE — PROGRESS NOTE ADULT - PROBLEM SELECTOR PLAN 7
prior Echo on 9/28/21 showed RV systolic pressure is 69 mm Hg. Severe pulmonary hypertension.  - admit to tele  - [facility] home meds IMDUR, metop

## 2021-12-07 NOTE — PROGRESS NOTE ADULT - SUBJECTIVE AND OBJECTIVE BOX
PGY-1 Progress Note discussed with attending    PAGER #: [[phone]] TILL 5:00 PM  PLEASE CONTACT ON CALL TEAM:  - On Call Team (Please refer to Mary) FROM 5:00 PM - 8:30PM  - Nightfloat Team FROM 8:30 -7:30 AM    CHIEF COMPLAINT & BRIEF HOSPITAL COURSE:      INTERVAL HPI/OVERNIGHT EVENTS:       REVIEW OF SYSTEMS:  CONSTITUTIONAL: No fever, weight loss, or fatigue  RESPIRATORY: No cough, wheezing, chills or hemoptysis; No shortness of breath  CARDIOVASCULAR: No chest pain, palpitations, dizziness, or leg swelling  GASTROINTESTINAL: No abdominal pain. No nausea, vomiting, or hematemesis; No diarrhea or constipation. No melena or hematochezia.  GENITOURINARY: No dysuria or hematuria, urinary frequency  NEUROLOGICAL: No headaches, memory loss, loss of strength, numbness, or tremors  SKIN: No itching, burning, rashes, or lesions     MEDICATIONS  (STANDING):  aspirin  chewable 81 milliGRAM(s) Oral daily  atorvastatin 80 milliGRAM(s) Oral at bedtime  bisacodyl 5 milliGRAM(s) Oral at bedtime  chlorhexidine 2% Cloths 1 Application(s) Topical <User Schedule>  colchicine 0.6 milliGRAM(s) Oral two times a day  epoetin ximena-epbx (RETACRIT) Injectable 6000 Unit(s) IV Push <User Schedule>  famotidine    Tablet 20 milliGRAM(s) Oral daily  ferrous    sulfate 325 milliGRAM(s) Oral daily  folic acid 1 milliGRAM(s) Oral daily  heparin   Injectable 5000 Unit(s) SubCutaneous every 12 hours  hydrALAZINE 50 milliGRAM(s) Oral three times a day  isosorbide   mononitrate ER Tablet (IMDUR) 30 milliGRAM(s) Oral daily  lactulose Syrup 20 Gram(s) Oral at bedtime  sevelamer carbonate 1600 milliGRAM(s) Oral three times a day    MEDICATIONS  (PRN):  acetaminophen     Tablet .. 650 milliGRAM(s) Oral every 6 hours PRN Mild Pain (1 - 3), Moderate Pain (4 - 6)      Vital Signs Last 24 Hrs  T(C): 36.5 (07 Dec 2021 05:28), Max: 36.9 (06 Dec 2021 19:22)  T(F): 97.7 (07 Dec 2021 05:28), Max: 98.4 (06 Dec 2021 19:22)  HR: 77 (07 Dec 2021 05:28) (68 - 77)  BP: 156/63 (07 Dec 2021 05:28) (115/56 - 177/71)  BP(mean): --  RR: 18 (07 Dec 2021 05:28) (18 - 18)  SpO2: 98% (07 Dec 2021 05:28) (97% - 100%)    PHYSICAL EXAMINATION:  GENERAL: NAD, well built  HEAD:  Atraumatic, Normocephalic  EYES:  conjunctiva and sclera clear  NECK: Supple, No JVD, Normal thyroid  CHEST/LUNG: Clear to auscultation. Clear to percussion bilaterally; No rales, rhonchi, wheezing, or rubs  HEART: Regular rate and rhythm; No murmurs, rubs, or gallops  ABDOMEN: Soft, Nontender, Nondistended; Bowel sounds present, no pain or masses on palpation  NERVOUS SYSTEM:  Alert & Oriented X3  : voiding well  EXTREMITIES:  2+ Peripheral Pulses, No clubbing, cyanosis, or edema  SKIN: warm dry                          8.1    4.52  )-----------( 152      ( 06 Dec 2021 09:20 )             24.9     12-06    138  |  99  |  74<H>  ----------------------------<  126<H>  4.8   |  29  |  11.60<H>    Ca    9.3      06 Dec 2021 09:20  Phos  5.7     12-06  Mg     2.4     12-06    TPro  7.0  /  Alb  2.8<L>  /  TBili  0.3  /  DBili  x   /  AST  9<L>  /  ALT  32  /  AlkPhos  154<H>  12-06    LIVER FUNCTIONS - ( 06 Dec 2021 09:20 )  Alb: 2.8 g/dL / Pro: 7.0 g/dL / ALK PHOS: 154 U/L / ALT: 32 U/L DA / AST: 9 U/L / GGT: x                   I&O's Summary    06 Dec 2021 07:01  -  07 Dec 2021 07:00  --------------------------------------------------------  IN: 700 mL / OUT: 1920 mL / NET: -1220 mL            CAPILLARY BLOOD GLUCOSE      RADIOLOGY & ADDITIONAL TESTS:                   PGY-1 Progress Note discussed with attending    PAGER #: [[phone]] TILL 5:00 PM  PLEASE CONTACT ON CALL TEAM:  - On Call Team (Please refer to Mary) FROM 5:00 PM - 8:30PM  - Nightfloat Team FROM 8:30 -7:30 AM    CHIEF COMPLAINT & BRIEF HOSPITAL COURSE:    Patient is a 77 y/o M from [facility]. He has past medical history of ESRD on HD (MWF), HTN, severe pulmonary hypertension (CHF w/ pEF > 55%; GIIDD), Anemia of CKD, NSTEMI, CAD, paroxysmal Atrial fibrillation, vascular dementia, CVA w/ R hemiparesis and mood disorder. He was brought in by EMS due to acute onset of shortness of breath secondary to fluid overload. His last HD was on 11/28/21. His O2 saturation was 91% requiring 4LPM of O2 via nasal cannula. His oxygen status improved to 98%. In the E.D, he was tachypneic and started on BiPAP. BNP was elevated at 30,000. Blood pressure was high at 241/84/ Chest Xray showed bilateral pulmonary edema. Nephrology (Dr. Pritchard) was consulted and he underwent emergency HD. ICU was also consulted and he was cleared to be admitted to the floors on nasal cannula. He was given dose of Lasix 80mg IV, Hydralazine 10 mg IV and Insulin 8u with dextrose for Hyperkalemia of 6.3. Serum potassium eventually improved. Troponin was initially elevated but trended down since. He still has some episodes of bradycardia on telemetry. Lopressor was creased in half from 25mg to 12.5mg. He still has asymptomatic bradycardia. Cardiology was recommending AICD placement but patient keeps on refusing. Palliative talked to the family for Goals of Care.     INTERVAL HPI/OVERNIGHT EVENTS:     Patient was examined at bedside, AAOX3, stable, NAD. Denies any complaints such as shortness of breath, chest pain, palpitation, and leg swelling. Scheduled for PPM placement today. No other significant events overnight.    REVIEW OF SYSTEMS:  CONSTITUTIONAL: No fever, weight loss, or fatigue  RESPIRATORY: No cough, wheezing, chills or hemoptysis; No shortness of breath  CARDIOVASCULAR: No chest pain, palpitations, dizziness, or leg swelling  GASTROINTESTINAL: No abdominal pain. No nausea, vomiting, or hematemesis; No diarrhea or constipation. No melena or hematochezia.  GENITOURINARY: No dysuria or hematuria, urinary frequency  NEUROLOGICAL: No headaches, memory loss, loss of strength, numbness, or tremors  SKIN: No itching, burning, rashes, or lesions     MEDICATIONS  (STANDING):  aspirin  chewable 81 milliGRAM(s) Oral daily  atorvastatin 80 milliGRAM(s) Oral at bedtime  bisacodyl 5 milliGRAM(s) Oral at bedtime  chlorhexidine 2% Cloths 1 Application(s) Topical <User Schedule>  colchicine 0.6 milliGRAM(s) Oral two times a day  epoetin ximena-epbx (RETACRIT) Injectable 6000 Unit(s) IV Push <User Schedule>  famotidine    Tablet 20 milliGRAM(s) Oral daily  ferrous    sulfate 325 milliGRAM(s) Oral daily  folic acid 1 milliGRAM(s) Oral daily  heparin   Injectable 5000 Unit(s) SubCutaneous every 12 hours  hydrALAZINE 50 milliGRAM(s) Oral three times a day  isosorbide   mononitrate ER Tablet (IMDUR) 30 milliGRAM(s) Oral daily  lactulose Syrup 20 Gram(s) Oral at bedtime  sevelamer carbonate 1600 milliGRAM(s) Oral three times a day    MEDICATIONS  (PRN):  acetaminophen     Tablet .. 650 milliGRAM(s) Oral every 6 hours PRN Mild Pain (1 - 3), Moderate Pain (4 - 6)      Vital Signs Last 24 Hrs  T(C): 36.5 (07 Dec 2021 05:28), Max: 36.9 (06 Dec 2021 19:22)  T(F): 97.7 (07 Dec 2021 05:28), Max: 98.4 (06 Dec 2021 19:22)  HR: 77 (07 Dec 2021 05:28) (68 - 77)  BP: 156/63 (07 Dec 2021 05:28) (115/56 - 177/71)  BP(mean): --  RR: 18 (07 Dec 2021 05:28) (18 - 18)  SpO2: 98% (07 Dec 2021 05:28) (97% - 100%)    PHYSICAL EXAMINATION:  GENERAL: NAD, well built  HEAD:  Atraumatic, Normocephalic  EYES:  conjunctiva and sclera clear  NECK: Supple, No JVD, Normal thyroid  CHEST/LUNG: Clear to auscultation. Clear to percussion bilaterally; No rales, rhonchi, wheezing, or rubs  HEART: Regular rate and rhythm; No murmurs, rubs, or gallops  ABDOMEN: Soft, Nontender, Nondistended; Bowel sounds present, no pain or masses on palpation  NERVOUS SYSTEM:  Alert & Oriented X3  : voiding well  EXTREMITIES:  2+ Peripheral Pulses, No clubbing, cyanosis, or edema  SKIN: warm dry                          8.1    4.52  )-----------( 152      ( 06 Dec 2021 09:20 )             24.9     12-06    138  |  99  |  74<H>  ----------------------------<  126<H>  4.8   |  29  |  11.60<H>    Ca    9.3      06 Dec 2021 09:20  Phos  5.7     12-06  Mg     2.4     12-06    TPro  7.0  /  Alb  2.8<L>  /  TBili  0.3  /  DBili  x   /  AST  9<L>  /  ALT  32  /  AlkPhos  154<H>  12-06    LIVER FUNCTIONS - ( 06 Dec 2021 09:20 )  Alb: 2.8 g/dL / Pro: 7.0 g/dL / ALK PHOS: 154 U/L / ALT: 32 U/L DA / AST: 9 U/L / GGT: x                   I&O's Summary    06 Dec 2021 07:01  -  07 Dec 2021 07:00  --------------------------------------------------------  IN: 700 mL / OUT: 1920 mL / NET: -1220 mL            CAPILLARY BLOOD GLUCOSE      RADIOLOGY & ADDITIONAL TESTS:

## 2021-12-07 NOTE — PROGRESS NOTE ADULT - SUBJECTIVE AND OBJECTIVE BOX
Problem List:  ESRD    PAST MEDICAL & SURGICAL HISTORY:  ESRD (end stage renal disease) on dialysis    Hypertension    Anemia    Cerebrovascular accident (CVA), unspecified mechanism    Paroxysmal atrial fibrillation    CAD (coronary artery disease)    A-V fistula        No Known Allergies      MEDICATIONS  (STANDING):  aspirin  chewable 81 milliGRAM(s) Oral daily  atorvastatin 80 milliGRAM(s) Oral at bedtime  bisacodyl 5 milliGRAM(s) Oral at bedtime  chlorhexidine 2% Cloths 1 Application(s) Topical <User Schedule>  colchicine 0.6 milliGRAM(s) Oral two times a day  epoetin ximena-epbx (RETACRIT) Injectable 6000 Unit(s) IV Push <User Schedule>  famotidine    Tablet 20 milliGRAM(s) Oral daily  ferrous    sulfate 325 milliGRAM(s) Oral daily  folic acid 1 milliGRAM(s) Oral daily  heparin   Injectable 5000 Unit(s) SubCutaneous every 12 hours  hydrALAZINE 50 milliGRAM(s) Oral three times a day  isosorbide   mononitrate ER Tablet (IMDUR) 30 milliGRAM(s) Oral daily  lactulose Syrup 20 Gram(s) Oral at bedtime  sevelamer carbonate 1600 milliGRAM(s) Oral three times a day    MEDICATIONS  (PRN):  acetaminophen     Tablet .. 650 milliGRAM(s) Oral every 6 hours PRN Mild Pain (1 - 3), Moderate Pain (4 - 6)                            8.2    4.98  )-----------( 163      ( 07 Dec 2021 11:04 )             25.1     12-07    SEE NOTE  |  SEE NOTE  |  SEE NOTE  ----------------------------<  SEE NOTE  SEE NOTE   |  SEE NOTE  |  SEE NOTE    Ca    SEE NOTE      07 Dec 2021 11:04  Phos  SEE NOTE     12-07  Mg     SEE NOTE     12-07    TPro  SEE NOTE  /  Alb  SEE NOTE  /  TBili  SEE NOTE  /  DBili  x   /  AST  SEE NOTE  /  ALT  SEE NOTE  /  AlkPhos  SEE NOTE  12-07    PT/INR - ( 07 Dec 2021 11:04 )   PT: 11.7 sec;   INR: 0.98 ratio         PTT - ( 07 Dec 2021 11:04 )  PTT:29.7 sec        REVIEW OF SYSTEMS:  General: no fever no chills, no weight loss.    RESPIRATORY: No cough, wheezing, hemoptysis; No shortness of breath  CARDIOVASCULAR: No chest pain or palpitations. No Edema  GASTROINTESTINAL: No abdominal or epigastric pain. No nausea, vomiting. No diarrhea or constipation. No melena.  GENITOURINARY: No dysuria, frequency, foamy urine, urinary urgency, incontinence or hematuria          VITALS:  T(F): 97.2 (12-07-21 @ 07:40), Max: 98.4 (12-06-21 @ 19:22)  HR: 69 (12-07-21 @ 07:40)  BP: 113/51 (12-07-21 @ 07:40)  RR: 20 (12-07-21 @ 07:40)  SpO2: 100% (12-07-21 @ 07:40)  Wt(kg): --    12-06 @ 07:01  -  12-07 @ 07:00  --------------------------------------------------------  IN: 700 mL / OUT: 1920 mL / NET: -1220 mL        PHYSICAL EXAM:  Constitutional: well developed, no diaphoresis, no distress.  Neck: No JVD, no carotid bruit, supple, no adenopathy  Respiratory:  air entrance B/L, no wheezes, rales or rhonchi  Cardiovascular: S1, S2, RRR, no pericardial rub, no murmur  Abdomen: BS+, soft, no tenderness, no bruit  Pelvis: bladder nondistended  Extremities: No cyanosis or clubbing. No peripheral edema.     Vascular Access: Right AVF with bruit and thrill

## 2021-12-07 NOTE — PROGRESS NOTE ADULT - ASSESSMENT
75 yo M from [facility], w/ ESRD (on HD MWF), HTN, Severe Pulm HTN, CHFpEF >55% w/ GIIDD, Anemia of CKD, NSTEMI, CAD, parox Afib, CVA w/ RIGHT dominant hemiparesis, Vascular Dementia and Mood disorder who was BIB EMS for acute onset SOB 2/2 fluid overload.  1.CHF and volume overload need for HD.  2.Qcryb-Mfmbf-w/w pt and daughter, agree for PPM.D/W Dr. Victor Hugo BECKER, plan for MICRA today.  3.ESRD-HD as per renal.  4.Parox Afib- refusing eliquis.  5.Lipid d/o-statin.  6.CVA-asa,statin."Refusing Eliquis-told cause prostate ca."  7.CAD-asa,imdur,statin.  8.HTN- hydralazine 50mg q8h.  9.GI and DVT prophylaxis.

## 2021-12-07 NOTE — PROGRESS NOTE ADULT - SUBJECTIVE AND OBJECTIVE BOX
CHIEF COMPLAINT:Patient is a 76y old  Male who presents with a chief complaint of SOB .Pt appears comfortable.    	  REVIEW OF SYSTEMS:  CONSTITUTIONAL: No fever, weight loss, or fatigue  EYES: No eye pain, visual disturbances, or discharge  ENT:  No difficulty hearing, tinnitus, vertigo; No sinus or throat pain  NECK: No pain or stiffness  RESPIRATORY: No cough, wheezing, chills or hemoptysis; No Shortness of Breath  CARDIOVASCULAR: No chest pain, palpitations, passing out, dizziness, or leg swelling  GASTROINTESTINAL: No abdominal or epigastric pain. No nausea, vomiting, or hematemesis; No diarrhea or constipation. No melena or hematochezia.  GENITOURINARY: No dysuria, frequency, hematuria, or incontinence  NEUROLOGICAL: No headaches, memory loss, loss of strength, numbness, or tremors  SKIN: No itching, burning, rashes, or lesions   LYMPH Nodes: No enlarged glands  ENDOCRINE: No heat or cold intolerance; No hair loss  MUSCULOSKELETAL: No joint pain or swelling; No muscle, back, or extremity pain  PSYCHIATRIC: No depression, anxiety, mood swings, or difficulty sleeping  HEME/LYMPH: No easy bruising, or bleeding gums  ALLERGY AND IMMUNOLOGIC: No hives or eczema	        PHYSICAL EXAM:  T(C): 36.5 (12-07-21 @ 05:28), Max: 36.9 (12-06-21 @ 19:22)  HR: 77 (12-07-21 @ 05:28) (68 - 77)  BP: 156/63 (12-07-21 @ 05:28) (115/56 - 156/63)  RR: 18 (12-07-21 @ 05:28) (18 - 18)  SpO2: 98% (12-07-21 @ 05:28) (97% - 100%)  Wt(kg): --  I&O's Summary    06 Dec 2021 07:01  -  07 Dec 2021 07:00  --------------------------------------------------------  IN: 700 mL / OUT: 1920 mL / NET: -1220 mL        Appearance: Normal	  HEENT:   Normal oral mucosa, PERRL, EOMI	  Lymphatic: No lymphadenopathy  Cardiovascular: Normal S1 S2, No JVD, No murmurs, No edema  Respiratory: Lungs clear to auscultation	  Psychiatry: A & O x 3, Mood & affect appropriate  Gastrointestinal:  Soft, Non-tender, + BS	  Skin: No rashes, No ecchymoses, No cyanosis	  Neurologic: Non-focal  Extremities: Normal range of motion, No clubbing, cyanosis or edema  Vascular: Peripheral pulses palpable 2+ bilaterally    MEDICATIONS  (STANDING):  aspirin  chewable 81 milliGRAM(s) Oral daily  atorvastatin 80 milliGRAM(s) Oral at bedtime  bisacodyl 5 milliGRAM(s) Oral at bedtime  chlorhexidine 2% Cloths 1 Application(s) Topical <User Schedule>  colchicine 0.6 milliGRAM(s) Oral two times a day  epoetin ximena-epbx (RETACRIT) Injectable 6000 Unit(s) IV Push <User Schedule>  famotidine    Tablet 20 milliGRAM(s) Oral daily  ferrous    sulfate 325 milliGRAM(s) Oral daily  folic acid 1 milliGRAM(s) Oral daily  heparin   Injectable 5000 Unit(s) SubCutaneous every 12 hours  hydrALAZINE 50 milliGRAM(s) Oral three times a day  isosorbide   mononitrate ER Tablet (IMDUR) 30 milliGRAM(s) Oral daily  lactulose Syrup 20 Gram(s) Oral at bedtime  sevelamer carbonate 1600 milliGRAM(s) Oral three times a day     	  	  LABS:	 	                       8.1    4.52  )-----------( 152      ( 06 Dec 2021 09:20 )             24.9     12-06    138  |  99  |  74<H>  ----------------------------<  126<H>  4.8   |  29  |  11.60<H>    Ca    9.3      06 Dec 2021 09:20  Phos  5.7     12-06  Mg     2.4     12-06    TPro  7.0  /  Alb  2.8<L>  /  TBili  0.3  /  DBili  x   /  AST  9<L>  /  ALT  32  /  AlkPhos  154<H>  12-06    proBNP: Serum Pro-Brain Natriuretic Peptide: 43303 pg/mL (11-28 @ 10:10)

## 2021-12-07 NOTE — PROGRESS NOTE ADULT - PROBLEM SELECTOR PLAN 3
started BB metop tart 25 BID and Eliquis 2.5 BID  decreased lopressor to 12.5  continue telemetry due to bradycardia  offered AICD initially refusing but finally consented  scheduled for PPM on 12/7/21  Cardio consult Dr. Brad KAPOOR (Dr. Gay) consulted by Dr. Salinas

## 2021-12-07 NOTE — PROGRESS NOTE ADULT - ASSESSMENT
ESRD, dialysis days mwf  Fluid removal  2 liters ,  follow bp during dialysis, avoid hypotensive episodes  Anemia placed on ALICIA  Renal bone disease ON sEVELAMER    CHF on admission with SSS , follow up with pacemaker placement today  Off antiarrythmic medications, he is refusing AC and off Eliquis at present.

## 2021-12-07 NOTE — CHART NOTE - NSCHARTNOTEFT_GEN_A_CORE
JEANCLAUDE DORSAINVIL  [id number]    PROCEDURE:  [employer] Pacemaker Placement          INDICATION:  Tachy-arnaldo        ELECTROPHYSIOLOGIST(S):  MD Victor Hugo        FINDINGS:  - Successful placement of Micra pacemaker      COMPLICATIONS:  None      RECOMMENDATIONS:  - Bed rest for 6 hours  - CXR in the AM  - Remove right groin stitch in the AM  - Wound check in 10-14 days as outpatient

## 2021-12-07 NOTE — PROGRESS NOTE ADULT - PROBLEM SELECTOR PLAN 1
- BP at NH wnl but elevated peak 241/84 w/ pulm edema on CXR  - BNP 30k and Trop elevation  - goal is reduce BP by 25% in 24 hours  - s/p Lasix 80 and Hydral 10  - s/p urgent HD (11/28/21)  - add hydralazine 25 q8  - vs q 4  - trend trop T3 - trended down  - continue lopressor 12.5 in setting of bradycardia  - continue to monitor on telemetry  - Cardio consulted EP (Dr. Gay)  - pending PPM (scheduled 12/7/21)  - NEPHRO Dr. Jacinto  - CARDIO Dr. Salinas

## 2021-12-07 NOTE — PROGRESS NOTE ADULT - PROBLEM SELECTOR PLAN 2
- on HD MWF last HD outpatient on Friday 11/26 , no missed HD  - CXR with fluid OD, b/l LE pitting edema  - BiPAP in ED  - urgent HD (11/28)  - resume home meds sevelamer, colcrys   - monitor electrolytes, post HD labs  - scheduled for HD today (12/6/21)  - NEPHRO Dr. Jacinto

## 2021-12-07 NOTE — PROGRESS NOTE ADULT - PROBLEM SELECTOR PLAN 4
Asymptomatic bradycardia on telemetry (30-50s)  scheduled for PPM on 12/7/21  continue lopressor 12.5 mg  continue telemetry monitoring  Cardio consult Dr. Brad KAPOOR (Dr. Gay) consulted by Dr. Salinas

## 2021-12-08 LAB
ALBUMIN SERPL ELPH-MCNC: 3 G/DL — LOW (ref 3.5–5)
ALP SERPL-CCNC: 153 U/L — HIGH (ref 40–120)
ALT FLD-CCNC: 29 U/L DA — SIGNIFICANT CHANGE UP (ref 10–60)
ANION GAP SERPL CALC-SCNC: 12 MMOL/L — SIGNIFICANT CHANGE UP (ref 5–17)
AST SERPL-CCNC: 14 U/L — SIGNIFICANT CHANGE UP (ref 10–40)
BASOPHILS # BLD AUTO: 0.02 K/UL — SIGNIFICANT CHANGE UP (ref 0–0.2)
BASOPHILS NFR BLD AUTO: 0.3 % — SIGNIFICANT CHANGE UP (ref 0–2)
BILIRUB SERPL-MCNC: 0.4 MG/DL — SIGNIFICANT CHANGE UP (ref 0.2–1.2)
BUN SERPL-MCNC: 63 MG/DL — HIGH (ref 7–18)
CALCIUM SERPL-MCNC: 9.6 MG/DL — SIGNIFICANT CHANGE UP (ref 8.4–10.5)
CHLORIDE SERPL-SCNC: 100 MMOL/L — SIGNIFICANT CHANGE UP (ref 96–108)
CO2 SERPL-SCNC: 24 MMOL/L — SIGNIFICANT CHANGE UP (ref 22–31)
CREAT SERPL-MCNC: 10.2 MG/DL — HIGH (ref 0.5–1.3)
EOSINOPHIL # BLD AUTO: 0.01 K/UL — SIGNIFICANT CHANGE UP (ref 0–0.5)
EOSINOPHIL NFR BLD AUTO: 0.2 % — SIGNIFICANT CHANGE UP (ref 0–6)
GLUCOSE SERPL-MCNC: 122 MG/DL — HIGH (ref 70–99)
HCT VFR BLD CALC: 23.8 % — LOW (ref 39–50)
HGB BLD-MCNC: 7.7 G/DL — LOW (ref 13–17)
IMM GRANULOCYTES NFR BLD AUTO: 0.3 % — SIGNIFICANT CHANGE UP (ref 0–1.5)
LYMPHOCYTES # BLD AUTO: 0.67 K/UL — LOW (ref 1–3.3)
LYMPHOCYTES # BLD AUTO: 10.1 % — LOW (ref 13–44)
MAGNESIUM SERPL-MCNC: 2.3 MG/DL — SIGNIFICANT CHANGE UP (ref 1.6–2.6)
MCHC RBC-ENTMCNC: 32.4 GM/DL — SIGNIFICANT CHANGE UP (ref 32–36)
MCHC RBC-ENTMCNC: 32.9 PG — SIGNIFICANT CHANGE UP (ref 27–34)
MCV RBC AUTO: 101.7 FL — HIGH (ref 80–100)
MONOCYTES # BLD AUTO: 0.8 K/UL — SIGNIFICANT CHANGE UP (ref 0–0.9)
MONOCYTES NFR BLD AUTO: 12.1 % — SIGNIFICANT CHANGE UP (ref 2–14)
NEUTROPHILS # BLD AUTO: 5.09 K/UL — SIGNIFICANT CHANGE UP (ref 1.8–7.4)
NEUTROPHILS NFR BLD AUTO: 77 % — SIGNIFICANT CHANGE UP (ref 43–77)
NRBC # BLD: 0 /100 WBCS — SIGNIFICANT CHANGE UP (ref 0–0)
PHOSPHATE SERPL-MCNC: 5.6 MG/DL — HIGH (ref 2.5–4.5)
PLATELET # BLD AUTO: 168 K/UL — SIGNIFICANT CHANGE UP (ref 150–400)
POTASSIUM SERPL-MCNC: 4.5 MMOL/L — SIGNIFICANT CHANGE UP (ref 3.5–5.3)
POTASSIUM SERPL-SCNC: 4.5 MMOL/L — SIGNIFICANT CHANGE UP (ref 3.5–5.3)
PROT SERPL-MCNC: 6.9 G/DL — SIGNIFICANT CHANGE UP (ref 6–8.3)
RBC # BLD: 2.34 M/UL — LOW (ref 4.2–5.8)
RBC # FLD: 15.9 % — HIGH (ref 10.3–14.5)
SODIUM SERPL-SCNC: 136 MMOL/L — SIGNIFICANT CHANGE UP (ref 135–145)
TROPONIN I, HIGH SENSITIVITY RESULT: 371.9 NG/L — HIGH
TROPONIN I, HIGH SENSITIVITY RESULT: 591.7 NG/L — HIGH
WBC # BLD: 6.61 K/UL — SIGNIFICANT CHANGE UP (ref 3.8–10.5)
WBC # FLD AUTO: 6.61 K/UL — SIGNIFICANT CHANGE UP (ref 3.8–10.5)

## 2021-12-08 PROCEDURE — 71045 X-RAY EXAM CHEST 1 VIEW: CPT | Mod: 26

## 2021-12-08 PROCEDURE — 93010 ELECTROCARDIOGRAM REPORT: CPT | Mod: 76

## 2021-12-08 PROCEDURE — 71275 CT ANGIOGRAPHY CHEST: CPT | Mod: 26

## 2021-12-08 RX ORDER — METOPROLOL TARTRATE 50 MG
25 TABLET ORAL
Refills: 0 | Status: ACTIVE | OUTPATIENT
Start: 2021-12-08 | End: 2022-11-06

## 2021-12-08 RX ORDER — HYDROMORPHONE HYDROCHLORIDE 2 MG/ML
0.5 INJECTION INTRAMUSCULAR; INTRAVENOUS; SUBCUTANEOUS ONCE
Refills: 0 | Status: DISCONTINUED | OUTPATIENT
Start: 2021-12-08 | End: 2021-12-08

## 2021-12-08 RX ORDER — ACETAMINOPHEN 500 MG
1000 TABLET ORAL ONCE
Refills: 0 | Status: COMPLETED | OUTPATIENT
Start: 2021-12-08 | End: 2021-12-08

## 2021-12-08 RX ORDER — DRONEDARONE 400 MG/1
400 TABLET, FILM COATED ORAL
Refills: 0 | Status: DISCONTINUED | OUTPATIENT
Start: 2021-12-08 | End: 2021-12-16

## 2021-12-08 RX ADMIN — HEPARIN SODIUM 5000 UNIT(S): 5000 INJECTION INTRAVENOUS; SUBCUTANEOUS at 18:04

## 2021-12-08 RX ADMIN — HYDROMORPHONE HYDROCHLORIDE 0.5 MILLIGRAM(S): 2 INJECTION INTRAMUSCULAR; INTRAVENOUS; SUBCUTANEOUS at 17:23

## 2021-12-08 RX ADMIN — Medication 0.6 MILLIGRAM(S): at 06:30

## 2021-12-08 RX ADMIN — Medication 50 MILLIGRAM(S): at 22:02

## 2021-12-08 RX ADMIN — Medication 25 MILLIGRAM(S): at 18:04

## 2021-12-08 RX ADMIN — HYDROMORPHONE HYDROCHLORIDE 0.5 MILLIGRAM(S): 2 INJECTION INTRAMUSCULAR; INTRAVENOUS; SUBCUTANEOUS at 08:37

## 2021-12-08 RX ADMIN — SEVELAMER CARBONATE 1600 MILLIGRAM(S): 2400 POWDER, FOR SUSPENSION ORAL at 06:28

## 2021-12-08 RX ADMIN — Medication 25 MILLIGRAM(S): at 09:15

## 2021-12-08 RX ADMIN — Medication 650 MILLIGRAM(S): at 01:33

## 2021-12-08 RX ADMIN — Medication 0.6 MILLIGRAM(S): at 18:06

## 2021-12-08 RX ADMIN — HYDROMORPHONE HYDROCHLORIDE 0.5 MILLIGRAM(S): 2 INJECTION INTRAMUSCULAR; INTRAVENOUS; SUBCUTANEOUS at 17:08

## 2021-12-08 RX ADMIN — SEVELAMER CARBONATE 1600 MILLIGRAM(S): 2400 POWDER, FOR SUSPENSION ORAL at 22:02

## 2021-12-08 RX ADMIN — Medication 1000 MILLIGRAM(S): at 09:05

## 2021-12-08 RX ADMIN — Medication 400 MILLIGRAM(S): at 07:49

## 2021-12-08 RX ADMIN — HYDROMORPHONE HYDROCHLORIDE 0.5 MILLIGRAM(S): 2 INJECTION INTRAMUSCULAR; INTRAVENOUS; SUBCUTANEOUS at 08:52

## 2021-12-08 RX ADMIN — ATORVASTATIN CALCIUM 80 MILLIGRAM(S): 80 TABLET, FILM COATED ORAL at 22:04

## 2021-12-08 RX ADMIN — Medication 650 MILLIGRAM(S): at 00:40

## 2021-12-08 RX ADMIN — Medication 50 MILLIGRAM(S): at 06:29

## 2021-12-08 RX ADMIN — CHLORHEXIDINE GLUCONATE 1 APPLICATION(S): 213 SOLUTION TOPICAL at 06:17

## 2021-12-08 RX ADMIN — Medication 5 MILLIGRAM(S): at 22:02

## 2021-12-08 RX ADMIN — DRONEDARONE 400 MILLIGRAM(S): 400 TABLET, FILM COATED ORAL at 18:04

## 2021-12-08 RX ADMIN — LACTULOSE 20 GRAM(S): 10 SOLUTION ORAL at 22:02

## 2021-12-08 NOTE — PROGRESS NOTE ADULT - PROBLEM SELECTOR PLAN 2
- on HD MWF last HD outpatient on Friday 11/26 , no missed HD  - CXR with fluid OD, b/l LE pitting edema  - BiPAP in ED  - urgent HD (11/28)  - resume home meds sevelamer, colcrys   - monitor electrolytes, post HD labs  - scheduled for HD today (12/6/21)  - NEPHRO Dr. Jacinto - on HD MWF last HD outpatient on Friday 11/26 , no missed HD  - CXR with fluid OD, b/l LE pitting edema  - BiPAP in ED  - urgent HD (11/28)  - resume home meds sevelamer, colcrys   - monitor electrolytes, post HD labs  - scheduled for HD today (12/8/21)  - NEPHRO Dr. Jacinto

## 2021-12-08 NOTE — PROGRESS NOTE ADULT - PROBLEM SELECTOR PLAN 7
prior Echo on 9/28/21 showed RV systolic pressure is 69 mm Hg. Severe pulmonary hypertension.  - admit to tele  - [facility] home meds IMDUR, metop prior Echo on 9/28/21 showed RV systolic pressure is 69 mm Hg. Severe pulmonary hypertension.  - admitted to [facility]  - [facility] IMDUR, metop

## 2021-12-08 NOTE — PROGRESS NOTE ADULT - SUBJECTIVE AND OBJECTIVE BOX
Problem List:  ESRD dialysis days MWF  Post pace maker insertion.    PAST MEDICAL & SURGICAL HISTORY:  ESRD (end stage renal disease) on dialysis    Hypertension    Anemia    Cerebrovascular accident (CVA), unspecified mechanism    Paroxysmal atrial fibrillation    CAD (coronary artery disease)    A-V fistula        No Known Allergies      MEDICATIONS  (STANDING):  aspirin  chewable 81 milliGRAM(s) Oral daily  atorvastatin 80 milliGRAM(s) Oral at bedtime  bisacodyl 5 milliGRAM(s) Oral at bedtime  chlorhexidine 2% Cloths 1 Application(s) Topical <User Schedule>  colchicine 0.6 milliGRAM(s) Oral two times a day  dronedarone 400 milliGRAM(s) Oral two times a day  epoetin ximena-epbx (RETACRIT) Injectable 6000 Unit(s) IV Push <User Schedule>  famotidine    Tablet 20 milliGRAM(s) Oral daily  ferrous    sulfate 325 milliGRAM(s) Oral daily  folic acid 1 milliGRAM(s) Oral daily  heparin   Injectable 5000 Unit(s) SubCutaneous every 12 hours  hydrALAZINE 50 milliGRAM(s) Oral three times a day  HYDROmorphone  Injectable 0.5 milliGRAM(s) IV Push once  isosorbide   mononitrate ER Tablet (IMDUR) 30 milliGRAM(s) Oral daily  lactulose Syrup 20 Gram(s) Oral at bedtime  metoprolol tartrate 25 milliGRAM(s) Oral two times a day  sevelamer carbonate 1600 milliGRAM(s) Oral three times a day    MEDICATIONS  (PRN):  acetaminophen     Tablet .. 650 milliGRAM(s) Oral every 6 hours PRN Mild Pain (1 - 3), Moderate Pain (4 - 6)                            7.7    6.61  )-----------( 168      ( 08 Dec 2021 07:33 )             23.8     12-08    136  |  100  |  63<H>  ----------------------------<  122<H>  4.5   |  24  |  10.20<H>    Ca    9.6      08 Dec 2021 07:33  Phos  5.6     12-08  Mg     2.3     12-08    TPro  6.9  /  Alb  3.0<L>  /  TBili  0.4  /  DBili  x   /  AST  14  /  ALT  29  /  AlkPhos  153<H>  12-08    PT/INR - ( 07 Dec 2021 11:04 )   PT: 11.7 sec;   INR: 0.98 ratio         PTT - ( 07 Dec 2021 11:04 )  PTT:29.7 sec        REVIEW OF SYSTEMS:  General: no fever no chills, no weight loss.    RESPIRATORY: No cough, wheezing, hemoptysis; No shortness of breath  CARDIOVASCULAR: c/o pain in mid chest area 7/10 , constant pain no changes with breathing.  GASTROINTESTINAL: No abdominal or epigastric pain. No nausea, vomiting. No diarrhea or constipation. No melena.  GENITOURINARY: No dysuria, frequency, foamy urine, urinary urgency, incontinence or hematuria  NEUROLOGICAL: No numbness or weakness, no tremor , no dizziness.   Muscle skeletal : no joint pain and no swelling of joints and limbs.  SKIN: No itching, burning, rashes.        VITALS:  T(F): 97.6 (12-08-21 @ 11:06), Max: 98.1 (12-08-21 @ 03:34)  HR: 72 (12-08-21 @ 11:06)  BP: 117/55 (12-08-21 @ 11:06)  RR: 17 (12-08-21 @ 11:06)  SpO2: 100% (12-08-21 @ 11:06)  Wt(kg): --    12-07 @ 07:01  -  12-08 @ 07:00  --------------------------------------------------------  IN: 75 mL / OUT: 5 mL / NET: 70 mL        PHYSICAL EXAM:  Constitutional: well developed, no diaphoresis, no distress.  Neck: No JVD, no carotid bruit, supple, no adenopathy  Respiratory: air entrance B/L, no wheezes, rales or rhonchi  Cardiovascular: S1, S2, RRR, no pericardial rub, no murmur  Abdomen: BS+, soft, no tenderness, no bruit  Pelvis: bladder nondistended  Extremities: No cyanosis or clubbing. No peripheral edema.     Psychiatric: Normal mood, normal affect  Skin: No rashes  Vascular Access: right AVG with bruti and thrill.

## 2021-12-08 NOTE — PROGRESS NOTE ADULT - PROBLEM SELECTOR PLAN 4
Asymptomatic bradycardia on telemetry (30-50s)  scheduled for PPM on 12/7/21  continue lopressor 12.5 mg  continue telemetry monitoring  Cardio consult Dr. Brad KAPOOR (Dr. Gay) consulted by Dr. Salinas Asymptomatic bradycardia on telemetry (30-50s)  scheduled for PPM on 12/7/21  continue lopressor 12.5 mg  resume Multaw  continue telemetry monitoring (tachycardia-bradycardia)  Cardio consult Dr. Brad KAPOOR (Dr. Gay) consulted by Dr. Salinas

## 2021-12-08 NOTE — PROGRESS NOTE ADULT - ASSESSMENT
75 yo M from [facility], w/ ESRD (on HD MWF), HTN, Severe Pulm HTN, CHFpEF >55% w/ GIIDD, Anemia of CKD, NSTEMI, CAD, parox Afib, CVA w/ RIGHT dominant hemiparesis, Vascular Dementia and Mood disorder who was BIB EMS for acute onset SOB 2/2 fluid overload,now CP.  1.CP-CTA-R/O PE.  2.Pthqd-Yvijr-t/p PPM, resume lopressor and multaq.  3.ESRD-HD as per renal.  4.Parox Afib- refusing eliquis.  5.Lipid d/o-statin.  6.CVA-asa,statin."Refusing Eliquis-told cause prostate ca."  7.CAD-asa,imdur,statin.  8.HTN- hydralazine 50mg q8h.  9.GI and DVT prophylaxis.

## 2021-12-08 NOTE — PROGRESS NOTE ADULT - SUBJECTIVE AND OBJECTIVE BOX
CHIEF COMPLAINT:Patient is a 76y old  Male who presents with a chief complaint of SOB.Pt having chest pain since last night.    	  REVIEW OF SYSTEMS:  CONSTITUTIONAL: No fever, weight loss, or fatigue  EYES: No eye pain, visual disturbances, or discharge  ENT:  No difficulty hearing, tinnitus, vertigo; No sinus or throat pain  NECK: No pain or stiffness  RESPIRATORY: No cough, wheezing, chills or hemoptysis; No Shortness of Breath  CARDIOVASCULAR: + chest pain, No palpitations, passing out, dizziness, or leg swelling  GASTROINTESTINAL: No abdominal or epigastric pain. No nausea, vomiting, or hematemesis; No diarrhea or constipation. No melena or hematochezia.  GENITOURINARY: No dysuria, frequency, hematuria, or incontinence  NEUROLOGICAL: No headaches, memory loss, loss of strength, numbness, or tremors  SKIN: No itching, burning, rashes, or lesions   LYMPH Nodes: No enlarged glands  ENDOCRINE: No heat or cold intolerance; No hair loss  MUSCULOSKELETAL: No joint pain or swelling; No muscle, back, or extremity pain  PSYCHIATRIC: No depression, anxiety, mood swings, or difficulty sleeping  HEME/LYMPH: No easy bruising, or bleeding gums  ALLERGY AND IMMUNOLOGIC: No hives or eczema	      PHYSICAL EXAM:  T(C): 36.4 (12-08-21 @ 07:40), Max: 36.7 (12-08-21 @ 03:34)  HR: 85 (12-08-21 @ 07:40) (73 - 85)  BP: 122/57 (12-08-21 @ 07:40) (113/47 - 152/57)  RR: 17 (12-08-21 @ 07:40) (15 - 21)  SpO2: 100% (12-08-21 @ 07:40) (97% - 100%)  Wt(kg): --  I&O's Summary    07 Dec 2021 07:01  -  08 Dec 2021 07:00  --------------------------------------------------------  IN: 75 mL / OUT: 5 mL / NET: 70 mL        Appearance: Normal	  HEENT:   Normal oral mucosa, PERRL, EOMI	  Lymphatic: No lymphadenopathy  Cardiovascular: Normal S1 S2, No JVD, No murmurs, No edema  Respiratory: Lungs clear to auscultation	  Psychiatry: A & O x 3, Mood & affect appropriate  Gastrointestinal:  Soft, Non-tender, + BS	  Skin: No rashes, No ecchymoses, No cyanosis	  Neurologic: Non-focal  Extremities: Normal range of motion, No clubbing, cyanosis or edema  Vascular: Peripheral pulses palpable 2+ bilaterally    MEDICATIONS  (STANDING):  aspirin  chewable 81 milliGRAM(s) Oral daily  atorvastatin 80 milliGRAM(s) Oral at bedtime  bisacodyl 5 milliGRAM(s) Oral at bedtime  chlorhexidine 2% Cloths 1 Application(s) Topical <User Schedule>  colchicine 0.6 milliGRAM(s) Oral two times a day  dronedarone 400 milliGRAM(s) Oral two times a day  epoetin ximena-epbx (RETACRIT) Injectable 6000 Unit(s) IV Push <User Schedule>  famotidine    Tablet 20 milliGRAM(s) Oral daily  ferrous    sulfate 325 milliGRAM(s) Oral daily  folic acid 1 milliGRAM(s) Oral daily  heparin   Injectable 5000 Unit(s) SubCutaneous every 12 hours  hydrALAZINE 50 milliGRAM(s) Oral three times a day  isosorbide   mononitrate ER Tablet (IMDUR) 30 milliGRAM(s) Oral daily  lactulose Syrup 20 Gram(s) Oral at bedtime  metoprolol tartrate 25 milliGRAM(s) Oral two times a day  sevelamer carbonate 1600 milliGRAM(s) Oral three times a day      TELEMETRY: 	nsr,pvc's     	  	  LABS:	       Troponin I, High Sensitivity Result: 591.7 ng/L (12-08 @ 07:33)                            7.7    6.61  )-----------( 168      ( 08 Dec 2021 07:33 )             23.8     12-08    136  |  100  |  63<H>  ----------------------------<  122<H>  4.5   |  24  |  10.20<H>    Ca    9.6      08 Dec 2021 07:33  Phos  5.6     12-08  Mg     2.3     12-08    TPro  6.9  /  Alb  3.0<L>  /  TBili  0.4  /  DBili  x   /  AST  14  /  ALT  29  /  AlkPhos  153<H>  12-08    proBNP: Serum Pro-Brain Natriuretic Peptide: 37245 pg/mL (11-28 @ 10:10)

## 2021-12-08 NOTE — PROGRESS NOTE ADULT - ASSESSMENT
ESRD , dialysis MWF  Post pacemake palcemnt , pain in left  chest arae placed on pain medication.  follow with cardiology    CT chest pericardial effusion.   No PE    Anemia continue with EPoetin , follow lab in am and rosa m for blood transfusion.    `

## 2021-12-08 NOTE — PROGRESS NOTE ADULT - PROBLEM SELECTOR PLAN 3
started BB metop tart 25 BID and Eliquis 2.5 BID  decreased lopressor to 12.5  continue telemetry due to bradycardia  offered AICD initially refusing but finally consented  scheduled for PPM on 12/7/21  Cardio consult Dr. Brad KAPOOR (Dr. Gay) consulted by Dr. Salinas started BB metop tart 25 BID and Eliquis 2.5 BID  decreased lopressor to 12.5  resumed Multaq  continue telemetry due to tachycardia-bradycardia  offered AICD initially refusing but finally consented  s/p Micra placement on 12/7/21  Cardio consult Dr. Brad KAPOOR (Dr. Gay) consulted by Dr. Salinas

## 2021-12-08 NOTE — PROGRESS NOTE ADULT - SUBJECTIVE AND OBJECTIVE BOX
PGY-1 Progress Note discussed with attending    PAGER #: [[phone]] TILL 5:00 PM  PLEASE CONTACT ON CALL TEAM:  - On Call Team (Please refer to Mary) FROM 5:00 PM - 8:30PM  - Nightfloat Team FROM 8:30 -7:30 AM    CHIEF COMPLAINT & BRIEF HOSPITAL COURSE:      INTERVAL HPI/OVERNIGHT EVENTS:       REVIEW OF SYSTEMS:  CONSTITUTIONAL: No fever, weight loss, or fatigue  RESPIRATORY: No cough, wheezing, chills or hemoptysis; No shortness of breath  CARDIOVASCULAR: No chest pain, palpitations, dizziness, or leg swelling  GASTROINTESTINAL: No abdominal pain. No nausea, vomiting, or hematemesis; No diarrhea or constipation. No melena or hematochezia.  GENITOURINARY: No dysuria or hematuria, urinary frequency  NEUROLOGICAL: No headaches, memory loss, loss of strength, numbness, or tremors  SKIN: No itching, burning, rashes, or lesions     MEDICATIONS  (STANDING):  aspirin  chewable 81 milliGRAM(s) Oral daily  atorvastatin 80 milliGRAM(s) Oral at bedtime  bisacodyl 5 milliGRAM(s) Oral at bedtime  chlorhexidine 2% Cloths 1 Application(s) Topical <User Schedule>  colchicine 0.6 milliGRAM(s) Oral two times a day  epoetin ximena-epbx (RETACRIT) Injectable 6000 Unit(s) IV Push <User Schedule>  famotidine    Tablet 20 milliGRAM(s) Oral daily  ferrous    sulfate 325 milliGRAM(s) Oral daily  folic acid 1 milliGRAM(s) Oral daily  heparin   Injectable 5000 Unit(s) SubCutaneous every 12 hours  hydrALAZINE 50 milliGRAM(s) Oral three times a day  isosorbide   mononitrate ER Tablet (IMDUR) 30 milliGRAM(s) Oral daily  lactulose Syrup 20 Gram(s) Oral at bedtime  sevelamer carbonate 1600 milliGRAM(s) Oral three times a day    MEDICATIONS  (PRN):  acetaminophen     Tablet .. 650 milliGRAM(s) Oral every 6 hours PRN Mild Pain (1 - 3), Moderate Pain (4 - 6)  acetaminophen   IVPB .. 1000 milliGRAM(s) IV Intermittent once PRN Severe Pain (7 - 10)      Vital Signs Last 24 Hrs  T(C): 36.7 (08 Dec 2021 04:47), Max: 36.7 (08 Dec 2021 03:34)  T(F): 98.1 (08 Dec 2021 04:47), Max: 98.1 (08 Dec 2021 03:34)  HR: 85 (08 Dec 2021 04:47) (73 - 85)  BP: 116/49 (08 Dec 2021 04:47) (113/47 - 152/57)  BP(mean): 75 (07 Dec 2021 17:15) (74 - 79)  RR: 17 (08 Dec 2021 04:47) (15 - 21)  SpO2: 100% (08 Dec 2021 04:47) (97% - 100%)    PHYSICAL EXAMINATION:  GENERAL: NAD, well built  HEAD:  Atraumatic, Normocephalic  EYES:  conjunctiva and sclera clear  NECK: Supple, No JVD, Normal thyroid  CHEST/LUNG: Clear to auscultation. Clear to percussion bilaterally; No rales, rhonchi, wheezing, or rubs  HEART: Regular rate and rhythm; No murmurs, rubs, or gallops  ABDOMEN: Soft, Nontender, Nondistended; Bowel sounds present, no pain or masses on palpation  NERVOUS SYSTEM:  Alert & Oriented X3  : voiding well  EXTREMITIES:  2+ Peripheral Pulses, No clubbing, cyanosis, or edema  SKIN: warm dry                          8.2    4.98  )-----------( 163      ( 07 Dec 2021 11:04 )             25.1     12-07    SEE NOTE  |  SEE NOTE  |  SEE NOTE  ----------------------------<  SEE NOTE  SEE NOTE   |  SEE NOTE  |  SEE NOTE    Ca    SEE NOTE      07 Dec 2021 11:04  Phos  SEE NOTE     12-07  Mg     SEE NOTE     12-07    TPro  SEE NOTE  /  Alb  SEE NOTE  /  TBili  SEE NOTE  /  DBili  x   /  AST  SEE NOTE  /  ALT  SEE NOTE  /  AlkPhos  SEE NOTE  12-07    LIVER FUNCTIONS - ( 07 Dec 2021 11:04 )  Alb: SEE NOTE g/dL / Pro: SEE NOTE g/dL / ALK PHOS: SEE NOTE U/L / ALT: SEE NOTE U/L DA / AST: SEE NOTE U/L / GGT: x               PT/INR - ( 07 Dec 2021 11:04 )   PT: 11.7 sec;   INR: 0.98 ratio         PTT - ( 07 Dec 2021 11:04 )  PTT:29.7 sec    I&O's Summary    07 Dec 2021 07:01  -  08 Dec 2021 07:00  --------------------------------------------------------  IN: 75 mL / OUT: 5 mL / NET: 70 mL            CAPILLARY BLOOD GLUCOSE      RADIOLOGY & ADDITIONAL TESTS:                   PGY-1 Progress Note discussed with attending    PAGER #: [[phone]] TILL 5:00 PM  PLEASE CONTACT ON CALL TEAM:  - On Call Team (Please refer to Mary) FROM 5:00 PM - 8:30PM  - Nightfloat Team FROM 8:30 -7:30 AM    CHIEF COMPLAINT & BRIEF HOSPITAL COURSE:    Patient is a 77 y/o M from [facility]. He has past medical history of ESRD on HD (MWF), HTN, severe pulmonary hypertension (CHF w/ pEF > 55%; GIIDD), Anemia of CKD, NSTEMI, CAD, paroxysmal Atrial fibrillation, vascular dementia, CVA w/ R hemiparesis and mood disorder. He was brought in by EMS due to acute onset of shortness of breath secondary to fluid overload. His last HD was on 11/28/21. His O2 saturation was 91% requiring 4LPM of O2 via nasal cannula. His oxygen status improved to 98%. In the E.D, he was tachypneic and started on BiPAP. BNP was elevated at 30,000. Blood pressure was high at 241/84/ Chest Xray showed bilateral pulmonary edema. Nephrology (Dr. Pritchard) was consulted and he underwent emergency HD. ICU was also consulted and he was cleared to be admitted to the floors on nasal cannula. He was given dose of Lasix 80mg IV, Hydralazine 10 mg IV and Insulin 8u with dextrose for Hyperkalemia of 6.3. Serum potassium eventually improved. Troponin was initially elevated but trended down since. He still has some episodes of bradycardia on telemetry. Lopressor was creased in half from 25mg to 12.5mg. He still has asymptomatic bradycardia. Cardiology was recommending AICD placement but patient keeps on refusing. Palliative talked to the family for Goals of Care.     INTERVAL HPI/OVERNIGHT EVENTS:     Patient has been complaining of progressively left-sided chest pressure/ pain overnight. EKG was done twice showing NSR but non-specific T wave changes. EKG was compared on admission and it was similar. He had MICRA placement yesterday. No headache, dizziness, shortness of breath, palpitation. Troponin was mildly elevated. Given IV Tylenol and Dilaudid. Telemetry shows tachycardia-bradycardia. Resumed Lopressor and Multaq. no other significant events overnight. For CTA Chest prior to HD today.    REVIEW OF SYSTEMS:  CONSTITUTIONAL: No fever, weight loss, or fatigue  RESPIRATORY: No cough, wheezing, chills or hemoptysis; No shortness of breath  CARDIOVASCULAR: (+) chest pain, No palpitations, dizziness, or leg swelling  GASTROINTESTINAL: No abdominal pain. No nausea, vomiting, or hematemesis; No diarrhea or constipation. No melena or hematochezia.  GENITOURINARY: No dysuria or hematuria, urinary frequency  NEUROLOGICAL: No headaches, memory loss, loss of strength, numbness, or tremors  SKIN: No itching, burning, rashes, or lesions     MEDICATIONS  (STANDING):  aspirin  chewable 81 milliGRAM(s) Oral daily  atorvastatin 80 milliGRAM(s) Oral at bedtime  bisacodyl 5 milliGRAM(s) Oral at bedtime  chlorhexidine 2% Cloths 1 Application(s) Topical <User Schedule>  colchicine 0.6 milliGRAM(s) Oral two times a day  epoetin ximena-epbx (RETACRIT) Injectable 6000 Unit(s) IV Push <User Schedule>  famotidine    Tablet 20 milliGRAM(s) Oral daily  ferrous    sulfate 325 milliGRAM(s) Oral daily  folic acid 1 milliGRAM(s) Oral daily  heparin   Injectable 5000 Unit(s) SubCutaneous every 12 hours  hydrALAZINE 50 milliGRAM(s) Oral three times a day  isosorbide   mononitrate ER Tablet (IMDUR) 30 milliGRAM(s) Oral daily  lactulose Syrup 20 Gram(s) Oral at bedtime  sevelamer carbonate 1600 milliGRAM(s) Oral three times a day    MEDICATIONS  (PRN):  acetaminophen     Tablet .. 650 milliGRAM(s) Oral every 6 hours PRN Mild Pain (1 - 3), Moderate Pain (4 - 6)  acetaminophen   IVPB .. 1000 milliGRAM(s) IV Intermittent once PRN Severe Pain (7 - 10)      Vital Signs Last 24 Hrs  T(C): 36.7 (08 Dec 2021 04:47), Max: 36.7 (08 Dec 2021 03:34)  T(F): 98.1 (08 Dec 2021 04:47), Max: 98.1 (08 Dec 2021 03:34)  HR: 85 (08 Dec 2021 04:47) (73 - 85)  BP: 116/49 (08 Dec 2021 04:47) (113/47 - 152/57)  BP(mean): 75 (07 Dec 2021 17:15) (74 - 79)  RR: 17 (08 Dec 2021 04:47) (15 - 21)  SpO2: 100% (08 Dec 2021 04:47) (97% - 100%)    PHYSICAL EXAMINATION:  GENERAL: NAD  HEAD:  Atraumatic, Normocephalic  EYES:  conjunctiva and sclera clear  NECK: Supple, No JVD, Normal thyroid  CHEST/LUNG: Clear to auscultation. Clear to percussion bilaterally; No rales, rhonchi, wheezing, or rubs  HEART: Regular rate and rhythm; No murmurs, rubs, or gallops  ABDOMEN: Soft, Nontender, Nondistended; Bowel sounds present, no pain or masses on palpation  NERVOUS SYSTEM:  Alert & Oriented X3  : voiding well  EXTREMITIES:  2+ Peripheral Pulses, No clubbing, cyanosis, or edema  SKIN: warm dry, R groin dressing and sutures (s/p MICRA placement)                          8.2    4.98  )-----------( 163      ( 07 Dec 2021 11:04 )             25.1     12-07    SEE NOTE  |  SEE NOTE  |  SEE NOTE  ----------------------------<  SEE NOTE  SEE NOTE   |  SEE NOTE  |  SEE NOTE    Ca    SEE NOTE      07 Dec 2021 11:04  Phos  SEE NOTE     12-07  Mg     SEE NOTE     12-07    TPro  SEE NOTE  /  Alb  SEE NOTE  /  TBili  SEE NOTE  /  DBili  x   /  AST  SEE NOTE  /  ALT  SEE NOTE  /  AlkPhos  SEE NOTE  12-07    LIVER FUNCTIONS - ( 07 Dec 2021 11:04 )  Alb: SEE NOTE g/dL / Pro: SEE NOTE g/dL / ALK PHOS: SEE NOTE U/L / ALT: SEE NOTE U/L DA / AST: SEE NOTE U/L / GGT: x               PT/INR - ( 07 Dec 2021 11:04 )   PT: 11.7 sec;   INR: 0.98 ratio         PTT - ( 07 Dec 2021 11:04 )  PTT:29.7 sec    I&O's Summary    07 Dec 2021 07:01  -  08 Dec 2021 07:00  --------------------------------------------------------  IN: 75 mL / OUT: 5 mL / NET: 70 mL            CAPILLARY BLOOD GLUCOSE      RADIOLOGY & ADDITIONAL TESTS:

## 2021-12-08 NOTE — PROGRESS NOTE ADULT - PROBLEM SELECTOR PLAN 1
- BP at NH wnl but elevated peak 241/84 w/ pulm edema on CXR  - BNP 30k and Trop elevation  - goal is reduce BP by 25% in 24 hours  - s/p Lasix 80 and Hydral 10  - s/p urgent HD (11/28/21)  - add hydralazine 25 q8  - vs q 4  - trend trop T3 - trended down  - continue lopressor 12.5 in setting of bradycardia  - continue to monitor on telemetry  - Cardio consulted EP (Dr. Gay)  - pending PPM (scheduled 12/7/21)  - NEPHRO Dr. Jacinto  - CARDIO Dr. Salinas - BP at NH wnl but elevated peak 241/84 w/ pulm edema on CXR  - BNP 30k and Trop elevation  - goal is reduce BP by 25% in 24 hours  - s/p Lasix 80 and Hydral 10  - s/p urgent HD (11/28/21)  - add hydralazine 25 q8  - vs q 4  - trend trop T3 - trended down  - continue lopressor 12.5 in setting of bradycardia  - continue Multaq  - continue to monitor on telemetry (tachycardia-bradycardia)  - Cardio consulted EP (Dr. Gay)  - s/p Micra placement (12/7/21)  - NEPHRO Dr. Jacinto  - CARDIO Dr. Salinas

## 2021-12-09 DIAGNOSIS — I30.9 ACUTE PERICARDITIS, UNSPECIFIED: ICD-10-CM

## 2021-12-09 DIAGNOSIS — I49.9 CARDIAC ARRHYTHMIA, UNSPECIFIED: ICD-10-CM

## 2021-12-09 LAB
ALBUMIN SERPL ELPH-MCNC: 2.7 G/DL — LOW (ref 3.5–5)
ALP SERPL-CCNC: 146 U/L — HIGH (ref 40–120)
ALT FLD-CCNC: 28 U/L DA — SIGNIFICANT CHANGE UP (ref 10–60)
ANION GAP SERPL CALC-SCNC: 10 MMOL/L — SIGNIFICANT CHANGE UP (ref 5–17)
ANISOCYTOSIS BLD QL: SLIGHT — SIGNIFICANT CHANGE UP
APTT BLD: 34.4 SEC — SIGNIFICANT CHANGE UP (ref 27.5–35.5)
AST SERPL-CCNC: 9 U/L — LOW (ref 10–40)
BASOPHILS # BLD AUTO: 0 K/UL — SIGNIFICANT CHANGE UP (ref 0–0.2)
BASOPHILS NFR BLD AUTO: 0 % — SIGNIFICANT CHANGE UP (ref 0–2)
BILIRUB SERPL-MCNC: 0.5 MG/DL — SIGNIFICANT CHANGE UP (ref 0.2–1.2)
BUN SERPL-MCNC: 76 MG/DL — HIGH (ref 7–18)
CALCIUM SERPL-MCNC: 9.2 MG/DL — SIGNIFICANT CHANGE UP (ref 8.4–10.5)
CHLORIDE SERPL-SCNC: 99 MMOL/L — SIGNIFICANT CHANGE UP (ref 96–108)
CK MB BLD-MCNC: 1.7 % — SIGNIFICANT CHANGE UP (ref 0–3.5)
CK MB CFR SERPL CALC: 1.2 NG/ML — SIGNIFICANT CHANGE UP (ref 0–3.6)
CK SERPL-CCNC: 60 U/L — SIGNIFICANT CHANGE UP (ref 35–232)
CK SERPL-CCNC: 71 U/L — SIGNIFICANT CHANGE UP (ref 35–232)
CO2 SERPL-SCNC: 27 MMOL/L — SIGNIFICANT CHANGE UP (ref 22–31)
CREAT SERPL-MCNC: 11.8 MG/DL — HIGH (ref 0.5–1.3)
DACRYOCYTES BLD QL SMEAR: SLIGHT — SIGNIFICANT CHANGE UP
EOSINOPHIL # BLD AUTO: 0 K/UL — SIGNIFICANT CHANGE UP (ref 0–0.5)
EOSINOPHIL NFR BLD AUTO: 0 % — SIGNIFICANT CHANGE UP (ref 0–6)
ERYTHROCYTE [SEDIMENTATION RATE] IN BLOOD: 85 MM/HR — HIGH (ref 0–20)
GLUCOSE SERPL-MCNC: 112 MG/DL — HIGH (ref 70–99)
HCT VFR BLD CALC: 23.6 % — LOW (ref 39–50)
HGB BLD-MCNC: 7.7 G/DL — LOW (ref 13–17)
HYPOCHROMIA BLD QL: SLIGHT — SIGNIFICANT CHANGE UP
LYMPHOCYTES # BLD AUTO: 0.77 K/UL — LOW (ref 1–3.3)
LYMPHOCYTES # BLD AUTO: 10 % — LOW (ref 13–44)
MACROCYTES BLD QL: SLIGHT — SIGNIFICANT CHANGE UP
MAGNESIUM SERPL-MCNC: 2.2 MG/DL — SIGNIFICANT CHANGE UP (ref 1.6–2.6)
MANUAL SMEAR VERIFICATION: SIGNIFICANT CHANGE UP
MCHC RBC-ENTMCNC: 32.6 GM/DL — SIGNIFICANT CHANGE UP (ref 32–36)
MCHC RBC-ENTMCNC: 33.2 PG — SIGNIFICANT CHANGE UP (ref 27–34)
MCV RBC AUTO: 101.7 FL — HIGH (ref 80–100)
MONOCYTES # BLD AUTO: 0.77 K/UL — SIGNIFICANT CHANGE UP (ref 0–0.9)
MONOCYTES NFR BLD AUTO: 10 % — SIGNIFICANT CHANGE UP (ref 2–14)
NEUTROPHILS # BLD AUTO: 6.23 K/UL — SIGNIFICANT CHANGE UP (ref 1.8–7.4)
NEUTROPHILS NFR BLD AUTO: 80 % — HIGH (ref 43–77)
NRBC # BLD: 0 /100 — SIGNIFICANT CHANGE UP (ref 0–0)
OVALOCYTES BLD QL SMEAR: SLIGHT — SIGNIFICANT CHANGE UP
PHOSPHATE SERPL-MCNC: 6 MG/DL — HIGH (ref 2.5–4.5)
PLAT MORPH BLD: NORMAL — SIGNIFICANT CHANGE UP
PLATELET # BLD AUTO: 160 K/UL — SIGNIFICANT CHANGE UP (ref 150–400)
POIKILOCYTOSIS BLD QL AUTO: SLIGHT — SIGNIFICANT CHANGE UP
POLYCHROMASIA BLD QL SMEAR: SLIGHT — SIGNIFICANT CHANGE UP
POTASSIUM SERPL-MCNC: 5.2 MMOL/L — SIGNIFICANT CHANGE UP (ref 3.5–5.3)
POTASSIUM SERPL-SCNC: 5.2 MMOL/L — SIGNIFICANT CHANGE UP (ref 3.5–5.3)
PROT SERPL-MCNC: 7.1 G/DL — SIGNIFICANT CHANGE UP (ref 6–8.3)
RBC # BLD: 2.32 M/UL — LOW (ref 4.2–5.8)
RBC # FLD: 16.3 % — HIGH (ref 10.3–14.5)
RBC BLD AUTO: ABNORMAL
SODIUM SERPL-SCNC: 136 MMOL/L — SIGNIFICANT CHANGE UP (ref 135–145)
TROPONIN I, HIGH SENSITIVITY RESULT: 262.7 NG/L — HIGH
TROPONIN I, HIGH SENSITIVITY RESULT: 325.6 NG/L — HIGH
WBC # BLD: 7.73 K/UL — SIGNIFICANT CHANGE UP (ref 3.8–10.5)
WBC # FLD AUTO: 7.73 K/UL — SIGNIFICANT CHANGE UP (ref 3.8–10.5)

## 2021-12-09 PROCEDURE — 93010 ELECTROCARDIOGRAM REPORT: CPT

## 2021-12-09 PROCEDURE — 93306 TTE W/DOPPLER COMPLETE: CPT | Mod: 26

## 2021-12-09 PROCEDURE — 99232 SBSQ HOSP IP/OBS MODERATE 35: CPT | Mod: 24

## 2021-12-09 PROCEDURE — 93308 TTE F-UP OR LMTD: CPT | Mod: 26,59

## 2021-12-09 PROCEDURE — 93321 DOPPLER ECHO F-UP/LMTD STD: CPT | Mod: 26,59

## 2021-12-09 RX ORDER — HEPARIN SODIUM 5000 [USP'U]/ML
INJECTION INTRAVENOUS; SUBCUTANEOUS
Qty: 25000 | Refills: 0 | Status: DISCONTINUED | OUTPATIENT
Start: 2021-12-09 | End: 2021-12-09

## 2021-12-09 RX ORDER — HEPARIN SODIUM 5000 [USP'U]/ML
5000 INJECTION INTRAVENOUS; SUBCUTANEOUS EVERY 8 HOURS
Refills: 0 | Status: DISCONTINUED | OUTPATIENT
Start: 2021-12-09 | End: 2021-12-11

## 2021-12-09 RX ORDER — COLCHICINE 0.6 MG
0.6 TABLET ORAL DAILY
Refills: 0 | Status: DISCONTINUED | OUTPATIENT
Start: 2021-12-09 | End: 2021-12-16

## 2021-12-09 RX ORDER — POLYETHYLENE GLYCOL 3350 17 G/17G
17 POWDER, FOR SOLUTION ORAL DAILY
Refills: 0 | Status: DISCONTINUED | OUTPATIENT
Start: 2021-12-09 | End: 2021-12-16

## 2021-12-09 RX ORDER — NITROGLYCERIN 6.5 MG
0.4 CAPSULE, EXTENDED RELEASE ORAL ONCE
Refills: 0 | Status: COMPLETED | OUTPATIENT
Start: 2021-12-09 | End: 2021-12-09

## 2021-12-09 RX ORDER — MORPHINE SULFATE 50 MG/1
2 CAPSULE, EXTENDED RELEASE ORAL ONCE
Refills: 0 | Status: DISCONTINUED | OUTPATIENT
Start: 2021-12-09 | End: 2021-12-09

## 2021-12-09 RX ADMIN — Medication 81 MILLIGRAM(S): at 13:34

## 2021-12-09 RX ADMIN — SEVELAMER CARBONATE 1600 MILLIGRAM(S): 2400 POWDER, FOR SUSPENSION ORAL at 05:14

## 2021-12-09 RX ADMIN — HEPARIN SODIUM 5000 UNIT(S): 5000 INJECTION INTRAVENOUS; SUBCUTANEOUS at 13:34

## 2021-12-09 RX ADMIN — Medication 0.4 MILLIGRAM(S): at 03:46

## 2021-12-09 RX ADMIN — Medication 325 MILLIGRAM(S): at 13:34

## 2021-12-09 RX ADMIN — ISOSORBIDE MONONITRATE 30 MILLIGRAM(S): 60 TABLET, EXTENDED RELEASE ORAL at 13:35

## 2021-12-09 RX ADMIN — MORPHINE SULFATE 2 MILLIGRAM(S): 50 CAPSULE, EXTENDED RELEASE ORAL at 04:57

## 2021-12-09 RX ADMIN — DRONEDARONE 400 MILLIGRAM(S): 400 TABLET, FILM COATED ORAL at 05:14

## 2021-12-09 RX ADMIN — Medication 1 MILLIGRAM(S): at 13:34

## 2021-12-09 RX ADMIN — Medication 25 MILLIGRAM(S): at 17:54

## 2021-12-09 RX ADMIN — LACTULOSE 20 GRAM(S): 10 SOLUTION ORAL at 22:31

## 2021-12-09 RX ADMIN — FAMOTIDINE 20 MILLIGRAM(S): 10 INJECTION INTRAVENOUS at 13:34

## 2021-12-09 RX ADMIN — Medication 5 MILLIGRAM(S): at 22:31

## 2021-12-09 RX ADMIN — Medication 0.6 MILLIGRAM(S): at 05:14

## 2021-12-09 RX ADMIN — CHLORHEXIDINE GLUCONATE 1 APPLICATION(S): 213 SOLUTION TOPICAL at 05:14

## 2021-12-09 RX ADMIN — Medication 25 MILLIGRAM(S): at 05:14

## 2021-12-09 RX ADMIN — ATORVASTATIN CALCIUM 80 MILLIGRAM(S): 80 TABLET, FILM COATED ORAL at 22:30

## 2021-12-09 RX ADMIN — Medication 0.6 MILLIGRAM(S): at 13:34

## 2021-12-09 RX ADMIN — Medication 50 MILLIGRAM(S): at 05:14

## 2021-12-09 RX ADMIN — POLYETHYLENE GLYCOL 3350 17 GRAM(S): 17 POWDER, FOR SOLUTION ORAL at 05:17

## 2021-12-09 RX ADMIN — ERYTHROPOIETIN 6000 UNIT(S): 10000 INJECTION, SOLUTION INTRAVENOUS; SUBCUTANEOUS at 11:35

## 2021-12-09 RX ADMIN — MORPHINE SULFATE 2 MILLIGRAM(S): 50 CAPSULE, EXTENDED RELEASE ORAL at 05:30

## 2021-12-09 RX ADMIN — SEVELAMER CARBONATE 1600 MILLIGRAM(S): 2400 POWDER, FOR SUSPENSION ORAL at 22:31

## 2021-12-09 RX ADMIN — DRONEDARONE 400 MILLIGRAM(S): 400 TABLET, FILM COATED ORAL at 17:54

## 2021-12-09 RX ADMIN — SEVELAMER CARBONATE 1600 MILLIGRAM(S): 2400 POWDER, FOR SUSPENSION ORAL at 13:34

## 2021-12-09 NOTE — PROGRESS NOTE ADULT - PROBLEM SELECTOR PLAN 4
Asymptomatic bradycardia on telemetry (30-50s)  scheduled for PPM on 12/7/21  continue lopressor 12.5 mg  resume Multaw  continue telemetry monitoring (tachycardia-bradycardia)  Cardio consult Dr. Brad KAPOOR (Dr. Gay) consulted by Dr. Salinas started BB metop tart 25 BID and Eliquis 2.5 BID  decreased lopressor to 12.5  resumed Multaq  continue telemetry due to tachycardia-bradycardia  offered AICD initially refusing but finally consented  s/p Micra placement on 12/7/21  Cardio consult Dr. Brad KAPOOR (Dr. Gay) consulted by Dr. Salinas

## 2021-12-09 NOTE — PROGRESS NOTE ADULT - SUBJECTIVE AND OBJECTIVE BOX
CHIEF COMPLAINT:Patient is a 76y old  Male who presents with a chief complaint of SOB .Pt having chest pain since 330 am and reports pleuritic in nature and improve with sitting forward.    	  REVIEW OF SYSTEMS:  CONSTITUTIONAL: No fever, weight loss, or fatigue  EYES: No eye pain, visual disturbances, or discharge  ENT:  No difficulty hearing, tinnitus, vertigo; No sinus or throat pain  NECK: No pain or stiffness  RESPIRATORY: No cough, wheezing, chills or hemoptysis; No Shortness of Breath  CARDIOVASCULAR: + chest pain, No palpitations, passing out, dizziness, or leg swelling  GASTROINTESTINAL: No abdominal or epigastric pain. No nausea, vomiting, or hematemesis; No diarrhea or constipation. No melena or hematochezia.  GENITOURINARY: No dysuria, frequency, hematuria, or incontinence  NEUROLOGICAL: No headaches, memory loss, loss of strength, numbness, or tremors  SKIN: No itching, burning, rashes, or lesions   LYMPH Nodes: No enlarged glands  ENDOCRINE: No heat or cold intolerance; No hair loss  MUSCULOSKELETAL: No joint pain or swelling; No muscle, back, or extremity pain  PSYCHIATRIC: No depression, anxiety, mood swings, or difficulty sleeping  HEME/LYMPH: No easy bruising, or bleeding gums  ALLERGY AND IMMUNOLOGIC: No hives or eczema	      PHYSICAL EXAM:  T(C): 36.6 (12-09-21 @ 04:33), Max: 37.1 (12-08-21 @ 20:05)  HR: 81 (12-09-21 @ 04:33) (72 - 85)  BP: 118/66 (12-09-21 @ 04:33) (103/57 - 125/64)  RR: 18 (12-09-21 @ 04:33) (16 - 20)  SpO2: 97% (12-09-21 @ 04:33) (97% - 100%)  Wt(kg): --  I&O's Summary    07 Dec 2021 07:01  -  08 Dec 2021 07:00  --------------------------------------------------------  IN: 75 mL / OUT: 5 mL / NET: 70 mL        Appearance: Normal	  HEENT:   Normal oral mucosa, PERRL, EOMI	  Lymphatic: No lymphadenopathy  Cardiovascular: Normal S1 S2, No JVD, No murmurs, No edema  Respiratory: Lungs clear to auscultation	  Psychiatry: A & O x 3, Mood & affect appropriate  Gastrointestinal:  Soft, Non-tender, + BS	  Skin: No rashes, No ecchymoses, No cyanosis	  Neurologic: Non-focal  Extremities: Normal range of motion, No clubbing, cyanosis or edema  Vascular: Peripheral pulses palpable 2+ bilaterally    MEDICATIONS  (STANDING):  aspirin  chewable 81 milliGRAM(s) Oral daily  atorvastatin 80 milliGRAM(s) Oral at bedtime  bisacodyl 5 milliGRAM(s) Oral at bedtime  chlorhexidine 2% Cloths 1 Application(s) Topical <User Schedule>  colchicine 0.6 milliGRAM(s) Oral two times a day  dronedarone 400 milliGRAM(s) Oral two times a day  epoetin ximena-epbx (RETACRIT) Injectable 6000 Unit(s) IV Push <User Schedule>  famotidine    Tablet 20 milliGRAM(s) Oral daily  ferrous    sulfate 325 milliGRAM(s) Oral daily  folic acid 1 milliGRAM(s) Oral daily  hydrALAZINE 50 milliGRAM(s) Oral three times a day  isosorbide   mononitrate ER Tablet (IMDUR) 30 milliGRAM(s) Oral daily  lactulose Syrup 20 Gram(s) Oral at bedtime  metoprolol tartrate 25 milliGRAM(s) Oral two times a day  polyethylene glycol 3350 17 Gram(s) Oral daily  sevelamer carbonate 1600 milliGRAM(s) Oral three times a day          ECG:  NSR with st elevation 1 and avl, pr elevation in avr and pr depression I and avl	    	  LABS:	 	    CARDIAC MARKERS ( 09 Dec 2021 03:22 )  x     / x     / 71 U/L / x     / 1.2 ng/mL      Troponin I, High Sensitivity Result: 325.6 ng/L (12-09 @ 03:22)  Troponin I, High Sensitivity Result: 371.9 ng/L (12-08 @ 19:08)  Troponin I, High Sensitivity Result: 591.7 ng/L (12-08 @ 07:33)                            7.7    x     )-----------( 160      ( 09 Dec 2021 04:52 )             23.6     12-09    136  |  99  |  76<H>  ----------------------------<  112<H>  5.2   |  27  |  11.80<H>    Ca    9.2      09 Dec 2021 04:52  Phos  6.0     12-09  Mg     2.2     12-09    TPro  7.1  /  Alb  2.7<L>  /  TBili  0.5  /  DBili  x   /  AST  9<L>  /  ALT  28  /  AlkPhos  146<H>  12-09    proBNP: Serum Pro-Brain Natriuretic Peptide: 87295 pg/mL (11-28 @ 10:10)    ct< from: CT Angio Chest PE Protocol w/ IV Cont (12.08.21 @ 11:26) >  ACC: [id number] EXAM:  CT ANGIO CHEST PULM ART [facility]                          PROCEDURE DATE:  12/08/2021          INTERPRETATION:  INDICATION: Shortness of breath    TECHNIQUE: Volumetric images of the chest were obtained after the   administration of 80 mL of Omnipaque 350. Maximum intensity projection   images were generated.    COMPARISON: None.    FINDINGS:    PULMONARY ANGIOGRAM:  No pulmonary embolism.    LUNGS/AIRWAYS/PLEURA: Patent airways to the segmental bronchi. Punctate   nodule on the minor fissure, likely an intrapulmonary lymph node. Mild   atelectasis in the lower lobes and lingula. Unremarkable pleura.    LYMPH NODES/MEDIASTINUM: Unremarkable thyroid. No enlarged lymph nodes.    HEART/VASCULATURE: Normal heart size. Small pericardial effusion. Right   ventricular leadless pacemaker. Coronary artery calcification. Normal   caliber aorta. Calcified aortic valve and mitral annulus. 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 Patent right subclavian and axillary vein stents.    UPPER ABDOMEN: Atrophic kidneys containing multiple cysts and other   hypodensities that are incompletely characterized. Bilateral renal   calculi. No hydronephrosis.    BONES/SOFT TISSUES: Degenerative changes. Multiple collateral veins in   the left chest wall.      IMPRESSION:    No pulmonary embolism or other acute pulmonary process.    Small pericardial effusion.              HERNESTO LUNA M.D., ATTENDING RADIOGIST  This document has been electronically signed. Dec  8 2021 12:06PM

## 2021-12-09 NOTE — PROGRESS NOTE ADULT - PROBLEM SELECTOR PLAN 1
- BP at NH wnl but elevated peak 241/84 w/ pulm edema on CXR  - BNP 30k and Trop elevation  - goal is reduce BP by 25% in 24 hours  - s/p Lasix 80 and Hydral 10  - s/p urgent HD (11/28/21)  - add hydralazine 25 q8  - vs q 4  - trend trop T3 - trended down  - continue lopressor 12.5 in setting of bradycardia  - continue Multaq  - continue to monitor on telemetry (tachycardia-bradycardia)  - Cardio consulted EP (Dr. Gay)  - s/p Micra placement (12/7/21)  - NEPHRO Dr. Jacinto  - CARDIO Dr. Salinas - BP at NH wnl but elevated peak 241/84 w/ pulm edema on CXR  - BNP 30k and Trop elevation  - goal is reduce BP by 25% in 24 hours  - s/p Lasix 80 and Hydral 10  - s/p urgent HD (11/28/21)  - d/c hydralazine  - trend trop T3 - trended down  - continue lopressor 12.5 in setting of bradycardia  - continue Multaq  - continue to monitor on telemetry (tachycardia-bradycardia)  - Cardio consulted EP (Dr. Gay)  - s/p Micra placement (12/7/21)  - NEPHRO Dr. Jacinto  - CARDIO Dr. Salinas

## 2021-12-09 NOTE — PROGRESS NOTE ADULT - PROBLEM SELECTOR PLAN 5
Hb 9.8 slight new macrocytosis .7  continue iron + stool softener  B12 and folate- wnl had asymptomatic bradycardia on telemetry (30-50s)  s/p PPM on 12/7/21  continue lopressor 12.5 mg  resume Multaw  continue telemetry monitoring (tachycardia-bradycardia)  Cardio consult Dr. Brad KAPOOR (Dr. Gay) consulted by Dr. Salinas

## 2021-12-09 NOTE — PROGRESS NOTE ADULT - SUBJECTIVE AND OBJECTIVE BOX
PGY-1 Progress Note discussed with attending    PAGER #: [[phone]] TILL 5:00 PM  PLEASE CONTACT ON CALL TEAM:  - On Call Team (Please refer to Mary) FROM 5:00 PM - 8:30PM  - Nightfloat Team FROM 8:30 -7:30 AM    CHIEF COMPLAINT & BRIEF HOSPITAL COURSE:      INTERVAL HPI/OVERNIGHT EVENTS:       REVIEW OF SYSTEMS:  CONSTITUTIONAL: No fever, weight loss, or fatigue  RESPIRATORY: No cough, wheezing, chills or hemoptysis; No shortness of breath  CARDIOVASCULAR: No chest pain, palpitations, dizziness, or leg swelling  GASTROINTESTINAL: No abdominal pain. No nausea, vomiting, or hematemesis; No diarrhea or constipation. No melena or hematochezia.  GENITOURINARY: No dysuria or hematuria, urinary frequency  NEUROLOGICAL: No headaches, memory loss, loss of strength, numbness, or tremors  SKIN: No itching, burning, rashes, or lesions     MEDICATIONS  (STANDING):  aspirin  chewable 81 milliGRAM(s) Oral daily  atorvastatin 80 milliGRAM(s) Oral at bedtime  bisacodyl 5 milliGRAM(s) Oral at bedtime  chlorhexidine 2% Cloths 1 Application(s) Topical <User Schedule>  colchicine 0.6 milliGRAM(s) Oral daily  dronedarone 400 milliGRAM(s) Oral two times a day  epoetin ximena-epbx (RETACRIT) Injectable 6000 Unit(s) IV Push <User Schedule>  famotidine    Tablet 20 milliGRAM(s) Oral daily  ferrous    sulfate 325 milliGRAM(s) Oral daily  folic acid 1 milliGRAM(s) Oral daily  heparin   Injectable 5000 Unit(s) SubCutaneous every 8 hours  isosorbide   mononitrate ER Tablet (IMDUR) 30 milliGRAM(s) Oral daily  lactulose Syrup 20 Gram(s) Oral at bedtime  metoprolol tartrate 25 milliGRAM(s) Oral two times a day  polyethylene glycol 3350 17 Gram(s) Oral daily  sevelamer carbonate 1600 milliGRAM(s) Oral three times a day    MEDICATIONS  (PRN):  acetaminophen     Tablet .. 650 milliGRAM(s) Oral every 6 hours PRN Mild Pain (1 - 3), Moderate Pain (4 - 6)      Vital Signs Last 24 Hrs  T(C): 36.8 (09 Dec 2021 08:10), Max: 37.1 (08 Dec 2021 20:05)  T(F): 98.3 (09 Dec 2021 08:10), Max: 98.7 (08 Dec 2021 20:05)  HR: 74 (09 Dec 2021 08:10) (72 - 81)  BP: 99/55 (09 Dec 2021 08:10) (99/55 - 125/64)  BP(mean): --  RR: 18 (09 Dec 2021 08:10) (16 - 20)  SpO2: 100% (09 Dec 2021 08:10) (97% - 100%)    PHYSICAL EXAMINATION:  GENERAL: NAD, well built  HEAD:  Atraumatic, Normocephalic  EYES:  conjunctiva and sclera clear  NECK: Supple, No JVD, Normal thyroid  CHEST/LUNG: Clear to auscultation. Clear to percussion bilaterally; No rales, rhonchi, wheezing, or rubs  HEART: Regular rate and rhythm; No murmurs, rubs, or gallops  ABDOMEN: Soft, Nontender, Nondistended; Bowel sounds present, no pain or masses on palpation  NERVOUS SYSTEM:  Alert & Oriented X3  : voiding well  EXTREMITIES:  2+ Peripheral Pulses, No clubbing, cyanosis, or edema  SKIN: warm dry                          7.7    7.73  )-----------( 160      ( 09 Dec 2021 04:52 )             23.6     12-09    136  |  99  |  76<H>  ----------------------------<  112<H>  5.2   |  27  |  11.80<H>    Ca    9.2      09 Dec 2021 04:52  Phos  6.0     12-09  Mg     2.2     12-09    TPro  7.1  /  Alb  2.7<L>  /  TBili  0.5  /  DBili  x   /  AST  9<L>  /  ALT  28  /  AlkPhos  146<H>  12-09    LIVER FUNCTIONS - ( 09 Dec 2021 04:52 )  Alb: 2.7 g/dL / Pro: 7.1 g/dL / ALK PHOS: 146 U/L / ALT: 28 U/L DA / AST: 9 U/L / GGT: x           CARDIAC MARKERS ( 09 Dec 2021 03:22 )  x     / x     / 71 U/L / x     / 1.2 ng/mL      PT/INR - ( 07 Dec 2021 11:04 )   PT: 11.7 sec;   INR: 0.98 ratio         PTT - ( 09 Dec 2021 04:52 )  PTT:34.4 sec    I&O's Summary          CAPILLARY BLOOD GLUCOSE      RADIOLOGY & ADDITIONAL TESTS:                   PGY-1 Progress Note discussed with attending    PAGER #: [[phone]] TILL 5:00 PM  PLEASE CONTACT ON CALL TEAM:  - On Call Team (Please refer to Mary) FROM 5:00 PM - 8:30PM  - Nightfloat Team FROM 8:30 -7:30 AM    CHIEF COMPLAINT & BRIEF HOSPITAL COURSE:    Patient is a 75 y/o M from [facility]. He has past medical history of ESRD on HD (MWF), HTN, severe pulmonary hypertension (CHF w/ pEF > 55%; GIIDD), Anemia of CKD, NSTEMI, CAD, paroxysmal Atrial fibrillation, vascular dementia, CVA w/ R hemiparesis and mood disorder. He was brought in by EMS due to acute onset of shortness of breath secondary to fluid overload. His last HD was on 11/28/21. His O2 saturation was 91% requiring 4LPM of O2 via nasal cannula. His oxygen status improved to 98%. In the E.D, he was tachypneic and started on BiPAP. BNP was elevated at 30,000. Blood pressure was high at 241/84/ Chest Xray showed bilateral pulmonary edema. Nephrology (Dr. Pritchard) was consulted and he underwent emergency HD. ICU was also consulted and he was cleared to be admitted to the floors on nasal cannula. He was given dose of Lasix 80mg IV, Hydralazine 10 mg IV and Insulin 8u with dextrose for Hyperkalemia of 6.3. Serum potassium eventually improved. Troponin was initially elevated but trended down since. He still has some episodes of bradycardia on telemetry. Lopressor was creased in half from 25mg to 12.5mg. He still has asymptomatic bradycardia. Cardiology was recommending AICD placement but patient keeps on refusing initially but finally consented. Micra was inserted on 12/7/21 c/o EP (Dr. Gay). Overnight he developed severe left-sided chest pain. He had similar episodes in the past. EKG was negative for any ischemia. Troponin was mildly elevated but downtrended. CTA Chest was was negative for pulmonary embolism..He was given tylenol, diluadid and NTG to relieve the pain. Chest pain continued to be severe and worsen. Repeat EKG showed KY and ST changes suggesting STEMI. Cardiology came and suspected pericarditis. Repeat ECHO was done. Palliative talked to the family for Goals of Care.     INTERVAL HPI/OVERNIGHT EVENTS:     Patient was examined at bedside AAOx3. He has been having severe progressively worsening left-sided chest pain for 2 days. ST changes were noted on telemetry. Serial EKGs were done revealing KY changes and ST changes that may suggest acute STEMI. Troponins were obtained but downtrended from yesterday. He was given IV dilaudid and NTG which relieved his pain. Cardiology (Dr. Salinas) was called. She was suspecting pericarditis. ECHO was done in the morning with initial reading of posterior pericardial effusion, still pending official results. EP (Dr. Gay) consulted and recommending a repeat ECHO the next morning. Hemoglobin has also remained low for 2 days at 7.7. Creatinine is elevated at 11.80. Nephrology (Dr. Jacinto) ordered transfusion of 1u PRBC intradialysis. For possible transfer to [facility] depending on his clinical status today.    REVIEW OF SYSTEMS:  CONSTITUTIONAL: No fever, weight loss, or fatigue  RESPIRATORY: No cough, wheezing, chills or hemoptysis; No shortness of breath  CARDIOVASCULAR: (+) chest pain, No palpitations, dizziness, or leg swelling  GASTROINTESTINAL: No abdominal pain. No nausea, vomiting, or hematemesis; No diarrhea or constipation. No melena or hematochezia.  GENITOURINARY: No dysuria or hematuria, urinary frequency  NEUROLOGICAL: No headaches, memory loss, loss of strength, numbness, or tremors  SKIN: No itching, burning, rashes, or lesions     MEDICATIONS  (STANDING):  aspirin  chewable 81 milliGRAM(s) Oral daily  atorvastatin 80 milliGRAM(s) Oral at bedtime  bisacodyl 5 milliGRAM(s) Oral at bedtime  chlorhexidine 2% Cloths 1 Application(s) Topical <User Schedule>  colchicine 0.6 milliGRAM(s) Oral daily  dronedarone 400 milliGRAM(s) Oral two times a day  epoetin ximena-epbx (RETACRIT) Injectable 6000 Unit(s) IV Push <User Schedule>  famotidine    Tablet 20 milliGRAM(s) Oral daily  ferrous    sulfate 325 milliGRAM(s) Oral daily  folic acid 1 milliGRAM(s) Oral daily  heparin   Injectable 5000 Unit(s) SubCutaneous every 8 hours  isosorbide   mononitrate ER Tablet (IMDUR) 30 milliGRAM(s) Oral daily  lactulose Syrup 20 Gram(s) Oral at bedtime  metoprolol tartrate 25 milliGRAM(s) Oral two times a day  polyethylene glycol 3350 17 Gram(s) Oral daily  sevelamer carbonate 1600 milliGRAM(s) Oral three times a day    MEDICATIONS  (PRN):  acetaminophen     Tablet .. 650 milliGRAM(s) Oral every 6 hours PRN Mild Pain (1 - 3), Moderate Pain (4 - 6)      Vital Signs Last 24 Hrs  T(C): 36.8 (09 Dec 2021 08:10), Max: 37.1 (08 Dec 2021 20:05)  T(F): 98.3 (09 Dec 2021 08:10), Max: 98.7 (08 Dec 2021 20:05)  HR: 74 (09 Dec 2021 08:10) (72 - 81)  BP: 99/55 (09 Dec 2021 08:10) (99/55 - 125/64)  BP(mean): --  RR: 18 (09 Dec 2021 08:10) (16 - 20)  SpO2: 100% (09 Dec 2021 08:10) (97% - 100%)    PHYSICAL EXAMINATION:  GENERAL: NAD  HEAD:  Atraumatic, Normocephalic  EYES:  conjunctiva and sclera clear  NECK: Supple, No JVD, Normal thyroid  CHEST/LUNG: Clear to auscultation. Clear to percussion bilaterally; No rales, rhonchi, wheezing, or rubs  HEART: Regular rate and rhythm; No murmurs, rubs, or gallops  ABDOMEN: Soft, Nontender, Nondistended; Bowel sounds present, no pain or masses on palpation  NERVOUS SYSTEM:  Alert & Oriented X3  : voiding well  EXTREMITIES:  2+ Peripheral Pulses, No clubbing, cyanosis, or edema  SKIN: warm dry                          7.7    7.73  )-----------( 160      ( 09 Dec 2021 04:52 )             23.6     12-09    136  |  99  |  76<H>  ----------------------------<  112<H>  5.2   |  27  |  11.80<H>    Ca    9.2      09 Dec 2021 04:52  Phos  6.0     12-09  Mg     2.2     12-09    TPro  7.1  /  Alb  2.7<L>  /  TBili  0.5  /  DBili  x   /  AST  9<L>  /  ALT  28  /  AlkPhos  146<H>  12-09    LIVER FUNCTIONS - ( 09 Dec 2021 04:52 )  Alb: 2.7 g/dL / Pro: 7.1 g/dL / ALK PHOS: 146 U/L / ALT: 28 U/L DA / AST: 9 U/L / GGT: x           CARDIAC MARKERS ( 09 Dec 2021 03:22 )  x     / x     / 71 U/L / x     / 1.2 ng/mL      PT/INR - ( 07 Dec 2021 11:04 )   PT: 11.7 sec;   INR: 0.98 ratio         PTT - ( 09 Dec 2021 04:52 )  PTT:34.4 sec    I&O's Summary          CAPILLARY BLOOD GLUCOSE      RADIOLOGY & ADDITIONAL TESTS:

## 2021-12-09 NOTE — PROGRESS NOTE ADULT - SUBJECTIVE AND OBJECTIVE BOX
Problem List:  ESRD  Post Micra pacemaker placement  Chest pain improved  Pericarditis on Echo     PAST MEDICAL & SURGICAL HISTORY:  ESRD (end stage renal disease) on dialysis    Hypertension    Anemia    Cerebrovascular accident (CVA), unspecified mechanism    Paroxysmal atrial fibrillation    CAD (coronary artery disease)    A-V fistula        No Known Allergies      MEDICATIONS  (STANDING):  aspirin  chewable 81 milliGRAM(s) Oral daily  atorvastatin 80 milliGRAM(s) Oral at bedtime  bisacodyl 5 milliGRAM(s) Oral at bedtime  chlorhexidine 2% Cloths 1 Application(s) Topical <User Schedule>  colchicine 0.6 milliGRAM(s) Oral daily  dronedarone 400 milliGRAM(s) Oral two times a day  epoetin ximena-epbx (RETACRIT) Injectable 6000 Unit(s) IV Push <User Schedule>  famotidine    Tablet 20 milliGRAM(s) Oral daily  ferrous    sulfate 325 milliGRAM(s) Oral daily  folic acid 1 milliGRAM(s) Oral daily  isosorbide   mononitrate ER Tablet (IMDUR) 30 milliGRAM(s) Oral daily  lactulose Syrup 20 Gram(s) Oral at bedtime  metoprolol tartrate 25 milliGRAM(s) Oral two times a day  polyethylene glycol 3350 17 Gram(s) Oral daily  sevelamer carbonate 1600 milliGRAM(s) Oral three times a day    MEDICATIONS  (PRN):  acetaminophen     Tablet .. 650 milliGRAM(s) Oral every 6 hours PRN Mild Pain (1 - 3), Moderate Pain (4 - 6)                            7.7    7.73  )-----------( 160      ( 09 Dec 2021 04:52 )             23.6     12-09    136  |  99  |  76<H>  ----------------------------<  112<H>  5.2   |  27  |  11.80<H>    Ca    9.2      09 Dec 2021 04:52  Phos  6.0     12-09  Mg     2.2     12-09    TPro  7.1  /  Alb  2.7<L>  /  TBili  0.5  /  DBili  x   /  AST  9<L>  /  ALT  28  /  AlkPhos  146<H>  12-09    PT/INR - ( 07 Dec 2021 11:04 )   PT: 11.7 sec;   INR: 0.98 ratio         PTT - ( 09 Dec 2021 04:52 )  PTT:34.4 sec        REVIEW OF SYSTEMS:  General: no fever no chills, no weight loss.    RESPIRATORY: No cough, wheezing, hemoptysis; No shortness of breath  CARDIOVASCULAR: No more chest pain  GASTROINTESTINAL: No abdominal or epigastric pain. No nausea, vomiting. No diarrhea or constipation. No melena.  GENITOURINARY: No dysuria, frequency, foamy urine, urinary urgency, incontinence or hematuria  NEUROLOGICAL: No numbness or weakness, no tremor , no dizziness.   Muscle skeletal : no joint pain and no swelling of joints and limbs.  SKIN: No itching, burning, rashes.        VITALS:  T(F): 97.8 (12-09-21 @ 04:33), Max: 98.7 (12-08-21 @ 20:05)  HR: 81 (12-09-21 @ 04:33)  BP: 118/66 (12-09-21 @ 04:33)  RR: 18 (12-09-21 @ 04:33)  SpO2: 97% (12-09-21 @ 04:33)  Wt(kg): --      PHYSICAL EXAM:  Constitutional: well developed, no diaphoresis, no distress.  Neck: No JVD, no carotid bruit, supple, no adenopathy  Respiratory: air entrance B/L, no wheezes, rales or rhonchi  Cardiovascular: S1, S2, RRR, no pericardial rub, no murmur  Abdomen: BS+, soft, no tenderness, no bruit  Pelvis: bladder nondistended  right AVG with bruit and thrill.

## 2021-12-09 NOTE — PROGRESS NOTE ADULT - PROBLEM SELECTOR PLAN 8
RISK                                                          Points  [] Previous VTE                                           3  [] Thrombophilia                                        2  [] Lower limb paralysis                              2   [] Current Cancer                                       2   [x] Immobilization > 24 hrs                        1  [] ICU/CCU stay > 24 hours                       1  [x] Age > 60                                                   1    eliquis for DVT prophylaxis  PPI IV for GI prophylaxis prior Echo on 9/28/21 showed RV systolic pressure is 69 mm Hg. Severe pulmonary hypertension.  - admitted to [facility]  - [facility] IMDUR, metop

## 2021-12-09 NOTE — PROGRESS NOTE ADULT - SUBJECTIVE AND OBJECTIVE BOX
EPS Progress Note    S: Patient developed chest pain yesterday. CTPA showed no PE, but evidence of a small pericardial effusion. EKG overnight showing ST segment evolutions consistent with pericarditis. No chest pain this morning. States he has had this chest pain prior to this hospital stay, but it did not occur again until yesterday.     T(C): 36.8 (12-09-21 @ 08:10), Max: 37.1 (12-08-21 @ 20:05)  HR: 74 (12-09-21 @ 08:10) (72 - 81)  BP: 99/55 (12-09-21 @ 08:10) (99/55 - 125/64)  RR: 18 (12-09-21 @ 08:10) (16 - 20)  SpO2: 100% (12-09-21 @ 08:10) (97% - 100%)          General:  No acute distress      Chest:  Chest is clear to auscultation bilaterally without wheezes, crackles, or rhonchi  Cardiac:  Regular rate and rhythm.  2/6 systolic murmur, no rubs, or gallops heard.  Abdomen:  Soft without rebound or guarding.  Bowel sounds are present in all 4 quadrants.  No hepatosplenomegaly  Extremities:  No lower extremity edema is present.  No cyanosis or clubbing       MEDICATIONS  (STANDING):  aspirin  chewable 81 milliGRAM(s) Oral daily  atorvastatin 80 milliGRAM(s) Oral at bedtime  bisacodyl 5 milliGRAM(s) Oral at bedtime  chlorhexidine 2% Cloths 1 Application(s) Topical <User Schedule>  colchicine 0.6 milliGRAM(s) Oral daily  dronedarone 400 milliGRAM(s) Oral two times a day  epoetin ximena-epbx (RETACRIT) Injectable 6000 Unit(s) IV Push <User Schedule>  famotidine    Tablet 20 milliGRAM(s) Oral daily  ferrous    sulfate 325 milliGRAM(s) Oral daily  folic acid 1 milliGRAM(s) Oral daily  heparin   Injectable 5000 Unit(s) SubCutaneous every 8 hours  isosorbide   mononitrate ER Tablet (IMDUR) 30 milliGRAM(s) Oral daily  lactulose Syrup 20 Gram(s) Oral at bedtime  metoprolol tartrate 25 milliGRAM(s) Oral two times a day  polyethylene glycol 3350 17 Gram(s) Oral daily  sevelamer carbonate 1600 milliGRAM(s) Oral three times a day    MEDICATIONS  (PRN):  acetaminophen     Tablet .. 650 milliGRAM(s) Oral every 6 hours PRN Mild Pain (1 - 3), Moderate Pain (4 - 6)                                                         7.7    7.73  )-----------( 160      ( 09 Dec 2021 04:52 )             23.6     12-09    136  |  99  |  76<H>  ----------------------------<  112<H>  5.2   |  27  |  11.80<H>    Ca    9.2      09 Dec 2021 04:52  Phos  6.0     12-09  Mg     2.2     12-09    TPro  7.1  /  Alb  2.7<L>  /  TBili  0.5  /  DBili  x   /  AST  9<L>  /  ALT  28  /  AlkPhos  146<H>  12-09    PT/INR - ( 07 Dec 2021 11:04 )   PT: 11.7 sec;   INR: 0.98 ratio         PTT - ( 09 Dec 2021 04:52 )  PTT:34.4 sec

## 2021-12-09 NOTE — PROGRESS NOTE ADULT - PROBLEM SELECTOR PLAN 3
started BB metop tart 25 BID and Eliquis 2.5 BID  decreased lopressor to 12.5  resumed Multaq  continue telemetry due to tachycardia-bradycardia  offered AICD initially refusing but finally consented  s/p Micra placement on 12/7/21  Cardio consult Dr. Brad KAPOOR (Dr. Gay) consulted by Dr. Salinas complains of severe left-sided chest pain x 2 days post PPM placement (12/8/21)  Troponins were mildly elevated but trended down  Given pain meds (IV Tylenol, IV dilaudid, NTG)  Telemetry showed ST elevations (12/9/21)  EKG showed diffused MA and ST changes  ECHO was done (12/9/21) - initial read showed post. pericardial effusion  Cardio consult - Dr. Salinas  EP consult - Dr. Gay  for repeat ECHO td. (12/10/21)  for possible transfer to [facility]

## 2021-12-09 NOTE — PROGRESS NOTE ADULT - ASSESSMENT
77 yo M from [facility], w/ ESRD (on HD MWF), HTN, Severe Pulm HTN, CHFpEF >55% w/ GIIDD, Anemia of CKD, NSTEMI, CAD, parox Afib, CVA w/ RIGHT dominant hemiparesis, Vascular Dementia and Mood disorder who was BIB EMS for acute onset SOB 2/2 fluid overload,now CP c/w pericarditis.  1.CP c/w pericarditis-Echocardiogram eval pericardial effusion.  2.Mjnrg-Pbtxl-o/p PPM, lopressor and multaq.  3.ESRD-HD as per renal.  4.Parox Afib- refusing eliquis.  5.Lipid d/o-statin.  6.CVA-asa,statin."Refusing Eliquis-told cause prostate ca."  7.CAD-asa,imdur,statin.  8.HTN- d/c hydralazine 50mg q8h.  9.GI and DVT prophylaxis.

## 2021-12-09 NOTE — PROGRESS NOTE ADULT - PROBLEM SELECTOR PLAN 2
- on HD MWF last HD outpatient on Friday 11/26 , no missed HD  - CXR with fluid OD, b/l LE pitting edema  - BiPAP in ED  - urgent HD (11/28)  - resume home meds sevelamer, colcrys   - monitor electrolytes, post HD labs  - scheduled for HD today (12/8/21)  - NEPHRO Dr. Jacinto - on HD MWF last HD outpatient on Friday 11/26 , no missed HD  - CXR with fluid OD, b/l LE pitting edema  - BiPAP in ED  - urgent HD (11/28)  - resume home meds sevelamer, colcrys   - monitor electrolytes, post HD labs  - scheduled for HD today (12/8/21)  - s/p 1u PRBC intradialysis   - NEPHRO Dr. Jacinto

## 2021-12-09 NOTE — CHART NOTE - NSCHARTNOTEFT_GEN_A_CORE
Paged by nurse that patient has had multiple of episodes of VT non-sustaining and ST elevations on tele.  Patient seen at bedside to asses chest pain. Per patient c/o worsening chest pain with increased frequency from yesterday.  Ordered STAT EKG and STAT tronopin as trops were elevated in the AM.

## 2021-12-09 NOTE — PROGRESS NOTE ADULT - TIME BILLING
discussed with medical team
discussed and explained the patient the issues with his HR and fluid overload .  He is refusing PACEMAKER PALCEMENT

## 2021-12-09 NOTE — PROGRESS NOTE ADULT - PROBLEM SELECTOR PLAN 7
prior Echo on 9/28/21 showed RV systolic pressure is 69 mm Hg. Severe pulmonary hypertension.  - admitted to [facility]  - [facility] IMDUR, metop continue home meds  no neuro status change currently at baseline

## 2021-12-09 NOTE — PROGRESS NOTE ADULT - ASSESSMENT
75 y/o male with ESRD (on HD MWF), HTN, Severe Pulm HTN, HFpEF, Anemia of CKD, NSTEMI, CAD, parox Afib, CVA w/ RIGHT dominant hemiparesis, Vascular Dementia, Mood disorder, and tachy arnaldo syndrome who is now s/p Micra placement. He developed recurrent chest pain in the last two days. CT and echo shows a pericardial effusion. No evidence of contrast in the pericardium on CTPA. The device is septal on my review of the CT images. His echo this morning per verbal report shows a posterior pericardial effusion with normal RV and LV function, and no evidence of cardiac tamponade.     - Suspect pericarditis is unrelated to Micra placement. He has had a history of recurrent chest pain that is similar to his current pain. Device implantation was uneventful. He has multiple comorbidities that could explain his effusion and pericarditis. It is difficult at this time to truly know the etiology.   - Please repeat echo tomorrow. Consider bubble study to evaluate for any bubbles in the pericardium.  - Would benefit from close monitoring. Discussed transfer to [facility] today. If clinical status changes at all, would transfer.   - Agree with management of pericarditis.   - Hold AC at this time.

## 2021-12-09 NOTE — PROGRESS NOTE ADULT - ASSESSMENT
ESRD , will have dialysis today  Follow BP in dialysis , UF 1 KG as per BP, no heparin during treatment    Anemia follow H/H in am and cross and type in dialysis today, transfuse 1 unit of PRBC .    Post Micra pacemaker, chest pain improved.     Cardiology to follow   ESRD , will have dialysis today  Follow BP in dialysis , UF 1 KG as per BP, no heparin during treatment    Anemia follow H/H in am and cross and type in dialysis today, transfuse 1 unit of PRBC  in dialysis.    Post Micra pacemaker, chest pain improved.     Cardiology to follow

## 2021-12-09 NOTE — PROGRESS NOTE ADULT - PROBLEM SELECTOR PLAN 9
palliative consult done  daughter and sister desired FULL CODE RISK                                                          Points  [] Previous VTE                                           3  [] Thrombophilia                                        2  [] Lower limb paralysis                              2   [] Current Cancer                                       2   [x] Immobilization > 24 hrs                        1  [] ICU/CCU stay > 24 hours                       1  [x] Age > 60                                                   1    eliquis for DVT prophylaxis  PPI IV for GI prophylaxis

## 2021-12-10 LAB
ALBUMIN SERPL ELPH-MCNC: 2.5 G/DL — LOW (ref 3.5–5)
ALP SERPL-CCNC: 138 U/L — HIGH (ref 40–120)
ALT FLD-CCNC: 34 U/L DA — SIGNIFICANT CHANGE UP (ref 10–60)
ANION GAP SERPL CALC-SCNC: 9 MMOL/L — SIGNIFICANT CHANGE UP (ref 5–17)
ANISOCYTOSIS BLD QL: SLIGHT — SIGNIFICANT CHANGE UP
AST SERPL-CCNC: 19 U/L — SIGNIFICANT CHANGE UP (ref 10–40)
BASOPHILS # BLD AUTO: 0.01 K/UL — SIGNIFICANT CHANGE UP (ref 0–0.2)
BASOPHILS NFR BLD AUTO: 0.2 % — SIGNIFICANT CHANGE UP (ref 0–2)
BILIRUB SERPL-MCNC: 0.5 MG/DL — SIGNIFICANT CHANGE UP (ref 0.2–1.2)
BUN SERPL-MCNC: 55 MG/DL — HIGH (ref 7–18)
CALCIUM SERPL-MCNC: 10.1 MG/DL — SIGNIFICANT CHANGE UP (ref 8.4–10.5)
CHLORIDE SERPL-SCNC: 98 MMOL/L — SIGNIFICANT CHANGE UP (ref 96–108)
CO2 SERPL-SCNC: 29 MMOL/L — SIGNIFICANT CHANGE UP (ref 22–31)
CREAT SERPL-MCNC: 8.45 MG/DL — HIGH (ref 0.5–1.3)
DACRYOCYTES BLD QL SMEAR: SLIGHT — SIGNIFICANT CHANGE UP
EOSINOPHIL # BLD AUTO: 0.01 K/UL — SIGNIFICANT CHANGE UP (ref 0–0.5)
EOSINOPHIL NFR BLD AUTO: 0.2 % — SIGNIFICANT CHANGE UP (ref 0–6)
GLUCOSE SERPL-MCNC: 97 MG/DL — SIGNIFICANT CHANGE UP (ref 70–99)
HCT VFR BLD CALC: 25.9 % — LOW (ref 39–50)
HGB BLD-MCNC: 8.7 G/DL — LOW (ref 13–17)
IMM GRANULOCYTES NFR BLD AUTO: 0.5 % — SIGNIFICANT CHANGE UP (ref 0–1.5)
LYMPHOCYTES # BLD AUTO: 0.97 K/UL — LOW (ref 1–3.3)
LYMPHOCYTES # BLD AUTO: 16.3 % — SIGNIFICANT CHANGE UP (ref 13–44)
MACROCYTES BLD QL: SLIGHT — SIGNIFICANT CHANGE UP
MAGNESIUM SERPL-MCNC: 2.3 MG/DL — SIGNIFICANT CHANGE UP (ref 1.6–2.6)
MANUAL SMEAR VERIFICATION: SIGNIFICANT CHANGE UP
MCHC RBC-ENTMCNC: 33.5 PG — SIGNIFICANT CHANGE UP (ref 27–34)
MCHC RBC-ENTMCNC: 33.6 GM/DL — SIGNIFICANT CHANGE UP (ref 32–36)
MCV RBC AUTO: 99.6 FL — SIGNIFICANT CHANGE UP (ref 80–100)
MONOCYTES # BLD AUTO: 1.14 K/UL — HIGH (ref 0–0.9)
MONOCYTES NFR BLD AUTO: 19.1 % — HIGH (ref 2–14)
NEUTROPHILS # BLD AUTO: 3.8 K/UL — SIGNIFICANT CHANGE UP (ref 1.8–7.4)
NEUTROPHILS NFR BLD AUTO: 63.7 % — SIGNIFICANT CHANGE UP (ref 43–77)
NRBC # BLD: 0 /100 WBCS — SIGNIFICANT CHANGE UP (ref 0–0)
OVALOCYTES BLD QL SMEAR: SLIGHT — SIGNIFICANT CHANGE UP
PHOSPHATE SERPL-MCNC: 3.9 MG/DL — SIGNIFICANT CHANGE UP (ref 2.5–4.5)
PLAT MORPH BLD: NORMAL — SIGNIFICANT CHANGE UP
PLATELET # BLD AUTO: 158 K/UL — SIGNIFICANT CHANGE UP (ref 150–400)
POIKILOCYTOSIS BLD QL AUTO: SLIGHT — SIGNIFICANT CHANGE UP
POLYCHROMASIA BLD QL SMEAR: SLIGHT — SIGNIFICANT CHANGE UP
POTASSIUM SERPL-MCNC: 4 MMOL/L — SIGNIFICANT CHANGE UP (ref 3.5–5.3)
POTASSIUM SERPL-SCNC: 4 MMOL/L — SIGNIFICANT CHANGE UP (ref 3.5–5.3)
PROT SERPL-MCNC: 6.9 G/DL — SIGNIFICANT CHANGE UP (ref 6–8.3)
RBC # BLD: 2.6 M/UL — LOW (ref 4.2–5.8)
RBC # FLD: 16.4 % — HIGH (ref 10.3–14.5)
RBC BLD AUTO: ABNORMAL
SODIUM SERPL-SCNC: 136 MMOL/L — SIGNIFICANT CHANGE UP (ref 135–145)
WBC # BLD: 5.96 K/UL — SIGNIFICANT CHANGE UP (ref 3.8–10.5)
WBC # FLD AUTO: 5.96 K/UL — SIGNIFICANT CHANGE UP (ref 3.8–10.5)

## 2021-12-10 RX ORDER — ATORVASTATIN CALCIUM 80 MG/1
20 TABLET, FILM COATED ORAL AT BEDTIME
Refills: 0 | Status: DISCONTINUED | OUTPATIENT
Start: 2021-12-10 | End: 2021-12-16

## 2021-12-10 RX ADMIN — Medication 25 MILLIGRAM(S): at 06:04

## 2021-12-10 RX ADMIN — Medication 5 MILLIGRAM(S): at 21:20

## 2021-12-10 RX ADMIN — Medication 81 MILLIGRAM(S): at 12:21

## 2021-12-10 RX ADMIN — SEVELAMER CARBONATE 1600 MILLIGRAM(S): 2400 POWDER, FOR SUSPENSION ORAL at 06:04

## 2021-12-10 RX ADMIN — Medication 25 MILLIGRAM(S): at 17:22

## 2021-12-10 RX ADMIN — SEVELAMER CARBONATE 1600 MILLIGRAM(S): 2400 POWDER, FOR SUSPENSION ORAL at 21:20

## 2021-12-10 RX ADMIN — SEVELAMER CARBONATE 1600 MILLIGRAM(S): 2400 POWDER, FOR SUSPENSION ORAL at 13:26

## 2021-12-10 RX ADMIN — Medication 0.6 MILLIGRAM(S): at 12:21

## 2021-12-10 RX ADMIN — LACTULOSE 20 GRAM(S): 10 SOLUTION ORAL at 21:20

## 2021-12-10 RX ADMIN — FAMOTIDINE 20 MILLIGRAM(S): 10 INJECTION INTRAVENOUS at 12:21

## 2021-12-10 RX ADMIN — ISOSORBIDE MONONITRATE 30 MILLIGRAM(S): 60 TABLET, EXTENDED RELEASE ORAL at 12:21

## 2021-12-10 RX ADMIN — DRONEDARONE 400 MILLIGRAM(S): 400 TABLET, FILM COATED ORAL at 06:04

## 2021-12-10 RX ADMIN — ATORVASTATIN CALCIUM 20 MILLIGRAM(S): 80 TABLET, FILM COATED ORAL at 21:20

## 2021-12-10 RX ADMIN — POLYETHYLENE GLYCOL 3350 17 GRAM(S): 17 POWDER, FOR SOLUTION ORAL at 12:22

## 2021-12-10 RX ADMIN — Medication 1 MILLIGRAM(S): at 12:21

## 2021-12-10 RX ADMIN — DRONEDARONE 400 MILLIGRAM(S): 400 TABLET, FILM COATED ORAL at 17:22

## 2021-12-10 RX ADMIN — CHLORHEXIDINE GLUCONATE 1 APPLICATION(S): 213 SOLUTION TOPICAL at 06:15

## 2021-12-10 NOTE — PROGRESS NOTE ADULT - PROBLEM SELECTOR PLAN 3
complains of severe left-sided chest pain x 2 days post PPM placement (12/8/21)  Troponins were mildly elevated but trended down  Given pain meds (IV Tylenol, IV dilaudid, NTG)  Telemetry showed ST elevations (12/9/21)  EKG showed diffused [place] and ST changes  ECHO was done (12/9/21) - initial read showed post. pericardial effusion  Cardio consult - Dr. Salinas  EP consult - Dr. Gay  for repeat ECHO td. (12/10/21)  for [facility] complains of severe left-sided chest pain x 2 days post PPM placement (12/8/21)  Troponins were mildly elevated but trended down  Given pain meds (IV Tylenol, IV dilaudid, NTG)  Telemetry showed ST elevations (12/9/21)  EKG showed diffused MT and ST changes  ECHO was done x 2 (12/9/21) confirming pericardial fluid and pericarditis (12/10/21)  Started on Colchicine  chest pain improved and controlled  Cardio consult - Dr. Salinas  EP consult - Dr. Gay

## 2021-12-10 NOTE — PROGRESS NOTE ADULT - PROBLEM SELECTOR PLAN 5
had asymptomatic bradycardia on telemetry (30-50s)  s/p PPM on 12/7/21  continue lopressor 12.5 mg  resume Multaw  continue telemetry monitoring (tachycardia-bradycardia)  Cardio consult Dr. Brad KAPOOR (Dr. Gay) consulted by Dr. Salinas Hb 9.8 slight new macrocytosis .7  continue iron + stool softener  Hgb was low 7.7 (12/9/21)  B12 and folate- wnl  s/p 1u PRBC intra-HD

## 2021-12-10 NOTE — PROGRESS NOTE ADULT - SUBJECTIVE AND OBJECTIVE BOX
Problem List:  ESRD  Post micra pacemaker placement  Pericarditis      PAST MEDICAL & SURGICAL HISTORY:  ESRD (end stage renal disease) on dialysis    Hypertension    Anemia    Cerebrovascular accident (CVA), unspecified mechanism    Paroxysmal atrial fibrillation    CAD (coronary artery disease)    A-V fistula        No Known Allergies      MEDICATIONS  (STANDING):  aspirin  chewable 81 milliGRAM(s) Oral daily  atorvastatin 20 milliGRAM(s) Oral at bedtime  bisacodyl 5 milliGRAM(s) Oral at bedtime  chlorhexidine 2% Cloths 1 Application(s) Topical <User Schedule>  colchicine 0.6 milliGRAM(s) Oral daily  dronedarone 400 milliGRAM(s) Oral two times a day  epoetin ximena-epbx (RETACRIT) Injectable 6000 Unit(s) IV Push <User Schedule>  famotidine    Tablet 20 milliGRAM(s) Oral daily  ferrous    sulfate 325 milliGRAM(s) Oral daily  folic acid 1 milliGRAM(s) Oral daily  heparin   Injectable 5000 Unit(s) SubCutaneous every 8 hours  isosorbide   mononitrate ER Tablet (IMDUR) 30 milliGRAM(s) Oral daily  lactulose Syrup 20 Gram(s) Oral at bedtime  metoprolol tartrate 25 milliGRAM(s) Oral two times a day  polyethylene glycol 3350 17 Gram(s) Oral daily  sevelamer carbonate 1600 milliGRAM(s) Oral three times a day    MEDICATIONS  (PRN):  acetaminophen     Tablet .. 650 milliGRAM(s) Oral every 6 hours PRN Mild Pain (1 - 3), Moderate Pain (4 - 6)    post 1 unit blood transfusion                        8.7    5.96  )-----------( 158      ( 10 Dec 2021 09:12 )             25.9     12-09    136  |  99  |  76<H>  ----------------------------<  112<H>  5.2   |  27  |  11.80<H>    Ca    9.2      09 Dec 2021 04:52  Phos  6.0     12-09  Mg     2.2     12-09    TPro  7.1  /  Alb  2.7<L>  /  TBili  0.5  /  DBili  x   /  AST  9<L>  /  ALT  28  /  AlkPhos  146<H>  12-09    PTT - ( 09 Dec 2021 04:52 )  PTT:34.4 sec        REVIEW OF SYSTEMS:  General: no fever no chills, no weight loss.    RESPIRATORY: No cough, wheezing, hemoptysis; No shortness of breath  CARDIOVASCULAR: no chest pain at present and in the last 24 hours  GASTROINTESTINAL: No abdominal or epigastric pain. No nausea, vomiting. No diarrhea or constipation. No melena.  GENITOURINARY: No dysuria, oliguria        VITALS:  T(F): 98.8 (12-10-21 @ 07:48), Max: 99.4 (12-09-21 @ 21:09)  HR: 82 (12-10-21 @ 07:48)  BP: 104/44 (12-10-21 @ 07:48)  RR: 18 (12-10-21 @ 07:48)  SpO2: 100% (12-10-21 @ 07:48)  Wt(kg): --    12-09 @ 07:01  -  12-10 @ 07:00  --------------------------------------------------------  IN: 700 mL / OUT: 1700 mL / NET: -1000 mL        PHYSICAL EXAM:  Constitutional: well developed, no diaphoresis, no distress.  Neck: No JVD, no carotid bruit, supple, no adenopathy  Respiratory:  air entrance B/L, no wheezes, rales or rhonchi  Cardiovascular: S1, S2, RRR, no pericardial rub, no murmur  Abdomen: BS+, soft, no tenderness, no bruit  Pelvis: bladder nondistended  Extremities: No edema  Vascular Access: right AVG

## 2021-12-10 NOTE — PROGRESS NOTE ADULT - PROBLEM SELECTOR PLAN 7
continue home meds  no neuro status change currently at baseline prior Echo on 9/28/21 showed RV systolic pressure is 69 mm Hg. Severe pulmonary hypertension.  - admitted to [facility]  - [facility] IMDUR, metop

## 2021-12-10 NOTE — PROGRESS NOTE ADULT - PROBLEM SELECTOR PLAN 6
Hb 9.8 slight new macrocytosis .7  continue iron + stool softener  B12 and folate- wnl continue home meds  no neuro status change currently at baseline

## 2021-12-10 NOTE — PROGRESS NOTE ADULT - PROBLEM SELECTOR PLAN 2
- on HD MWF last HD outpatient on Friday 11/26 , no missed HD  - CXR with fluid OD, b/l LE pitting edema  - BiPAP in ED  - urgent HD (11/28)  - resume home meds sevelamer, colcrys   - monitor electrolytes, post HD labs  - scheduled for HD today (12/8/21)  - s/p 1u PRBC intradialysis   - NEPHRO Dr. Jacinto

## 2021-12-10 NOTE — PROGRESS NOTE ADULT - PROBLEM SELECTOR PLAN 4
started BB metop tart 25 BID and Eliquis 2.5 BID  decreased lopressor to 12.5  resumed Multaq  continue telemetry due to tachycardia-bradycardia  offered AICD initially refusing but finally consented  s/p Micra placement on 12/7/21  Cardio consult Dr. Brad KAPOOR (Dr. Gay) consulted by Dr. Salinas

## 2021-12-10 NOTE — PROGRESS NOTE ADULT - SUBJECTIVE AND OBJECTIVE BOX
PGY-1 Progress Note discussed with attending    PAGER #: [[phone]] TILL 5:00 PM  PLEASE CONTACT ON CALL TEAM:  - On Call Team (Please refer to Mary) FROM 5:00 PM - 8:30PM  - Nightfloat Team FROM 8:30 -7:30 AM    CHIEF COMPLAINT & BRIEF HOSPITAL COURSE:      INTERVAL HPI/OVERNIGHT EVENTS:       REVIEW OF SYSTEMS:  CONSTITUTIONAL: No fever, weight loss, or fatigue  RESPIRATORY: No cough, wheezing, chills or hemoptysis; No shortness of breath  CARDIOVASCULAR: No chest pain, palpitations, dizziness, or leg swelling  GASTROINTESTINAL: No abdominal pain. No nausea, vomiting, or hematemesis; No diarrhea or constipation. No melena or hematochezia.  GENITOURINARY: No dysuria or hematuria, urinary frequency  NEUROLOGICAL: No headaches, memory loss, loss of strength, numbness, or tremors  SKIN: No itching, burning, rashes, or lesions     MEDICATIONS  (STANDING):  aspirin  chewable 81 milliGRAM(s) Oral daily  atorvastatin 80 milliGRAM(s) Oral at bedtime  bisacodyl 5 milliGRAM(s) Oral at bedtime  chlorhexidine 2% Cloths 1 Application(s) Topical <User Schedule>  colchicine 0.6 milliGRAM(s) Oral daily  dronedarone 400 milliGRAM(s) Oral two times a day  epoetin ximena-epbx (RETACRIT) Injectable 6000 Unit(s) IV Push <User Schedule>  famotidine    Tablet 20 milliGRAM(s) Oral daily  ferrous    sulfate 325 milliGRAM(s) Oral daily  folic acid 1 milliGRAM(s) Oral daily  heparin   Injectable 5000 Unit(s) SubCutaneous every 8 hours  isosorbide   mononitrate ER Tablet (IMDUR) 30 milliGRAM(s) Oral daily  lactulose Syrup 20 Gram(s) Oral at bedtime  metoprolol tartrate 25 milliGRAM(s) Oral two times a day  polyethylene glycol 3350 17 Gram(s) Oral daily  sevelamer carbonate 1600 milliGRAM(s) Oral three times a day    MEDICATIONS  (PRN):  acetaminophen     Tablet .. 650 milliGRAM(s) Oral every 6 hours PRN Mild Pain (1 - 3), Moderate Pain (4 - 6)      Vital Signs Last 24 Hrs  T(C): 37.2 (10 Dec 2021 05:07), Max: 37.4 (09 Dec 2021 09:45)  T(F): 98.9 (10 Dec 2021 05:07), Max: 99.4 (09 Dec 2021 21:09)  HR: 87 (10 Dec 2021 05:07) (65 - 87)  BP: 125/71 (10 Dec 2021 05:07) (99/55 - 125/71)  BP(mean): --  RR: 18 (10 Dec 2021 05:07) (18 - 29)  SpO2: 100% (10 Dec 2021 05:07) (99% - 100%)    PHYSICAL EXAMINATION:  GENERAL: NAD, well built  HEAD:  Atraumatic, Normocephalic  EYES:  conjunctiva and sclera clear  NECK: Supple, No JVD, Normal thyroid  CHEST/LUNG: Clear to auscultation. Clear to percussion bilaterally; No rales, rhonchi, wheezing, or rubs  HEART: Regular rate and rhythm; No murmurs, rubs, or gallops  ABDOMEN: Soft, Nontender, Nondistended; Bowel sounds present, no pain or masses on palpation  NERVOUS SYSTEM:  Alert & Oriented X3  : voiding well  EXTREMITIES:  2+ Peripheral Pulses, No clubbing, cyanosis, or edema  SKIN: warm dry                          7.7    7.73  )-----------( 160      ( 09 Dec 2021 04:52 )             23.6     12-09    136  |  99  |  76<H>  ----------------------------<  112<H>  5.2   |  27  |  11.80<H>    Ca    9.2      09 Dec 2021 04:52  Phos  6.0     12-09  Mg     2.2     12-09    TPro  7.1  /  Alb  2.7<L>  /  TBili  0.5  /  DBili  x   /  AST  9<L>  /  ALT  28  /  AlkPhos  146<H>  12-09    LIVER FUNCTIONS - ( 09 Dec 2021 04:52 )  Alb: 2.7 g/dL / Pro: 7.1 g/dL / ALK PHOS: 146 U/L / ALT: 28 U/L DA / AST: 9 U/L / GGT: x           CARDIAC MARKERS ( 09 Dec 2021 12:10 )  x     / x     / 60 U/L / x     / x      CARDIAC MARKERS ( 09 Dec 2021 03:22 )  x     / x     / 71 U/L / x     / 1.2 ng/mL      PTT - ( 09 Dec 2021 04:52 )  PTT:34.4 sec    I&O's Summary    09 Dec 2021 07:01  -  10 Dec 2021 07:00  --------------------------------------------------------  IN: 700 mL / OUT: 1700 mL / NET: -1000 mL            CAPILLARY BLOOD GLUCOSE      RADIOLOGY & ADDITIONAL TESTS:                   PGY-1 Progress Note discussed with attending    PAGER #: [[phone]] TILL 5:00 PM  PLEASE CONTACT ON CALL TEAM:  - On Call Team (Please refer to Mary) FROM 5:00 PM - 8:30PM  - Nightfloat Team FROM 8:30 -7:30 AM    CHIEF COMPLAINT & BRIEF HOSPITAL COURSE:    Patient is a 75 y/o M from [place]. He has past medical history of ESRD on HD (MWF), HTN, severe pulmonary hypertension (CHF w/ pEF > 55%; GIIDD), Anemia of CKD, NSTEMI, CAD, paroxysmal Atrial fibrillation, vascular dementia, CVA w/ R hemiparesis and mood disorder. He was brought in by EMS due to acute onset of shortness of breath secondary to fluid overload. His last HD was on 11/28/21. His O2 saturation was 91% requiring 4LPM of O2 via nasal cannula. His oxygen status improved to 98%. In the E.D, he was tachypneic and started on BiPAP. BNP was elevated at 30,000. Blood pressure was high at 241/84/ Chest Xray showed bilateral pulmonary edema. Nephrology (Dr. Pritchard) was consulted and he underwent emergency HD. ICU was also consulted and he was cleared to be admitted to the floors on nasal cannula. He was given dose of Lasix 80mg IV, Hydralazine 10 mg IV and Insulin 8u with dextrose for Hyperkalemia of 6.3. Serum potassium eventually improved. Troponin was initially elevated but trended down since. He still has some episodes of bradycardia on telemetry. Lopressor was creased in half from 25mg to 12.5mg. He still has asymptomatic bradycardia. Cardiology was recommending AICD placement but patient keeps on refusing initially but finally consented. Micra was inserted on 12/7/21 c/o EP (Dr. Gay). Overnight he developed severe left-sided chest pain. He had similar episodes in the past. EKG was negative for any ischemia. Troponin was mildly elevated but downtrended. CTA Chest was was negative for pulmonary embolism..He was given tylenol, diluadid and NTG to relieve the pain. Chest pain continued to be severe and worsen. Repeat EKG showed CT and ST changes suggesting STEMI. Cardiology came and suspected pericarditis. Repeat ECHO was done and showed moderate pericardial fluid and pericarditis. He was started on Colchicine. His chest pain improved. Palliative talked to the family for Goals of Care.     INTERVAL HPI/OVERNIGHT EVENTS:     Patient was examined at bedside, AAOx3, stable, NAD. Currently he does not have any episodes of chest pain. Echo was done twice showing moderate pericardial effusion. He was started on colchicine. No other significant events overnight.    REVIEW OF SYSTEMS:  CONSTITUTIONAL: No fever, weight loss, or fatigue  RESPIRATORY: No cough, wheezing, chills or hemoptysis; No shortness of breath  CARDIOVASCULAR: No chest pain, palpitations, dizziness, or leg swelling  GASTROINTESTINAL: No abdominal pain. No nausea, vomiting, or hematemesis; No diarrhea or constipation. No melena or hematochezia.  GENITOURINARY: No dysuria or hematuria, urinary frequency  NEUROLOGICAL: No headaches, memory loss, loss of strength, numbness, or tremors  SKIN: No itching, burning, rashes, or lesions     MEDICATIONS  (STANDING):  aspirin  chewable 81 milliGRAM(s) Oral daily  atorvastatin 80 milliGRAM(s) Oral at bedtime  bisacodyl 5 milliGRAM(s) Oral at bedtime  chlorhexidine 2% Cloths 1 Application(s) Topical <User Schedule>  colchicine 0.6 milliGRAM(s) Oral daily  dronedarone 400 milliGRAM(s) Oral two times a day  epoetin ximena-epbx (RETACRIT) Injectable 6000 Unit(s) IV Push <User Schedule>  famotidine    Tablet 20 milliGRAM(s) Oral daily  ferrous    sulfate 325 milliGRAM(s) Oral daily  folic acid 1 milliGRAM(s) Oral daily  heparin   Injectable 5000 Unit(s) SubCutaneous every 8 hours  isosorbide   mononitrate ER Tablet (IMDUR) 30 milliGRAM(s) Oral daily  lactulose Syrup 20 Gram(s) Oral at bedtime  metoprolol tartrate 25 milliGRAM(s) Oral two times a day  polyethylene glycol 3350 17 Gram(s) Oral daily  sevelamer carbonate 1600 milliGRAM(s) Oral three times a day    MEDICATIONS  (PRN):  acetaminophen     Tablet .. 650 milliGRAM(s) Oral every 6 hours PRN Mild Pain (1 - 3), Moderate Pain (4 - 6)      Vital Signs Last 24 Hrs  T(C): 37.2 (10 Dec 2021 05:07), Max: 37.4 (09 Dec 2021 09:45)  T(F): 98.9 (10 Dec 2021 05:07), Max: 99.4 (09 Dec 2021 21:09)  HR: 87 (10 Dec 2021 05:07) (65 - 87)  BP: 125/71 (10 Dec 2021 05:07) (99/55 - 125/71)  BP(mean): --  RR: 18 (10 Dec 2021 05:07) (18 - 29)  SpO2: 100% (10 Dec 2021 05:07) (99% - 100%)    PHYSICAL EXAMINATION:  GENERAL: NAD  HEAD:  Atraumatic, Normocephalic  EYES:  conjunctiva and sclera clear  NECK: Supple, No JVD, Normal thyroid  CHEST/LUNG: Clear to auscultation. Clear to percussion bilaterally; No rales, rhonchi, wheezing, or rubs  HEART: Regular rate and rhythm; No murmurs, rubs, or gallops  ABDOMEN: Soft, Nontender, Nondistended; Bowel sounds present, no pain or masses on palpation  NERVOUS SYSTEM:  Alert & Oriented X3  : voiding well  EXTREMITIES:  2+ Peripheral Pulses, No clubbing, cyanosis, or edema  SKIN: warm dry                          7.7    7.73  )-----------( 160      ( 09 Dec 2021 04:52 )             23.6     12-09    136  |  99  |  76<H>  ----------------------------<  112<H>  5.2   |  27  |  11.80<H>    Ca    9.2      09 Dec 2021 04:52  Phos  6.0     12-09  Mg     2.2     12-09    TPro  7.1  /  Alb  2.7<L>  /  TBili  0.5  /  DBili  x   /  AST  9<L>  /  ALT  28  /  AlkPhos  146<H>  12-09    LIVER FUNCTIONS - ( 09 Dec 2021 04:52 )  Alb: 2.7 g/dL / Pro: 7.1 g/dL / ALK PHOS: 146 U/L / ALT: 28 U/L DA / AST: 9 U/L / GGT: x           CARDIAC MARKERS ( 09 Dec 2021 12:10 )  x     / x     / 60 U/L / x     / x      CARDIAC MARKERS ( 09 Dec 2021 03:22 )  x     / x     / 71 U/L / x     / 1.2 ng/mL      PTT - ( 09 Dec 2021 04:52 )  PTT:34.4 sec    I&O's Summary    09 Dec 2021 07:01  -  10 Dec 2021 07:00  --------------------------------------------------------  IN: 700 mL / OUT: 1700 mL / NET: -1000 mL            CAPILLARY BLOOD GLUCOSE      RADIOLOGY & ADDITIONAL TESTS:    < from: Transthoracic Echocardiogram (12.09.21 @ 07:57) >  CONCLUSIONS:  1. Moderate posterior mitral annular calcification. Mild  mitral regurgitation.  2. Calcified trileaflet aortic valve with decreased  opening. Peak transaortic valve gradient equals 50.1 mm Hg,  mean transaortic valve gradient equals 38 mm Hg, estimated  aortic valve area equals 0.9 sqcm (by continuity equation),  dimensionless index 0.3, consistent with moderate to severe  aortic stenosis. Mild aortic regurgitation.  3. Normal aortic root.  4. Moderately dilated left atrium.  5. Severe concentric left ventricular hypertrophy.  6. Endocardium not well visualized; grossly low-normal left  ventricular systolic function.  7. Grade II diastolic dysfunction (moderate).  8. Normal right atrium.  9. Normal right ventricular size and systolic function  (TAPSE 2.2 cm).  10. RV systolic pressure is mildly increased at  35 mm Hg.  11. Normal tricuspid valve. Mild tricuspid regurgitation.  12. Pulmonic valve not well seen. Trace pulmonic  insufficiency is noted.  13. Moderate, circumferential pericardial effusion with  fluid pocket measuring 1.6 cmdeep to the LV in the  subcostal view. No echocardiographic evidence of tamponade  physiology.    < end of copied text >  < from: Limited Transthoracic Echo (12.09.21 @ 10:47) >  CONCLUSIONS:  1. Moderate circumferential pericardial effusion.  Agitated  saline injection showed no evidence of saline extravasation  to the pericardial space to suggest hemopericardium.    *** Compared with echocardiogram of 12/09/2021, no  significant changes in size of pericardial effusion were  noted.    < end of copied text >

## 2021-12-10 NOTE — PROGRESS NOTE ADULT - PROBLEM SELECTOR PLAN 1
- BP at NH wnl but elevated peak 241/84 w/ pulm edema on CXR  - BNP 30k and Trop elevation  - goal is reduce BP by 25% in 24 hours  - s/p Lasix 80 and Hydral 10  - s/p urgent HD (11/28/21)  - d/c hydralazine  - trend trop T3 - trended down  - continue lopressor 12.5 in setting of bradycardia  - continue Multaq  - continue to monitor on telemetry (tachycardia-bradycardia)  - Cardio consulted EP (Dr. Gay)  - s/p Micra placement (12/7/21)  - NEPHRO Dr. Jacinto  - CARDIO Dr. Salinas

## 2021-12-10 NOTE — PROGRESS NOTE ADULT - ASSESSMENT
ESRD , last dialysis yesterday, post 1 unit of PRBC in dialysis, next dialysis tomorrow  Anemia on ALICIA and post PRBC  SSS post Micra pacemaker  Pericarditis - asymptomatica.

## 2021-12-10 NOTE — PROGRESS NOTE ADULT - PROBLEM SELECTOR PLAN 8
prior Echo on 9/28/21 showed RV systolic pressure is 69 mm Hg. Severe pulmonary hypertension.  - admitted to [facility]  - [facility] IMDUR, metop RISK                                                          Points  [] Previous VTE                                           3  [] Thrombophilia                                        2  [] Lower limb paralysis                              2   [] Current Cancer                                       2   [x] Immobilization > 24 hrs                        1  [] ICU/CCU stay > 24 hours                       1  [x] Age > 60                                                   1    eliquis for DVT prophylaxis  PPI IV for GI prophylaxis

## 2021-12-10 NOTE — PROGRESS NOTE ADULT - PROBLEM SELECTOR PLAN 9
RISK                                                          Points  [] Previous VTE                                           3  [] Thrombophilia                                        2  [] Lower limb paralysis                              2   [] Current Cancer                                       2   [x] Immobilization > 24 hrs                        1  [] ICU/CCU stay > 24 hours                       1  [x] Age > 60                                                   1    eliquis for DVT prophylaxis  PPI IV for GI prophylaxis palliative consult done  daughter and sister desired FULL CODE

## 2021-12-10 NOTE — PROGRESS NOTE ADULT - ASSESSMENT
75 yo M from [facility], w/ ESRD (on HD MWF), HTN, Severe Pulm HTN, CHFpEF >55% w/ GIIDD, Anemia of CKD, NSTEMI, CAD, parox Afib, CVA w/ RIGHT dominant hemiparesis, Vascular Dementia and Mood disorder who was BIB EMS for acute onset SOB 2/2 fluid overload,now CP c/w pericarditis.  1.CP c/w pericarditis-colchicine.  2.Zfcbn-[id number]-a/p PPM, lopressor and multaq.  3.ESRD-HD as per renal.  4.Parox Afib- refusing eliquis.  5.Lipid d/o-statin.  6.CVA-asa,statin."Refusing Eliquis-told cause prostate ca."  7.CAD-asa,imdur,statin.  8.Anemia-s/p transfusion,epogen.  9.GI and DVT prophylaxis.

## 2021-12-10 NOTE — PROGRESS NOTE ADULT - SUBJECTIVE AND OBJECTIVE BOX
CHIEF COMPLAINT:Patient is a 76y old  Male who presents with a chief complaint of SOB.Pt has had no further chest pain.He had HD and 1 PRBC.    	  REVIEW OF SYSTEMS:  CONSTITUTIONAL: No fever, weight loss, or fatigue  EYES: No eye pain, visual disturbances, or discharge  ENT:  No difficulty hearing, tinnitus, vertigo; No sinus or throat pain  NECK: No pain or stiffness  RESPIRATORY: No cough, wheezing, chills or hemoptysis; No Shortness of Breath  CARDIOVASCULAR: No chest pain, palpitations, passing out, dizziness, or leg swelling  GASTROINTESTINAL: No abdominal or epigastric pain. No nausea, vomiting, or hematemesis; No diarrhea or constipation. No melena or hematochezia.  GENITOURINARY: No dysuria, frequency, hematuria, or incontinence  NEUROLOGICAL: No headaches, memory loss, loss of strength, numbness, or tremors  SKIN: No itching, burning, rashes, or lesions   LYMPH Nodes: No enlarged glands  ENDOCRINE: No heat or cold intolerance; No hair loss  MUSCULOSKELETAL: No joint pain or swelling; No muscle, back, or extremity pain  PSYCHIATRIC: No depression, anxiety, mood swings, or difficulty sleeping  HEME/LYMPH: No easy bruising, or bleeding gums  ALLERGY AND IMMUNOLOGIC: No hives or eczema	      PHYSICAL EXAM:  T(C): 37.1 (12-10-21 @ 07:48), Max: 37.4 (12-09-21 @ 09:45)  HR: 82 (12-10-21 @ 07:48) (65 - 87)  BP: 104/44 (12-10-21 @ 07:48) (101/47 - 125/71)  RR: 18 (12-10-21 @ 07:48) (18 - 29)  SpO2: 100% (12-10-21 @ 07:48) (99% - 100%)  Wt(kg): --  I&O's Summary    09 Dec 2021 07:01  -  10 Dec 2021 07:00  --------------------------------------------------------  IN: 700 mL / OUT: 1700 mL / NET: -1000 mL        Appearance: Normal	  HEENT:   Normal oral mucosa, PERRL, EOMI	  Lymphatic: No lymphadenopathy  Cardiovascular: Normal S1 S2, No JVD, No murmurs, No edema  Respiratory: Lungs clear to auscultation	  Psychiatry: A & O x 3, Mood & affect appropriate  Gastrointestinal:  Soft, Non-tender, + BS	  Skin: No rashes, No ecchymoses, No cyanosis	  Neurologic: Non-focal  Extremities: Normal range of motion, No clubbing, cyanosis or edema  Vascular: Peripheral pulses palpable 2+ bilaterally    MEDICATIONS  (STANDING):  aspirin  chewable 81 milliGRAM(s) Oral daily  atorvastatin 20 milliGRAM(s) Oral at bedtime  bisacodyl 5 milliGRAM(s) Oral at bedtime  chlorhexidine 2% Cloths 1 Application(s) Topical <User Schedule>  colchicine 0.6 milliGRAM(s) Oral daily  dronedarone 400 milliGRAM(s) Oral two times a day  epoetin ximena-epbx (RETACRIT) Injectable 6000 Unit(s) IV Push <User Schedule>  famotidine    Tablet 20 milliGRAM(s) Oral daily  ferrous    sulfate 325 milliGRAM(s) Oral daily  folic acid 1 milliGRAM(s) Oral daily  heparin   Injectable 5000 Unit(s) SubCutaneous every 8 hours  isosorbide   mononitrate ER Tablet (IMDUR) 30 milliGRAM(s) Oral daily  lactulose Syrup 20 Gram(s) Oral at bedtime  metoprolol tartrate 25 milliGRAM(s) Oral two times a day  polyethylene glycol 3350 17 Gram(s) Oral daily  sevelamer carbonate 1600 milliGRAM(s) Oral three times a day      	  LABS:	 	      CARDIAC MARKERS ( 09 Dec 2021 12:10 )  x     / x     / 60 U/L / x     / x      CARDIAC MARKERS ( 09 Dec 2021 03:22 )  x     / x     / 71 U/L / x     / 1.2 ng/mL      Troponin I, High Sensitivity Result: 262.7 ng/L (12-09 @ 12:10)  Troponin I, High Sensitivity Result: 325.6 ng/L (12-09 @ 03:22)  Troponin I, High Sensitivity Result: 371.9 ng/L (12-08 @ 19:08)  Troponin I, High Sensitivity Result: 591.7 ng/L (12-08 @ 07:33)                            7.7    7.73  )-----------( 160      ( 09 Dec 2021 04:52 )             23.6     12-09    136  |  99  |  76<H>  ----------------------------<  112<H>  5.2   |  27  |  11.80<H>    Ca    9.2      09 Dec 2021 04:52  Phos  6.0     12-09  Mg     2.2     12-09    TPro  7.1  /  Alb  2.7<L>  /  TBili  0.5  /  DBili  x   /  AST  9<L>  /  ALT  28  /  AlkPhos  146<H>  12-09    proBNP: Serum Pro-Brain Natriuretic Peptide: 74649 pg/mL (11-28 @ 10:10)      	    ech< from: Transthoracic Echocardiogram (12.09.21 @ 07:57) >  OBSERVATIONS:  Mitral Valve: Moderate posterior mitral annular  calcification. Mild mitral regurgitation.  Aortic Root: Normal aortic root.  Aortic Valve: Calcified trileaflet aortic valve with  decreased opening. Peak transaortic valve gradient equals  50.1 mm Hg, mean transaortic valve gradient equals 38 mm  Hg, estimated aortic valve area equals 0.9 sqcm (by  continuity equation), dimensionless index 0.3, consistent  with moderate to severe aortic stenosis. Mild aortic  regurgitation.  Left Atrium: Moderately dilated left atrium.  LA volume  index = 45 cc/m2.  Left Ventricle: Endocardium not well visualized; grossly  low-normal left ventricular systolic function. Severe  concentric left ventricular hypertrophy. Grade II diastolic  dysfunction (moderate).  Right Heart: Normal right atrium. Normal right ventricular  size and systolic function (TAPSE 2.2 cm). Normal tricuspid  valve. Mild tricuspid regurgitation. Pulmonic valve not  well seen. Trace pulmonic insufficiency is noted.  Pericardium/PleuraModerate, circumferential pericardial  effusion measuring at 1.6 cm deep to the LV in the  subcostal view.  No echocardiographic evidence of tamponade  physiology.      < from: Limited Transthoracic Echo (12.09.21 @ 10:47) >  CONCLUSIONS:  1. Moderate circumferential pericardial effusion.  Agitated  saline injection showed no evidence of saline extravasation  to the pericardial space to suggest hemopericardium.    *** Compared with echocardiogram of 12/09/2021, no  significant changes in size of pericardial effusion were  noted.  ------------------------------------------------------------------------  Confirmed on  12/9/2021 - 16:55:06 by Unruly Russo MD  ------------------------------------------------------------------------

## 2021-12-10 NOTE — PROGRESS NOTE ADULT - PROBLEM SELECTOR PLAN 10
palliative consult done  daughter and sister desired FULL CODE
palliative consult done  daughter and sister desired FULL CODE

## 2021-12-11 LAB
ALBUMIN SERPL ELPH-MCNC: 2.4 G/DL — LOW (ref 3.5–5)
ALP SERPL-CCNC: 149 U/L — HIGH (ref 40–120)
ALT FLD-CCNC: 34 U/L DA — SIGNIFICANT CHANGE UP (ref 10–60)
ANION GAP SERPL CALC-SCNC: 11 MMOL/L — SIGNIFICANT CHANGE UP (ref 5–17)
AST SERPL-CCNC: 23 U/L — SIGNIFICANT CHANGE UP (ref 10–40)
BASOPHILS # BLD AUTO: 0.01 K/UL — SIGNIFICANT CHANGE UP (ref 0–0.2)
BASOPHILS NFR BLD AUTO: 0.2 % — SIGNIFICANT CHANGE UP (ref 0–2)
BILIRUB SERPL-MCNC: 0.7 MG/DL — SIGNIFICANT CHANGE UP (ref 0.2–1.2)
BUN SERPL-MCNC: 79 MG/DL — HIGH (ref 7–18)
CALCIUM SERPL-MCNC: 9.8 MG/DL — SIGNIFICANT CHANGE UP (ref 8.4–10.5)
CHLORIDE SERPL-SCNC: 96 MMOL/L — SIGNIFICANT CHANGE UP (ref 96–108)
CO2 SERPL-SCNC: 28 MMOL/L — SIGNIFICANT CHANGE UP (ref 22–31)
CREAT SERPL-MCNC: 10.6 MG/DL — HIGH (ref 0.5–1.3)
EOSINOPHIL # BLD AUTO: 0.01 K/UL — SIGNIFICANT CHANGE UP (ref 0–0.5)
EOSINOPHIL NFR BLD AUTO: 0.2 % — SIGNIFICANT CHANGE UP (ref 0–6)
GLUCOSE SERPL-MCNC: 114 MG/DL — HIGH (ref 70–99)
HCT VFR BLD CALC: 25.8 % — LOW (ref 39–50)
HGB BLD-MCNC: 8.6 G/DL — LOW (ref 13–17)
IMM GRANULOCYTES NFR BLD AUTO: 1 % — SIGNIFICANT CHANGE UP (ref 0–1.5)
LYMPHOCYTES # BLD AUTO: 0.66 K/UL — LOW (ref 1–3.3)
LYMPHOCYTES # BLD AUTO: 13.8 % — SIGNIFICANT CHANGE UP (ref 13–44)
MAGNESIUM SERPL-MCNC: 2.3 MG/DL — SIGNIFICANT CHANGE UP (ref 1.6–2.6)
MCHC RBC-ENTMCNC: 32.6 PG — SIGNIFICANT CHANGE UP (ref 27–34)
MCHC RBC-ENTMCNC: 33.3 GM/DL — SIGNIFICANT CHANGE UP (ref 32–36)
MCV RBC AUTO: 97.7 FL — SIGNIFICANT CHANGE UP (ref 80–100)
MONOCYTES # BLD AUTO: 0.85 K/UL — SIGNIFICANT CHANGE UP (ref 0–0.9)
MONOCYTES NFR BLD AUTO: 17.7 % — HIGH (ref 2–14)
NEUTROPHILS # BLD AUTO: 3.22 K/UL — SIGNIFICANT CHANGE UP (ref 1.8–7.4)
NEUTROPHILS NFR BLD AUTO: 67.1 % — SIGNIFICANT CHANGE UP (ref 43–77)
NRBC # BLD: 0 /100 WBCS — SIGNIFICANT CHANGE UP (ref 0–0)
PHOSPHATE SERPL-MCNC: 3.5 MG/DL — SIGNIFICANT CHANGE UP (ref 2.5–4.5)
PLATELET # BLD AUTO: 161 K/UL — SIGNIFICANT CHANGE UP (ref 150–400)
POTASSIUM SERPL-MCNC: 4.3 MMOL/L — SIGNIFICANT CHANGE UP (ref 3.5–5.3)
POTASSIUM SERPL-SCNC: 4.3 MMOL/L — SIGNIFICANT CHANGE UP (ref 3.5–5.3)
PROT SERPL-MCNC: 6.8 G/DL — SIGNIFICANT CHANGE UP (ref 6–8.3)
RBC # BLD: 2.64 M/UL — LOW (ref 4.2–5.8)
RBC # FLD: 15.7 % — HIGH (ref 10.3–14.5)
SARS-COV-2 RNA SPEC QL NAA+PROBE: SIGNIFICANT CHANGE UP
SODIUM SERPL-SCNC: 135 MMOL/L — SIGNIFICANT CHANGE UP (ref 135–145)
WBC # BLD: 4.8 K/UL — SIGNIFICANT CHANGE UP (ref 3.8–10.5)
WBC # FLD AUTO: 4.8 K/UL — SIGNIFICANT CHANGE UP (ref 3.8–10.5)

## 2021-12-11 RX ORDER — ACETAMINOPHEN 500 MG
650 TABLET ORAL EVERY 6 HOURS
Refills: 0 | Status: DISCONTINUED | OUTPATIENT
Start: 2021-12-11 | End: 2021-12-16

## 2021-12-11 RX ORDER — HEPARIN SODIUM 5000 [USP'U]/ML
5000 INJECTION INTRAVENOUS; SUBCUTANEOUS EVERY 12 HOURS
Refills: 0 | Status: DISCONTINUED | OUTPATIENT
Start: 2021-12-11 | End: 2021-12-16

## 2021-12-11 RX ORDER — ERYTHROPOIETIN 10000 [IU]/ML
6000 INJECTION, SOLUTION INTRAVENOUS; SUBCUTANEOUS ONCE
Refills: 0 | Status: COMPLETED | OUTPATIENT
Start: 2021-12-11 | End: 2021-12-11

## 2021-12-11 RX ADMIN — Medication 25 MILLIGRAM(S): at 05:05

## 2021-12-11 RX ADMIN — Medication 0.6 MILLIGRAM(S): at 13:39

## 2021-12-11 RX ADMIN — DRONEDARONE 400 MILLIGRAM(S): 400 TABLET, FILM COATED ORAL at 05:05

## 2021-12-11 RX ADMIN — SEVELAMER CARBONATE 1600 MILLIGRAM(S): 2400 POWDER, FOR SUSPENSION ORAL at 22:02

## 2021-12-11 RX ADMIN — Medication 650 MILLIGRAM(S): at 23:30

## 2021-12-11 RX ADMIN — SEVELAMER CARBONATE 1600 MILLIGRAM(S): 2400 POWDER, FOR SUSPENSION ORAL at 05:05

## 2021-12-11 RX ADMIN — Medication 1 MILLIGRAM(S): at 13:39

## 2021-12-11 RX ADMIN — CHLORHEXIDINE GLUCONATE 1 APPLICATION(S): 213 SOLUTION TOPICAL at 05:06

## 2021-12-11 RX ADMIN — SEVELAMER CARBONATE 1600 MILLIGRAM(S): 2400 POWDER, FOR SUSPENSION ORAL at 13:39

## 2021-12-11 RX ADMIN — DRONEDARONE 400 MILLIGRAM(S): 400 TABLET, FILM COATED ORAL at 18:54

## 2021-12-11 RX ADMIN — FAMOTIDINE 20 MILLIGRAM(S): 10 INJECTION INTRAVENOUS at 13:39

## 2021-12-11 RX ADMIN — ERYTHROPOIETIN 6000 UNIT(S): 10000 INJECTION, SOLUTION INTRAVENOUS; SUBCUTANEOUS at 16:35

## 2021-12-11 RX ADMIN — Medication 650 MILLIGRAM(S): at 22:02

## 2021-12-11 RX ADMIN — Medication 81 MILLIGRAM(S): at 13:39

## 2021-12-11 RX ADMIN — Medication 5 MILLIGRAM(S): at 22:02

## 2021-12-11 RX ADMIN — HEPARIN SODIUM 5000 UNIT(S): 5000 INJECTION INTRAVENOUS; SUBCUTANEOUS at 18:53

## 2021-12-11 RX ADMIN — ATORVASTATIN CALCIUM 20 MILLIGRAM(S): 80 TABLET, FILM COATED ORAL at 22:02

## 2021-12-11 RX ADMIN — LACTULOSE 20 GRAM(S): 10 SOLUTION ORAL at 22:03

## 2021-12-11 RX ADMIN — Medication 325 MILLIGRAM(S): at 13:39

## 2021-12-11 RX ADMIN — ISOSORBIDE MONONITRATE 30 MILLIGRAM(S): 60 TABLET, EXTENDED RELEASE ORAL at 13:39

## 2021-12-11 NOTE — PROGRESS NOTE ADULT - SUBJECTIVE AND OBJECTIVE BOX
PGY-1 Progress Note discussed with attending    PAGER #: [[phone]] TILL 5:00 PM  PLEASE CONTACT ON CALL TEAM:  - On Call Team (Please refer to Mary) FROM 5:00 PM - 8:30PM  - Nightfloat Team FROM 8:30 -7:30 AM    CHIEF COMPLAINT & BRIEF HOSPITAL COURSE:      INTERVAL HPI/OVERNIGHT EVENTS:       REVIEW OF SYSTEMS:  CONSTITUTIONAL: No fever, weight loss, or fatigue  RESPIRATORY: No cough, wheezing, chills or hemoptysis; No shortness of breath  CARDIOVASCULAR: No chest pain, palpitations, dizziness, or leg swelling  GASTROINTESTINAL: No abdominal pain. No nausea, vomiting, or hematemesis; No diarrhea or constipation. No melena or hematochezia.  GENITOURINARY: No dysuria or hematuria, urinary frequency  NEUROLOGICAL: No headaches, memory loss, loss of strength, numbness, or tremors  SKIN: No itching, burning, rashes, or lesions     MEDICATIONS  (STANDING):  aspirin  chewable 81 milliGRAM(s) Oral daily  atorvastatin 20 milliGRAM(s) Oral at bedtime  bisacodyl 5 milliGRAM(s) Oral at bedtime  chlorhexidine 2% Cloths 1 Application(s) Topical <User Schedule>  colchicine 0.6 milliGRAM(s) Oral daily  dronedarone 400 milliGRAM(s) Oral two times a day  epoetin ximena-epbx (RETACRIT) Injectable 6000 Unit(s) IV Push <User Schedule>  famotidine    Tablet 20 milliGRAM(s) Oral daily  ferrous    sulfate 325 milliGRAM(s) Oral daily  folic acid 1 milliGRAM(s) Oral daily  heparin   Injectable 5000 Unit(s) SubCutaneous every 8 hours  isosorbide   mononitrate ER Tablet (IMDUR) 30 milliGRAM(s) Oral daily  lactulose Syrup 20 Gram(s) Oral at bedtime  metoprolol tartrate 25 milliGRAM(s) Oral two times a day  polyethylene glycol 3350 17 Gram(s) Oral daily  sevelamer carbonate 1600 milliGRAM(s) Oral three times a day    MEDICATIONS  (PRN):  acetaminophen     Tablet .. 650 milliGRAM(s) Oral every 6 hours PRN Mild Pain (1 - 3), Moderate Pain (4 - 6)      Vital Signs Last 24 Hrs  T(C): 37.4 (11 Dec 2021 04:59), Max: 37.4 (11 Dec 2021 04:59)  T(F): 99.4 (11 Dec 2021 04:59), Max: 99.4 (11 Dec 2021 04:59)  HR: 86 (11 Dec 2021 04:59) (74 - 86)  BP: 128/61 (11 Dec 2021 04:59) (96/51 - 128/61)  BP(mean): --  RR: 18 (11 Dec 2021 04:59) (17 - 18)  SpO2: 94% (11 Dec 2021 04:59) (94% - 100%)    PHYSICAL EXAMINATION:  GENERAL: NAD, well built  HEAD:  Atraumatic, Normocephalic  EYES:  conjunctiva and sclera clear  NECK: Supple, No JVD, Normal thyroid  CHEST/LUNG: Clear to auscultation. Clear to percussion bilaterally; No rales, rhonchi, wheezing, or rubs  HEART: Regular rate and rhythm; No murmurs, rubs, or gallops  ABDOMEN: Soft, Nontender, Nondistended; Bowel sounds present, no pain or masses on palpation  NERVOUS SYSTEM:  Alert & Oriented X3  : voiding well  EXTREMITIES:  2+ Peripheral Pulses, No clubbing, cyanosis, or edema  SKIN: warm dry                          8.7    5.96  )-----------( 158      ( 10 Dec 2021 09:12 )             25.9     12-10    136  |  98  |  55<H>  ----------------------------<  97  4.0   |  29  |  8.45<H>    Ca    10.1      10 Dec 2021 09:12  Phos  3.9     12-10  Mg     2.3     12-10    TPro  6.9  /  Alb  2.5<L>  /  TBili  0.5  /  DBili  x   /  AST  19  /  ALT  34  /  AlkPhos  138<H>  12-10    LIVER FUNCTIONS - ( 10 Dec 2021 09:12 )  Alb: 2.5 g/dL / Pro: 6.9 g/dL / ALK PHOS: 138 U/L / ALT: 34 U/L DA / AST: 19 U/L / GGT: x           CARDIAC MARKERS ( 09 Dec 2021 12:10 )  x     / x     / 60 U/L / x     / x              I&O's Summary          CAPILLARY BLOOD GLUCOSE      RADIOLOGY & ADDITIONAL TESTS:                   PGY-1 Progress Note discussed with attending    PAGER #: [[phone]] TILL 5:00 PM  PLEASE CONTACT ON CALL TEAM:  - On Call Team (Please refer to Mary) FROM 5:00 PM - 8:30PM  - Nightfloat Team FROM 8:30 -7:30 AM    CHIEF COMPLAINT & BRIEF HOSPITAL COURSE:    Patient is a 77 y/o M from [facility]. He has past medical history of ESRD on HD (MWF), HTN, severe pulmonary hypertension (CHF w/ pEF > 55%; GIIDD), Anemia of CKD, NSTEMI, CAD, paroxysmal Atrial fibrillation, vascular dementia, CVA w/ R hemiparesis and mood disorder. He was brought in by EMS due to acute onset of shortness of breath secondary to fluid overload. His last HD was on 11/28/21. His O2 saturation was 91% requiring 4LPM of O2 via nasal cannula. His oxygen status improved to 98%. In the E.D, he was tachypneic and started on BiPAP. BNP was elevated at 30,000. Blood pressure was high at 241/84/ Chest Xray showed bilateral pulmonary edema. Nephrology (Dr. Pritchard) was consulted and he underwent emergency HD. ICU was also consulted and he was cleared to be admitted to the floors on nasal cannula. He was given dose of Lasix 80mg IV, Hydralazine 10 mg IV and Insulin 8u with dextrose for Hyperkalemia of 6.3. Serum potassium eventually improved. Troponin was initially elevated but trended down since. He still has some episodes of bradycardia on telemetry. Lopressor was creased in half from 25mg to 12.5mg. He still has asymptomatic bradycardia. Cardiology was recommending AICD placement but patient keeps on refusing initially but finally consented. Micra was inserted on 12/7/21 c/o EP (Dr. Gay). Overnight he developed severe left-sided chest pain. He had similar episodes in the past. EKG was negative for any ischemia. Troponin was mildly elevated but downtrended. CTA Chest was was negative for pulmonary embolism..He was given tylenol, diluadid and NTG to relieve the pain. Chest pain continued to be severe and worsen. Repeat EKG showed IL and ST changes suggesting STEMI. Cardiology came and suspected pericarditis. Repeat ECHO was done and showed moderate pericardial fluid and pericarditis. He was started on Colchicine. His chest pain improved. Palliative talked to the family for Goals of Care.       INTERVAL HPI/OVERNIGHT EVENTS:     Patient was examined at bedside AAOx3, stable, NAD. He denies having chest pain, shortness of breath, palpitation. No other significant events overnight.    REVIEW OF SYSTEMS:  CONSTITUTIONAL: No fever, weight loss, or fatigue  RESPIRATORY: No cough, wheezing, chills or hemoptysis; No shortness of breath  CARDIOVASCULAR: No chest pain, palpitations, dizziness, or leg swelling  GASTROINTESTINAL: No abdominal pain. No nausea, vomiting, or hematemesis; No diarrhea or constipation. No melena or hematochezia.  GENITOURINARY: No dysuria or hematuria, urinary frequency  NEUROLOGICAL: No headaches, memory loss, loss of strength, numbness, or tremors  SKIN: No itching, burning, rashes, or lesions     MEDICATIONS  (STANDING):  aspirin  chewable 81 milliGRAM(s) Oral daily  atorvastatin 20 milliGRAM(s) Oral at bedtime  bisacodyl 5 milliGRAM(s) Oral at bedtime  chlorhexidine 2% Cloths 1 Application(s) Topical <User Schedule>  colchicine 0.6 milliGRAM(s) Oral daily  dronedarone 400 milliGRAM(s) Oral two times a day  epoetin ximena-epbx (RETACRIT) Injectable 6000 Unit(s) IV Push <User Schedule>  famotidine    Tablet 20 milliGRAM(s) Oral daily  ferrous    sulfate 325 milliGRAM(s) Oral daily  folic acid 1 milliGRAM(s) Oral daily  heparin   Injectable 5000 Unit(s) SubCutaneous every 8 hours  isosorbide   mononitrate ER Tablet (IMDUR) 30 milliGRAM(s) Oral daily  lactulose Syrup 20 Gram(s) Oral at bedtime  metoprolol tartrate 25 milliGRAM(s) Oral two times a day  polyethylene glycol 3350 17 Gram(s) Oral daily  sevelamer carbonate 1600 milliGRAM(s) Oral three times a day    MEDICATIONS  (PRN):  acetaminophen     Tablet .. 650 milliGRAM(s) Oral every 6 hours PRN Mild Pain (1 - 3), Moderate Pain (4 - 6)      Vital Signs Last 24 Hrs  T(C): 37.4 (11 Dec 2021 04:59), Max: 37.4 (11 Dec 2021 04:59)  T(F): 99.4 (11 Dec 2021 04:59), Max: 99.4 (11 Dec 2021 04:59)  HR: 86 (11 Dec 2021 04:59) (74 - 86)  BP: 128/61 (11 Dec 2021 04:59) (96/51 - 128/61)  BP(mean): --  RR: 18 (11 Dec 2021 04:59) (17 - 18)  SpO2: 94% (11 Dec 2021 04:59) (94% - 100%)    PHYSICAL EXAMINATION:  GENERAL: NAD  HEAD:  Atraumatic, Normocephalic  EYES:  conjunctiva and sclera clear  NECK: Supple, No JVD, Normal thyroid  CHEST/LUNG: Clear to auscultation. Clear to percussion bilaterally; No rales, rhonchi, wheezing, or rubs  HEART: Regular rate and rhythm; No murmurs, rubs, or gallops  ABDOMEN: Soft, Nontender, Nondistended; Bowel sounds present, no pain or masses on palpation  NERVOUS SYSTEM:  Alert & Oriented X3  : voiding well  EXTREMITIES:  2+ Peripheral Pulses, No clubbing, cyanosis, or edema  SKIN: warm dry                          8.7    5.96  )-----------( 158      ( 10 Dec 2021 09:12 )             25.9     12-10    136  |  98  |  55<H>  ----------------------------<  97  4.0   |  29  |  8.45<H>    Ca    10.1      10 Dec 2021 09:12  Phos  3.9     12-10  Mg     2.3     12-10    TPro  6.9  /  Alb  2.5<L>  /  TBili  0.5  /  DBili  x   /  AST  19  /  ALT  34  /  AlkPhos  138<H>  12-10    LIVER FUNCTIONS - ( 10 Dec 2021 09:12 )  Alb: 2.5 g/dL / Pro: 6.9 g/dL / ALK PHOS: 138 U/L / ALT: 34 U/L DA / AST: 19 U/L / GGT: x           CARDIAC MARKERS ( 09 Dec 2021 12:10 )  x     / x     / 60 U/L / x     / x              I&O's Summary          CAPILLARY BLOOD GLUCOSE      RADIOLOGY & ADDITIONAL TESTS:

## 2021-12-11 NOTE — PROGRESS NOTE ADULT - PROBLEM SELECTOR PLAN 5
Hb 9.8 slight new macrocytosis .7  continue iron + stool softener  Hgb was low 7.7 (12/9/21)  B12 and folate- wnl  s/p 1u PRBC intra-HD

## 2021-12-11 NOTE — PROGRESS NOTE ADULT - ASSESSMENT
ESRD ,  dialysis toDAY.  Anemia on ALICIA and post PRBC  SSS post Micra pacemaker  Pericarditis - asymptomatica.

## 2021-12-11 NOTE — PROGRESS NOTE ADULT - PROBLEM SELECTOR PLAN 2
- on HD MWF last HD outpatient on Friday 11/26 , no missed HD  - CXR with fluid OD, b/l LE pitting edema  - BiPAP in ED  - urgent HD (11/28)  - resume home meds sevelamer, colcrys   - monitor electrolytes, post HD labs  - scheduled for HD today (12/8/21)  - s/p 1u PRBC intradialysis   - NEPHRO Dr. Jacinto - on HD MWF last HD outpatient on Friday 11/26 , no missed HD  - CXR with fluid OD, b/l LE pitting edema  - BiPAP in ED  - urgent HD (11/28)  - resume home meds sevelamer, colcrys   - monitor electrolytes, post HD labs  - scheduled for HD today (12/11/21)  - s/p 1u PRBC intradialysis   - NEPHRO Dr. Jacinto

## 2021-12-11 NOTE — PROGRESS NOTE ADULT - PROBLEM SELECTOR PLAN 7
prior Echo on 9/28/21 showed RV systolic pressure is 69 mm Hg. Severe pulmonary hypertension.  - admitted to [facility]  - [facility] IMDUR, metop

## 2021-12-11 NOTE — PROGRESS NOTE ADULT - SUBJECTIVE AND OBJECTIVE BOX
CHIEF COMPLAINT:Patient is a 76y old  Male who presents with a chief complaint of SOB .Pt appears comfortable.    	  REVIEW OF SYSTEMS:  CONSTITUTIONAL: No fever, weight loss, or fatigue  EYES: No eye pain, visual disturbances, or discharge  ENT:  No difficulty hearing, tinnitus, vertigo; No sinus or throat pain  NECK: No pain or stiffness  RESPIRATORY: No cough, wheezing, chills or hemoptysis; No Shortness of Breath  CARDIOVASCULAR: No chest pain, palpitations, passing out, dizziness, or leg swelling  GASTROINTESTINAL: No abdominal or epigastric pain. No nausea, vomiting, or hematemesis; No diarrhea or constipation. No melena or hematochezia.  GENITOURINARY: No dysuria, frequency, hematuria, or incontinence  NEUROLOGICAL: No headaches, memory loss, loss of strength, numbness, or tremors  SKIN: No itching, burning, rashes, or lesions   LYMPH Nodes: No enlarged glands  ENDOCRINE: No heat or cold intolerance; No hair loss  MUSCULOSKELETAL: No joint pain or swelling; No muscle, back, or extremity pain  PSYCHIATRIC: No depression, anxiety, mood swings, or difficulty sleeping  HEME/LYMPH: No easy bruising, or bleeding gums  ALLERGY AND IMMUNOLOGIC: No hives or eczema	        PHYSICAL EXAM:  T(C): 37.3 (12-11-21 @ 10:33), Max: 37.4 (12-11-21 @ 04:59)  HR: 84 (12-11-21 @ 07:20) (74 - 86)  BP: 115/44 (12-11-21 @ 10:33) (96/51 - 128/61)  RR: 18 (12-11-21 @ 10:33) (17 - 18)  SpO2: 100% (12-11-21 @ 10:33) (91% - 100%)  Wt(kg): --  I&O's Summary      Appearance: Normal	  HEENT:   Normal oral mucosa, PERRL, EOMI	  Lymphatic: No lymphadenopathy  Cardiovascular: Normal S1 S2, No JVD, No murmurs, No edema  Respiratory: Lungs clear to auscultation	  Psychiatry: A & O x 3, Mood & affect appropriate  Gastrointestinal:  Soft, Non-tender, + BS	  Skin: No rashes, No ecchymoses, No cyanosis	  Neurologic: Non-focal  Extremities: Normal range of motion, No clubbing, cyanosis or edema  Vascular: Peripheral pulses palpable 2+ bilaterally    MEDICATIONS  (STANDING):  aspirin  chewable 81 milliGRAM(s) Oral daily  atorvastatin 20 milliGRAM(s) Oral at bedtime  bisacodyl 5 milliGRAM(s) Oral at bedtime  chlorhexidine 2% Cloths 1 Application(s) Topical <User Schedule>  colchicine 0.6 milliGRAM(s) Oral daily  dronedarone 400 milliGRAM(s) Oral two times a day  epoetin ximena-epbx (RETACRIT) Injectable 6000 Unit(s) IV Push <User Schedule>  famotidine    Tablet 20 milliGRAM(s) Oral daily  ferrous    sulfate 325 milliGRAM(s) Oral daily  folic acid 1 milliGRAM(s) Oral daily  heparin   Injectable 5000 Unit(s) SubCutaneous every 8 hours  isosorbide   mononitrate ER Tablet (IMDUR) 30 milliGRAM(s) Oral daily  lactulose Syrup 20 Gram(s) Oral at bedtime  metoprolol tartrate 25 milliGRAM(s) Oral two times a day  polyethylene glycol 3350 17 Gram(s) Oral daily  sevelamer carbonate 1600 milliGRAM(s) Oral three times a day      TELEMETRY: 	nsr,intermittent v paced    	  	  LABS:	 	    CARDIAC MARKERS ( 09 Dec 2021 12:10 )  x     / x     / 60 U/L / x     / x          Troponin I, High Sensitivity Result: 262.7 ng/L (12-09 @ 12:10)  Troponin I, High Sensitivity Result: 325.6 ng/L (12-09 @ 03:22)  Troponin I, High Sensitivity Result: 371.9 ng/L (12-08 @ 19:08)                            8.7    5.96  )-----------( 158      ( 10 Dec 2021 09:12 )             25.9     12-10    136  |  98  |  55<H>  ----------------------------<  97  4.0   |  29  |  8.45<H>    Ca    10.1      10 Dec 2021 09:12  Phos  3.9     12-10  Mg     2.3     12-10    TPro  6.9  /  Alb  2.5<L>  /  TBili  0.5  /  DBili  x   /  AST  19  /  ALT  34  /  AlkPhos  138<H>  12-10    proBNP: Serum Pro-Brain Natriuretic Peptide: 79491 pg/mL (11-28 @ 10:10)    Lipid Profile:   HgA1c:   TSH:

## 2021-12-11 NOTE — PROGRESS NOTE ADULT - PROBLEM SELECTOR PLAN 3
complains of severe left-sided chest pain x 2 days post PPM placement (12/8/21)  Troponins were mildly elevated but trended down  Given pain meds (IV Tylenol, IV dilaudid, NTG)  Telemetry showed ST elevations (12/9/21)  EKG showed diffused GA and ST changes  ECHO was done x 2 (12/9/21) confirming pericardial fluid and pericarditis (12/10/21)  Started on Colchicine  chest pain improved and controlled  Cardio consult - Dr. Salinas  EP consult - Dr. Gay

## 2021-12-11 NOTE — PROGRESS NOTE ADULT - SUBJECTIVE AND OBJECTIVE BOX
Problem List:  ESRD      PAST MEDICAL & SURGICAL HISTORY:  ESRD (end stage renal disease) on dialysis    Hypertension    Anemia    Cerebrovascular accident (CVA), unspecified mechanism    Paroxysmal atrial fibrillation    CAD (coronary artery disease)    A-V fistula        No Known Allergies      MEDICATIONS  (STANDING):  aspirin  chewable 81 milliGRAM(s) Oral daily  atorvastatin 20 milliGRAM(s) Oral at bedtime  bisacodyl 5 milliGRAM(s) Oral at bedtime  chlorhexidine 2% Cloths 1 Application(s) Topical <User Schedule>  colchicine 0.6 milliGRAM(s) Oral daily  dronedarone 400 milliGRAM(s) Oral two times a day  epoetin ximena-epbx (RETACRIT) Injectable 6000 Unit(s) IV Push <User Schedule>  famotidine    Tablet 20 milliGRAM(s) Oral daily  ferrous    sulfate 325 milliGRAM(s) Oral daily  folic acid 1 milliGRAM(s) Oral daily  heparin   Injectable 5000 Unit(s) SubCutaneous every 12 hours  isosorbide   mononitrate ER Tablet (IMDUR) 30 milliGRAM(s) Oral daily  lactulose Syrup 20 Gram(s) Oral at bedtime  metoprolol tartrate 25 milliGRAM(s) Oral two times a day  polyethylene glycol 3350 17 Gram(s) Oral daily  sevelamer carbonate 1600 milliGRAM(s) Oral three times a day    MEDICATIONS  (PRN):  acetaminophen     Tablet .. 650 milliGRAM(s) Oral every 6 hours PRN Mild Pain (1 - 3), Moderate Pain (4 - 6)                            8.7    5.96  )-----------( 158      ( 10 Dec 2021 09:12 )             25.9     12-10    136  |  98  |  55<H>  ----------------------------<  97  4.0   |  29  |  8.45<H>    Ca    10.1      10 Dec 2021 09:12  Phos  3.9     12-10  Mg     2.3     12-10    TPro  6.9  /  Alb  2.5<L>  /  TBili  0.5  /  DBili  x   /  AST  19  /  ALT  34  /  AlkPhos  138<H>  12-10            REVIEW OF SYSTEMS:  General: no fever no chills, no weight loss.  EYES/ENT: No visual changes;  No vertigo, no headache.  NECK: No pain or stiffness  RESPIRATORY: No cough, wheezing, hemoptysis; No shortness of breath  CARDIOVASCULAR: No chest pain.  GASTROINTESTINAL: No abdominal or epigastric pain. No nausea, vomiting. No diarrhea or constipation. No melena.  GENITOURINARY: No dysuria, frequency, foamy urine, urinary urgency, incontinence or hematuria          VITALS:  T(F): 99.2 (12-11-21 @ 10:33), Max: 99.4 (12-11-21 @ 04:59)  HR: 84 (12-11-21 @ 07:20)  BP: 115/44 (12-11-21 @ 10:33)  RR: 18 (12-11-21 @ 10:33)  SpO2: 100% (12-11-21 @ 10:33)  Wt(kg): --      PHYSICAL EXAM:  Constitutional: well developed, no diaphoresis, no distress.  Neck: No JVD, no carotid bruit, supple, no adenopathy  Respiratory: Good air entrance B/L, no wheezes, rales or rhonchi  Cardiovascular: S1, S2, systolic m at base 2/6, RRR, no pericardial rub, no murmur  Abdomen: BS+, soft, no tenderness, no bruit  Pelvis: bladder nondistended  Extremities: No cyanosis or clubbing. No peripheral edema.   Right AVG with bruit and thrill

## 2021-12-11 NOTE — PROGRESS NOTE ADULT - ASSESSMENT
77 yo M from [facility], w/ ESRD (on HD MWF), HTN, Severe Pulm HTN, CHFpEF >55% w/ GIIDD, Anemia of CKD, NSTEMI, CAD, parox Afib, CVA w/ RIGHT dominant hemiparesis, Vascular Dementia and Mood disorder who was BIB EMS for acute onset SOB 2/2 fluid overload,now CP c/w pericarditis.  1.CP c/w pericarditis-colchicine.  2.Lgblr-Whzah-t/p PPM, lopressor and multaq.  3.ESRD-HD as per renal.  4.Parox Afib- refusing eliquis.  5.Lipid d/o-statin.  6.CVA-asa,statin."Refusing Eliquis-told cause prostate ca."  7.CAD-asa,imdur,statin.  8.Anemia-s/p transfusion,epogen.  9.GI and DVT prophylaxis.

## 2021-12-12 RX ADMIN — SEVELAMER CARBONATE 1600 MILLIGRAM(S): 2400 POWDER, FOR SUSPENSION ORAL at 06:38

## 2021-12-12 RX ADMIN — Medication 0.6 MILLIGRAM(S): at 12:24

## 2021-12-12 RX ADMIN — SEVELAMER CARBONATE 1600 MILLIGRAM(S): 2400 POWDER, FOR SUSPENSION ORAL at 14:46

## 2021-12-12 RX ADMIN — SEVELAMER CARBONATE 1600 MILLIGRAM(S): 2400 POWDER, FOR SUSPENSION ORAL at 22:20

## 2021-12-12 RX ADMIN — FAMOTIDINE 20 MILLIGRAM(S): 10 INJECTION INTRAVENOUS at 12:24

## 2021-12-12 RX ADMIN — Medication 81 MILLIGRAM(S): at 12:24

## 2021-12-12 RX ADMIN — CHLORHEXIDINE GLUCONATE 1 APPLICATION(S): 213 SOLUTION TOPICAL at 06:38

## 2021-12-12 RX ADMIN — DRONEDARONE 400 MILLIGRAM(S): 400 TABLET, FILM COATED ORAL at 06:39

## 2021-12-12 RX ADMIN — Medication 1 MILLIGRAM(S): at 12:24

## 2021-12-12 RX ADMIN — ISOSORBIDE MONONITRATE 30 MILLIGRAM(S): 60 TABLET, EXTENDED RELEASE ORAL at 12:24

## 2021-12-12 RX ADMIN — DRONEDARONE 400 MILLIGRAM(S): 400 TABLET, FILM COATED ORAL at 17:27

## 2021-12-12 NOTE — PROGRESS NOTE ADULT - PROBLEM SELECTOR PLAN 7
prior Echo on 9/28/21 showed RV systolic pressure is 69 mm Hg. Severe pulmonary hypertension.  - admitted to [facility]  - [facility] IMDUR, metop

## 2021-12-12 NOTE — PROGRESS NOTE ADULT - PROBLEM SELECTOR PLAN 3
complains of severe left-sided chest pain x 2 days post PPM placement (12/8/21)  Troponins were mildly elevated but trended down  Given pain meds (IV Tylenol, IV dilaudid, NTG)  Telemetry showed ST elevations (12/9/21)  EKG showed diffused NH and ST changes  ECHO was done x 2 (12/9/21) confirming pericardial fluid and pericarditis (12/10/21)  Started on Colchicine  chest pain improved and controlled  Cardio consult - Dr. Salinas  EP consult - Dr. Gay

## 2021-12-12 NOTE — PROGRESS NOTE ADULT - ASSESSMENT
77 yo M from [facility], w/ ESRD (on HD MWF), HTN, Severe Pulm HTN, CHFpEF >55% w/ GIIDD, Anemia of CKD, NSTEMI, CAD, parox Afib, CVA w/ RIGHT dominant hemiparesis, Vascular Dementia and Mood disorder who was BIB EMS for acute onset SOB 2/2 fluid overload,now CP c/w pericarditis.  1.CP c/w pericarditis-colchicine.  2.Yxsll-Agnto-g/p PPM, lopressor and multaq.  3.ESRD-HD as per renal.  4.Parox Afib- refusing eliquis.  5.Lipid d/o-statin.  6.CVA-asa,statin."Refusing Eliquis-told cause prostate ca."  7.CAD-asa,imdur,statin.  8.Anemia-s/p transfusion,epogen.  9.GI and DVT prophylaxis.

## 2021-12-12 NOTE — PROGRESS NOTE ADULT - PROBLEM SELECTOR PLAN 2
- on HD MWF last HD outpatient on Friday 11/26 , no missed HD  - CXR with fluid OD, b/l LE pitting edema  - BiPAP in ED  - urgent HD (11/28)  - resume home meds sevelamer, colcrys   - monitor electrolytes, post HD labs  - scheduled for HD today (12/11/21)  - s/p 1u [facility] intradialysis   - NEPHRO Dr. Jacinto

## 2021-12-12 NOTE — PROGRESS NOTE ADULT - SUBJECTIVE AND OBJECTIVE BOX
CHIEF COMPLAINT:Patient is a 76y old  Male who presents with a chief complaint of SOB.Pt appears comfortable.    	  REVIEW OF SYSTEMS:  CONSTITUTIONAL: No fever, weight loss, or fatigue  EYES: No eye pain, visual disturbances, or discharge  ENT:  No difficulty hearing, tinnitus, vertigo; No sinus or throat pain  NECK: No pain or stiffness  RESPIRATORY: No cough, wheezing, chills or hemoptysis; No Shortness of Breath  CARDIOVASCULAR: No chest pain, palpitations, passing out, dizziness, or leg swelling  GASTROINTESTINAL: No abdominal or epigastric pain. No nausea, vomiting, or hematemesis; No diarrhea or constipation. No melena or hematochezia.  GENITOURINARY: No dysuria, frequency, hematuria, or incontinence  NEUROLOGICAL: No headaches, memory loss, loss of strength, numbness, or tremors  SKIN: No itching, burning, rashes, or lesions   LYMPH Nodes: No enlarged glands  ENDOCRINE: No heat or cold intolerance; No hair loss  MUSCULOSKELETAL: No joint pain or swelling; No muscle, back, or extremity pain  PSYCHIATRIC: No depression, anxiety, mood swings, or difficulty sleeping  HEME/LYMPH: No easy bruising, or bleeding gums  ALLERGY AND IMMUNOLOGIC: No hives or eczema	        PHYSICAL EXAM:  T(C): 36.9 (12-12-21 @ 07:44), Max: 38 (12-11-21 @ 21:41)  HR: 82 (12-12-21 @ 07:44) (80 - 88)  BP: 132/78 (12-12-21 @ 07:44) (93/53 - 132/78)  RR: 18 (12-12-21 @ 07:44) (18 - 30)  SpO2: 99% (12-12-21 @ 07:44) (94% - 100%)  Wt(kg): --  I&O's Summary    11 Dec 2021 07:01  -  12 Dec 2021 07:00  --------------------------------------------------------  IN: 800 mL / OUT: 1320 mL / NET: -520 mL        Appearance: Normal	  HEENT:   Normal oral mucosa, PERRL, EOMI	  Lymphatic: No lymphadenopathy  Cardiovascular: Normal S1 S2, No JVD, No murmurs, No edema  Respiratory: Lungs clear to auscultation	  Psychiatry: A & O x 3, Mood & affect appropriate  Gastrointestinal:  Soft, Non-tender, + BS	  Skin: No rashes, No ecchymoses, No cyanosis	  Neurologic: Non-focal  Extremities: Normal range of motion, No clubbing, cyanosis or edema  Vascular: Peripheral pulses palpable 2+ bilaterally    MEDICATIONS  (STANDING):  aspirin  chewable 81 milliGRAM(s) Oral daily  atorvastatin 20 milliGRAM(s) Oral at bedtime  bisacodyl 5 milliGRAM(s) Oral at bedtime  chlorhexidine 2% Cloths 1 Application(s) Topical <User Schedule>  colchicine 0.6 milliGRAM(s) Oral daily  dronedarone 400 milliGRAM(s) Oral two times a day  epoetin ximena-epbx (RETACRIT) Injectable 6000 Unit(s) IV Push <User Schedule>  famotidine    Tablet 20 milliGRAM(s) Oral daily  ferrous    sulfate 325 milliGRAM(s) Oral daily  folic acid 1 milliGRAM(s) Oral daily  heparin   Injectable 5000 Unit(s) SubCutaneous every 12 hours  isosorbide   mononitrate ER Tablet (IMDUR) 30 milliGRAM(s) Oral daily  lactulose Syrup 20 Gram(s) Oral at bedtime  metoprolol tartrate 25 milliGRAM(s) Oral two times a day  polyethylene glycol 3350 17 Gram(s) Oral daily  sevelamer carbonate 1600 milliGRAM(s) Oral three times a day       	  	  LABS:	 	    Troponin I, High Sensitivity Result: 262.7 ng/L (12-09 @ 12:10)                            8.6    4.80  )-----------( 161      ( 11 Dec 2021 14:53 )             25.8     12-11    135  |  96  |  79<H>  ----------------------------<  114<H>  4.3   |  28  |  10.60<H>    Ca    9.8      11 Dec 2021 14:53  Phos  3.5     12-11  Mg     2.3     12-11    TPro  6.8  /  Alb  2.4<L>  /  TBili  0.7  /  DBili  x   /  AST  23  /  ALT  34  /  AlkPhos  149<H>  12-11    proBNP: Serum Pro-Brain Natriuretic Peptide: 02653 pg/mL (11-28 @ 10:10)

## 2021-12-12 NOTE — PROGRESS NOTE ADULT - SUBJECTIVE AND OBJECTIVE BOX
INTERVAL HPI/OVERNIGHT EVENTS:     Patient was examined at bedside   REVIEW OF SYSTEMS:  CONSTITUTIONAL: No fever, weight loss, or fatigue  RESPIRATORY: No cough, wheezing, chills or hemoptysis; No shortness of breath  CARDIOVASCULAR: No chest pain, palpitations, dizziness, or leg swelling  GASTROINTESTINAL: No abdominal pain. No nausea, vomiting, or hematemesis; No diarrhea or constipation. No melena or hematochezia.  GENITOURINARY: No dysuria or hematuria, urinary frequency  NEUROLOGICAL: No headaches, memory loss, loss of strength, numbness, or tremors  SKIN: No itching, burning, rashes, or lesions     MEDICATIONS  (STANDING):  aspirin  chewable 81 milliGRAM(s) Oral daily  atorvastatin 20 milliGRAM(s) Oral at bedtime  bisacodyl 5 milliGRAM(s) Oral at bedtime  chlorhexidine 2% Cloths 1 Application(s) Topical <User Schedule>  colchicine 0.6 milliGRAM(s) Oral daily  dronedarone 400 milliGRAM(s) Oral two times a day  epoetin ximena-epbx (RETACRIT) Injectable 6000 Unit(s) IV Push <User Schedule>  famotidine    Tablet 20 milliGRAM(s) Oral daily  ferrous    sulfate 325 milliGRAM(s) Oral daily  folic acid 1 milliGRAM(s) Oral daily  heparin   Injectable 5000 Unit(s) SubCutaneous every 12 hours  isosorbide   mononitrate ER Tablet (IMDUR) 30 milliGRAM(s) Oral daily  lactulose Syrup 20 Gram(s) Oral at bedtime  metoprolol tartrate 25 milliGRAM(s) Oral two times a day  polyethylene glycol 3350 17 Gram(s) Oral daily  sevelamer carbonate 1600 milliGRAM(s) Oral three times a day    MEDICATIONS  (PRN):  acetaminophen     Tablet .. 650 milliGRAM(s) Oral every 6 hours PRN Mild Pain (1 - 3), Moderate Pain (4 - 6)  acetaminophen     Tablet .. 650 milliGRAM(s) Oral every 6 hours PRN Temp greater or equal to 38C (100.4F), Mild Pain (1 - 3), Moderate Pain (4 - 6)    Vital Signs Last 24 Hrs  T(C): 36.9 (12-12-21 @ 05:23), Max: 38 (12-11-21 @ 21:41)  T(F): 98.4 (12-12-21 @ 05:23), Max: 100.4 (12-11-21 @ 21:41)  HR: 80 (12-12-21 @ 05:23) (80 - 88)  BP: 102/56 (12-12-21 @ 05:23) (93/53 - 115/44)  BP(mean): 61 (12-12-21 @ 01:02) (61 - 61)  RR: 18 (12-12-21 @ 05:23) (18 - 30)  SpO2: 95% (12-12-21 @ 05:23) (94% - 100%)        PHYSICAL EXAMINATION:  GENERAL: NAD  HEAD:  Atraumatic, Normocephalic  EYES:  conjunctiva and sclera clear  NECK: Supple, No JVD, Normal thyroid  CHEST/LUNG: dec breath sounds at bases  HEART: Regular rate and rhythm; No murmurs, rubs, or gallops  ABDOMEN: Soft, Nontender, Nondistended; Bowel sounds present, no pain or masses on palpation  NERVOUS SYSTEM:  Alert & Oriented X3  : voiding well  EXTREMITIES:  2+ Peripheral Pulses, No clubbing, cyanosis, or edema  SKIN: warm dry    LABS:  12-11    135  |  96  |  79<H>  ----------------------------<  114<H>  4.3   |  28  |  10.60<H>    Ca    9.8      11 Dec 2021 14:53  Phos  3.5     12-11  Mg     2.3     12-11    TPro  6.8  /  Alb  2.4<L>  /  TBili  0.7  /  DBili      /  AST  23  /  ALT  34  /  AlkPhos  149<H>  12-11    Creatinine Trend: 10.60 <--, 8.45 <--, 11.80 <--, 10.20 <--, SEE NOTE <--, 11.60 <--                        8.6    4.80  )-----------( 161      ( 11 Dec 2021 14:53 )             25.8     Urine Studies:            LIVER FUNCTIONS - ( 11 Dec 2021 14:53 )  Alb: 2.4 g/dL / Pro: 6.8 g/dL / ALK PHOS: 149 U/L / ALT: 34 U/L DA / AST: 23 U/L / GGT: x

## 2021-12-13 LAB
ANION GAP SERPL CALC-SCNC: 12 MMOL/L — SIGNIFICANT CHANGE UP (ref 5–17)
BUN SERPL-MCNC: 73 MG/DL — HIGH (ref 7–18)
CALCIUM SERPL-MCNC: 9.8 MG/DL — SIGNIFICANT CHANGE UP (ref 8.4–10.5)
CHLORIDE SERPL-SCNC: 96 MMOL/L — SIGNIFICANT CHANGE UP (ref 96–108)
CO2 SERPL-SCNC: 27 MMOL/L — SIGNIFICANT CHANGE UP (ref 22–31)
CREAT SERPL-MCNC: 9.48 MG/DL — HIGH (ref 0.5–1.3)
GLUCOSE SERPL-MCNC: 119 MG/DL — HIGH (ref 70–99)
HCT VFR BLD CALC: 26.7 % — LOW (ref 39–50)
HGB BLD-MCNC: 8.8 G/DL — LOW (ref 13–17)
MCHC RBC-ENTMCNC: 32.2 PG — SIGNIFICANT CHANGE UP (ref 27–34)
MCHC RBC-ENTMCNC: 33 GM/DL — SIGNIFICANT CHANGE UP (ref 32–36)
MCV RBC AUTO: 97.8 FL — SIGNIFICANT CHANGE UP (ref 80–100)
NRBC # BLD: 0 /100 WBCS — SIGNIFICANT CHANGE UP (ref 0–0)
PLATELET # BLD AUTO: 155 K/UL — SIGNIFICANT CHANGE UP (ref 150–400)
POTASSIUM SERPL-MCNC: 3.8 MMOL/L — SIGNIFICANT CHANGE UP (ref 3.5–5.3)
POTASSIUM SERPL-SCNC: 3.8 MMOL/L — SIGNIFICANT CHANGE UP (ref 3.5–5.3)
RBC # BLD: 2.73 M/UL — LOW (ref 4.2–5.8)
RBC # FLD: 15.4 % — HIGH (ref 10.3–14.5)
SARS-COV-2 RNA SPEC QL NAA+PROBE: SIGNIFICANT CHANGE UP
SODIUM SERPL-SCNC: 135 MMOL/L — SIGNIFICANT CHANGE UP (ref 135–145)
WBC # BLD: 3.77 K/UL — LOW (ref 3.8–10.5)
WBC # FLD AUTO: 3.77 K/UL — LOW (ref 3.8–10.5)

## 2021-12-13 PROCEDURE — 93321 DOPPLER ECHO F-UP/LMTD STD: CPT | Mod: 26

## 2021-12-13 PROCEDURE — 93308 TTE F-UP OR LMTD: CPT | Mod: 26

## 2021-12-13 RX ORDER — ERYTHROPOIETIN 10000 [IU]/ML
10000 INJECTION, SOLUTION INTRAVENOUS; SUBCUTANEOUS
Refills: 0 | Status: DISCONTINUED | OUTPATIENT
Start: 2021-12-13 | End: 2021-12-16

## 2021-12-13 RX ORDER — ISOSORBIDE MONONITRATE 60 MG/1
30 TABLET, EXTENDED RELEASE ORAL
Refills: 0 | Status: DISCONTINUED | OUTPATIENT
Start: 2021-12-13 | End: 2021-12-15

## 2021-12-13 RX ORDER — MIDODRINE HYDROCHLORIDE 2.5 MG/1
5 TABLET ORAL THREE TIMES A DAY
Refills: 0 | Status: DISCONTINUED | OUTPATIENT
Start: 2021-12-13 | End: 2021-12-16

## 2021-12-13 RX ADMIN — ERYTHROPOIETIN 10000 UNIT(S): 10000 INJECTION, SOLUTION INTRAVENOUS; SUBCUTANEOUS at 16:44

## 2021-12-13 RX ADMIN — HEPARIN SODIUM 5000 UNIT(S): 5000 INJECTION INTRAVENOUS; SUBCUTANEOUS at 22:21

## 2021-12-13 RX ADMIN — SEVELAMER CARBONATE 1600 MILLIGRAM(S): 2400 POWDER, FOR SUSPENSION ORAL at 15:14

## 2021-12-13 RX ADMIN — MIDODRINE HYDROCHLORIDE 5 MILLIGRAM(S): 2.5 TABLET ORAL at 15:19

## 2021-12-13 RX ADMIN — SEVELAMER CARBONATE 1600 MILLIGRAM(S): 2400 POWDER, FOR SUSPENSION ORAL at 22:21

## 2021-12-13 RX ADMIN — CHLORHEXIDINE GLUCONATE 1 APPLICATION(S): 213 SOLUTION TOPICAL at 06:00

## 2021-12-13 RX ADMIN — FAMOTIDINE 20 MILLIGRAM(S): 10 INJECTION INTRAVENOUS at 12:44

## 2021-12-13 RX ADMIN — Medication 81 MILLIGRAM(S): at 12:43

## 2021-12-13 RX ADMIN — Medication 0.6 MILLIGRAM(S): at 12:43

## 2021-12-13 RX ADMIN — SEVELAMER CARBONATE 1600 MILLIGRAM(S): 2400 POWDER, FOR SUSPENSION ORAL at 05:59

## 2021-12-13 RX ADMIN — DRONEDARONE 400 MILLIGRAM(S): 400 TABLET, FILM COATED ORAL at 05:59

## 2021-12-13 RX ADMIN — Medication 25 MILLIGRAM(S): at 22:21

## 2021-12-13 RX ADMIN — Medication 5 MILLIGRAM(S): at 22:20

## 2021-12-13 RX ADMIN — Medication 1 MILLIGRAM(S): at 12:43

## 2021-12-13 RX ADMIN — DRONEDARONE 400 MILLIGRAM(S): 400 TABLET, FILM COATED ORAL at 22:21

## 2021-12-13 RX ADMIN — ATORVASTATIN CALCIUM 20 MILLIGRAM(S): 80 TABLET, FILM COATED ORAL at 22:20

## 2021-12-13 RX ADMIN — Medication 25 MILLIGRAM(S): at 05:59

## 2021-12-13 RX ADMIN — MIDODRINE HYDROCHLORIDE 5 MILLIGRAM(S): 2.5 TABLET ORAL at 22:20

## 2021-12-13 NOTE — PROGRESS NOTE ADULT - SUBJECTIVE AND OBJECTIVE BOX
CHIEF COMPLAINT:Patient is a 76y old  Male who presents with a chief complaint of SOB.Pt appears comfortable.    	  REVIEW OF SYSTEMS:  CONSTITUTIONAL: No fever, weight loss, or fatigue  EYES: No eye pain, visual disturbances, or discharge  ENT:  No difficulty hearing, tinnitus, vertigo; No sinus or throat pain  NECK: No pain or stiffness  RESPIRATORY: No cough, wheezing, chills or hemoptysis; No Shortness of Breath  CARDIOVASCULAR: No chest pain, palpitations, passing out, dizziness, or leg swelling  GASTROINTESTINAL: No abdominal or epigastric pain. No nausea, vomiting, or hematemesis; No diarrhea or constipation. No melena or hematochezia.  GENITOURINARY: No dysuria, frequency, hematuria, or incontinence  NEUROLOGICAL: No headaches, memory loss, loss of strength, numbness, or tremors  SKIN: No itching, burning, rashes, or lesions   LYMPH Nodes: No enlarged glands  ENDOCRINE: No heat or cold intolerance; No hair loss  MUSCULOSKELETAL: No joint pain or swelling; No muscle, back, or extremity pain  PSYCHIATRIC: No depression, anxiety, mood swings, or difficulty sleeping  HEME/LYMPH: No easy bruising, or bleeding gums  ALLERGY AND IMMUNOLOGIC: No hives or eczema	        PHYSICAL EXAM:  T(C): 36.9 (12-13-21 @ 05:25), Max: 36.9 (12-12-21 @ 10:22)  HR: 85 (12-13-21 @ 05:25) (59 - 85)  BP: 116/65 (12-13-21 @ 05:25) (109/64 - 116/65)  RR: 17 (12-13-21 @ 05:25) (17 - 19)  SpO2: 98% (12-13-21 @ 05:25) (96% - 98%)  Wt(kg): --  I&O's Summary    12 Dec 2021 07:01  -  13 Dec 2021 07:00  --------------------------------------------------------  IN: 0 mL / OUT: 1 mL / NET: -1 mL        Appearance: Normal	  HEENT:   Normal oral mucosa, PERRL, EOMI	  Lymphatic: No lymphadenopathy  Cardiovascular: Normal S1 S2, No JVD, No murmurs, No edema  Respiratory: Lungs clear to auscultation	  Psychiatry: A & O x 3, Mood & affect appropriate  Gastrointestinal:  Soft, Non-tender, + BS	  Skin: No rashes, No ecchymoses, No cyanosis	  Neurologic: Non-focal  Extremities: Normal range of motion, No clubbing, cyanosis or edema  Vascular: Peripheral pulses palpable 2+ bilaterally    MEDICATIONS  (STANDING):  aspirin  chewable 81 milliGRAM(s) Oral daily  atorvastatin 20 milliGRAM(s) Oral at bedtime  bisacodyl 5 milliGRAM(s) Oral at bedtime  chlorhexidine 2% Cloths 1 Application(s) Topical <User Schedule>  colchicine 0.6 milliGRAM(s) Oral daily  dronedarone 400 milliGRAM(s) Oral two times a day  epoetin ixmena-epbx (RETACRIT) Injectable 6000 Unit(s) IV Push <User Schedule>  famotidine    Tablet 20 milliGRAM(s) Oral daily  ferrous    sulfate 325 milliGRAM(s) Oral daily  folic acid 1 milliGRAM(s) Oral daily  heparin   Injectable 5000 Unit(s) SubCutaneous every 12 hours  isosorbide   mononitrate ER Tablet (IMDUR) 30 milliGRAM(s) Oral daily  lactulose Syrup 20 Gram(s) Oral at bedtime  metoprolol tartrate 25 milliGRAM(s) Oral two times a day  polyethylene glycol 3350 17 Gram(s) Oral daily  sevelamer carbonate 1600 milliGRAM(s) Oral three times a day      TELEMETRY: nsr,ntermittent v paced	    	  	  LABS:	 	                        8.6    4.80  )-----------( 161      ( 11 Dec 2021 14:53 )             25.8     12-11    135  |  96  |  79<H>  ----------------------------<  114<H>  4.3   |  28  |  10.60<H>    Ca    9.8      11 Dec 2021 14:53  Phos  3.5     12-11  Mg     2.3     12-11    TPro  6.8  /  Alb  2.4<L>  /  TBili  0.7  /  DBili  x   /  AST  23  /  ALT  34  /  AlkPhos  149<H>  12-11    proBNP: Serum Pro-Brain Natriuretic Peptide: 39270 pg/mL (11-28 @ 10:10)

## 2021-12-13 NOTE — PROGRESS NOTE ADULT - ASSESSMENT
77 yo M from [facility], w/ ESRD (on HD MWF), HTN, Severe Pulm HTN, CHFpEF >55% w/ GIIDD, Anemia of CKD, NSTEMI, CAD, parox Afib, CVA w/ RIGHT dominant hemiparesis, Vascular Dementia and Mood disorder who was BIB EMS for acute onset SOB 2/2 fluid overload,now CP c/w pericarditis.  1.CP c/w pericarditis-colchicine.  2.Gkqga-Ezeys-e/p PPM, lopressor and multaq.  3.ESRD-HD as per renal.  4.Parox Afib- refusing eliquis.  5.Lipid d/o-statin.  6.CVA-asa,statin."Refusing Eliquis-told cause prostate ca."  7.CAD-asa,imdur,statin.  8.Anemia-s/p transfusion,epogen.  9.GI and DVT prophylaxis. 75 yo M from [facility], w/ ESRD (on HD MWF), HTN, Severe Pulm HTN, CHFpEF >55% w/ GIIDD, Anemia of CKD, NSTEMI, CAD, parox Afib, CVA w/ RIGHT dominant hemiparesis, Vascular Dementia and Mood disorder who was BIB EMS for acute onset SOB 2/2 fluid overload,now CP c/w pericarditis.  1.CP c/w pericarditis-colchicine.Repeat limited echo-f/u pericardial effusion.  2.Khmay-Mqiej-o/p PPM, lopressor and multaq.  3.ESRD-HD as per renal.  4.Parox Afib- refusing eliquis.  5.Lipid d/o-statin.  6.CVA-asa,statin."Refusing Eliquis-told cause prostate ca."  7.CAD-asa,imdur,statin.  8.Anemia-s/p transfusion,epogen.  9.GI and DVT prophylaxis.

## 2021-12-13 NOTE — PROGRESS NOTE ADULT - SUBJECTIVE AND OBJECTIVE BOX
Problem List:  ESRD    PAST MEDICAL & SURGICAL HISTORY:  ESRD (end stage renal disease) on dialysis    Hypertension    Anemia    Cerebrovascular accident (CVA), unspecified mechanism    Paroxysmal atrial fibrillation    CAD (coronary artery disease)    A-V fistula        No Known Allergies      MEDICATIONS  (STANDING):  aspirin  chewable 81 milliGRAM(s) Oral daily  atorvastatin 20 milliGRAM(s) Oral at bedtime  bisacodyl 5 milliGRAM(s) Oral at bedtime  chlorhexidine 2% Cloths 1 Application(s) Topical <User Schedule>  colchicine 0.6 milliGRAM(s) Oral daily  dronedarone 400 milliGRAM(s) Oral two times a day  epoetin ximena-epbx (RETACRIT) Injectable 6000 Unit(s) IV Push <User Schedule>  famotidine    Tablet 20 milliGRAM(s) Oral daily  ferrous    sulfate 325 milliGRAM(s) Oral daily  folic acid 1 milliGRAM(s) Oral daily  heparin   Injectable 5000 Unit(s) SubCutaneous every 12 hours  isosorbide   mononitrate ER Tablet (IMDUR) 30 milliGRAM(s) Oral daily  lactulose Syrup 20 Gram(s) Oral at bedtime  metoprolol tartrate 25 milliGRAM(s) Oral two times a day  polyethylene glycol 3350 17 Gram(s) Oral daily  sevelamer carbonate 1600 milliGRAM(s) Oral three times a day    MEDICATIONS  (PRN):  acetaminophen     Tablet .. 650 milliGRAM(s) Oral every 6 hours PRN Mild Pain (1 - 3), Moderate Pain (4 - 6)  acetaminophen     Tablet .. 650 milliGRAM(s) Oral every 6 hours PRN Temp greater or equal to 38C (100.4F), Mild Pain (1 - 3), Moderate Pain (4 - 6)                            8.6    4.80  )-----------( 161      ( 11 Dec 2021 14:53 )             25.8     12-11    135  |  96  |  79<H>  ----------------------------<  114<H>  4.3   |  28  |  10.60<H>    Ca    9.8      11 Dec 2021 14:53  Phos  3.5     12-11  Mg     2.3     12-11    TPro  6.8  /  Alb  2.4<L>  /  TBili  0.7  /  DBili  x   /  AST  23  /  ALT  34  /  AlkPhos  149<H>  12-11            REVIEW OF SYSTEMS:  General: no fever no chills, no weight loss.  EYES/ENT: No visual changes;  No vertigo, no headache.    RESPIRATORY: No cough, wheezing, hemoptysis; No shortness of breath  CARDIOVASCULAR: No chest pain or palpitations. No Edema  GASTROINTESTINAL: No abdominal or epigastric pain. No nausea, vomiting. No diarrhea or constipation. No melena.          VITALS:  T(F): 97.9 (12-13-21 @ 08:47), Max: 98.5 (12-12-21 @ 10:22)  HR: 76 (12-13-21 @ 08:47)  BP: 93/53 (12-13-21 @ 08:47)  RR: 18 (12-13-21 @ 08:47)  SpO2: 100% (12-13-21 @ 08:47)  Wt(kg): --    12-12 @ 07:01  -  12-13 @ 07:00  --------------------------------------------------------  IN: 0 mL / OUT: 1 mL / NET: -1 mL        PHYSICAL EXAM:  Constitutional: well developed, no diaphoresis, no distress.  Neck: No JVD, no carotid bruit, supple, no adenopathy  Respiratory: Good air entrance B/L, no wheezes, rales or rhonchi  Cardiovascular: S1, S2, RRR, no pericardial rub, no murmur  Abdomen: BS+, soft, no tenderness, no bruit  Pelvis: bladder nondistended  Extremities: No cyanosis or clubbing. No peripheral edema.   Pulses: All present  Neurological: A/O x 3, no focal deficits  Psychiatric: Normal mood, normal affect  Skin: No rashes  Vascular Access:

## 2021-12-13 NOTE — PROGRESS NOTE ADULT - ASSESSMENT
ESRD dialysis days MWF  Dialysis today  Anemia continue Epoetin  Hypotension, hold IMdur on dialysis days and add midodrine.

## 2021-12-14 LAB
ANION GAP SERPL CALC-SCNC: 10 MMOL/L — SIGNIFICANT CHANGE UP (ref 5–17)
BUN SERPL-MCNC: 40 MG/DL — HIGH (ref 7–18)
CALCIUM SERPL-MCNC: 10.5 MG/DL — SIGNIFICANT CHANGE UP (ref 8.4–10.5)
CHLORIDE SERPL-SCNC: 100 MMOL/L — SIGNIFICANT CHANGE UP (ref 96–108)
CO2 SERPL-SCNC: 28 MMOL/L — SIGNIFICANT CHANGE UP (ref 22–31)
CREAT SERPL-MCNC: 5.92 MG/DL — HIGH (ref 0.5–1.3)
GLUCOSE SERPL-MCNC: 97 MG/DL — SIGNIFICANT CHANGE UP (ref 70–99)
HCT VFR BLD CALC: 29.2 % — LOW (ref 39–50)
HGB BLD-MCNC: 9.5 G/DL — LOW (ref 13–17)
MCHC RBC-ENTMCNC: 32.3 PG — SIGNIFICANT CHANGE UP (ref 27–34)
MCHC RBC-ENTMCNC: 32.5 GM/DL — SIGNIFICANT CHANGE UP (ref 32–36)
MCV RBC AUTO: 99.3 FL — SIGNIFICANT CHANGE UP (ref 80–100)
NRBC # BLD: 0 /100 WBCS — SIGNIFICANT CHANGE UP (ref 0–0)
PLATELET # BLD AUTO: 155 K/UL — SIGNIFICANT CHANGE UP (ref 150–400)
POTASSIUM SERPL-MCNC: 3.9 MMOL/L — SIGNIFICANT CHANGE UP (ref 3.5–5.3)
POTASSIUM SERPL-SCNC: 3.9 MMOL/L — SIGNIFICANT CHANGE UP (ref 3.5–5.3)
RBC # BLD: 2.94 M/UL — LOW (ref 4.2–5.8)
RBC # FLD: 15.6 % — HIGH (ref 10.3–14.5)
SODIUM SERPL-SCNC: 138 MMOL/L — SIGNIFICANT CHANGE UP (ref 135–145)
WBC # BLD: 3.41 K/UL — LOW (ref 3.8–10.5)
WBC # FLD AUTO: 3.41 K/UL — LOW (ref 3.8–10.5)

## 2021-12-14 RX ORDER — METOPROLOL TARTRATE 50 MG
12.5 TABLET ORAL
Refills: 0 | Status: DISCONTINUED | OUTPATIENT
Start: 2021-12-14 | End: 2021-12-16

## 2021-12-14 RX ADMIN — Medication 0.6 MILLIGRAM(S): at 12:06

## 2021-12-14 RX ADMIN — SEVELAMER CARBONATE 1600 MILLIGRAM(S): 2400 POWDER, FOR SUSPENSION ORAL at 06:44

## 2021-12-14 RX ADMIN — MIDODRINE HYDROCHLORIDE 5 MILLIGRAM(S): 2.5 TABLET ORAL at 06:43

## 2021-12-14 RX ADMIN — ATORVASTATIN CALCIUM 20 MILLIGRAM(S): 80 TABLET, FILM COATED ORAL at 23:03

## 2021-12-14 RX ADMIN — DRONEDARONE 400 MILLIGRAM(S): 400 TABLET, FILM COATED ORAL at 06:44

## 2021-12-14 RX ADMIN — MIDODRINE HYDROCHLORIDE 5 MILLIGRAM(S): 2.5 TABLET ORAL at 13:52

## 2021-12-14 RX ADMIN — Medication 12.5 MILLIGRAM(S): at 17:35

## 2021-12-14 RX ADMIN — HEPARIN SODIUM 5000 UNIT(S): 5000 INJECTION INTRAVENOUS; SUBCUTANEOUS at 06:44

## 2021-12-14 RX ADMIN — SEVELAMER CARBONATE 1600 MILLIGRAM(S): 2400 POWDER, FOR SUSPENSION ORAL at 23:05

## 2021-12-14 RX ADMIN — Medication 25 MILLIGRAM(S): at 06:44

## 2021-12-14 RX ADMIN — ISOSORBIDE MONONITRATE 30 MILLIGRAM(S): 60 TABLET, EXTENDED RELEASE ORAL at 06:43

## 2021-12-14 RX ADMIN — CHLORHEXIDINE GLUCONATE 1 APPLICATION(S): 213 SOLUTION TOPICAL at 06:43

## 2021-12-14 RX ADMIN — MIDODRINE HYDROCHLORIDE 5 MILLIGRAM(S): 2.5 TABLET ORAL at 23:05

## 2021-12-14 RX ADMIN — Medication 81 MILLIGRAM(S): at 12:06

## 2021-12-14 RX ADMIN — FAMOTIDINE 20 MILLIGRAM(S): 10 INJECTION INTRAVENOUS at 12:06

## 2021-12-14 RX ADMIN — Medication 325 MILLIGRAM(S): at 12:06

## 2021-12-14 RX ADMIN — DRONEDARONE 400 MILLIGRAM(S): 400 TABLET, FILM COATED ORAL at 17:28

## 2021-12-14 RX ADMIN — SEVELAMER CARBONATE 1600 MILLIGRAM(S): 2400 POWDER, FOR SUSPENSION ORAL at 13:53

## 2021-12-14 RX ADMIN — Medication 1 MILLIGRAM(S): at 12:06

## 2021-12-14 NOTE — PROGRESS NOTE ADULT - SUBJECTIVE AND OBJECTIVE BOX
CHIEF COMPLAINT:Patient is a 76y old  Male who presents with a chief complaint of SOB .Pt appears comfortable.    	  REVIEW OF SYSTEMS:  CONSTITUTIONAL: No fever, weight loss, or fatigue  EYES: No eye pain, visual disturbances, or discharge  ENT:  No difficulty hearing, tinnitus, vertigo; No sinus or throat pain  NECK: No pain or stiffness  RESPIRATORY: No cough, wheezing, chills or hemoptysis; No Shortness of Breath  CARDIOVASCULAR: No chest pain, palpitations, passing out, dizziness, or leg swelling  GASTROINTESTINAL: No abdominal or epigastric pain. No nausea, vomiting, or hematemesis; No diarrhea or constipation. No melena or hematochezia.  GENITOURINARY: No dysuria, frequency, hematuria, or incontinence  NEUROLOGICAL: No headaches, memory loss, loss of strength, numbness, or tremors  SKIN: No itching, burning, rashes, or lesions   LYMPH Nodes: No enlarged glands  ENDOCRINE: No heat or cold intolerance; No hair loss  MUSCULOSKELETAL: No joint pain or swelling; No muscle, back, or extremity pain  PSYCHIATRIC: No depression, anxiety, mood swings, or difficulty sleeping  HEME/LYMPH: No easy bruising, or bleeding gums  ALLERGY AND IMMUNOLOGIC: No hives or eczema	      PHYSICAL EXAM:  T(C): 36.7 (12-14-21 @ 08:00), Max: 37.7 (12-13-21 @ 21:11)  HR: 91 (12-14-21 @ 08:00) (70 - 92)  BP: 116/72 (12-14-21 @ 08:00) (99/65 - 128/58)  RR: 17 (12-14-21 @ 08:00) (17 - 18)  SpO2: 94% (12-14-21 @ 08:00) (94% - 100%)  Wt(kg): --  I&O's Summary    13 Dec 2021 07:01  -  14 Dec 2021 07:00  --------------------------------------------------------  IN: 0 mL / OUT: 1 mL / NET: -1 mL        Appearance: Normal	  HEENT:   Normal oral mucosa, PERRL, EOMI	  Lymphatic: No lymphadenopathy  Cardiovascular: Normal S1 S2, No JVD, No murmurs, No edema  Respiratory: Lungs clear to auscultation	  Psychiatry: A & O x 3, Mood & affect appropriate  Gastrointestinal:  Soft, Non-tender, + BS	  Skin: No rashes, No ecchymoses, No cyanosis	  Neurologic: Non-focal  Extremities: Normal range of motion, No clubbing, cyanosis or edema  Vascular: Peripheral pulses palpable 2+ bilaterally    MEDICATIONS  (STANDING):  aspirin  chewable 81 milliGRAM(s) Oral daily  atorvastatin 20 milliGRAM(s) Oral at bedtime  bisacodyl 5 milliGRAM(s) Oral at bedtime  chlorhexidine 2% Cloths 1 Application(s) Topical <User Schedule>  colchicine 0.6 milliGRAM(s) Oral daily  dronedarone 400 milliGRAM(s) Oral two times a day  epoetin ximena-epbx (RETACRIT) Injectable 11240 Unit(s) IV Push <User Schedule>  famotidine    Tablet 20 milliGRAM(s) Oral daily  ferrous    sulfate 325 milliGRAM(s) Oral daily  folic acid 1 milliGRAM(s) Oral daily  heparin   Injectable 5000 Unit(s) SubCutaneous every 12 hours  isosorbide   mononitrate ER Tablet (IMDUR) 30 milliGRAM(s) Oral <User Schedule>  lactulose Syrup 20 Gram(s) Oral at bedtime  metoprolol tartrate 12.5 milliGRAM(s) Oral two times a day  midodrine 5 milliGRAM(s) Oral three times a day  polyethylene glycol 3350 17 Gram(s) Oral daily  sevelamer carbonate 1600 milliGRAM(s) Oral three times a day      	  	  LABS:	 	                          9.5    3.41  )-----------( 155      ( 14 Dec 2021 08:42 )             29.2     12-14    138  |  100  |  40<H>  ----------------------------<  97  3.9   |  28  |  5.92<H>    Ca    10.5      14 Dec 2021 08:42      proBNP: Serum Pro-Brain Natriuretic Peptide: 10541 pg/mL (11-28 @ 10:10)      	     Limited Transthoracic Echo (12.13.21 @ 10:02) >  CONCLUSIONS:  1. Large pericardial effusion, it is circumferential,  however it is most pronounced along the left ventricular  inferolateral wall measuring 2.1cm. Mild right atrial  inversion is seen. No echocardiographic evidence of  tamponade, however, cardiac tamponade is a clinical  diagnosis.    *** Compared with limited echocardiogram report of  12/9/2021, pericardial effusion has increased in size.  ------------------------------------------------------------------------  Confirmed on  12/13/2021 - 13:07:45 by Cyndie Olsen MD  ------------------------------------------------------------------------

## 2021-12-14 NOTE — PROGRESS NOTE ADULT - PROBLEM SELECTOR PLAN 1
complains of severe left-sided chest pain x 2 days post PPM placement (12/8/21)  Troponins were mildly elevated but trended down  Given pain meds (IV Tylenol, IV dilaudid, NTG)  Telemetry showed ST elevations (12/9/21)  EKG showed diffused AK and ST changes  ECHO was done x 2 (12/9/21) confirming pericardial fluid and pericarditis (12/10/21)  Repeat echo on 12/13 shows worsening pericardial effusion, no need to transfer to CCU at this time, monitor on the floor, serial echo to monitor effusion  c/w Colchicine  Cardio consult - Dr. Salinas  EP consult - Dr. Gay

## 2021-12-14 NOTE — PROGRESS NOTE ADULT - PROBLEM SELECTOR PROBLEM 7
Patient is having Bilateral Ptosis repair with bilateral upper lid blepharoplasty on 1/3/19 with Dr. Still. Has written instructions from surgeon's office to hold ASA and NSAIDs, and supplements for the surgery. Spouse Carlos to accompany patient on DOS. Will take the following medications with water only prior to arrival in the surgery center: Amlodipine, Gabapentin, use Nebulizer for her asthma, and bring prn Inhaler. May take Clonazepam prn for her anxiety. BS run in 100's and she checks them every other day. Will use an antibacterial soap to shower prior to arrival.        Pulmonary HTN

## 2021-12-14 NOTE — PROGRESS NOTE ADULT - ATTENDING COMMENTS
discussed management plan with house staff

## 2021-12-14 NOTE — PROGRESS NOTE ADULT - PROBLEM SELECTOR PLAN 2
- BP at NH wnl but elevated peak 241/84 w/ pulm edema on CXR  - BNP 30k and Trop elevation  - goal is reduce BP by 25% in 24 hours  - s/p Lasix 80 and Hydral 10  - s/p urgent HD (11/28/21)  - trend trop T3 - trended down  - continue lopressor 12.5 in setting of bradycardia  - continue Multaq  - continue to monitor on telemetry (tachycardia-bradycardia)  - Cardio consulted EP (Dr. Gay)  - s/p Micra placement (12/7/21)  - NEPHRO Dr. Jacinto  - CARDIO Dr. Salinas

## 2021-12-14 NOTE — PROGRESS NOTE ADULT - PROBLEM SELECTOR PLAN 7
prior Echo on 9/28/21 showed RV systolic pressure is 69 mm Hg. Severe pulmonary hypertension.  - admitted to [facility]  [facility] metop

## 2021-12-14 NOTE — CHART NOTE - NSCHARTNOTEFT_GEN_A_CORE
Assessment:                Factors impacting intake: [ ] none [ ] nausea  [ ] vomiting [ ] diarrhea [ ] constipation  [ ]chewing problems [ ] swallowing issues  [ ] other:     Diet Presciption: Diet, Regular:   DASH/TLC {Sodium & Cholesterol Restricted}  1200mL Fluid Restriction ([id number])  For patients receiving Renal Replacement - No Protein Restr, No Conc K, No Conc Phos, Low Sodium (RENAL)  Supplement Feeding Modality:  Oral  Nepro Cans or Servings Per Day:  1       Frequency:  Daily (21 @ 20:17)    Intake:     Daily Weight in k.3 (14 Dec 2021 04:36)  Weight in k.6 (13 Dec 2021 19:05)  Weight in k.1 (13 Dec 2021 16:00)  Weight in k.4 (13 Dec 2021 05:25)  Weight in k.4 (11 Dec 2021 17:45)  Weight in k.1 (11 Dec 2021 14:20)  Weight in k.1 (11 Dec 2021 04:59)  Weight in k (10 Dec 2021 05:07)  Weight in k.4 (09 Dec 2021 13:00)  Weight in k.8 (09 Dec 2021 09:45)  Weight in k.6 (09 Dec 2021 04:33)  Weight in k.1 (08 Dec 2021 04:47)    % Weight Change    Pertinent Medications: MEDICATIONS  (STANDING):  aspirin  chewable 81 milliGRAM(s) Oral daily  atorvastatin 20 milliGRAM(s) Oral at bedtime  bisacodyl 5 milliGRAM(s) Oral at bedtime  chlorhexidine 2% Cloths 1 Application(s) Topical <User Schedule>  colchicine 0.6 milliGRAM(s) Oral daily  dronedarone 400 milliGRAM(s) Oral two times a day  epoetin ximena-epbx (RETACRIT) Injectable 71832 Unit(s) IV Push <User Schedule>  famotidine    Tablet 20 milliGRAM(s) Oral daily  ferrous    sulfate 325 milliGRAM(s) Oral daily  folic acid 1 milliGRAM(s) Oral daily  heparin   Injectable 5000 Unit(s) SubCutaneous every 12 hours  isosorbide   mononitrate ER Tablet (IMDUR) 30 milliGRAM(s) Oral <User Schedule>  lactulose Syrup 20 Gram(s) Oral at bedtime  metoprolol tartrate 12.5 milliGRAM(s) Oral two times a day  midodrine 5 milliGRAM(s) Oral three times a day  polyethylene glycol 3350 17 Gram(s) Oral daily  sevelamer carbonate 1600 milliGRAM(s) Oral three times a day    MEDICATIONS  (PRN):  acetaminophen     Tablet .. 650 milliGRAM(s) Oral every 6 hours PRN Mild Pain (1 - 3), Moderate Pain (4 - 6)  acetaminophen     Tablet .. 650 milliGRAM(s) Oral every 6 hours PRN Temp greater or equal to 38C (100.4F), Mild Pain (1 - 3), Moderate Pain (4 - 6)    Pertinent Labs: 1214 Na138 mmol/L Glu 97 mg/dL K+ 3.9 mmol/L Cr  5.92 mg/dL<H> BUN 40 mg/dL<H> 12-11 Phos 3.5 mg/dL 12-11 Alb 2.4 g/dL<L>     CAPILLARY BLOOD GLUCOSE          Skin:     Estimated Needs:   [ ] no change since previous assessment  [ ] recalculated:     Previous Nutrition Diagnosis:   [ ] Inadequate Energy Intake [ ]Inadequate Oral Intake [ ] Excessive Energy Intake   [ ] Underweight [ ] Increased Nutrient Needs [ ] Overweight/Obesity  [ ] Swallowing Difficult   [ ] Altered GI Function [ ] Unintended Weight Loss [ ] Food & Nutrition Related Knowledge Deficit [ ] Malnutrition   [ ] Not Ready for Diet/Life Style Changes     Nutrition Diagnosis is [ ] ongoing  [ ] Improving   [ ] resolved [ ] not applicable     New Nutrition Diagnosis: [ ] not applicable       Interventions:   Recommend  [ ] Change Diet To:  [ ] Nutrition Supplement  [ ] Nutrition Support  [ ] Other:     Monitoring and Evaluation:   [ ] PO intake [ x ] Tolerance to diet prescription [ x ] weights [ x ] labs[ x ] follow up per protocol  [ ] other: Assessment:      Nutrition reassessment for follow-up. Chart reviewed, pt visited, alert, oriented, able to communicate well; appetite good, denied GI distress, chewing or swallowing problem at present, no specific food choices, but varied intake depending on food served, observed lunch, 25% intake due to food dislikes, declining "Service Recovery", took the Nepro supplement reported;     Factors impacting intake: [ X ] other: change in mental status; difficulty with food procurement/preparation; Druze/ethnic/cultural/personal food preferences; acute on chronic comorbidities, ESRD on HD, extensive cardiac issues including CHF, CVA; functional quadriplegia    Diet Prescription: Diet, Regular:   DASH/TLC {Sodium & Cholesterol Restricted}  1200mL Fluid Restriction ([id number])  For patients receiving Renal Replacement - No Protein Restr, No Conc K, No Conc Phos, Low Sodium (RENAL)  Supplement Feeding Modality:  Oral  Nepro Cans or Servings Per Day:  1       Frequency:  Daily (21 @ 20:17)    Intake: see above     Daily Weight in k.3 (14 Dec 2021 04:36)  Weight in k.6 (13 Dec 2021 19:05)  Weight in k.1 (13 Dec 2021 16:00)  Weight in k.4 (13 Dec 2021 05:25)  Weight in k.4 (11 Dec 2021 17:45)  Weight in k.1 (11 Dec 2021 14:20)  Weight in k.1 (11 Dec 2021 04:59)  Weight in k (10 Dec 2021 05:07)  Weight in k.4 (09 Dec 2021 13:00)  Weight in k.8 (09 Dec 2021 09:45)  Weight in k.6 (09 Dec 2021 04:33)  Weight in k.1 (08 Dec 2021 04:47)    % Weight Change: a bit fluctuated, may due to fluid shift from HD and/or scale variance    Pertinent Medications: MEDICATIONS  (STANDING):  aspirin  chewable 81 milliGRAM(s) Oral daily  atorvastatin 20 milliGRAM(s) Oral at bedtime  bisacodyl 5 milliGRAM(s) Oral at bedtime  chlorhexidine 2% Cloths 1 Application(s) Topical <User Schedule>  colchicine 0.6 milliGRAM(s) Oral daily  dronedarone 400 milliGRAM(s) Oral two times a day  epoetin ximena-epbx (RETACRIT) Injectable 96327 Unit(s) IV Push <User Schedule>  famotidine    Tablet 20 milliGRAM(s) Oral daily  ferrous    sulfate 325 milliGRAM(s) Oral daily  folic acid 1 milliGRAM(s) Oral daily  heparin   Injectable 5000 Unit(s) SubCutaneous every 12 hours  isosorbide   mononitrate ER Tablet (IMDUR) 30 milliGRAM(s) Oral <User Schedule>  lactulose Syrup 20 Gram(s) Oral at bedtime  metoprolol tartrate 12.5 milliGRAM(s) Oral two times a day  midodrine 5 milliGRAM(s) Oral three times a day  polyethylene glycol 3350 17 Gram(s) Oral daily  sevelamer carbonate 1600 milliGRAM(s) Oral three times a day    MEDICATIONS  (PRN):  acetaminophen     Tablet .. 650 milliGRAM(s) Oral every 6 hours PRN Mild Pain (1 - 3), Moderate Pain (4 - 6)  acetaminophen     Tablet .. 650 milliGRAM(s) Oral every 6 hours PRN Temp greater or equal to 38C (100.4F), Mild Pain (1 - 3), Moderate Pain (4 - 6)    Pertinent Labs: 12-14 Na138 mmol/L Glu 97 mg/dL K+ 3.9 mmol/L Cr  5.92 mg/dL<H> BUN 40 mg/dL<H> 12-11 Phos 3.5 mg/dL 12-11 Alb 2.4 g/dL<L>     CAPILLARY BLOOD GLUCOSE    Skin: skin intact     Estimated Needs:   [ X ] no change since previous assessment  [ ] recalculated:     Previous Nutrition Diagnosis:   [ X ] Predicted Inadequate Nutrient Intake     Nutrition Diagnosis is [ X ] ongoing  [ ] Improving   [ ] resolved [ ] not applicable     New Nutrition Diagnosis: [ ] not applicable     Interventions:   Recommend  [ X ] Continue diet Rx as ordered   [ X ] Nutrition Supplement: on Nepro 1can daily ( 425 kcal, 19 g protein)   [ ] Nutrition Support  [ X ] Other: Provide food choices within diet Rx as available/updated                    Pt to be followed by dietitian at skilled nursing facility and dialysis center when medically ready to be discharged     Monitoring and Evaluation:   [ X ] PO intake [ x ] Tolerance to diet prescription [ x ] weights [ x ] labs[ x ] follow up per protocol  [ ] other: Assessment:      Nutrition reassessment for follow-up. Chart reviewed, pt visited, alert, oriented, able to communicate well; appetite good, denied GI distress, chewing or swallowing problem at present, no specific food choices, but varied intake depending on food served, observed lunch, 25% intake due to food dislikes, declining "Service Recovery", took the Nepro supplement reported;     Factors impacting intake: [ X ] other: change in mental status; difficulty with food procurement/preparation; Moravian/ethnic/cultural/personal food preferences; acute on chronic comorbidities, ESRD on HD, extensive cardiac issues including CHF, CVA; functional quadriplegia    Diet Prescription: Diet, Regular:   DASH/TLC {Sodium & Cholesterol Restricted}  1200mL Fluid Restriction ([id number])  For patients receiving Renal Replacement - No Protein Restr, No Conc K, No Conc Phos, Low Sodium (RENAL)  Supplement Feeding Modality:  Oral  Nepro Cans or Servings Per Day:  1       Frequency:  Daily (21 @ 20:17)    Intake: see above     Daily Weight in k.3 (14 Dec 2021 04:36)  Weight in k.6 (13 Dec 2021 19:05)  Weight in k.1 (13 Dec 2021 16:00)  Weight in k.4 (13 Dec 2021 05:25)  Weight in k.4 (11 Dec 2021 17:45)  Weight in k.1 (11 Dec 2021 14:20)  Weight in k.1 (11 Dec 2021 04:59)  Weight in k (10 Dec 2021 05:07)  Weight in k.4 (09 Dec 2021 13:00)  Weight in k.8 (09 Dec 2021 09:45)  Weight in k.6 (09 Dec 2021 04:33)  Weight in k.1 (08 Dec 2021 04:47)    % Weight Change: a bit fluctuated, may due to fluid shift from HD and/or scale variance    Pertinent Medications: MEDICATIONS  (STANDING):  aspirin  chewable 81 milliGRAM(s) Oral daily  atorvastatin 20 milliGRAM(s) Oral at bedtime  bisacodyl 5 milliGRAM(s) Oral at bedtime  chlorhexidine 2% Cloths 1 Application(s) Topical <User Schedule>  colchicine 0.6 milliGRAM(s) Oral daily  dronedarone 400 milliGRAM(s) Oral two times a day  epoetin ximena-epbx (RETACRIT) Injectable 66861 Unit(s) IV Push <User Schedule>  famotidine    Tablet 20 milliGRAM(s) Oral daily  ferrous    sulfate 325 milliGRAM(s) Oral daily  folic acid 1 milliGRAM(s) Oral daily  heparin   Injectable 5000 Unit(s) SubCutaneous every 12 hours  isosorbide   mononitrate ER Tablet (IMDUR) 30 milliGRAM(s) Oral <User Schedule>  lactulose Syrup 20 Gram(s) Oral at bedtime  metoprolol tartrate 12.5 milliGRAM(s) Oral two times a day  midodrine 5 milliGRAM(s) Oral three times a day  polyethylene glycol 3350 17 Gram(s) Oral daily  sevelamer carbonate 1600 milliGRAM(s) Oral three times a day    MEDICATIONS  (PRN):  acetaminophen     Tablet .. 650 milliGRAM(s) Oral every 6 hours PRN Mild Pain (1 - 3), Moderate Pain (4 - 6)  acetaminophen     Tablet .. 650 milliGRAM(s) Oral every 6 hours PRN Temp greater or equal to 38C (100.4F), Mild Pain (1 - 3), Moderate Pain (4 - 6)    Pertinent Labs: 12-14 Na138 mmol/L Glu 97 mg/dL K+ 3.9 mmol/L Cr  5.92 mg/dL<H> BUN 40 mg/dL<H> 12-11 Phos 3.5 mg/dL 12-11 Alb 2.4 g/dL<L>     CAPILLARY BLOOD GLUCOSE    Skin: skin intact     Estimated Needs:   [ X ] no change since previous assessment  [ ] recalculated:     Previous Nutrition Diagnosis:   [ X ] Predicted Inadequate Nutrient Intake     Nutrition Diagnosis is [ X ] ongoing  [ ] Improving   [ ] resolved [ ] not applicable     New Nutrition Diagnosis: [ ] not applicable     Interventions:   Recommend  [ X ] Continue diet Rx as ordered with Nepro 1can daily ( 425 kcal, 19 g protein)  [ X ] Nutrition Supplement: Nephrocaps as medically feasible   [ ] Nutrition Support  [ X ] Other: Provide food choices within diet Rx as available/updated                    Pt to be followed by dietitian at skilled nursing facility and dialysis center when medically ready to be discharged     Monitoring and Evaluation:   [ X ] PO intake [ x ] Tolerance to diet prescription [ x ] weights [ x ] labs[ x ] follow up per protocol  [ ] other: Assessment:      Nutrition reassessment for follow-up. Chart reviewed, pt visited, alert, oriented, able to communicate well; appetite good, denied GI distress, chewing or swallowing problem at present, no specific food choices, but varied intake depending on food served, observed lunch, 25% intake due to food dislikes, declining "Service Recovery", took the Nepro supplement reported;     Factors impacting intake: [ X ] other: change in mental status; difficulty with food procurement/preparation; Sikh/ethnic/cultural/personal food preferences; acute on chronic comorbidities, ESRD on HD, extensive cardiac issues including CHF, CVA; functional quadriplegia    Diet Prescription: Diet, Regular:   DASH/TLC {Sodium & Cholesterol Restricted}  1200mL Fluid Restriction ([id number])  For patients receiving Renal Replacement - No Protein Restr, No Conc K, No Conc Phos, Low Sodium (RENAL)  Supplement Feeding Modality:  Oral  Nepro Cans or Servings Per Day:  1       Frequency:  Daily (21 @ 20:17)    Intake: see above     Daily Weight in k.3 (14 Dec 2021 04:36)  Weight in k.6 (13 Dec 2021 19:05)  Weight in k.1 (13 Dec 2021 16:00)  Weight in k.4 (13 Dec 2021 05:25)  Weight in k.4 (11 Dec 2021 17:45)  Weight in k.1 (11 Dec 2021 14:20)  Weight in k.1 (11 Dec 2021 04:59)  Weight in k (10 Dec 2021 05:07)  Weight in k.4 (09 Dec 2021 13:00)  Weight in k.8 (09 Dec 2021 09:45)  Weight in k.6 (09 Dec 2021 04:33)  Weight in k.1 (08 Dec 2021 04:47)    % Weight Change: a bit fluctuated, may due to fluid shift from HD and/or scale variance    Pertinent Medications: MEDICATIONS  (STANDING):  aspirin  chewable 81 milliGRAM(s) Oral daily  atorvastatin 20 milliGRAM(s) Oral at bedtime  bisacodyl 5 milliGRAM(s) Oral at bedtime  chlorhexidine 2% Cloths 1 Application(s) Topical <User Schedule>  colchicine 0.6 milliGRAM(s) Oral daily  dronedarone 400 milliGRAM(s) Oral two times a day  epoetin ximena-epbx (RETACRIT) Injectable 49632 Unit(s) IV Push <User Schedule>  famotidine    Tablet 20 milliGRAM(s) Oral daily  ferrous    sulfate 325 milliGRAM(s) Oral daily  folic acid 1 milliGRAM(s) Oral daily  heparin   Injectable 5000 Unit(s) SubCutaneous every 12 hours  isosorbide   mononitrate ER Tablet (IMDUR) 30 milliGRAM(s) Oral <User Schedule>  lactulose Syrup 20 Gram(s) Oral at bedtime  metoprolol tartrate 12.5 milliGRAM(s) Oral two times a day  midodrine 5 milliGRAM(s) Oral three times a day  polyethylene glycol 3350 17 Gram(s) Oral daily  sevelamer carbonate 1600 milliGRAM(s) Oral three times a day    MEDICATIONS  (PRN):  acetaminophen     Tablet .. 650 milliGRAM(s) Oral every 6 hours PRN Mild Pain (1 - 3), Moderate Pain (4 - 6)  acetaminophen     Tablet .. 650 milliGRAM(s) Oral every 6 hours PRN Temp greater or equal to 38C (100.4F), Mild Pain (1 - 3), Moderate Pain (4 - 6)    Pertinent Labs: 12-14 Na138 mmol/L Glu 97 mg/dL K+ 3.9 mmol/L Cr  5.92 mg/dL<H> BUN 40 mg/dL<H> 12-11 Phos 3.5 mg/dL 12-11 Alb 2.4 g/dL<L>     CAPILLARY BLOOD GLUCOSE    Skin: skin intact     Estimated Needs:   [ X ] no change since previous assessment  [ ] recalculated:     Previous Nutrition Diagnosis:   [ X ] Predicted Inadequate Nutrient Intake     Nutrition Diagnosis is [ X ] ongoing  [ ] Improving   [ ] resolved [ ] not applicable     New Nutrition Diagnosis: [ X ] not applicable     Interventions:   Recommend  [ X ] Continue diet Rx as ordered with Nepro 1can daily ( 425 kcal, 19 g protein)  [ X ] Nutrition Supplement: Nephrocaps as medically feasible   [ ] Nutrition Support  [ X ] Other: Provide food choices within diet Rx as available/updated                    Pt to be followed by dietitian at skilled nursing facility and dialysis center when medically ready to be discharged     Monitoring and Evaluation:   [ X ] PO intake [ x ] Tolerance to diet prescription [ x ] weights [ x ] labs[ x ] follow up per protocol  [ ] other: Assessment:      Nutrition reassessment for follow-up. Chart reviewed, pt visited, alert, oriented, able to communicate well; appetite good, denied GI distress, chewing or swallowing problem at present, no specific food choices, but varied intake depending on food served, observed lunch, 25% intake due to food dislikes, declining "Service Recovery", took the Nepro supplement reported;     Factors impacting intake: [ X ] other: change in mental status; difficulty with food procurement/preparation; Jainism/ethnic/cultural/personal food preferences; acute on chronic comorbidities, ESRD on HD, extensive cardiac issues including CHF, CVA; functional quadriplegia    Diet Prescription: Diet, Regular:   DASH/TLC {Sodium & Cholesterol Restricted}  1200mL Fluid Restriction ([id number])  For patients receiving Renal Replacement - No Protein Restr, No Conc K, No Conc Phos, Low Sodium (RENAL)  Supplement Feeding Modality:  Oral  Nepro Cans or Servings Per Day:  1       Frequency:  Daily (21 @ 20:17)    Intake: see above     Daily Weight in k.3 (14 Dec 2021 04:36)  Weight in k.6 (13 Dec 2021 19:05)  Weight in k.1 (13 Dec 2021 16:00)  Weight in k.4 (13 Dec 2021 05:25)  Weight in k.4 (11 Dec 2021 17:45)  Weight in k.1 (11 Dec 2021 14:20)  Weight in k.1 (11 Dec 2021 04:59)  Weight in k (10 Dec 2021 05:07)  Weight in k.4 (09 Dec 2021 13:00)  Weight in k.8 (09 Dec 2021 09:45)  Weight in k.6 (09 Dec 2021 04:33)  Weight in k.1 (08 Dec 2021 04:47)    % Weight Change: a bit fluctuated, may due to fluid shift from HD and/or scale variance    Pertinent Medications: MEDICATIONS  (STANDING):  aspirin  chewable 81 milliGRAM(s) Oral daily  atorvastatin 20 milliGRAM(s) Oral at bedtime  bisacodyl 5 milliGRAM(s) Oral at bedtime  chlorhexidine 2% Cloths 1 Application(s) Topical <User Schedule>  colchicine 0.6 milliGRAM(s) Oral daily  dronedarone 400 milliGRAM(s) Oral two times a day  epoetin ximena-epbx (RETACRIT) Injectable 48014 Unit(s) IV Push <User Schedule>  famotidine    Tablet 20 milliGRAM(s) Oral daily  ferrous    sulfate 325 milliGRAM(s) Oral daily  folic acid 1 milliGRAM(s) Oral daily  heparin   Injectable 5000 Unit(s) SubCutaneous every 12 hours  isosorbide   mononitrate ER Tablet (IMDUR) 30 milliGRAM(s) Oral <User Schedule>  lactulose Syrup 20 Gram(s) Oral at bedtime  metoprolol tartrate 12.5 milliGRAM(s) Oral two times a day  midodrine 5 milliGRAM(s) Oral three times a day  polyethylene glycol 3350 17 Gram(s) Oral daily  sevelamer carbonate 1600 milliGRAM(s) Oral three times a day    MEDICATIONS  (PRN):  acetaminophen     Tablet .. 650 milliGRAM(s) Oral every 6 hours PRN Mild Pain (1 - 3), Moderate Pain (4 - 6)  acetaminophen     Tablet .. 650 milliGRAM(s) Oral every 6 hours PRN Temp greater or equal to 38C (100.4F), Mild Pain (1 - 3), Moderate Pain (4 - 6)    Pertinent Labs: 12-14 Na138 mmol/L Glu 97 mg/dL K+ 3.9 mmol/L Cr  5.92 mg/dL<H> BUN 40 mg/dL<H> 12-11 Phos 3.5 mg/dL 12-11 Alb 2.4 g/dL<L>     CAPILLARY BLOOD GLUCOSE    Skin: skin intact     Estimated Needs:   [ X ] no change since previous assessment  [ ] recalculated:     Previous Nutrition Diagnosis:   [ X ] Predicted Inadequate Nutrient Intake     Nutrition Diagnosis is [ X ] ongoing  [ ] Improving   [ ] resolved [ ] not applicable     New Nutrition Diagnosis: [ X ] not applicable     Interventions:   Recommend  [ X ] Continue diet Rx as ordered with Nepro 1can daily ( 425 kcal, 19 g protein)  [ X ] Nutrition Supplement: Nephrocaps as medically feasible   [ ] Nutrition Support  [ X ] Other: Provide food choices within diet Rx as available/updated                    Pt to be followed by dietitian at skilled nursing facility and dialysis center when medically ready to be discharged.     Monitoring and Evaluation:   [ X ] PO intake [ x ] Tolerance to diet prescription [ x ] weights [ x ] labs[ x ] follow up per protocol  [ ] other:

## 2021-12-14 NOTE — PROGRESS NOTE ADULT - SUBJECTIVE AND OBJECTIVE BOX
Problem List:  ESRD    PAST MEDICAL & SURGICAL HISTORY:  ESRD (end stage renal disease) on dialysis    Hypertension    Anemia    Cerebrovascular accident (CVA), unspecified mechanism    Paroxysmal atrial fibrillation    CAD (coronary artery disease)    A-V fistula        No Known Allergies      MEDICATIONS  (STANDING):  aspirin  chewable 81 milliGRAM(s) Oral daily  atorvastatin 20 milliGRAM(s) Oral at bedtime  bisacodyl 5 milliGRAM(s) Oral at bedtime  chlorhexidine 2% Cloths 1 Application(s) Topical <User Schedule>  colchicine 0.6 milliGRAM(s) Oral daily  dronedarone 400 milliGRAM(s) Oral two times a day  epoetin ximena-epbx (RETACRIT) Injectable 07773 Unit(s) IV Push <User Schedule>  famotidine    Tablet 20 milliGRAM(s) Oral daily  ferrous    sulfate 325 milliGRAM(s) Oral daily  folic acid 1 milliGRAM(s) Oral daily  heparin   Injectable 5000 Unit(s) SubCutaneous every 12 hours  isosorbide   mononitrate ER Tablet (IMDUR) 30 milliGRAM(s) Oral <User Schedule>  lactulose Syrup 20 Gram(s) Oral at bedtime  metoprolol tartrate 12.5 milliGRAM(s) Oral two times a day  midodrine 5 milliGRAM(s) Oral three times a day  polyethylene glycol 3350 17 Gram(s) Oral daily  sevelamer carbonate 1600 milliGRAM(s) Oral three times a day    MEDICATIONS  (PRN):  acetaminophen     Tablet .. 650 milliGRAM(s) Oral every 6 hours PRN Mild Pain (1 - 3), Moderate Pain (4 - 6)  acetaminophen     Tablet .. 650 milliGRAM(s) Oral every 6 hours PRN Temp greater or equal to 38C (100.4F), Mild Pain (1 - 3), Moderate Pain (4 - 6)                            9.5    3.41  )-----------( 155      ( 14 Dec 2021 08:42 )             29.2     12-14    138  |  100  |  40<H>  ----------------------------<  97  3.9   |  28  |  5.92<H>    Ca    10.5      14 Dec 2021 08:42              REVIEW OF SYSTEMS:  General: no fever no chills, no weight loss.    RESPIRATORY: No cough, wheezing, hemoptysis; No shortness of breath  CARDIOVASCULAR: No chest pain or palpitations. No Edema  GASTROINTESTINAL: No abdominal or epigastric pain. No nausea, vomiting. No diarrhea or constipation. No melena.          VITALS:  T(F): 97.8 (12-14-21 @ 11:10), Max: 99.8 (12-13-21 @ 21:11)  HR: 81 (12-14-21 @ 11:10)  BP: 104/57 (12-14-21 @ 11:10)  RR: 18 (12-14-21 @ 11:10)  SpO2: 95% (12-14-21 @ 11:10)  Wt(kg): --    12-13 @ 07:01  -  12-14 @ 07:00  --------------------------------------------------------  IN: 0 mL / OUT: 1 mL / NET: -1 mL        PHYSICAL EXAM:  Constitutional: well developed, no diaphoresis, no distress.  Neck: No JVD, no carotid bruit, supple, no adenopathy  Respiratory:  air entrance B/L, no wheezes, rales or rhonchi  Cardiovascular: S1, S2, RRR,systolic m 2/6 at the base .  no pericardial rub, no murmur  Abdomen: BS+, soft, no tenderness, no bruit  Pelvis: bladder nondistended  Extremities: No cyanosis or clubbing. No peripheral edema.     Psychiatric: Normal mood, normal affect  Skin: No rashes  Vascular Access: right AVG with bruit.

## 2021-12-14 NOTE — PHARMACOTHERAPY INTERVENTION NOTE - COMMENTS
[facility] list patient with CHF, medication profile reviewed, educational materials provided
Patient is on a STAR GALE LIST for HF.  Medication profile was reviewed. Spoke with the patient.  No recommendations at this time.

## 2021-12-14 NOTE — PROGRESS NOTE ADULT - ASSESSMENT
ESRD dialysis days MWF  Dialysis today  Anemia continue Epoetin  Hypotension, hold Imdur  on dialysis days and add midodrine.    C P c/w pericarditis-colchicine.Repeat limited echo-f/u pericardial effusion inc in size. D/W Dr. Cao not able to do pericardiocentesis by him or IR due to location and size.   .Nyzmd-Pjbkf-s/p PPM, dec lopressor 12.5mg bid and multaq. post Micra pacemaker. Parox Afib- refusing eliquis..CAD-asa,imdur,statin.  Anemia-s/p transfusion,epogen.

## 2021-12-14 NOTE — PROGRESS NOTE ADULT - SUBJECTIVE AND OBJECTIVE BOX
PGY-1 Progress Note discussed with attending    PAGER #: [[phone]] TILL 5:00 PM  PLEASE CONTACT ON CALL TEAM:  - On Call Team (Please refer to Mary) FROM 5:00 PM - 8:30PM  - Nightfloat Team FROM 8:30 -7:30 AM    HPI:  77 yo M from [facility], w/ ESRD (on HD MWF), HTN, Severe Pulm HTN, CHFpEF >55% w/ GIIDD, Anemia of CKD, NSTEMI, CAD, parox Afib, CVA w/ RIGHT dominant hemiparesis, Vascular Dementia and Mood disorder who was BIB EMS for acute onset SOB 2/2 fluid overload. Last HD was Fri 11/26; no missed dialysis. On 11/28 in AM was complaining of SOB. O2 sat in low 91% requiring 4L NC w/ improvement to 98% and VS has normal /82. Send to [facility].   Was started on BiPAP in ED due to tachypnea. BNP ~ 30,000. /84. CXR with b/l pulm edema. Nephro consulted for urgent HD. ICU consulted for new BiPAP - cleared patient to be admitted to floors after HD once tolerates NC.   s/p Lasix 80, Hydral 10 and Insulin 8U + dextrose for Hyperkalemia 6.3. On repeat reduced to 5.7. Trop elevated 182 > 204. Will continue trend. On medicine eval patient is sat 100% on BiPAP. Denies any chest pain, N/V, cough or subjective fevers. Limited 1-2 word replies yes/no questions. Reports improvement in SOB.  Kylie Almendarez Primary POC: [phone] (28 Nov 2021 15:03)        REVIEW OF SYSTEMS:   Poor historian, reports he is "fine" when asked how he feels    MEDICATIONS  (STANDING):  aspirin  chewable 81 milliGRAM(s) Oral daily  atorvastatin 20 milliGRAM(s) Oral at bedtime  bisacodyl 5 milliGRAM(s) Oral at bedtime  chlorhexidine 2% Cloths 1 Application(s) Topical <User Schedule>  colchicine 0.6 milliGRAM(s) Oral daily  dronedarone 400 milliGRAM(s) Oral two times a day  epoetin ximena-epbx (RETACRIT) Injectable 22314 Unit(s) IV Push <User Schedule>  famotidine    Tablet 20 milliGRAM(s) Oral daily  ferrous    sulfate 325 milliGRAM(s) Oral daily  folic acid 1 milliGRAM(s) Oral daily  heparin   Injectable 5000 Unit(s) SubCutaneous every 12 hours  isosorbide   mononitrate ER Tablet (IMDUR) 30 milliGRAM(s) Oral <User Schedule>  lactulose Syrup 20 Gram(s) Oral at bedtime  metoprolol tartrate 12.5 milliGRAM(s) Oral two times a day  midodrine 5 milliGRAM(s) Oral three times a day  polyethylene glycol 3350 17 Gram(s) Oral daily  sevelamer carbonate 1600 milliGRAM(s) Oral three times a day    MEDICATIONS  (PRN):  acetaminophen     Tablet .. 650 milliGRAM(s) Oral every 6 hours PRN Mild Pain (1 - 3), Moderate Pain (4 - 6)  acetaminophen     Tablet .. 650 milliGRAM(s) Oral every 6 hours PRN Temp greater or equal to 38C (100.4F), Mild Pain (1 - 3), Moderate Pain (4 - 6)      Vital Signs Last 24 Hrs  T(C): 36.6 (14 Dec 2021 11:10), Max: 37.7 (13 Dec 2021 21:11)  T(F): 97.8 (14 Dec 2021 11:10), Max: 99.8 (13 Dec 2021 21:11)  HR: 81 (14 Dec 2021 11:10) (70 - 92)  BP: 104/57 (14 Dec 2021 11:10) (99/65 - 128/58)  BP(mean): --  RR: 18 (14 Dec 2021 11:10) (17 - 18)  SpO2: 95% (14 Dec 2021 11:10) (94% - 100%)    PHYSICAL EXAMINATION:  GENERAL: NAD  HEAD: AT/NC  EYES: conjunctiva and sclera clear  NECK:  No JVD noted, Normal thyroid  CHEST/LUNG: CTABL; no rales, rhonchi, wheezing, or rubs  HEART: regular rate and rhythm; non-muffled  ABDOMEN: soft, nontender, nondistended; Bowel sounds present  EXTREMITIES:  2+ Peripheral Pulses, No clubbing, cyanosis, or edema  SKIN: warm dry                          9.5    3.41  )-----------( 155      ( 14 Dec 2021 08:42 )             29.2     12-14    138  |  100  |  40<H>  ----------------------------<  97  3.9   |  28  |  5.92<H>    Ca    10.5      14 Dec 2021 08:42              COVID-19 PCR: NotDetec (13 Dec 2021 12:12)  COVID-19 PCR: NotDetec (11 Dec 2021 05:26)  COVID-19 PCR: NotDetec (05 Dec 2021 06:18)  COVID-19 PCR: NotDetec (28 Nov 2021 11:40)  COVID-19 PCR: NotDetec (04 Oct 2021 07:00)  COVID-19 PCR: NotDetec (27 Sep 2021 10:55)      CAPILLARY BLOOD GLUCOSE          RADIOLOGY & ADDITIONAL TESTS:

## 2021-12-15 LAB
ANION GAP SERPL CALC-SCNC: 11 MMOL/L — SIGNIFICANT CHANGE UP (ref 5–17)
BUN SERPL-MCNC: 69 MG/DL — HIGH (ref 7–18)
CALCIUM SERPL-MCNC: 10.9 MG/DL — HIGH (ref 8.4–10.5)
CHLORIDE SERPL-SCNC: 100 MMOL/L — SIGNIFICANT CHANGE UP (ref 96–108)
CO2 SERPL-SCNC: 27 MMOL/L — SIGNIFICANT CHANGE UP (ref 22–31)
CREAT SERPL-MCNC: 7.82 MG/DL — HIGH (ref 0.5–1.3)
GLUCOSE SERPL-MCNC: 122 MG/DL — HIGH (ref 70–99)
HCT VFR BLD CALC: 30.4 % — LOW (ref 39–50)
HGB BLD-MCNC: 10.1 G/DL — LOW (ref 13–17)
MCHC RBC-ENTMCNC: 33.1 PG — SIGNIFICANT CHANGE UP (ref 27–34)
MCHC RBC-ENTMCNC: 33.2 GM/DL — SIGNIFICANT CHANGE UP (ref 32–36)
MCV RBC AUTO: 99.7 FL — SIGNIFICANT CHANGE UP (ref 80–100)
NRBC # BLD: 0 /100 WBCS — SIGNIFICANT CHANGE UP (ref 0–0)
PLATELET # BLD AUTO: 155 K/UL — SIGNIFICANT CHANGE UP (ref 150–400)
POTASSIUM SERPL-MCNC: 3.8 MMOL/L — SIGNIFICANT CHANGE UP (ref 3.5–5.3)
POTASSIUM SERPL-SCNC: 3.8 MMOL/L — SIGNIFICANT CHANGE UP (ref 3.5–5.3)
RBC # BLD: 3.05 M/UL — LOW (ref 4.2–5.8)
RBC # FLD: 15.7 % — HIGH (ref 10.3–14.5)
SODIUM SERPL-SCNC: 138 MMOL/L — SIGNIFICANT CHANGE UP (ref 135–145)
WBC # BLD: 2.85 K/UL — LOW (ref 3.8–10.5)
WBC # FLD AUTO: 2.85 K/UL — LOW (ref 3.8–10.5)

## 2021-12-15 PROCEDURE — 70450 CT HEAD/BRAIN W/O DYE: CPT | Mod: 26

## 2021-12-15 RX ADMIN — HEPARIN SODIUM 5000 UNIT(S): 5000 INJECTION INTRAVENOUS; SUBCUTANEOUS at 06:49

## 2021-12-15 RX ADMIN — MIDODRINE HYDROCHLORIDE 5 MILLIGRAM(S): 2.5 TABLET ORAL at 06:48

## 2021-12-15 RX ADMIN — SEVELAMER CARBONATE 1600 MILLIGRAM(S): 2400 POWDER, FOR SUSPENSION ORAL at 06:48

## 2021-12-15 RX ADMIN — Medication 81 MILLIGRAM(S): at 14:45

## 2021-12-15 RX ADMIN — Medication 0.6 MILLIGRAM(S): at 14:46

## 2021-12-15 RX ADMIN — CHLORHEXIDINE GLUCONATE 1 APPLICATION(S): 213 SOLUTION TOPICAL at 06:48

## 2021-12-15 RX ADMIN — MIDODRINE HYDROCHLORIDE 5 MILLIGRAM(S): 2.5 TABLET ORAL at 14:46

## 2021-12-15 RX ADMIN — Medication 12.5 MILLIGRAM(S): at 06:49

## 2021-12-15 RX ADMIN — DRONEDARONE 400 MILLIGRAM(S): 400 TABLET, FILM COATED ORAL at 18:55

## 2021-12-15 RX ADMIN — LACTULOSE 20 GRAM(S): 10 SOLUTION ORAL at 22:48

## 2021-12-15 RX ADMIN — FAMOTIDINE 20 MILLIGRAM(S): 10 INJECTION INTRAVENOUS at 14:46

## 2021-12-15 RX ADMIN — ERYTHROPOIETIN 10000 UNIT(S): 10000 INJECTION, SOLUTION INTRAVENOUS; SUBCUTANEOUS at 11:00

## 2021-12-15 RX ADMIN — Medication 1 MILLIGRAM(S): at 14:46

## 2021-12-15 RX ADMIN — DRONEDARONE 400 MILLIGRAM(S): 400 TABLET, FILM COATED ORAL at 06:49

## 2021-12-15 RX ADMIN — ATORVASTATIN CALCIUM 20 MILLIGRAM(S): 80 TABLET, FILM COATED ORAL at 22:48

## 2021-12-15 RX ADMIN — Medication 325 MILLIGRAM(S): at 14:46

## 2021-12-15 RX ADMIN — MIDODRINE HYDROCHLORIDE 5 MILLIGRAM(S): 2.5 TABLET ORAL at 22:48

## 2021-12-15 NOTE — PROGRESS NOTE ADULT - SUBJECTIVE AND OBJECTIVE BOX
CHIEF COMPLAINT:Patient is a 76y old  Male who presents with a chief complaint of SOB.Pt appears comfortable,sleep in am,reports not sleeping well at night.    	  REVIEW OF SYSTEMS:  CONSTITUTIONAL: No fever, weight loss, or fatigue  EYES: No eye pain, visual disturbances, or discharge  ENT:  No difficulty hearing, tinnitus, vertigo; No sinus or throat pain  NECK: No pain or stiffness  RESPIRATORY: No cough, wheezing, chills or hemoptysis; No Shortness of Breath  CARDIOVASCULAR: No chest pain, palpitations, passing out, dizziness, or leg swelling  GASTROINTESTINAL: No abdominal or epigastric pain. No nausea, vomiting, or hematemesis; No diarrhea or constipation. No melena or hematochezia.  GENITOURINARY: No dysuria, frequency, hematuria, or incontinence  NEUROLOGICAL: No headaches, memory loss, loss of strength, numbness, or tremors  SKIN: No itching, burning, rashes, or lesions   LYMPH Nodes: No enlarged glands  ENDOCRINE: No heat or cold intolerance; No hair loss  MUSCULOSKELETAL: No joint pain or swelling; No muscle, back, or extremity pain  PSYCHIATRIC: No depression, anxiety, mood swings, or difficulty sleeping  HEME/LYMPH: No easy bruising, or bleeding gums  ALLERGY AND IMMUNOLOGIC: No hives or eczema	      PHYSICAL EXAM:  T(C): 37 (12-15-21 @ 07:51), Max: 37 (12-15-21 @ 07:51)  HR: 82 (12-15-21 @ 07:51) (81 - 85)  BP: 129/64 (12-15-21 @ 07:51) (104/57 - 137/68)  RR: 18 (12-15-21 @ 07:51) (18 - 18)  SpO2: 97% (12-15-21 @ 07:51) (95% - 97%)  Wt(kg): --  I&O's Summary    14 Dec 2021 07:01  -  15 Dec 2021 07:00  --------------------------------------------------------  IN: 0 mL / OUT: 1 mL / NET: -1 mL        Appearance: Normal	  HEENT:   Normal oral mucosa, PERRL, EOMI	  Lymphatic: No lymphadenopathy  Cardiovascular: Normal S1 S2, No JVD, No murmurs, No edema  Respiratory: Lungs clear to auscultation	  Psychiatry: A & O x 3, Mood & affect appropriate  Gastrointestinal:  Soft, Non-tender, + BS	  Skin: No rashes, No ecchymoses, No cyanosis	  Neurologic: Non-focal  Extremities: Normal range of motion, No clubbing, cyanosis or edema  Vascular: Peripheral pulses palpable 2+ bilaterally    MEDICATIONS  (STANDING):  aspirin  chewable 81 milliGRAM(s) Oral daily  atorvastatin 20 milliGRAM(s) Oral at bedtime  bisacodyl 5 milliGRAM(s) Oral at bedtime  chlorhexidine 2% Cloths 1 Application(s) Topical <User Schedule>  colchicine 0.6 milliGRAM(s) Oral daily  dronedarone 400 milliGRAM(s) Oral two times a day  epoetin ximena-epbx (RETACRIT) Injectable 98556 Unit(s) IV Push <User Schedule>  famotidine    Tablet 20 milliGRAM(s) Oral daily  ferrous    sulfate 325 milliGRAM(s) Oral daily  folic acid 1 milliGRAM(s) Oral daily  heparin   Injectable 5000 Unit(s) SubCutaneous every 12 hours  lactulose Syrup 20 Gram(s) Oral at bedtime  metoprolol tartrate 12.5 milliGRAM(s) Oral two times a day  midodrine 5 milliGRAM(s) Oral three times a day  polyethylene glycol 3350 17 Gram(s) Oral daily      TELEMETRY: 	nsr,pvc's,intermittent v paced    	  	  LABS:	 	                     10.1   2.85  )-----------( 155      ( 15 Dec 2021 08:24 )             30.4     12-15    138  |  100  |  69<H>  ----------------------------<  122<H>  3.8   |  27  |  7.82<H>    Ca    10.9<H>      15 Dec 2021 08:24      proBNP: Serum Pro-Brain Natriuretic Peptide: 21722 pg/mL (11-28 @ 10:10)    Lipid Profile:   HgA1c:   TSH:

## 2021-12-15 NOTE — PROGRESS NOTE ADULT - SUBJECTIVE AND OBJECTIVE BOX
Problem List:  ESRD  Post Micra Pacemaker  Pericardial effusion.  AS severe.  Hypotension      PAST MEDICAL & SURGICAL HISTORY:  ESRD (end stage renal disease) on dialysis    Hypertension    Anemia    Cerebrovascular accident (CVA), unspecified mechanism    Paroxysmal atrial fibrillation    CAD (coronary artery disease)    A-V fistula        No Known Allergies      MEDICATIONS  (STANDING):  aspirin  chewable 81 milliGRAM(s) Oral daily  atorvastatin 20 milliGRAM(s) Oral at bedtime  bisacodyl 5 milliGRAM(s) Oral at bedtime  chlorhexidine 2% Cloths 1 Application(s) Topical <User Schedule>  colchicine 0.6 milliGRAM(s) Oral daily  dronedarone 400 milliGRAM(s) Oral two times a day  epoetin ximena-epbx (RETACRIT) Injectable 48805 Unit(s) IV Push <User Schedule>  famotidine    Tablet 20 milliGRAM(s) Oral daily  ferrous    sulfate 325 milliGRAM(s) Oral daily  folic acid 1 milliGRAM(s) Oral daily  heparin   Injectable 5000 Unit(s) SubCutaneous every 12 hours  lactulose Syrup 20 Gram(s) Oral at bedtime  metoprolol tartrate 12.5 milliGRAM(s) Oral two times a day  midodrine 5 milliGRAM(s) Oral three times a day  polyethylene glycol 3350 17 Gram(s) Oral daily  sevelamer carbonate 1600 milliGRAM(s) Oral three times a day    MEDICATIONS  (PRN):  acetaminophen     Tablet .. 650 milliGRAM(s) Oral every 6 hours PRN Mild Pain (1 - 3), Moderate Pain (4 - 6)  acetaminophen     Tablet .. 650 milliGRAM(s) Oral every 6 hours PRN Temp greater or equal to 38C (100.4F), Mild Pain (1 - 3), Moderate Pain (4 - 6)                            10.1   2.85  )-----------( 155      ( 15 Dec 2021 08:24 )             30.4     12-15    138  |  100  |  69<H>  ----------------------------<  122<H>  3.8   |  27  |  7.82<H>    Ca    10.9<H>      15 Dec 2021 08:24              REVIEW OF SYSTEMS:  General: no fever no chills, no weight loss. Increased fatigue  RESPIRATORY: No cough, wheezing, hemoptysis; No shortness of breath  CARDIOVASCULAR: No chest pain or palpitations. No Edema  GASTROINTESTINAL: No abdominal or epigastric pain. No nausea, vomiting. No diarrhea or constipation. No melena.          VITALS:  T(F): 98.6 (12-15-21 @ 07:51), Max: 98.6 (12-15-21 @ 07:51)  HR: 82 (12-15-21 @ 07:51)  BP: 129/64 (12-15-21 @ 07:51)  RR: 18 (12-15-21 @ 07:51)  SpO2: 97% (12-15-21 @ 07:51)  Wt(kg): --    12-14 @ 07:01  -  12-15 @ 07:00  --------------------------------------------------------  IN: 0 mL / OUT: 1 mL / NET: -1 mL        PHYSICAL EXAM:  Constitutional: appears tired,  no diaphoresis, no distress.  Neck: No JVD, no carotid bruit, supple, no adenopathy  Respiratory:  air entrance B/L, no wheezes, rales or rhonchi  Cardiovascular: S1, S2, RRR, no pericardial rub, systolic m at the base  Abdomen: BS+, soft, no tenderness, no bruit  Pelvis: bladder nondistended  Extremities: No edema  Vascular Access: right AVG

## 2021-12-15 NOTE — PROGRESS NOTE ADULT - SUBJECTIVE AND OBJECTIVE BOX
PGY-1 Progress Note discussed with attending    PAGER #: [[phone]] TILL 5:00 PM  PLEASE CONTACT ON CALL TEAM:  - On Call Team (Please refer to Mary) FROM 5:00 PM - 8:30PM  - Nightfloat Team FROM 8:30 -7:30 AM    HPI:  75 yo M from [facility], w/ ESRD (on HD MWF), HTN, Severe Pulm HTN, CHFpEF >55% w/ GIIDD, Anemia of CKD, NSTEMI, CAD, parox Afib, CVA w/ RIGHT dominant hemiparesis, Vascular Dementia and Mood disorder who was BIB EMS for acute onset SOB 2/2 fluid overload. Last HD was Fri 11/26; no missed dialysis. On 11/28 in AM was complaining of SOB. O2 sat in low 91% requiring 4L NC w/ improvement to 98% and VS has normal /82. Send to [facility].   Was started on BiPAP in ED due to tachypnea. BNP ~ 30,000. /84. CXR with b/l pulm edema. Nephro consulted for urgent HD. ICU consulted for new BiPAP - cleared patient to be admitted to floors after HD once tolerates NC.   s/p Lasix 80, Hydral 10 and Insulin 8U + dextrose for Hyperkalemia 6.3. On repeat reduced to 5.7. Trop elevated 182 > 204. Will continue trend. On medicine eval patient is sat 100% on BiPAP. Denies any chest pain, N/V, cough or subjective fevers. Limited 1-2 word replies yes/no questions. Reports improvement in SOB. Kylie Almendarez Primary POC: [phone] (28 Nov 2021 15:03)    OVERNIGHT EVENTS:   - No acute events. Patient seen laying comfortably in bed, just complains of foot pain    REVIEW OF SYSTEMS:  CONSTITUTIONAL: No fever, weight loss, fatigue  RESPIRATORY: No cough, wheezing, chills or hemoptysis; No shortness of breath  CARDIOVASCULAR: No chest pain, palpitations, dizziness, or leg swelling  GASTROINTESTINAL: No abdominal pain. No nausea, vomiting, or hematemesis; No diarrhea or constipation. No melena or hematochezia.  GENITOURINARY: No dysuria or hematuria, urinary frequency  NEUROLOGICAL: No headaches, memory loss, loss of strength, numbness, or tremors  EXTREMITY: b/l minor foot pain  SKIN: No itching, burning, rashes, or lesions     MEDICATIONS  (STANDING):  aspirin  chewable 81 milliGRAM(s) Oral daily  atorvastatin 20 milliGRAM(s) Oral at bedtime  bisacodyl 5 milliGRAM(s) Oral at bedtime  chlorhexidine 2% Cloths 1 Application(s) Topical <User Schedule>  colchicine 0.6 milliGRAM(s) Oral daily  dronedarone 400 milliGRAM(s) Oral two times a day  epoetin ximena-epbx (RETACRIT) Injectable 15612 Unit(s) IV Push <User Schedule>  famotidine    Tablet 20 milliGRAM(s) Oral daily  ferrous    sulfate 325 milliGRAM(s) Oral daily  folic acid 1 milliGRAM(s) Oral daily  heparin   Injectable 5000 Unit(s) SubCutaneous every 12 hours  lactulose Syrup 20 Gram(s) Oral at bedtime  metoprolol tartrate 12.5 milliGRAM(s) Oral two times a day  midodrine 5 milliGRAM(s) Oral three times a day  polyethylene glycol 3350 17 Gram(s) Oral daily    MEDICATIONS  (PRN):  acetaminophen     Tablet .. 650 milliGRAM(s) Oral every 6 hours PRN Mild Pain (1 - 3), Moderate Pain (4 - 6)  acetaminophen     Tablet .. 650 milliGRAM(s) Oral every 6 hours PRN Temp greater or equal to 38C (100.4F), Mild Pain (1 - 3), Moderate Pain (4 - 6)      Vital Signs Last 24 Hrs  T(C): 36.7 (15 Dec 2021 10:40), Max: 37 (15 Dec 2021 07:51)  T(F): 98.1 (15 Dec 2021 10:40), Max: 98.6 (15 Dec 2021 07:51)  HR: 71 (15 Dec 2021 10:40) (71 - 85)  BP: 105/55 (15 Dec 2021 10:40) (105/55 - 137/68)  BP(mean): --  RR: 39 (15 Dec 2021 10:40) (18 - 39)  SpO2: 100% (15 Dec 2021 10:40) (96% - 100%)    PHYSICAL EXAMINATION:  GENERAL: NAD, AAOx3  HEAD: AT/NC  EYES: conjunctiva and sclera clear  NECK: supple, No JVD noted, Normal thyroid  CHEST/LUNG: CTABL; no rales, rhonchi, wheezing, or rubs  HEART: regular rate and rhythm; no murmurs, rubs, or gallops  ABDOMEN: soft, nontender, nondistended; Bowel sounds present  EXTREMITIES:  2+ Peripheral Pulses, b/l dark hypipigmented shins, crusted toenails  SKIN: warm dry                          10.1   2.85  )-----------( 155      ( 15 Dec 2021 08:24 )             30.4     12-15    138  |  100  |  69<H>  ----------------------------<  122<H>  3.8   |  27  |  7.82<H>    Ca    10.9<H>      15 Dec 2021 08:24              COVID-19 PCR: NotDetec (13 Dec 2021 12:12)  COVID-19 PCR: NotDetec (11 Dec 2021 05:26)  COVID-19 PCR: NotDetec (05 Dec 2021 06:18)  COVID-19 PCR: NotDetec (28 Nov 2021 11:40)  COVID-19 PCR: NotDetec (04 Oct 2021 07:00)  COVID-19 PCR: NotDetec (27 Sep 2021 10:55)      CAPILLARY BLOOD GLUCOSE          RADIOLOGY & ADDITIONAL TESTS:

## 2021-12-15 NOTE — PROGRESS NOTE ADULT - PROBLEM SELECTOR PLAN 1
complains of severe left-sided chest pain x 2 days post PPM placement (12/8/21)  Given pain meds (IV Tylenol, IV dilaudid, NTG)  Telemetry showed ST elevations (12/9/21)  EKG showed diffused VT and ST changes  ECHO was done x 2 (12/9/21) confirming pericardial fluid and pericarditis (12/10/21)  Repeat echo on 12/13 shows worsening pericardial effusion, however EP does not believe there is significant increased, will repeat echo tomorrow  c/w Colchicine  Cardio consult - Dr. Salinas  EP consult - Dr. Gay

## 2021-12-15 NOTE — PROGRESS NOTE ADULT - ASSESSMENT
ESRD dialysis days MWF    Anemia continue Epoetin  Hypotension, hold Imdur  on dialysis days placed omn Midodrine, cause AS , autonomic neuropathy , meds and low albumin.    Pericardial effusion , follow echo tomorrow  Tachy-Pradip on  lopressor 12.5mg bid and multaq,  post Micra pacemaker. Parox Afib- refusing eliquis.  CAD-asa,imdur,statin.    Anemia improved on ALICIA  Hypercalcemia - possible dehydration and SHPTH , follow up.

## 2021-12-15 NOTE — PROGRESS NOTE ADULT - PROBLEM SELECTOR PLAN 7
prior Echo on 9/28/21 showed RV systolic pressure is 69 mm Hg. Severe pulmonary hypertension.  - admitted to [facility]  - [facility] IMDUR, metop

## 2021-12-15 NOTE — PROGRESS NOTE ADULT - ASSESSMENT
75 yo M from [facility], w/ ESRD (on HD MWF), HTN, Severe Pulm HTN, CHFpEF >55% w/ GIIDD, Anemia of CKD, NSTEMI, CAD, parox Afib, CVA w/ RIGHT dominant hemiparesis, Vascular Dementia and Mood disorder who was BIB EMS for acute onset SOB 2/2 fluid overload,now CP c/w pericarditis.  1.CP c/w pericarditis-colchicine.Repeat limited echo-f/u pericardial effusion inc in size. D/W Dr. Cao not able to do pericardiocentesis by him or IR due to location and size. Will repeat echo in am.  2.Uxbxc-Fmrsu-p/p PPM, dec lopressor 12.5mg bid and multaq.  3.ESRD-HD as per renal.  4.Parox Afib- refusing eliquis.  5.Lipid d/o-statin.  6.CVA-asa,statin."Refusing Eliquis-told cause prostate ca."  7.CAD-asa,imdur,statin.  8.Anemia-s/p transfusion,epogen.  9.Hypotension-cont midodrine.  10.GI and DVT prophylaxis.  11.CT head-R/O CVA. 75 yo M from [facility], w/ ESRD (on HD MWF), HTN, Severe Pulm HTN, CHFpEF >55% w/ GIIDD, Anemia of CKD, NSTEMI, CAD, parox Afib, CVA w/ RIGHT dominant hemiparesis, Vascular Dementia and Mood disorder who was BIB EMS for acute onset SOB 2/2 fluid overload,now CP c/w pericarditis.  1.CP c/w pericarditis-colchicine.Repeat limited echo-f/u pericardial effusion inc in size. D/W Dr. Cao not able to do pericardiocentesis by him or IR due to location and size. Will repeat echo in am.  2.Folco-Upngl-o/p PPM, dec lopressor 12.5mg bid and multaq.  3.ESRD-HD as per renal.  4.Parox Afib- refusing eliquis.  5.Lipid d/o-statin.  6.CVA-asa,statin."Refusing Eliquis-told cause prostate ca."  7.CAD-asa,lopresor,d/c imdur,cont statin.  8.Anemia-s/p transfusion,epogen.  9.Hypotension-cont midodrine.  10.GI and DVT prophylaxis.  11.CT head-R/O CVA.

## 2021-12-15 NOTE — CHART NOTE - NSCHARTNOTEFT_GEN_A_CORE
Echo from 12/13/21 reviewed. Overall to my eyes effusion appears unchanged. Clot seen over RV. Patient has been started on Midodrine. Would stop Imdur. Recommend repeating echo today.

## 2021-12-16 VITALS
HEART RATE: 85 BPM | OXYGEN SATURATION: 97 % | SYSTOLIC BLOOD PRESSURE: 112 MMHG | RESPIRATION RATE: 20 BRPM | TEMPERATURE: 99 F | DIASTOLIC BLOOD PRESSURE: 38 MMHG

## 2021-12-16 LAB
ANION GAP SERPL CALC-SCNC: 11 MMOL/L — SIGNIFICANT CHANGE UP (ref 5–17)
BUN SERPL-MCNC: 53 MG/DL — HIGH (ref 7–18)
CALCIUM SERPL-MCNC: 10.7 MG/DL — HIGH (ref 8.4–10.5)
CHLORIDE SERPL-SCNC: 99 MMOL/L — SIGNIFICANT CHANGE UP (ref 96–108)
CO2 SERPL-SCNC: 28 MMOL/L — SIGNIFICANT CHANGE UP (ref 22–31)
CREAT SERPL-MCNC: 6.29 MG/DL — HIGH (ref 0.5–1.3)
GLUCOSE SERPL-MCNC: 126 MG/DL — HIGH (ref 70–99)
HCT VFR BLD CALC: 29.2 % — LOW (ref 39–50)
HGB BLD-MCNC: 9.5 G/DL — LOW (ref 13–17)
MCHC RBC-ENTMCNC: 31.9 PG — SIGNIFICANT CHANGE UP (ref 27–34)
MCHC RBC-ENTMCNC: 32.5 GM/DL — SIGNIFICANT CHANGE UP (ref 32–36)
MCV RBC AUTO: 98 FL — SIGNIFICANT CHANGE UP (ref 80–100)
NRBC # BLD: 0 /100 WBCS — SIGNIFICANT CHANGE UP (ref 0–0)
PLATELET # BLD AUTO: 147 K/UL — LOW (ref 150–400)
POTASSIUM SERPL-MCNC: 3.8 MMOL/L — SIGNIFICANT CHANGE UP (ref 3.5–5.3)
POTASSIUM SERPL-SCNC: 3.8 MMOL/L — SIGNIFICANT CHANGE UP (ref 3.5–5.3)
RBC # BLD: 2.98 M/UL — LOW (ref 4.2–5.8)
RBC # FLD: 15.2 % — HIGH (ref 10.3–14.5)
SARS-COV-2 RNA SPEC QL NAA+PROBE: SIGNIFICANT CHANGE UP
SODIUM SERPL-SCNC: 138 MMOL/L — SIGNIFICANT CHANGE UP (ref 135–145)
WBC # BLD: 3.01 K/UL — LOW (ref 3.8–10.5)
WBC # FLD AUTO: 3.01 K/UL — LOW (ref 3.8–10.5)

## 2021-12-16 PROCEDURE — 93321 DOPPLER ECHO F-UP/LMTD STD: CPT | Mod: 26

## 2021-12-16 PROCEDURE — 93308 TTE F-UP OR LMTD: CPT | Mod: 26

## 2021-12-16 RX ORDER — DRONEDARONE 400 MG/1
1 TABLET, FILM COATED ORAL
Qty: 60 | Refills: 0
Start: 2021-12-16 | End: 2022-01-14

## 2021-12-16 RX ORDER — METOPROLOL TARTRATE 50 MG
0.5 TABLET ORAL
Qty: 45 | Refills: 0
Start: 2021-12-16 | End: 2022-01-14

## 2021-12-16 RX ORDER — MIDODRINE HYDROCHLORIDE 2.5 MG/1
1 TABLET ORAL
Qty: 90 | Refills: 0
Start: 2021-12-16 | End: 2022-01-14

## 2021-12-16 RX ORDER — COLCHICINE 0.6 MG
1 TABLET ORAL
Qty: 30 | Refills: 0
Start: 2021-12-16 | End: 2022-01-14

## 2021-12-16 RX ORDER — COLCHICINE 0.6 MG
1 TABLET ORAL
Qty: 0 | Refills: 0 | DISCHARGE

## 2021-12-16 RX ORDER — COLCHICINE 0.6 MG
1 TABLET ORAL
Qty: 0 | Refills: 0 | DISCHARGE
Start: 2021-12-16

## 2021-12-16 RX ORDER — DRONEDARONE 400 MG/1
1 TABLET, FILM COATED ORAL
Qty: 0 | Refills: 0 | DISCHARGE

## 2021-12-16 RX ORDER — METOPROLOL TARTRATE 50 MG
1 TABLET ORAL
Qty: 0 | Refills: 0 | DISCHARGE
Start: 2021-12-16

## 2021-12-16 RX ORDER — DRONEDARONE 400 MG/1
1 TABLET, FILM COATED ORAL
Qty: 0 | Refills: 0 | DISCHARGE
Start: 2021-12-16

## 2021-12-16 RX ADMIN — FAMOTIDINE 20 MILLIGRAM(S): 10 INJECTION INTRAVENOUS at 12:34

## 2021-12-16 RX ADMIN — Medication 12.5 MILLIGRAM(S): at 17:14

## 2021-12-16 RX ADMIN — CHLORHEXIDINE GLUCONATE 1 APPLICATION(S): 213 SOLUTION TOPICAL at 06:07

## 2021-12-16 RX ADMIN — DRONEDARONE 400 MILLIGRAM(S): 400 TABLET, FILM COATED ORAL at 06:07

## 2021-12-16 RX ADMIN — Medication 0.6 MILLIGRAM(S): at 12:34

## 2021-12-16 RX ADMIN — HEPARIN SODIUM 5000 UNIT(S): 5000 INJECTION INTRAVENOUS; SUBCUTANEOUS at 17:13

## 2021-12-16 RX ADMIN — Medication 81 MILLIGRAM(S): at 12:34

## 2021-12-16 RX ADMIN — MIDODRINE HYDROCHLORIDE 5 MILLIGRAM(S): 2.5 TABLET ORAL at 06:08

## 2021-12-16 RX ADMIN — DRONEDARONE 400 MILLIGRAM(S): 400 TABLET, FILM COATED ORAL at 17:13

## 2021-12-16 RX ADMIN — Medication 1 MILLIGRAM(S): at 12:34

## 2021-12-16 RX ADMIN — MIDODRINE HYDROCHLORIDE 5 MILLIGRAM(S): 2.5 TABLET ORAL at 14:16

## 2021-12-16 RX ADMIN — HEPARIN SODIUM 5000 UNIT(S): 5000 INJECTION INTRAVENOUS; SUBCUTANEOUS at 06:08

## 2021-12-16 RX ADMIN — POLYETHYLENE GLYCOL 3350 17 GRAM(S): 17 POWDER, FOR SOLUTION ORAL at 12:34

## 2021-12-16 RX ADMIN — Medication 12.5 MILLIGRAM(S): at 06:08

## 2021-12-16 RX ADMIN — Medication 325 MILLIGRAM(S): at 12:34

## 2021-12-16 NOTE — PROGRESS NOTE ADULT - PROBLEM SELECTOR PLAN 1
complains of severe left-sided chest pain x 2 days post PPM placement (12/8/21)  Given pain meds (IV Tylenol, IV dilaudid, NTG)  Telemetry showed ST elevations (12/9/21)  EKG showed diffused WI and ST changes  ECHO was done x 2 (12/9/21) confirming pericardial fluid and pericarditis (12/10/21)  Repeat echo on 12/13 shows worsening pericardial effusion, however EP does not believe there is significant increased  Repeat 12/16 shows improvement per Dr. Salinas, cleared by cardio for discharge  f/u CXR  c/w Colchicine  Cardio consult - Dr. Salinas  EP consult - Dr. Gay

## 2021-12-16 NOTE — PROGRESS NOTE ADULT - SUBJECTIVE AND OBJECTIVE BOX
CHIEF COMPLAINT:Patient is a 76y old  Male who presents with a chief complaint of SOB.Pt appears comfortable.    	  REVIEW OF SYSTEMS:  CONSTITUTIONAL: No fever, weight loss, or fatigue  EYES: No eye pain, visual disturbances, or discharge  ENT:  No difficulty hearing, tinnitus, vertigo; No sinus or throat pain  NECK: No pain or stiffness  RESPIRATORY: No cough, wheezing, chills or hemoptysis; No Shortness of Breath  CARDIOVASCULAR: No chest pain, palpitations, passing out, dizziness, or leg swelling  GASTROINTESTINAL: No abdominal or epigastric pain. No nausea, vomiting, or hematemesis; No diarrhea or constipation. No melena or hematochezia.  GENITOURINARY: No dysuria, frequency, hematuria, or incontinence  NEUROLOGICAL: No headaches, memory loss, loss of strength, numbness, or tremors  SKIN: No itching, burning, rashes, or lesions   LYMPH Nodes: No enlarged glands  ENDOCRINE: No heat or cold intolerance; No hair loss  MUSCULOSKELETAL: No joint pain or swelling; No muscle, back, or extremity pain  PSYCHIATRIC: No depression, anxiety, mood swings, or difficulty sleeping  HEME/LYMPH: No easy bruising, or bleeding gums  ALLERGY AND IMMUNOLOGIC: No hives or eczema	        PHYSICAL EXAM:  T(C): 37.1 (12-16-21 @ 08:27), Max: 37.4 (12-15-21 @ 16:11)  HR: 85 (12-16-21 @ 08:27) (71 - 90)  BP: 111/70 (12-16-21 @ 08:27) (95/61 - 118/63)  RR: 17 (12-16-21 @ 08:27) (17 - 39)  SpO2: 98% (12-16-21 @ 08:27) (95% - 100%)  Wt(kg): --  I&O's Summary    15 Dec 2021 07:01  -  16 Dec 2021 07:00  --------------------------------------------------------  IN: 700 mL / OUT: 1610 mL / NET: -910 mL        Appearance: Normal	  HEENT:   Normal oral mucosa, PERRL, EOMI	  Lymphatic: No lymphadenopathy  Cardiovascular: Normal S1 S2, No JVD, No murmurs, No edema  Respiratory: Lungs clear to auscultation	  Psychiatry: A & O x 3, Mood & affect appropriate  Gastrointestinal:  Soft, Non-tender, + BS	  Skin: No rashes, No ecchymoses, No cyanosis	  Neurologic: Non-focal  Extremities: Normal range of motion, No clubbing, cyanosis or edema  Vascular: Peripheral pulses palpable 2+ bilaterally    MEDICATIONS  (STANDING):  aspirin  chewable 81 milliGRAM(s) Oral daily  atorvastatin 20 milliGRAM(s) Oral at bedtime  bisacodyl 5 milliGRAM(s) Oral at bedtime  chlorhexidine 2% Cloths 1 Application(s) Topical <User Schedule>  colchicine 0.6 milliGRAM(s) Oral daily  dronedarone 400 milliGRAM(s) Oral two times a day  epoetin ximena-epbx (RETACRIT) Injectable 38985 Unit(s) IV Push <User Schedule>  famotidine    Tablet 20 milliGRAM(s) Oral daily  ferrous    sulfate 325 milliGRAM(s) Oral daily  folic acid 1 milliGRAM(s) Oral daily  heparin   Injectable 5000 Unit(s) SubCutaneous every 12 hours  lactulose Syrup 20 Gram(s) Oral at bedtime  metoprolol tartrate 12.5 milliGRAM(s) Oral two times a day  midodrine 5 milliGRAM(s) Oral three times a day  polyethylene glycol 3350 17 Gram(s) Oral daily      	  	  LABS:	 	                         9.5    3.01  )-----------( 147      ( 16 Dec 2021 09:06 )             29.2     12-16    138  |  99  |  53<H>  ----------------------------<  126<H>  3.8   |  28  |  6.29<H>    Ca    10.7<H>      16 Dec 2021 09:06      proBNP: Serum Pro-Brain Natriuretic Peptide: 77755 pg/mL (11-28 @ 10:10)        	  < from: CT Head No Cont (12.15.21 @ 15:25) >  ACC: [id number] EXAM:  CT BRAIN                          PROCEDURE DATE:  12/15/2021          INTERPRETATION:  CLINICAL INFORMATION: r/o cva    r/o cva. HCT. Admitting   Dxs: I50.9 HEART FAILURE, UNSPECIFIED.    TECHNIQUE: Sequential axial images were obtained from the vertex to the   skull base without intravenous contrast. Coronal and sagittal   reformations were obtained.    COMPARISON: Prior CT dated 9/21/2020.    FINDINGS:    Chronic right basal ganglia lacunar infarct. There is no acute   intracranial hemorrhage or mass effect. There are areas of hypodensity in   the bilateral hemispheric white matter suggesting white matter   microvascular ischemic change. There is cerebral volume loss.    There is no extraaxial fluid collection.    There is no displaced calvarial fracture. The visualized orbits are   within normal limits. The visualized portions of the paranasal sinuses   are well aerated. Soft tissue in the bilateral mastoids.      IMPRESSION: No acute intracranial hemorrhage or mass effect.      RYDER VARMA MD; Attending Radiologist  This document has been electronically signed. Dec 15 2021  3:38PM

## 2021-12-16 NOTE — PROGRESS NOTE ADULT - SUBJECTIVE AND OBJECTIVE BOX
PGY-1 Progress Note discussed with attending    PAGER #: [[phone]] TILL 5:00 PM  PLEASE CONTACT ON CALL TEAM:  - On Call Team (Please refer to Mary) FROM 5:00 PM - 8:30PM  - Nightfloat Team FROM 8:30 -7:30 AM    HPI:  75 yo M from [facility], w/ ESRD (on HD MWF), HTN, Severe Pulm HTN, CHFpEF >55% w/ GIIDD, Anemia of CKD, NSTEMI, CAD, parox Afib, CVA w/ RIGHT dominant hemiparesis, Vascular Dementia and Mood disorder who was BIB EMS for acute onset SOB 2/2 fluid overload. Last HD was Fri 11/26; no missed dialysis. On 11/28 in AM was complaining of SOB. O2 sat in low 91% requiring 4L NC w/ improvement to 98% and VS has normal /82. Send to [facility].   Was started on BiPAP in ED due to tachypnea. BNP ~ 30,000. /84. CXR with b/l pulm edema. Nephro consulted for urgent HD. ICU consulted for new BiPAP - cleared patient to be admitted to floors after HD once tolerates NC.   s/p Lasix 80, Hydral 10 and Insulin 8U + dextrose for Hyperkalemia 6.3. On repeat reduced to 5.7. Trop elevated 182 > 204. Will continue trend. On medicine eval patient is sat 100% on BiPAP. Denies any chest pain, N/V, cough or subjective fevers. Limited 1-2 word replies yes/no questions. Reports improvement in SOB. Kylie Almenadrez Primary POC: [phone] (28 Nov 2021 15:03)    OVERNIGHT EVENTS:   - No acute events    REVIEW OF SYSTEMS:  CONSTITUTIONAL: No fever, weight loss, or fatigue  RESPIRATORY: No cough, wheezing, chills or hemoptysis; No shortness of breath  CARDIOVASCULAR: No chest pain, palpitations, dizziness, or leg swelling  GASTROINTESTINAL: No abdominal pain. No nausea, vomiting, or hematemesis; No diarrhea or constipation. No melena or hematochezia.  GENITOURINARY: No dysuria or hematuria, urinary frequency  NEUROLOGICAL: No headaches, memory loss, loss of strength, numbness, or tremors  SKIN: No itching, burning, rashes, or lesions     MEDICATIONS  (STANDING):  aspirin  chewable 81 milliGRAM(s) Oral daily  atorvastatin 20 milliGRAM(s) Oral at bedtime  bisacodyl 5 milliGRAM(s) Oral at bedtime  chlorhexidine 2% Cloths 1 Application(s) Topical <User Schedule>  colchicine 0.6 milliGRAM(s) Oral daily  dronedarone 400 milliGRAM(s) Oral two times a day  epoetin ximena-epbx (RETACRIT) Injectable 01904 Unit(s) IV Push <User Schedule>  famotidine    Tablet 20 milliGRAM(s) Oral daily  ferrous    sulfate 325 milliGRAM(s) Oral daily  folic acid 1 milliGRAM(s) Oral daily  heparin   Injectable 5000 Unit(s) SubCutaneous every 12 hours  lactulose Syrup 20 Gram(s) Oral at bedtime  metoprolol tartrate 12.5 milliGRAM(s) Oral two times a day  midodrine 5 milliGRAM(s) Oral three times a day  polyethylene glycol 3350 17 Gram(s) Oral daily    MEDICATIONS  (PRN):  acetaminophen     Tablet .. 650 milliGRAM(s) Oral every 6 hours PRN Mild Pain (1 - 3), Moderate Pain (4 - 6)  acetaminophen     Tablet .. 650 milliGRAM(s) Oral every 6 hours PRN Temp greater or equal to 38C (100.4F), Mild Pain (1 - 3), Moderate Pain (4 - 6)      Vital Signs Last 24 Hrs  T(C): 37.1 (16 Dec 2021 15:16), Max: 37.4 (15 Dec 2021 16:11)  T(F): 98.8 (16 Dec 2021 15:16), Max: 99.4 (15 Dec 2021 16:11)  HR: 85 (16 Dec 2021 15:16) (80 - 90)  BP: 112/38 (16 Dec 2021 15:16) (93/53 - 118/63)  BP(mean): --  RR: 20 (16 Dec 2021 15:16) (17 - 20)  SpO2: 97% (16 Dec 2021 15:16) (95% - 100%)    PHYSICAL EXAMINATION:  GENERAL: NAD, patient seen sitting comfortably in bed eating breakfast  HEAD: AT/NC  EYES: conjunctiva and sclera clear  NECK: supple, No JVD noted, Normal thyroid  CHEST/LUNG: CTABL; no rales, rhonchi, wheezing, or rubs  HEART: regular rate and rhythm; no murmurs, rubs, or gallops  ABDOMEN: soft, nontender, nondistended; Bowel sounds present  EXTREMITIES:  2+ Peripheral Pulses, No clubbing, cyanosis, or edema  SKIN: warm dry                          9.5    3.01  )-----------( 147      ( 16 Dec 2021 09:06 )             29.2     12-16    138  |  99  |  53<H>  ----------------------------<  126<H>  3.8   |  28  |  6.29<H>    Ca    10.7<H>      16 Dec 2021 09:06              COVID-19 PCR: NotDetec (16 Dec 2021 10:43)  COVID-19 PCR: NotDetec (13 Dec 2021 12:12)  COVID-19 PCR: NotDetec (11 Dec 2021 05:26)  COVID-19 PCR: NotDetec (05 Dec 2021 06:18)  COVID-19 PCR: NotDetec (28 Nov 2021 11:40)  COVID-19 PCR: NotDetec (04 Oct 2021 07:00)  COVID-19 PCR: NotDetec (27 Sep 2021 10:55)      CAPILLARY BLOOD GLUCOSE          RADIOLOGY & ADDITIONAL TESTS:

## 2021-12-16 NOTE — PROGRESS NOTE ADULT - ASSESSMENT
77 yo M from [facility], w/ ESRD (on HD MWF), HTN, Severe Pulm HTN, CHFpEF >55% w/ GIIDD, Anemia of CKD, NSTEMI, CAD, parox Afib, CVA w/ RIGHT dominant hemiparesis, Vascular Dementia and Mood disorder who was BIB EMS for acute onset SOB 2/2 fluid overload,now CP c/w pericarditis.  1.CP c/w pericarditis-colchicine.Repeat limited echo-f/u pericardial effusion size.  2.Adsno-Letwm-x/p PPM, lopressor 12.5mg bid and multaq.  3.ESRD-HD as per renal.  4.Parox Afib- refusing eliquis.  5.Lipid d/o-statin.  6.CVA-asa,statin."Refusing Eliquis-told cause prostate ca."  7.CAD-asa,lopresor,d/c imdur,cont statin.  8.Anemia-s/p transfusion,epogen.  9.Hypotension-cont midodrine.  10.GI and DVT prophylaxis.  11.CT head-R/O CVA.

## 2021-12-16 NOTE — PROGRESS NOTE ADULT - PROBLEM SELECTOR PLAN 7
prior Echo on 9/28/21 showed RV systolic pressure is 69 mm Hg. Severe pulmonary hypertension.  - admitted to [facility]  - [facility] I[facility], metop

## 2021-12-16 NOTE — PROGRESS NOTE ADULT - ASSESSMENT
ESRD dialysis days MWF    Anemia continue Epoetin  Hypotension, hold Imdur  on dialysis days placed omn Midodrine, cause AS , autonomic neuropathy , meds and low albumin.    Pericardial effusion , follow echo today   Tachy-Pradip on  lopressor 12.5mg bid and multaq,  post Micra pacemaker. Parox Afib- refusing eliquis.  CAD-asa,imdur,statin.    12/13/21  Large pericardial effusion, it is circumferential,  however it is most pronounced along the left ventricular  inferolateral wall measuring 2.1cm. Mild right atrial  inversion is seen. No echocardiographic evidence of  tamponade, however, cardiac tamponade is a clinical  diagnosis.  12/16/21 reduced pericardial effusion. Pleural effusion present  Increase fluid removal with dialysis in am     Anemia improved on ALICIA

## 2021-12-16 NOTE — PROGRESS NOTE ADULT - SUBJECTIVE AND OBJECTIVE BOX
Problem List:  ESRD  Post Micra pacemaker  Pericarditis    PAST MEDICAL & SURGICAL HISTORY:  ESRD (end stage renal disease) on dialysis    Hypertension    Anemia    Cerebrovascular accident (CVA), unspecified mechanism    Paroxysmal atrial fibrillation    CAD (coronary artery disease)    A-V fistula        No Known Allergies      MEDICATIONS  (STANDING):  aspirin  chewable 81 milliGRAM(s) Oral daily  atorvastatin 20 milliGRAM(s) Oral at bedtime  bisacodyl 5 milliGRAM(s) Oral at bedtime  chlorhexidine 2% Cloths 1 Application(s) Topical <User Schedule>  colchicine 0.6 milliGRAM(s) Oral daily  dronedarone 400 milliGRAM(s) Oral two times a day  epoetin ximena-epbx (RETACRIT) Injectable 50524 Unit(s) IV Push <User Schedule>  famotidine    Tablet 20 milliGRAM(s) Oral daily  ferrous    sulfate 325 milliGRAM(s) Oral daily  folic acid 1 milliGRAM(s) Oral daily  heparin   Injectable 5000 Unit(s) SubCutaneous every 12 hours  lactulose Syrup 20 Gram(s) Oral at bedtime  metoprolol tartrate 12.5 milliGRAM(s) Oral two times a day  midodrine 5 milliGRAM(s) Oral three times a day  polyethylene glycol 3350 17 Gram(s) Oral daily    MEDICATIONS  (PRN):  acetaminophen     Tablet .. 650 milliGRAM(s) Oral every 6 hours PRN Mild Pain (1 - 3), Moderate Pain (4 - 6)  acetaminophen     Tablet .. 650 milliGRAM(s) Oral every 6 hours PRN Temp greater or equal to 38C (100.4F), Mild Pain (1 - 3), Moderate Pain (4 - 6)                            9.5    3.01  )-----------( 147      ( 16 Dec 2021 09:06 )             29.2     12-16    138  |  99  |  53<H>  ----------------------------<  126<H>  3.8   |  28  |  6.29<H>    Ca    10.7<H>      16 Dec 2021 09:06              REVIEW OF SYSTEMS:  General: no fever no chills, no weight loss.    RESPIRATORY: No cough, wheezing, hemoptysis; No shortness of breath  CARDIOVASCULAR: No chest pain or palpitations. No Edema  GASTROINTESTINAL: No abdominal or epigastric pain. No nausea, vomiting. No diarrhea or constipation. No melena.  GENITOURINARY: No dysuria, frequency, foamy urine, urinary urgency, incontinence or hematuria          VITALS:  T(F): 98.8 (12-16-21 @ 08:27), Max: 99.4 (12-15-21 @ 16:11)  HR: 85 (12-16-21 @ 08:27)  BP: 111/70 (12-16-21 @ 08:27)  RR: 17 (12-16-21 @ 08:27)  SpO2: 98% (12-16-21 @ 08:27)  Wt(kg): --    12-15 @ 07:01  -  12-16 @ 07:00  --------------------------------------------------------  IN: 700 mL / OUT: 1610 mL / NET: -910 mL        PHYSICAL EXAM:  Constitutional: well developed, no diaphoresis, no distress.  Neck: No JVD, no carotid bruit, supple, no adenopathy  Respiratory: Good air entrance B/L, no wheezes, rales or rhonchi  Cardiovascular: S1, S2, RRR, systolic m 2/6 at the base,  no pericardial rub, no murmur  Abdomen: BS+, soft, no tenderness, no bruit  Pelvis: bladder nondistended  Extremities: No edema .   Vascular Access: right AVG with bruit and thrill

## 2021-12-16 NOTE — PROGRESS NOTE ADULT - PROBLEM SELECTOR PROBLEM 1
Hypertensive emergency
Acute pericarditis, unspecified
Hypertensive emergency
Acute pericarditis, unspecified
Hypertensive emergency
Acute pericarditis, unspecified
Hypertensive emergency
Hypertensive emergency

## 2021-12-16 NOTE — PROGRESS NOTE ADULT - REASON FOR ADMISSION
SOB
esrd  chf
SOB

## 2021-12-16 NOTE — PROGRESS NOTE ADULT - PROVIDER SPECIALTY LIST ADULT
Cardiology
Nephrology
Cardiology
Electrophysiology
Nephrology
Cardiology
Internal Medicine
Internal Medicine
Nephrology
Cardiology
Internal Medicine
Nephrology
Nephrology
Internal Medicine
Nephrology
Internal Medicine

## 2021-12-17 NOTE — ED ADULT NURSE NOTE - NURSING NEURO ORIENTATION
Patient is in the supine position.   The body was positioned using the following devices: safety strap, gel pad mattress, blanket and draw sheet.  The head was positioned using the following devices: regular pillow.  The left arm was positioned using the following devices: arm board and gel arm pads.  The right arm was positioned using the following devices: arm board and gel arm pads.  The left leg was positioned using the following devices: blanket.  The right leg was positioned using the following devices: blanket.  The patient was positioned by Michael Monge, Ifeoma Hester RN, Claudia Cadena  oriented to person, place and time

## 2021-12-30 PROCEDURE — 83880 ASSAY OF NATRIURETIC PEPTIDE: CPT

## 2021-12-30 PROCEDURE — 36415 COLL VENOUS BLD VENIPUNCTURE: CPT

## 2021-12-30 PROCEDURE — 86923 COMPATIBILITY TEST ELECTRIC: CPT

## 2021-12-30 PROCEDURE — 70450 CT HEAD/BRAIN W/O DYE: CPT

## 2021-12-30 PROCEDURE — 82962 GLUCOSE BLOOD TEST: CPT

## 2021-12-30 PROCEDURE — 80048 BASIC METABOLIC PNL TOTAL CA: CPT

## 2021-12-30 PROCEDURE — 82747 ASSAY OF FOLIC ACID RBC: CPT

## 2021-12-30 PROCEDURE — 84100 ASSAY OF PHOSPHORUS: CPT

## 2021-12-30 PROCEDURE — 83735 ASSAY OF MAGNESIUM: CPT

## 2021-12-30 PROCEDURE — 86850 RBC ANTIBODY SCREEN: CPT

## 2021-12-30 PROCEDURE — 93005 ELECTROCARDIOGRAM TRACING: CPT

## 2021-12-30 PROCEDURE — 82803 BLOOD GASES ANY COMBINATION: CPT

## 2021-12-30 PROCEDURE — 93308 TTE F-UP OR LMTD: CPT

## 2021-12-30 PROCEDURE — 86901 BLOOD TYPING SEROLOGIC RH(D): CPT

## 2021-12-30 PROCEDURE — 76000 FLUOROSCOPY <1 HR PHYS/QHP: CPT

## 2021-12-30 PROCEDURE — 85610 PROTHROMBIN TIME: CPT

## 2021-12-30 PROCEDURE — 99261: CPT

## 2021-12-30 PROCEDURE — 36430 TRANSFUSION BLD/BLD COMPNT: CPT

## 2021-12-30 PROCEDURE — P9040: CPT

## 2021-12-30 PROCEDURE — 84484 ASSAY OF TROPONIN QUANT: CPT

## 2021-12-30 PROCEDURE — 82550 ASSAY OF CK (CPK): CPT

## 2021-12-30 PROCEDURE — 99291 CRITICAL CARE FIRST HOUR: CPT | Mod: 25

## 2021-12-30 PROCEDURE — 71045 X-RAY EXAM CHEST 1 VIEW: CPT

## 2021-12-30 PROCEDURE — 86900 BLOOD TYPING SEROLOGIC ABO: CPT

## 2021-12-30 PROCEDURE — 93306 TTE W/DOPPLER COMPLETE: CPT

## 2021-12-30 PROCEDURE — C1894: CPT

## 2021-12-30 PROCEDURE — 80074 ACUTE HEPATITIS PANEL: CPT

## 2021-12-30 PROCEDURE — C1786: CPT

## 2021-12-30 PROCEDURE — 82607 VITAMIN B-12: CPT

## 2021-12-30 PROCEDURE — 85027 COMPLETE CBC AUTOMATED: CPT

## 2021-12-30 PROCEDURE — 93321 DOPPLER ECHO F-UP/LMTD STD: CPT

## 2021-12-30 PROCEDURE — 85025 COMPLETE CBC W/AUTO DIFF WBC: CPT

## 2021-12-30 PROCEDURE — 82553 CREATINE MB FRACTION: CPT

## 2021-12-30 PROCEDURE — 85730 THROMBOPLASTIN TIME PARTIAL: CPT

## 2021-12-30 PROCEDURE — 85652 RBC SED RATE AUTOMATED: CPT

## 2021-12-30 PROCEDURE — 71275 CT ANGIOGRAPHY CHEST: CPT

## 2021-12-30 PROCEDURE — C1769: CPT

## 2021-12-30 PROCEDURE — C1758: CPT

## 2021-12-30 PROCEDURE — 87635 SARS-COV-2 COVID-19 AMP PRB: CPT

## 2021-12-30 PROCEDURE — 80053 COMPREHEN METABOLIC PANEL: CPT

## 2021-12-30 PROCEDURE — 94660 CPAP INITIATION&MGMT: CPT

## 2022-01-11 NOTE — ED CLERICAL - NS ED CLERK UNITS
· Reports is not currently taking outpatient statin medication  · Needs outpatient follow-up with PCP  5NOR/500B

## 2022-02-28 NOTE — ED ADULT NURSE NOTE - BREATHING, MLM
From: Slickliane Muhammad  To: Hank Baljitharjinder  Sent: 2/28/2022 1:32 PM EST  Subject: Headaches    This message is being sent by Akilah Baron on behalf of Franklin Muhammad. Go Alvarado has still been getting headaches despite going to eye doctor and getting glasses to rest her eyes. Her first neurology appointment isn't until March 24th. I'm going to pick her up early today. Should I bring her in to get a doctor's note? I kind of think it's the weather, but there's no telling since they occur almost daily.     Thanks,  Jose Lawton Spontaneous, unlabored and symmetrical

## 2022-05-24 NOTE — ED ADULT NURSE NOTE - BREATHING, MLM
Start Pramipexole 0.25 mg, one tablet at night, 2 hours before bedtime. If sleep problems do not improve, increase to two tablets after one week. If still not better after another week, please call and notify us      ________________________________________________    Patient Education     Pramipexole Oral Tablet 0.25 mg  Uses  This medicine is used for the following purposes:  · muscle aches  · Parkinson's disease  · restless leg syndrome  Instructions  This medicine may be taken with or without food.  You may take with food to prevent stomach upset.  It is very important that you take the medicine at about the same time every day. It will work best if you do this.  Store at room temperature in a dry place. Do not keep in the bathroom.  Keep the medicine away from heat and light.  It is important that you keep taking each dose of this medicine on time even if you are feeling well.  If you forget to take a dose on time, take it as soon as you remember. If it is almost time for the next dose, do not take the missed dose. Return to your normal dosing schedule. Do not take 2 doses of this medicine at one time.  Please tell your doctor and pharmacist about all the medicines you take. Include both prescription and over-the-counter medicines. Also tell them about any vitamins, herbal medicines, or anything else you take for your health.  If your symptoms do not improve or they worsen while on this medicine, contact your doctor.  Do not suddenly stop taking this medicine. Check with your doctor before stopping.  Cautions  Tell your doctor and pharmacist if you ever had an allergic reaction to a medicine. Symptoms of an allergic reaction can include trouble breathing, skin rash, itching, swelling, or severe dizziness.  Some patients taking this medicine have experienced serious side effects. Please speak with your doctor to understand the risks and benefits associated with this medicine.  Do not use the medication any more  than instructed.  This medicine may cause dizziness or fainting, especially after exercising or in hot weather. Be very careful when standing or sitting up quickly.  Your ability to stay alert or to react quickly may be impaired by this medicine. Do not drive or operate machinery until you know how this medicine will affect you.  Please check with your doctor before drinking alcohol while on this medicine.  If you drink more than a few alcoholic beverages each day, ask your doctor whether you should be on this medicine.  Contact your doctor if you notice a change in the amount or darkening of your urine.  Family should check on the patient often. Call the doctor if patient becomes more depressed or shows changes in behavior.  Call the doctor if there are any signs of confusion or unusual changes in behavior.  Tell the doctor or pharmacist if you are pregnant, planning to be pregnant, or breastfeeding.  Ask your pharmacist if this medicine can interact with any of your other medicines. Be sure to tell them about all the medicines you take.  Please tell all your doctors and dentists that you are on this medicine before they provide care.  Do not start or stop any other medicines without first speaking to your doctor or pharmacist.  Do not share this medicine with anyone who has not been prescribed this medicine.  Side Effects  The following is a list of some common side effects from this medicine. Please speak with your doctor about what you should do if you experience these or other side effects.  · loss of balance  · constipation  · dizziness  · drowsiness or sedation  · dry mouth  · lack of energy and tiredness  · headaches  · low blood pressure  · lightheadedness  · nausea  · stomach upset or abdominal pain  · sweating  · vomiting  Call your doctor or get medical help right away if you notice any of these more serious side effects:  · agitated feeling or trouble sleeping  · change in behavior  · chest  pain  · changes in memory, mood, or thinking  · confusion  · depression or feeling sad  · swelling of the legs, feet, and hands  · fainting  · hallucinations (unusual thoughts, seeing or hearing things that are not real)  · fast or irregular heart beats  · unusual strong urges - such as increased need to shop, lion, or have sex  · memory problems or loss  · uncontrollable movement of face, tongue, arms or legs  · muscle aches, spasms or abnormal movements  · muscle pain  · tight or rigid muscles  · muscle weakness  · falling asleep suddenly during usual daily activities  · restlessness  · problems with sexual functions or desire  · shakiness  · skin changes - brown or black area with uneven edges or coloring  · change in the size or color of a skin mole  · unusual or unexplained tiredness or weakness  · blurring or changes of vision  A few people may have an allergic reactions to this medicine. Symptoms can include difficulty breathing, skin rash, itching, swelling, or severe dizziness. If you notice any of these symptoms, seek medical help quickly.  Extra  Please speak with your doctor, nurse, or pharmacist if you have any questions about this medicine.  [email or web address]  IMPORTANT NOTE: This document tells you briefly how to take your medicine, but it does not tell you all there is to know about it.Your doctor or pharmacist may give you other documents about your medicine. Please talk to them if you have any questions.Always follow their advice. There is a more complete description of this medicine available in English.Scan this code on your smartphone or tablet or use the web address below. You can also ask your pharmacist for a printout. If you have any questions, please ask your pharmacist.   © 2021 [employer].             Thank you for choosing the Pulmonary Services Department, at [facility], as your Pulmonary Specialists.  We actively use  feedback to constantly improve and deliver the best care possible. To provide the best experience, we are collecting feedback from you on how we performed.  You may receive a survey in the mail or through your e-mail to evaluate how we did. Please take a moment and share your thoughts.      If for any reason you feel that we did not meet your expectations or you want to share a positive experience, please give us a call. Your feedback helps us know how we are doing and what we can be doing better.    Office hours: 8:00 am to 4:30 pm, Monday - Friday  Phone: [phone] Option #2      YOUR TEST RESULTS    If you have any concerns regarding any testing ordered with your visit, please contact our office at the above number.    Otherwise, your test results will be communicated to you in one of the following ways:    - Follow-up office visit  - Phone call  - Through [facility]  - Mailed letter      If you are prescribed an inhaler or a medicine that you find to be too expensive, please be aware that your physician has no way to know what your cost will be. All copayments are specific to you and your insurance plan.    If you find the medication prescribed to be too expensive, please consider:  • Do you have a deductible you need to meet?  • Are you in your coverage gap (or donut hole)?  • Inhalers are very expensive, and most are only available as brand name (costing more than $500 without insurance)    If you are unable to afford your copay, please:  • Call your insurance company and ask which medication would be the least expensive for you (the telephone number is often located on the back of your insurance card under “Member Services”)  • Contact our office with the name or names of alternate medications so we can consider prescribing for you    Thank You,  Pulmonary Services                                                                                                                                                                                                                                                                      [phone]     Spontaneous, unlabored and symmetrical

## 2022-05-26 NOTE — PATIENT PROFILE ADULT - NSPROHMGUHEMODIALLASTDT_GEN_A_NUR
POPPY Vaz   21-Oct-2018

## 2022-06-08 NOTE — H&P ADULT - PROBLEM/PLAN-5
Sue Mendez was seen and treated in our emergency department on 6/8/2022. She may return to work on 06/10/2022. Patient seen and evaluated in the ED today. Please excuse patient from work until date noted for recovery. If you have any questions or concerns, please don't hesitate to call.       George Cole PA-C DISPLAY PLAN FREE TEXT

## 2022-06-09 NOTE — ED PROVIDER NOTE - WR INTERPRETATION 1
"no new focal infiltrate, left pleural effusion evident

## 2022-06-28 NOTE — H&P ADULT - NSHPPOADEEPVENOUSTHROMB_GEN_A_CORE
EKG and labs today    Hold aspirin as recommended by Dr Candis Cook    Can take carvedilol morning of procedure, early with small sip of water. Resume rest of medications after procedure. Due to low blood sugar readings, decrease Basaglar to 18 units daily. Continue Novolog same dose. Plan for follow-up 6 months, sooner as needed if new issues or concerns arise. no

## 2022-06-30 NOTE — PATIENT PROFILE ADULT - TRANSPORTATION
MD notified d/t pt having increased anxiety after talking with psych and is requesting for Seroquel dose to be more frequent.   - Seroquel changed to BID    
no

## 2022-08-10 NOTE — PATIENT PROFILE ADULT - LAST ORAL INTAKE
02-Jan-2019 Ivermectin Counseling:  Patient instructed to take medication on an empty stomach with a full glass of water.  Patient informed of potential adverse effects including but not limited to nausea, diarrhea, dizziness, itching, and swelling of the extremities or lymph nodes.  The patient verbalized understanding of the proper use and possible adverse effects of ivermectin.  All of the patient's questions and concerns were addressed.

## 2023-04-03 NOTE — PATIENT PROFILE ADULT - DOES PATIENT HAVE ADVANCE DIRECTIVE
Kyle Bernal called to request a refill on her medication.       Last office visit : 2/20/2023   Next office visit : 4/3/2023     Requested Prescriptions     Pending Prescriptions Disp Refills    baclofen (LIORESAL) 10 MG tablet [Pharmacy Med Name: BACLOFEN 10MG TABLETS] 90 tablet 2     Sig: TAKE 1 TABLET BY MOUTH THREE TIMES DAILY AS NEEDED FOR MUSCLE SPASM OR NECK PAIN            Alexys Romano MA
no

## 2023-04-19 NOTE — DISCHARGE NOTE PROVIDER - NSDCADMDATE_GEN_ALL_CORE_FT
Airway    Performed by: Bailey Talbot MD  Authorized by: Bailey Talbot MD    Final Airway Type:  Supraglottic airway  Final Supraglottic Airway:  Air-Q  SGA Size*:  1.5  Attempts*:  1  Number of Other Approaches Attempted:  0   Patient Identified, Procedure confirmed, Emergency equipment available and Safety protocols followed  Location:  OR  Urgency:  Elective  Difficult Airway: No    Indications for Airway Management:  Anesthesia  Spontaneous Ventilation: present    Sedation Level:  Anesthetized  Mask Difficulty Assessment:  1 - vent by mask      
18-Sep-2020 14:08

## 2023-06-12 NOTE — DISCHARGE NOTE NURSING/CASE MANAGEMENT/SOCIAL WORK - NSDCPEPTSTROKESIGNS_GEN_ALL_CORE
Sudden numbness or weakness of the face, arm, or leg, especially on one side of the body. Confusion, trouble speaking or understanding. Trouble seeing in one or both eyes. Trouble walking, dizziness, loss of balance or coordination. Severe headache. within functional limits

## 2023-09-01 NOTE — H&P ADULT - PROBLEM SELECTOR PLAN 2
K 6.0 on admission   EKG shows non specific ST and T wave changes, No tall T waves or sine waves   S/p Insulin IV and calcium gluconate   Repeat K 4.3  Will get dialysis and repeat BMP  Renal Diet Continue Regimen: Hydrocortisone cream as needed Render In Strict Bullet Format?: No Detail Level: Zone

## 2023-10-18 NOTE — PATIENT PROFILE ADULT - LIVES WITH
60 year old female presents for follow up of rectal pain and anal fissure. Sick visit 2/23/2023 with rectal pain since the colonoscopy; sharp and feels like a razor blade when having a bowel movement. Dx with a fissure and NTG started which helped some but rectal pain recurred. Rectal pain wakes her up at night and having a bowel movement is painful. No blood. No drainage; no fevers or chills. Hyoscyamine was prescribed and Nitro cream was restarted and both help some. Pt at increased risk for CRC with hx of polyps. Presented 1/2023 with one year of diarrhea. Stool studies showed possible EPI. Currently, she takes Creon daily but she is still having 4-5 bowel movements a day. Colonoscopy 2/6/23 and showed 2 adenomatous polyps, otherwise normal and no colitis on bx. Of note, she had a total thyroidectomy and 2 rounds of radioactive iodine around 2016. She has a follow up in 2/2023. Nursing home

## 2023-11-25 NOTE — ED ADULT NURSE NOTE - ALCOHOL PRE SCREEN (AUDIT - C)
Interval History:  Patient seen and examined.  No acute events overnight.  Has not had any further episodes of vomiting.  H/H remains stable.  Awaiting EGD today.    Review of Systems   Gastrointestinal:  Positive for abdominal pain and vomiting.        Hematemesis   All other systems reviewed and are negative.    Objective:     Vital Signs (Most Recent):  Temp: 96.9 °F (36.1 °C) (11/24/23 2338)  Pulse: 86 (11/25/23 0805)  Resp: 16 (11/25/23 0805)  BP: (!) 123/92 (11/25/23 0455)  SpO2: 97 % (11/25/23 0805) Vital Signs (24h Range):  Temp:  [96.9 °F (36.1 °C)-98.8 °F (37.1 °C)] 96.9 °F (36.1 °C)  Pulse:  [74-86] 86  Resp:  [13-21] 16  SpO2:  [97 %-100 %] 97 %  BP: (100-144)/(59-92) 123/92     Weight: 83.5 kg (184 lb 1.4 oz)  Body mass index is 28.83 kg/m².    Intake/Output Summary (Last 24 hours) at 11/25/2023 0821  Last data filed at 11/25/2023 0553  Gross per 24 hour   Intake 543.14 ml   Output --   Net 543.14 ml           Physical Exam  Constitutional:       General: He is not in acute distress.     Appearance: He is normal weight. He is not ill-appearing, toxic-appearing or diaphoretic.   HENT:      Mouth/Throat:      Mouth: Mucous membranes are moist.      Pharynx: Oropharynx is clear.   Cardiovascular:      Rate and Rhythm: Normal rate and regular rhythm.      Pulses: Normal pulses.      Heart sounds: Normal heart sounds.   Pulmonary:      Effort: Pulmonary effort is normal. No respiratory distress.      Breath sounds: Normal breath sounds.   Abdominal:      General: Bowel sounds are normal. There is no distension.      Palpations: Abdomen is soft.      Tenderness: There is abdominal tenderness (Umbilical area). There is no guarding.   Skin:     General: Skin is warm and dry.      Coloration: Skin is not jaundiced or pale.   Neurological:      Mental Status: He is alert and oriented to person, place, and time. Mental status is at baseline.   Psychiatric:         Mood and Affect: Mood normal.         Behavior:  Behavior normal.             Significant Labs: All pertinent labs within the past 24 hours have been reviewed.  CBC:   Recent Labs   Lab 11/23/23  1804 11/24/23  0431 11/25/23  0453   WBC 6.16 5.72 5.81   HGB 9.6* 9.2* 10.8*   HCT 31.7* 29.8* 36.3*    236 244       CMP:   Recent Labs   Lab 11/24/23  0431 11/25/23  0453    140   K 4.2 4.2    102   CO2 25 27    110   BUN 15 13   CREATININE 1.1 1.2   CALCIUM 8.9 9.2   PROT 6.6 7.4   ALBUMIN 3.6 3.9   BILITOT 0.3 0.3   ALKPHOS 91 102   AST 24 15   ALT 38 29   ANIONGAP 9 11         Significant Imaging: I have reviewed all pertinent imaging results/findings within the past 24 hours.   Statement Selected

## 2023-12-29 NOTE — ED ADULT NURSE NOTE - NSFALLRSKASSISTTYPE_ED_ALL_ED
[FreeTextEntry1] : 28 y/o female with PMH of vitiligo and recent diagnosis of hypothyroidism here for f/u. New left leg pain.  #Left foot and leg pain #Back pain #Sciatica - worsening since past 3 months - positive Straight leg raise - no red flags or new associated trauma - PT referral placed - Xray of lumbar spine and left foot placed - Mobic 15mg daily for 10 days prescribed   # Hypothyroidism - currently asymptomatic  - TSH 12/23: 3.17 (wnl); 4.54 in 9/23 - c/w levothyroxine  #Vitiligo - continue tacrolimus 0.1% ointment cream daily  # HLD - Can be from uncontrolled hypothyroidism. - Low fat diet advised.  #HCM Pap smear 11/23 normal Refused flu vaccine  RTC in 6 months or PRN
Toileting/Standing/Walking

## 2024-04-29 NOTE — DISCHARGE NOTE PROVIDER - PROVIDER RX CONTACT NUMBER
_  Chief Complaint   Patient presents with    Peripheral Neuropathy    Fatigue    Palpitations    Medication Problem     Stopped aromasin d/t side effects      DIAGNOSIS:        Stage T2 a N0 M0 left breast lower outer quadrant invasive lobular carcinoma, ER positive, AR positive, HER-2/janiya not amplified.  Evidence of local recurrence of the left breast lobular carcinoma.  Biopsy-proven April 3, 2023  Status post bilateral mastectomy with left breast T1 cN0 M0 invasive lobular carcinoma ER positive, AR positive, HER2/janiya not amplified  Sarcoidosis  Multiple comorbidities as listed   CURRENT THERAPY:         Status post left lumpectomy May 18, 2021  Declined radiation therapy  Initially started on Arimidex by her surgeon and she could not tolerate it  Prescribed Femara but she decided to stop it 3 weeks after initiating treatment due to side effects  Declines any further endocrine therapy  Status post bilateral mastectomy Shannan 15, 2023  Agreed on trying Aromasin July 2023. Could not tolerate it.   BRIEF CASE HISTORY:       Ms. Sumi Ziegler is a very pleasant 83 y.o. female with history of multiple comorbidities as listed.  The patient had bilateral screening mammogram August 20, 2020.  She was noted to have suspicious mass in the left breast.  Diagnostic mammogram and ultrasound and biopsy done September 4, 2020.  Patient was noted to have suspicious 1 cm mass in the left breast lower outer quadrant.  Biopsy showed invasive lobular carcinoma.  ER positive, AR positive, HER-2/janiya not amplified.  Patient was evaluated by her surgeon but she did not want to have the surgery due to the corona.  She was started on endocrine therapy with Arimidex.  Patient started treatment in February 2021 due to increasing side effects.  Subsequently she was evaluated again by her surgeon and she had lumpectomy 5/18/2021.  Final pathology results showed 2.4 cm invasive lobular carcinoma with clear margins.  Home lymph 
[phone]

## 2024-05-03 NOTE — PATIENT PROFILE ADULT - TOBACCO USE
Never smoker Detail Level: Detailed Depth Of Biopsy: dermis Was A Bandage Applied: Yes Size Of Lesion In Cm: 0 Biopsy Type: H and E Biopsy Method: Personna blade Anesthesia Type: 1% lidocaine with epinephrine Anesthesia Volume In Cc: 0.5 Hemostasis: Monsel's and Electrocautery Wound Care: Vaseline Dressing: Band-Aid Destruction After The Procedure: No Type Of Destruction Used: Curettage Curettage Text: The wound bed was treated with curettage after the biopsy was performed. Cryotherapy Text: The wound bed was treated with cryotherapy after the biopsy was performed. Electrodesiccation Text: The wound bed was treated with electrodesiccation after the biopsy was performed. Electrodesiccation And Curettage Text: The wound bed was treated with electrodesiccation and curettage after the biopsy was performed. Silver Nitrate Text: The wound bed was treated with silver nitrate after the biopsy was performed. Lab: -0415 Consent: Written consent was obtained and risks were reviewed including but not limited to scarring, infection, bleeding, scabbing, incomplete removal, nerve damage and allergy to anesthesia. Post-Care Instructions: I reviewed with the patient in detail post-care instructions. Patient is to keep the biopsy site dry overnight, and then apply Vaseline twice daily until healed. Patient may apply hydrogen peroxide soaks to remove any crusting. Notification Instructions: Patient will be notified of biopsy results. However, patient instructed to call the office if not contacted within 2 weeks. Billing Type: Third-Party Bill Information: Selecting Yes will display possible errors in your note based on the variables you have selected. This validation is only offered as a suggestion for you. PLEASE NOTE THAT THE VALIDATION TEXT WILL BE REMOVED WHEN YOU FINALIZE YOUR NOTE. IF YOU WANT TO FAX A PRELIMINARY NOTE YOU WILL NEED TO TOGGLE THIS TO 'NO' IF YOU DO NOT WANT IT IN YOUR FAXED NOTE.

## 2024-05-15 NOTE — ED PROVIDER NOTE - NS ED MD DISPO DISCHARGE
Called patient about the lovenox as he reported to the [facility] nurse. Medically advised patient that he does not need to take anticoagulation as he is ambulatory.   
Home

## 2024-07-17 NOTE — H&P ADULT - NSICDXPASTSURGICALHX_GEN_ALL_CORE_FT
[facility]  [address]  [address]  [place] [address]  Dept: [phone]  Dept Fax: [phone]  Loc: [phone]     7/17/2024    Visit type: Acute care    Reason for Visit: Vaginal Bleeding (Pt reports 1 episode of vaginal bleeding 5 days ago. No pain/discomfort to area. )       ASSESSMENT/PLAN   1. Post-menopause bleeding  -     US PELVIS COMPLETE NON-OB TRANSABDOMINAL AND TRANSVAGINAL; Future  2. Hyperglycemia  Comments:  Stable advised lifestyle medicine diet exercise lose weight  3. Primary hypertension  Comments:  Stable advised DASH diet continue medications  4. Mild episode of recurrent major depressive disorder (HCC)  Comments:  Stable continue the same medications    No follow-ups on file.  No orders of the defined types were placed in this encounter.       Subjective        Subjective    HPI:  Patient: Laura Wong is a 60 y.o. female with a past medical history of depression hypertension hyperlipidemia hyperglycemia complaining of vaginal bleeding for the last 1 week no abdominal cramps      Review of Systems   Constitutional:  Negative for appetite change, chills, diaphoresis, fatigue and unexpected weight change.   HENT:  Negative for congestion, hearing loss, sinus pain and voice change.    Eyes:  Negative for visual disturbance.   Respiratory:  Negative for cough, chest tightness, shortness of breath and wheezing.    Cardiovascular:  Negative for chest pain, palpitations and leg swelling.   Gastrointestinal:  Negative for abdominal pain, blood in stool, constipation, diarrhea and nausea.   Endocrine: Negative for cold intolerance, heat intolerance and polyuria.   Genitourinary:  Positive for vaginal bleeding. Negative for difficulty urinating, dysuria, hematuria, menstrual problem, pelvic pain and vaginal discharge.   Musculoskeletal:  Negative for arthralgias, gait problem, joint swelling and myalgias.   Skin:  Negative for pallor and 
PAST SURGICAL HISTORY:  A-V fistula

## 2024-08-30 NOTE — PROGRESS NOTE ADULT - PROBLEM/PLAN-7
I sent patient a message through [employer].   Korina Castro M.D.     
Patient left a message 08/28 regarding the over the counter joint supplement that Dr. Castro talked about during his appointment. Was unable to locate any notes, please contact regarding this.  
DISPLAY PLAN FREE TEXT

## 2024-09-25 NOTE — ED PROVIDER NOTE - HISTORY ATTESTATION, MLM
Scheduled date of colonoscopy (as of today): 10/21/24  Physician performing colonoscopy: Sarah  Location of colonoscopy: [place]  Bowel prep reviewed with patient: Clenpiq  Instructions reviewed with patient by: Latesha ORTIZ  Clearances:    I have reviewed and confirmed nurses' notes...

## 2024-11-04 NOTE — ED PROVIDER NOTE - CPE EDP RESP NORM
normal... Mark, Lissette Broderick, Reginaldo Galloway, Dorie Rosario, Tammy Barksdale, Nik Sanchez, Arminda Abbott, Carmen Youssef, Allen Choi, Karin Webster, Charly Pleitez, Josue Kim, Cecilia Carpio, Lety GUERRIER

## 2024-11-28 NOTE — DISCHARGE NOTE NURSING/CASE MANAGEMENT/SOCIAL WORK - PATIENT PORTAL LINK FT
Yes You can access the FollowMyHealth Patient Portal offered by [facility] by registering at the following website: [email or web address]. By joining [employer]’s FollowMyHealth portal, you will also be able to view your health information using other applications (apps) compatible with our system.

## 2024-12-17 NOTE — PATIENT PROFILE ADULT - TRANSPORTATION
Medication: metformin (GLUCOPHAGE)  passed protocol.   Last office visit date: 7/25/24  Next appointment scheduled?: No; Outreach performed, left message for patient to call back.    Number of refills given: 1    no

## 2024-12-31 NOTE — PATIENT PROFILE ADULT - CENTRAL VENOUS CATHETER/PICC LINE
----- Message from Lillie sent at 12/31/2024 10:19 AM CST -----  MIRAPEX sent to  [facility]   no

## 2025-01-09 NOTE — ED PROVIDER NOTE - TOBACCO USE
Continued Stay SW/CM Assessment/Plan of Care Note     Active Substitute Decision Maker (SDM)    There are no active Substitute Decision Maker (SDM) on file.       Progress note:  Patient is medically ready for discharge and is pending insurance authorization for [facility].  No authorization as of Thursday afternoon.  SW/CM following.    See SW/CM flowsheets for other objective data.    Disposition Recommendations:  Preliminary discharge destination: Planned Discharge Destination: Rehabilitation/Skilled Care  SW/CM recommendation for discharge: SNF, Sub-acute nursing home    Destination Pharmacy:        Discharge Plan/Needs: [facility]    Continued Care and Services - Admitted Since 12/19/2024       Destination  Coordination complete.      Service Provider Request Status Selected Services Address Phone Fax    [facility]  [place]illed [place] [address] [place] [id number] [phone]    [facility] Accepted -- [address] [place] [phone]    [facility] Pending - Request Sent -- [address] [phone]    [facility] PAN Declined -- [address] [phone] [phone]    [facility] Declined -- [address] [phone]    [facility] [place] [facility] PAN Declined -- [address] [address] [phone]    [facility] Declined -- [address] [facility] [id number] [phone]    [facility] Declined -- [address] [address] [phone]                  Continued Care and Services - Linked Episodes Includes continued care and service providers from the active episodes linked to  this encounter, which are listed below      Care Transitions Episode start date: 12/28/2024   There are no active outsourced providers for this episode.                 Devices/ Equipment that need to be arranged for discharge: None at this time     Prior To Hospitalization:    Living Situation: Alone and residing at House    .  Support Systems: None   Home Devices/Equipment: Mobility assist device, Sensory assist device            Mobility Assist Devices: Wheelchair   Type of Service Prior to Hospitalization: Dialysis, Transportation services [facility]     Patient/Family discharge goal (s):  SNF, Sub-acute nursing home     Resources provided:   [facility] ([facility]), Skilled Nursing Facility       Prior Function  Bed Mobility: Independent (12/20/24 0600 : Teri Starks PT)  Transfers: Modified Independent (12/20/24 0600 : Milady Contreras OT)  Ambulation in the Home: Modified  Independent (12/20/24 0600 : Milady Contreras OT)  Ambulation in the Community: Modified Independent (12/20/24 0600 : Milady Contreras OT)    Current Function  Last Filed Values         Value Time User    Current Function  significantly below baseline level of function 1/5/2025  3:51 PM Teri Starks PT    Therapy Impairments  balance; activity tolerance; pain; safety awareness; cognition 1/5/2025  3:51 PM Teri Starks PT    ADLs Requiring Support  transfers; ambulation; stairs; bed mobility 1/5/2025  3:51 PM Teri Starks PT            Therapy Recommendations for Discharge:   PT:      Last Filed Values         Value Time User    PT Discharge Needs  therapy 5 or more times per week 1/5/2025  3:51 PM Teri Starks PT          OT:       Last Filed Values         Value Time User    OT Discharge Needs  therapy 5 or more times per week 1/5/2025  4:04 PM Milady Contreras OT          SLP:    Last Filed Values       None            Mobility Equipment  Recommended for Discharge: TBD      Barriers to Discharge  Identified Barriers to Discharge/Transition Planning: Medical necessity for acute care, Consult Pending/Clearance, Pending diagnostic results/procedure        Radha Tavares, [facility]  Inpatient   [phone]  [facility] [place]             Never smoker

## 2025-02-17 NOTE — DIETITIAN INITIAL EVALUATION ADULT. - BODY MASS INDEX
Graciela Tolbert  [facility]  NEPHRO [address]  Scheduled Appointment: 04/15/2025    Seema Love  [facility]  INTMED [address]  Scheduled Appointment: 05/15/2025     29

## 2025-06-19 NOTE — DISCHARGE NOTE NURSING/CASE MANAGEMENT/SOCIAL WORK - NSDCPEPT PROEDHF_GEN_ALL_CORE
Spoke with Lorraine Hurst, Charlieons current legal guardian, and provided instructions on how to access and complete the online questionnaires. Ms. Hurst explained that she currently has custody of Li, though it is not court-ordered, while Li's mother is incarcerated. She stated that prior to mom's incarceration, Dorian signed consent forms allowing Ms. Hurst to bring Li to all of her appointments.  
Call primary care provider for follow up after discharge/Activities as tolerated/Low salt diet/Monitor weight daily/Report signs and symptoms to primary care provider

## 2025-06-23 NOTE — PROGRESS NOTE ADULT - PROBLEM SELECTOR PLAN 1
-resolved with Lokelma      -Pt p/w  chest pain , resolved with sub nitro   - ECG : NSR with no NYA   - Troponins mildly elevated with Tmax of 0.12  - Pro-BNP 6715  - c/w aspirin, cardizem and statin   - f/u Echo  - likely will need Stress test   patient refused stress test   - Cardio Dr Morales

## 2025-07-11 NOTE — DISCHARGE NOTE NURSING/CASE MANAGEMENT/SOCIAL WORK - NSDCPEELIQUISDIET_GEN_ALL_CORE
Cont SGLT2i, improved.   Eat healthy foods you enjoy. Apixaban/Eliquis DOES NOT have a special diet. Limit your alcohol intake.

## 2025-07-29 NOTE — PATIENT PROFILE ADULT - NSASFALLNEEDSASSIST_GEN_A_NUR
[Feeling Poorly] : not feeling poorly (malaise) [Fever] : no fever [Wgt Loss (___ Lbs)] : no recent weight loss [Pallor] : not pale [Eye Discharge] : no eye discharge [Redness] : no redness [Change in Vision] : no change in vision [Nasal Stuffiness] : no nasal congestion [Sore Throat] : no sore throat [Earache] : no earache [Loss Of Hearing] : no hearing loss [Cyanosis] : no cyanosis [Edema] : no edema [Diaphoresis] : not diaphoretic [Chest Pain] : no chest pain or discomfort [Exercise Intolerance] : no persistence of exercise intolerance [Palpitations] : no palpitations [Orthopnea] : no orthopnea [Fast HR] : no tachycardia [Tachypnea] : not tachypneic [Wheezing] : no wheezing [Cough] : no cough [Shortness Of Breath] : not expressed as feeling short of breath [Vomiting] : no vomiting [Diarrhea] : no diarrhea [Abdominal Pain] : no abdominal pain [Decrease In Appetite] : appetite not decreased [Fainting (Syncope)] : no fainting [Seizure] : no seizures [Headache] : no headache [Dizziness] : no dizziness [Limping] : no limping [Joint Pains] : no arthralgias [Joint Swelling] : no joint swelling [Rash] : no rash [Wound problems] : no wound problems [Easy Bruising] : no tendency for easy bruising [Swollen Glands] : no lymphadenopathy [Easy Bleeding] : no ~M tendency for easy bleeding [Nosebleeds] : no epistaxis yes [Sleep Disturbances] : ~T no sleep disturbances [Hyperactive] : no hyperactive behavior [Depression] : no depression [Anxiety] : no anxiety [Failure To Thrive] : no failure to thrive [Short Stature] : short stature was not noted [Jitteriness] : no jitteriness [Heat/Cold Intolerance] : no temperature intolerance [Dec Urine Output] : no oliguria